# Patient Record
Sex: FEMALE | Race: WHITE | NOT HISPANIC OR LATINO | Employment: OTHER | URBAN - NONMETROPOLITAN AREA
[De-identification: names, ages, dates, MRNs, and addresses within clinical notes are randomized per-mention and may not be internally consistent; named-entity substitution may affect disease eponyms.]

---

## 2017-03-16 ENCOUNTER — TRANSCRIBE ORDERS (OUTPATIENT)
Dept: GENERAL RADIOLOGY | Facility: HOSPITAL | Age: 72
End: 2017-03-16

## 2017-03-16 ENCOUNTER — HOSPITAL ENCOUNTER (OUTPATIENT)
Dept: GENERAL RADIOLOGY | Facility: HOSPITAL | Age: 72
Discharge: HOME | End: 2017-03-16
Attending: INTERNAL MEDICINE | Admitting: INTERNAL MEDICINE

## 2017-03-16 DIAGNOSIS — N95.9 UNSPECIFIED MENOPAUSAL AND POSTMENOPAUSAL DISORDER: Primary | ICD-10-CM

## 2017-03-16 PROCEDURE — 77080 DXA BONE DENSITY AXIAL: CPT

## 2017-10-17 ENCOUNTER — HOSPITAL ENCOUNTER (OUTPATIENT)
Dept: PREADMISSION TESTING | Age: 72
Discharge: HOME | End: 2017-10-17
Payer: MEDICARE

## 2017-10-17 VITALS — WEIGHT: 168 LBS | BODY MASS INDEX: 27.99 KG/M2 | HEIGHT: 65 IN

## 2017-10-17 LAB
ANION GAP SERPL CALCULATED.3IONS-SCNC: 14 MMOL/L (ref 7–19)
BASOPHILS ABSOLUTE: 0.1 K/UL (ref 0–0.2)
BASOPHILS RELATIVE PERCENT: 0.6 % (ref 0–1)
BUN BLDV-MCNC: 12 MG/DL (ref 8–23)
CALCIUM SERPL-MCNC: 9.3 MG/DL (ref 8.8–10.2)
CHLORIDE BLD-SCNC: 100 MMOL/L (ref 98–111)
CO2: 26 MMOL/L (ref 22–29)
CREAT SERPL-MCNC: 0.6 MG/DL (ref 0.5–0.9)
EOSINOPHILS ABSOLUTE: 0.2 K/UL (ref 0–0.6)
EOSINOPHILS RELATIVE PERCENT: 2.3 % (ref 0–5)
GFR NON-AFRICAN AMERICAN: >60
GLUCOSE BLD-MCNC: 78 MG/DL (ref 74–109)
HCT VFR BLD CALC: 44.3 % (ref 37–47)
HEMOGLOBIN: 14.4 G/DL (ref 12–16)
LYMPHOCYTES ABSOLUTE: 2.6 K/UL (ref 1.1–4.5)
LYMPHOCYTES RELATIVE PERCENT: 29.7 % (ref 20–40)
MCH RBC QN AUTO: 30.6 PG (ref 27–31)
MCHC RBC AUTO-ENTMCNC: 32.5 G/DL (ref 33–37)
MCV RBC AUTO: 94.3 FL (ref 81–99)
MONOCYTES ABSOLUTE: 0.7 K/UL (ref 0–0.9)
MONOCYTES RELATIVE PERCENT: 8.3 % (ref 0–10)
NEUTROPHILS ABSOLUTE: 5.2 K/UL (ref 1.5–7.5)
NEUTROPHILS RELATIVE PERCENT: 58.9 % (ref 50–65)
PDW BLD-RTO: 13.4 % (ref 11.5–14.5)
PLATELET # BLD: 268 K/UL (ref 130–400)
PMV BLD AUTO: 11.4 FL (ref 9.4–12.3)
POTASSIUM SERPL-SCNC: 4.2 MMOL/L (ref 3.5–5)
RBC # BLD: 4.7 M/UL (ref 4.2–5.4)
SODIUM BLD-SCNC: 140 MMOL/L (ref 136–145)
WBC # BLD: 8.8 K/UL (ref 4.8–10.8)

## 2017-10-17 PROCEDURE — 80048 BASIC METABOLIC PNL TOTAL CA: CPT

## 2017-10-17 PROCEDURE — 85025 COMPLETE CBC W/AUTO DIFF WBC: CPT

## 2017-10-17 PROCEDURE — 93005 ELECTROCARDIOGRAM TRACING: CPT

## 2017-10-17 RX ORDER — LOSARTAN POTASSIUM 25 MG/1
25 TABLET ORAL DAILY
COMMUNITY
End: 2021-08-06

## 2017-10-17 RX ORDER — CAL/D3/MAG11/ZINC/COP/MANG/BOR 600 MG-800
2 TABLET ORAL DAILY
COMMUNITY
End: 2021-08-06

## 2017-10-17 RX ORDER — LEVOTHYROXINE SODIUM 0.12 MG/1
125 TABLET ORAL DAILY
COMMUNITY
End: 2022-01-12

## 2017-10-18 PROBLEM — S46.012A TRAUMATIC INCOMPLETE TEAR OF LEFT ROTATOR CUFF: Status: ACTIVE | Noted: 2017-10-18

## 2017-10-18 LAB
EKG P AXIS: 37 DEGREES
EKG P-R INTERVAL: 152 MS
EKG Q-T INTERVAL: 352 MS
EKG QRS DURATION: 76 MS
EKG QTC CALCULATION (BAZETT): 380 MS
EKG T AXIS: 29 DEGREES

## 2017-10-18 NOTE — OP NOTE
Patient Name: Osvaldo Patel  : 1945  MRN: [id number]    DATE of SURGERY: 10/19/17    SURGEON: Fabien [address] MD Cornelius    ASSISTANT: Valeri Gupta PA-C, was used as an assistant during this procedure and was utilized for patient/limb positioning, arthroscopic management during repair, wound closure, and postoperative dressing/sling application. PREOPERATIVE DIAGNOSIS:  1) Chronic traumatic incomplete rotator cuff tear of the Right shoulder  2) Acromioclavicular joint primary osteoarthritis, Right Shoulder   3) Subacromial impingement syndrome, Right Shoulder     POSTOPERATIVE DIAGNOSIS:  1) Chronic traumatic complete rotator cuff tear of the Right shoulder  2) Acromioclavicular joint primary osteoarthritis, Right Shoulder   3) Subacromial impingement syndrome, Right Shoulder     PROCEDURE PERFORMED:  1) Right Shoulder arthroscopic rotator cuff repair   2) Right Shoulder arthroscopic distal clavicle excision  3) Right Shoulder arthroscopic subacromial decompression    IMPLANTS: Arthrex 4.75 mm bioswivelock anchor    ANESTHESIA USED: General endotrachial anesthesia, interscalene block    ESTIMATED BLOOD LOSS: minimal    DRAINS: none     COMPLICATIONS: none    SPECIMENS: none    OPERATIVE INDICATIONS: This patient is a 71 y.o. 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 An MRI was obtained which showed the above named diagnoses. Pain was not relieved with conservative treatment consisting of anti-inflammatories, corticosteroid injections, physical therapy. Due to progressive pain and loss of function, surgical evaluation was discussed and the patient wished to proceed understanding risks, benefits, and alternatives.  The surgical indication were to relieve pain, improve function, and prevent future disability in regards to the shoulder pathology dictated in the aboved diagnoses. Risks included, but were not limited to, that of anesthesia, bleeding, infection, pain, damage to local structures, need for further surgery, failure of repair, stiffness, failure of implants, and loss of function. OPERATIVE FINDINGS:  1) Exam under anesthesia:  Full passive ROM, joint stable without laxity, skin intact  2) Glenohumeral Joint:       A) Chondral surfaces: Intact          B) Labrum: Intact without tear            C) Biceps: Intact without tear        D) Rotator Cuff:  Complete full thickness tear of the anterior supraspinatus, crescent shaped, bone/tissue adequate, good excursion  3) Subacromial space:         A) Large amount of dense vascular bursa         B) Type 2 acromium, hypertrophic coracoacromial ligament         C) Bursal surface rotator cuff:  Complete tear as above         D) AC joint: Hypertrophic with decreased joint space, osteophytes      PROCEDURE in DETAIL:  The patient was seen in the preoperative holding room, once again the informed consent was reviewed with the patient and signed. The site of surgery was marked with the patient's agreement. After being transported to the operating room, a timeout was performed identifying the correct patient as well as the operative site. Dose appropriate IV antibiotics were given prior to incision. The patient was positioned in the beach chair position, all bony prominences were protected and a sterile prep and drape was performed. A standard posterior arthroscopy portal was established and an outside-in technique was utilized to establish an anterior portal within the rotator interval.  An arthroscopic probe was inserted and a thorough exam of the glenohumeral joint was performed. Attention was then turned to the intraarticular portion of the rotator cuff. A probe was used to identify a complete tear of the rotator cuff and a shaver was used to debride the tendon.     The arthroscope was then transitioned to the subacromial space and an outside in technique was used to establish a lateral portal.  The arthroscopic shaver was introduced in the subacromial space and a complete bursectomy was performed with this device. Attention was turned to the anterior and lateral edge of the acromium where soft tissue was removed with a cautery device. Due to a prominent acromial spur causing impingement, an acromioplasty was performed with an arthroscopic jenna converting this to a flat type 1 morphology. The coracoaromial ligament was incised with a cautery device completing the subacromial decompression. Attention was then turned to the rotator cuff tear which was visualized from the intraarticular space. The tendon and greater tuberosity footprint were debrided followed by shuttling fibertape and fiberwire suture into the tear site with a suture passing device. Fixation was then performed to the greater tuberosity footprint with a 4.75 mm bioswivelock anchor in knotless fashion. The repair appeared to be anatomic. Attention was then turned to the acromioclavicular joint where a cautery device was utilized to remove soft tissue from the distal end of the clavicle revealing decreased joint space and osteophyte formation. An arthroscopic jenna was introduced through the anterior portal removing the entire articular portion of the distal clavicle. Less than 1 cm of bone was removed, preserving the posterior and superior ligaments to prevent instability of the joint. The arthroscope was transitioned anteriorly noting the decompression to be adequate. Portals were then closed with monocryl and a sterile dressing was applied followed by a sling. The patient was awakened from anesthesia, transported to the recovery room in stable condition.     POSTOP PLAN:    1) Discharge home with family  2) Follow up in 1 week for a clinical check  3) Follow the following protocol: Small/Medium RCR       Electronically

## 2017-10-19 ENCOUNTER — HOSPITAL ENCOUNTER (OUTPATIENT)
Age: 72
Setting detail: OUTPATIENT SURGERY
Discharge: HOME | End: 2017-10-19
Attending: ORTHOPAEDIC SURGERY | Admitting: ORTHOPAEDIC SURGERY
Payer: MEDICARE

## 2017-10-19 ENCOUNTER — ANESTHESIA EVENT (OUTPATIENT)
Dept: OPERATING ROOM | Age: 72
End: 2017-10-19
Payer: MEDICARE

## 2017-10-19 ENCOUNTER — ANESTHESIA (OUTPATIENT)
Dept: OPERATING ROOM | Age: 72
End: 2017-10-19
Payer: MEDICARE

## 2017-10-19 VITALS
HEART RATE: 76 BPM | DIASTOLIC BLOOD PRESSURE: 87 MMHG | TEMPERATURE: 97 F | OXYGEN SATURATION: 96 % | RESPIRATION RATE: 18 BRPM | SYSTOLIC BLOOD PRESSURE: 147 MMHG

## 2017-10-19 VITALS
SYSTOLIC BLOOD PRESSURE: 127 MMHG | TEMPERATURE: 97 F | RESPIRATION RATE: 3 BRPM | DIASTOLIC BLOOD PRESSURE: 77 MMHG | OXYGEN SATURATION: 94 %

## 2017-10-19 PROCEDURE — 2580000003 HC RX 258: Performed by: ANESTHESIOLOGY

## 2017-10-19 PROCEDURE — 6360000002 HC RX W HCPCS: Performed by: NURSE ANESTHETIST, CERTIFIED REGISTERED

## 2017-10-19 PROCEDURE — 6360000002 HC RX W HCPCS: Performed by: ORTHOPAEDIC SURGERY

## 2017-10-19 PROCEDURE — 6360000002 HC RX W HCPCS: Performed by: ANESTHESIOLOGY

## 2017-10-19 PROCEDURE — 64415 NJX AA&/STRD BRCH PLXS IMG: CPT | Performed by: NURSE ANESTHETIST, CERTIFIED REGISTERED

## 2017-10-19 PROCEDURE — S0028 INJECTION, FAMOTIDINE, 20 MG: HCPCS | Performed by: ANESTHESIOLOGY

## 2017-10-19 PROCEDURE — 3600000014 HC SURGERY LEVEL 4 ADDTL 15MIN: Performed by: ORTHOPAEDIC SURGERY

## 2017-10-19 PROCEDURE — 7100000000 HC PACU RECOVERY - FIRST 15 MIN: Performed by: ORTHOPAEDIC SURGERY

## 2017-10-19 PROCEDURE — 3600000004 HC SURGERY LEVEL 4 BASE: Performed by: ORTHOPAEDIC SURGERY

## 2017-10-19 PROCEDURE — 2500000003 HC RX 250 WO HCPCS: Performed by: ANESTHESIOLOGY

## 2017-10-19 PROCEDURE — C1776 JOINT DEVICE (IMPLANTABLE): HCPCS | Performed by: ORTHOPAEDIC SURGERY

## 2017-10-19 PROCEDURE — 6370000000 HC RX 637 (ALT 250 FOR IP): Performed by: ANESTHESIOLOGY

## 2017-10-19 PROCEDURE — 6370000000 HC RX 637 (ALT 250 FOR IP): Performed by: ORTHOPAEDIC SURGERY

## 2017-10-19 PROCEDURE — 3700000000 HC ANESTHESIA ATTENDED CARE: Performed by: ORTHOPAEDIC SURGERY

## 2017-10-19 PROCEDURE — 7100000010 HC PHASE II RECOVERY - FIRST 15 MIN: Performed by: ORTHOPAEDIC SURGERY

## 2017-10-19 PROCEDURE — 2580000003 HC RX 258: Performed by: ORTHOPAEDIC SURGERY

## 2017-10-19 PROCEDURE — 2720000001 HC MISC SURG SUPPLY STERILE $51-500: Performed by: ORTHOPAEDIC SURGERY

## 2017-10-19 PROCEDURE — 7100000001 HC PACU RECOVERY - ADDTL 15 MIN: Performed by: ORTHOPAEDIC SURGERY

## 2017-10-19 PROCEDURE — 3700000001 HC ADD 15 MINUTES (ANESTHESIA): Performed by: ORTHOPAEDIC SURGERY

## 2017-10-19 PROCEDURE — 7100000011 HC PHASE II RECOVERY - ADDTL 15 MIN: Performed by: ORTHOPAEDIC SURGERY

## 2017-10-19 PROCEDURE — 2720000003 HC MISC SUTURE/STAPLES/RELOADS/ETC: Performed by: ORTHOPAEDIC SURGERY

## 2017-10-19 PROCEDURE — 2720000010 HC SURG SUPPLY STERILE: Performed by: ORTHOPAEDIC SURGERY

## 2017-10-19 PROCEDURE — C1713 ANCHOR/SCREW BN/BN,TIS/BN: HCPCS | Performed by: ORTHOPAEDIC SURGERY

## 2017-10-19 PROCEDURE — 2720000000 HC MISC SURG SUPPLY STERILE $0-50: Performed by: ORTHOPAEDIC SURGERY

## 2017-10-19 PROCEDURE — 2500000003 HC RX 250 WO HCPCS: Performed by: NURSE ANESTHETIST, CERTIFIED REGISTERED

## 2017-10-19 PROCEDURE — A6258 TRANSPARENT FILM >16<=48 IN: HCPCS | Performed by: ORTHOPAEDIC SURGERY

## 2017-10-19 DEVICE — ANCHOR SUTURE BIOCOMP 4.75X19.1 MM SWIVELOCK C: Type: IMPLANTABLE DEVICE | Site: SHOULDER | Status: FUNCTIONAL

## 2017-10-19 RX ORDER — METOCLOPRAMIDE HYDROCHLORIDE 5 MG/ML
10 INJECTION INTRAMUSCULAR; INTRAVENOUS
Status: DISCONTINUED | OUTPATIENT
Start: 2017-10-19 | End: 2017-10-19 | Stop reason: HOSPADM

## 2017-10-19 RX ORDER — MORPHINE SULFATE 4 MG/ML
4 INJECTION, SOLUTION INTRAMUSCULAR; INTRAVENOUS EVERY 5 MIN PRN
Status: DISCONTINUED | OUTPATIENT
Start: 2017-10-19 | End: 2017-10-19 | Stop reason: HOSPADM

## 2017-10-19 RX ORDER — LIDOCAINE HYDROCHLORIDE 10 MG/ML
INJECTION, SOLUTION INFILTRATION; PERINEURAL PRN
Status: DISCONTINUED | OUTPATIENT
Start: 2017-10-19 | End: 2017-10-19 | Stop reason: SDUPTHER

## 2017-10-19 RX ORDER — SODIUM CHLORIDE, SODIUM LACTATE, POTASSIUM CHLORIDE, CALCIUM CHLORIDE 600; 310; 30; 20 MG/100ML; MG/100ML; MG/100ML; MG/100ML
INJECTION, SOLUTION INTRAVENOUS CONTINUOUS
Status: DISCONTINUED | OUTPATIENT
Start: 2017-10-19 | End: 2017-10-19 | Stop reason: HOSPADM

## 2017-10-19 RX ORDER — PROPOFOL 10 MG/ML
INJECTION, EMULSION INTRAVENOUS CONTINUOUS PRN
Status: DISCONTINUED | OUTPATIENT
Start: 2017-10-19 | End: 2017-10-19 | Stop reason: SDUPTHER

## 2017-10-19 RX ORDER — MIDAZOLAM HYDROCHLORIDE 1 MG/ML
2 INJECTION INTRAMUSCULAR; INTRAVENOUS
Status: COMPLETED | OUTPATIENT
Start: 2017-10-19 | End: 2017-10-19

## 2017-10-19 RX ORDER — FENTANYL CITRATE 50 UG/ML
50 INJECTION, SOLUTION INTRAMUSCULAR; INTRAVENOUS
Status: DISCONTINUED | OUTPATIENT
Start: 2017-10-19 | End: 2017-10-19 | Stop reason: HOSPADM

## 2017-10-19 RX ORDER — LABETALOL HYDROCHLORIDE 5 MG/ML
INJECTION, SOLUTION INTRAVENOUS PRN
Status: DISCONTINUED | OUTPATIENT
Start: 2017-10-19 | End: 2017-10-19 | Stop reason: SDUPTHER

## 2017-10-19 RX ORDER — PROPOFOL 10 MG/ML
INJECTION, EMULSION INTRAVENOUS PRN
Status: DISCONTINUED | OUTPATIENT
Start: 2017-10-19 | End: 2017-10-19 | Stop reason: SDUPTHER

## 2017-10-19 RX ORDER — ONDANSETRON 4 MG/1
4 TABLET, FILM COATED ORAL EVERY 8 HOURS PRN
Qty: 10 TABLET | Refills: 0 | Status: SHIPPED | OUTPATIENT
Start: 2017-10-19

## 2017-10-19 RX ORDER — ONDANSETRON 2 MG/ML
INJECTION INTRAMUSCULAR; INTRAVENOUS PRN
Status: DISCONTINUED | OUTPATIENT
Start: 2017-10-19 | End: 2017-10-19 | Stop reason: SDUPTHER

## 2017-10-19 RX ORDER — OXYCODONE HYDROCHLORIDE AND ACETAMINOPHEN 5; 325 MG/1; MG/1
1 TABLET ORAL PRN
Status: COMPLETED | OUTPATIENT
Start: 2017-10-19 | End: 2017-10-19

## 2017-10-19 RX ORDER — MORPHINE SULFATE 4 MG/ML
INJECTION, SOLUTION INTRAMUSCULAR; INTRAVENOUS
Status: DISCONTINUED
Start: 2017-10-19 | End: 2017-10-19 | Stop reason: HOSPADM

## 2017-10-19 RX ORDER — DEXAMETHASONE SODIUM PHOSPHATE 10 MG/ML
INJECTION INTRAMUSCULAR; INTRAVENOUS PRN
Status: DISCONTINUED | OUTPATIENT
Start: 2017-10-19 | End: 2017-10-19 | Stop reason: SDUPTHER

## 2017-10-19 RX ORDER — LIDOCAINE HYDROCHLORIDE 10 MG/ML
1 INJECTION, SOLUTION EPIDURAL; INFILTRATION; INTRACAUDAL; PERINEURAL
Status: DISCONTINUED | OUTPATIENT
Start: 2017-10-19 | End: 2017-10-19 | Stop reason: HOSPADM

## 2017-10-19 RX ORDER — MORPHINE SULFATE 4 MG/ML
4 INJECTION, SOLUTION INTRAMUSCULAR; INTRAVENOUS
Status: DISCONTINUED | OUTPATIENT
Start: 2017-10-19 | End: 2017-10-19 | Stop reason: HOSPADM

## 2017-10-19 RX ORDER — SODIUM CHLORIDE 0.9 % (FLUSH) 0.9 %
10 SYRINGE (ML) INJECTION PRN
Status: DISCONTINUED | OUTPATIENT
Start: 2017-10-19 | End: 2017-10-19 | Stop reason: HOSPADM

## 2017-10-19 RX ORDER — SODIUM CHLORIDE 0.9 % (FLUSH) 0.9 %
10 SYRINGE (ML) INJECTION EVERY 12 HOURS SCHEDULED
Status: DISCONTINUED | OUTPATIENT
Start: 2017-10-19 | End: 2017-10-19 | Stop reason: HOSPADM

## 2017-10-19 RX ORDER — MORPHINE SULFATE 4 MG/ML
2 INJECTION, SOLUTION INTRAMUSCULAR; INTRAVENOUS EVERY 5 MIN PRN
Status: DISCONTINUED | OUTPATIENT
Start: 2017-10-19 | End: 2017-10-19 | Stop reason: HOSPADM

## 2017-10-19 RX ORDER — LABETALOL HYDROCHLORIDE 5 MG/ML
5 INJECTION, SOLUTION INTRAVENOUS EVERY 10 MIN PRN
Status: DISCONTINUED | OUTPATIENT
Start: 2017-10-19 | End: 2017-10-19 | Stop reason: HOSPADM

## 2017-10-19 RX ORDER — DIPHENHYDRAMINE HYDROCHLORIDE 50 MG/ML
12.5 INJECTION INTRAMUSCULAR; INTRAVENOUS
Status: DISCONTINUED | OUTPATIENT
Start: 2017-10-19 | End: 2017-10-19 | Stop reason: HOSPADM

## 2017-10-19 RX ORDER — HYDRALAZINE HYDROCHLORIDE 20 MG/ML
5 INJECTION INTRAMUSCULAR; INTRAVENOUS EVERY 10 MIN PRN
Status: DISCONTINUED | OUTPATIENT
Start: 2017-10-19 | End: 2017-10-19 | Stop reason: HOSPADM

## 2017-10-19 RX ORDER — OXYCODONE HYDROCHLORIDE AND ACETAMINOPHEN 5; 325 MG/1; MG/1
2 TABLET ORAL PRN
Status: COMPLETED | OUTPATIENT
Start: 2017-10-19 | End: 2017-10-19

## 2017-10-19 RX ORDER — MEPERIDINE HYDROCHLORIDE 50 MG/ML
12.5 INJECTION INTRAMUSCULAR; INTRAVENOUS; SUBCUTANEOUS EVERY 5 MIN PRN
Status: DISCONTINUED | OUTPATIENT
Start: 2017-10-19 | End: 2017-10-19 | Stop reason: HOSPADM

## 2017-10-19 RX ORDER — SCOLOPAMINE TRANSDERMAL SYSTEM 1 MG/1
1 PATCH, EXTENDED RELEASE TRANSDERMAL
Status: DISCONTINUED | OUTPATIENT
Start: 2017-10-19 | End: 2017-10-19 | Stop reason: HOSPADM

## 2017-10-19 RX ORDER — ENALAPRILAT 2.5 MG/2ML
1.25 INJECTION INTRAVENOUS
Status: DISCONTINUED | OUTPATIENT
Start: 2017-10-19 | End: 2017-10-19 | Stop reason: HOSPADM

## 2017-10-19 RX ORDER — PROMETHAZINE HYDROCHLORIDE 25 MG/ML
6.25 INJECTION, SOLUTION INTRAMUSCULAR; INTRAVENOUS
Status: DISCONTINUED | OUTPATIENT
Start: 2017-10-19 | End: 2017-10-19 | Stop reason: HOSPADM

## 2017-10-19 RX ORDER — ROCURONIUM BROMIDE 10 MG/ML
INJECTION, SOLUTION INTRAVENOUS PRN
Status: DISCONTINUED | OUTPATIENT
Start: 2017-10-19 | End: 2017-10-19 | Stop reason: SDUPTHER

## 2017-10-19 RX ORDER — OXYCODONE AND ACETAMINOPHEN 10; 325 MG/1; MG/1
TABLET ORAL
Qty: 60 TABLET | Refills: 0 | Status: ON HOLD | OUTPATIENT
Start: 2017-10-19 | End: 2021-09-21 | Stop reason: SINTOL

## 2017-10-19 RX ORDER — ROPIVACAINE HYDROCHLORIDE 5 MG/ML
INJECTION, SOLUTION EPIDURAL; INFILTRATION; PERINEURAL PRN
Status: DISCONTINUED | OUTPATIENT
Start: 2017-10-19 | End: 2017-10-19 | Stop reason: SDUPTHER

## 2017-10-19 RX ADMIN — SODIUM CHLORIDE, SODIUM LACTATE, POTASSIUM CHLORIDE, AND CALCIUM CHLORIDE: 600; 310; 30; 20 INJECTION, SOLUTION INTRAVENOUS at 08:11

## 2017-10-19 RX ADMIN — DEXAMETHASONE SODIUM PHOSPHATE 10 MG: 10 INJECTION INTRAMUSCULAR; INTRAVENOUS at 08:24

## 2017-10-19 RX ADMIN — SODIUM CHLORIDE, SODIUM LACTATE, POTASSIUM CHLORIDE, AND CALCIUM CHLORIDE: 600; 310; 30; 20 INJECTION, SOLUTION INTRAVENOUS at 07:24

## 2017-10-19 RX ADMIN — LABETALOL HYDROCHLORIDE 5 MG: 5 INJECTION, SOLUTION INTRAVENOUS at 09:02

## 2017-10-19 RX ADMIN — LIDOCAINE HYDROCHLORIDE 50 MG: 10 INJECTION, SOLUTION INFILTRATION; PERINEURAL at 08:15

## 2017-10-19 RX ADMIN — OXYCODONE HYDROCHLORIDE AND ACETAMINOPHEN 2 TABLET: 5; 325 TABLET ORAL at 10:19

## 2017-10-19 RX ADMIN — MORPHINE SULFATE 4 MG: 4 INJECTION, SOLUTION INTRAMUSCULAR; INTRAVENOUS at 09:39

## 2017-10-19 RX ADMIN — LABETALOL HYDROCHLORIDE 5 MG: 5 INJECTION, SOLUTION INTRAVENOUS at 08:40

## 2017-10-19 RX ADMIN — PROPOFOL 140 MCG/KG/MIN: 10 INJECTION, EMULSION INTRAVENOUS at 08:18

## 2017-10-19 RX ADMIN — CEFAZOLIN SODIUM 1 G: 1 INJECTION, SOLUTION INTRAVENOUS at 08:22

## 2017-10-19 RX ADMIN — PROPOFOL 170 MG: 10 INJECTION, EMULSION INTRAVENOUS at 08:15

## 2017-10-19 RX ADMIN — MIDAZOLAM 2 MG: 1 INJECTION INTRAMUSCULAR; INTRAVENOUS at 07:45

## 2017-10-19 RX ADMIN — SODIUM CHLORIDE, SODIUM LACTATE, POTASSIUM CHLORIDE, AND CALCIUM CHLORIDE: 600; 310; 30; 20 INJECTION, SOLUTION INTRAVENOUS at 09:04

## 2017-10-19 RX ADMIN — SUGAMMADEX 160 MG: 100 INJECTION, SOLUTION INTRAVENOUS at 09:12

## 2017-10-19 RX ADMIN — ONDANSETRON HYDROCHLORIDE 4 MG: 2 SOLUTION INTRAMUSCULAR; INTRAVENOUS at 08:24

## 2017-10-19 RX ADMIN — FAMOTIDINE 20 MG: 10 INJECTION, SOLUTION INTRAVENOUS at 07:35

## 2017-10-19 RX ADMIN — ROPIVACAINE HYDROCHLORIDE 20 ML: 5 INJECTION, SOLUTION EPIDURAL; INFILTRATION; PERINEURAL at 07:49

## 2017-10-19 RX ADMIN — ROCURONIUM BROMIDE 50 MG: 10 INJECTION INTRAVENOUS at 08:15

## 2017-10-19 ASSESSMENT — PAIN DESCRIPTION - FREQUENCY: FREQUENCY: INTERMITTENT

## 2017-10-19 ASSESSMENT — PAIN DESCRIPTION - ORIENTATION: ORIENTATION: RIGHT;UPPER

## 2017-10-19 ASSESSMENT — PAIN DESCRIPTION - LOCATION: LOCATION: SHOULDER

## 2017-10-19 ASSESSMENT — PAIN SCALES - GENERAL
PAINLEVEL_OUTOF10: 6
PAINLEVEL_OUTOF10: 9
PAINLEVEL_OUTOF10: 9

## 2017-10-19 ASSESSMENT — PAIN DESCRIPTION - DESCRIPTORS: DESCRIPTORS: BURNING

## 2017-10-19 NOTE — ANESTHESIA PRE PROCEDURE
Department of Anesthesiology  Preprocedure Note       Name:  Boni Washburn   Age:  71 y.o.  :  1945                                          MRN:  [id number]         Date:  10/19/2017      Surgeon: Krystal Mclaughlin):  López Butler MD    Procedure: Procedure(s):  SHOULDER ARTHROSCOPY ROTATOR CUFF REPAIR/ DEBRIDEMENT BICEPS TENODESIS/ TENOTOMY SUBACROMIAL DECOMPRESSION/ DISTAL CLAVICLE EXCISION    Medications prior to admission:   Prior to Admission medications    Medication Sig Start Date End Date Taking? Authorizing Provider   levothyroxine (SYNTHROID) 125 MCG tablet Take 125 mcg by mouth Daily    Historical Provider, MD   losartan (COZAAR) 25 MG tablet Take 25 mg by mouth daily    Historical Provider, MD   CALTRATE 600+D PLUS MINERALS (CALTRATE) 600-800 MG-UNIT TABS tablet Take 2 tablets by mouth daily    Historical Provider, MD       Current medications:    Current Facility-Administered Medications   Medication Dose Route Frequency Provider Last Rate Last Dose    ceFAZolin (ANCEF) 1 g in dextrose 5 % 50 mL IVPB (premix)  1 g Intravenous Once López Butler MD        lactated ringers infusion   Intravenous Continuous López Butler MD           Allergies:     Allergies   Allergen Reactions    Diclofenac Other (See Comments)     Couldn't sleep and face turned blood red       Problem List:    Patient Active Problem List   Diagnosis Code    Traumatic incomplete tear of left rotator cuff S46.012A       Past Medical History:        Diagnosis Date    Arthritis     Hypertension     Hypoglycemia     if fasting for very long    Shoulder pain     incompleted rcr; possible injury    Thyroid disease        Past Surgical History:        Procedure Laterality Date    CHOLECYSTECTOMY      HYSTERECTOMY      WRIST SURGERY      spur excision       Social History:    Social History   Substance Use Topics    Smoking status: Never Smoker    Smokeless tobacco: Never Used    Alcohol use Yes      Comment: rare Counseling given: Not Answered      Vital Signs (Current): There were no vitals filed for this visit. BP Readings from Last 3 Encounters:   No data found for BP       NPO Status:                                                                                 BMI:   Wt Readings from Last 3 Encounters:   10/17/17 168 lb (76.2 kg)     There is no height or weight on file to calculate BMI.    CBC:   Lab Results   Component Value Date    WBC 8.8 10/17/2017    RBC 4.70 10/17/2017    HGB 14.4 10/17/2017    HCT 44.3 10/17/2017    MCV 94.3 10/17/2017    RDW 13.4 10/17/2017     10/17/2017       CMP:   Lab Results   Component Value Date     10/17/2017    K 4.2 10/17/2017     10/17/2017    CO2 26 10/17/2017    BUN 12 10/17/2017    CREATININE 0.6 10/17/2017    LABGLOM >60 10/17/2017    GLUCOSE 78 10/17/2017    CALCIUM 9.3 10/17/2017       POC Tests: No results for input(s): POCGLU, POCNA, POCK, POCCL, POCBUN, POCHEMO, POCHCT in the last 72 hours. Coags: No results found for: PROTIME, INR, APTT    HCG (If Applicable): No results found for: PREGTESTUR, PREGSERUM, HCG, HCGQUANT     ABGs: No results found for: PHART, PO2ART, RZG0VQB, PBZ2BGM, BEART, E0PWCAQQ     Type & Screen (If Applicable):  No results found for: [facility]    Anesthesia Evaluation  Patient summary reviewed and Nursing notes reviewed   history of anesthetic complications: PONV. Airway: Mallampati: II  TM distance: >3 FB   Neck ROM: full  Mouth opening: > = 3 FB Dental:          Pulmonary:negative ROS and normal exam                               Cardiovascular:    (+) hypertension:,       ECG reviewed               Beta Blocker:  Not on Beta Blocker         Neuro/Psych:   {neg ROS              GI/Hepatic/Renal: neg ROS            Endo/Other: negative ROS   (+) hypothyroidism: arthritis:.                  Abdominal:           Vascular:

## 2017-10-19 NOTE — H&P
Pt Name: Barber Rodriguez  MRN: [id number]  YOB: 1945  Date of evaluation: 10/19/2017    H&P including current review of systems was updated in the paper chart and/or the document previously scanned into the record. There have been no significant changes or new problems since the original evaluation. The patient's problems continue and indications for contemplated procedure have not changed.     Electronically signed by Michelle Limon MD on 10/19/2017 at 6:32 AM

## 2017-10-19 NOTE — DISCHARGE INSTR - DIET
 Good nutrition is important when healing from an illness, injury, or surgery. Follow any nutrition recommendations given to you during your hospital stay.    Resume usual diet

## 2018-03-02 ENCOUNTER — TELEPHONE (OUTPATIENT)
Dept: INTERNAL MEDICINE | Facility: CLINIC | Age: 73
End: 2018-03-02

## 2018-03-02 ENCOUNTER — LAB (OUTPATIENT)
Dept: LAB | Facility: HOSPITAL | Age: 73
End: 2018-03-02

## 2018-03-02 ENCOUNTER — OFFICE VISIT (OUTPATIENT)
Dept: INTERNAL MEDICINE | Facility: CLINIC | Age: 73
End: 2018-03-02

## 2018-03-02 VITALS
HEIGHT: 65 IN | DIASTOLIC BLOOD PRESSURE: 82 MMHG | TEMPERATURE: 97.8 F | HEART RATE: 83 BPM | OXYGEN SATURATION: 97 % | BODY MASS INDEX: 29.22 KG/M2 | WEIGHT: 175.4 LBS | RESPIRATION RATE: 16 BRPM | SYSTOLIC BLOOD PRESSURE: 145 MMHG

## 2018-03-02 DIAGNOSIS — E03.9 ACQUIRED HYPOTHYROIDISM: ICD-10-CM

## 2018-03-02 DIAGNOSIS — R06.02 SHORTNESS OF BREATH: ICD-10-CM

## 2018-03-02 DIAGNOSIS — Z00.00 ENCOUNTER FOR PREVENTIVE HEALTH EXAMINATION: ICD-10-CM

## 2018-03-02 DIAGNOSIS — M85.89 OSTEOPENIA OF MULTIPLE SITES: ICD-10-CM

## 2018-03-02 DIAGNOSIS — E03.9 ACQUIRED HYPOTHYROIDISM: Primary | ICD-10-CM

## 2018-03-02 DIAGNOSIS — Z12.39 SCREENING FOR BREAST CANCER: ICD-10-CM

## 2018-03-02 DIAGNOSIS — I10 ESSENTIAL HYPERTENSION: Primary | ICD-10-CM

## 2018-03-02 DIAGNOSIS — R05.9 COUGH: ICD-10-CM

## 2018-03-02 PROBLEM — E78.2 MIXED HYPERLIPIDEMIA: Status: ACTIVE | Noted: 2018-03-02

## 2018-03-02 PROBLEM — M54.2 CERVICALGIA: Status: ACTIVE | Noted: 2018-03-02

## 2018-03-02 PROBLEM — M15.9 OSTEOARTHRITIS OF MULTIPLE JOINTS: Status: ACTIVE | Noted: 2018-03-02

## 2018-03-02 PROBLEM — M54.2 CERVICALGIA: Status: RESOLVED | Noted: 2018-03-02 | Resolved: 2018-03-02

## 2018-03-02 LAB
ALBUMIN SERPL-MCNC: 4.1 G/DL (ref 3.5–5)
ALBUMIN/GLOB SERPL: 1.3 G/DL (ref 1.1–2.5)
ALP SERPL-CCNC: 71 U/L (ref 24–120)
ALT SERPL W P-5'-P-CCNC: 29 U/L (ref 0–54)
ANION GAP SERPL CALCULATED.3IONS-SCNC: 11 MMOL/L (ref 4–13)
ARTICHOKE IGE QN: 156 MG/DL (ref 0–99)
AST SERPL-CCNC: 34 U/L (ref 7–45)
BILIRUB SERPL-MCNC: 0.3 MG/DL (ref 0.1–1)
BUN BLD-MCNC: 16 MG/DL (ref 5–21)
BUN/CREAT SERPL: 23.9 (ref 7–25)
CALCIUM SPEC-SCNC: 9.4 MG/DL (ref 8.4–10.4)
CHLORIDE SERPL-SCNC: 102 MMOL/L (ref 98–110)
CHOLEST SERPL-MCNC: 221 MG/DL (ref 130–200)
CO2 SERPL-SCNC: 27 MMOL/L (ref 24–31)
CREAT BLD-MCNC: 0.67 MG/DL (ref 0.5–1.4)
DEPRECATED RDW RBC AUTO: 41.4 FL (ref 40–54)
ERYTHROCYTE [DISTWIDTH] IN BLOOD BY AUTOMATED COUNT: 12.8 % (ref 12–15)
GFR SERPL CREATININE-BSD FRML MDRD: 87 ML/MIN/1.73
GLOBULIN UR ELPH-MCNC: 3.2 GM/DL
GLUCOSE BLD-MCNC: 87 MG/DL (ref 70–100)
HCT VFR BLD AUTO: 41.7 % (ref 37–47)
HCV AB SER DONR QL: NEGATIVE
HCV S/C RATIO: 0.01 (ref 0–0.99)
HDLC SERPL-MCNC: 40 MG/DL
HGB BLD-MCNC: 13.9 G/DL (ref 12–16)
LDLC/HDLC SERPL: 3.48 {RATIO}
MCH RBC QN AUTO: 29.5 PG (ref 28–32)
MCHC RBC AUTO-ENTMCNC: 33.3 G/DL (ref 33–36)
MCV RBC AUTO: 88.5 FL (ref 82–98)
PLATELET # BLD AUTO: 269 10*3/MM3 (ref 130–400)
PMV BLD AUTO: 11.3 FL (ref 6–12)
POTASSIUM BLD-SCNC: 4.4 MMOL/L (ref 3.5–5.3)
PROT SERPL-MCNC: 7.3 G/DL (ref 6.3–8.7)
RBC # BLD AUTO: 4.71 10*6/MM3 (ref 4.2–5.4)
SODIUM BLD-SCNC: 140 MMOL/L (ref 135–145)
TRIGL SERPL-MCNC: 210 MG/DL (ref 0–149)
TSH SERPL DL<=0.05 MIU/L-ACNC: 0.28 MIU/ML (ref 0.47–4.68)
WBC NRBC COR # BLD: 8.15 10*3/MM3 (ref 4.8–10.8)

## 2018-03-02 PROCEDURE — 85027 COMPLETE CBC AUTOMATED: CPT | Performed by: NURSE PRACTITIONER

## 2018-03-02 PROCEDURE — 99214 OFFICE O/P EST MOD 30 MIN: CPT | Performed by: NURSE PRACTITIONER

## 2018-03-02 PROCEDURE — 36415 COLL VENOUS BLD VENIPUNCTURE: CPT

## 2018-03-02 PROCEDURE — 80053 COMPREHEN METABOLIC PANEL: CPT | Performed by: NURSE PRACTITIONER

## 2018-03-02 PROCEDURE — 86803 HEPATITIS C AB TEST: CPT | Performed by: NURSE PRACTITIONER

## 2018-03-02 PROCEDURE — 80061 LIPID PANEL: CPT | Performed by: NURSE PRACTITIONER

## 2018-03-02 PROCEDURE — 84443 ASSAY THYROID STIM HORMONE: CPT | Performed by: NURSE PRACTITIONER

## 2018-03-02 RX ORDER — CAL/D3/MAG11/ZINC/COP/MANG/BOR 600 MG-800
1 TABLET ORAL DAILY
COMMUNITY
End: 2023-03-28

## 2018-03-02 RX ORDER — LOSARTAN POTASSIUM 25 MG/1
25 TABLET ORAL DAILY
COMMUNITY
End: 2018-08-31 | Stop reason: SDUPTHER

## 2018-03-02 RX ORDER — LEVOTHYROXINE SODIUM 112 UG/1
112 TABLET ORAL DAILY
Qty: 30 TABLET | Refills: 5 | Status: SHIPPED | OUTPATIENT
Start: 2018-03-02 | End: 2018-09-05 | Stop reason: SDUPTHER

## 2018-03-02 RX ORDER — LEVOTHYROXINE SODIUM 0.12 MG/1
1 TABLET ORAL DAILY
COMMUNITY
End: 2018-03-02

## 2018-03-02 NOTE — TELEPHONE ENCOUNTER
Patient has refused statin therapy at this time.  We will continue to encourage this at future appointments.

## 2018-03-02 NOTE — TELEPHONE ENCOUNTER
Please advise that her cholesterol is elevated.  Total is 221, HDL is 40 and LDL is 156.  Triglycerides 210.  Her risk of having a heart attack or stroke within the next 10 years is 18.8%  Statin therapy is strongly advised for anything over 7.5%.  She said that she was strongly against these medications, however, please check to see if she is willing to try one herself.  She very well may not have the same reaction her  had.  We would recommend this for her.  Also, I need to adjust her levothyroxine. Her TSH was only 0.278, so I need to decrease her levothyroxine dose to 112 mcg.  We will need to recheck her TSH in 6 weeks also. The order will be in and she can just show up at the lab at her convenience. Please ask her what pharmacy she would like her meds to go to.

## 2018-03-02 NOTE — TELEPHONE ENCOUNTER
"Patient informed of her elevated cholesterol levels and risk for heart attack or stroke and was told we highly recommend starting statin therapy.  Patient said because of the history of her 's issues with statins, she does not want to start therapy right now, but said \"I will think about it.\"  Patient also informed of the change in her levothyroxine dosage and stated she will return to have her TSH level checked again around the 18th of next month. Patient would like her medications sent to [facility] on [address].  "

## 2018-03-02 NOTE — PROGRESS NOTES
CC: establish care; HTN, hypothyroid    History:  Ирина Randolph is a 72 y.o. female who presents today for evaluation of the above problems.    BP is elevated today, but normally runs <140/90 at home. She did have labs done in February and her TSH was normal.   She did have right rotator cuff repair this past October and is doing home therapy 2x per week still. She is extremely averse to statin therapy because her  had a severe reaction.  She does have a chronic cough and shortness of breath with exertion.  This has been a problem for 15 years. She has not had pulmonary function testing done in the past. She denies any history of tobacco use or second hand smoke exposure. She does have wheezing at night time.  She currently uses cough drops and OTC cough syrups to help.  She has had a history of anxiety/depression, however this is not currently a problem. She has had a pneumonia vaccine, however, she is not sure which one and if she has had more than one vaccination.      ROS:  Review of Systems   Constitutional: Negative for chills, fatigue, fever and unexpected weight change.   HENT: Negative for congestion, postnasal drip, rhinorrhea, sinus pain, sinus pressure, sneezing and sore throat.    Eyes: Positive for itching. Negative for visual disturbance.   Respiratory: Positive for cough, shortness of breath and wheezing. Negative for chest tightness.    Cardiovascular: Negative for chest pain and leg swelling.   Gastrointestinal: Negative for abdominal distention, abdominal pain, blood in stool, constipation, diarrhea, nausea and vomiting.   Endocrine: Negative for polyuria.   Genitourinary: Negative for decreased urine volume, dysuria, hematuria and urgency.   Musculoskeletal: Negative for arthralgias and myalgias.   Skin: Negative for rash.   Allergic/Immunologic: Negative for environmental allergies.   Neurological: Negative for dizziness, seizures, light-headedness and headaches.   Psychiatric/Behavioral:  "Negative for dysphoric mood and sleep disturbance.       Allergies   Allergen Reactions   • Diclofenac Other (See Comments)     Couldn't sleep and face turned blood red   • Percocet [Oxycodone-Acetaminophen] Hives     itching     Past Medical History:   Diagnosis Date   • Acute bronchitis    • Arthritis     Osteoarthritis     Past Surgical History:   Procedure Laterality Date   • ABDOMINAL HYSTERECTOMY     • CHOLECYSTECTOMY  1994   • ROTATOR CUFF REPAIR  10/19/2017     Family History   Problem Relation Age of Onset   • Alcohol abuse Father    • Breast cancer Maternal Aunt    • Bone cancer Maternal Aunt    • Leukemia Maternal Aunt       reports that she has never smoked. She has never used smokeless tobacco. She reports that she does not use illicit drugs.      Current Outpatient Prescriptions:   •  Cholecalciferol (VITAMIN D3 PO), Take 2,000 mg by mouth Daily., Disp: , Rfl:   •  NON FORMULARY, Daily., Disp: , Rfl:   •  Turmeric 450 MG capsule, Take 1 tablet by mouth Daily., Disp: , Rfl:   •  Calcium Carbonate-Vit D-Min (CALTRATE 600+D PLUS MINERALS) 600-800 MG-UNIT tablet, Take 1 tablet by mouth Daily., Disp: , Rfl:   •  levothyroxine (SYNTHROID, LEVOTHROID) 125 MCG tablet, Take 1 tablet by mouth Daily., Disp: , Rfl:   •  losartan (COZAAR) 25 MG tablet, Take 25 mg by mouth Daily., Disp: , Rfl:     OBJECTIVE:  /82 (BP Location: Left arm, Patient Position: Sitting, Cuff Size: Adult)  Pulse 83  Temp 97.8 °F (36.6 °C) (Oral)   Resp 16  Ht 165.1 cm (65\")  Wt 79.6 kg (175 lb 6.4 oz)  SpO2 97%  BMI 29.19 kg/m2   Physical Exam   Constitutional: She is oriented to person, place, and time. Vital signs are normal. She appears well-developed and well-nourished. She is cooperative. She does not have a sickly appearance.   HENT:   Head: Normocephalic.   Right Ear: Tympanic membrane, external ear and ear canal normal.   Left Ear: External ear and ear canal normal.   Nose: Right sinus exhibits no maxillary sinus " tenderness and no frontal sinus tenderness. Left sinus exhibits no maxillary sinus tenderness and no frontal sinus tenderness.   Mouth/Throat: Uvula is midline, oropharynx is clear and moist and mucous membranes are normal. No posterior oropharyngeal edema.   Left TM not visible due to cerumen.   Eyes: Conjunctivae and lids are normal. Right eye exhibits no discharge. Left eye exhibits no discharge.   Cardiovascular: Normal rate, regular rhythm and normal heart sounds.  Exam reveals no gallop and no friction rub.    No murmur heard.  Pulmonary/Chest: Effort normal and breath sounds normal. No tachypnea. No respiratory distress. She has no wheezes. She has no rales. She exhibits no tenderness.   Abdominal: Soft. Bowel sounds are normal. She exhibits no distension. There is no tenderness.   Neurological: She is alert and oriented to person, place, and time.   Skin: Skin is warm, dry and intact. No rash noted. She is not diaphoretic. No erythema.   Psychiatric: She has a normal mood and affect. Her speech is normal and behavior is normal. Thought content normal.   Vitals reviewed.      Assessment/Plan    Diagnoses and all orders for this visit:    Essential hypertension  BP only mildly elevated today and she states that it is generally <140/90 at home.  No changes at this time.    Acquired hypothyroidism  Will evaluate TSH and adjust therapy as indicated.    Screening for breast cancer  -     Mammo Screening Bilateral With CAD; Future  She did take a printed order with her for a mammogram due in May.    Cough  Shortness of breath  -     Full Pulmonary Function Test With Bronchodilator; Future  This has been an ongoing problem for many years.  She has not had pulmonary function testing done in the past and I believe that it would be advisable to evaluate this due to the chronic nature of her complaint.     Osteopenia of multiple sites  Continue calcium and vitamin d supplementation.    Encounter for preventive health  examination  -     Comprehensive Metabolic Panel; Future  -     Lipid panel; Future  -     TSH; Future  -     CBC (No Diff); Future  -     Hepatitis C antibody; Future  She will schedule an appointment for a Medicare Wellness visit.  I will request vaccination records from Dr. Ford.      An After Visit Summary was printed and given to the patient at discharge.  Return for Medicare Wellness.         Summer Cota, APRN . 3/2/2018

## 2018-03-16 ENCOUNTER — HOSPITAL ENCOUNTER (OUTPATIENT)
Dept: PULMONOLOGY | Facility: HOSPITAL | Age: 73
Discharge: HOME | End: 2018-03-16
Admitting: NURSE PRACTITIONER

## 2018-03-16 DIAGNOSIS — R06.02 SHORTNESS OF BREATH: ICD-10-CM

## 2018-03-16 DIAGNOSIS — R05.9 COUGH: ICD-10-CM

## 2018-03-16 PROCEDURE — 94060 EVALUATION OF WHEEZING: CPT

## 2018-03-16 PROCEDURE — 94726 PLETHYSMOGRAPHY LUNG VOLUMES: CPT

## 2018-03-16 PROCEDURE — 94729 DIFFUSING CAPACITY: CPT

## 2018-03-16 RX ORDER — ALBUTEROL SULFATE 2.5 MG/3ML
2.5 SOLUTION RESPIRATORY (INHALATION) ONCE
Status: COMPLETED | OUTPATIENT
Start: 2018-03-16 | End: 2018-03-16

## 2018-03-16 RX ADMIN — ALBUTEROL SULFATE 2.5 MG: 2.5 SOLUTION RESPIRATORY (INHALATION) at 11:00

## 2018-03-20 ENCOUNTER — TELEPHONE (OUTPATIENT)
Dept: INTERNAL MEDICINE | Facility: CLINIC | Age: 73
End: 2018-03-20

## 2018-03-20 RX ORDER — ALBUTEROL SULFATE 90 UG/1
2 AEROSOL, METERED RESPIRATORY (INHALATION) EVERY 6 HOURS PRN
Qty: 1 INHALER | Refills: 3 | Status: SHIPPED | OUTPATIENT
Start: 2018-03-20

## 2018-03-20 NOTE — TELEPHONE ENCOUNTER
Please advise Mrs. Randolph that her pulmonary function testing did not show any asthma or COPD. All testing was normal, however, her lung function did improve after she used the bronchodilator.   So, I am going to order an albuterol inhaler for her to use every 6 hours as needed for shortness of breath or wheezing.  I have sent the order to [facility].

## 2018-04-09 ENCOUNTER — TELEPHONE (OUTPATIENT)
Dept: INTERNAL MEDICINE | Facility: CLINIC | Age: 73
End: 2018-04-09

## 2018-04-09 NOTE — TELEPHONE ENCOUNTER
It appears that she had pneumococcal vaccination in 2010 (age 64) and in 2015 (age 69). Age 64 would have been Pneumo 23 and at age 69 should have been Prevnar 13.  She will need one additional dose of Pneumo 23 and then her pneumococcal vaccinations will be complete.  Will plan to give this at her appointment on 4/18.

## 2018-04-18 ENCOUNTER — LAB (OUTPATIENT)
Dept: LAB | Facility: HOSPITAL | Age: 73
End: 2018-04-18

## 2018-04-18 ENCOUNTER — OFFICE VISIT (OUTPATIENT)
Dept: INTERNAL MEDICINE | Facility: CLINIC | Age: 73
End: 2018-04-18

## 2018-04-18 ENCOUNTER — TELEPHONE (OUTPATIENT)
Dept: INTERNAL MEDICINE | Facility: CLINIC | Age: 73
End: 2018-04-18

## 2018-04-18 VITALS
DIASTOLIC BLOOD PRESSURE: 91 MMHG | HEART RATE: 86 BPM | RESPIRATION RATE: 16 BRPM | BODY MASS INDEX: 29.85 KG/M2 | OXYGEN SATURATION: 96 % | TEMPERATURE: 97.9 F | SYSTOLIC BLOOD PRESSURE: 142 MMHG | WEIGHT: 179.4 LBS

## 2018-04-18 DIAGNOSIS — Z23 ENCOUNTER FOR IMMUNIZATION: ICD-10-CM

## 2018-04-18 DIAGNOSIS — E78.2 MIXED HYPERLIPIDEMIA: ICD-10-CM

## 2018-04-18 DIAGNOSIS — Z00.00 MEDICARE ANNUAL WELLNESS VISIT, INITIAL: Primary | ICD-10-CM

## 2018-04-18 DIAGNOSIS — E03.9 ACQUIRED HYPOTHYROIDISM: ICD-10-CM

## 2018-04-18 LAB — TSH SERPL DL<=0.05 MIU/L-ACNC: 0.89 MIU/ML (ref 0.47–4.68)

## 2018-04-18 PROCEDURE — 36415 COLL VENOUS BLD VENIPUNCTURE: CPT

## 2018-04-18 PROCEDURE — 84443 ASSAY THYROID STIM HORMONE: CPT | Performed by: NURSE PRACTITIONER

## 2018-04-18 PROCEDURE — G0439 PPPS, SUBSEQ VISIT: HCPCS | Performed by: NURSE PRACTITIONER

## 2018-04-18 NOTE — PROGRESS NOTES
QUICK REFERENCE INFORMATION:  The ABCs of the Annual Wellness Visit    Initial Medicare Wellness Visit    HEALTH RISK ASSESSMENT    1945    Recent Hospitalizations:  No hospitalization(s) within the last year..        Current Medical Providers:  Patient Care Team:  Serafin Andrade DO as PCP - General (Internal Medicine)  Delmar Ford DO as PCP - Claims Attributed        Smoking Status:  History   Smoking Status   • Never Smoker   Smokeless Tobacco   • Never Used       Alcohol Consumption:  History   Alcohol use Not on file       Depression Screen:   PHQ-2/PHQ-9 Depression Screening 3/2/2018   Little interest or pleasure in doing things 0   Feeling down, depressed, or hopeless 0   Total Score 0       Health Habits and Functional and Cognitive Screening:  Functional & Cognitive Status 4/18/2018   Do you have difficulty preparing food and eating? No   Do you have difficulty bathing yourself, getting dressed or grooming yourself? No   Do you have difficulty using the toilet? No   Do you have difficulty moving around from place to place? No   Do you have trouble with steps or getting out of a bed or a chair? No   In the past year have you fallen or experienced a near fall? No   Current Diet Limited Junk Food   Dental Exam Up to date   Eye Exam Up to date   Exercise (times per week) 3 times per week   Current Exercise Activities Include Walking   Do you need help using the phone?  No   Are you deaf or do you have serious difficulty hearing?  No   Do you need help with transportation? No   Do you need help shopping? No   Do you need help preparing meals?  No   Do you need help with housework?  No   Do you need help with laundry? No   Do you need help taking your medications? No   Do you need help managing money? No   Do you ever drive or ride in a car without wearing a seat belt? No   Have you felt unusual stress, anger or loneliness in the last month? No   Who do you live with? Alone   If you need help, do you  have trouble finding someone available to you? No   Have you been bothered in the last four weeks by sexual problems? No   Do you have difficulty concentrating, remembering or making decisions? No           Does the patient have evidence of cognitive impairment? No    Asiprin use counseling: Does not need ASA (and currently is not on it)      Recent Lab Results:    Visual Acuity:  No exam data present    Age-appropriate Screening Schedule:  Refer to the list below for future screening recommendations based on patient's age, sex and/or medical conditions. Orders for these recommended tests are listed in the plan section. The patient has been provided with a written plan.    Health Maintenance   Topic Date Due   • TDAP/TD VACCINES (1 - Tdap) 10/05/1965   • PNEUMOCOCCAL VACCINES (65+ LOW/MEDIUM RISK) (1 of 2 - PCV13) 10/05/2011   • MAMMOGRAM  03/01/2018   • COLONOSCOPY  03/01/2018   • ZOSTER VACCINE  03/01/2018   • INFLUENZA VACCINE  08/01/2018   • LIPID PANEL  03/02/2019        Subjective   History of Present Illness    Ирина Randolph is a 72 y.o. female who presents for an Annual Wellness Visit.    The following portions of the patient's history were reviewed and updated as appropriate: allergies, current medications, past family history, past medical history, past social history, past surgical history and problem list.    Outpatient Medications Prior to Visit   Medication Sig Dispense Refill   • albuterol (PROVENTIL HFA;VENTOLIN HFA) 108 (90 Base) MCG/ACT inhaler Inhale 2 puffs Every 6 (Six) Hours As Needed for Wheezing or Shortness of Air. 1 inhaler 3   • Calcium Carbonate-Vit D-Min (CALTRATE 600+D PLUS MINERALS) 600-800 MG-UNIT tablet Take 1 tablet by mouth Daily.     • Cholecalciferol (VITAMIN D3 PO) Take 2,000 mg by mouth Daily.     • levothyroxine (SYNTHROID, LEVOTHROID) 112 MCG tablet Take 1 tablet by mouth Daily. 30 tablet 5   • losartan (COZAAR) 25 MG tablet Take 25 mg by mouth Daily.     • NON FORMULARY  Daily.     • Turmeric 450 MG capsule Take 1 tablet by mouth Daily.       No facility-administered medications prior to visit.        Patient Active Problem List   Diagnosis   • Essential hypertension   • Acquired hypothyroidism   • Osteopenia of multiple sites   • Osteoarthritis of multiple joints   • Mixed hyperlipidemia       Advance Care Planning:  has NO advance directive - information provided to the patient today    Identification of Risk Factors:  Risk factors include: weight .    Review of Systems    Compared to one year ago, the patient feels her physical health is the same.  Compared to one year ago, the patient feels her mental health is the same.    Objective     Physical Exam    Vitals:    04/18/18 1014   BP: 142/91   BP Location: Right arm   Patient Position: Sitting   Cuff Size: Adult   Pulse: 86   Resp: 16   Temp: 97.9 °F (36.6 °C)   TempSrc: Oral   SpO2: 96%   Weight: 81.4 kg (179 lb 6.4 oz)       Patient's Body mass index is 29.85 kg/m². BMI is above normal parameters. Follow-up plan includes:  nutrition counseling.      Assessment/Plan   Patient Self-Management and Personalized Health Advice  The patient has been provided with information about: diet, exercise, weight management, prevention of cardiac or vascular disease and designing advance directives and preventive services including:   · Advance directive, Nutrition counseling provided, Pneumococcal vaccine , Screening mammography, referral placed.    Visit Diagnoses:    ICD-10-CM ICD-9-CM   1. Medicare annual wellness visit, initial Z00.00 V70.0   2. Encounter for immunization Z23 V03.89   3. Mixed hyperlipidemia E78.2 272.2     Discussed need for statin therapy with Mrs. Randolph.  I did advise that this medication would help to reduce her risk of heart attack or stroke.  She denies treatment at this time.     No orders of the defined types were placed in this encounter.      Outpatient Encounter Prescriptions as of 4/18/2018   Medication Sig  Dispense Refill   • albuterol (PROVENTIL HFA;VENTOLIN HFA) 108 (90 Base) MCG/ACT inhaler Inhale 2 puffs Every 6 (Six) Hours As Needed for Wheezing or Shortness of Air. 1 inhaler 3   • Calcium Carbonate-Vit D-Min (CALTRATE 600+D PLUS MINERALS) 600-800 MG-UNIT tablet Take 1 tablet by mouth Daily.     • Cholecalciferol (VITAMIN D3 PO) Take 2,000 mg by mouth Daily.     • levothyroxine (SYNTHROID, LEVOTHROID) 112 MCG tablet Take 1 tablet by mouth Daily. 30 tablet 5   • losartan (COZAAR) 25 MG tablet Take 25 mg by mouth Daily.     • NON FORMULARY Daily.     • Turmeric 450 MG capsule Take 1 tablet by mouth Daily.       Facility-Administered Encounter Medications as of 4/18/2018   Medication Dose Route Frequency Provider Last Rate Last Dose   • pneumococcal polysaccharide 23-valent (PNEUMOVAX-23) vaccine 0.5 mL  0.5 mL Intramuscular Once SKYLER Ross           Reviewed use of high risk medication in the elderly: yes  Reviewed for potential of harmful drug interactions in the elderly: yes    Follow Up:  Return in about 6 months (around 10/18/2018).     An After Visit Summary and PPPS with all of these plans were given to the patient.

## 2018-05-19 ENCOUNTER — OFFICE VISIT (OUTPATIENT)
Dept: RETAIL CLINIC | Facility: CLINIC | Age: 73
End: 2018-05-19

## 2018-05-19 VITALS
SYSTOLIC BLOOD PRESSURE: 122 MMHG | DIASTOLIC BLOOD PRESSURE: 78 MMHG | TEMPERATURE: 98.2 F | OXYGEN SATURATION: 98 % | RESPIRATION RATE: 18 BRPM | HEART RATE: 90 BPM

## 2018-05-19 DIAGNOSIS — L24.7 IRRITANT CONTACT DERMATITIS DUE TO PLANTS, EXCEPT FOOD: Primary | ICD-10-CM

## 2018-05-19 PROCEDURE — 99213 OFFICE O/P EST LOW 20 MIN: CPT | Performed by: NURSE PRACTITIONER

## 2018-05-19 PROCEDURE — 96372 THER/PROPH/DIAG INJ SC/IM: CPT | Performed by: NURSE PRACTITIONER

## 2018-05-19 RX ORDER — METHYLPREDNISOLONE ACETATE 80 MG/ML
40 INJECTION, SUSPENSION INTRA-ARTICULAR; INTRALESIONAL; INTRAMUSCULAR; SOFT TISSUE ONCE
Status: COMPLETED | OUTPATIENT
Start: 2018-05-19 | End: 2018-05-19

## 2018-05-19 RX ORDER — DEXAMETHASONE SODIUM PHOSPHATE 4 MG/ML
2 INJECTION, SOLUTION INTRA-ARTICULAR; INTRALESIONAL; INTRAMUSCULAR; INTRAVENOUS; SOFT TISSUE ONCE
Status: COMPLETED | OUTPATIENT
Start: 2018-05-19 | End: 2018-05-19

## 2018-05-19 RX ADMIN — DEXAMETHASONE SODIUM PHOSPHATE 2 MG: 4 INJECTION, SOLUTION INTRA-ARTICULAR; INTRALESIONAL; INTRAMUSCULAR; INTRAVENOUS; SOFT TISSUE at 15:01

## 2018-05-19 RX ADMIN — METHYLPREDNISOLONE ACETATE 40 MG: 80 INJECTION, SUSPENSION INTRA-ARTICULAR; INTRALESIONAL; INTRAMUSCULAR; SOFT TISSUE at 15:02

## 2018-05-19 NOTE — PROGRESS NOTES
Chief Complaint   Patient presents with   • Rash     Subjective   Ирина Randolph is a 72 y.o. female who presents to the clinic today with complaints poison ivy. She was in the ivy about 4 days ago and now itching. She has to have IM steroids to get rid of it.   Rash   The current episode started in the past 7 days. The affected locations include the left arm, right arm, left hand and right hand. She was exposed to plant contact. Pertinent negatives include no anorexia, congestion, cough, diarrhea, eye pain, fatigue, fever, joint pain, nail changes, rhinorrhea, shortness of breath, sore throat or vomiting. Past treatments include anti-itch cream. The treatment provided mild relief. There is no history of allergies, asthma, eczema or varicella.         Current Outpatient Prescriptions:   •  albuterol (PROVENTIL HFA;VENTOLIN HFA) 108 (90 Base) MCG/ACT inhaler, Inhale 2 puffs Every 6 (Six) Hours As Needed for Wheezing or Shortness of Air., Disp: 1 inhaler, Rfl: 3  •  Calcium Carbonate-Vit D-Min (CALTRATE 600+D PLUS MINERALS) 600-800 MG-UNIT tablet, Take 1 tablet by mouth Daily., Disp: , Rfl:   •  Cholecalciferol (VITAMIN D3 PO), Take 2,000 mg by mouth Daily., Disp: , Rfl:   •  levothyroxine (SYNTHROID, LEVOTHROID) 112 MCG tablet, Take 1 tablet by mouth Daily., Disp: 30 tablet, Rfl: 5  •  losartan (COZAAR) 25 MG tablet, Take 25 mg by mouth Daily., Disp: , Rfl:   •  NON FORMULARY, Daily., Disp: , Rfl:   •  Turmeric 450 MG capsule, Take 1 tablet by mouth Daily., Disp: , Rfl:   •  triamcinolone (KENALOG) 0.1 % ointment, Apply  topically 2 (Two) Times a Day., Disp: 30 g, Rfl: 0  No current facility-administered medications for this visit.     Allergies:  Diclofenac and Percocet [oxycodone-acetaminophen]    Past Medical History:   Diagnosis Date   • Acute bronchitis    • Arthritis     Osteoarthritis   • Gallbladder abscess    • Hypertension      Past Surgical History:   Procedure Laterality Date   • ABDOMINAL HYSTERECTOMY      • CHOLECYSTECTOMY  1994   • ROTATOR CUFF REPAIR  10/19/2017     Family History   Problem Relation Age of Onset   • Alcohol abuse Father    • Breast cancer Maternal Aunt    • Bone cancer Maternal Aunt    • Leukemia Maternal Aunt      Social History   Substance Use Topics   • Smoking status: Never Smoker   • Smokeless tobacco: Never Used   • Alcohol use Not on file       Review of Systems  Review of Systems   Constitutional: Negative for fatigue and fever.   HENT: Negative for congestion, rhinorrhea and sore throat.    Eyes: Negative for pain.   Respiratory: Negative for cough and shortness of breath.    Gastrointestinal: Negative for anorexia, diarrhea and vomiting.   Musculoskeletal: Negative for joint pain.   Skin: Positive for rash. Negative for nail changes.       Objective   /78 (BP Location: Left arm, Patient Position: Sitting, Cuff Size: Adult)   Pulse 90   Temp 98.2 °F (36.8 °C) (Tympanic)   Resp 18   SpO2 98%       Physical Exam   Constitutional: She is oriented to person, place, and time. She appears well-developed and well-nourished.   HENT:   Head: Normocephalic and atraumatic.   Eyes: EOM are normal. Pupils are equal, round, and reactive to light.   Neck: Normal range of motion. Neck supple.   Cardiovascular: Normal rate, regular rhythm and normal heart sounds.  Exam reveals no gallop and no friction rub.    No murmur heard.  Pulmonary/Chest: Effort normal and breath sounds normal. No stridor. No respiratory distress. She has no wheezes. She has no rales. She exhibits no tenderness.   Lymphadenopathy:     She has no cervical adenopathy.   Neurological: She is alert and oriented to person, place, and time.   Skin: Skin is warm and dry. Rash (she has erythema and papules on her arms and hands with excoriations from scratching ) noted.   Psychiatric: She has a normal mood and affect. Her behavior is normal.   Vitals reviewed.      Assessment/Plan     Ирина was seen today for rash.    Diagnoses  and all orders for this visit:    Irritant contact dermatitis due to plants, except food  -     methylPREDNISolone acetate (DEPO-medrol) injection 40 mg; Inject 0.5 mL into the shoulder, thigh, or buttocks 1 (One) Time.  -     dexamethasone (DECADRON) injection 2 mg; Infuse 0.5 mL into a venous catheter 1 (One) Time.    Other orders  -     triamcinolone (KENALOG) 0.1 % ointment; Apply  topically 2 (Two) Times a Day.    Follow up if symptoms worsen or persist

## 2018-05-19 NOTE — PATIENT INSTRUCTIONS
Poison Ivy Dermatitis  Poison ivy dermatitis is inflammation of the skin that is caused by the allergens on the leaves of the poison ivy plant. The skin reaction often involves redness, swelling, blisters, and extreme itching.  What are the causes?  This condition is caused by a specific chemical (urushiol) found in the sap of the poison ivy plant. This chemical is sticky and can be easily spread to people, animals, and objects. You can get poison ivy dermatitis by:  · Having direct contact with a poison ivy plant.  · Touching animals, other people, or objects that have come in contact with poison ivy and have the chemical on them.  What increases the risk?  This condition is more likely to develop in:  · People who are outdoors often.  · People who go outdoors without wearing protective clothing, such as closed shoes, long pants, and a long-sleeved shirt.  What are the signs or symptoms?  Symptoms of this condition include:  · Redness and itching.  · A rash that often includes bumps and blisters. The rash usually appears 48 hours after exposure.  · Swelling. This may occur if the reaction is more severe.  Symptoms usually last for 1-2 weeks. However, the first time you develop this condition, symptoms may last 3-4 weeks.  How is this diagnosed?  This condition may be diagnosed based on your symptoms and a physical exam. Your health care provider may also ask you about any recent outdoor activity.  How is this treated?  Treatment for this condition will vary depending on how severe it is. Treatment may include:  · Hydrocortisone creams or calamine lotions to relieve itching.  · Oatmeal baths to soothe the skin.  · Over-the-counter antihistamine tablets.  · Oral steroid medicine for more severe outbreaks.  Follow these instructions at home:  · Take or apply over-the-counter and prescription medicines only as told by your health care provider.  · Wash exposed skin as soon as possible with soap and cold water.  · Use  hydrocortisone creams or calamine lotion as needed to soothe the skin and relieve itching.  · Take oatmeal baths as needed. Use colloidal oatmeal. You can get this at your local pharmacy or grocery store. Follow the instructions on the packaging.  · Do not scratch or rub your skin.  · While you have the rash, wash clothes right after you wear them.  How is this prevented?  · Learn to identify the poison ivy plant and avoid contact with the plant. This plant can be recognized by the number of leaves. Generally, poison ivy has three leaves with flowering branches on a single stem. The leaves are typically glossy, and they have jagged edges that come to a point at the front.  · If you have been exposed to poison ivy, thoroughly wash with soap and water right away. You have about 30 minutes to remove the plant resin before it will cause the rash. Be sure to wash under your fingernails because any plant resin there will continue to spread the rash.  · When hiking or camping, wear clothes that will help you to avoid exposure on the skin. This includes long pants, a long-sleeved shirt, tall socks, and hiking boots. You can also apply preventive lotion to your skin to help limit exposure.  · If you suspect that your clothes or outdoor gear came in contact with poison ivy, rinse them off outside with a garden hose before you bring them inside your house.  Contact a health care provider if:  · You have open sores in the rash area.  · You have more redness, swelling, or pain in the affected area.  · You have redness that spreads beyond the rash area.  · You have fluid, blood, or pus coming from the affected area.  · You have a fever.  · You have a rash over a large area of your body.  · You have a rash on your eyes, mouth, or genitals.  · Your rash does not improve after a few days.  Get help right away if:  · Your face swells or your eyes swell shut.  · You have trouble breathing.  · You have trouble swallowing.  This  information is not intended to replace advice given to you by your health care provider. Make sure you discuss any questions you have with your health care provider.  Document Released: 12/15/2001 Document Revised: 05/25/2017 Document Reviewed: 05/25/2016  [employer] Interactive Patient Education © 2017 [employer]

## 2018-06-09 PROCEDURE — 87086 URINE CULTURE/COLONY COUNT: CPT | Performed by: NURSE PRACTITIONER

## 2018-06-09 PROCEDURE — 87186 SC STD MICRODIL/AGAR DIL: CPT | Performed by: NURSE PRACTITIONER

## 2018-06-09 PROCEDURE — 87077 CULTURE AEROBIC IDENTIFY: CPT | Performed by: NURSE PRACTITIONER

## 2018-08-31 RX ORDER — LOSARTAN POTASSIUM 25 MG/1
25 TABLET ORAL DAILY
Qty: 90 TABLET | Refills: 3 | Status: SHIPPED | OUTPATIENT
Start: 2018-08-31 | End: 2018-10-18 | Stop reason: SDUPTHER

## 2018-09-05 DIAGNOSIS — E03.9 ACQUIRED HYPOTHYROIDISM: ICD-10-CM

## 2018-09-05 RX ORDER — LEVOTHYROXINE SODIUM 112 UG/1
112 TABLET ORAL DAILY
Qty: 90 TABLET | Refills: 3 | Status: SHIPPED | OUTPATIENT
Start: 2018-09-05 | End: 2019-04-10

## 2018-10-18 ENCOUNTER — OFFICE VISIT (OUTPATIENT)
Dept: INTERNAL MEDICINE | Facility: CLINIC | Age: 73
End: 2018-10-18

## 2018-10-18 VITALS
OXYGEN SATURATION: 97 % | HEART RATE: 85 BPM | HEIGHT: 65 IN | WEIGHT: 181.1 LBS | SYSTOLIC BLOOD PRESSURE: 162 MMHG | BODY MASS INDEX: 30.17 KG/M2 | DIASTOLIC BLOOD PRESSURE: 98 MMHG | RESPIRATION RATE: 16 BRPM

## 2018-10-18 DIAGNOSIS — I10 ESSENTIAL HYPERTENSION: Primary | ICD-10-CM

## 2018-10-18 DIAGNOSIS — Z00.00 ENCOUNTER FOR PREVENTIVE HEALTH EXAMINATION: ICD-10-CM

## 2018-10-18 DIAGNOSIS — E03.9 ACQUIRED HYPOTHYROIDISM: ICD-10-CM

## 2018-10-18 DIAGNOSIS — M85.89 OSTEOPENIA OF MULTIPLE SITES: ICD-10-CM

## 2018-10-18 DIAGNOSIS — Z23 ENCOUNTER FOR IMMUNIZATION: ICD-10-CM

## 2018-10-18 DIAGNOSIS — E78.2 MIXED HYPERLIPIDEMIA: ICD-10-CM

## 2018-10-18 PROCEDURE — G0008 ADMIN INFLUENZA VIRUS VAC: HCPCS | Performed by: INTERNAL MEDICINE

## 2018-10-18 PROCEDURE — 90662 IIV NO PRSV INCREASED AG IM: CPT | Performed by: INTERNAL MEDICINE

## 2018-10-18 PROCEDURE — 99214 OFFICE O/P EST MOD 30 MIN: CPT | Performed by: INTERNAL MEDICINE

## 2018-10-18 RX ORDER — LOSARTAN POTASSIUM 50 MG/1
50 TABLET ORAL DAILY
Qty: 30 TABLET | Refills: 5 | Status: SHIPPED | OUTPATIENT
Start: 2018-10-18 | End: 2019-05-13 | Stop reason: SDUPTHER

## 2018-10-18 NOTE — PROGRESS NOTES
"CC: f/u hypertension    History:  Ирина Randolph is a 73 y.o. female who presents today for follow-up for evaluation of the above:  She reports she has been doing well without acute illness. BP runs 140s/80s at home, though it tends to run higher here in the office. She continues on levothyroxine without symptoms of thyroid dysfunction at this time and with normal TSH in 4/2018 after dose adjustment in March. She continues on Calcium + D for her osteopenia and has no new falls or fractures.     ROS:  Review of Systems   Constitutional: Negative for chills and fever.   HENT: Negative for congestion and sore throat.    Respiratory: Negative for cough and shortness of breath.    Cardiovascular: Negative for chest pain and palpitations.   Gastrointestinal: Negative for abdominal pain, constipation and nausea.       Ms. Randolph  reports that she has never smoked. She has never used smokeless tobacco. She reports that she does not drink alcohol or use drugs.      Current Outpatient Prescriptions:   •  albuterol (PROVENTIL HFA;VENTOLIN HFA) 108 (90 Base) MCG/ACT inhaler, Inhale 2 puffs Every 6 (Six) Hours As Needed for Wheezing or Shortness of Air., Disp: 1 inhaler, Rfl: 3  •  Calcium Carbonate-Vit D-Min (CALTRATE 600+D PLUS MINERALS) 600-800 MG-UNIT tablet, Take 1 tablet by mouth Daily., Disp: , Rfl:   •  Cholecalciferol (VITAMIN D3 PO), Take 2,000 mg by mouth Daily., Disp: , Rfl:   •  levothyroxine (SYNTHROID, LEVOTHROID) 112 MCG tablet, Take 1 tablet by mouth Daily., Disp: 90 tablet, Rfl: 3  •  losartan (COZAAR) 25 MG tablet, Take 1 tablet by mouth Daily., Disp: 90 tablet, Rfl: 3  •  NON FORMULARY, Daily., Disp: , Rfl:       OBJECTIVE:  /98 (BP Location: Left arm, Patient Position: Sitting, Cuff Size: Adult)   Pulse 85   Resp 16   Ht 165.1 cm (65\")   Wt 82.1 kg (181 lb 1.6 oz)   SpO2 97%   Breastfeeding? No   BMI 30.14 kg/m²    Physical Exam   Constitutional: She is oriented to person, place, and time. She " appears well-nourished. No distress.   Cardiovascular: Normal rate, regular rhythm and normal heart sounds.    No murmur heard.  Pulmonary/Chest: Effort normal and breath sounds normal. She has no wheezes.   Abdominal: Soft. There is no tenderness.   Neurological: She is alert and oriented to person, place, and time.   Psychiatric: She has a normal mood and affect.       Assessment/Plan    Diagnoses and all orders for this visit:    Essential hypertension  -     losartan (COZAAR) 50 MG tablet; Take 1 tablet by mouth Daily.  Fair control, BP goal for age is <150/90 per JNC 8 guidelines and increase losartan to 50mg.     Acquired hypothyroidism  -     TSH; Future  TSH WNL in 4/2018. Recheck prior to follow-up. Continue levothyroxine.     Mixed hyperlipidemia  -     Lipid Panel; Future  Recheck lipids prior to next visit.     Osteopenia of multiple sites  -     Comprehensive Metabolic Panel; Future  -     Phosphorus; Future  Check labs and will address DEXA at f/u in 6 months.     Encounter for preventive health examination  -     Comprehensive Metabolic Panel; Future  -     Lipid Panel; Future    Encounter for immunization  -     Flu Vaccine High Dose PF 65YR+ ([id number])      An After Visit Summary was printed and given to the patient at discharge.  Return in about 6 months (around 4/18/2019). Sooner if problems arise.         Serafin Andrade D.O. 10/18/2018

## 2019-04-10 ENCOUNTER — LAB (OUTPATIENT)
Dept: LAB | Facility: HOSPITAL | Age: 74
End: 2019-04-10

## 2019-04-10 DIAGNOSIS — E03.9 ACQUIRED HYPOTHYROIDISM: ICD-10-CM

## 2019-04-10 DIAGNOSIS — Z00.00 ENCOUNTER FOR PREVENTIVE HEALTH EXAMINATION: ICD-10-CM

## 2019-04-10 DIAGNOSIS — E03.9 ACQUIRED HYPOTHYROIDISM: Primary | ICD-10-CM

## 2019-04-10 DIAGNOSIS — E78.2 MIXED HYPERLIPIDEMIA: ICD-10-CM

## 2019-04-10 DIAGNOSIS — M85.89 OSTEOPENIA OF MULTIPLE SITES: ICD-10-CM

## 2019-04-10 LAB
ALBUMIN SERPL-MCNC: 4.5 G/DL (ref 3.5–5)
ALBUMIN/GLOB SERPL: 1.6 G/DL (ref 1.1–2.5)
ALP SERPL-CCNC: 84 U/L (ref 24–120)
ALT SERPL W P-5'-P-CCNC: <15 U/L (ref 0–54)
ANION GAP SERPL CALCULATED.3IONS-SCNC: 8 MMOL/L (ref 4–13)
ARTICHOKE IGE QN: 143 MG/DL (ref 0–99)
AST SERPL-CCNC: 40 U/L (ref 7–45)
BILIRUB SERPL-MCNC: 0.9 MG/DL (ref 0.1–1)
BUN BLD-MCNC: 14 MG/DL (ref 5–21)
BUN/CREAT SERPL: 20.3 (ref 7–25)
CALCIUM SPEC-SCNC: 9.5 MG/DL (ref 8.4–10.4)
CHLORIDE SERPL-SCNC: 102 MMOL/L (ref 98–110)
CHOLEST SERPL-MCNC: 235 MG/DL (ref 130–200)
CO2 SERPL-SCNC: 29 MMOL/L (ref 24–31)
CREAT BLD-MCNC: 0.69 MG/DL (ref 0.5–1.4)
GFR SERPL CREATININE-BSD FRML MDRD: 84 ML/MIN/1.73
GLOBULIN UR ELPH-MCNC: 2.9 GM/DL
GLUCOSE BLD-MCNC: 89 MG/DL (ref 70–100)
HDLC SERPL-MCNC: 46 MG/DL
LDLC/HDLC SERPL: 3.29 {RATIO}
PHOSPHATE SERPL-MCNC: 3.7 MG/DL (ref 2.5–4.5)
POTASSIUM BLD-SCNC: 4.5 MMOL/L (ref 3.5–5.3)
PROT SERPL-MCNC: 7.4 G/DL (ref 6.3–8.7)
SODIUM BLD-SCNC: 139 MMOL/L (ref 135–145)
TRIGL SERPL-MCNC: 188 MG/DL (ref 0–149)
TSH SERPL DL<=0.05 MIU/L-ACNC: 5.26 MIU/ML (ref 0.47–4.68)

## 2019-04-10 PROCEDURE — 36415 COLL VENOUS BLD VENIPUNCTURE: CPT

## 2019-04-10 PROCEDURE — 84443 ASSAY THYROID STIM HORMONE: CPT | Performed by: INTERNAL MEDICINE

## 2019-04-10 PROCEDURE — 80061 LIPID PANEL: CPT | Performed by: INTERNAL MEDICINE

## 2019-04-10 PROCEDURE — 80053 COMPREHEN METABOLIC PANEL: CPT | Performed by: INTERNAL MEDICINE

## 2019-04-10 PROCEDURE — 84100 ASSAY OF PHOSPHORUS: CPT | Performed by: INTERNAL MEDICINE

## 2019-04-10 RX ORDER — LEVOTHYROXINE SODIUM 0.12 MG/1
125 TABLET ORAL DAILY
Qty: 90 TABLET | Refills: 1 | Status: SHIPPED | OUTPATIENT
Start: 2019-04-10 | End: 2023-03-28 | Stop reason: SDUPTHER

## 2019-04-11 ENCOUNTER — TELEPHONE (OUTPATIENT)
Dept: INTERNAL MEDICINE | Facility: CLINIC | Age: 74
End: 2019-04-11

## 2019-04-11 NOTE — TELEPHONE ENCOUNTER
----- Message from Serafin Andrade DO sent at 4/10/2019  5:40 PM CDT -----  Labs look OK. I have sent a change in her dose of thyroid pill to 125. She may start this with her next refill. Otherwise, no changes needed at this time and Summer will speak with her about cholesterol and other labs at her follow-up next week.

## 2019-04-11 NOTE — TELEPHONE ENCOUNTER
Patient informed labs look okay, per Dr. Andrade.  Patient informed of increase in levothyroxine dose and that labs will be discussed in more detail during her OV next week.

## 2019-04-19 ENCOUNTER — OFFICE VISIT (OUTPATIENT)
Dept: INTERNAL MEDICINE | Facility: CLINIC | Age: 74
End: 2019-04-19

## 2019-04-19 VITALS
DIASTOLIC BLOOD PRESSURE: 100 MMHG | HEART RATE: 80 BPM | HEIGHT: 65 IN | RESPIRATION RATE: 12 BRPM | WEIGHT: 179.13 LBS | TEMPERATURE: 97.9 F | SYSTOLIC BLOOD PRESSURE: 154 MMHG | BODY MASS INDEX: 29.84 KG/M2 | OXYGEN SATURATION: 97 %

## 2019-04-19 DIAGNOSIS — I10 ESSENTIAL HYPERTENSION: Primary | ICD-10-CM

## 2019-04-19 DIAGNOSIS — E03.9 ACQUIRED HYPOTHYROIDISM: ICD-10-CM

## 2019-04-19 DIAGNOSIS — E78.2 MIXED HYPERLIPIDEMIA: ICD-10-CM

## 2019-04-19 DIAGNOSIS — M15.9 PRIMARY OSTEOARTHRITIS INVOLVING MULTIPLE JOINTS: ICD-10-CM

## 2019-04-19 DIAGNOSIS — H61.22 IMPACTED CERUMEN OF LEFT EAR: ICD-10-CM

## 2019-04-19 DIAGNOSIS — M85.89 OSTEOPENIA OF MULTIPLE SITES: ICD-10-CM

## 2019-04-19 PROCEDURE — 99214 OFFICE O/P EST MOD 30 MIN: CPT | Performed by: NURSE PRACTITIONER

## 2019-04-19 RX ORDER — ATORVASTATIN CALCIUM 20 MG/1
20 TABLET, FILM COATED ORAL DAILY
Qty: 30 TABLET | Refills: 2 | Status: CANCELLED | OUTPATIENT
Start: 2019-04-19

## 2019-04-19 NOTE — PROGRESS NOTES
CC: follow up HTN, hyperlipidemia, osteoarthritis    History:  Ирина Randolph is a 73 y.o. female who presents today for evaluation of the above problems.      Doing well.     Has had sharp pain in her left ear multiple times over the last few weeks.  Has had some accompanying sinus drainage as well.     Otherwise, has occasional shortness of breath and no other symptoms.     BP is elevated today.  She relates this to having a long walk in and also receiving a phone call on her way in that her grandson was on his way to ER.    Reports that she will under no circumstances take a statin medication.      ROS:  Review of Systems   Respiratory: Positive for shortness of breath.    Cardiovascular: Negative for chest pain.   Gastrointestinal: Negative for constipation, diarrhea, nausea and vomiting.   Neurological: Negative for dizziness, light-headedness and headaches.   Psychiatric/Behavioral: Negative for dysphoric mood. The patient is not nervous/anxious.        Allergies   Allergen Reactions   • Diclofenac Other (See Comments)     Couldn't sleep and face turned blood red   • Percocet [Oxycodone-Acetaminophen] Hives     itching     Past Medical History:   Diagnosis Date   • Acute bronchitis    • Arthritis     Osteoarthritis   • Gallbladder abscess    • Hypertension      Past Surgical History:   Procedure Laterality Date   • ABDOMINAL HYSTERECTOMY     • CHOLECYSTECTOMY  1994   • ROTATOR CUFF REPAIR  10/19/2017     Family History   Problem Relation Age of Onset   • Alcohol abuse Father    • Breast cancer Maternal Aunt    • Bone cancer Maternal Aunt    • Leukemia Maternal Aunt       reports that she has never smoked. She has never used smokeless tobacco. She reports that she does not drink alcohol or use drugs.      Current Outpatient Medications:   •  albuterol (PROVENTIL HFA;VENTOLIN HFA) 108 (90 Base) MCG/ACT inhaler, Inhale 2 puffs Every 6 (Six) Hours As Needed for Wheezing or Shortness of Air., Disp: 1 inhaler, Rfl:  "3  •  Calcium Carbonate-Vit D-Min (CALTRATE 600+D PLUS MINERALS) 600-800 MG-UNIT tablet, Take 1 tablet by mouth Daily., Disp: , Rfl:   •  Cholecalciferol (VITAMIN D3 PO), Take 2,000 mg by mouth Daily., Disp: , Rfl:   •  levothyroxine (SYNTHROID, LEVOTHROID) 125 MCG tablet, Take 1 tablet by mouth Daily. (Patient taking differently: Take 125 mcg by mouth Daily. Patient is still taking the 112mcg until it is finished), Disp: 90 tablet, Rfl: 1  •  losartan (COZAAR) 50 MG tablet, Take 1 tablet by mouth Daily., Disp: 30 tablet, Rfl: 5  •  NON FORMULARY, Daily., Disp: , Rfl:   •  carbamide peroxide (DEBROX) 6.5 % otic solution, Administer 5 drops into the left ear Every Night., Disp: 14.8 mL, Rfl: 1    OBJECTIVE:  /100 (BP Location: Left arm, Patient Position: Sitting, Cuff Size: Adult)   Pulse 80   Temp 97.9 °F (36.6 °C) (Temporal)   Resp 12   Ht 165.1 cm (65\")   Wt 81.3 kg (179 lb 2 oz)   SpO2 97%   BMI 29.81 kg/m²    Physical Exam   Constitutional: She is oriented to person, place, and time. Vital signs are normal. She appears well-developed and well-nourished.   Cardiovascular: Normal rate and regular rhythm.   Pulmonary/Chest: Effort normal and breath sounds normal.   Neurological: She is alert and oriented to person, place, and time.   Psychiatric: She has a normal mood and affect. Her behavior is normal.   Vitals reviewed.      Assessment/Plan    Ирина was seen today for follow-up.    Diagnoses and all orders for this visit:    Essential hypertension  Will recheck in 2 weeks due to elevation today.     Acquired hypothyroidism  Level elevated at recent check and medication dosage increased by Dr. Andrade.    Primary osteoarthritis involving multiple joints    Mixed hyperlipidemia  Patient unwilling to take statin.  Advised closer attention to diet choices to lower cholesterol and reduce risk of heart attack or stroke.     Osteopenia of multiple sites    Impacted cerumen of left ear  -     carbamide " peroxide (DEBROX) 6.5 % otic solution; Administer 5 drops into the left ear Every Night.  Will use debrox for two weeks and return for lavage.     Other orders  -     Cancel: atorvastatin (LIPITOR) 20 MG tablet; Take 1 tablet by mouth Daily.    An After Visit Summary was printed and given to the patient at discharge.  Return in about 2 weeks (around 5/3/2019) for Ear lavage and BP check.       Summer Cota, APRN  4/19/2019

## 2019-05-03 ENCOUNTER — OFFICE VISIT (OUTPATIENT)
Dept: INTERNAL MEDICINE | Facility: CLINIC | Age: 74
End: 2019-05-03

## 2019-05-03 VITALS
HEART RATE: 76 BPM | DIASTOLIC BLOOD PRESSURE: 88 MMHG | RESPIRATION RATE: 16 BRPM | WEIGHT: 181 LBS | SYSTOLIC BLOOD PRESSURE: 140 MMHG | HEIGHT: 65 IN | BODY MASS INDEX: 30.16 KG/M2 | TEMPERATURE: 97.8 F | OXYGEN SATURATION: 96 %

## 2019-05-03 DIAGNOSIS — I10 ESSENTIAL HYPERTENSION: Primary | ICD-10-CM

## 2019-05-03 DIAGNOSIS — H61.22 HEARING LOSS DUE TO CERUMEN IMPACTION, LEFT: ICD-10-CM

## 2019-05-03 PROCEDURE — 99213 OFFICE O/P EST LOW 20 MIN: CPT | Performed by: NURSE PRACTITIONER

## 2019-05-03 PROCEDURE — 69210 REMOVE IMPACTED EAR WAX UNI: CPT | Performed by: NURSE PRACTITIONER

## 2019-05-03 NOTE — PROGRESS NOTES
Procedure   Ear Cerumen Removal Instrumentation  Date/Time: 5/3/2019 11:51 AM  Performed by: Summer Cota APRN  Authorized by: Summer Cota APRN     Anesthesia:  Local Anesthetic: none  Location details: left ear  Patient tolerance: Patient tolerated the procedure well with no immediate complications  Comments: TM visualized and WNL post procedure.   Procedure type: curette (And irrigation)      Sedation:  Patient sedated: no

## 2019-05-03 NOTE — PROGRESS NOTES
CC: Follow up hypertension and cerumen impaction.    History:  Ирина Randolph is a 73 y.o. female who presents today for evaluation of the above problems.      Has been taking losartan 50 mg daily and BP is 140/88.      Has been using debrox drops in left ear for two weeks.  Would like to have impaction removed today.      ROS:  Review of Systems   HENT: Positive for hearing loss.    Respiratory: Negative for shortness of breath.    Cardiovascular: Negative for chest pain.       Allergies   Allergen Reactions   • Diclofenac Other (See Comments)     Couldn't sleep and face turned blood red   • Percocet [Oxycodone-Acetaminophen] Hives     itching     Past Medical History:   Diagnosis Date   • Acute bronchitis    • Arthritis     Osteoarthritis   • Gallbladder abscess    • Hypertension      Past Surgical History:   Procedure Laterality Date   • ABDOMINAL HYSTERECTOMY     • CHOLECYSTECTOMY  1994   • ROTATOR CUFF REPAIR  10/19/2017     Family History   Problem Relation Age of Onset   • Alcohol abuse Father    • Breast cancer Maternal Aunt    • Bone cancer Maternal Aunt    • Leukemia Maternal Aunt       reports that she has never smoked. She has never used smokeless tobacco. She reports that she does not drink alcohol or use drugs.      Current Outpatient Medications:   •  albuterol (PROVENTIL HFA;VENTOLIN HFA) 108 (90 Base) MCG/ACT inhaler, Inhale 2 puffs Every 6 (Six) Hours As Needed for Wheezing or Shortness of Air., Disp: 1 inhaler, Rfl: 3  •  Calcium Carbonate-Vit D-Min (CALTRATE 600+D PLUS MINERALS) 600-800 MG-UNIT tablet, Take 1 tablet by mouth Daily., Disp: , Rfl:   •  carbamide peroxide (DEBROX) 6.5 % otic solution, Administer 5 drops into the left ear Every Night., Disp: 14.8 mL, Rfl: 1  •  Cholecalciferol (VITAMIN D3 PO), Take 2,000 mg by mouth Daily., Disp: , Rfl:   •  levothyroxine (SYNTHROID, LEVOTHROID) 125 MCG tablet, Take 1 tablet by mouth Daily. (Patient taking differently: Take 125 mcg by mouth Daily.  "Patient is still taking the 112mcg until it is finished), Disp: 90 tablet, Rfl: 1  •  losartan (COZAAR) 50 MG tablet, Take 1 tablet by mouth Daily., Disp: 30 tablet, Rfl: 5  •  NON FORMULARY, Daily., Disp: , Rfl:     OBJECTIVE:  /88 (BP Location: Left arm, Patient Position: Sitting, Cuff Size: Adult)   Pulse 76   Temp 97.8 °F (36.6 °C) (Oral)   Resp 16   Ht 165.1 cm (65\")   Wt 82.1 kg (181 lb)   SpO2 96%   BMI 30.12 kg/m²    Physical Exam   Constitutional: She is oriented to person, place, and time. Vital signs are normal. She appears well-developed and well-nourished.   HENT:   Right Ear: Tympanic membrane, external ear and ear canal normal.   Left Ear: External ear and ear canal normal.   Cerumen impaction left ear   Cardiovascular: Normal rate.   Pulmonary/Chest: Effort normal.   Neurological: She is alert and oriented to person, place, and time.   Psychiatric: She has a normal mood and affect. Her behavior is normal.   Vitals reviewed.      Assessment/Plan    Ирина was seen today for cerumen impaction and hypertension.    Diagnoses and all orders for this visit:    Essential hypertension   No change to therapy.  Patient advised to check with her pharmacy regarding Losartan recall and let us know if replacement is required.     Hearing loss due to cerumen impaction, left  -     Ear Cerumen Removal Instrumentation    An After Visit Summary was printed and given to the patient at discharge.  Return in about 6 months (around 11/3/2019).         Summer Cota, SKYLER  5/3/2019   "

## 2019-05-11 DIAGNOSIS — I10 ESSENTIAL HYPERTENSION: ICD-10-CM

## 2019-05-11 RX ORDER — LOSARTAN POTASSIUM 50 MG/1
TABLET ORAL
Qty: 30 TABLET | Refills: 5 | Status: CANCELLED | OUTPATIENT
Start: 2019-05-11

## 2019-05-13 DIAGNOSIS — I10 ESSENTIAL HYPERTENSION: ICD-10-CM

## 2019-05-13 RX ORDER — LOSARTAN POTASSIUM 50 MG/1
50 TABLET ORAL DAILY
Qty: 30 TABLET | Refills: 11 | Status: SHIPPED | OUTPATIENT
Start: 2019-05-13 | End: 2019-05-21

## 2019-05-21 DIAGNOSIS — I10 ESSENTIAL HYPERTENSION: Primary | ICD-10-CM

## 2019-05-21 RX ORDER — BENAZEPRIL HYDROCHLORIDE 20 MG/1
20 TABLET ORAL DAILY
Qty: 30 TABLET | Refills: 11 | Status: SHIPPED | OUTPATIENT
Start: 2019-05-21 | End: 2019-05-29 | Stop reason: ALTCHOICE

## 2019-05-21 NOTE — TELEPHONE ENCOUNTER
We don't have any allergies on file for her. Has she ever tried lisinopril or had a blood pressure medication cause cough? If not, we can change to benazepril 20mg PO daily.

## 2019-05-21 NOTE — TELEPHONE ENCOUNTER
Patient stated she does not recall ever taking lisinopril and has not developed a cough while on any previous BP meds.    A request was sent for benazepril. Patient informed the new script will be sent to her pharmacy.

## 2019-05-21 NOTE — TELEPHONE ENCOUNTER
Patient called to request that her script for losartan 50 mg tablet be changed due to recalls.    She uses [facility], [address]    If you have any questions, you can reach her at [phone]

## 2019-05-29 ENCOUNTER — TELEPHONE (OUTPATIENT)
Dept: INTERNAL MEDICINE | Facility: CLINIC | Age: 74
End: 2019-05-29

## 2019-05-29 ENCOUNTER — HOSPITAL ENCOUNTER (OUTPATIENT)
Dept: GENERAL RADIOLOGY | Facility: HOSPITAL | Age: 74
Discharge: HOME | End: 2019-05-29
Admitting: NURSE PRACTITIONER

## 2019-05-29 ENCOUNTER — OFFICE VISIT (OUTPATIENT)
Dept: INTERNAL MEDICINE | Facility: CLINIC | Age: 74
End: 2019-05-29

## 2019-05-29 VITALS
BODY MASS INDEX: 29.82 KG/M2 | DIASTOLIC BLOOD PRESSURE: 86 MMHG | RESPIRATION RATE: 12 BRPM | OXYGEN SATURATION: 98 % | WEIGHT: 179 LBS | HEART RATE: 82 BPM | SYSTOLIC BLOOD PRESSURE: 118 MMHG | HEIGHT: 65 IN | TEMPERATURE: 97.9 F

## 2019-05-29 DIAGNOSIS — Z12.39 BREAST CANCER SCREENING: ICD-10-CM

## 2019-05-29 DIAGNOSIS — M79.671 PAIN OF RIGHT HEEL: ICD-10-CM

## 2019-05-29 DIAGNOSIS — I10 ESSENTIAL HYPERTENSION: Primary | ICD-10-CM

## 2019-05-29 DIAGNOSIS — M77.31 HEEL SPUR, RIGHT: Primary | ICD-10-CM

## 2019-05-29 PROCEDURE — 99214 OFFICE O/P EST MOD 30 MIN: CPT | Performed by: NURSE PRACTITIONER

## 2019-05-29 PROCEDURE — 73630 X-RAY EXAM OF FOOT: CPT

## 2019-05-29 RX ORDER — METHYLPREDNISOLONE 4 MG/1
TABLET ORAL
Qty: 21 TABLET | Refills: 0 | Status: SHIPPED | OUTPATIENT
Start: 2019-05-29 | End: 2019-07-10

## 2019-05-29 RX ORDER — LISINOPRIL 20 MG/1
20 TABLET ORAL DAILY
Qty: 30 TABLET | Refills: 0 | Status: SHIPPED | OUTPATIENT
Start: 2019-05-29 | End: 2019-06-24 | Stop reason: SDUPTHER

## 2019-05-29 NOTE — PROGRESS NOTES
CC: foot pain    History:  Ирина Randolph is a 73 y.o. female who presents today for evaluation of the above problems.    Has had an intolerable nonproductive dry cough since starting benazepril earlier this month.  Has never taken lisinopril. Previously took losartan until this was changed due to losartan recall.     Has been having right heel pain for about a year.  Is becoming more constant.  Pain is worse with walking and better first thing in the morning. Pain does not radiate into the arch of the foot - it is localized to the heel only.     She requests an order for her annual mammogram today as well.    ROS:  Review of Systems   Constitutional: Negative for fever.   HENT: Negative for congestion and sore throat.    Respiratory: Positive for cough. Negative for shortness of breath.    Musculoskeletal:        Right heel pain       Allergies   Allergen Reactions   • Diclofenac Other (See Comments)     Couldn't sleep and face turned blood red   • Percocet [Oxycodone-Acetaminophen] Hives     itching     Past Medical History:   Diagnosis Date   • Acute bronchitis    • Arthritis     Osteoarthritis   • Gallbladder abscess    • Hypertension      Past Surgical History:   Procedure Laterality Date   • ABDOMINAL HYSTERECTOMY     • CHOLECYSTECTOMY  1994   • ROTATOR CUFF REPAIR  10/19/2017     Family History   Problem Relation Age of Onset   • Alcohol abuse Father    • Breast cancer Maternal Aunt    • Bone cancer Maternal Aunt    • Leukemia Maternal Aunt       reports that she has never smoked. She has never used smokeless tobacco. She reports that she does not drink alcohol or use drugs.      Current Outpatient Medications:   •  albuterol (PROVENTIL HFA;VENTOLIN HFA) 108 (90 Base) MCG/ACT inhaler, Inhale 2 puffs Every 6 (Six) Hours As Needed for Wheezing or Shortness of Air., Disp: 1 inhaler, Rfl: 3  •  Calcium Carbonate-Vit D-Min (CALTRATE 600+D PLUS MINERALS) 600-800 MG-UNIT tablet, Take 1 tablet by mouth Daily., Disp: ,  "Rfl:   •  Cholecalciferol (VITAMIN D3 PO), Take 2,000 mg by mouth Daily., Disp: , Rfl:   •  levothyroxine (SYNTHROID, LEVOTHROID) 125 MCG tablet, Take 1 tablet by mouth Daily., Disp: 90 tablet, Rfl: 1  •  NON FORMULARY, Daily., Disp: , Rfl:   •  carbamide peroxide (DEBROX) 6.5 % otic solution, Administer 5 drops into the left ear Every Night., Disp: 14.8 mL, Rfl: 1  •  lisinopril (PRINIVIL,ZESTRIL) 20 MG tablet, Take 1 tablet by mouth Daily., Disp: 30 tablet, Rfl: 0    OBJECTIVE:  /86 (BP Location: Left arm, Patient Position: Sitting, Cuff Size: Adult)   Pulse 82   Temp 97.9 °F (36.6 °C) (Temporal)   Resp 12   Ht 165.1 cm (65\")   Wt 81.2 kg (179 lb)   SpO2 98%   BMI 29.79 kg/m²    Physical Exam   Constitutional: She is oriented to person, place, and time. Vital signs are normal. She appears well-developed and well-nourished.   Cardiovascular: Normal rate.   Pulmonary/Chest: Effort normal.        Neurological: She is alert and oriented to person, place, and time.   Psychiatric: She has a normal mood and affect. Her behavior is normal.   Vitals reviewed.      Assessment/Plan    Ирина was seen today for foot pain.    Diagnoses and all orders for this visit:    Essential hypertension  -     lisinopril (PRINIVIL,ZESTRIL) 20 MG tablet; Take 1 tablet by mouth Daily.  Discussed that lisinopril is in the same class as benazepril so she may not tolerate this medication, however, I would like to try this before changing to Amlodipine due to increased titration availability with lisinopril. She will notify us if cough does not improve.     Pain of right heel  -     XR Foot 3+ View Right; Future  Will review imaging and proceed as indicated.  Discussed potential for heel spur and advised icing foot with a frozen water bottle and taking ibuprofen.  Will consider steroid pack and potential podiatry referral based on imaging results.     Breast cancer screening  -     Mammo Screening Bilateral With CAD; Future        An " After Visit Summary was printed and given to the patient at discharge.  Return for Next scheduled follow up.         Summer Cota, SKYLER  5/29/2019

## 2019-05-29 NOTE — TELEPHONE ENCOUNTER
----- Message from SKYLER Ross sent at 5/29/2019 12:46 PM CDT -----  Please advise that her xray did show a heel spur.  I have sent in a medrol dose pack.  This will likely flare occasionally and symptoms will come and go.  It was a mild spur, so I don't suspect they would want to do a surgical correction, but I will   refer her to podiatry if she would like.

## 2019-05-29 NOTE — PROGRESS NOTES
Please advise that her xray did show a heel spur.  I have sent in a medrol dose pack.  This will likely flare occasionally and symptoms will come and go.  It was a mild spur, so I don't suspect they would want to do a surgical correction, but I will refer her to podiatry if she would like.

## 2019-06-11 DIAGNOSIS — Z12.39 BREAST CANCER SCREENING: ICD-10-CM

## 2019-06-13 ENCOUNTER — TELEPHONE (OUTPATIENT)
Dept: INTERNAL MEDICINE | Facility: CLINIC | Age: 74
End: 2019-06-13

## 2019-06-13 NOTE — TELEPHONE ENCOUNTER
----- Message from SKYLER Ross sent at 6/13/2019  7:58 AM CDT -----  Please advise that her mammogram was normal.

## 2019-06-24 DIAGNOSIS — I10 ESSENTIAL HYPERTENSION: ICD-10-CM

## 2019-06-26 RX ORDER — LISINOPRIL 20 MG/1
20 TABLET ORAL DAILY
Qty: 30 TABLET | Refills: 5 | Status: ON HOLD | OUTPATIENT
Start: 2019-06-26 | End: 2020-06-17

## 2019-07-09 ENCOUNTER — TELEPHONE (OUTPATIENT)
Dept: INTERNAL MEDICINE | Facility: CLINIC | Age: 74
End: 2019-07-09

## 2019-07-10 ENCOUNTER — OFFICE VISIT (OUTPATIENT)
Dept: INTERNAL MEDICINE | Facility: CLINIC | Age: 74
End: 2019-07-10

## 2019-07-10 VITALS
HEART RATE: 79 BPM | TEMPERATURE: 97.9 F | DIASTOLIC BLOOD PRESSURE: 106 MMHG | WEIGHT: 176.38 LBS | BODY MASS INDEX: 29.38 KG/M2 | OXYGEN SATURATION: 98 % | SYSTOLIC BLOOD PRESSURE: 152 MMHG | RESPIRATION RATE: 12 BRPM | HEIGHT: 65 IN

## 2019-07-10 DIAGNOSIS — G89.29 CHRONIC TMJ PAIN: ICD-10-CM

## 2019-07-10 DIAGNOSIS — R05.3 CHRONIC COUGH: Primary | ICD-10-CM

## 2019-07-10 DIAGNOSIS — M26.629 CHRONIC TMJ PAIN: ICD-10-CM

## 2019-07-10 DIAGNOSIS — I10 ESSENTIAL HYPERTENSION: ICD-10-CM

## 2019-07-10 PROCEDURE — 99214 OFFICE O/P EST MOD 30 MIN: CPT | Performed by: FAMILY MEDICINE

## 2019-07-10 RX ORDER — FLUTICASONE PROPIONATE 50 MCG
2 SPRAY, SUSPENSION (ML) NASAL DAILY
Qty: 1 BOTTLE | Refills: 12 | Status: SHIPPED | OUTPATIENT
Start: 2019-07-10

## 2019-07-10 NOTE — PATIENT INSTRUCTIONS
Goal blood pressure at home is less than 140/90    Start an over the counter bite guard to see if this helps your TMJ.     For chronic cough:   Start daily antihistamine (claritin, xyzal, zyrtec, or allegra)  Start flonase for congestion  If no improvement in one week, start over the counter zantac for acid reflux    Follow up in 4 weeks

## 2019-07-10 NOTE — PROGRESS NOTES
CC:   Chief Complaint   Patient presents with   • Follow-up     Patient reports left cheek bone pain that happens about 2-3 times a day   • Hypertension   • Cough       History:  Ирина Randolph is a 73 y.o. female who presents today for follow-up for evaluation of the above:    Cheek bone pain that occurs 2-3 times daily along the L zygomatic arch, does have TMJ, has been for about one month. Pain is sharp, lasts for about a minute. Is afraid of esophageal cancer 2/2 cousin who  from it and presented with L sided ear pain. Has had some acid reflux, does have a cough that is dry, no smoking hx. Does have acid reflux and heartburn at times.     HTN uncontrolled on visit, has been on lisinopril for about a month. Was on losartan but stopped due to recalls. Had bad cough with benazepril. At home it has been 130/75.     ROS:  Review of Systems   HENT: Dental problem: jaw pain.    Respiratory: Negative.    Cardiovascular: Negative.    Psychiatric/Behavioral: Positive for sleep disturbance.        Past Medical History:   Diagnosis Date   • Acute bronchitis    • Arthritis     Osteoarthritis   • Gallbladder abscess    • Hypertension        Ms. Randolph  reports that she has never smoked. She has never used smokeless tobacco. She reports that she does not drink alcohol or use drugs.      Current Outpatient Medications:   •  albuterol (PROVENTIL HFA;VENTOLIN HFA) 108 (90 Base) MCG/ACT inhaler, Inhale 2 puffs Every 6 (Six) Hours As Needed for Wheezing or Shortness of Air., Disp: 1 inhaler, Rfl: 3  •  Calcium Carbonate-Vit D-Min (CALTRATE 600+D PLUS MINERALS) 600-800 MG-UNIT tablet, Take 1 tablet by mouth Daily., Disp: , Rfl:   •  Cholecalciferol (VITAMIN D3 PO), Take 2,000 mg by mouth Daily., Disp: , Rfl:   •  levothyroxine (SYNTHROID, LEVOTHROID) 125 MCG tablet, Take 1 tablet by mouth Daily., Disp: 90 tablet, Rfl: 1  •  lisinopril (PRINIVIL,ZESTRIL) 20 MG tablet, Take 1 tablet by mouth Daily., Disp: 30 tablet, Rfl: 5  •  NON  "FORMULARY, Daily., Disp: , Rfl:   •  fluticasone (FLONASE) 50 MCG/ACT nasal spray, 2 sprays into the nostril(s) as directed by provider Daily., Disp: 1 bottle, Rfl: 12      OBJECTIVE:  BP (!) 152/106 (BP Location: Left arm, Patient Position: Sitting, Cuff Size: Adult)   Pulse 79   Temp 97.9 °F (36.6 °C) (Temporal)   Resp 12   Ht 165.1 cm (65\")   Wt 80 kg (176 lb 6 oz)   SpO2 98%   BMI 29.35 kg/m²    Physical Exam   Constitutional: She is oriented to person, place, and time. No distress.   HENT:   Head: Normocephalic and atraumatic.   Nose: Nose normal.   Hypertonicity to left masseter  Pale turbinate enlargement   Eyes: Conjunctivae are normal. Right eye exhibits no discharge. Left eye exhibits no discharge. No scleral icterus.   Neck: No tracheal deviation present.   Cardiovascular: Normal rate, regular rhythm and normal heart sounds. Exam reveals no gallop and no friction rub.   No murmur heard.  Pulmonary/Chest: Effort normal and breath sounds normal. No respiratory distress. She has no wheezes. She has no rales.   Abdominal: Soft. There is no tenderness.   Lymphadenopathy:     She has no cervical adenopathy.   Neurological: She is alert and oriented to person, place, and time.   Skin: Skin is warm and dry. She is not diaphoretic. No pallor.   Psychiatric: She has a normal mood and affect. Her behavior is normal. Judgment and thought content normal.   Nursing note and vitals reviewed.      Assessment/Plan     Diagnosis Plan   1. Chronic cough  fluticasone (FLONASE) 50 MCG/ACT nasal spray   2. Chronic TMJ pain     3. Essential hypertension     -Home monitoring for blood pressure given good control at home  -Recommend over-the-counter bite guard  -For chronic cough, start daily antihistamine with Flonase, if no improvement in 1 week consider zantac for GERD  -f/u 4 weeks    An After Visit Summary was printed and given to the patient at discharge.  Return in about 4 weeks (around 8/7/2019). Sooner if problems " umesh.         Scotty Jaffe D.O.  [facility]

## 2019-08-07 ENCOUNTER — OFFICE VISIT (OUTPATIENT)
Dept: INTERNAL MEDICINE | Facility: CLINIC | Age: 74
End: 2019-08-07

## 2019-08-07 VITALS
DIASTOLIC BLOOD PRESSURE: 92 MMHG | SYSTOLIC BLOOD PRESSURE: 138 MMHG | HEART RATE: 81 BPM | HEIGHT: 65 IN | OXYGEN SATURATION: 97 % | WEIGHT: 177.13 LBS | BODY MASS INDEX: 29.51 KG/M2 | TEMPERATURE: 98.2 F | RESPIRATION RATE: 16 BRPM

## 2019-08-07 DIAGNOSIS — R09.82 POST-NASAL DRIP: ICD-10-CM

## 2019-08-07 DIAGNOSIS — I10 ESSENTIAL HYPERTENSION: Primary | ICD-10-CM

## 2019-08-07 PROCEDURE — 99214 OFFICE O/P EST MOD 30 MIN: CPT | Performed by: FAMILY MEDICINE

## 2019-08-07 RX ORDER — DIPHENHYDRAMINE HCL 25 MG
25 TABLET ORAL NIGHTLY PRN
COMMUNITY

## 2019-08-07 RX ORDER — LORATADINE 10 MG/1
10 TABLET ORAL DAILY
COMMUNITY
End: 2021-06-02

## 2019-08-07 NOTE — PROGRESS NOTES
CC:   Chief Complaint   Patient presents with   • Follow-up     Patient reports BP readings at home 117//80   • Hypertension       History:  Ирина Randolph is a 73 y.o. female who presents today for follow-up for evaluation of the above:    BP well controlled on home measurements above on lisinopril 20 mg daily without side effects     Cough better with claritin and benadryl. No heartburn symptoms    ROS:  Review of Systems   Constitutional: Negative for activity change.   HENT: Negative for congestion.    Respiratory: Negative for shortness of breath.    Cardiovascular: Negative for chest pain.   Gastrointestinal: Negative.    Neurological: Negative for dizziness, light-headedness and headaches.       Ms. Randolph  reports that she has never smoked. She has never used smokeless tobacco. She reports that she does not drink alcohol or use drugs.      Current Outpatient Medications:   •  albuterol (PROVENTIL HFA;VENTOLIN HFA) 108 (90 Base) MCG/ACT inhaler, Inhale 2 puffs Every 6 (Six) Hours As Needed for Wheezing or Shortness of Air., Disp: 1 inhaler, Rfl: 3  •  Calcium Carbonate-Vit D-Min (CALTRATE 600+D PLUS MINERALS) 600-800 MG-UNIT tablet, Take 1 tablet by mouth Daily., Disp: , Rfl:   •  Cholecalciferol (VITAMIN D3 PO), Take 2,000 mg by mouth Daily., Disp: , Rfl:   •  diphenhydrAMINE (BENADRYL) 25 MG tablet, Take 25 mg by mouth At Night As Needed for Itching or Sleep., Disp: , Rfl:   •  fluticasone (FLONASE) 50 MCG/ACT nasal spray, 2 sprays into the nostril(s) as directed by provider Daily., Disp: 1 bottle, Rfl: 12  •  levothyroxine (SYNTHROID, LEVOTHROID) 125 MCG tablet, Take 1 tablet by mouth Daily., Disp: 90 tablet, Rfl: 1  •  lisinopril (PRINIVIL,ZESTRIL) 20 MG tablet, Take 1 tablet by mouth Daily., Disp: 30 tablet, Rfl: 5  •  loratadine (CLARITIN) 10 MG tablet, Take 10 mg by mouth Daily., Disp: , Rfl:   •  NON FORMULARY, Daily., Disp: , Rfl:       OBJECTIVE:  /92 (BP Location: Left arm, Patient  "Position: Sitting, Cuff Size: Adult)   Pulse 81   Temp 98.2 °F (36.8 °C) (Temporal)   Resp 16   Ht 165.1 cm (65\")   Wt 80.3 kg (177 lb 2 oz)   SpO2 97%   BMI 29.48 kg/m²    Physical Exam   Constitutional: She is oriented to person, place, and time. No distress.   HENT:   Head: Normocephalic and atraumatic.   Nose: Nose normal.   Eyes: Conjunctivae are normal. Right eye exhibits no discharge. Left eye exhibits no discharge. No scleral icterus.   Neck: No tracheal deviation present.   Cardiovascular: Normal rate, regular rhythm and normal heart sounds. Exam reveals no gallop and no friction rub.   No murmur heard.  Pulmonary/Chest: Effort normal and breath sounds normal. No respiratory distress. She has no wheezes. She has no rales.   Neurological: She is alert and oriented to person, place, and time.   Skin: Skin is warm and dry. She is not diaphoretic. No pallor.   Psychiatric: She has a normal mood and affect. Her behavior is normal. Judgment and thought content normal.   Nursing note and vitals reviewed.      Assessment/Plan     Diagnosis Plan   1. Essential hypertension     2. Post-nasal drip     -controlled, continue current regimen    An After Visit Summary was printed and given to the patient at discharge.  Return in about 6 months (around 2/7/2020). Sooner if problems arise.         Scotty Jaffe D.O.  Family Medicine  [facility]  "

## 2019-09-03 ENCOUNTER — HOSPITAL ENCOUNTER (OUTPATIENT)
Dept: GENERAL RADIOLOGY | Facility: HOSPITAL | Age: 74
Discharge: HOME | End: 2019-09-03
Admitting: PEDIATRICS

## 2019-09-03 ENCOUNTER — TRANSCRIBE ORDERS (OUTPATIENT)
Dept: ADMINISTRATIVE | Facility: HOSPITAL | Age: 74
End: 2019-09-03

## 2019-09-03 DIAGNOSIS — R06.02 SHORTNESS OF BREATH: Primary | ICD-10-CM

## 2019-09-03 PROCEDURE — 71046 X-RAY EXAM CHEST 2 VIEWS: CPT

## 2019-10-01 ENCOUNTER — HOSPITAL ENCOUNTER (OUTPATIENT)
Dept: CT IMAGING | Facility: HOSPITAL | Age: 74
Discharge: HOME | End: 2019-10-01
Admitting: NURSE PRACTITIONER

## 2019-10-01 ENCOUNTER — TRANSCRIBE ORDERS (OUTPATIENT)
Dept: GENERAL RADIOLOGY | Facility: HOSPITAL | Age: 74
End: 2019-10-01

## 2019-10-01 DIAGNOSIS — R05.9 COUGH: Primary | ICD-10-CM

## 2019-10-01 DIAGNOSIS — R05.9 COUGH: ICD-10-CM

## 2019-10-01 PROCEDURE — 71250 CT THORAX DX C-: CPT

## 2020-02-11 ENCOUNTER — APPOINTMENT (OUTPATIENT)
Dept: LAB | Facility: HOSPITAL | Age: 75
End: 2020-02-11

## 2020-02-11 ENCOUNTER — TRANSCRIBE ORDERS (OUTPATIENT)
Dept: ADMINISTRATIVE | Facility: HOSPITAL | Age: 75
End: 2020-02-11

## 2020-02-11 DIAGNOSIS — E03.9 HYPOTHYROIDISM, UNSPECIFIED TYPE: Primary | ICD-10-CM

## 2020-02-11 DIAGNOSIS — I10 ESSENTIAL (PRIMARY) HYPERTENSION: ICD-10-CM

## 2020-02-11 LAB
ALBUMIN SERPL-MCNC: 4.4 G/DL (ref 3.5–5)
ALBUMIN/GLOB SERPL: 1.3 G/DL (ref 1.1–2.5)
ALP SERPL-CCNC: 85 U/L (ref 24–120)
ALT SERPL W P-5'-P-CCNC: 16 U/L (ref 0–54)
ANION GAP SERPL CALCULATED.3IONS-SCNC: 7 MMOL/L (ref 4–13)
AST SERPL-CCNC: 29 U/L (ref 7–45)
AUTO MIXED CELLS #: 1.2 10*3/MM3 (ref 0.1–2.6)
AUTO MIXED CELLS %: 14.5 % (ref 0.1–24)
BILIRUB SERPL-MCNC: 0.5 MG/DL (ref 0.1–1)
BILIRUB UR QL STRIP: NEGATIVE
BUN BLD-MCNC: 12 MG/DL (ref 5–21)
BUN/CREAT SERPL: 20.3
CALCIUM SPEC-SCNC: 9.9 MG/DL (ref 8.4–10.4)
CHLORIDE SERPL-SCNC: 102 MMOL/L (ref 98–110)
CLARITY UR: CLEAR
CO2 SERPL-SCNC: 30 MMOL/L (ref 24–31)
COLOR UR: YELLOW
CREAT BLD-MCNC: 0.59 MG/DL (ref 0.5–1.4)
ERYTHROCYTE [DISTWIDTH] IN BLOOD BY AUTOMATED COUNT: 12.3 % (ref 12.3–15.4)
GFR SERPL CREATININE-BSD FRML MDRD: 100 ML/MIN/1.73
GLOBULIN UR ELPH-MCNC: 3.5 GM/DL
GLUCOSE BLD-MCNC: 87 MG/DL (ref 70–100)
GLUCOSE UR STRIP-MCNC: NEGATIVE MG/DL
HCT VFR BLD AUTO: 41.8 % (ref 34–46.6)
HGB BLD-MCNC: 14.3 G/DL (ref 12–15.9)
HGB UR QL STRIP.AUTO: NEGATIVE
KETONES UR QL STRIP: NEGATIVE
LEUKOCYTE ESTERASE UR QL STRIP.AUTO: NEGATIVE
LYMPHOCYTES # BLD AUTO: 3.4 10*3/MM3 (ref 0.7–3.1)
LYMPHOCYTES NFR BLD AUTO: 41.9 % (ref 19.6–45.3)
MCH RBC QN AUTO: 30.7 PG (ref 26.6–33)
MCHC RBC AUTO-ENTMCNC: 34.2 G/DL (ref 31.5–35.7)
MCV RBC AUTO: 89.7 FL (ref 79–97)
NEUTROPHILS # BLD AUTO: 3.6 10*3/MM3 (ref 1.7–7)
NEUTROPHILS NFR BLD AUTO: 43.6 % (ref 42.7–76)
NITRITE UR QL STRIP: NEGATIVE
PH UR STRIP.AUTO: 6.5 [PH] (ref 5–8)
PLATELET # BLD AUTO: 245 10*3/MM3 (ref 140–450)
PMV BLD AUTO: 9.9 FL (ref 6–12)
POTASSIUM BLD-SCNC: 4.7 MMOL/L (ref 3.5–5.3)
PROT SERPL-MCNC: 7.9 G/DL (ref 6.3–8.7)
PROT UR QL STRIP: NEGATIVE
RBC # BLD AUTO: 4.66 10*6/MM3 (ref 3.77–5.28)
SODIUM BLD-SCNC: 139 MMOL/L (ref 135–145)
SP GR UR STRIP: 1.02 (ref 1–1.03)
UROBILINOGEN UR QL STRIP: NORMAL
WBC NRBC COR # BLD: 8.2 10*3/MM3 (ref 3.4–10.8)

## 2020-02-11 PROCEDURE — 85025 COMPLETE CBC W/AUTO DIFF WBC: CPT | Performed by: PEDIATRICS

## 2020-02-11 PROCEDURE — 84443 ASSAY THYROID STIM HORMONE: CPT | Performed by: PEDIATRICS

## 2020-02-11 PROCEDURE — 80053 COMPREHEN METABOLIC PANEL: CPT | Performed by: PEDIATRICS

## 2020-02-11 PROCEDURE — 81003 URINALYSIS AUTO W/O SCOPE: CPT | Performed by: PEDIATRICS

## 2020-02-11 PROCEDURE — 36415 COLL VENOUS BLD VENIPUNCTURE: CPT | Performed by: PEDIATRICS

## 2020-02-12 LAB — TSH SERPL DL<=0.05 MIU/L-ACNC: 2.57 UIU/ML (ref 0.27–4.2)

## 2020-04-22 ENCOUNTER — HOSPITAL ENCOUNTER (EMERGENCY)
Facility: HOSPITAL | Age: 75
Discharge: HOME | End: 2020-04-22
Admitting: FAMILY MEDICINE

## 2020-04-22 ENCOUNTER — APPOINTMENT (OUTPATIENT)
Dept: GENERAL RADIOLOGY | Facility: HOSPITAL | Age: 75
End: 2020-04-22

## 2020-04-22 ENCOUNTER — APPOINTMENT (OUTPATIENT)
Dept: CT IMAGING | Facility: HOSPITAL | Age: 75
End: 2020-04-22

## 2020-04-22 VITALS
WEIGHT: 174 LBS | RESPIRATION RATE: 18 BRPM | TEMPERATURE: 98.8 F | HEART RATE: 94 BPM | HEIGHT: 65 IN | DIASTOLIC BLOOD PRESSURE: 80 MMHG | SYSTOLIC BLOOD PRESSURE: 133 MMHG | OXYGEN SATURATION: 99 % | BODY MASS INDEX: 28.99 KG/M2

## 2020-04-22 DIAGNOSIS — K86.89 PANCREATIC MASS: ICD-10-CM

## 2020-04-22 DIAGNOSIS — K85.90 ACUTE PANCREATITIS WITHOUT INFECTION OR NECROSIS, UNSPECIFIED PANCREATITIS TYPE: Primary | ICD-10-CM

## 2020-04-22 LAB
ALBUMIN SERPL-MCNC: 4.2 G/DL (ref 3.5–5.2)
ALBUMIN/GLOB SERPL: 1.5 G/DL
ALP SERPL-CCNC: 81 U/L (ref 39–117)
ALT SERPL W P-5'-P-CCNC: 9 U/L (ref 1–33)
ANION GAP SERPL CALCULATED.3IONS-SCNC: 10 MMOL/L (ref 5–15)
AST SERPL-CCNC: 24 U/L (ref 1–32)
B PARAPERT DNA SPEC QL NAA+PROBE: NOT DETECTED
B PERT DNA SPEC QL NAA+PROBE: NOT DETECTED
BACTERIA UR QL AUTO: ABNORMAL /HPF
BASOPHILS # BLD AUTO: 0.05 10*3/MM3 (ref 0–0.2)
BASOPHILS NFR BLD AUTO: 0.4 % (ref 0–1.5)
BILIRUB SERPL-MCNC: 0.4 MG/DL (ref 0.2–1.2)
BILIRUB UR QL STRIP: NEGATIVE
BUN BLD-MCNC: 8 MG/DL (ref 8–23)
BUN/CREAT SERPL: 12.1 (ref 7–25)
C PNEUM DNA NPH QL NAA+NON-PROBE: NOT DETECTED
CALCIUM SPEC-SCNC: 9.4 MG/DL (ref 8.6–10.5)
CHLORIDE SERPL-SCNC: 102 MMOL/L (ref 98–107)
CLARITY UR: CLEAR
CO2 SERPL-SCNC: 27 MMOL/L (ref 22–29)
COLOR UR: YELLOW
CREAT BLD-MCNC: 0.66 MG/DL (ref 0.57–1)
CRP SERPL-MCNC: 2.48 MG/DL (ref 0–0.5)
D-LACTATE SERPL-SCNC: 0.8 MMOL/L (ref 0.5–2)
DEPRECATED RDW RBC AUTO: 39.6 FL (ref 37–54)
EOSINOPHIL # BLD AUTO: 0.12 10*3/MM3 (ref 0–0.4)
EOSINOPHIL NFR BLD AUTO: 1 % (ref 0.3–6.2)
ERYTHROCYTE [DISTWIDTH] IN BLOOD BY AUTOMATED COUNT: 12.3 % (ref 12.3–15.4)
FLUAV H1 2009 PAND RNA NPH QL NAA+PROBE: NOT DETECTED
FLUAV H1 HA GENE NPH QL NAA+PROBE: NOT DETECTED
FLUAV H3 RNA NPH QL NAA+PROBE: NOT DETECTED
FLUAV SUBTYP SPEC NAA+PROBE: NOT DETECTED
FLUBV RNA ISLT QL NAA+PROBE: NOT DETECTED
GFR SERPL CREATININE-BSD FRML MDRD: 88 ML/MIN/1.73
GLOBULIN UR ELPH-MCNC: 2.8 GM/DL
GLUCOSE BLD-MCNC: 111 MG/DL (ref 65–99)
GLUCOSE UR STRIP-MCNC: NEGATIVE MG/DL
HADV DNA SPEC NAA+PROBE: NOT DETECTED
HCOV 229E RNA SPEC QL NAA+PROBE: NOT DETECTED
HCOV HKU1 RNA SPEC QL NAA+PROBE: NOT DETECTED
HCOV NL63 RNA SPEC QL NAA+PROBE: NOT DETECTED
HCOV OC43 RNA SPEC QL NAA+PROBE: NOT DETECTED
HCT VFR BLD AUTO: 40.2 % (ref 34–46.6)
HGB BLD-MCNC: 13.6 G/DL (ref 12–15.9)
HGB UR QL STRIP.AUTO: NEGATIVE
HMPV RNA NPH QL NAA+NON-PROBE: NOT DETECTED
HPIV1 RNA SPEC QL NAA+PROBE: NOT DETECTED
HPIV2 RNA SPEC QL NAA+PROBE: NOT DETECTED
HPIV3 RNA NPH QL NAA+PROBE: NOT DETECTED
HPIV4 P GENE NPH QL NAA+PROBE: NOT DETECTED
HYALINE CASTS UR QL AUTO: ABNORMAL /LPF
IMM GRANULOCYTES # BLD AUTO: 0.03 10*3/MM3 (ref 0–0.05)
IMM GRANULOCYTES NFR BLD AUTO: 0.2 % (ref 0–0.5)
KETONES UR QL STRIP: NEGATIVE
LDH SERPL-CCNC: 168 U/L (ref 135–214)
LEUKOCYTE ESTERASE UR QL STRIP.AUTO: ABNORMAL
LIPASE SERPL-CCNC: 45 U/L (ref 13–60)
LYMPHOCYTES # BLD AUTO: 2.04 10*3/MM3 (ref 0.7–3.1)
LYMPHOCYTES NFR BLD AUTO: 16.6 % (ref 19.6–45.3)
M PNEUMO IGG SER IA-ACNC: NOT DETECTED
MCH RBC QN AUTO: 29.9 PG (ref 26.6–33)
MCHC RBC AUTO-ENTMCNC: 33.8 G/DL (ref 31.5–35.7)
MCV RBC AUTO: 88.4 FL (ref 79–97)
MONOCYTES # BLD AUTO: 0.94 10*3/MM3 (ref 0.1–0.9)
MONOCYTES NFR BLD AUTO: 7.7 % (ref 5–12)
NEUTROPHILS # BLD AUTO: 9.1 10*3/MM3 (ref 1.7–7)
NEUTROPHILS NFR BLD AUTO: 74.1 % (ref 42.7–76)
NITRITE UR QL STRIP: NEGATIVE
NRBC BLD AUTO-RTO: 0 /100 WBC (ref 0–0.2)
PH UR STRIP.AUTO: >=9 [PH] (ref 5–8)
PLATELET # BLD AUTO: 272 10*3/MM3 (ref 140–450)
PMV BLD AUTO: 10.4 FL (ref 6–12)
POTASSIUM BLD-SCNC: 4.6 MMOL/L (ref 3.5–5.2)
PROCALCITONIN SERPL-MCNC: 0.03 NG/ML (ref 0.1–0.25)
PROT SERPL-MCNC: 7 G/DL (ref 6–8.5)
PROT UR QL STRIP: NEGATIVE
RBC # BLD AUTO: 4.55 10*6/MM3 (ref 3.77–5.28)
RBC # UR: ABNORMAL /HPF
REF LAB TEST METHOD: ABNORMAL
RHINOVIRUS RNA SPEC NAA+PROBE: NOT DETECTED
RSV RNA NPH QL NAA+NON-PROBE: NOT DETECTED
SODIUM BLD-SCNC: 139 MMOL/L (ref 136–145)
SP GR UR STRIP: 1.01 (ref 1–1.03)
SQUAMOUS #/AREA URNS HPF: ABNORMAL /HPF
TROPONIN T SERPL-MCNC: <0.01 NG/ML (ref 0–0.03)
UROBILINOGEN UR QL STRIP: ABNORMAL
WBC NRBC COR # BLD: 12.28 10*3/MM3 (ref 3.4–10.8)
WBC UR QL AUTO: ABNORMAL /HPF

## 2020-04-22 PROCEDURE — 80053 COMPREHEN METABOLIC PANEL: CPT | Performed by: NURSE PRACTITIONER

## 2020-04-22 PROCEDURE — 84145 PROCALCITONIN (PCT): CPT | Performed by: NURSE PRACTITIONER

## 2020-04-22 PROCEDURE — 83690 ASSAY OF LIPASE: CPT | Performed by: NURSE PRACTITIONER

## 2020-04-22 PROCEDURE — 86140 C-REACTIVE PROTEIN: CPT | Performed by: NURSE PRACTITIONER

## 2020-04-22 PROCEDURE — 84484 ASSAY OF TROPONIN QUANT: CPT | Performed by: NURSE PRACTITIONER

## 2020-04-22 PROCEDURE — 81001 URINALYSIS AUTO W/SCOPE: CPT | Performed by: NURSE PRACTITIONER

## 2020-04-22 PROCEDURE — 99284 EMERGENCY DEPT VISIT MOD MDM: CPT

## 2020-04-22 PROCEDURE — 25010000002 IOPAMIDOL 61 % SOLUTION: Performed by: NURSE PRACTITIONER

## 2020-04-22 PROCEDURE — 25010000002 ONDANSETRON PER 1 MG: Performed by: EMERGENCY MEDICINE

## 2020-04-22 PROCEDURE — 93005 ELECTROCARDIOGRAM TRACING: CPT | Performed by: NURSE PRACTITIONER

## 2020-04-22 PROCEDURE — 25010000002 MORPHINE SULFATE (PF) 2 MG/ML SOLUTION: Performed by: EMERGENCY MEDICINE

## 2020-04-22 PROCEDURE — 83615 LACTATE (LD) (LDH) ENZYME: CPT | Performed by: NURSE PRACTITIONER

## 2020-04-22 PROCEDURE — 87635 SARS-COV-2 COVID-19 AMP PRB: CPT | Performed by: PHYSICIAN ASSISTANT

## 2020-04-22 PROCEDURE — 93010 ELECTROCARDIOGRAM REPORT: CPT | Performed by: INTERNAL MEDICINE

## 2020-04-22 PROCEDURE — 85025 COMPLETE CBC W/AUTO DIFF WBC: CPT | Performed by: NURSE PRACTITIONER

## 2020-04-22 PROCEDURE — 36415 COLL VENOUS BLD VENIPUNCTURE: CPT | Performed by: NURSE PRACTITIONER

## 2020-04-22 PROCEDURE — 25010000002 ONDANSETRON PER 1 MG: Performed by: NURSE PRACTITIONER

## 2020-04-22 PROCEDURE — 96376 TX/PRO/DX INJ SAME DRUG ADON: CPT

## 2020-04-22 PROCEDURE — 71045 X-RAY EXAM CHEST 1 VIEW: CPT

## 2020-04-22 PROCEDURE — 96374 THER/PROPH/DIAG INJ IV PUSH: CPT

## 2020-04-22 PROCEDURE — 0100U HC BIOFIRE FILMARRAY RESP PANEL 2: CPT | Performed by: NURSE PRACTITIONER

## 2020-04-22 PROCEDURE — 74177 CT ABD & PELVIS W/CONTRAST: CPT

## 2020-04-22 PROCEDURE — 83605 ASSAY OF LACTIC ACID: CPT | Performed by: NURSE PRACTITIONER

## 2020-04-22 PROCEDURE — 25010000002 MORPHINE SULFATE (PF) 2 MG/ML SOLUTION: Performed by: NURSE PRACTITIONER

## 2020-04-22 PROCEDURE — 96375 TX/PRO/DX INJ NEW DRUG ADDON: CPT

## 2020-04-22 RX ORDER — CEFDINIR 300 MG/1
300 CAPSULE ORAL 2 TIMES DAILY
Qty: 14 CAPSULE | Refills: 0 | Status: SHIPPED | OUTPATIENT
Start: 2020-04-22 | End: 2020-04-29

## 2020-04-22 RX ORDER — MORPHINE SULFATE 2 MG/ML
2 INJECTION, SOLUTION INTRAMUSCULAR; INTRAVENOUS ONCE
Status: COMPLETED | OUTPATIENT
Start: 2020-04-22 | End: 2020-04-22

## 2020-04-22 RX ORDER — ONDANSETRON 2 MG/ML
4 INJECTION INTRAMUSCULAR; INTRAVENOUS ONCE
Status: COMPLETED | OUTPATIENT
Start: 2020-04-22 | End: 2020-04-22

## 2020-04-22 RX ADMIN — MORPHINE SULFATE 2 MG: 2 INJECTION, SOLUTION INTRAMUSCULAR; INTRAVENOUS at 19:52

## 2020-04-22 RX ADMIN — ONDANSETRON HYDROCHLORIDE 4 MG: 2 SOLUTION INTRAMUSCULAR; INTRAVENOUS at 19:52

## 2020-04-22 RX ADMIN — ONDANSETRON HYDROCHLORIDE 4 MG: 2 SOLUTION INTRAMUSCULAR; INTRAVENOUS at 15:10

## 2020-04-22 RX ADMIN — IOPAMIDOL 100 ML: 612 INJECTION, SOLUTION INTRAVENOUS at 18:15

## 2020-04-22 RX ADMIN — MORPHINE SULFATE 2 MG: 2 INJECTION, SOLUTION INTRAMUSCULAR; INTRAVENOUS at 15:11

## 2020-04-23 ENCOUNTER — TELEPHONE (OUTPATIENT)
Dept: EMERGENCY DEPT | Facility: HOSPITAL | Age: 75
End: 2020-04-23

## 2020-04-23 ENCOUNTER — EPISODE CHANGES (OUTPATIENT)
Dept: CASE MANAGEMENT | Facility: OTHER | Age: 75
End: 2020-04-23

## 2020-04-23 LAB — SARS-COV-2 RNA RESP QL NAA+PROBE: NOT DETECTED

## 2020-04-27 ENCOUNTER — EPISODE CHANGES (OUTPATIENT)
Dept: CASE MANAGEMENT | Facility: OTHER | Age: 75
End: 2020-04-27

## 2020-04-29 ENCOUNTER — TELEPHONE (OUTPATIENT)
Dept: EMERGENCY DEPT | Facility: HOSPITAL | Age: 75
End: 2020-04-29

## 2020-05-01 ENCOUNTER — TELEPHONE (OUTPATIENT)
Dept: GASTROENTEROLOGY | Facility: CLINIC | Age: 75
End: 2020-05-01

## 2020-05-01 NOTE — TELEPHONE ENCOUNTER
Left a voice mail that she is due for a c-scope  Asked her to call our office   Option 2 option 2 to potentially schedule

## 2020-06-04 ENCOUNTER — OFFICE VISIT (OUTPATIENT)
Dept: GASTROENTEROLOGY | Facility: CLINIC | Age: 75
End: 2020-06-04

## 2020-06-04 VITALS
HEART RATE: 79 BPM | DIASTOLIC BLOOD PRESSURE: 80 MMHG | SYSTOLIC BLOOD PRESSURE: 134 MMHG | TEMPERATURE: 97.1 F | BODY MASS INDEX: 28.82 KG/M2 | HEIGHT: 65 IN | OXYGEN SATURATION: 97 % | WEIGHT: 173 LBS

## 2020-06-04 DIAGNOSIS — R11.0 NAUSEA: ICD-10-CM

## 2020-06-04 DIAGNOSIS — Z86.010 HISTORY OF COLON POLYPS: Primary | ICD-10-CM

## 2020-06-04 PROCEDURE — S0260 H&P FOR SURGERY: HCPCS | Performed by: NURSE PRACTITIONER

## 2020-06-04 RX ORDER — CARBAMAZEPINE 200 MG/1
TABLET ORAL EVERY 12 HOURS SCHEDULED
COMMUNITY
End: 2023-03-28

## 2020-06-04 NOTE — H&P (VIEW-ONLY)
Chief Complaint   Patient presents with   • Colonoscopy     4-23-15 had colon 2 polyps 5 year recall having colon change in stools       PCP: Anupama Dhaliwal, DO  REFER: No ref. provider found    Subjective     HPI    Ирина Randolph is a 74 y.o. female who presents to office for preventative maintenance.  There is  a personal history of colon polyps.  There is not a history of colon cancer.  She does not have complaints of , change in bowels, weight loss, no BRBPR, no melena.  She has complain of nausea that started suddenly one week ago.  No emesis.  No nausea x 2 days.  Denies heartburn or indigestion.  No dysphagia.  Denies frequent use of NSAIDs.  There is not a family history of colon cancer.  There is not a family history of colon polyps.  She last colonoscopy-2015 .  Bowels do move on regular basis.      Past Medical History:   Diagnosis Date   • Acute bronchitis    • Arthritis     Osteoarthritis   • Gallbladder abscess    • Hypertension      Past Surgical History:   Procedure Laterality Date   • ABDOMINAL HYSTERECTOMY     • CHOLECYSTECTOMY  1994   • COLONOSCOPY  04/23/2015    two polyps both removed  extensive diverticulosis   • ENDOSCOPY  08/16/2016    normal   • ROTATOR CUFF REPAIR  10/19/2017     Outpatient Medications Marked as Taking for the 6/4/20 encounter (Office Visit) with Moustapha Pedroza APRN   Medication Sig Dispense Refill   • albuterol (PROVENTIL HFA;VENTOLIN HFA) 108 (90 Base) MCG/ACT inhaler Inhale 2 puffs Every 6 (Six) Hours As Needed for Wheezing or Shortness of Air. 1 inhaler 3   • Calcium Carbonate-Vit D-Min (CALTRATE 600+D PLUS MINERALS) 600-800 MG-UNIT tablet Take 1 tablet by mouth Daily.     • carBAMazepine (TEGretol) 200 MG tablet Every 12 (Twelve) Hours.     • Cholecalciferol (VITAMIN D3 PO) Take 2,000 mg by mouth Daily.     • diphenhydrAMINE (BENADRYL) 25 MG tablet Take 25 mg by mouth At Night As Needed for Itching or Sleep.     • fluticasone (FLONASE) 50 MCG/ACT  nasal spray 2 sprays into the nostril(s) as directed by provider Daily. 1 bottle 12   • levothyroxine (SYNTHROID, LEVOTHROID) 125 MCG tablet Take 1 tablet by mouth Daily. 90 tablet 1   • loratadine (CLARITIN) 10 MG tablet Take 10 mg by mouth Daily.     • NON FORMULARY Daily.       Allergies   Allergen Reactions   • Diclofenac Other (See Comments)     Couldn't sleep and face turned blood red   • Percocet [Oxycodone-Acetaminophen] Hives     itching     Social History     Socioeconomic History   • Marital status:      Spouse name: Not on file   • Number of children: Not on file   • Years of education: Not on file   • Highest education level: Not on file   Tobacco Use   • Smoking status: Never Smoker   • Smokeless tobacco: Never Used   Substance and Sexual Activity   • Alcohol use: No   • Drug use: No   • Sexual activity: Yes     Partners: Male     Family History   Problem Relation Age of Onset   • Alcohol abuse Father    • Breast cancer Maternal Aunt    • Bone cancer Maternal Aunt    • Leukemia Maternal Aunt    • Colon cancer Neg Hx    • Colon polyps Neg Hx    • Esophageal cancer Neg Hx      Review of Systems   Constitutional: Negative for fatigue, fever and unexpected weight change.   HENT: Negative for hearing loss, sore throat and voice change.    Eyes: Negative for visual disturbance.   Respiratory: Negative for cough, shortness of breath and wheezing.    Cardiovascular: Negative for chest pain and palpitations.   Gastrointestinal: Negative for abdominal pain, blood in stool and vomiting.   Endocrine: Negative for polydipsia and polyuria.   Genitourinary: Negative for difficulty urinating, dysuria, hematuria and urgency.   Musculoskeletal: Negative for joint swelling and myalgias.   Skin: Negative for color change, rash and wound.   Neurological: Negative for dizziness, tremors, seizures and syncope.   Hematological: Does not bruise/bleed easily.   Psychiatric/Behavioral: Negative for agitation and  confusion. The patient is not nervous/anxious.      Objective   Vitals:    06/04/20 1005   BP: 134/80   Pulse: 79   Temp: 97.1 °F (36.2 °C)   SpO2: 97%     Physical Exam   Constitutional: She is oriented to person, place, and time. She appears well-developed and well-nourished. She is cooperative.   HENT:   Head: Normocephalic and atraumatic.   Eyes: Pupils are equal, round, and reactive to light. Conjunctivae are normal. No scleral icterus.   Neck: Normal range of motion. Neck supple. No JVD present. No thyroid mass and no thyromegaly present.   Cardiovascular: Normal rate, regular rhythm and normal heart sounds. Exam reveals no gallop and no friction rub.   No murmur heard.  Pulmonary/Chest: Effort normal and breath sounds normal. No accessory muscle usage. No respiratory distress. She has no wheezes. She has no rales.   Abdominal: Soft. Normal appearance and bowel sounds are normal. She exhibits no distension, no ascites and no mass. There is no hepatosplenomegaly. There is no tenderness. There is no rebound and no guarding.   Musculoskeletal: Normal range of motion. She exhibits no edema or tenderness.     Vascular Status -  Her right foot exhibits normal foot vasculature  and no edema. Her left foot exhibits normal foot vasculature  and no edema.  Lymphadenopathy:     She has no cervical adenopathy.   Neurological: She is alert and oriented to person, place, and time. She has normal strength. Gait normal.   Skin: Skin is warm, dry and intact. No rash noted.     Imaging Results (Most Recent)     None        Body mass index is 28.79 kg/m².    Assessment/Plan   Ирина was seen today for colonoscopy.    Diagnoses and all orders for this visit:    History of colon polyps  -     Cancel: Case Request; Standing  -     Cancel: Case Request  -     Case Request; Standing  -     Case Request    Nausea  -     Cancel: Case Request; Standing  -     Cancel: Case Request      COLONOSCOPY WITH ANESTHESIA (N/A)    Discussed EGD  evaluation vs delaying procedure..  Due to acute onset and resolution of nausea will delay EGD at this time.  If nausea resumes she will call office and procedure can be added to colonoscopy procedure.  Patient was agreeable.     All risks, benefits, alternatives, and indications of colonoscopy procedure have been discussed with the patient. Risks to include perforation of the colon requiring possible surgery or colostomy, risk of bleeding from biopsies or removal of colon tissue, possibility of missing a colon polyp or cancer, or adverse drug reaction.  Benefits to include the diagnosis and management of disease of the colon and rectum. Alternatives to include barium enema, radiographic evaluation, lab testing or no intervention. She verbalizes understanding and agrees.     Patient verbalized understanding covid testing will be required prior to procedure    Patient's Body mass index is 28.79 kg/m². BMI is above normal parameters. Recommendations include: no follow up.          SKYLER Crowley  06/05/20        There are no Patient Instructions on file for this visit.

## 2020-06-04 NOTE — PROGRESS NOTES
Chief Complaint   Patient presents with   • Colonoscopy     4-23-15 had colon 2 polyps 5 year recall having colon change in stools       PCP: Anupama Dhaliwal, DO  REFER: No ref. provider found    Subjective     HPI    Ирина Radnolph is a 74 y.o. female who presents to office for preventative maintenance.  There is  a personal history of colon polyps.  There is not a history of colon cancer.  She does not have complaints of , change in bowels, weight loss, no BRBPR, no melena.  She has complain of nausea that started suddenly one week ago.  No emesis.  No nausea x 2 days.  Denies heartburn or indigestion.  No dysphagia.  Denies frequent use of NSAIDs.  There is not a family history of colon cancer.  There is not a family history of colon polyps.  She last colonoscopy-2015 .  Bowels do move on regular basis.      Past Medical History:   Diagnosis Date   • Acute bronchitis    • Arthritis     Osteoarthritis   • Gallbladder abscess    • Hypertension      Past Surgical History:   Procedure Laterality Date   • ABDOMINAL HYSTERECTOMY     • CHOLECYSTECTOMY  1994   • COLONOSCOPY  04/23/2015    two polyps both removed  extensive diverticulosis   • ENDOSCOPY  08/16/2016    normal   • ROTATOR CUFF REPAIR  10/19/2017     Outpatient Medications Marked as Taking for the 6/4/20 encounter (Office Visit) with Moustapha Pedroza APRN   Medication Sig Dispense Refill   • albuterol (PROVENTIL HFA;VENTOLIN HFA) 108 (90 Base) MCG/ACT inhaler Inhale 2 puffs Every 6 (Six) Hours As Needed for Wheezing or Shortness of Air. 1 inhaler 3   • Calcium Carbonate-Vit D-Min (CALTRATE 600+D PLUS MINERALS) 600-800 MG-UNIT tablet Take 1 tablet by mouth Daily.     • carBAMazepine (TEGretol) 200 MG tablet Every 12 (Twelve) Hours.     • Cholecalciferol (VITAMIN D3 PO) Take 2,000 mg by mouth Daily.     • diphenhydrAMINE (BENADRYL) 25 MG tablet Take 25 mg by mouth At Night As Needed for Itching or Sleep.     • fluticasone (FLONASE) 50 MCG/ACT  nasal spray 2 sprays into the nostril(s) as directed by provider Daily. 1 bottle 12   • levothyroxine (SYNTHROID, LEVOTHROID) 125 MCG tablet Take 1 tablet by mouth Daily. 90 tablet 1   • loratadine (CLARITIN) 10 MG tablet Take 10 mg by mouth Daily.     • NON FORMULARY Daily.       Allergies   Allergen Reactions   • Diclofenac Other (See Comments)     Couldn't sleep and face turned blood red   • Percocet [Oxycodone-Acetaminophen] Hives     itching     Social History     Socioeconomic History   • Marital status:      Spouse name: Not on file   • Number of children: Not on file   • Years of education: Not on file   • Highest education level: Not on file   Tobacco Use   • Smoking status: Never Smoker   • Smokeless tobacco: Never Used   Substance and Sexual Activity   • Alcohol use: No   • Drug use: No   • Sexual activity: Yes     Partners: Male     Family History   Problem Relation Age of Onset   • Alcohol abuse Father    • Breast cancer Maternal Aunt    • Bone cancer Maternal Aunt    • Leukemia Maternal Aunt    • Colon cancer Neg Hx    • Colon polyps Neg Hx    • Esophageal cancer Neg Hx      Review of Systems   Constitutional: Negative for fatigue, fever and unexpected weight change.   HENT: Negative for hearing loss, sore throat and voice change.    Eyes: Negative for visual disturbance.   Respiratory: Negative for cough, shortness of breath and wheezing.    Cardiovascular: Negative for chest pain and palpitations.   Gastrointestinal: Negative for abdominal pain, blood in stool and vomiting.   Endocrine: Negative for polydipsia and polyuria.   Genitourinary: Negative for difficulty urinating, dysuria, hematuria and urgency.   Musculoskeletal: Negative for joint swelling and myalgias.   Skin: Negative for color change, rash and wound.   Neurological: Negative for dizziness, tremors, seizures and syncope.   Hematological: Does not bruise/bleed easily.   Psychiatric/Behavioral: Negative for agitation and  confusion. The patient is not nervous/anxious.      Objective   Vitals:    06/04/20 1005   BP: 134/80   Pulse: 79   Temp: 97.1 °F (36.2 °C)   SpO2: 97%     Physical Exam   Constitutional: She is oriented to person, place, and time. She appears well-developed and well-nourished. She is cooperative.   HENT:   Head: Normocephalic and atraumatic.   Eyes: Pupils are equal, round, and reactive to light. Conjunctivae are normal. No scleral icterus.   Neck: Normal range of motion. Neck supple. No JVD present. No thyroid mass and no thyromegaly present.   Cardiovascular: Normal rate, regular rhythm and normal heart sounds. Exam reveals no gallop and no friction rub.   No murmur heard.  Pulmonary/Chest: Effort normal and breath sounds normal. No accessory muscle usage. No respiratory distress. She has no wheezes. She has no rales.   Abdominal: Soft. Normal appearance and bowel sounds are normal. She exhibits no distension, no ascites and no mass. There is no hepatosplenomegaly. There is no tenderness. There is no rebound and no guarding.   Musculoskeletal: Normal range of motion. She exhibits no edema or tenderness.     Vascular Status -  Her right foot exhibits normal foot vasculature  and no edema. Her left foot exhibits normal foot vasculature  and no edema.  Lymphadenopathy:     She has no cervical adenopathy.   Neurological: She is alert and oriented to person, place, and time. She has normal strength. Gait normal.   Skin: Skin is warm, dry and intact. No rash noted.     Imaging Results (Most Recent)     None        Body mass index is 28.79 kg/m².    Assessment/Plan   Ирина was seen today for colonoscopy.    Diagnoses and all orders for this visit:    History of colon polyps  -     Cancel: Case Request; Standing  -     Cancel: Case Request  -     Case Request; Standing  -     Case Request    Nausea  -     Cancel: Case Request; Standing  -     Cancel: Case Request      COLONOSCOPY WITH ANESTHESIA (N/A)    Discussed EGD  evaluation vs delaying procedure..  Due to acute onset and resolution of nausea will delay EGD at this time.  If nausea resumes she will call office and procedure can be added to colonoscopy procedure.  Patient was agreeable.     All risks, benefits, alternatives, and indications of colonoscopy procedure have been discussed with the patient. Risks to include perforation of the colon requiring possible surgery or colostomy, risk of bleeding from biopsies or removal of colon tissue, possibility of missing a colon polyp or cancer, or adverse drug reaction.  Benefits to include the diagnosis and management of disease of the colon and rectum. Alternatives to include barium enema, radiographic evaluation, lab testing or no intervention. She verbalizes understanding and agrees.     Patient verbalized understanding covid testing will be required prior to procedure    Patient's Body mass index is 28.79 kg/m². BMI is above normal parameters. Recommendations include: no follow up.          SKYLER Crowley  06/05/20        There are no Patient Instructions on file for this visit.

## 2020-06-09 ENCOUNTER — TRANSCRIBE ORDERS (OUTPATIENT)
Dept: ADMINISTRATIVE | Facility: HOSPITAL | Age: 75
End: 2020-06-09

## 2020-06-09 ENCOUNTER — TELEPHONE (OUTPATIENT)
Dept: GASTROENTEROLOGY | Facility: CLINIC | Age: 75
End: 2020-06-09

## 2020-06-09 DIAGNOSIS — Z01.818 PRE-OP TESTING: Primary | ICD-10-CM

## 2020-06-09 NOTE — TELEPHONE ENCOUNTER
Patient called and she does want EGD same day as COLON.  Could you fix case request when you get back in office ? Thank you.

## 2020-06-15 ENCOUNTER — LAB (OUTPATIENT)
Dept: LAB | Facility: HOSPITAL | Age: 75
End: 2020-06-15

## 2020-06-15 LAB — SARS-COV-2 RNA PNL SPEC NAA+PROBE: NOT DETECTED

## 2020-06-15 PROCEDURE — 87635 SARS-COV-2 COVID-19 AMP PRB: CPT | Performed by: INTERNAL MEDICINE

## 2020-06-17 ENCOUNTER — ANESTHESIA EVENT (OUTPATIENT)
Dept: GASTROENTEROLOGY | Facility: HOSPITAL | Age: 75
End: 2020-06-17

## 2020-06-17 ENCOUNTER — HOSPITAL ENCOUNTER (OUTPATIENT)
Facility: HOSPITAL | Age: 75
Setting detail: HOSPITAL OUTPATIENT SURGERY
Discharge: HOME | End: 2020-06-17
Attending: INTERNAL MEDICINE | Admitting: INTERNAL MEDICINE

## 2020-06-17 ENCOUNTER — ANESTHESIA (OUTPATIENT)
Dept: GASTROENTEROLOGY | Facility: HOSPITAL | Age: 75
End: 2020-06-17

## 2020-06-17 VITALS
DIASTOLIC BLOOD PRESSURE: 89 MMHG | BODY MASS INDEX: 27.82 KG/M2 | WEIGHT: 167 LBS | RESPIRATION RATE: 17 BRPM | TEMPERATURE: 97 F | OXYGEN SATURATION: 99 % | SYSTOLIC BLOOD PRESSURE: 150 MMHG | HEIGHT: 65 IN | HEART RATE: 70 BPM

## 2020-06-17 DIAGNOSIS — Z86.010 HISTORY OF COLON POLYPS: ICD-10-CM

## 2020-06-17 PROCEDURE — 25010000002 PROPOFOL 10 MG/ML EMULSION: Performed by: NURSE ANESTHETIST, CERTIFIED REGISTERED

## 2020-06-17 PROCEDURE — 87081 CULTURE SCREEN ONLY: CPT | Performed by: INTERNAL MEDICINE

## 2020-06-17 PROCEDURE — 43239 EGD BIOPSY SINGLE/MULTIPLE: CPT | Performed by: INTERNAL MEDICINE

## 2020-06-17 PROCEDURE — 45385 COLONOSCOPY W/LESION REMOVAL: CPT | Performed by: INTERNAL MEDICINE

## 2020-06-17 RX ORDER — LIDOCAINE HYDROCHLORIDE 10 MG/ML
0.5 INJECTION, SOLUTION EPIDURAL; INFILTRATION; INTRACAUDAL; PERINEURAL ONCE AS NEEDED
Status: DISCONTINUED | OUTPATIENT
Start: 2020-06-17 | End: 2020-06-17 | Stop reason: HOSPADM

## 2020-06-17 RX ORDER — PROPOFOL 10 MG/ML
VIAL (ML) INTRAVENOUS AS NEEDED
Status: DISCONTINUED | OUTPATIENT
Start: 2020-06-17 | End: 2020-06-17 | Stop reason: SURG

## 2020-06-17 RX ORDER — SODIUM CHLORIDE 0.9 % (FLUSH) 0.9 %
10 SYRINGE (ML) INJECTION AS NEEDED
Status: DISCONTINUED | OUTPATIENT
Start: 2020-06-17 | End: 2020-06-17 | Stop reason: HOSPADM

## 2020-06-17 RX ORDER — SODIUM CHLORIDE 9 MG/ML
500 INJECTION, SOLUTION INTRAVENOUS CONTINUOUS PRN
Status: DISCONTINUED | OUTPATIENT
Start: 2020-06-17 | End: 2020-06-17 | Stop reason: HOSPADM

## 2020-06-17 RX ORDER — LOSARTAN POTASSIUM 50 MG/1
50 TABLET ORAL DAILY
COMMUNITY
End: 2023-03-28

## 2020-06-17 RX ADMIN — LIDOCAINE HYDROCHLORIDE 100 MG: 20 INJECTION, SOLUTION INTRAVENOUS at 07:59

## 2020-06-17 RX ADMIN — SODIUM CHLORIDE: 0.9 INJECTION, SOLUTION INTRAVENOUS at 07:37

## 2020-06-17 RX ADMIN — PROPOFOL 220 MG: 10 INJECTION, EMULSION INTRAVENOUS at 07:59

## 2020-06-17 NOTE — ANESTHESIA PREPROCEDURE EVALUATION
Anesthesia Evaluation     Patient summary reviewed and Nursing notes reviewed   no history of anesthetic complications:  NPO Solid Status: > 8 hours  NPO Liquid Status: > 2 hours           Airway   Mallampati: II  TM distance: >3 FB  Neck ROM: full  Small opening  Dental      Pulmonary    (-) not a smoker    ROS comment: Uses advair with bronchitis  Cardiovascular   Exercise tolerance: good (4-7 METS)    (+) hypertension, hyperlipidemia,       Neuro/Psych  (-) seizures, TIA, CVA  GI/Hepatic/Renal/Endo    (+)   thyroid problem hypothyroidism  (-) liver disease, no renal disease, diabetes    Musculoskeletal     Abdominal    Substance History      OB/GYN          Other   arthritis,                      Anesthesia Plan    ASA 2     MAC     intravenous induction     Anesthetic plan, all risks, benefits, and alternatives have been provided, discussed and informed consent has been obtained with: patient.

## 2020-06-17 NOTE — ANESTHESIA POSTPROCEDURE EVALUATION
"Patient: Ирина Randolph    Procedure Summary     Date:  06/17/20 Room / Location:  [facility] ENDOSCOPY 6 / [facility] END[facility]COPY    Anesthesia Start:  0754 Anesthesia Stop:  0816    Procedures:       COLONOSCOPY WITH ANESTHESIA (N/A )      ESOPHAGOGASTRODUODENOSCOPY WITH ANESTHESIA (N/A ) Diagnosis:       History of colon polyps      (History of colon polyps [Z86.010])    Surgeon:  Homer Zamarripa DO Provider:  Kiki Coffey CRNA    Anesthesia Type:  MAC ASA Status:  2          Anesthesia Type: MAC    Vitals  Vitals Value Taken Time   /85 6/17/2020  8:30 AM   Temp     Pulse 69 6/17/2020  8:31 AM   Resp 18 6/17/2020  8:25 AM   SpO2 100 % 6/17/2020  8:21 AM   Vitals shown include unvalidated device data.        Post Anesthesia Care and Evaluation    Patient location during evaluation: PHASE II  Patient participation: complete - patient participated  Level of consciousness: awake and alert  Pain management: adequate  Airway patency: patent  Anesthetic complications: No anesthetic complications  PONV Status: none  Cardiovascular status: acceptable  Respiratory status: acceptable  Hydration status: acceptable    Comments: Blood pressure 111/81, pulse 78, temperature 97 °F (36.1 °C), temperature source Temporal, resp. rate 18, height 165.1 cm (65\"), weight 75.8 kg (167 lb), SpO2 99 %, not currently breastfeeding.        "

## 2020-06-18 LAB — UREASE TISS QL: NEGATIVE

## 2020-06-19 ENCOUNTER — TELEPHONE (OUTPATIENT)
Dept: GASTROENTEROLOGY | Facility: CLINIC | Age: 75
End: 2020-06-19

## 2020-08-22 ENCOUNTER — APPOINTMENT (OUTPATIENT)
Dept: CT IMAGING | Facility: HOSPITAL | Age: 75
End: 2020-08-22

## 2020-08-22 ENCOUNTER — APPOINTMENT (OUTPATIENT)
Dept: GENERAL RADIOLOGY | Facility: HOSPITAL | Age: 75
End: 2020-08-22

## 2020-08-22 ENCOUNTER — HOSPITAL ENCOUNTER (EMERGENCY)
Facility: HOSPITAL | Age: 75
Discharge: HOME | End: 2020-08-22
Attending: EMERGENCY MEDICINE | Admitting: EMERGENCY MEDICINE

## 2020-08-22 VITALS
WEIGHT: 164 LBS | BODY MASS INDEX: 29.06 KG/M2 | DIASTOLIC BLOOD PRESSURE: 76 MMHG | OXYGEN SATURATION: 98 % | HEIGHT: 63 IN | TEMPERATURE: 98.5 F | HEART RATE: 78 BPM | SYSTOLIC BLOOD PRESSURE: 138 MMHG | RESPIRATION RATE: 18 BRPM

## 2020-08-22 DIAGNOSIS — S60.222A CONTUSION OF LEFT HAND, INITIAL ENCOUNTER: ICD-10-CM

## 2020-08-22 DIAGNOSIS — S00.83XA CONTUSION OF FACE, INITIAL ENCOUNTER: ICD-10-CM
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
## 2020-08-22 PROCEDURE — 99282 EMERGENCY DEPT VISIT SF MDM: CPT

## 2020-08-22 PROCEDURE — 73110 X-RAY EXAM OF WRIST: CPT

## 2020-08-22 PROCEDURE — 70450 CT HEAD/BRAIN W/O DYE: CPT

## 2020-08-22 PROCEDURE — 73130 X-RAY EXAM OF HAND: CPT

## 2020-08-22 PROCEDURE — 70486 CT MAXILLOFACIAL W/O DYE: CPT

## 2020-08-22 NOTE — ED PROVIDER NOTES
"Subjective   Patient says she was at her neighbor's house and apparently tripped over a speed bump in the neighbors drive and fell forward.  She complains of \"a punk knot\" on her left forehead and some tenderness and swelling of her left cheek.  She also has some tenderness in her left lower lip.  She has some tenderness in her left wrist and in the small finger of the left hand.  She also has little scrapes in her knees but that is not bad.  She also has little pain in her back but also is not bad.  She had no loss of consciousness.      History provided by:  Patient   used: No    Fall   Mechanism of injury: fall    Injury location:  Head/neck, face and shoulder/arm  Head/neck injury location:  Head  Facial injury location:  L cheek and lower lip  Shoulder/arm injury location:  L wrist and L hand  Incident location:  Home  Time since incident:  1 hour  Arrived directly from scene: no    Fall:     Fall occurred:  Tripped    Impact surface: asphalt.    Point of impact:  Unable to specify    Entrapped after fall: no    Protective equipment: none    Suspicion of alcohol use: no    Suspicion of drug use: no    Tetanus status:  Unknown  Prior to arrival data:     Bystander interventions:  None    Patient ambulatory at scene: yes      Blood loss:  None    Responsiveness at scene:  Alert    Orientation at scene:  Person, place, situation and time    Loss of consciousness: no      Airway interventions:  None    Breathing interventions:  None    IV access status:  None    IO access:  None    Fluids administered:  None    Cardiac interventions:  None    Medications administered:  None    Immobilization:  None    Airway condition since incident:  Stable    Breathing condition since incident:  Stable    Circulation condition since incident:  Stable    Mental status condition since incident:  Stable    Disability condition since incident:  Stable  Associated symptoms: no abdominal pain, no back pain, no " blindness, no chest pain, no difficulty breathing, no headaches, no hearing loss, no loss of consciousness, no nausea, no neck pain, no seizures and no vomiting    Risk factors: no AICD, no anticoagulation therapy, no asthma, no beta blocker therapy, no CABG, no CAD, no CHF, no COPD, no diabetes, no dialysis, no hemophilia, no kidney disease, no pacemaker, no past MI, not pregnant and no steroid use        Review of Systems   Constitutional: Negative.    HENT: Negative.  Negative for hearing loss.    Eyes: Negative for blindness.   Respiratory: Negative.    Cardiovascular: Negative.  Negative for chest pain.   Gastrointestinal: Negative.  Negative for abdominal pain, nausea and vomiting.   Genitourinary: Negative.    Musculoskeletal: Negative.  Negative for back pain and neck pain.   Skin: Negative.    Neurological: Negative.  Negative for seizures, loss of consciousness and headaches.   Psychiatric/Behavioral: Negative.    All other systems reviewed and are negative.      Past Medical History:   Diagnosis Date   • Acute bronchitis    • Arthritis     Osteoarthritis   • Disease of thyroid gland    • Gallbladder abscess    • Hypertension        Allergies   Allergen Reactions   • Diclofenac Other (See Comments)     Couldn't sleep and face turned blood red   • Percocet [Oxycodone-Acetaminophen] Hives     itching       Past Surgical History:   Procedure Laterality Date   • ABDOMINAL HYSTERECTOMY     • CHOLECYSTECTOMY  1994   • COLONOSCOPY  04/23/2015    two polyps both removed  extensive diverticulosis   • COLONOSCOPY N/A 6/17/2020    Procedure: COLONOSCOPY WITH ANESTHESIA;  Surgeon: Homer Zamarripa DO;  Location: [facility] ENDOSCOPY;  Service: Gastroenterology;  Laterality: N/A;  pre: Hx of polyps  post:  Anupama Dhaliwal   • ENDOSCOPY  08/16/2016    normal   • ENDOSCOPY N/A 6/17/2020    Procedure: ESOPHAGOGASTRODUODENOSCOPY WITH ANESTHESIA;  Surgeon: Homer Zamarripa DO;  Location: [facility] ENDOSCOPY;  Service:  Gastroenterology;  Laterality: N/A;  pre: nausea  post:    • ROTATOR CUFF REPAIR  10/19/2017       Family History   Problem Relation Age of Onset   • Alcohol abuse Father    • Breast cancer Maternal Aunt    • Bone cancer Maternal Aunt    • Leukemia Maternal Aunt    • Colon cancer Neg Hx    • Colon polyps Neg Hx    • Esophageal cancer Neg Hx        Social History     Socioeconomic History   • Marital status:      Spouse name: Not on file   • Number of children: Not on file   • Years of education: Not on file   • Highest education level: Not on file   Tobacco Use   • Smoking status: Never Smoker   • Smokeless tobacco: Never Used   Substance and Sexual Activity   • Alcohol use: No   • Drug use: No   • Sexual activity: Yes     Partners: Male       Prior to Admission medications    Medication Sig Start Date End Date Taking? Authorizing Provider   albuterol (PROVENTIL HFA;VENTOLIN HFA) 108 (90 Base) MCG/ACT inhaler Inhale 2 puffs Every 6 (Six) Hours As Needed for Wheezing or Shortness of Air. 3/20/18   Summer Cota APRN   Calcium Carbonate-Vit D-Min (CALTRATE 600+D PLUS MINERALS) 600-800 MG-UNIT tablet Take 1 tablet by mouth Daily.    ProviderAlmita MD   carBAMazepine (TEGretol) 200 MG tablet Every 12 (Twelve) Hours.    Almita Tate MD   Cholecalciferol (VITAMIN D3 PO) Take 2,000 mg by mouth Daily.    Almita Tate MD   diphenhydrAMINE (BENADRYL) 25 MG tablet Take 25 mg by mouth At Night As Needed for Itching or Sleep.    ProviderAlmita MD   fluticasone (FLONASE) 50 MCG/ACT nasal spray 2 sprays into the nostril(s) as directed by provider Daily. 7/10/19   Scotty Jaffe, DO   levothyroxine (SYNTHROID, LEVOTHROID) 125 MCG tablet Take 1 tablet by mouth Daily. 4/10/19   Serafin Andrade, DO   loratadine (CLARITIN) 10 MG tablet Take 10 mg by mouth Daily.    Almita Tate MD   losartan (COZAAR) 50 MG tablet Take 50 mg by mouth Daily.    Almita Tate MD   NON  FORMULARY Daily.    Provider, MD Almita       Medications - No data to display    Vitals:    08/22/20 1345   BP: 144/83   Pulse: 79   Resp: 15   Temp: 98.4 °F (36.9 °C)   SpO2: 96%         Objective   Physical Exam   Constitutional: She is oriented to person, place, and time. She appears well-developed and well-nourished.   HENT:   Head: Normocephalic.   There is some mild swelling and localized swelling and tenderness in the left forehead but no bony deformity palpated.  She has little blood under her left nostril.  She has some bruising of her left lower lip and little localized swelling there.  There is no obvious laceration.  She has 2 superficial 1 cm cuts on her left cheek.  They are closed spontaneously.   Eyes: Pupils are equal, round, and reactive to light. EOM are normal.   Neck: Normal range of motion. Neck supple.   Cardiovascular: Normal rate and regular rhythm.   Pulmonary/Chest: Effort normal and breath sounds normal.   Musculoskeletal: Normal range of motion. She exhibits tenderness.   There is tenderness to palpation in the left lateral wrist area and on the lateral side of the left hand but no obvious deformities palpated.   Neurological: She is alert and oriented to person, place, and time.   Skin: Skin is warm and dry.   Psychiatric: She has a normal mood and affect. Her behavior is normal.   Nursing note and vitals reviewed.      Procedures         Lab Results (last 24 hours)     ** No results found for the last 24 hours. **          CT Head Without Contrast   Final Result   No hemorrhage, edema or mass effect. No acute findings.       The full report of this exam was immediately signed and available to the   emergency room. The patient is currently in the emergency room.       This report was finalized on 08/22/2020 15:30 by Dr. Adarsh Cintron MD.      CT Facial Bones Without Contrast   Final Result   1. No evidence of facial bone fracture.   2. Torus arising from the hard palate.   3.  Degenerative change of the temporomandibular joint spaces   bilaterally.   4. Minimal soft tissue swelling over the left forehead.       The full report of this exam was immediately signed and available to the   emergency room. The patient is currently in the emergency room.   This report was finalized on 08/22/2020 15:33 by Dr. Adarsh Cintron MD.      XR Wrist 3+ View Left   Final Result   1. No evidence of acute fracture.   2. Degenerative osteoarthritis.       This report was finalized on 08/22/2020 15:35 by Dr. Adarsh Cintron MD.      XR Hand 3+ View Left   Final Result   1. No evidence of acute fracture.   2. Degenerative osteoarthritis.       This report was finalized on 08/22/2020 15:36 by Dr. Adarsh Cintron MD.          ED Course  ED Course as of Aug 22 1553   Sat Aug 22, 2020   1552 Told the patient that her x-rays are okay with no signs of anything broken.  She is bruising even more now so I told her should be extremely sore tomorrow but should gradually get better.  She says she has pain medication at home.  She is discharged in stable condition.    [TR]      ED Course User Index  [TR] Ish Coronel Jr., MD          MDM  Number of Diagnoses or Management Options  Contusion of face, initial encounter: new and requires workup  Contusion of left hand, initial encounter: new and requires workup  Fall, initial encounter: new and requires workup     Amount and/or Complexity of Data Reviewed  Tests in the radiology section of CPT®: ordered and reviewed    Risk of Complications, Morbidity, and/or Mortality  Presenting problems: moderate  Diagnostic procedures: moderate  Management options: moderate    Patient Progress  Patient progress: stable      Final diagnoses:   Fall, initial encounter   Contusion of face, initial encounter   Contusion of left hand, initial encounter          Ish Coronel Jr., MD  08/22/20 6893

## 2021-06-02 ENCOUNTER — OFFICE VISIT (OUTPATIENT)
Dept: GASTROENTEROLOGY | Facility: CLINIC | Age: 76
End: 2021-06-02

## 2021-06-02 VITALS
HEART RATE: 80 BPM | BODY MASS INDEX: 28.32 KG/M2 | DIASTOLIC BLOOD PRESSURE: 72 MMHG | OXYGEN SATURATION: 97 % | SYSTOLIC BLOOD PRESSURE: 142 MMHG | HEIGHT: 65 IN | TEMPERATURE: 97.2 F | WEIGHT: 170 LBS

## 2021-06-02 DIAGNOSIS — K86.2 PANCREATIC CYST: Primary | ICD-10-CM

## 2021-06-02 PROCEDURE — 99214 OFFICE O/P EST MOD 30 MIN: CPT | Performed by: INTERNAL MEDICINE

## 2021-06-02 NOTE — PROGRESS NOTES
Chief Complaint   Patient presents with   • GI Problem     Pancreatic Cyst       PCP: Anupama Dhaliwal DO  REFER: Anupama Dhaliwal*    Subjective     HPI          Intermittent abdominal pain/tightness  Denies weight loss or appetite  Able to eat well  NML Bm's noted        Past Medical History:   Diagnosis Date   • Acute bronchitis    • Arthritis     Osteoarthritis   • Disease of thyroid gland    • Gallbladder abscess    • Hypertension        Past Surgical History:   Procedure Laterality Date   • ABDOMINAL HYSTERECTOMY     • CHOLECYSTECTOMY  1994   • COLONOSCOPY  04/23/2015    two polyps both removed  extensive diverticulosis   • COLONOSCOPY N/A 6/17/2020    Procedure: COLONOSCOPY WITH ANESTHESIA;  Surgeon: Homer Zamarripa DO;  Location: [facility] ENDOSCOPY;  Service: Gastroenterology;  Laterality: N/A;  pre: Hx of polyps  post:  Anupama Dhaliwal   • ENDOSCOPY  08/16/2016    normal   • ENDOSCOPY N/A 6/17/2020    Procedure: ESOPHAGOGASTRODUODENOSCOPY WITH ANESTHESIA;  Surgeon: Homer Zamarripa DO;  Location: [facility] ENDOSCOPY;  Service: Gastroenterology;  Laterality: N/A;  pre: nausea  post:    • ROTATOR CUFF REPAIR  10/19/2017       Outpatient Medications Marked as Taking for the 6/2/21 encounter (Office Visit) with Homer Zamarripa DO   Medication Sig Dispense Refill   • albuterol (PROVENTIL HFA;VENTOLIN HFA) 108 (90 Base) MCG/ACT inhaler Inhale 2 puffs Every 6 (Six) Hours As Needed for Wheezing or Shortness of Air. 1 inhaler 3   • Calcium Carbonate-Vit D-Min (CALTRATE 600+D PLUS MINERALS) 600-800 MG-UNIT tablet Take 1 tablet by mouth Daily.     • carBAMazepine (TEGretol) 200 MG tablet Every 12 (Twelve) Hours.     • Cholecalciferol (VITAMIN D3 PO) Take 2,000 mg by mouth Daily.     • diphenhydrAMINE (BENADRYL) 25 MG tablet Take 25 mg by mouth At Night As Needed for Itching or Sleep.     • fluticasone (FLONASE) 50 MCG/ACT nasal spray 2 sprays into the nostril(s) as directed by provider Daily. 1  "bottle 12   • levothyroxine (SYNTHROID, LEVOTHROID) 125 MCG tablet Take 1 tablet by mouth Daily. 90 tablet 1   • losartan (COZAAR) 50 MG tablet Take 50 mg by mouth Daily.         Allergies   Allergen Reactions   • Diclofenac Other (See Comments)     Couldn't sleep and face turned blood red   • Percocet [Oxycodone-Acetaminophen] Hives     itching       Social History     Socioeconomic History   • Marital status:      Spouse name: Not on file   • Number of children: Not on file   • Years of education: Not on file   • Highest education level: Not on file   Tobacco Use   • Smoking status: Never Smoker   • Smokeless tobacco: Never Used   Substance and Sexual Activity   • Alcohol use: No   • Drug use: No   • Sexual activity: Yes     Partners: Male       Review of Systems   Constitutional: Negative for unexpected weight change.   Respiratory: Negative for shortness of breath.    Cardiovascular: Negative for chest pain.   Gastrointestinal: Negative for abdominal pain and anal bleeding.       Objective     Vitals:    06/02/21 1240   BP: 142/72   Pulse: 80   Temp: 97.2 °F (36.2 °C)   SpO2: 97%   Weight: 77.1 kg (170 lb)   Height: 165.1 cm (65\")     Body mass index is 28.29 kg/m².    Physical Exam  Constitutional:       Appearance: Normal appearance. She is well-developed.   Eyes:      General: No scleral icterus.  Neck:      Thyroid: No thyroid mass or thyromegaly.      Vascular: No JVD.   Pulmonary:      Effort: Pulmonary effort is normal. No accessory muscle usage.   Abdominal:      General: There is no distension.      Tenderness: There is no abdominal tenderness. There is no guarding.   Skin:     Coloration: Skin is not jaundiced.   Neurological:      Mental Status: She is alert.   Psychiatric:         Behavior: Behavior is cooperative.         Judgment: Judgment normal.         Imaging Results (Most Recent)     None          Body mass index is 28.29 kg/m².    Assessment/Plan     Diagnoses and all orders for this " visit:    1. Pancreatic cyst (Primary)  -     Ambulatory Referral to Gastroenterology      EUS seems very reasonable at this time  Will refer to Dr Ruperto solitario    * Surgery not found *        Advised pt to stop use of NSAIDs, Fish Oil, and MV 5 days prior to procedure, per Dr Zamarripa protocol.  Tylenol based products are ok to take.  Pt verbalized understanding.    Precautions are currently being put in place due to COVID-19.  I have explained to Ирина Randolph they will be required to undergo COVID testing prior to their procedure.  Ирина Randolph verbalized understanding and was willing to proceed.        Homer Zamarripa, DO  06/02/21          There are no Patient Instructions on file for this visit.

## 2021-06-07 ENCOUNTER — LAB (OUTPATIENT)
Dept: LAB | Facility: HOSPITAL | Age: 76
End: 2021-06-07

## 2021-06-07 ENCOUNTER — TRANSCRIBE ORDERS (OUTPATIENT)
Dept: ADMINISTRATIVE | Facility: HOSPITAL | Age: 76
End: 2021-06-07

## 2021-06-07 DIAGNOSIS — K86.89 PANCREATIC MASS: Primary | ICD-10-CM

## 2021-06-07 DIAGNOSIS — K86.89 PANCREATIC MASS: ICD-10-CM

## 2021-06-07 LAB — CANCER AG19-9 SERPL-ACNC: 407.1 U/ML

## 2021-06-07 PROCEDURE — 86301 IMMUNOASSAY TUMOR CA 19-9: CPT

## 2021-06-07 PROCEDURE — 36415 COLL VENOUS BLD VENIPUNCTURE: CPT

## 2021-06-11 ENCOUNTER — TELEPHONE (OUTPATIENT)
Dept: GASTROENTEROLOGY | Facility: CLINIC | Age: 76
End: 2021-06-11

## 2021-06-11 NOTE — TELEPHONE ENCOUNTER
EUS-Dr. Wolfe-6/3/2021    Procedure for suspected mass in pancreas on CT scan    Findings  Visualized portion of pancreas abnormal on ultrasound.  There is a 26.7 mm solid mass in pancreatic tail.  It abuts the splenic vessel without occlusion or varices.  Pancreatic duct is dilated to 6 mm in the tail proximal to the mass.  FNA x4 yields atypical cells.  Celiac axis, liver appear normal, no local nodes.  Body and head of pancreas normal.    Impression  Specimens collected    Recommendations patient would be discharged home with review of treatment options with her including surgery.      PATHOLOGY    ADENOCARCINOMA

## 2021-06-24 PROBLEM — C25.2 MALIGNANT NEOPLASM OF TAIL OF PANCREAS (HCC): Status: ACTIVE | Noted: 2021-06-24

## 2021-07-07 ENCOUNTER — TELEPHONE (OUTPATIENT)
Dept: SURGERY | Age: 76
End: 2021-07-07

## 2021-07-07 NOTE — TELEPHONE ENCOUNTER
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 She is currently postop from Whipple procedure for pancreatic cancer. Her daughter is our patient and Ms. Cruzbruna Wang had her testing through the familial testing program with [facility]. Bryan-Please scan results into her chart and mail her a copy to her home address.

## 2021-08-03 DIAGNOSIS — C25.2 MALIGNANT NEOPLASM OF TAIL OF PANCREAS (HCC): Primary | ICD-10-CM

## 2021-08-04 NOTE — PROGRESS NOTES
MEDICAL ONCOLOGY CONSULTATION    Pt Name: Ashtyn Santamaria  MRN: [id number]  YOB: 1945  Date of evaluation: 8/6/2021    REASON FOR CONSULTATION:  Pancreatic cancer  REQUESTING PHYSICIAN: Dr Adela Stoll    History Obtained From:  patient and old medical records    HISTORY OF PRESENT ILLNESS:      Diagnosis  · Invasive ductal adenocarcinoma, pancreas, June 2021  · T2 N1 M0  · Strong family history for breast cancer  · Positive JESUSITA gene mutation    Treatment Summary  · 6/28/21 Laparoscopic assisted distal pancreatectomy and splenectomy with 1 of 6 lymph nodes positive. Surgeon Dr. Weldon [address]   · Recommend Gemzar 1000 mg/m2 IV D 1, D8, D15 with Capecitabine 830 mg/m2 D1-21 every 28 days x6 cycles    Cancer History  Loraine Combs was first seen by me on 8/6/2021. The patient was referred for a diagnosis of pancreatic malignancy. She had initial complaints of abdominal pain. · 5/5/20 MRI abdomen [facility]): Moderate fatty replacement of the pancreas with several small cystic structures within or adjacent to the pancreatic head and uncinate process most suggestive of benign cysts. A follow-up MRI of the abdomen may be obtained in 1 year to assess for stability. No solid lesion is seen. Otherwise unremarkable MRI of the abdomen. · 4/22/20 CT abd/pelvis [facility]): Focal inflammatory stranding associated with the pancreatic head. Radiographically I would favor this to represent acute pancreatitis. There is mild associated stranding within the descending and proximal transverse duodenum. There are 2 well-circumscribed hypodensities one in the region of the uncinate process and one slightly anterior to the pancreatic head likely representing sequela of pancreatitis but warranting follow-up on subsequent exam. The body and tail of the pancreas are spared. No evidence of pancreatic necrosis. The patient is status post hysterectomy and cholecystectomy. Bibasilar subsegmental atelectasis.  No evidence of nephrolithiasis or obstructive uropathy. Diverticulosis of the distal descending and sigmoid colon without evidence of diverticulitis. · 5/5/21 MRI [facility]) There are multiple tiny-to-small foci of increased T2 signal seen within the pancreas, consistent with a combination of cysts and/or beaded dilatation of the tributaries of the main pancreatic duct, increased in size and number when compared to the prior similar study of 5/5/20. The patient is again noted to be status post cholecystectomy. The examination is otherwise within normal limits for age with no abnormal postcontrast enhancement noted. · 6/3/21 Upper EUS (Dr Олег Rene/[place]): CT Visualized portion of pancreas abnormal on ultrasound. There is a 26.7 mm solid mass in pancreatic tail. It abuts the splenic vessel without occlusion or varices. Pancreatic duct is dilated to 6 mm in the tail proximal to the mass. FNA x4 yields atypical cells. Celiac axis, liver appear normal, no local nodes. Body and head of pancreas normal.  · 6/3/21 Pancreas tail mass, FNA aspiration smear (x2), ThinPrep and cell block: Adenocarcinoma. · 6/7/21 CA 19-9 407.1  · 6/28/21 Pancreatectomy: Invasive ductal carcinoma 4.3 cm in greatest dimension, invasive in the peripancreatic adipose tissue, diffuse perineural invasion. Surgical margins were negative for carcinoma. The pancreas margins involved by a low-grade dysplasia. 1/6 nodes positive for metastatic cancer with direct extension. Distal fibrosis and pancreatitis was noted. Spleen was unremarkable. Pathological stage pT2 N1 M0  · 8/6/2021she was first seen by me. Recommend adjuvant chemotherapy with gemcitabine/Xeloda. She acknowledged understanding and agree with treatment.     Past Medical History:    Past Medical History:   Diagnosis Date    Arthritis     Hypertension     Hypoglycemia     if fasting for very long    Pancreatic cancer ([facility] [address] 06/2021    s/p Whipple by Dr. Josue Kowalski  Shoulder pain     incompleted rcr; possible injury    Thyroid disease        Past Surgical History:    Past Surgical History:   Procedure Laterality Date    CHOLECYSTECTOMY  1991    COLONOSCOPY  2020    Dr Valle Base  06/28/2021    [place] SHLDR ARTHROSCOP,SURG,W/ROTAT CUFF REPR Right 10/19/2017    SHOULDER ARTHROSCOPY ROTATOR CUFF REPAIR/ DEBRIDEMENT BICEPS TENODESIS/ TENOTOMY SUBACROMIAL DECOMPRESSION/ DISTAL CLAVICLE EXCISION [facility]  [facility]21    UPPER GASTROINTESTINAL ENDOSCOPY  06/2021    Dr. John Morelos in [place]      spur excision       Social History:    Marital status:   Smoking status: no  ETOH status: no  Resides: [place], [place]    Family History:   Family History   Adopted: Yes   Problem Relation Age of Onset    Breast Cancer Maternal Aunt     Breast Cancer Daughter     Cancer Daughter         Bladder    Cancer Maternal Uncle         Bladder       Current Hospital Medications:    Current Outpatient Medications   Medication Sig Dispense Refill    busPIRone (BUSPAR) 5 MG tablet TAKE 1 TABLET BY MOUTH TWICE A DAY      losartan (COZAAR) 50 MG tablet TAKE 1 TABLET BY MOUTH EVERY DAY      levothyroxine (SYNTHROID) 125 MCG tablet Take 125 mcg by mouth Daily      oxyCODONE-acetaminophen (PERCOCET)  MG per tablet 1-2 tabs po q4-6 hrs prn pain. (Patient not taking: Reported on 8/6/2021) 60 tablet 0    ondansetron (ZOFRAN) 4 MG tablet Take 1 tablet by mouth every 8 hours as needed for Nausea or Vomiting (Patient not taking: Reported on 8/6/2021) 10 tablet 0     No current facility-administered medications for this visit. Allergies:    Allergies   Allergen Reactions    Diclofenac Other (See Comments)     Couldn't sleep and face turned blood red    Oxycodone-Acetaminophen Hives     itching         Subjective   REVIEW OF SYSTEMS:   CONSTITUTIONAL: no fever, no night sweats, no fatigue, loss of appetite;  HEENT: no blurring of vision, no double vision, no hearing difficulty, no tinnitus, no ulceration, no dysplasia, no epistaxis;  LUNGS: no cough, no hemoptysis, no wheeze,  no shortness of breath;  CARDIOVASCULAR: no palpitation, no chest pain, no shortness of breath;  GI: no abdominal pain, no nausea, no vomiting, no diarrhea, no constipation;  STUART: no dysuria, no hematuria, no frequency or urgency, no nephrolithiasis;  MUSCULOSKELETAL: no joint pain, no swelling, no stiffness;  ENDOCRINE: no polyuria, no polydipsia, no cold or heat intolerance;  HEMATOLOGY: no easy bruising or bleeding, no history of clotting disorder;  DERMATOLOGY: no skin rash, no eczema, no pruritus;  PSYCHIATRY: depression, anxiety, no panic attacks, no suicidal ideation, no homicidal ideation;  NEUROLOGY: no syncope, no seizures, no numbness or tingling of hands, no numbness or tingling of feet, no paresis;    Objective   /79   Pulse 79   Ht 5' 5\" (1.651 m)   Wt 160 lb (72.6 kg)   SpO2 96%   BMI 26.63 kg/m²     PHYSICAL EXAM:  CONSTITUTIONAL: Alert, appropriate, no acute distress  EYES: Non icteric, EOM intact, pupils equal round   ENT: Mucus membranes moist, no oral pharyngeal lesions, external inspection of ears and nose are normal  NECK: Supple, no masses. No palpable thyroid mass  CHEST/LUNGS: CTA bilaterally, normal respiratory effort   CARDIOVASCULAR: RRR, no murmurs. No lower extremity edema  ABDOMEN: soft non-tender, active bowel sounds, no HSM. No palpable masses  EXTREMITIES: warm, full ROM in all 4 extremities, no focal weakness. SKIN: warm, dry with no rashes or lesions  LYMPH: No cervical, clavicular, axillary, or inguinal lymphadenopathy  NEUROLOGIC: follows commands, non focal   PSYCH: mood and affect appropriate.   Alert and oriented to time, place, person      LABORATORY RESULTS REVIEWED/ANALYZED BY ME:  Lab Results   Component Value Date    WBC 10.70 (H) 08/06/2021    HGB 14.1 08/06/2021    HCT 45.3 (H) 08/06/2021    MCV 97.6 (H) 08/06/2021     08/06/2021     Lab Results   Component Value Date    NEUTROABS 4.46 08/06/2021     Lab Results   Component Value Date     10/17/2017    K 4.2 10/17/2017     10/17/2017    CO2 26 10/17/2017    BUN 12 10/17/2017    CREATININE 0.6 10/17/2017    GLUCOSE 78 10/17/2017    CALCIUM 9.3 10/17/2017    LABGLOM >60 10/17/2017         RADIOLOGY STUDIES REVIEWED BY ME:    ASSESSMENT:    No orders of the defined types were placed in this encounter. Perez Marion was seen today for new patient. Diagnoses and all orders for this visit:    Adenocarcinoma of pancreas [facility])    Care plan discussed with patient    History of partial pancreatectomy    History of splenectomy    Pancreatic tail adenocarcinoma pT2 N1 M0  The patient was counseled today about diagnosis, staging, prognosis, diagnostic tests, medications, side effects and disease management. Essentially, node positive third of the pancreas adenocarcinoma. Status post distal pancreatectomy/splenectomy  Discussed about adjuvant chemotherapy, side effects, treatment options to include FOLFIRINOX. I have recommended Gemzar/capecitabine due to better tolerance. PLAN:  · CT chest, abdomen, pelvis for staging  · CMP CEA CA 19-9 with treatment  · Recommend Gemzar 1000 mg/m2 IV D 1, D8, D15 with Capecitabine 830 mg/m2 D1-21 every 28 days x6 cycles  · RTC with MD MARINELLI# 2 in treatment room        I, Ashley Brito, am scribing for Corey Jo MD. Electronically signed by Ashley Brito RN on 8/6/2021 at 3:19 PM CDT. I, Dr Walt Gaona, personally performed the services described in this documentation as scribed by Ashley Brito RN in my presence and is both accurate and complete. Corey Jo MD    I have seen, examined and reviewed this patient medication list, appropriate labs and imaging studies. I reviewed relevant medical records and others physicians notes.  I discussed the plans of care with the patient. I answered all the questions to the patients satisfaction. I have also reviewed the chief complaint (CC) and part of the history (History of Present Illness (HPI), Past Family Social History [facility]), or Review of Systems (ROS) and made changes when appropriated. (Please note that portions of this note were completed with a voice recognition program. Efforts were made to edit the dictations but occasionally words are mis-transcribed.)  The total time (60min) I spent to see the patient today includes at least one or more of the following: preparing to see the patient by reviewing prior tests, prior notes or other relevant information, performing appropriate independent examination and evaluation, counseling, ordering of medications, tests or procedures, communicating with other healthcare professionals when appropriated to coordinate care, documenting clinic information in the electronic medical record or other health records, independently interpreting results of tests, managing test results and communicating the results to the patient/family or caregiver.       08/06/21  11:07 AM

## 2021-08-06 ENCOUNTER — OFFICE VISIT (OUTPATIENT)
Dept: HEMATOLOGY | Age: 76
End: 2021-08-06
Payer: MEDICARE

## 2021-08-06 ENCOUNTER — HOSPITAL ENCOUNTER (OUTPATIENT)
Dept: INFUSION THERAPY | Age: 76
Discharge: HOME | End: 2021-08-06
Payer: MEDICARE

## 2021-08-06 VITALS
HEART RATE: 79 BPM | DIASTOLIC BLOOD PRESSURE: 79 MMHG | WEIGHT: 160 LBS | SYSTOLIC BLOOD PRESSURE: 137 MMHG | HEIGHT: 65 IN | BODY MASS INDEX: 26.66 KG/M2 | OXYGEN SATURATION: 96 %

## 2021-08-06 DIAGNOSIS — C25.9 ADENOCARCINOMA OF PANCREAS (HCC): Primary | ICD-10-CM

## 2021-08-06 DIAGNOSIS — C25.2 MALIGNANT NEOPLASM OF TAIL OF PANCREAS (HCC): ICD-10-CM

## 2021-08-06 DIAGNOSIS — Z90.411 HISTORY OF PARTIAL PANCREATECTOMY: ICD-10-CM

## 2021-08-06 DIAGNOSIS — Z90.81 HISTORY OF SPLENECTOMY: ICD-10-CM

## 2021-08-06 DIAGNOSIS — Z71.89 CARE PLAN DISCUSSED WITH PATIENT: ICD-10-CM

## 2021-08-06 LAB
BASOPHILS ABSOLUTE: 0.06 K/UL (ref 0.01–0.08)
BASOPHILS RELATIVE PERCENT: 0.6 % (ref 0.1–1.2)
EOSINOPHILS ABSOLUTE: 0.44 K/UL (ref 0.04–0.54)
EOSINOPHILS RELATIVE PERCENT: 4.1 % (ref 0.7–7)
HCT VFR BLD CALC: 45.3 % (ref 34.1–44.9)
HEMOGLOBIN: 14.1 G/DL (ref 11.2–15.7)
LYMPHOCYTES ABSOLUTE: 4.42 K/UL (ref 1.18–3.74)
LYMPHOCYTES RELATIVE PERCENT: 41.3 % (ref 19.3–53.1)
MCH RBC QN AUTO: 30.4 PG (ref 25.6–32.2)
MCHC RBC AUTO-ENTMCNC: 31.1 G/DL (ref 32.3–35.5)
MCV RBC AUTO: 97.6 FL (ref 79.4–94.8)
MONOCYTES ABSOLUTE: 1.32 K/UL (ref 0.24–0.82)
MONOCYTES RELATIVE PERCENT: 12.3 % (ref 4.7–12.5)
NEUTROPHILS ABSOLUTE: 4.46 K/UL (ref 1.56–6.13)
NEUTROPHILS RELATIVE PERCENT: 41.7 % (ref 34–71.1)
PDW BLD-RTO: 13.8 % (ref 11.7–14.4)
PLATELET # BLD: 279 K/UL (ref 182–369)
PMV BLD AUTO: 11.3 FL (ref 7.4–10.4)
RBC # BLD: 4.64 M/UL (ref 3.93–5.22)
WBC # BLD: 10.7 K/UL (ref 3.98–10.04)

## 2021-08-06 PROCEDURE — 99205 OFFICE O/P NEW HI 60 MIN: CPT | Performed by: INTERNAL MEDICINE

## 2021-08-06 PROCEDURE — G8417 CALC BMI ABV UP PARAM F/U: HCPCS | Performed by: INTERNAL MEDICINE

## 2021-08-06 PROCEDURE — 1123F ACP DISCUSS/DSCN MKR DOCD: CPT | Performed by: INTERNAL MEDICINE

## 2021-08-06 PROCEDURE — 1090F PRES/ABSN URINE INCON ASSESS: CPT | Performed by: INTERNAL MEDICINE

## 2021-08-06 PROCEDURE — 1036F TOBACCO NON-USER: CPT | Performed by: INTERNAL MEDICINE

## 2021-08-06 PROCEDURE — 85025 COMPLETE CBC W/AUTO DIFF WBC: CPT

## 2021-08-06 PROCEDURE — 99212 OFFICE O/P EST SF 10 MIN: CPT

## 2021-08-06 PROCEDURE — G8400 PT W/DXA NO RESULTS DOC: HCPCS | Performed by: INTERNAL MEDICINE

## 2021-08-06 PROCEDURE — 3017F COLORECTAL CA SCREEN DOC REV: CPT | Performed by: INTERNAL MEDICINE

## 2021-08-06 PROCEDURE — 4040F PNEUMOC VAC/ADMIN/RCVD: CPT | Performed by: INTERNAL MEDICINE

## 2021-08-06 PROCEDURE — G8427 DOCREV CUR MEDS BY ELIG CLIN: HCPCS | Performed by: INTERNAL MEDICINE

## 2021-08-06 RX ORDER — PROMETHAZINE HYDROCHLORIDE 12.5 MG/1
12.5 TABLET ORAL EVERY 6 HOURS PRN
Qty: 30 TABLET | Refills: 5 | Status: SHIPPED | OUTPATIENT
Start: 2021-08-06

## 2021-08-06 RX ORDER — CAPECITABINE 500 MG/1
830 TABLET, FILM COATED ORAL 2 TIMES DAILY
Qty: 126 TABLET | Refills: 3 | Status: SHIPPED | OUTPATIENT
Start: 2021-08-06 | End: 2021-08-27

## 2021-08-06 RX ORDER — BUSPIRONE HYDROCHLORIDE 5 MG/1
5 TABLET ORAL 3 TIMES DAILY
COMMUNITY
Start: 2021-07-15

## 2021-08-06 RX ORDER — LOSARTAN POTASSIUM 50 MG/1
50 TABLET ORAL DAILY
COMMUNITY
Start: 2021-07-22 | End: 2022-01-12

## 2021-08-09 ENCOUNTER — HOSPITAL ENCOUNTER (OUTPATIENT)
Dept: CT IMAGING | Age: 76
Discharge: HOME | End: 2021-08-09
Payer: MEDICARE

## 2021-08-09 DIAGNOSIS — Z90.411 HISTORY OF PARTIAL PANCREATECTOMY: ICD-10-CM

## 2021-08-09 DIAGNOSIS — C25.9 ADENOCARCINOMA OF PANCREAS (HCC): ICD-10-CM

## 2021-08-09 DIAGNOSIS — Z90.81 HISTORY OF SPLENECTOMY: ICD-10-CM

## 2021-08-09 LAB
GFR AFRICAN AMERICAN: >60
GFR NON-AFRICAN AMERICAN: >60
PERFORMED ON: NORMAL
POC CREATININE: 0.6 MG/DL (ref 0.3–1.3)
POC SAMPLE TYPE: NORMAL

## 2021-08-09 PROCEDURE — 82565 ASSAY OF CREATININE: CPT

## 2021-08-09 PROCEDURE — 71260 CT THORAX DX C+: CPT

## 2021-08-09 PROCEDURE — 74177 CT ABD & PELVIS W/CONTRAST: CPT

## 2021-08-09 PROCEDURE — 6360000004 HC RX CONTRAST MEDICATION: Performed by: INTERNAL MEDICINE

## 2021-08-09 RX ADMIN — IOPAMIDOL 75 ML: 755 INJECTION, SOLUTION INTRAVENOUS at 10:12

## 2021-08-13 ENCOUNTER — HOSPITAL ENCOUNTER (OUTPATIENT)
Dept: INFUSION THERAPY | Age: 76
Discharge: HOME | End: 2021-08-13
Payer: MEDICARE

## 2021-08-13 VITALS
DIASTOLIC BLOOD PRESSURE: 80 MMHG | HEIGHT: 65 IN | HEART RATE: 81 BPM | BODY MASS INDEX: 26.54 KG/M2 | OXYGEN SATURATION: 98 % | WEIGHT: 159.3 LBS | SYSTOLIC BLOOD PRESSURE: 142 MMHG | TEMPERATURE: 98.5 F

## 2021-08-13 DIAGNOSIS — C25.2 MALIGNANT NEOPLASM OF TAIL OF PANCREAS (HCC): Primary | ICD-10-CM

## 2021-08-13 LAB
ALBUMIN SERPL-MCNC: 4.2 G/DL (ref 3.5–5.2)
ALP BLD-CCNC: 85 U/L (ref 35–104)
ALT SERPL-CCNC: 13 U/L (ref 9–52)
ANION GAP SERPL CALCULATED.3IONS-SCNC: 7 MMOL/L (ref 7–19)
AST SERPL-CCNC: 30 U/L (ref 14–36)
BASOPHILS ABSOLUTE: 0.06 K/UL (ref 0.01–0.08)
BASOPHILS RELATIVE PERCENT: 0.5 % (ref 0.1–1.2)
BILIRUB SERPL-MCNC: 0.5 MG/DL (ref 0.2–1.3)
BUN BLDV-MCNC: 12 MG/DL (ref 7–17)
CALCIUM SERPL-MCNC: 10 MG/DL (ref 8.4–10.2)
CHLORIDE BLD-SCNC: 103 MMOL/L (ref 98–111)
CO2: 29 MMOL/L (ref 22–29)
CREAT SERPL-MCNC: 0.6 MG/DL (ref 0.5–1)
EOSINOPHILS ABSOLUTE: 0.32 K/UL (ref 0.04–0.54)
EOSINOPHILS RELATIVE PERCENT: 2.9 % (ref 0.7–7)
GFR NON-AFRICAN AMERICAN: >60
GLOBULIN: 3 G/DL
GLUCOSE BLD-MCNC: 96 MG/DL (ref 74–106)
HCT VFR BLD CALC: 40.5 % (ref 34.1–44.9)
HEMOGLOBIN: 13.2 G/DL (ref 11.2–15.7)
LYMPHOCYTES ABSOLUTE: 4.19 K/UL (ref 1.18–3.74)
LYMPHOCYTES RELATIVE PERCENT: 38 % (ref 19.3–53.1)
MCH RBC QN AUTO: 29.9 PG (ref 25.6–32.2)
MCHC RBC AUTO-ENTMCNC: 32.6 G/DL (ref 32.3–35.5)
MCV RBC AUTO: 91.8 FL (ref 79.4–94.8)
MONOCYTES ABSOLUTE: 1.25 K/UL (ref 0.24–0.82)
MONOCYTES RELATIVE PERCENT: 11.3 % (ref 4.7–12.5)
NEUTROPHILS ABSOLUTE: 5.22 K/UL (ref 1.56–6.13)
NEUTROPHILS RELATIVE PERCENT: 47.3 % (ref 34–71.1)
PDW BLD-RTO: 13.5 % (ref 11.7–14.4)
PLATELET # BLD: 478 K/UL (ref 182–369)
PMV BLD AUTO: 10.2 FL (ref 7.4–10.4)
POTASSIUM SERPL-SCNC: 4.4 MMOL/L (ref 3.5–5.1)
RBC # BLD: 4.41 M/UL (ref 3.93–5.22)
SODIUM BLD-SCNC: 139 MMOL/L (ref 137–145)
TOTAL PROTEIN: 7.3 G/DL (ref 6.3–8.2)
WBC # BLD: 11.04 K/UL (ref 3.98–10.04)

## 2021-08-13 PROCEDURE — 6360000002 HC RX W HCPCS: Performed by: INTERNAL MEDICINE

## 2021-08-13 PROCEDURE — 2580000003 HC RX 258: Performed by: INTERNAL MEDICINE

## 2021-08-13 PROCEDURE — 96413 CHEMO IV INFUSION 1 HR: CPT

## 2021-08-13 PROCEDURE — 80053 COMPREHEN METABOLIC PANEL: CPT

## 2021-08-13 PROCEDURE — 85025 COMPLETE CBC W/AUTO DIFF WBC: CPT

## 2021-08-13 PROCEDURE — 96375 TX/PRO/DX INJ NEW DRUG ADDON: CPT

## 2021-08-13 RX ORDER — DIPHENHYDRAMINE HYDROCHLORIDE 50 MG/ML
50 INJECTION INTRAMUSCULAR; INTRAVENOUS ONCE
Status: CANCELLED | OUTPATIENT
Start: 2021-08-27 | End: 2021-08-27

## 2021-08-13 RX ORDER — EPINEPHRINE 1 MG/ML
0.3 INJECTION, SOLUTION, CONCENTRATE INTRAVENOUS PRN
Status: CANCELLED | OUTPATIENT
Start: 2021-08-27

## 2021-08-13 RX ORDER — SODIUM CHLORIDE 9 MG/ML
20 INJECTION, SOLUTION INTRAVENOUS ONCE
Status: CANCELLED | OUTPATIENT
Start: 2021-08-20 | End: 2021-08-20

## 2021-08-13 RX ORDER — DIPHENHYDRAMINE HYDROCHLORIDE 50 MG/ML
50 INJECTION INTRAMUSCULAR; INTRAVENOUS ONCE
Status: CANCELLED | OUTPATIENT
Start: 2021-08-13 | End: 2021-08-13

## 2021-08-13 RX ORDER — SODIUM CHLORIDE 0.9 % (FLUSH) 0.9 %
5-40 SYRINGE (ML) INJECTION PRN
Status: CANCELLED | OUTPATIENT
Start: 2021-08-20

## 2021-08-13 RX ORDER — METHYLPREDNISOLONE SODIUM SUCCINATE 125 MG/2ML
125 INJECTION, POWDER, LYOPHILIZED, FOR SOLUTION INTRAMUSCULAR; INTRAVENOUS ONCE
Status: CANCELLED | OUTPATIENT
Start: 2021-08-27 | End: 2021-08-27

## 2021-08-13 RX ORDER — SODIUM CHLORIDE 9 MG/ML
20 INJECTION, SOLUTION INTRAVENOUS ONCE
Status: CANCELLED | OUTPATIENT
Start: 2021-08-27 | End: 2021-08-27

## 2021-08-13 RX ORDER — PALONOSETRON 0.05 MG/ML
0.25 INJECTION, SOLUTION INTRAVENOUS ONCE
Status: COMPLETED | OUTPATIENT
Start: 2021-08-13 | End: 2021-08-13

## 2021-08-13 RX ORDER — SODIUM CHLORIDE 9 MG/ML
INJECTION, SOLUTION INTRAVENOUS CONTINUOUS
Status: CANCELLED | OUTPATIENT
Start: 2021-08-27

## 2021-08-13 RX ORDER — SODIUM CHLORIDE 0.9 % (FLUSH) 0.9 %
5-40 SYRINGE (ML) INJECTION PRN
Status: CANCELLED | OUTPATIENT
Start: 2021-08-13

## 2021-08-13 RX ORDER — SODIUM CHLORIDE 9 MG/ML
25 INJECTION, SOLUTION INTRAVENOUS PRN
Status: CANCELLED | OUTPATIENT
Start: 2021-08-27

## 2021-08-13 RX ORDER — DEXAMETHASONE SODIUM PHOSPHATE 10 MG/ML
10 INJECTION, SOLUTION INTRAMUSCULAR; INTRAVENOUS ONCE
Status: DISCONTINUED | OUTPATIENT
Start: 2021-08-14 | End: 2021-08-13

## 2021-08-13 RX ORDER — EPINEPHRINE 1 MG/ML
0.3 INJECTION, SOLUTION, CONCENTRATE INTRAVENOUS PRN
Status: CANCELLED | OUTPATIENT
Start: 2021-08-20

## 2021-08-13 RX ORDER — METHYLPREDNISOLONE SODIUM SUCCINATE 125 MG/2ML
125 INJECTION, POWDER, LYOPHILIZED, FOR SOLUTION INTRAMUSCULAR; INTRAVENOUS ONCE
Status: CANCELLED | OUTPATIENT
Start: 2021-08-20 | End: 2021-08-20

## 2021-08-13 RX ORDER — SODIUM CHLORIDE 9 MG/ML
INJECTION, SOLUTION INTRAVENOUS CONTINUOUS
Status: CANCELLED | OUTPATIENT
Start: 2021-08-20

## 2021-08-13 RX ORDER — HEPARIN SODIUM (PORCINE) LOCK FLUSH IV SOLN 100 UNIT/ML 100 UNIT/ML
500 SOLUTION INTRAVENOUS PRN
Status: CANCELLED | OUTPATIENT
Start: 2021-08-27

## 2021-08-13 RX ORDER — SODIUM CHLORIDE 0.9 % (FLUSH) 0.9 %
5-40 SYRINGE (ML) INJECTION PRN
Status: CANCELLED | OUTPATIENT
Start: 2021-08-27

## 2021-08-13 RX ORDER — METHYLPREDNISOLONE SODIUM SUCCINATE 125 MG/2ML
125 INJECTION, POWDER, LYOPHILIZED, FOR SOLUTION INTRAMUSCULAR; INTRAVENOUS ONCE
Status: CANCELLED | OUTPATIENT
Start: 2021-08-13 | End: 2021-08-13

## 2021-08-13 RX ORDER — EPINEPHRINE 1 MG/ML
0.3 INJECTION, SOLUTION, CONCENTRATE INTRAVENOUS PRN
Status: CANCELLED | OUTPATIENT
Start: 2021-08-13

## 2021-08-13 RX ORDER — DEXAMETHASONE SODIUM PHOSPHATE 10 MG/ML
10 INJECTION, SOLUTION INTRAMUSCULAR; INTRAVENOUS ONCE
Status: COMPLETED | OUTPATIENT
Start: 2021-08-13 | End: 2021-08-13

## 2021-08-13 RX ORDER — SODIUM CHLORIDE 9 MG/ML
25 INJECTION, SOLUTION INTRAVENOUS PRN
Status: CANCELLED | OUTPATIENT
Start: 2021-08-20

## 2021-08-13 RX ORDER — HEPARIN SODIUM (PORCINE) LOCK FLUSH IV SOLN 100 UNIT/ML 100 UNIT/ML
500 SOLUTION INTRAVENOUS PRN
Status: CANCELLED | OUTPATIENT
Start: 2021-08-13

## 2021-08-13 RX ORDER — PALONOSETRON 0.05 MG/ML
0.25 INJECTION, SOLUTION INTRAVENOUS ONCE
Status: CANCELLED
Start: 2021-08-20 | End: 2021-08-20

## 2021-08-13 RX ORDER — SODIUM CHLORIDE 9 MG/ML
INJECTION, SOLUTION INTRAVENOUS CONTINUOUS
Status: CANCELLED | OUTPATIENT
Start: 2021-08-13

## 2021-08-13 RX ORDER — DIPHENHYDRAMINE HYDROCHLORIDE 50 MG/ML
50 INJECTION INTRAMUSCULAR; INTRAVENOUS ONCE
Status: CANCELLED | OUTPATIENT
Start: 2021-08-20 | End: 2021-08-20

## 2021-08-13 RX ORDER — PALONOSETRON 0.05 MG/ML
0.25 INJECTION, SOLUTION INTRAVENOUS ONCE
Status: CANCELLED
Start: 2021-08-27 | End: 2021-08-27

## 2021-08-13 RX ORDER — HEPARIN SODIUM (PORCINE) LOCK FLUSH IV SOLN 100 UNIT/ML 100 UNIT/ML
500 SOLUTION INTRAVENOUS PRN
Status: CANCELLED | OUTPATIENT
Start: 2021-08-20

## 2021-08-13 RX ORDER — SODIUM CHLORIDE 9 MG/ML
25 INJECTION, SOLUTION INTRAVENOUS PRN
Status: CANCELLED | OUTPATIENT
Start: 2021-08-13

## 2021-08-13 RX ORDER — SODIUM CHLORIDE 9 MG/ML
20 INJECTION, SOLUTION INTRAVENOUS ONCE
Status: COMPLETED | OUTPATIENT
Start: 2021-08-13 | End: 2021-08-14

## 2021-08-13 RX ADMIN — GEMCITABINE 1820 MG: 38 INJECTION, SOLUTION INTRAVENOUS at 13:57

## 2021-08-13 RX ADMIN — SODIUM CHLORIDE 20 ML/HR: 9 INJECTION, SOLUTION INTRAVENOUS at 12:50

## 2021-08-13 RX ADMIN — DEXAMETHASONE SODIUM PHOSPHATE 10 MG: 10 INJECTION, SOLUTION INTRAMUSCULAR; INTRAVENOUS at 12:50

## 2021-08-13 RX ADMIN — PALONOSETRON 0.25 MG: 0.05 INJECTION, SOLUTION INTRAVENOUS at 12:49

## 2021-08-20 ENCOUNTER — HOSPITAL ENCOUNTER (OUTPATIENT)
Dept: INFUSION THERAPY | Age: 76
Discharge: HOME | End: 2021-08-20
Payer: MEDICARE

## 2021-08-20 VITALS
DIASTOLIC BLOOD PRESSURE: 83 MMHG | BODY MASS INDEX: 26.62 KG/M2 | HEIGHT: 65 IN | RESPIRATION RATE: 18 BRPM | TEMPERATURE: 98.1 F | HEART RATE: 81 BPM | WEIGHT: 159.8 LBS | OXYGEN SATURATION: 97 % | SYSTOLIC BLOOD PRESSURE: 135 MMHG

## 2021-08-20 DIAGNOSIS — C25.2 MALIGNANT NEOPLASM OF TAIL OF PANCREAS (HCC): Primary | ICD-10-CM

## 2021-08-20 LAB
ALBUMIN SERPL-MCNC: 4.2 G/DL (ref 3.5–5.2)
ALP BLD-CCNC: 94 U/L (ref 35–104)
ALT SERPL-CCNC: 13 U/L (ref 9–52)
ANION GAP SERPL CALCULATED.3IONS-SCNC: 8 MMOL/L (ref 7–19)
AST SERPL-CCNC: 32 U/L (ref 14–36)
BASOPHILS ABSOLUTE: 0.05 K/UL (ref 0.01–0.08)
BASOPHILS RELATIVE PERCENT: 0.6 % (ref 0.1–1.2)
BILIRUB SERPL-MCNC: 0.6 MG/DL (ref 0.2–1.3)
BUN BLDV-MCNC: 14 MG/DL (ref 7–17)
CALCIUM SERPL-MCNC: 10 MG/DL (ref 8.4–10.2)
CHLORIDE BLD-SCNC: 99 MMOL/L (ref 98–111)
CO2: 31 MMOL/L (ref 22–29)
CREAT SERPL-MCNC: 0.6 MG/DL (ref 0.5–1)
EOSINOPHILS ABSOLUTE: 0.13 K/UL (ref 0.04–0.54)
EOSINOPHILS RELATIVE PERCENT: 1.6 % (ref 0.7–7)
GFR NON-AFRICAN AMERICAN: >60
GLOBULIN: 3.1 G/DL
GLUCOSE BLD-MCNC: 103 MG/DL (ref 74–106)
HCT VFR BLD CALC: 40.3 % (ref 34.1–44.9)
HEMOGLOBIN: 13.1 G/DL (ref 11.2–15.7)
LYMPHOCYTES ABSOLUTE: 4.23 K/UL (ref 1.18–3.74)
LYMPHOCYTES RELATIVE PERCENT: 51.6 % (ref 19.3–53.1)
MCH RBC QN AUTO: 30.3 PG (ref 25.6–32.2)
MCHC RBC AUTO-ENTMCNC: 32.5 G/DL (ref 32.3–35.5)
MCV RBC AUTO: 93.1 FL (ref 79.4–94.8)
MONOCYTES ABSOLUTE: 0.96 K/UL (ref 0.24–0.82)
MONOCYTES RELATIVE PERCENT: 11.7 % (ref 4.7–12.5)
NEUTROPHILS ABSOLUTE: 2.82 K/UL (ref 1.56–6.13)
NEUTROPHILS RELATIVE PERCENT: 34.5 % (ref 34–71.1)
PDW BLD-RTO: 13 % (ref 11.7–14.4)
PLATELET # BLD: 389 K/UL (ref 182–369)
PMV BLD AUTO: 10.3 FL (ref 7.4–10.4)
POTASSIUM SERPL-SCNC: 4.5 MMOL/L (ref 3.5–5.1)
RBC # BLD: 4.33 M/UL (ref 3.93–5.22)
SODIUM BLD-SCNC: 138 MMOL/L (ref 137–145)
TOTAL PROTEIN: 7.3 G/DL (ref 6.3–8.2)
WBC # BLD: 8.19 K/UL (ref 3.98–10.04)

## 2021-08-20 PROCEDURE — 85025 COMPLETE CBC W/AUTO DIFF WBC: CPT

## 2021-08-20 PROCEDURE — 6360000002 HC RX W HCPCS: Performed by: INTERNAL MEDICINE

## 2021-08-20 PROCEDURE — 96413 CHEMO IV INFUSION 1 HR: CPT

## 2021-08-20 PROCEDURE — 96375 TX/PRO/DX INJ NEW DRUG ADDON: CPT

## 2021-08-20 PROCEDURE — 2580000003 HC RX 258: Performed by: INTERNAL MEDICINE

## 2021-08-20 PROCEDURE — 80053 COMPREHEN METABOLIC PANEL: CPT

## 2021-08-20 RX ORDER — SODIUM CHLORIDE 0.9 % (FLUSH) 0.9 %
5-40 SYRINGE (ML) INJECTION PRN
Status: DISCONTINUED | OUTPATIENT
Start: 2021-08-20 | End: 2021-08-21 | Stop reason: HOSPADM

## 2021-08-20 RX ORDER — DEXAMETHASONE SODIUM PHOSPHATE 10 MG/ML
10 INJECTION, SOLUTION INTRAMUSCULAR; INTRAVENOUS ONCE
Status: DISCONTINUED | OUTPATIENT
Start: 2021-08-21 | End: 2021-08-20

## 2021-08-20 RX ORDER — SODIUM CHLORIDE 9 MG/ML
20 INJECTION, SOLUTION INTRAVENOUS ONCE
Status: COMPLETED | OUTPATIENT
Start: 2021-08-20 | End: 2021-08-21

## 2021-08-20 RX ORDER — PALONOSETRON 0.05 MG/ML
0.25 INJECTION, SOLUTION INTRAVENOUS ONCE
Status: COMPLETED | OUTPATIENT
Start: 2021-08-20 | End: 2021-08-20

## 2021-08-20 RX ORDER — DEXAMETHASONE SODIUM PHOSPHATE 10 MG/ML
10 INJECTION, SOLUTION INTRAMUSCULAR; INTRAVENOUS ONCE
Status: COMPLETED | OUTPATIENT
Start: 2021-08-20 | End: 2021-08-20

## 2021-08-20 RX ADMIN — PALONOSETRON 0.25 MG: 0.05 INJECTION, SOLUTION INTRAVENOUS at 12:53

## 2021-08-20 RX ADMIN — GEMCITABINE 1820 MG: 38 INJECTION, SOLUTION INTRAVENOUS at 13:20

## 2021-08-20 RX ADMIN — DEXAMETHASONE SODIUM PHOSPHATE 10 MG: 10 INJECTION, SOLUTION INTRAMUSCULAR; INTRAVENOUS at 12:53

## 2021-08-20 RX ADMIN — SODIUM CHLORIDE 20 ML/HR: 9 INJECTION, SOLUTION INTRAVENOUS at 12:53

## 2021-08-27 ENCOUNTER — HOSPITAL ENCOUNTER (OUTPATIENT)
Dept: INFUSION THERAPY | Age: 76
Discharge: HOME | End: 2021-08-27
Payer: MEDICARE

## 2021-08-27 VITALS
TEMPERATURE: 98.2 F | WEIGHT: 161.6 LBS | SYSTOLIC BLOOD PRESSURE: 139 MMHG | RESPIRATION RATE: 18 BRPM | BODY MASS INDEX: 26.92 KG/M2 | DIASTOLIC BLOOD PRESSURE: 85 MMHG | HEIGHT: 65 IN | OXYGEN SATURATION: 98 % | HEART RATE: 82 BPM

## 2021-08-27 DIAGNOSIS — C25.2 MALIGNANT NEOPLASM OF TAIL OF PANCREAS (HCC): Primary | ICD-10-CM

## 2021-08-27 LAB
ALBUMIN SERPL-MCNC: 4 G/DL (ref 3.5–5.2)
ALP BLD-CCNC: 92 U/L (ref 35–104)
ALT SERPL-CCNC: 17 U/L (ref 9–52)
ANION GAP SERPL CALCULATED.3IONS-SCNC: 5 MMOL/L (ref 7–19)
AST SERPL-CCNC: 36 U/L (ref 14–36)
BASOPHILS ABSOLUTE: 0.03 K/UL (ref 0.01–0.08)
BASOPHILS RELATIVE PERCENT: 0.6 % (ref 0.1–1.2)
BILIRUB SERPL-MCNC: 0.6 MG/DL (ref 0.2–1.3)
BUN BLDV-MCNC: 11 MG/DL (ref 7–17)
CALCIUM SERPL-MCNC: 9.6 MG/DL (ref 8.4–10.2)
CHLORIDE BLD-SCNC: 100 MMOL/L (ref 98–111)
CO2: 33 MMOL/L (ref 22–29)
CREAT SERPL-MCNC: 0.5 MG/DL (ref 0.5–1)
EOSINOPHILS ABSOLUTE: 0.11 K/UL (ref 0.04–0.54)
EOSINOPHILS RELATIVE PERCENT: 2.2 % (ref 0.7–7)
GFR NON-AFRICAN AMERICAN: >60
GLOBULIN: 3 G/DL
GLUCOSE BLD-MCNC: 111 MG/DL (ref 74–106)
HCT VFR BLD CALC: 39.5 % (ref 34.1–44.9)
HEMOGLOBIN: 13 G/DL (ref 11.2–15.7)
LYMPHOCYTES ABSOLUTE: 3.23 K/UL (ref 1.18–3.74)
LYMPHOCYTES RELATIVE PERCENT: 63.7 % (ref 19.3–53.1)
MCH RBC QN AUTO: 30.7 PG (ref 25.6–32.2)
MCHC RBC AUTO-ENTMCNC: 32.9 G/DL (ref 32.3–35.5)
MCV RBC AUTO: 93.2 FL (ref 79.4–94.8)
MONOCYTES ABSOLUTE: 0.48 K/UL (ref 0.24–0.82)
MONOCYTES RELATIVE PERCENT: 9.5 % (ref 4.7–12.5)
NEUTROPHILS ABSOLUTE: 1.22 K/UL (ref 1.56–6.13)
NEUTROPHILS RELATIVE PERCENT: 24 % (ref 34–71.1)
PDW BLD-RTO: 13 % (ref 11.7–14.4)
PLATELET # BLD: 202 K/UL (ref 182–369)
PMV BLD AUTO: 9.5 FL (ref 7.4–10.4)
POTASSIUM SERPL-SCNC: 4.1 MMOL/L (ref 3.5–5.1)
RBC # BLD: 4.24 M/UL (ref 3.93–5.22)
SODIUM BLD-SCNC: 138 MMOL/L (ref 137–145)
TOTAL PROTEIN: 7 G/DL (ref 6.3–8.2)
WBC # BLD: 5.07 K/UL (ref 3.98–10.04)

## 2021-08-27 PROCEDURE — 96375 TX/PRO/DX INJ NEW DRUG ADDON: CPT

## 2021-08-27 PROCEDURE — 96413 CHEMO IV INFUSION 1 HR: CPT

## 2021-08-27 PROCEDURE — 6360000002 HC RX W HCPCS: Performed by: INTERNAL MEDICINE

## 2021-08-27 PROCEDURE — 85025 COMPLETE CBC W/AUTO DIFF WBC: CPT

## 2021-08-27 PROCEDURE — 80053 COMPREHEN METABOLIC PANEL: CPT

## 2021-08-27 PROCEDURE — 2580000003 HC RX 258: Performed by: INTERNAL MEDICINE

## 2021-08-27 RX ORDER — DEXAMETHASONE SODIUM PHOSPHATE 10 MG/ML
10 INJECTION, SOLUTION INTRAMUSCULAR; INTRAVENOUS ONCE
Status: COMPLETED | OUTPATIENT
Start: 2021-08-27 | End: 2021-08-27

## 2021-08-27 RX ORDER — PALONOSETRON 0.05 MG/ML
0.25 INJECTION, SOLUTION INTRAVENOUS ONCE
Status: COMPLETED | OUTPATIENT
Start: 2021-08-27 | End: 2021-08-27

## 2021-08-27 RX ORDER — SODIUM CHLORIDE 9 MG/ML
20 INJECTION, SOLUTION INTRAVENOUS ONCE
Status: COMPLETED | OUTPATIENT
Start: 2021-08-27 | End: 2021-08-28

## 2021-08-27 RX ADMIN — PALONOSETRON 0.25 MG: 0.05 INJECTION, SOLUTION INTRAVENOUS at 12:09

## 2021-08-27 RX ADMIN — GEMCITABINE 1820 MG: 38 INJECTION, SOLUTION INTRAVENOUS at 12:28

## 2021-08-27 RX ADMIN — DEXAMETHASONE SODIUM PHOSPHATE 10 MG: 10 INJECTION, SOLUTION INTRAMUSCULAR; INTRAVENOUS at 12:10

## 2021-08-27 RX ADMIN — SODIUM CHLORIDE 20 ML/HR: 9 INJECTION, SOLUTION INTRAVENOUS at 12:10

## 2021-08-31 ENCOUNTER — HOSPITAL ENCOUNTER (OUTPATIENT)
Dept: INFUSION THERAPY | Age: 76
Discharge: HOME | End: 2021-08-31
Payer: MEDICARE

## 2021-08-31 RX ORDER — DOXYCYCLINE HYCLATE 100 MG
100 TABLET ORAL 2 TIMES DAILY
Qty: 14 TABLET | Refills: 0 | Status: SHIPPED | OUTPATIENT
Start: 2021-08-31 | End: 2021-09-07

## 2021-09-03 NOTE — PROGRESS NOTES
MEDICAL ONCOLOGY PROGRESS NOTE    Pt Name: Vasile Lemus  MRN: [id number]  YOB: 1945  Date of evaluation: 9/10/2021    HISTORY OF PRESENT ILLNESS:    The patient is a very pleasant 77years old female who has been diagnosed with invasive ductal adenocarcinoma of the pancreas. She was node positive. She is receiving adjuvant chemotherapy with Gemzar/capecitabine. She has been tolerating treatments complains of steatorrhea. She has some fatigue as well. Denies any nausea vomiting. Diagnosis  · Invasive ductal adenocarcinoma, pancreas, June 2021  · pT2 N1 M0  · Strong family history for breast cancer  · Positive JESUSITA gene mutation    Treatment Summary  · 6/28/21 Laparoscopic assisted distal pancreatectomy and splenectomy with 1 of 6 lymph nodes positive. Surgeon Dr. Karyn Cruz [address] [place] [address] Drive [place]   · Recommend Gemzar 1000 mg/m2 IV D 1, D8, D15 with Capecitabine 830 mg/m2 D1-21 every 28 days x6 cycles    Cancer History  Cindy Santana was first seen by me on 8/6/2021. The patient was referred for a diagnosis of pancreatic malignancy. She had initial complaints of abdominal pain. · 5/5/20 MRI abdomen [facility]): Moderate fatty replacement of the pancreas with several small cystic structures within or adjacent to the pancreatic head and uncinate process most suggestive of benign cysts. A follow-up MRI of the abdomen may be obtained in 1 year to assess for stability. No solid lesion is seen. Otherwise unremarkable MRI of the abdomen. · 4/22/20 CT abd/pelvis [facility]): Focal inflammatory stranding associated with the pancreatic head. Radiographically I would favor this to represent acute pancreatitis. There is mild associated stranding within the descending and proximal transverse duodenum.  There are 2 well-circumscribed hypodensities one in the region of the uncinate process and one slightly anterior to the pancreatic head likely representing sequela of pancreatitis but warranting follow-up on subsequent exam. The body and tail of the pancreas are spared. No evidence of pancreatic necrosis. The patient is status post hysterectomy and cholecystectomy. Bibasilar subsegmental atelectasis. No evidence of nephrolithiasis or obstructive uropathy. Diverticulosis of the distal descending and sigmoid colon without evidence of diverticulitis. · 5/5/21 MRI [facility]) There are multiple tiny-to-small foci of increased T2 signal seen within the pancreas, consistent with a combination of cysts and/or beaded dilatation of the tributaries of the main pancreatic duct, increased in size and number when compared to the prior similar study of 5/5/20. The patient is again noted to be status post cholecystectomy. The examination is otherwise within normal limits for age with no abnormal postcontrast enhancement noted. · 6/3/21 Upper EUS (Dr Demi Rene/[place]): CT Visualized portion of pancreas abnormal on ultrasound. There is a 26.7 mm solid mass in pancreatic tail. It abuts the splenic vessel without occlusion or varices. Pancreatic duct is dilated to 6 mm in the tail proximal to the mass. FNA x4 yields atypical cells. Celiac axis, liver appear normal, no local nodes. Body and head of pancreas normal.  · 6/3/21 Pancreas tail mass, FNA aspiration smear (x2), ThinPrep and cell block: Adenocarcinoma. · 6/7/21 CA 19-9 407.1  · 6/28/21-she underwent Whipple procedure pancreatectomy by Dr. Rebeka Block: Invasive ductal carcinoma 4.3 cm in greatest dimension, invasive in the peripancreatic adipose tissue, diffuse perineural invasion. Surgical margins were negative for carcinoma. The pancreas margins involved by a low-grade dysplasia. 1/6 nodes positive for metastatic cancer with direct extension. Distal fibrosis and pancreatitis was noted. Spleen was unremarkable. Pathological stage pT2 N1 M0  · 8/6/2021she was first seen by me. Recommend adjuvant chemotherapy with gemcitabine/Xeloda.   She acknowledged understanding and agree with treatment. · 8/09/2021 CT Chest W/Contrast No evidence of intrathoracic metastatic disease is identified. · 8/09/2021 CT Abd/Pelvis W/Oral Contrast Prior distal pancreatectomy and splenectomy. There is a 1.5 cm circumscribed, nonenhancing cystic lesion along the inferior pancreatic head, perhaps a branch IPMN. Otherwise, no obvious pancreatic solid mass is seen. No suspicious adenopathy or evidence of distant metastatic disease in the abdomen or pelvis. Prior cholecystectomy. Liver steatosis. Colonic diverticulosis. · 8/13/2021initiation of adjuvant capecitabine/Gemzar   · 9/10/2021I reviewed CT chest abdomen pelvis.     Past Medical History:    Past Medical History:   Diagnosis Date    Anxiety     Arthritis     Depression     Hypertension     Hypoglycemia     if fasting for very long    Pancreatic cancer ([facility] [address] 06/2021    s/p Whipple by Dr. Mireille Rodas    Shoulder pain     incompleted rcr; possible injury    Thyroid disease        Past Surgical History:    Past Surgical History:   Procedure Laterality Date    CHOLECYSTECTOMY  1991    COLONOSCOPY  2020    Dr Jaqueline Yang  06/28/2021    [place] SHLDR ARTHROSCOP,SURG,W/ROTAT CUFF REPR Right 10/19/2017    SHOULDER ARTHROSCOPY ROTATOR CUFF REPAIR/ DEBRIDEMENT BICEPS TENODESIS/ TENOTOMY SUBACROMIAL DECOMPRESSION/ DISTAL CLAVICLE EXCISION performed by Taylor Theodore MD at [address] [place] [address]  06/28/2021    UPPER GASTROINTESTINAL ENDOSCOPY  06/2021    Dr. Sofia Mae in [place]      spur excision       Social History:    Marital status:   Smoking status: no  ETOH status: no  [place]ides[place]    Family History:   Family History   Adopted: Yes   Problem Relation Age of Onset    Breast Cancer Maternal Aunt     Breast Cancer Daughter     Cancer Daughter         Bladder    Cancer Maternal Uncle         Bladder       Current Hospital Medications:    Current Outpatient Medications   Medication Sig Dispense Refill    busPIRone (BUSPAR) 5 MG tablet TAKE 1 TABLET BY MOUTH TWICE A DAY      losartan (COZAAR) 50 MG tablet TAKE 1 TABLET BY MOUTH EVERY DAY      promethazine (PHENERGAN) 12.5 MG tablet Take 1 tablet by mouth every 6 hours as needed for Nausea 30 tablet 5    oxyCODONE-acetaminophen (PERCOCET)  MG per tablet 1-2 tabs po q4-6 hrs prn pain. (Patient not taking: Reported on 8/6/2021) 60 tablet 0    ondansetron (ZOFRAN) 4 MG tablet Take 1 tablet by mouth every 8 hours as needed for Nausea or Vomiting (Patient not taking: Reported on 8/6/2021) 10 tablet 0    levothyroxine (SYNTHROID) 125 MCG tablet Take 125 mcg by mouth Daily       No current facility-administered medications for this visit. Allergies:    Allergies   Allergen Reactions    Diclofenac Other (See Comments)     Couldn't sleep and face turned blood red    Oxycodone-Acetaminophen Hives     itching         Subjective   REVIEW OF SYSTEMS:   CONSTITUTIONAL: no fever, no night sweats, no fatigue, loss of appetite;  HEENT: no blurring of vision, no double vision, no hearing difficulty, no tinnitus, no ulceration, no dysplasia, no epistaxis;  LUNGS: no cough, no hemoptysis, no wheeze,  no shortness of breath;  CARDIOVASCULAR: no palpitation, no chest pain, no shortness of breath;  GI: no abdominal pain, no nausea, no vomiting, no diarrhea, no constipation;  STUART: no dysuria, no hematuria, no frequency or urgency, no nephrolithiasis;  MUSCULOSKELETAL: no joint pain, no swelling, no stiffness;  ENDOCRINE: no polyuria, no polydipsia, no cold or heat intolerance;  HEMATOLOGY: no easy bruising or bleeding, no history of clotting disorder;  DERMATOLOGY: no skin rash, no eczema, no pruritus;  PSYCHIATRY: depression, anxiety, no panic attacks, no suicidal ideation, no homicidal ideation;  NEUROLOGY: no syncope, no seizures, no numbness or tingling of hands, no numbness or tingling of feet, no paresis;    Objective   /84 (Site: Left Upper Arm, Position: Sitting)   Pulse 86   Temp 98.2 °F (36.8 °C)   Resp 16   Ht 5' 5\" (1.651 m)   Wt 161 lb 9.6 oz (73.3 kg)   SpO2 98%   BMI 26.89 kg/m²     PHYSICAL EXAM:  CONSTITUTIONAL: Alert, appropriate, no acute distress  EYES: Non icteric, EOM intact, pupils equal round   ENT: Mucus membranes moist, no oral pharyngeal lesions, external inspection of ears and nose are normal  NECK: Supple, no masses. No palpable thyroid mass  CHEST/LUNGS: CTA bilaterally, normal respiratory effort   CARDIOVASCULAR: RRR, no murmurs. No lower extremity edema  ABDOMEN: soft non-tender, active bowel sounds, no HSM. No palpable masses  EXTREMITIES: warm, full ROM in all 4 extremities, no focal weakness. SKIN: warm, dry with no rashes or lesions  LYMPH: No cervical, clavicular, axillary, or inguinal lymphadenopathy  NEUROLOGIC: follows commands, non focal   PSYCH: mood and affect appropriate. Alert and oriented to time, place, person      LABORATORY RESULTS REVIEWED/ANALYZED BY ME:  Lab Results   Component Value Date    WBC 10.90 (H) 09/10/2021    HGB 13.3 09/10/2021    HCT 40.1 09/10/2021    MCV 96.9 (H) 09/10/2021     (H) 09/10/2021     Lab Results   Component Value Date    NEUTROABS 2.91 09/10/2021     Lab Results   Component Value Date     08/27/2021    K 4.1 08/27/2021     08/27/2021    CO2 33 (H) 08/27/2021    BUN 11 08/27/2021    CREATININE 0.5 08/27/2021    GLUCOSE 111 (H) 08/27/2021    CALCIUM 9.6 08/27/2021    PROT 7.0 08/27/2021    LABALBU 4.0 08/27/2021    BILITOT 0.6 08/27/2021    ALKPHOS 92 08/27/2021    AST 36 08/27/2021    ALT 17 08/27/2021    LABGLOM >60 08/27/2021    GFRAA >60 08/09/2021    GLOB 3.0 08/27/2021         RADIOLOGY STUDIES REVIEWED BY ME:  8/09/2021 CT Chest W/Contrast No evidence of intrathoracic metastatic disease is identified. 8/09/2021 CT Abd/Pelvis W/Oral Contrast Prior distal pancreatectomy and splenectomy.  There is a 1.5 cm  circumscribed, nonenhancing cystic lesion along the inferior  pancreatic head, perhaps a branch IPMN. Otherwise, no obvious  pancreatic solid mass is seen. No suspicious adenopathy or evidence of distant metastatic disease in the abdomen or pelvis. Prior cholecystectomy. Liver steatosis. Colonic diverticulosis. ASSESSMENT:    Orders Placed This Encounter   Procedures    Comprehensive Metabolic Panel     Standing Status:   Future     Number of Occurrences:   1     Standing Expiration Date:   9/10/2022      Demi Johnson was seen today for cancer. Diagnoses and all orders for this visit:    Adenocarcinoma of pancreas ([facility] [address]  -     Comprehensive Metabolic Panel; Future    Poor venous access    Pancreatic tail adenocarcinoma pT2 N1 M0  Essentially, node positive third of the pancreas adenocarcinoma. Status post distal pancreatectomy/splenectomy  Discussed about adjuvant chemotherapy, side effects, treatment options to include FOLFIRINOX. I have recommended Gemzar/capecitabine due to better tolerance. Very poor IV access. Recommended port placement. Hold chemotherapy today. Proceed with port placement. Start cycle number 2-day 1 next week      PLAN:  · Refer to [address] [place] [address] Surgery for port placement ASAP  · Continue Gemzar 1000 mg/m2 IV D 1, D8, D15 with Capecitabine 830 mg/m2 D1-21 every 28 days x6 cycles  · RTC with MD MARINELLI# 2 in treatment room        Gil ROSAS am scribing for Gilmar Schulte MD. Electronically signed by Gil Jacobs MA on 9/10/2021 at 3:47 PM CDT. I, Dr Alexander Alvarez, personally performed the services described in this documentation as scribed by Gil Jacobs MA in my presence and is both accurate and complete. I have seen, examined and reviewed this patient medication list, appropriate labs and imaging studies. I reviewed relevant medical records and others physicians notes. I discussed the plans of care with the patient.  I answered all the questions to the patients satisfaction. I have also reviewed the chief complaint (CC) and part of the history (History of Present Illness (HPI), Past Family Social History [facility]), or Review of Systems (ROS) and made changes when appropriated.            09/10/21  12:25 PM

## 2021-09-10 ENCOUNTER — OFFICE VISIT (OUTPATIENT)
Dept: HEMATOLOGY | Age: 76
End: 2021-09-10
Payer: MEDICARE

## 2021-09-10 ENCOUNTER — HOSPITAL ENCOUNTER (OUTPATIENT)
Dept: INFUSION THERAPY | Age: 76
Discharge: HOME | End: 2021-09-10
Payer: MEDICARE

## 2021-09-10 VITALS
BODY MASS INDEX: 26.92 KG/M2 | HEART RATE: 86 BPM | OXYGEN SATURATION: 98 % | DIASTOLIC BLOOD PRESSURE: 84 MMHG | RESPIRATION RATE: 16 BRPM | SYSTOLIC BLOOD PRESSURE: 130 MMHG | TEMPERATURE: 98.2 F | HEIGHT: 65 IN | WEIGHT: 161.6 LBS

## 2021-09-10 DIAGNOSIS — C25.9 ADENOCARCINOMA OF PANCREAS (HCC): Primary | ICD-10-CM

## 2021-09-10 DIAGNOSIS — I87.8 POOR VENOUS ACCESS: ICD-10-CM

## 2021-09-10 DIAGNOSIS — C25.2 MALIGNANT NEOPLASM OF TAIL OF PANCREAS (HCC): ICD-10-CM

## 2021-09-10 DIAGNOSIS — C25.9 ADENOCARCINOMA OF PANCREAS (HCC): ICD-10-CM

## 2021-09-10 LAB
BASOPHILS ABSOLUTE: 0.09 K/UL (ref 0.01–0.08)
BASOPHILS RELATIVE PERCENT: 0.8 % (ref 0.1–1.2)
EOSINOPHILS ABSOLUTE: 0.41 K/UL (ref 0.04–0.54)
EOSINOPHILS RELATIVE PERCENT: 3.8 % (ref 0.7–7)
HCT VFR BLD CALC: 40.1 % (ref 34.1–44.9)
HEMOGLOBIN: 13.3 G/DL (ref 11.2–15.7)
LYMPHOCYTES ABSOLUTE: 5.65 K/UL (ref 1.18–3.74)
LYMPHOCYTES RELATIVE PERCENT: 51.8 % (ref 19.3–53.1)
MCH RBC QN AUTO: 32.1 PG (ref 25.6–32.2)
MCHC RBC AUTO-ENTMCNC: 33.2 G/DL (ref 32.3–35.5)
MCV RBC AUTO: 96.9 FL (ref 79.4–94.8)
MONOCYTES ABSOLUTE: 1.84 K/UL (ref 0.24–0.82)
MONOCYTES RELATIVE PERCENT: 16.9 % (ref 4.7–12.5)
NEUTROPHILS ABSOLUTE: 2.91 K/UL (ref 1.56–6.13)
NEUTROPHILS RELATIVE PERCENT: 26.7 % (ref 34–71.1)
PDW BLD-RTO: 17.4 % (ref 11.7–14.4)
PLATELET # BLD: 388 K/UL (ref 182–369)
PMV BLD AUTO: 10 FL (ref 7.4–10.4)
RBC # BLD: 4.14 M/UL (ref 3.93–5.22)
WBC # BLD: 10.9 K/UL (ref 3.98–10.04)

## 2021-09-10 PROCEDURE — G8427 DOCREV CUR MEDS BY ELIG CLIN: HCPCS | Performed by: INTERNAL MEDICINE

## 2021-09-10 PROCEDURE — 1036F TOBACCO NON-USER: CPT | Performed by: INTERNAL MEDICINE

## 2021-09-10 PROCEDURE — G8417 CALC BMI ABV UP PARAM F/U: HCPCS | Performed by: INTERNAL MEDICINE

## 2021-09-10 PROCEDURE — 4040F PNEUMOC VAC/ADMIN/RCVD: CPT | Performed by: INTERNAL MEDICINE

## 2021-09-10 PROCEDURE — 85025 COMPLETE CBC W/AUTO DIFF WBC: CPT

## 2021-09-10 PROCEDURE — G8400 PT W/DXA NO RESULTS DOC: HCPCS | Performed by: INTERNAL MEDICINE

## 2021-09-10 PROCEDURE — 99212 OFFICE O/P EST SF 10 MIN: CPT

## 2021-09-10 PROCEDURE — 1123F ACP DISCUSS/DSCN MKR DOCD: CPT | Performed by: INTERNAL MEDICINE

## 2021-09-10 PROCEDURE — 1090F PRES/ABSN URINE INCON ASSESS: CPT | Performed by: INTERNAL MEDICINE

## 2021-09-10 PROCEDURE — 3017F COLORECTAL CA SCREEN DOC REV: CPT | Performed by: INTERNAL MEDICINE

## 2021-09-10 PROCEDURE — 99214 OFFICE O/P EST MOD 30 MIN: CPT | Performed by: INTERNAL MEDICINE

## 2021-09-10 RX ORDER — PANCRELIPASE 60000; 12000; 38000 [USP'U]/1; [USP'U]/1; [USP'U]/1
12000 CAPSULE, DELAYED RELEASE PELLETS ORAL
Qty: 90 CAPSULE | Refills: 5 | Status: SHIPPED | OUTPATIENT
Start: 2021-09-10 | End: 2022-03-09

## 2021-09-14 ENCOUNTER — OFFICE VISIT (OUTPATIENT)
Dept: SURGERY | Age: 76
End: 2021-09-14
Payer: MEDICARE

## 2021-09-14 ENCOUNTER — PREP FOR PROCEDURE (OUTPATIENT)
Dept: SURGERY | Age: 76
End: 2021-09-14

## 2021-09-14 VITALS — TEMPERATURE: 98.4 F | BODY MASS INDEX: 26.83 KG/M2 | OXYGEN SATURATION: 98 % | HEART RATE: 91 BPM | WEIGHT: 161.2 LBS

## 2021-09-14 DIAGNOSIS — C25.2 MALIGNANT NEOPLASM OF TAIL OF PANCREAS (HCC): Primary | ICD-10-CM

## 2021-09-14 PROCEDURE — 4040F PNEUMOC VAC/ADMIN/RCVD: CPT | Performed by: SURGERY

## 2021-09-14 PROCEDURE — 99203 OFFICE O/P NEW LOW 30 MIN: CPT | Performed by: SURGERY

## 2021-09-14 PROCEDURE — 1036F TOBACCO NON-USER: CPT | Performed by: SURGERY

## 2021-09-14 PROCEDURE — G8400 PT W/DXA NO RESULTS DOC: HCPCS | Performed by: SURGERY

## 2021-09-14 PROCEDURE — 1123F ACP DISCUSS/DSCN MKR DOCD: CPT | Performed by: SURGERY

## 2021-09-14 PROCEDURE — G8427 DOCREV CUR MEDS BY ELIG CLIN: HCPCS | Performed by: SURGERY

## 2021-09-14 PROCEDURE — 3017F COLORECTAL CA SCREEN DOC REV: CPT | Performed by: SURGERY

## 2021-09-14 PROCEDURE — 1090F PRES/ABSN URINE INCON ASSESS: CPT | Performed by: SURGERY

## 2021-09-14 PROCEDURE — G8417 CALC BMI ABV UP PARAM F/U: HCPCS | Performed by: SURGERY

## 2021-09-14 RX ORDER — SODIUM CHLORIDE 0.9 % (FLUSH) 0.9 %
10 SYRINGE (ML) INJECTION PRN
Status: CANCELLED | OUTPATIENT
Start: 2021-09-14

## 2021-09-14 RX ORDER — SODIUM CHLORIDE 0.9 % (FLUSH) 0.9 %
10 SYRINGE (ML) INJECTION EVERY 12 HOURS SCHEDULED
Status: CANCELLED | OUTPATIENT
Start: 2021-09-14

## 2021-09-14 RX ORDER — CAL/D3/MAG11/ZINC/COP/MANG/BOR 600 MG-800
1 TABLET ORAL DAILY
COMMUNITY

## 2021-09-14 RX ORDER — CAPECITABINE 500 MG/1
1250 TABLET, FILM COATED ORAL 2 TIMES DAILY
COMMUNITY
End: 2021-11-05 | Stop reason: SDUPTHER

## 2021-09-14 RX ORDER — SODIUM CHLORIDE 9 MG/ML
25 INJECTION, SOLUTION INTRAVENOUS PRN
Status: CANCELLED | OUTPATIENT
Start: 2021-09-14

## 2021-09-14 RX ORDER — SODIUM CHLORIDE, SODIUM LACTATE, POTASSIUM CHLORIDE, CALCIUM CHLORIDE 600; 310; 30; 20 MG/100ML; MG/100ML; MG/100ML; MG/100ML
INJECTION, SOLUTION INTRAVENOUS CONTINUOUS
Status: CANCELLED | OUTPATIENT
Start: 2021-09-14

## 2021-09-14 ASSESSMENT — ENCOUNTER SYMPTOMS
ABDOMINAL DISTENTION: 0
SHORTNESS OF BREATH: 0
COUGH: 0
ABDOMINAL PAIN: 0
CONSTIPATION: 0
EYE REDNESS: 0
EYE PAIN: 0
BACK PAIN: 0
COLOR CHANGE: 0
SORE THROAT: 0
CHEST TIGHTNESS: 0
VOMITING: 0
NAUSEA: 1
DIARRHEA: 0

## 2021-09-14 NOTE — PROGRESS NOTES
SUBJECTIVE:  Ms. Sheila Perez is a 77 y.o. female who presents today with a recent diagnosis of pancreatic cancer. She is undergoing chemotherapy but having trouble with her IV's and would like to discuss port insertion.  She has never had this issue in the past.       Past Medical History:   Diagnosis Date    Anxiety     Arthritis     Depression     Hypertension     Hypoglycemia     if fasting for very long    Pancreatic cancer ([address] 06/2021    s/p Whipple by Dr. Adela Stoll    Shoulder pain     incompleted rcr; possible injury    Thyroid disease      Past Surgical History:   Procedure Laterality Date   Ton Mckeon  2020    Dr Zamora Medicus  06/28/2021    SD SHLDR ARTHROSCOP,SURG,W/ROTAT CUFF REPR Right 10/19/2017    SHOULDER ARTHROSCOPY ROTATOR CUFF REPAIR/ DEBRIDEMENT BICEPS TENODESIS/ TENOTOMY SUBACROMIAL DECOMPRESSION/ DISTAL CLAVICLE EXCISION performed by Navdeep Arredondo MD at [address] [place] [address]  06/28/2021    UPPER GASTROINTESTINAL ENDOSCOPY  06/2021    Dr. Shakira Suárez in [place]      spur excision     Current Outpatient Medications   Medication Sig Dispense Refill    Caltrate 600+D Plus Minerals (CALTRATE) 600-800 MG-UNIT TABS tablet Take 1 tablet by mouth daily      capecitabine (XELODA) 500 MG chemo tablet Take 1,250 mg/m2 by mouth 2 times daily      busPIRone (BUSPAR) 5 MG tablet TAKE 1 TABLET BY MOUTH TWICE A DAY      losartan (COZAAR) 50 MG tablet TAKE 1 TABLET BY MOUTH EVERY DAY      levothyroxine (SYNTHROID) 125 MCG tablet Take 125 mcg by mouth Daily      lipase-protease-amylase (CREON) 41535-62597 units delayed release capsule Take 1 capsule by mouth 3 times daily (with meals) (Patient not taking: Reported on 9/14/2021) 90 capsule 5    promethazine (PHENERGAN) 12.5 MG tablet Take 1 tablet by mouth every 6 hours as needed for Nausea (Patient not taking: Reported on 9/14/2021) 30 tablet 5    oxyCODONE-acetaminophen (PERCOCET)  MG per tablet 1-2 tabs po q4-6 hrs prn pain. (Patient not taking: Reported on 8/6/2021) 60 tablet 0    ondansetron (ZOFRAN) 4 MG tablet Take 1 tablet by mouth every 8 hours as needed for Nausea or Vomiting (Patient not taking: Reported on 8/6/2021) 10 tablet 0     No current facility-administered medications for this visit. Allergies: Diclofenac, Oxycodone-acetaminophen, and Doxycycline    Family History   Adopted: Yes   Problem Relation Age of Onset    Breast Cancer Maternal Aunt     Breast Cancer Daughter     Cancer Daughter         Bladder    Cancer Maternal Uncle         Bladder       Social History     Tobacco Use    Smoking status: Never Smoker    Smokeless tobacco: Never Used   Substance Use Topics    Alcohol use: Not Currently     Comment: rare       Review of Systems   Constitutional: Negative for fatigue, fever and unexpected weight change. HENT: Negative for hearing loss, nosebleeds and sore throat. Eyes: Negative for pain, redness and visual disturbance. Respiratory: Negative for cough, chest tightness and shortness of breath. Cardiovascular: Negative for chest pain, palpitations and leg swelling. Gastrointestinal: Positive for nausea. Negative for abdominal distention, abdominal pain, constipation, diarrhea and vomiting. Endocrine: Negative for cold intolerance, heat intolerance and polydipsia. Genitourinary: Negative for difficulty urinating, frequency and urgency. Musculoskeletal: Negative for back pain, joint swelling and neck pain. Skin: Negative for color change, rash and wound. Allergic/Immunologic: Negative for environmental allergies and food allergies. Neurological: Negative for seizures, light-headedness and headaches. Hematological: Negative for adenopathy. Does not bruise/bleed easily. Psychiatric/Behavioral: Negative for confusion, sleep disturbance and suicidal ideas.        OBJECTIVE:  Pulse 91   Temp 98.4 °F (36.9 °C) (Temporal)   Wt 161 lb 3.2 oz (73.1 kg)   SpO2 98%   BMI 26.83 kg/m²   Physical Exam  Vitals reviewed. Constitutional:       Appearance: She is well-developed. HENT:      Head: Normocephalic and atraumatic. Eyes:      Pupils: Pupils are equal, round, and reactive to light. Cardiovascular:      Rate and Rhythm: Normal rate and regular rhythm. Heart sounds: Normal heart sounds. Pulmonary:      Effort: Pulmonary effort is normal.      Breath sounds: Normal breath sounds. No wheezing or rales. Abdominal:      General: Bowel sounds are normal. There is no distension. Palpations: Abdomen is soft. Tenderness: There is no abdominal tenderness. There is no guarding or rebound. Musculoskeletal:         General: Normal range of motion. Cervical back: Normal range of motion. Lymphadenopathy:      Cervical: No cervical adenopathy. Skin:     General: Skin is warm and dry. Neurological:      Mental Status: She is alert and oriented to person, place, and time. Deep Tendon Reflexes: Reflexes are normal and symmetric. Psychiatric:         Behavior: Behavior normal.         Thought Content: Thought content normal.         Judgment: Judgment normal.         ASSESSMENT:   Diagnosis Orders   1. Malignant neoplasm of tail of pancreas ([facility]         PLAN:  Orders Placed This Encounter   Procedures    Initiate PAT Protocol     Standing Status:   Future     Standing Expiration Date:   11/13/2021     The risks, benefits, and alternatives were discussed with the pt. She is willing to accept the risks and proceed with a single lumen power port insertion with ultrasound and fluoroscopy. The surgical risks included but not limited to bleeding, infection, perforation, risk of needing further surgery, etc. The anesthetic risks included heart attacks, strokes, pneumonia, phlebitis, etc.  She is willing to accept all risks and proceed with surgery.  No guarantees have been given.      Return for PRN.     DO Chadwick 2/54/0601 2:26 PM

## 2021-09-14 NOTE — H&P
on 9/14/2021) 90 capsule 5    promethazine (PHENERGAN) 12.5 MG tablet Take 1 tablet by mouth every 6 hours as needed for Nausea (Patient not taking: Reported on 9/14/2021) 30 tablet 5    oxyCODONE-acetaminophen (PERCOCET)  MG per tablet 1-2 tabs po q4-6 hrs prn pain. (Patient not taking: Reported on 8/6/2021) 60 tablet 0    ondansetron (ZOFRAN) 4 MG tablet Take 1 tablet by mouth every 8 hours as needed for Nausea or Vomiting (Patient not taking: Reported on 8/6/2021) 10 tablet 0      No current facility-administered medications for this visit.         Allergies: Diclofenac, Oxycodone-acetaminophen, and Doxycycline     Family History         Family History   Adopted: Yes   Problem Relation Age of Onset    Breast Cancer Maternal Aunt      Breast Cancer Daughter      Cancer Daughter           Bladder    Cancer Maternal Uncle           Bladder            Social History            Tobacco Use    Smoking status: Never Smoker    Smokeless tobacco: Never Used   Substance Use Topics    Alcohol use: Not Currently       Comment: rare         Review of Systems   Constitutional: Negative for fatigue, fever and unexpected weight change. HENT: Negative for hearing loss, nosebleeds and sore throat. Eyes: Negative for pain, redness and visual disturbance. Respiratory: Negative for cough, chest tightness and shortness of breath. Cardiovascular: Negative for chest pain, palpitations and leg swelling. Gastrointestinal: Positive for nausea. Negative for abdominal distention, abdominal pain, constipation, diarrhea and vomiting. Endocrine: Negative for cold intolerance, heat intolerance and polydipsia. Genitourinary: Negative for difficulty urinating, frequency and urgency. Musculoskeletal: Negative for back pain, joint swelling and neck pain. Skin: Negative for color change, rash and wound. Allergic/Immunologic: Negative for environmental allergies and food allergies.    Neurological: Negative for seizures, light-headedness and headaches. Hematological: Negative for adenopathy. Does not bruise/bleed easily. Psychiatric/Behavioral: Negative for confusion, sleep disturbance and suicidal ideas.         OBJECTIVE:  Pulse 91   Temp 98.4 °F (36.9 °C) (Temporal)   Wt 161 lb 3.2 oz (73.1 kg)   SpO2 98%   BMI 26.83 kg/m²   Physical Exam  Vitals reviewed. Constitutional:       Appearance: She is well-developed. HENT:      Head: Normocephalic and atraumatic. Eyes:      Pupils: Pupils are equal, round, and reactive to light. Cardiovascular:      Rate and Rhythm: Normal rate and regular rhythm. Heart sounds: Normal heart sounds. Pulmonary:      Effort: Pulmonary effort is normal.      Breath sounds: Normal breath sounds. No wheezing or rales. Abdominal:      General: Bowel sounds are normal. There is no distension. Palpations: Abdomen is soft. Tenderness: There is no abdominal tenderness. There is no guarding or rebound. Musculoskeletal:         General: Normal range of motion. Cervical back: Normal range of motion. Lymphadenopathy:      Cervical: No cervical adenopathy. Skin:     General: Skin is warm and dry. Neurological:      Mental Status: She is alert and oriented to person, place, and time. Deep Tendon Reflexes: Reflexes are normal and symmetric. Psychiatric:         Behavior: Behavior normal.         Thought Content: Thought content normal.         Judgment: Judgment normal.            ASSESSMENT:    Diagnosis Orders   1. Malignant neoplasm of tail of pancreas (HCC)            PLAN:        Orders Placed This Encounter   Procedures    Initiate PAT Protocol       Standing Status:   Future       Standing Expiration Date:   11/13/2021      The risks, benefits, and alternatives were discussed with the pt. She is willing to accept the risks and proceed with a single lumen power port insertion with ultrasound and fluoroscopy.  The surgical risks included but not limited to bleeding, infection, perforation, risk of needing further surgery, etc. The anesthetic risks included heart attacks, strokes, pneumonia, phlebitis, etc.  She is willing to accept all risks and proceed with surgery.  No guarantees have been given.        Return for PRN.     Vivian Grant DO 8/65/2249 2:26 PM

## 2021-09-14 NOTE — LETTER
SCHEDULING INSTRUCTIONS:     Patient: Vasile Lemus  : 1945    Hospital: [facility]    Admitting Physician:  Dr. Alfredo Stanton    Diagnosis: Pancreatic Cancer    Procedure: single lumen power port insertion with ultrasound and fluoroscopy    ALLOW ADDITIONAL 30 MINS FOR TURNOVER EXCEPT FOR PHYSICIAN'S BOUNCE DAY    Anesthesia: MAC    Admission: Outpatient    Date: 181          Jacobo Samaniego DO  22  2:18 PM       NOT TO BE USED OUTSIDE OF THE OFFICE

## 2021-09-14 NOTE — H&P (VIEW-ONLY)
[facility] Surgery History and Physical   SUBJECTIVE:  Ms. Lisa Alvarez is a 77 y.o. female who presents today with a recent diagnosis of pancreatic cancer. She is undergoing chemotherapy but having trouble with her IV's and would like to discuss port insertion.  She has never had this issue in the past.         Past Medical History        Past Medical History:   Diagnosis Date    Anxiety      Arthritis      Depression      Hypertension      Hypoglycemia       if fasting for very long    Pancreatic cancer ([address] 06/2021     s/p Whipple by Dr. Angel Hdz    Shoulder pain       incompleted rcr; possible injury    Thyroid disease           Past Surgical History         Past Surgical History:   Procedure Laterality Date    CHOLECYSTECTOMY   1991    COLONOSCOPY   2020     Dr Ivonne Bass   06/28/2021    AR SHLDR ARTHROSCOP,SURG,W/ROTAT CUFF REPR Right 10/19/2017     SHOULDER ARTHROSCOPY ROTATOR CUFF REPAIR/ DEBRIDEMENT BICEPS TENODESIS/ TENOTOMY SUBACROMIAL DECOMPRESSION/ DISTAL CLAVICLE EXCISION performed by Stephanie Mcnair MD at [facility]   06/28/2021    UPPER GASTROINTESTINAL ENDOSCOPY   06/2021     Dr. Maury Gaona in [place]         spur excision         Current Facility-Administered Medications          Current Outpatient Medications   Medication Sig Dispense Refill    Caltrate 600+D Plus Minerals (CALTRATE) 600-800 MG-UNIT TABS tablet Take 1 tablet by mouth daily        capecitabine (XELODA) 500 MG chemo tablet Take 1,250 mg/m2 by mouth 2 times daily        busPIRone (BUSPAR) 5 MG tablet TAKE 1 TABLET BY MOUTH TWICE A DAY        losartan (COZAAR) 50 MG tablet TAKE 1 TABLET BY MOUTH EVERY DAY        levothyroxine (SYNTHROID) 125 MCG tablet Take 125 mcg by mouth Daily        lipase-protease-amylase (CREON) 10936-91985 units delayed release capsule Take 1 capsule by mouth 3 times daily (with meals) (Patient not taking: Reported on 9/14/2021) 90 capsule 5    promethazine (PHENERGAN) 12.5 MG tablet Take 1 tablet by mouth every 6 hours as needed for Nausea (Patient not taking: Reported on 9/14/2021) 30 tablet 5    oxyCODONE-acetaminophen (PERCOCET)  MG per tablet 1-2 tabs po q4-6 hrs prn pain. (Patient not taking: Reported on 8/6/2021) 60 tablet 0    ondansetron (ZOFRAN) 4 MG tablet Take 1 tablet by mouth every 8 hours as needed for Nausea or Vomiting (Patient not taking: Reported on 8/6/2021) 10 tablet 0      No current facility-administered medications for this visit.         Allergies: Diclofenac, Oxycodone-acetaminophen, and Doxycycline     Family History         Family History   Adopted: Yes   Problem Relation Age of Onset    Breast Cancer Maternal Aunt      Breast Cancer Daughter      Cancer Daughter           Bladder    Cancer Maternal Uncle           Bladder            Social History            Tobacco Use    Smoking status: Never Smoker    Smokeless tobacco: Never Used   Substance Use Topics    Alcohol use: Not Currently       Comment: rare         Review of Systems   Constitutional: Negative for fatigue, fever and unexpected weight change. HENT: Negative for hearing loss, nosebleeds and sore throat. Eyes: Negative for pain, redness and visual disturbance. Respiratory: Negative for cough, chest tightness and shortness of breath. Cardiovascular: Negative for chest pain, palpitations and leg swelling. Gastrointestinal: Positive for nausea. Negative for abdominal distention, abdominal pain, constipation, diarrhea and vomiting. Endocrine: Negative for cold intolerance, heat intolerance and polydipsia. Genitourinary: Negative for difficulty urinating, frequency and urgency. Musculoskeletal: Negative for back pain, joint swelling and neck pain. Skin: Negative for color change, rash and wound. Allergic/Immunologic: Negative for environmental allergies and food allergies.    Neurological: Negative for seizures, light-headedness and headaches. Hematological: Negative for adenopathy. Does not bruise/bleed easily. Psychiatric/Behavioral: Negative for confusion, sleep disturbance and suicidal ideas.         OBJECTIVE:  Pulse 91   Temp 98.4 °F (36.9 °C) (Temporal)   Wt 161 lb 3.2 oz (73.1 kg)   SpO2 98%   BMI 26.83 kg/m²   Physical Exam  Vitals reviewed. Constitutional:       Appearance: She is well-developed. HENT:      Head: Normocephalic and atraumatic. Eyes:      Pupils: Pupils are equal, round, and reactive to light. Cardiovascular:      Rate and Rhythm: Normal rate and regular rhythm. Heart sounds: Normal heart sounds. Pulmonary:      Effort: Pulmonary effort is normal.      Breath sounds: Normal breath sounds. No wheezing or rales. Abdominal:      General: Bowel sounds are normal. There is no distension. Palpations: Abdomen is soft. Tenderness: There is no abdominal tenderness. There is no guarding or rebound. Musculoskeletal:         General: Normal range of motion. Cervical back: Normal range of motion. Lymphadenopathy:      Cervical: No cervical adenopathy. Skin:     General: Skin is warm and dry. Neurological:      Mental Status: She is alert and oriented to person, place, and time. Deep Tendon Reflexes: Reflexes are normal and symmetric. Psychiatric:         Behavior: Behavior normal.         Thought Content: Thought content normal.         Judgment: Judgment normal.            ASSESSMENT:    Diagnosis Orders   1. Malignant neoplasm of tail of pancreas (HCC)            PLAN:        Orders Placed This Encounter   Procedures    Initiate PAT Protocol       Standing Status:   Future       Standing Expiration Date:   11/13/2021      The risks, benefits, and alternatives were discussed with the pt. She is willing to accept the risks and proceed with a single lumen power port insertion with ultrasound and fluoroscopy.  The surgical risks included but not limited to bleeding, infection, perforation, risk of needing further surgery, etc. The anesthetic risks included heart attacks, strokes, pneumonia, phlebitis, etc.  She is willing to accept all risks and proceed with surgery.  No guarantees have been given.        Return for PRN.     Vivian Grant DO 6/88/1969 2:26 PM

## 2021-09-16 ENCOUNTER — ANESTHESIA EVENT (OUTPATIENT)
Dept: OPERATING ROOM | Age: 76
End: 2021-09-16

## 2021-09-20 ENCOUNTER — OFFICE VISIT (OUTPATIENT)
Age: 76
End: 2021-09-20

## 2021-09-20 ENCOUNTER — HOSPITAL ENCOUNTER (OUTPATIENT)
Dept: NON INVASIVE DIAGNOSTICS | Age: 76
Discharge: HOME | End: 2021-09-20
Payer: MEDICARE

## 2021-09-20 DIAGNOSIS — Z11.59 SCREENING FOR VIRAL DISEASE: Primary | ICD-10-CM

## 2021-09-20 DIAGNOSIS — Z01.818 PRE-OP TESTING: ICD-10-CM

## 2021-09-20 LAB — SARS-COV-2, PCR: NOT DETECTED

## 2021-09-20 PROCEDURE — 93005 ELECTROCARDIOGRAM TRACING: CPT | Performed by: ANESTHESIOLOGY

## 2021-09-20 PROCEDURE — 99999 PR OFFICE/OUTPT VISIT,PROCEDURE ONLY: CPT | Performed by: NURSE PRACTITIONER

## 2021-09-21 ENCOUNTER — ANESTHESIA (OUTPATIENT)
Dept: OPERATING ROOM | Age: 76
End: 2021-09-21

## 2021-09-21 ENCOUNTER — HOSPITAL ENCOUNTER (OUTPATIENT)
Dept: GENERAL RADIOLOGY | Age: 76
Setting detail: OUTPATIENT SURGERY
Discharge: HOME | End: 2021-09-21
Payer: MEDICARE

## 2021-09-21 ENCOUNTER — HOSPITAL ENCOUNTER (OUTPATIENT)
Age: 76
Setting detail: OUTPATIENT SURGERY
Discharge: HOME | End: 2021-09-21
Attending: SURGERY | Admitting: SURGERY
Payer: MEDICARE

## 2021-09-21 VITALS — SYSTOLIC BLOOD PRESSURE: 143 MMHG | OXYGEN SATURATION: 98 % | DIASTOLIC BLOOD PRESSURE: 67 MMHG

## 2021-09-21 VITALS
DIASTOLIC BLOOD PRESSURE: 79 MMHG | HEART RATE: 68 BPM | OXYGEN SATURATION: 97 % | WEIGHT: 161 LBS | HEIGHT: 65 IN | SYSTOLIC BLOOD PRESSURE: 124 MMHG | BODY MASS INDEX: 26.82 KG/M2 | TEMPERATURE: 97.2 F | RESPIRATION RATE: 16 BRPM

## 2021-09-21 DIAGNOSIS — C25.9 MALIGNANT NEOPLASM OF PANCREAS, UNSPECIFIED LOCATION OF MALIGNANCY (HCC): ICD-10-CM

## 2021-09-21 DIAGNOSIS — Z01.818 PRE-OP TESTING: Primary | ICD-10-CM

## 2021-09-21 PROBLEM — C25.2 MALIGNANT NEOPLASM OF TAIL OF PANCREAS (HCC): Chronic | Status: ACTIVE | Noted: 2021-06-24

## 2021-09-21 LAB
EKG P AXIS: 75 DEGREES
EKG P-R INTERVAL: 142 MS
EKG Q-T INTERVAL: 370 MS
EKG QRS DURATION: 80 MS
EKG QTC CALCULATION (BAZETT): 399 MS
EKG T AXIS: 63 DEGREES

## 2021-09-21 PROCEDURE — 36561 INSERT TUNNELED CV CATH: CPT

## 2021-09-21 PROCEDURE — 76937 US GUIDE VASCULAR ACCESS: CPT | Performed by: SURGERY

## 2021-09-21 PROCEDURE — 77001 FLUOROGUIDE FOR VEIN DEVICE: CPT | Performed by: SURGERY

## 2021-09-21 PROCEDURE — 3209999900 FLUORO FOR SURGICAL PROCEDURES

## 2021-09-21 PROCEDURE — G8907 PT DOC NO EVENTS ON DISCHARG: HCPCS

## 2021-09-21 PROCEDURE — G8916 PT W IV AB GIVEN ON TIME: HCPCS

## 2021-09-21 PROCEDURE — 36561 INSERT TUNNELED CV CATH: CPT | Performed by: SURGERY

## 2021-09-21 PROCEDURE — C1788 PORT, INDWELLING, IMP: HCPCS | Performed by: SURGERY

## 2021-09-21 PROCEDURE — 93010 ELECTROCARDIOGRAM REPORT: CPT | Performed by: INTERNAL MEDICINE

## 2021-09-21 DEVICE — PORT INFUS PLAS SGL LUMN W/ 9.6FR SIL CATH AIRGUARD VLV: Type: IMPLANTABLE DEVICE | Site: CHEST | Status: FUNCTIONAL

## 2021-09-21 RX ORDER — LIDOCAINE HYDROCHLORIDE 10 MG/ML
INJECTION, SOLUTION INFILTRATION; PERINEURAL PRN
Status: DISCONTINUED | OUTPATIENT
Start: 2021-09-21 | End: 2021-09-21 | Stop reason: SDUPTHER

## 2021-09-21 RX ORDER — BUPIVACAINE HYDROCHLORIDE AND EPINEPHRINE 2.5; 5 MG/ML; UG/ML
INJECTION, SOLUTION INFILTRATION; PERINEURAL PRN
Status: DISCONTINUED | OUTPATIENT
Start: 2021-09-21 | End: 2021-09-21 | Stop reason: ALTCHOICE

## 2021-09-21 RX ORDER — SODIUM CHLORIDE 9 MG/ML
25 INJECTION, SOLUTION INTRAVENOUS PRN
Status: DISCONTINUED | OUTPATIENT
Start: 2021-09-21 | End: 2021-09-21 | Stop reason: HOSPADM

## 2021-09-21 RX ORDER — SODIUM CHLORIDE 0.9 % (FLUSH) 0.9 %
10 SYRINGE (ML) INJECTION PRN
Status: DISCONTINUED | OUTPATIENT
Start: 2021-09-21 | End: 2021-09-21 | Stop reason: HOSPADM

## 2021-09-21 RX ORDER — HEPARIN SODIUM (PORCINE) LOCK FLUSH IV SOLN 100 UNIT/ML 100 UNIT/ML
SOLUTION INTRAVENOUS PRN
Status: DISCONTINUED | OUTPATIENT
Start: 2021-09-21 | End: 2021-09-21 | Stop reason: ALTCHOICE

## 2021-09-21 RX ORDER — SODIUM CHLORIDE, SODIUM LACTATE, POTASSIUM CHLORIDE, CALCIUM CHLORIDE 600; 310; 30; 20 MG/100ML; MG/100ML; MG/100ML; MG/100ML
INJECTION, SOLUTION INTRAVENOUS CONTINUOUS
Status: DISCONTINUED | OUTPATIENT
Start: 2021-09-21 | End: 2021-09-21 | Stop reason: HOSPADM

## 2021-09-21 RX ORDER — PROPOFOL 10 MG/ML
INJECTION, EMULSION INTRAVENOUS PRN
Status: DISCONTINUED | OUTPATIENT
Start: 2021-09-21 | End: 2021-09-21 | Stop reason: SDUPTHER

## 2021-09-21 RX ORDER — SODIUM CHLORIDE 0.9 % (FLUSH) 0.9 %
10 SYRINGE (ML) INJECTION EVERY 12 HOURS SCHEDULED
Status: DISCONTINUED | OUTPATIENT
Start: 2021-09-21 | End: 2021-09-21 | Stop reason: HOSPADM

## 2021-09-21 RX ADMIN — PROPOFOL 200 MG: 10 INJECTION, EMULSION INTRAVENOUS at 09:08

## 2021-09-21 RX ADMIN — SODIUM CHLORIDE, SODIUM LACTATE, POTASSIUM CHLORIDE, CALCIUM CHLORIDE: 600; 310; 30; 20 INJECTION, SOLUTION INTRAVENOUS at 08:26

## 2021-09-21 RX ADMIN — LIDOCAINE HYDROCHLORIDE 50 MG: 10 INJECTION, SOLUTION INFILTRATION; PERINEURAL at 09:08

## 2021-09-21 NOTE — OP NOTE
Operative Note      Patient: Ashtyn Santamaria  YOB: 1945  MRN: [id number]    Date of Procedure: 9/21/2021    Pre-Op Diagnosis: pancreatic cancer    Post-Op Diagnosis: Same       Procedure(s):  Single lumen PORT INSERTION with ultrasound and fluoroscopy    Surgeon(s):  Dior Wright DO    Assistant:   * No surgical staff found *    Anesthesia: Monitor Anesthesia Care    Estimated Blood Loss (mL): Minimal    Complications: None    Specimens:   * No specimens in log *    Implants:  Implant Name Type Inv. Item Serial No.  Lot No. LRB No. Used Action   PORT INFUS PLAS SGL LUMN W/ 9.6FR MICHAEL CATH AIRGUARD VLV  PORT INFUS PLAS SGL LUMN W/ 9.6FR MICHAEL CATH AIRGUARD VLV  [employer] [id number] Right 1 Implanted         Drains: * No LDAs found *      Detailed Description of Procedure:   Patient was taken to the main operating room and placed on the operating table supine. After the administration of appropriate IV sedation, the upper  chest and neck were prepped and draped to a sterile field. Timeout was undertaken. The right internal jugular vein was cannulated on the 1st pass with an 18-gauge thin-wall needle via sonogram guidance. Through this a flexible-tipped guidewire was advanced without problem. Appropriate positioning was confirmed by fluoroscopy. A peel-away sheath and dilator assembly were advanced over the guidewire and the guidewire and dilator removed. A small transverse incision was then made in the skin of the right upper chest and a subcutaneous pocket created for the port reservoir. A Power Port had been selected and this was delivered onto the field. The catheter was measured and cut to appropriate length. It was then assembled to the port reservoir and locked in place with the locking ring. Each lumen was flushed with heparin saline. The catheter was then attached to the tunneler and pulled it through the subcutaneous tunnel, from the chest to the neck, without problem.  The reservoir was placed in its pocket and secured to the deep tissue with interrupted 2-0 PDS suture. The catheter was then advanced through the peel-away sheath and the peel-away sheath removed without problem. Appropriate positioning was confirmed by fluoroscopy. There was good blood return and easy flush of heparin saline through each lumen of the port. The small incision at the cannulation site was closed with a single 3-0 Vicryl subcuticular suture followed by Dermabond dressing. The main incision was closed with interrupted 3-0 Vicryl subcuticular suture followed by Dermabond as well. Sponge, needle and instrument count correct on 2 occasions. Estimated intraoperative blood loss minimal.    Ms. Sheila Perez tolerated her surgery well and she was taken to PACU in satisfactory condition.       ________________________________  Brenda Corrales DO        Electronically signed by Brenda Corrales DO on 1/50/5081 at 9:35 AM

## 2021-09-21 NOTE — INTERVAL H&P NOTE
Update History & Physical    The patient's History and Physical of September 14, 2021 was reviewed with the patient and I examined the patient. There was no change. The surgical site was confirmed by the patient and me. Plan: The risks, benefits, expected outcome, and alternative to the recommended procedure have been discussed with the patient. Patient understands and wants to proceed with the procedure.      Electronically signed by Nicolás Altamirano DO on 0/18/7497 at 8:50 AM

## 2021-09-21 NOTE — ANESTHESIA PRE PROCEDURE
09/21/21 0826 New Bag at 09/21/21 0826    sodium chloride flush 0.9 % injection 10 mL  10 mL IntraVENous 2 times per day Vivian Grant,         sodium chloride flush 0.9 % injection 10 mL  10 mL IntraVENous PRN Vivian Grant, DO           Allergies:     Allergies   Allergen Reactions    Diclofenac Other (See Comments)     Couldn't sleep and face turned blood red    Oxycodone-Acetaminophen Hives     itching    Doxycycline Rash       Problem List:    Patient Active Problem List   Diagnosis Code    Traumatic incomplete tear of left rotator cuff S46.012A    Malignant neoplasm of tail of pancreas ([facility] [address] C25.2       Past Medical History:        Diagnosis Date    Anxiety     Arthritis     Depression     Hypertension     Hypoglycemia     if fasting for very long    Pancreatic cancer ([facility] [address] 06/2021    s/p Whipple by Dr. Annel Smith    Shoulder pain     incompleted rcr; possible injury    Thyroid disease        Past Surgical History:        Procedure Laterality Date    CHOLECYSTECTOMY  1991    COLONOSCOPY  2020    Dr Bernard Flowers  06/28/2021    VT SHLDR ARTHROSCOP,SURG,W/ROTAT CUFF REPR Right 10/19/2017    SHOULDER ARTHROSCOPY ROTATOR CUFF REPAIR/ DEBRIDEMENT BICEPS TENODESIS/ TENOTOMY SUBACROMIAL DECOMPRESSION/ DISTAL CLAVICLE EXCISION performed by Madina Andujar MD at [address] [place] [address]  06/28/2021    UPPER GASTROINTESTINAL ENDOSCOPY  06/2021    Dr. Katherine Rubin in [place]      spur excision       Social History:    Social History     Tobacco Use    Smoking status: Never Smoker    Smokeless tobacco: Never Used   Substance Use Topics    Alcohol use: Not Currently     Comment: rare                                Counseling given: Not Answered      Vital Signs (Current):   Vitals:    09/21/21 0822   BP: 135/89   Pulse: 81   Resp: 16   Temp: 97.2 °F (36.2 °C)   SpO2: 97%   Weight: 161 lb (73 kg)   Height: 5' 5\" (1.651 m) BP Readings from Last 3 Encounters:   09/21/21 135/89   09/10/21 130/84   08/27/21 139/85       NPO Status: Time of last liquid consumption: 2300                        Time of last solid consumption: 2300                        Date of last liquid consumption: 09/20/21                        Date of last solid food consumption: 09/20/21    BMI:   Wt Readings from Last 3 Encounters:   09/21/21 161 lb (73 kg)   09/14/21 161 lb 3.2 oz (73.1 kg)   09/10/21 161 lb 9.6 oz (73.3 kg)     Body mass index is 26.79 kg/m². CBC:   Lab Results   Component Value Date    WBC 10.90 09/10/2021    RBC 4.14 09/10/2021    HGB 13.3 09/10/2021    HCT 40.1 09/10/2021    MCV 96.9 09/10/2021    RDW 17.4 09/10/2021     09/10/2021       CMP:   Lab Results   Component Value Date     08/27/2021    K 4.1 08/27/2021     08/27/2021    CO2 33 08/27/2021    BUN 11 08/27/2021    CREATININE 0.5 08/27/2021    GFRAA >60 08/09/2021    LABGLOM >60 08/27/2021    GLUCOSE 111 08/27/2021    PROT 7.0 08/27/2021    CALCIUM 9.6 08/27/2021    BILITOT 0.6 08/27/2021    ALKPHOS 92 08/27/2021    AST 36 08/27/2021    ALT 17 08/27/2021       POC Tests: No results for input(s): POCGLU, POCNA, POCK, POCCL, POCBUN, POCHEMO, POCHCT in the last 72 hours.     Coags: No results found for: PROTIME, INR, APTT    HCG (If Applicable): No results found for: PREGTESTUR, PREGSERUM, HCG, HCGQUANT     ABGs: No results found for: PHART, PO2ART, QGA6DRQ, GBB7LNR, BEART, V2XJSXHC     Type & Screen (If Applicable):  No results found for: LABABO, LABRH    Drug/Infectious Status (If Applicable):  No results found for: HIV, HEPCAB    COVID-19 Screening (If Applicable):   Lab Results   Component Value Date    COVID19 Not Detected 09/20/2021           Anesthesia Evaluation  Patient summary reviewed and Nursing notes reviewed  Airway: Mallampati: II  TM distance: >3 FB   Neck ROM: full  Mouth opening: > = 3 FB Dental: normal exam Pulmonary:Negative Pulmonary ROS and normal exam                               Cardiovascular:    (+) hypertension:,       ECG reviewed               Beta Blocker:  Not on Beta Blocker      ROS comment: 9/20/21 - ECG - 77 BPM   Sinus rhythm  Comparison Summary: No serial comparison made  Summary: Normal ECG     Neuro/Psych:   (+) psychiatric history:            GI/Hepatic/Renal: Neg GI/Hepatic/Renal ROS            Endo/Other: Negative Endo/Other ROS                    Abdominal:             Vascular: negative vascular ROS. Other Findings:           Anesthesia Plan      MAC     ASA 3       Induction: intravenous. Anesthetic plan and risks discussed with patient. Plan discussed with CRNA.                   MACHELLE Crowder - CRNA   9/21/2021

## 2021-09-21 NOTE — ANESTHESIA POSTPROCEDURE EVALUATION
Department of Anesthesiology  Postprocedure Note    Patient: Weston Lowery  MRN: [id number]  YOB: 1945  Date of evaluation: 9/21/2021  Time:  9:41 AM     Procedure Summary     Date: 09/21/21 Room / Location: [address]    Anesthesia Start: 0901 Anesthesia Stop: 7213    Procedure: Single lumen PORT INSERTION with ultrasound and fluoroscopy (Right Chest) Diagnosis: (pancreatic cancer)    Surgeons: Manfred Schlatter, DO Responsible Provider: MACHELLE Davis CRNA    Anesthesia Type: MAC ASA Status: 3          Anesthesia Type: MAC    Kole Phase I:      Kole Phase II: Kole Score: 10    Last vitals: Reviewed and per EMR flowsheets.        Anesthesia Post Evaluation    Patient location during evaluation: bedside  Patient participation: complete - patient participated  Level of consciousness: awake  Pain score: 0  Airway patency: patent  Nausea & Vomiting: no nausea and no vomiting  Complications: no  Cardiovascular status: blood pressure returned to baseline  Respiratory status: acceptable, room air and spontaneous ventilation  Hydration status: euvolemic

## 2021-09-24 ENCOUNTER — HOSPITAL ENCOUNTER (OUTPATIENT)
Dept: INFUSION THERAPY | Age: 76
Discharge: HOME | End: 2021-09-24
Payer: MEDICARE

## 2021-09-24 VITALS
OXYGEN SATURATION: 97 % | TEMPERATURE: 98.5 F | HEIGHT: 65 IN | WEIGHT: 161.6 LBS | BODY MASS INDEX: 26.92 KG/M2 | HEART RATE: 65 BPM | SYSTOLIC BLOOD PRESSURE: 143 MMHG | RESPIRATION RATE: 16 BRPM | DIASTOLIC BLOOD PRESSURE: 82 MMHG

## 2021-09-24 DIAGNOSIS — C25.2 MALIGNANT NEOPLASM OF TAIL OF PANCREAS (HCC): Primary | ICD-10-CM

## 2021-09-24 DIAGNOSIS — C25.9 ADENOCARCINOMA OF PANCREAS (HCC): Primary | ICD-10-CM

## 2021-09-24 DIAGNOSIS — C25.9 ADENOCARCINOMA OF PANCREAS (HCC): ICD-10-CM

## 2021-09-24 LAB
ALBUMIN SERPL-MCNC: 4.2 G/DL (ref 3.5–5.2)
ALP BLD-CCNC: 131 U/L (ref 35–104)
ALT SERPL-CCNC: 16 U/L (ref 9–52)
ANION GAP SERPL CALCULATED.3IONS-SCNC: 9 MMOL/L (ref 7–19)
AST SERPL-CCNC: 35 U/L (ref 14–36)
BASOPHILS ABSOLUTE: 0.16 K/UL (ref 0.01–0.08)
BASOPHILS RELATIVE PERCENT: 1 % (ref 0.1–1.2)
BILIRUB SERPL-MCNC: 0.8 MG/DL (ref 0.2–1.3)
BUN BLDV-MCNC: 9 MG/DL (ref 7–17)
CALCIUM SERPL-MCNC: 9.5 MG/DL (ref 8.4–10.2)
CHLORIDE BLD-SCNC: 102 MMOL/L (ref 98–111)
CO2: 27 MMOL/L (ref 22–29)
CREAT SERPL-MCNC: 0.6 MG/DL (ref 0.5–1)
EOSINOPHILS ABSOLUTE: 1.45 K/UL (ref 0.04–0.54)
EOSINOPHILS RELATIVE PERCENT: 9.2 % (ref 0.7–7)
GFR NON-AFRICAN AMERICAN: >60
GLOBULIN: 2.9 G/DL
GLUCOSE BLD-MCNC: 107 MG/DL (ref 74–106)
HCT VFR BLD CALC: 38.7 % (ref 34.1–44.9)
HEMOGLOBIN: 12.9 G/DL (ref 11.2–15.7)
LYMPHOCYTES ABSOLUTE: 5.1 K/UL (ref 1.18–3.74)
LYMPHOCYTES RELATIVE PERCENT: 32.4 % (ref 19.3–53.1)
MCH RBC QN AUTO: 31.5 PG (ref 25.6–32.2)
MCHC RBC AUTO-ENTMCNC: 33.3 G/DL (ref 32.3–35.5)
MCV RBC AUTO: 94.6 FL (ref 79.4–94.8)
MONOCYTES ABSOLUTE: 1.86 K/UL (ref 0.24–0.82)
MONOCYTES RELATIVE PERCENT: 11.8 % (ref 4.7–12.5)
NEUTROPHILS ABSOLUTE: 7.15 K/UL (ref 1.56–6.13)
NEUTROPHILS RELATIVE PERCENT: 45.6 % (ref 34–71.1)
PDW BLD-RTO: 17.6 % (ref 11.7–14.4)
PLATELET # BLD: 494 K/UL (ref 182–369)
PMV BLD AUTO: 10.1 FL (ref 7.4–10.4)
POTASSIUM SERPL-SCNC: 4.1 MMOL/L (ref 3.5–5.1)
RBC # BLD: 4.09 M/UL (ref 3.93–5.22)
SODIUM BLD-SCNC: 138 MMOL/L (ref 137–145)
TOTAL PROTEIN: 7.1 G/DL (ref 6.3–8.2)
WBC # BLD: 15.72 K/UL (ref 3.98–10.04)

## 2021-09-24 PROCEDURE — 96375 TX/PRO/DX INJ NEW DRUG ADDON: CPT

## 2021-09-24 PROCEDURE — 96413 CHEMO IV INFUSION 1 HR: CPT

## 2021-09-24 PROCEDURE — 85025 COMPLETE CBC W/AUTO DIFF WBC: CPT

## 2021-09-24 PROCEDURE — 2580000003 HC RX 258: Performed by: INTERNAL MEDICINE

## 2021-09-24 PROCEDURE — 6360000002 HC RX W HCPCS: Performed by: INTERNAL MEDICINE

## 2021-09-24 PROCEDURE — 80053 COMPREHEN METABOLIC PANEL: CPT

## 2021-09-24 RX ORDER — SODIUM CHLORIDE 0.9 % (FLUSH) 0.9 %
5-40 SYRINGE (ML) INJECTION PRN
Status: DISCONTINUED | OUTPATIENT
Start: 2021-09-24 | End: 2021-09-25 | Stop reason: HOSPADM

## 2021-09-24 RX ORDER — SODIUM CHLORIDE 9 MG/ML
20 INJECTION, SOLUTION INTRAVENOUS ONCE
Status: CANCELLED | OUTPATIENT
Start: 2021-09-24 | End: 2021-09-24

## 2021-09-24 RX ORDER — LIDOCAINE AND PRILOCAINE 25; 25 MG/G; MG/G
CREAM TOPICAL
Qty: 30 G | Refills: 2 | Status: SHIPPED | OUTPATIENT
Start: 2021-09-24

## 2021-09-24 RX ORDER — SODIUM CHLORIDE 0.9 % (FLUSH) 0.9 %
5-40 SYRINGE (ML) INJECTION PRN
Status: CANCELLED | OUTPATIENT
Start: 2021-09-24

## 2021-09-24 RX ORDER — SODIUM CHLORIDE 9 MG/ML
20 INJECTION, SOLUTION INTRAVENOUS ONCE
Status: DISCONTINUED | OUTPATIENT
Start: 2021-09-24 | End: 2021-09-26 | Stop reason: HOSPADM

## 2021-09-24 RX ORDER — DEXAMETHASONE SODIUM PHOSPHATE 10 MG/ML
10 INJECTION, SOLUTION INTRAMUSCULAR; INTRAVENOUS ONCE
Status: DISCONTINUED | OUTPATIENT
Start: 2021-09-25 | End: 2021-09-24

## 2021-09-24 RX ORDER — DEXAMETHASONE SODIUM PHOSPHATE 10 MG/ML
10 INJECTION, SOLUTION INTRAMUSCULAR; INTRAVENOUS ONCE
Status: COMPLETED | OUTPATIENT
Start: 2021-09-24 | End: 2021-09-24

## 2021-09-24 RX ORDER — PALONOSETRON 0.05 MG/ML
0.25 INJECTION, SOLUTION INTRAVENOUS ONCE
Status: CANCELLED
Start: 2021-09-24 | End: 2021-09-24

## 2021-09-24 RX ORDER — PALONOSETRON 0.05 MG/ML
0.25 INJECTION, SOLUTION INTRAVENOUS ONCE
Status: COMPLETED | OUTPATIENT
Start: 2021-09-24 | End: 2021-09-24

## 2021-09-24 RX ORDER — HEPARIN SODIUM (PORCINE) LOCK FLUSH IV SOLN 100 UNIT/ML 100 UNIT/ML
500 SOLUTION INTRAVENOUS PRN
Status: CANCELLED | OUTPATIENT
Start: 2021-09-24

## 2021-09-24 RX ORDER — HEPARIN SODIUM (PORCINE) LOCK FLUSH IV SOLN 100 UNIT/ML 100 UNIT/ML
500 SOLUTION INTRAVENOUS PRN
Status: DISCONTINUED | OUTPATIENT
Start: 2021-09-24 | End: 2021-09-25 | Stop reason: HOSPADM

## 2021-09-24 RX ORDER — SODIUM CHLORIDE 9 MG/ML
25 INJECTION, SOLUTION INTRAVENOUS PRN
Status: CANCELLED | OUTPATIENT
Start: 2021-09-24

## 2021-09-24 RX ORDER — METHYLPREDNISOLONE SODIUM SUCCINATE 125 MG/2ML
125 INJECTION, POWDER, LYOPHILIZED, FOR SOLUTION INTRAMUSCULAR; INTRAVENOUS ONCE
Status: CANCELLED | OUTPATIENT
Start: 2021-09-24 | End: 2021-09-24

## 2021-09-24 RX ORDER — DIPHENHYDRAMINE HYDROCHLORIDE 50 MG/ML
50 INJECTION INTRAMUSCULAR; INTRAVENOUS ONCE
Status: CANCELLED | OUTPATIENT
Start: 2021-09-24 | End: 2021-09-24

## 2021-09-24 RX ORDER — EPINEPHRINE 1 MG/ML
0.3 INJECTION, SOLUTION, CONCENTRATE INTRAVENOUS PRN
Status: CANCELLED | OUTPATIENT
Start: 2021-09-24

## 2021-09-24 RX ORDER — SODIUM CHLORIDE 9 MG/ML
INJECTION, SOLUTION INTRAVENOUS CONTINUOUS
Status: CANCELLED | OUTPATIENT
Start: 2021-09-24

## 2021-09-24 RX ADMIN — PALONOSETRON 0.25 MG: 0.05 INJECTION, SOLUTION INTRAVENOUS at 11:57

## 2021-09-24 RX ADMIN — DEXAMETHASONE SODIUM PHOSPHATE 10 MG: 10 INJECTION, SOLUTION INTRAMUSCULAR; INTRAVENOUS at 11:57

## 2021-09-24 RX ADMIN — GEMCITABINE 1820 MG: 38 INJECTION, SOLUTION INTRAVENOUS at 12:21

## 2021-09-24 RX ADMIN — HEPARIN 500 UNITS: 100 SYRINGE at 12:55

## 2021-09-24 RX ADMIN — Medication 10 ML: at 12:55

## 2021-10-01 ENCOUNTER — HOSPITAL ENCOUNTER (OUTPATIENT)
Dept: INFUSION THERAPY | Age: 76
Discharge: HOME | End: 2021-10-01
Payer: MEDICARE

## 2021-10-01 VITALS
HEART RATE: 86 BPM | DIASTOLIC BLOOD PRESSURE: 79 MMHG | OXYGEN SATURATION: 99 % | BODY MASS INDEX: 27.09 KG/M2 | WEIGHT: 162.6 LBS | SYSTOLIC BLOOD PRESSURE: 128 MMHG | HEIGHT: 65 IN | TEMPERATURE: 98.1 F

## 2021-10-01 DIAGNOSIS — C25.2 MALIGNANT NEOPLASM OF TAIL OF PANCREAS (HCC): Primary | ICD-10-CM

## 2021-10-01 LAB
ALBUMIN SERPL-MCNC: 3.9 G/DL (ref 3.5–5.2)
ALP BLD-CCNC: 80 U/L (ref 35–104)
ALT SERPL-CCNC: 12 U/L (ref 9–52)
ANION GAP SERPL CALCULATED.3IONS-SCNC: 7 MMOL/L (ref 7–19)
AST SERPL-CCNC: 29 U/L (ref 14–36)
BASOPHILS ABSOLUTE: 0.06 K/UL (ref 0.01–0.08)
BASOPHILS RELATIVE PERCENT: 0.8 % (ref 0.1–1.2)
BILIRUB SERPL-MCNC: 0.5 MG/DL (ref 0.2–1.3)
BUN BLDV-MCNC: 13 MG/DL (ref 7–17)
CALCIUM SERPL-MCNC: 9.3 MG/DL (ref 8.4–10.2)
CHLORIDE BLD-SCNC: 101 MMOL/L (ref 98–111)
CO2: 30 MMOL/L (ref 22–29)
CREAT SERPL-MCNC: 0.6 MG/DL (ref 0.5–1)
EOSINOPHILS ABSOLUTE: 0.25 K/UL (ref 0.04–0.54)
EOSINOPHILS RELATIVE PERCENT: 3.5 % (ref 0.7–7)
GFR NON-AFRICAN AMERICAN: >60
GLOBULIN: 2.6 G/DL
GLUCOSE BLD-MCNC: 116 MG/DL (ref 74–106)
HCT VFR BLD CALC: 36.4 % (ref 34.1–44.9)
HEMOGLOBIN: 12.2 G/DL (ref 11.2–15.7)
LYMPHOCYTES ABSOLUTE: 2.72 K/UL (ref 1.18–3.74)
LYMPHOCYTES RELATIVE PERCENT: 38.4 % (ref 19.3–53.1)
MCH RBC QN AUTO: 31.8 PG (ref 25.6–32.2)
MCHC RBC AUTO-ENTMCNC: 33.5 G/DL (ref 32.3–35.5)
MCV RBC AUTO: 94.8 FL (ref 79.4–94.8)
MONOCYTES ABSOLUTE: 1.24 K/UL (ref 0.24–0.82)
MONOCYTES RELATIVE PERCENT: 17.5 % (ref 4.7–12.5)
NEUTROPHILS ABSOLUTE: 2.81 K/UL (ref 1.56–6.13)
NEUTROPHILS RELATIVE PERCENT: 39.8 % (ref 34–71.1)
PDW BLD-RTO: 17.3 % (ref 11.7–14.4)
PLATELET # BLD: 265 K/UL (ref 182–369)
PMV BLD AUTO: 9.9 FL (ref 7.4–10.4)
POTASSIUM SERPL-SCNC: 4.1 MMOL/L (ref 3.5–5.1)
RBC # BLD: 3.84 M/UL (ref 3.93–5.22)
SODIUM BLD-SCNC: 138 MMOL/L (ref 137–145)
TOTAL PROTEIN: 6.5 G/DL (ref 6.3–8.2)
WBC # BLD: 7.08 K/UL (ref 3.98–10.04)

## 2021-10-01 PROCEDURE — 80053 COMPREHEN METABOLIC PANEL: CPT

## 2021-10-01 PROCEDURE — 36593 DECLOT VASCULAR DEVICE: CPT

## 2021-10-01 PROCEDURE — 2580000003 HC RX 258: Performed by: INTERNAL MEDICINE

## 2021-10-01 PROCEDURE — 96375 TX/PRO/DX INJ NEW DRUG ADDON: CPT

## 2021-10-01 PROCEDURE — 85025 COMPLETE CBC W/AUTO DIFF WBC: CPT

## 2021-10-01 PROCEDURE — 96413 CHEMO IV INFUSION 1 HR: CPT

## 2021-10-01 PROCEDURE — 6360000002 HC RX W HCPCS: Performed by: INTERNAL MEDICINE

## 2021-10-01 RX ORDER — SODIUM CHLORIDE 9 MG/ML
25 INJECTION, SOLUTION INTRAVENOUS PRN
Status: CANCELLED | OUTPATIENT
Start: 2021-10-01

## 2021-10-01 RX ORDER — HEPARIN SODIUM (PORCINE) LOCK FLUSH IV SOLN 100 UNIT/ML 100 UNIT/ML
500 SOLUTION INTRAVENOUS PRN
Status: DISCONTINUED | OUTPATIENT
Start: 2021-10-01 | End: 2021-10-02 | Stop reason: HOSPADM

## 2021-10-01 RX ORDER — METHYLPREDNISOLONE SODIUM SUCCINATE 125 MG/2ML
125 INJECTION, POWDER, LYOPHILIZED, FOR SOLUTION INTRAMUSCULAR; INTRAVENOUS ONCE
Status: CANCELLED | OUTPATIENT
Start: 2021-10-01 | End: 2021-10-01

## 2021-10-01 RX ORDER — DIPHENHYDRAMINE HYDROCHLORIDE 50 MG/ML
50 INJECTION INTRAMUSCULAR; INTRAVENOUS ONCE
Status: CANCELLED | OUTPATIENT
Start: 2021-10-01 | End: 2021-10-01

## 2021-10-01 RX ORDER — SODIUM CHLORIDE 9 MG/ML
INJECTION, SOLUTION INTRAVENOUS CONTINUOUS
Status: CANCELLED | OUTPATIENT
Start: 2021-10-01

## 2021-10-01 RX ORDER — PALONOSETRON 0.05 MG/ML
0.25 INJECTION, SOLUTION INTRAVENOUS ONCE
Status: COMPLETED | OUTPATIENT
Start: 2021-10-01 | End: 2021-10-01

## 2021-10-01 RX ORDER — SODIUM CHLORIDE 9 MG/ML
20 INJECTION, SOLUTION INTRAVENOUS ONCE
Status: COMPLETED | OUTPATIENT
Start: 2021-10-01 | End: 2021-10-02

## 2021-10-01 RX ORDER — DEXAMETHASONE SODIUM PHOSPHATE 10 MG/ML
10 INJECTION, SOLUTION INTRAMUSCULAR; INTRAVENOUS ONCE
Status: COMPLETED | OUTPATIENT
Start: 2021-10-01 | End: 2021-10-01

## 2021-10-01 RX ORDER — EPINEPHRINE 1 MG/ML
0.3 INJECTION, SOLUTION, CONCENTRATE INTRAVENOUS PRN
Status: CANCELLED | OUTPATIENT
Start: 2021-10-01

## 2021-10-01 RX ORDER — SODIUM CHLORIDE 0.9 % (FLUSH) 0.9 %
5-40 SYRINGE (ML) INJECTION PRN
Status: DISCONTINUED | OUTPATIENT
Start: 2021-10-01 | End: 2021-10-02 | Stop reason: HOSPADM

## 2021-10-01 RX ORDER — DEXAMETHASONE SODIUM PHOSPHATE 10 MG/ML
10 INJECTION, SOLUTION INTRAMUSCULAR; INTRAVENOUS ONCE
Status: DISCONTINUED | OUTPATIENT
Start: 2021-10-02 | End: 2021-10-01

## 2021-10-01 RX ADMIN — DEXAMETHASONE SODIUM PHOSPHATE 10 MG: 10 INJECTION, SOLUTION INTRAMUSCULAR; INTRAVENOUS at 12:23

## 2021-10-01 RX ADMIN — HEPARIN 500 UNITS: 100 SYRINGE at 13:16

## 2021-10-01 RX ADMIN — PALONOSETRON 0.25 MG: 0.05 INJECTION, SOLUTION INTRAVENOUS at 12:23

## 2021-10-01 RX ADMIN — SODIUM CHLORIDE 20 ML/HR: 9 INJECTION, SOLUTION INTRAVENOUS at 12:23

## 2021-10-01 RX ADMIN — Medication 2.2 ML: at 11:09

## 2021-10-01 RX ADMIN — ALTEPLASE 2 MG: 2.2 INJECTION, POWDER, LYOPHILIZED, FOR SOLUTION INTRAVENOUS at 11:09

## 2021-10-01 RX ADMIN — Medication 10 ML: at 13:16

## 2021-10-01 RX ADMIN — GEMCITABINE 1820 MG: 38 INJECTION, SOLUTION INTRAVENOUS at 12:40

## 2021-10-06 NOTE — PROGRESS NOTES
MEDICAL ONCOLOGY PROGRESS NOTE    Pt Name: Enrrique Jacobsen  MRN: [id number]  YOB: 1945  Date of evaluation: 10/8/2021    HISTORY OF PRESENT ILLNESS:    The patient is a very pleasant 77years old female who has been diagnosed with invasive ductal adenocarcinoma of the pancreas, node positive. She was recommended adjuvant chemotherapy with Gemzar/capecitabine  She has been tolerated treatment well except for complains of mild fatigue. She had significant steatorrhea and was started on Creon that has helped significantly. She denies any hand-foot syndrome, mucositis. Diagnosis  · Invasive ductal adenocarcinoma, pancreas, June 2021  · pT2 N1 M0  · Strong family history for breast cancer  · Positive JESUSITA gene mutation    Treatment Summary  · 6/28/21 Laparoscopic assisted distal pancreatectomy and splenectomy with 1 of 6 lymph nodes positive. Surgeon Dr. West Lopez   · 8/13/2021adjuvant Gemzar 1000 mg/m2 IV D 1, D8, D15 with Capecitabine 830 mg/m2 D1-21 every 28 days x6 cycles    Cancer History  Micaela Allred was first seen by me on 8/6/2021. The patient was referred for a diagnosis of pancreatic malignancy. She had initial complaints of abdominal pain. · 5/5/20 MRI abdomen [facility]): Moderate fatty replacement of the pancreas with several small cystic structures within or adjacent to the pancreatic head and uncinate process most suggestive of benign cysts. A follow-up MRI of the abdomen may be obtained in 1 year to assess for stability. No solid lesion is seen. Otherwise unremarkable MRI of the abdomen. · 4/22/20 CT abd/pelvis [facility]): Focal inflammatory stranding associated with the pancreatic head. Radiographically I would favor this to represent acute pancreatitis. There is mild associated stranding within the descending and proximal transverse duodenum.  There are 2 well-circumscribed hypodensities one in the region of the uncinate process and one slightly anterior to the pancreatic head likely representing sequela of pancreatitis but warranting follow-up on subsequent exam. The body and tail of the pancreas are spared. No evidence of pancreatic necrosis. The patient is status post hysterectomy and cholecystectomy. Bibasilar subsegmental atelectasis. No evidence of nephrolithiasis or obstructive uropathy. Diverticulosis of the distal descending and sigmoid colon without evidence of diverticulitis. · 5/5/21 MRI [facility]) There are multiple tiny-to-small foci of increased T2 signal seen within the pancreas, consistent with a combination of cysts and/or beaded dilatation of the tributaries of the main pancreatic duct, increased in size and number when compared to the prior similar study of 5/5/20. The patient is again noted to be status post cholecystectomy. The examination is otherwise within normal limits for age with no abnormal postcontrast enhancement noted. · 6/3/21 Upper EUS (Dr Shellie Rene/[place]): CT Visualized portion of pancreas abnormal on ultrasound. There is a 26.7 mm solid mass in pancreatic tail. It abuts the splenic vessel without occlusion or varices. Pancreatic duct is dilated to 6 mm in the tail proximal to the mass. FNA x4 yields atypical cells. Celiac axis, liver appear normal, no local nodes. Body and head of pancreas normal.  · 6/3/21 Pancreas tail mass, FNA aspiration smear (x2), ThinPrep and cell block: Adenocarcinoma. · 6/7/21 CA 19-9 407.1  · 6/28/21-she underwent Whipple procedure pancreatectomy by Dr. Micah Conroy: Invasive ductal carcinoma 4.3 cm in greatest dimension, invasive in the peripancreatic adipose tissue, diffuse perineural invasion. Surgical margins were negative for carcinoma. The pancreas margins involved by a low-grade dysplasia. 1/6 nodes positive for metastatic cancer with direct extension. Distal fibrosis and pancreatitis was noted. Spleen was unremarkable.   Pathological stage pT2 N1 M0  · 8/6/2021she was first Yes   Problem Relation Age of Onset    Breast Cancer Maternal Aunt     Breast Cancer Daughter     Cancer Daughter         Bladder    Cancer Maternal Uncle         Bladder       Current Hospital Medications:    Current Outpatient Medications   Medication Sig Dispense Refill    lidocaine-prilocaine (EMLA) 2.5-2.5 % cream Apply topically as needed. 30 g 2    Caltrate 600+D Plus Minerals (CALTRATE) 600-800 MG-UNIT TABS tablet Take 1 tablet by mouth daily      capecitabine (XELODA) 500 MG chemo tablet Take 1,250 mg/m2 by mouth 2 times daily      lipase-protease-amylase (CREON) 11354-94822 units delayed release capsule Take 1 capsule by mouth 3 times daily (with meals) 90 capsule 5    busPIRone (BUSPAR) 5 MG tablet TAKE 1 TABLET BY MOUTH TWICE A DAY      losartan (COZAAR) 50 MG tablet TAKE 1 TABLET BY MOUTH EVERY DAY      promethazine (PHENERGAN) 12.5 MG tablet Take 1 tablet by mouth every 6 hours as needed for Nausea (Patient not taking: Reported on 9/14/2021) 30 tablet 5    ondansetron (ZOFRAN) 4 MG tablet Take 1 tablet by mouth every 8 hours as needed for Nausea or Vomiting (Patient not taking: Reported on 8/6/2021) 10 tablet 0    levothyroxine (SYNTHROID) 125 MCG tablet Take 125 mcg by mouth Daily       No current facility-administered medications for this visit. Facility-Administered Medications Ordered in Other Visits   Medication Dose Route Frequency Provider Last Rate Last Admin    0.9 % sodium chloride infusion  20 mL/hr IntraVENous Once Karina Shaffer MD 20 mL/hr at 10/08/21 1311 20 mL/hr at 10/08/21 1311    gemcitabine HCl (GEMZAR) 1,820 mg in sodium chloride 0.9 % 250 mL chemo IVPB  1,820 mg IntraVENous Once Karina Shaffer .7 mL/hr at 10/08/21 1348 1,820 mg at 10/08/21 1348    heparin flush 100 UNIT/ML injection 500 Units  500 Units IntraCATHeter PRN Karina Shaffer MD           Allergies:    Allergies   Allergen Reactions    Diclofenac Other (See Comments) Couldn't sleep and face turned blood red    Oxycodone-Acetaminophen Hives     itching    Doxycycline Rash         Subjective     REVIEW OF SYSTEMS:   CONSTITUTIONAL: no fever, no night sweats, no fatigue;  HEENT: no blurring of vision, no double vision, no hearing difficulty, no tinnitus, no ulceration, no dysplasia, no epistaxis;   LUNGS: no cough, no hemoptysis, no wheeze,  no shortness of breath;  CARDIOVASCULAR: no palpitation, no chest pain, no shortness of breath;  GI: no abdominal pain, no nausea, no vomiting, no diarrhea, no constipation;  STUART: no dysuria, no hematuria, no frequency or urgency, no nephrolithiasis;  MUSCULOSKELETAL: no joint pain, no swelling, no stiffness;  ENDOCRINE: no polyuria, no polydipsia, no cold or heat intolerance;  HEMATOLOGY: no easy bruising or bleeding, no history of clotting disorder;  DERMATOLOGY: no skin rash, no eczema, no pruritus;  PSYCHIATRY: no depression, no anxiety, no panic attacks, no suicidal ideation, no homicidal ideation;  NEUROLOGY: no syncope, no seizures, no numbness or tingling of hands, no numbness or tingling of feet, no paresis;       Objective /81   Pulse 88   Temp 98.3 °F (36.8 °C) (Oral)   Ht 5' 5\" (1.651 m)   Wt 164 lb 1.6 oz (74.4 kg)   SpO2 99%   BMI 27.31 kg/m²       PHYSICAL EXAM:  CONSTITUTIONAL: Alert, appropriate, no acute distress  EYES: Non icteric, EOM intact, pupils equal round   ENT: Mucus membranes moist, no oral pharyngeal lesions, external inspection of ears and nose are normal  NECK: Supple, no masses. No palpable thyroid mass  CHEST/LUNGS: CTA bilaterally, normal respiratory effort   CARDIOVASCULAR: RRR, no murmurs. No lower extremity edema  ABDOMEN: soft non-tender, active bowel sounds, no HSM. No palpable masses  EXTREMITIES: warm, full ROM in all 4 extremities, no focal weakness.   SKIN: warm, dry with no rashes or lesions  LYMPH: No cervical, clavicular, axillary, or inguinal lymphadenopathy  NEUROLOGIC: follows commands, non focal   PSYCH: mood and affect appropriate. Alert and oriented to time, place, person      LABORATORY RESULTS REVIEWED/ANALYZED BY ME:  Lab Results   Component Value Date    WBC 5.99 10/08/2021    HGB 12.0 10/08/2021    HCT 35.4 10/08/2021    MCV 95.2 (H) 10/08/2021     (L) 10/08/2021     Lab Results   Component Value Date    NEUTROABS 1.36 (L) 10/08/2021     Lab Results   Component Value Date     (L) 10/08/2021    K 4.1 10/08/2021     10/08/2021    CO2 30 (H) 10/08/2021    BUN 12 10/08/2021    CREATININE 0.5 10/08/2021    GLUCOSE 103 10/08/2021    CALCIUM 9.5 10/08/2021    PROT 6.6 10/08/2021    LABALBU 3.8 10/08/2021    BILITOT 0.7 10/08/2021    ALKPHOS 80 10/08/2021    AST 31 10/08/2021    ALT 14 10/08/2021    LABGLOM >60 10/08/2021    GFRAA >60 08/09/2021    GLOB 2.8 10/08/2021       RADIOLOGY STUDIES REVIEWED BY ME:   none    ASSESSMENT:    No orders of the defined types were placed in this encounter. Parviz Cordoba was seen today for cancer. Diagnoses and all orders for this visit:    Malignant neoplasm of tail of pancreas ([facility] [address]    Chemotherapy management, encounter for    Adverse effect of chemotherapy, subsequent encounter    Care plan discussed with patient    Chemotherapy-induced neutropenia ([facility] [address]    Pancreatic tail adenocarcinoma pT2 N1 M0  Essentially, node positive third of the pancreas adenocarcinoma. Status post distal pancreatectomy/splenectomy  I have recommended Gemzar/capecitabine due to better tolerance. Proceed cycle number 2-day 15 adjuvant treatment with Xeloda/Gemzar    Chemotherapy-induced toxicityfatigue     PLAN:  · Continue Gemzar 1000 mg/m2 IV D 1, D8, D15 with Capecitabine 830 mg/m2 D1-21 every 28 days x6 cycles  · RTC with MD MARINELLI# 2 in treatment room    I have seen, examined and reviewed this patient medication list, appropriate labs and imaging studies. I reviewed relevant medical records and others physicians notes.  I discussed the plans of care with the patient. I answered all the questions to the patients satisfaction. I have also reviewed the chief complaint (CC) and part of the history (History of Present Illness (HPI), Past Family Social History [facility]), or Review of Systems (ROS) and made changes when appropriated.            10/08/21  2:08 PM

## 2021-10-08 ENCOUNTER — HOSPITAL ENCOUNTER (OUTPATIENT)
Dept: INFUSION THERAPY | Age: 76
Discharge: HOME | End: 2021-10-08
Payer: MEDICARE

## 2021-10-08 ENCOUNTER — OFFICE VISIT (OUTPATIENT)
Dept: HEMATOLOGY | Age: 76
End: 2021-10-08
Payer: MEDICARE

## 2021-10-08 VITALS
BODY MASS INDEX: 27.34 KG/M2 | DIASTOLIC BLOOD PRESSURE: 81 MMHG | TEMPERATURE: 98.3 F | HEART RATE: 88 BPM | HEIGHT: 65 IN | SYSTOLIC BLOOD PRESSURE: 138 MMHG | WEIGHT: 164.1 LBS | OXYGEN SATURATION: 99 %

## 2021-10-08 DIAGNOSIS — D70.1 CHEMOTHERAPY-INDUCED NEUTROPENIA (HCC): ICD-10-CM

## 2021-10-08 DIAGNOSIS — T45.1X5A CHEMOTHERAPY-INDUCED NEUTROPENIA (HCC): ICD-10-CM

## 2021-10-08 DIAGNOSIS — T45.1X5D ADVERSE EFFECT OF CHEMOTHERAPY, SUBSEQUENT ENCOUNTER: ICD-10-CM

## 2021-10-08 DIAGNOSIS — Z51.11 CHEMOTHERAPY MANAGEMENT, ENCOUNTER FOR: ICD-10-CM

## 2021-10-08 DIAGNOSIS — C25.2 MALIGNANT NEOPLASM OF TAIL OF PANCREAS (HCC): Primary | ICD-10-CM

## 2021-10-08 DIAGNOSIS — Z71.89 CARE PLAN DISCUSSED WITH PATIENT: ICD-10-CM

## 2021-10-08 LAB
ALBUMIN SERPL-MCNC: 3.8 G/DL (ref 3.5–5.2)
ALP BLD-CCNC: 80 U/L (ref 35–104)
ALT SERPL-CCNC: 14 U/L (ref 9–52)
ANION GAP SERPL CALCULATED.3IONS-SCNC: 5 MMOL/L (ref 7–19)
AST SERPL-CCNC: 31 U/L (ref 14–36)
BASOPHILS ABSOLUTE: 0.06 K/UL (ref 0.01–0.08)
BASOPHILS RELATIVE PERCENT: 1 % (ref 0.1–1.2)
BILIRUB SERPL-MCNC: 0.7 MG/DL (ref 0.2–1.3)
BUN BLDV-MCNC: 12 MG/DL (ref 7–17)
CALCIUM SERPL-MCNC: 9.5 MG/DL (ref 8.4–10.2)
CHLORIDE BLD-SCNC: 101 MMOL/L (ref 98–111)
CO2: 30 MMOL/L (ref 22–29)
CREAT SERPL-MCNC: 0.5 MG/DL (ref 0.5–1)
EOSINOPHILS ABSOLUTE: 0.27 K/UL (ref 0.04–0.54)
EOSINOPHILS RELATIVE PERCENT: 4.5 % (ref 0.7–7)
GFR NON-AFRICAN AMERICAN: >60
GLOBULIN: 2.8 G/DL
GLUCOSE BLD-MCNC: 103 MG/DL (ref 74–106)
HCT VFR BLD CALC: 35.4 % (ref 34.1–44.9)
HEMOGLOBIN: 12 G/DL (ref 11.2–15.7)
LYMPHOCYTES ABSOLUTE: 3.61 K/UL (ref 1.18–3.74)
LYMPHOCYTES RELATIVE PERCENT: 60.3 % (ref 19.3–53.1)
MCH RBC QN AUTO: 32.3 PG (ref 25.6–32.2)
MCHC RBC AUTO-ENTMCNC: 33.9 G/DL (ref 32.3–35.5)
MCV RBC AUTO: 95.2 FL (ref 79.4–94.8)
MONOCYTES ABSOLUTE: 0.69 K/UL (ref 0.24–0.82)
MONOCYTES RELATIVE PERCENT: 11.5 % (ref 4.7–12.5)
NEUTROPHILS ABSOLUTE: 1.36 K/UL (ref 1.56–6.13)
NEUTROPHILS RELATIVE PERCENT: 22.7 % (ref 34–71.1)
PDW BLD-RTO: 17.7 % (ref 11.7–14.4)
PLATELET # BLD: 176 K/UL (ref 182–369)
PMV BLD AUTO: 9.4 FL (ref 7.4–10.4)
POTASSIUM SERPL-SCNC: 4.1 MMOL/L (ref 3.5–5.1)
RBC # BLD: 3.72 M/UL (ref 3.93–5.22)
SODIUM BLD-SCNC: 136 MMOL/L (ref 137–145)
TOTAL PROTEIN: 6.6 G/DL (ref 6.3–8.2)
WBC # BLD: 5.99 K/UL (ref 3.98–10.04)

## 2021-10-08 PROCEDURE — 3017F COLORECTAL CA SCREEN DOC REV: CPT | Performed by: INTERNAL MEDICINE

## 2021-10-08 PROCEDURE — 6360000002 HC RX W HCPCS: Performed by: INTERNAL MEDICINE

## 2021-10-08 PROCEDURE — G8400 PT W/DXA NO RESULTS DOC: HCPCS | Performed by: INTERNAL MEDICINE

## 2021-10-08 PROCEDURE — 1090F PRES/ABSN URINE INCON ASSESS: CPT | Performed by: INTERNAL MEDICINE

## 2021-10-08 PROCEDURE — 36415 COLL VENOUS BLD VENIPUNCTURE: CPT

## 2021-10-08 PROCEDURE — G8417 CALC BMI ABV UP PARAM F/U: HCPCS | Performed by: INTERNAL MEDICINE

## 2021-10-08 PROCEDURE — 96375 TX/PRO/DX INJ NEW DRUG ADDON: CPT

## 2021-10-08 PROCEDURE — 80053 COMPREHEN METABOLIC PANEL: CPT

## 2021-10-08 PROCEDURE — 2580000003 HC RX 258: Performed by: INTERNAL MEDICINE

## 2021-10-08 PROCEDURE — 99214 OFFICE O/P EST MOD 30 MIN: CPT | Performed by: INTERNAL MEDICINE

## 2021-10-08 PROCEDURE — 1123F ACP DISCUSS/DSCN MKR DOCD: CPT | Performed by: INTERNAL MEDICINE

## 2021-10-08 PROCEDURE — 1036F TOBACCO NON-USER: CPT | Performed by: INTERNAL MEDICINE

## 2021-10-08 PROCEDURE — 4040F PNEUMOC VAC/ADMIN/RCVD: CPT | Performed by: INTERNAL MEDICINE

## 2021-10-08 PROCEDURE — G8428 CUR MEDS NOT DOCUMENT: HCPCS | Performed by: INTERNAL MEDICINE

## 2021-10-08 PROCEDURE — 85025 COMPLETE CBC W/AUTO DIFF WBC: CPT

## 2021-10-08 PROCEDURE — G8484 FLU IMMUNIZE NO ADMIN: HCPCS | Performed by: INTERNAL MEDICINE

## 2021-10-08 PROCEDURE — 96413 CHEMO IV INFUSION 1 HR: CPT

## 2021-10-08 RX ORDER — HEPARIN SODIUM (PORCINE) LOCK FLUSH IV SOLN 100 UNIT/ML 100 UNIT/ML
500 SOLUTION INTRAVENOUS PRN
Status: DISCONTINUED | OUTPATIENT
Start: 2021-10-08 | End: 2021-10-09 | Stop reason: HOSPADM

## 2021-10-08 RX ORDER — EPINEPHRINE 1 MG/ML
0.3 INJECTION, SOLUTION, CONCENTRATE INTRAVENOUS PRN
Status: CANCELLED | OUTPATIENT
Start: 2021-10-08

## 2021-10-08 RX ORDER — HEPARIN SODIUM (PORCINE) LOCK FLUSH IV SOLN 100 UNIT/ML 100 UNIT/ML
500 SOLUTION INTRAVENOUS PRN
Status: CANCELLED | OUTPATIENT
Start: 2021-10-08

## 2021-10-08 RX ORDER — SODIUM CHLORIDE 9 MG/ML
20 INJECTION, SOLUTION INTRAVENOUS ONCE
Status: CANCELLED | OUTPATIENT
Start: 2021-10-08 | End: 2021-10-08

## 2021-10-08 RX ORDER — DIPHENHYDRAMINE HYDROCHLORIDE 50 MG/ML
50 INJECTION INTRAMUSCULAR; INTRAVENOUS ONCE
Status: CANCELLED | OUTPATIENT
Start: 2021-10-08 | End: 2021-10-08

## 2021-10-08 RX ORDER — PALONOSETRON 0.05 MG/ML
0.25 INJECTION, SOLUTION INTRAVENOUS ONCE
Status: COMPLETED | OUTPATIENT
Start: 2021-10-08 | End: 2021-10-08

## 2021-10-08 RX ORDER — SODIUM CHLORIDE 9 MG/ML
INJECTION, SOLUTION INTRAVENOUS CONTINUOUS
Status: CANCELLED | OUTPATIENT
Start: 2021-10-08

## 2021-10-08 RX ORDER — SODIUM CHLORIDE 9 MG/ML
25 INJECTION, SOLUTION INTRAVENOUS PRN
Status: CANCELLED | OUTPATIENT
Start: 2021-10-08

## 2021-10-08 RX ORDER — METHYLPREDNISOLONE SODIUM SUCCINATE 125 MG/2ML
125 INJECTION, POWDER, LYOPHILIZED, FOR SOLUTION INTRAMUSCULAR; INTRAVENOUS ONCE
Status: CANCELLED | OUTPATIENT
Start: 2021-10-08 | End: 2021-10-08

## 2021-10-08 RX ORDER — PALONOSETRON 0.05 MG/ML
0.25 INJECTION, SOLUTION INTRAVENOUS ONCE
Status: CANCELLED
Start: 2021-10-08 | End: 2021-10-08

## 2021-10-08 RX ORDER — SODIUM CHLORIDE 9 MG/ML
20 INJECTION, SOLUTION INTRAVENOUS ONCE
Status: COMPLETED | OUTPATIENT
Start: 2021-10-08 | End: 2021-10-09

## 2021-10-08 RX ORDER — SODIUM CHLORIDE 0.9 % (FLUSH) 0.9 %
5-40 SYRINGE (ML) INJECTION PRN
Status: CANCELLED | OUTPATIENT
Start: 2021-10-08

## 2021-10-08 RX ADMIN — DEXAMETHASONE SODIUM PHOSPHATE: 10 INJECTION, SOLUTION INTRAMUSCULAR; INTRAVENOUS at 13:11

## 2021-10-08 RX ADMIN — PALONOSETRON 0.25 MG: 0.05 INJECTION, SOLUTION INTRAVENOUS at 13:12

## 2021-10-08 RX ADMIN — GEMCITABINE 1820 MG: 38 INJECTION, SOLUTION INTRAVENOUS at 13:48

## 2021-10-08 RX ADMIN — SODIUM CHLORIDE 20 ML/HR: 9 INJECTION, SOLUTION INTRAVENOUS at 13:11

## 2021-10-08 RX ADMIN — HEPARIN 500 UNITS: 100 SYRINGE at 14:23

## 2021-10-11 ENCOUNTER — TELEPHONE (OUTPATIENT)
Dept: INFUSION THERAPY | Age: 76
End: 2021-10-11

## 2021-10-11 NOTE — TELEPHONE ENCOUNTER
Received a call from Shireen Navarro. She had complaints of eye flutters and throat burning. She denies any mouth sores. Spoke with Dr. Justin Collet miracle mouth wash escribed. Explained if eye flutters continue to see opthalmology.

## 2021-10-22 ENCOUNTER — HOSPITAL ENCOUNTER (OUTPATIENT)
Dept: INFUSION THERAPY | Age: 76
Discharge: HOME | End: 2021-10-22
Payer: MEDICARE

## 2021-10-22 VITALS
DIASTOLIC BLOOD PRESSURE: 88 MMHG | TEMPERATURE: 98.4 F | HEART RATE: 87 BPM | WEIGHT: 161.8 LBS | BODY MASS INDEX: 26.96 KG/M2 | RESPIRATION RATE: 18 BRPM | SYSTOLIC BLOOD PRESSURE: 147 MMHG | HEIGHT: 65 IN | OXYGEN SATURATION: 99 %

## 2021-10-22 DIAGNOSIS — C25.2 MALIGNANT NEOPLASM OF TAIL OF PANCREAS (HCC): Primary | ICD-10-CM

## 2021-10-22 LAB
ALBUMIN SERPL-MCNC: 4 G/DL (ref 3.5–5.2)
ALP BLD-CCNC: 101 U/L (ref 35–104)
ALT SERPL-CCNC: 26 U/L (ref 9–52)
ANION GAP SERPL CALCULATED.3IONS-SCNC: 6 MMOL/L (ref 7–19)
AST SERPL-CCNC: 63 U/L (ref 14–36)
BILIRUB SERPL-MCNC: 0.5 MG/DL (ref 0.2–1.3)
BUN BLDV-MCNC: 10 MG/DL (ref 7–17)
CALCIUM SERPL-MCNC: 9.4 MG/DL (ref 8.4–10.2)
CHLORIDE BLD-SCNC: 103 MMOL/L (ref 98–111)
CO2: 30 MMOL/L (ref 22–29)
CREAT SERPL-MCNC: 0.6 MG/DL (ref 0.5–1)
GFR NON-AFRICAN AMERICAN: >60
GLOBULIN: 2.5 G/DL
GLUCOSE BLD-MCNC: 94 MG/DL (ref 74–106)
HCT VFR BLD CALC: 36.3 % (ref 34.1–44.9)
HEMOGLOBIN: 12.3 G/DL (ref 11.2–15.7)
MCH RBC QN AUTO: 32.8 PG (ref 25.6–32.2)
MCHC RBC AUTO-ENTMCNC: 33.9 G/DL (ref 32.3–35.5)
MCV RBC AUTO: 96.8 FL (ref 79.4–94.8)
PDW BLD-RTO: 21.7 % (ref 11.7–14.4)
PLATELET # BLD: 461 K/UL (ref 182–369)
PMV BLD AUTO: 9.1 FL (ref 7.4–10.4)
POTASSIUM SERPL-SCNC: 4.3 MMOL/L (ref 3.5–5.1)
RBC # BLD: 3.75 M/UL (ref 3.93–5.22)
SODIUM BLD-SCNC: 139 MMOL/L (ref 137–145)
TOTAL PROTEIN: 6.6 G/DL (ref 6.3–8.2)
WBC # BLD: 9.3 K/UL (ref 3.98–10.04)

## 2021-10-22 PROCEDURE — 96413 CHEMO IV INFUSION 1 HR: CPT

## 2021-10-22 PROCEDURE — 80053 COMPREHEN METABOLIC PANEL: CPT

## 2021-10-22 PROCEDURE — 85027 COMPLETE CBC AUTOMATED: CPT

## 2021-10-22 PROCEDURE — 96375 TX/PRO/DX INJ NEW DRUG ADDON: CPT

## 2021-10-22 PROCEDURE — 96367 TX/PROPH/DG ADDL SEQ IV INF: CPT

## 2021-10-22 PROCEDURE — 2580000003 HC RX 258: Performed by: INTERNAL MEDICINE

## 2021-10-22 PROCEDURE — 6360000002 HC RX W HCPCS: Performed by: INTERNAL MEDICINE

## 2021-10-22 RX ORDER — DIPHENHYDRAMINE HYDROCHLORIDE 50 MG/ML
50 INJECTION INTRAMUSCULAR; INTRAVENOUS ONCE
Status: CANCELLED | OUTPATIENT
Start: 2021-11-05 | End: 2021-11-05

## 2021-10-22 RX ORDER — SODIUM CHLORIDE 9 MG/ML
20 INJECTION, SOLUTION INTRAVENOUS ONCE
Status: CANCELLED | OUTPATIENT
Start: 2021-11-05 | End: 2021-11-05

## 2021-10-22 RX ORDER — HEPARIN SODIUM (PORCINE) LOCK FLUSH IV SOLN 100 UNIT/ML 100 UNIT/ML
500 SOLUTION INTRAVENOUS PRN
Status: DISCONTINUED | OUTPATIENT
Start: 2021-10-22 | End: 2021-10-23 | Stop reason: HOSPADM

## 2021-10-22 RX ORDER — SODIUM CHLORIDE 9 MG/ML
INJECTION, SOLUTION INTRAVENOUS CONTINUOUS
Status: CANCELLED | OUTPATIENT
Start: 2021-10-22

## 2021-10-22 RX ORDER — SODIUM CHLORIDE 0.9 % (FLUSH) 0.9 %
5-40 SYRINGE (ML) INJECTION PRN
Status: CANCELLED | OUTPATIENT
Start: 2021-11-05

## 2021-10-22 RX ORDER — EPINEPHRINE 1 MG/ML
0.3 INJECTION, SOLUTION, CONCENTRATE INTRAVENOUS PRN
Status: CANCELLED | OUTPATIENT
Start: 2021-11-05

## 2021-10-22 RX ORDER — EPINEPHRINE 1 MG/ML
0.3 INJECTION, SOLUTION, CONCENTRATE INTRAVENOUS PRN
Status: CANCELLED | OUTPATIENT
Start: 2021-10-22

## 2021-10-22 RX ORDER — SODIUM CHLORIDE 9 MG/ML
20 INJECTION, SOLUTION INTRAVENOUS ONCE
Status: COMPLETED | OUTPATIENT
Start: 2021-10-22 | End: 2021-10-23

## 2021-10-22 RX ORDER — SODIUM CHLORIDE 9 MG/ML
20 INJECTION, SOLUTION INTRAVENOUS ONCE
Status: CANCELLED | OUTPATIENT
Start: 2021-10-29 | End: 2021-10-29

## 2021-10-22 RX ORDER — METHYLPREDNISOLONE SODIUM SUCCINATE 125 MG/2ML
125 INJECTION, POWDER, LYOPHILIZED, FOR SOLUTION INTRAMUSCULAR; INTRAVENOUS ONCE
Status: CANCELLED | OUTPATIENT
Start: 2021-10-29 | End: 2021-10-29

## 2021-10-22 RX ORDER — DIPHENHYDRAMINE HYDROCHLORIDE 50 MG/ML
50 INJECTION INTRAMUSCULAR; INTRAVENOUS ONCE
Status: CANCELLED | OUTPATIENT
Start: 2021-10-29 | End: 2021-10-29

## 2021-10-22 RX ORDER — SODIUM CHLORIDE 9 MG/ML
INJECTION, SOLUTION INTRAVENOUS CONTINUOUS
Status: CANCELLED | OUTPATIENT
Start: 2021-10-29

## 2021-10-22 RX ORDER — DIPHENHYDRAMINE HYDROCHLORIDE 50 MG/ML
50 INJECTION INTRAMUSCULAR; INTRAVENOUS ONCE
Status: CANCELLED | OUTPATIENT
Start: 2021-10-22 | End: 2021-10-22

## 2021-10-22 RX ORDER — METHYLPREDNISOLONE SODIUM SUCCINATE 125 MG/2ML
125 INJECTION, POWDER, LYOPHILIZED, FOR SOLUTION INTRAMUSCULAR; INTRAVENOUS ONCE
Status: CANCELLED | OUTPATIENT
Start: 2021-11-05 | End: 2021-11-05

## 2021-10-22 RX ORDER — SODIUM CHLORIDE 9 MG/ML
INJECTION, SOLUTION INTRAVENOUS CONTINUOUS
Status: CANCELLED | OUTPATIENT
Start: 2021-11-05

## 2021-10-22 RX ORDER — PALONOSETRON 0.05 MG/ML
0.25 INJECTION, SOLUTION INTRAVENOUS ONCE
Status: CANCELLED
Start: 2021-11-05 | End: 2021-11-05

## 2021-10-22 RX ORDER — SODIUM CHLORIDE 0.9 % (FLUSH) 0.9 %
5-40 SYRINGE (ML) INJECTION PRN
Status: CANCELLED | OUTPATIENT
Start: 2021-10-29

## 2021-10-22 RX ORDER — SODIUM CHLORIDE 9 MG/ML
25 INJECTION, SOLUTION INTRAVENOUS PRN
Status: CANCELLED | OUTPATIENT
Start: 2021-11-05

## 2021-10-22 RX ORDER — SODIUM CHLORIDE 9 MG/ML
20 INJECTION, SOLUTION INTRAVENOUS ONCE
Status: CANCELLED | OUTPATIENT
Start: 2021-10-22 | End: 2021-10-22

## 2021-10-22 RX ORDER — SODIUM CHLORIDE 9 MG/ML
25 INJECTION, SOLUTION INTRAVENOUS PRN
Status: CANCELLED | OUTPATIENT
Start: 2021-10-22

## 2021-10-22 RX ORDER — SODIUM CHLORIDE 9 MG/ML
25 INJECTION, SOLUTION INTRAVENOUS PRN
Status: CANCELLED | OUTPATIENT
Start: 2021-10-29

## 2021-10-22 RX ORDER — SODIUM CHLORIDE 0.9 % (FLUSH) 0.9 %
5-40 SYRINGE (ML) INJECTION PRN
Status: DISCONTINUED | OUTPATIENT
Start: 2021-10-22 | End: 2021-10-23 | Stop reason: HOSPADM

## 2021-10-22 RX ORDER — METHYLPREDNISOLONE SODIUM SUCCINATE 125 MG/2ML
125 INJECTION, POWDER, LYOPHILIZED, FOR SOLUTION INTRAMUSCULAR; INTRAVENOUS ONCE
Status: CANCELLED | OUTPATIENT
Start: 2021-10-22 | End: 2021-10-22

## 2021-10-22 RX ORDER — SODIUM CHLORIDE 0.9 % (FLUSH) 0.9 %
5-40 SYRINGE (ML) INJECTION PRN
Status: CANCELLED | OUTPATIENT
Start: 2021-10-22

## 2021-10-22 RX ORDER — EPINEPHRINE 1 MG/ML
0.3 INJECTION, SOLUTION, CONCENTRATE INTRAVENOUS PRN
Status: CANCELLED | OUTPATIENT
Start: 2021-10-29

## 2021-10-22 RX ORDER — PALONOSETRON 0.05 MG/ML
0.25 INJECTION, SOLUTION INTRAVENOUS ONCE
Status: CANCELLED
Start: 2021-10-29 | End: 2021-10-29

## 2021-10-22 RX ORDER — HEPARIN SODIUM (PORCINE) LOCK FLUSH IV SOLN 100 UNIT/ML 100 UNIT/ML
500 SOLUTION INTRAVENOUS PRN
Status: CANCELLED | OUTPATIENT
Start: 2021-11-05

## 2021-10-22 RX ORDER — HEPARIN SODIUM (PORCINE) LOCK FLUSH IV SOLN 100 UNIT/ML 100 UNIT/ML
500 SOLUTION INTRAVENOUS PRN
Status: CANCELLED | OUTPATIENT
Start: 2021-10-22

## 2021-10-22 RX ORDER — PALONOSETRON 0.05 MG/ML
0.25 INJECTION, SOLUTION INTRAVENOUS ONCE
Status: COMPLETED | OUTPATIENT
Start: 2021-10-22 | End: 2021-10-22

## 2021-10-22 RX ORDER — HEPARIN SODIUM (PORCINE) LOCK FLUSH IV SOLN 100 UNIT/ML 100 UNIT/ML
500 SOLUTION INTRAVENOUS PRN
Status: CANCELLED | OUTPATIENT
Start: 2021-10-29

## 2021-10-22 RX ORDER — PALONOSETRON 0.05 MG/ML
0.25 INJECTION, SOLUTION INTRAVENOUS ONCE
Status: CANCELLED
Start: 2021-10-22 | End: 2021-10-22

## 2021-10-22 RX ADMIN — SODIUM CHLORIDE, PRESERVATIVE FREE 10 ML: 5 INJECTION INTRAVENOUS at 14:28

## 2021-10-22 RX ADMIN — GEMCITABINE 1800 MG: 38 INJECTION, SOLUTION INTRAVENOUS at 13:49

## 2021-10-22 RX ADMIN — SODIUM CHLORIDE 20 ML/HR: 9 INJECTION, SOLUTION INTRAVENOUS at 13:27

## 2021-10-22 RX ADMIN — HEPARIN 500 UNITS: 100 SYRINGE at 14:28

## 2021-10-22 RX ADMIN — PALONOSETRON 0.25 MG: 0.05 INJECTION, SOLUTION INTRAVENOUS at 13:27

## 2021-10-22 RX ADMIN — DEXAMETHASONE SODIUM PHOSPHATE: 10 INJECTION, SOLUTION INTRAMUSCULAR; INTRAVENOUS at 13:27

## 2021-10-29 ENCOUNTER — HOSPITAL ENCOUNTER (OUTPATIENT)
Dept: INFUSION THERAPY | Age: 76
Discharge: HOME | End: 2021-10-29
Payer: MEDICARE

## 2021-10-29 VITALS
WEIGHT: 161.4 LBS | SYSTOLIC BLOOD PRESSURE: 123 MMHG | DIASTOLIC BLOOD PRESSURE: 74 MMHG | RESPIRATION RATE: 18 BRPM | TEMPERATURE: 98.3 F | HEART RATE: 85 BPM | BODY MASS INDEX: 26.89 KG/M2 | HEIGHT: 65 IN | OXYGEN SATURATION: 99 %

## 2021-10-29 DIAGNOSIS — C25.2 MALIGNANT NEOPLASM OF TAIL OF PANCREAS (HCC): Primary | ICD-10-CM

## 2021-10-29 LAB
ALBUMIN SERPL-MCNC: 3.9 G/DL (ref 3.5–5.2)
ALP BLD-CCNC: 116 U/L (ref 35–104)
ALT SERPL-CCNC: 24 U/L (ref 9–52)
ANION GAP SERPL CALCULATED.3IONS-SCNC: 6 MMOL/L (ref 7–19)
AST SERPL-CCNC: 51 U/L (ref 14–36)
BASOPHILS ABSOLUTE: 0.12 K/UL (ref 0.01–0.08)
BASOPHILS RELATIVE PERCENT: 1.7 % (ref 0.1–1.2)
BILIRUB SERPL-MCNC: 0.9 MG/DL (ref 0.2–1.3)
BUN BLDV-MCNC: 11 MG/DL (ref 7–17)
CALCIUM SERPL-MCNC: 8.9 MG/DL (ref 8.4–10.2)
CHLORIDE BLD-SCNC: 103 MMOL/L (ref 98–111)
CO2: 28 MMOL/L (ref 22–29)
CREAT SERPL-MCNC: 0.6 MG/DL (ref 0.5–1)
EOSINOPHILS ABSOLUTE: 0.1 K/UL (ref 0.04–0.54)
EOSINOPHILS RELATIVE PERCENT: 1.4 % (ref 0.7–7)
GFR NON-AFRICAN AMERICAN: >60
GLOBULIN: 2.7 G/DL
GLUCOSE BLD-MCNC: 114 MG/DL (ref 74–106)
HCT VFR BLD CALC: 35 % (ref 34.1–44.9)
HEMOGLOBIN: 12 G/DL (ref 11.2–15.7)
LYMPHOCYTES ABSOLUTE: 3.77 K/UL (ref 1.18–3.74)
LYMPHOCYTES RELATIVE PERCENT: 53.9 % (ref 19.3–53.1)
MCH RBC QN AUTO: 33.1 PG (ref 25.6–32.2)
MCHC RBC AUTO-ENTMCNC: 34.3 G/DL (ref 32.3–35.5)
MCV RBC AUTO: 96.7 FL (ref 79.4–94.8)
MONOCYTES ABSOLUTE: 1.31 K/UL (ref 0.24–0.82)
MONOCYTES RELATIVE PERCENT: 18.7 % (ref 4.7–12.5)
NEUTROPHILS ABSOLUTE: 1.7 K/UL (ref 1.56–6.13)
NEUTROPHILS RELATIVE PERCENT: 24.3 % (ref 34–71.1)
PDW BLD-RTO: 21.1 % (ref 11.7–14.4)
PLATELET # BLD: 544 K/UL (ref 182–369)
PMV BLD AUTO: 10 FL (ref 7.4–10.4)
POTASSIUM SERPL-SCNC: 4.3 MMOL/L (ref 3.5–5.1)
RBC # BLD: 3.62 M/UL (ref 3.93–5.22)
SODIUM BLD-SCNC: 137 MMOL/L (ref 137–145)
TOTAL PROTEIN: 6.5 G/DL (ref 6.3–8.2)
WBC # BLD: 7 K/UL (ref 3.98–10.04)

## 2021-10-29 PROCEDURE — 6360000002 HC RX W HCPCS: Performed by: INTERNAL MEDICINE

## 2021-10-29 PROCEDURE — 96367 TX/PROPH/DG ADDL SEQ IV INF: CPT

## 2021-10-29 PROCEDURE — 96413 CHEMO IV INFUSION 1 HR: CPT

## 2021-10-29 PROCEDURE — 85025 COMPLETE CBC W/AUTO DIFF WBC: CPT

## 2021-10-29 PROCEDURE — 96375 TX/PRO/DX INJ NEW DRUG ADDON: CPT

## 2021-10-29 PROCEDURE — 80053 COMPREHEN METABOLIC PANEL: CPT

## 2021-10-29 PROCEDURE — 2580000003 HC RX 258: Performed by: INTERNAL MEDICINE

## 2021-10-29 RX ORDER — PALONOSETRON 0.05 MG/ML
0.25 INJECTION, SOLUTION INTRAVENOUS ONCE
Status: COMPLETED | OUTPATIENT
Start: 2021-10-29 | End: 2021-10-29

## 2021-10-29 RX ORDER — HEPARIN SODIUM (PORCINE) LOCK FLUSH IV SOLN 100 UNIT/ML 100 UNIT/ML
500 SOLUTION INTRAVENOUS PRN
Status: DISCONTINUED | OUTPATIENT
Start: 2021-10-29 | End: 2021-10-30 | Stop reason: HOSPADM

## 2021-10-29 RX ORDER — SODIUM CHLORIDE 0.9 % (FLUSH) 0.9 %
5-40 SYRINGE (ML) INJECTION PRN
Status: DISCONTINUED | OUTPATIENT
Start: 2021-10-29 | End: 2021-10-30 | Stop reason: HOSPADM

## 2021-10-29 RX ADMIN — DEXAMETHASONE SODIUM PHOSPHATE: 10 INJECTION, SOLUTION INTRAMUSCULAR; INTRAVENOUS at 12:10

## 2021-10-29 RX ADMIN — Medication 500 UNITS: at 12:58

## 2021-10-29 RX ADMIN — GEMCITABINE 1820 MG: 38 INJECTION, SOLUTION INTRAVENOUS at 12:24

## 2021-10-29 RX ADMIN — SODIUM CHLORIDE, PRESERVATIVE FREE 10 ML: 5 INJECTION INTRAVENOUS at 12:58

## 2021-10-29 RX ADMIN — PALONOSETRON 0.25 MG: 0.05 INJECTION, SOLUTION INTRAVENOUS at 12:10

## 2021-11-04 NOTE — PROGRESS NOTES
MEDICAL ONCOLOGY PROGRESS NOTE    Pt Name: Dasia Uriarte  MRN: [id number]  YOB: 1945  Date of evaluation: 11/7/2021    HISTORY OF PRESENT ILLNESS:    The patient is a very pleasant 77years old female who has been diagnosed with invasive ductal adenocarcinoma of the pancreas, node positive. She is currently receiving adjuvant chemotherapy with Gemzar/capecitabine. She has been tolerating treatments complains of fatigue. She also has significant diarrhea secondary to pancreatic insufficiency. She has been started on Creon. Diarrhea has improved. She has mild erythema/discoloration left foot plantar region. Denies any nausea vomiting. Diagnosis  · Invasive ductal adenocarcinoma, pancreas, June 2021  · pT2 N1 M0  · Strong family history for breast cancer  · Positive JESUSITA gene mutation    Treatment Summary  · 6/28/21 Laparoscopic assisted distal pancreatectomy and splenectomy with 1 of 6 lymph nodes positive. Surgeon Dr. Fabby Reed   · 8/13/2021-adjuvant Gemzar 1000 mg/m2 IV D 1, D8, D15 with Capecitabine 830 mg/m2 D1-21 every 28 days x6 cycles    Cancer History  Elvira Carter was first seen by me on 8/6/2021. The patient was referred for a diagnosis of pancreatic malignancy. She had initial complaints of abdominal pain. · 5/5/20 MRI abdomen [facility]): Moderate fatty replacement of the pancreas with several small cystic structures within or adjacent to the pancreatic head and uncinate process most suggestive of benign cysts. A follow-up MRI of the abdomen may be obtained in 1 year to assess for stability. No solid lesion is seen. Otherwise unremarkable MRI of the abdomen. · 4/22/20 CT abd/pelvis [facility]): Focal inflammatory stranding associated with the pancreatic head. Radiographically I would favor this to represent acute pancreatitis. There is mild associated stranding within the descending and proximal transverse duodenum.  There are 2 well-circumscribed noted. Spleen was unremarkable. Pathological stage pT2 N1 M0  · 8/6/2021-she was first seen by me. Recommend adjuvant chemotherapy with gemcitabine/Xeloda. She acknowledged understanding and agree with treatment. · 8/09/2021 CT Chest W/Contrast No evidence of intrathoracic metastatic disease is identified. · 8/09/2021 CT Abd/Pelvis W/Oral Contrast Prior distal pancreatectomy and splenectomy. There is a 1.5 cm circumscribed, nonenhancing cystic lesion along the inferior pancreatic head, perhaps a branch IPMN. Otherwise, no obvious pancreatic solid mass is seen. No suspicious adenopathy or evidence of distant metastatic disease in the abdomen or pelvis. Prior cholecystectomy. Liver steatosis. Colonic diverticulosis. · 8/13/2021-initiation of adjuvant capecitabine/Gemzar   · 9/10/2021-I reviewed CT chest abdomen pelvis.     Past Medical History:    Past Medical History:   Diagnosis Date    Anxiety     Arthritis     Depression     Hypertension     Hypoglycemia     if fasting for very long    Pancreatic cancer ([address] 06/2021    s/p Whipple by Dr. Regina Archuleta    Shoulder pain     incompleted rcr; possible injury    Thyroid disease        Past Surgical History:    Past Surgical History:   Procedure Laterality Date   [address]  2020    Dr Cao Blade  06/28/2021    PORT SURGERY Right 9/21/2021    Single lumen PORT INSERTION with ultrasound and fluoroscopy performed by Jeff Niño DO at [address] Right 10/19/2017    SHOULDER ARTHROSCOPY ROTATOR CUFF REPAIR/ DEBRIDEMENT BICEPS TENODESIS/ TENOTOMY SUBACROMIAL DECOMPRESSION/ DISTAL CLAVICLE EXCISION performed by Víctor Doan MD at [address]  06/28/2021    UPPER GASTROINTESTINAL ENDOSCOPY  06/2021    Dr. Tavon Cash in [place]      spur excision       Social History:    Marital status:   Smoking status: no  ETOH status: no  Resides: [place]    Family History:   Family History   Adopted: Yes   Problem Relation Age of Onset    Breast Cancer Maternal Aunt     Breast Cancer Daughter     Cancer Daughter         Bladder    Cancer Maternal Uncle         Bladder       Current Hospital Medications:    Current Outpatient Medications   Medication Sig Dispense Refill    capecitabine (XELODA) 500 MG chemo tablet Take 3 tablets by mouth 2 times a day for 21 days of a 28 day cycle 126 tablet 5    Magic Mouthwash (MIRACLE MOUTHWASH) Swish and spit 5 mLs 4 times daily as needed for Irritation 1/3 viscous lidocaine, 1/3 benadryl, 1/3 Maalox. 480 mL 1    lidocaine-prilocaine (EMLA) 2.5-2.5 % cream Apply topically as needed. 30 g 2    Caltrate 600+D Plus Minerals (CALTRATE) 600-800 MG-UNIT TABS tablet Take 1 tablet by mouth daily      lipase-protease-amylase (CREON) 92367-28107 units delayed release capsule Take 1 capsule by mouth 3 times daily (with meals) 90 capsule 5    busPIRone (BUSPAR) 5 MG tablet TAKE 1 TABLET BY MOUTH TWICE A DAY      losartan (COZAAR) 50 MG tablet TAKE 1 TABLET BY MOUTH EVERY DAY      promethazine (PHENERGAN) 12.5 MG tablet Take 1 tablet by mouth every 6 hours as needed for Nausea (Patient not taking: Reported on 9/14/2021) 30 tablet 5    ondansetron (ZOFRAN) 4 MG tablet Take 1 tablet by mouth every 8 hours as needed for Nausea or Vomiting (Patient not taking: Reported on 8/6/2021) 10 tablet 0    levothyroxine (SYNTHROID) 125 MCG tablet Take 125 mcg by mouth Daily       No current facility-administered medications for this visit. Allergies:    Allergies   Allergen Reactions    Diclofenac Other (See Comments)     Couldn't sleep and face turned blood red    Oxycodone-Acetaminophen Hives     itching    Doxycycline Rash         Subjective     REVIEW OF SYSTEMS:   CONSTITUTIONAL: no fever, no night sweats, fatigue;  HEENT: no blurring of vision, no double vision, no hearing difficulty, no 11/05/2021     11/05/2021     Lab Results   Component Value Date    NEUTROABS 1.50 (L) 11/05/2021     Lab Results   Component Value Date     11/05/2021    K 4.3 11/05/2021     11/05/2021    CO2 31 (H) 11/05/2021    BUN 12 11/05/2021    CREATININE 0.5 11/05/2021    GLUCOSE 111 (H) 11/05/2021    CALCIUM 9.5 11/05/2021    PROT 6.5 11/05/2021    LABALBU 3.8 11/05/2021    BILITOT 1.0 11/05/2021    ALKPHOS 107 (H) 11/05/2021    AST 40 (H) 11/05/2021    ALT 18 11/05/2021    LABGLOM >60 11/05/2021    GFRAA >60 08/09/2021    GLOB 2.7 11/05/2021         RADIOLOGY STUDIES REVIEWED BY ME:   None    ASSESSMENT:    No orders of the defined types were placed in this encounter. Fatmata Bermudez was seen today for cancer. Diagnoses and all orders for this visit:    Malignant neoplasm of tail of pancreas [facility])    Chemotherapy management, encounter for    Care plan discussed with patient    Chemotherapy-induced neutropenia ([facility] [address]    Pancreatic tail adenocarcinoma pT2 N1 M0  Essentially, node positive third of the pancreas adenocarcinoma. Status post distal pancreatectomy/splenectomy  I have recommended Gemzar/capecitabine due to better tolerance. Proceed cycle number 3-day 15 adjuvant treatment with Xeloda/Gemzar    Chemotherapy-induced toxicity-fatigue, neutropenia, hand-foot syndrome     PLAN:  · Continue Gemzar 1000 mg/m2 IV D 1, D8, D15 with Capecitabine 830 mg/m2 D1-21 every 28 days x6 cycles  · RTC with MD 12/3/2021 in treatment room  · Repeat CT scans Feb 2022 and every 6 months  · CBC/CMP for toxicity monitoring    IOlimpia am scribing for Lisandro Irizarry MD. Electronically signed by Olimpia Delgado RN on 11/7/2021 at 1:45 PM CDT. I, Dr Donita Gutierrez, personally performed the services described in this documentation as scribed by Olimpia Delgado RN in my presence and is both accurate and complete. I have seen, examined and reviewed this patient medication list, appropriate labs and imaging studies. I reviewed relevant medical records and others physicians notes. I discussed the plans of care with the patient. I answered all the questions to the patients satisfaction. I have also reviewed the chief complaint (CC) and part of the history (History of Present Illness (HPI), Past Family Social History [facility]), or Review of Systems (ROS) and made changes when appropriated. (Please note that portions of this note were completed with a voice recognition program. Efforts were made to edit the dictations but occasionally words are mis-transcribed. )Electronically signed by Troy Nieves MD on 11/7/2021 at 5:21 PM

## 2021-11-05 ENCOUNTER — OFFICE VISIT (OUTPATIENT)
Dept: HEMATOLOGY | Age: 76
End: 2021-11-05
Payer: MEDICARE

## 2021-11-05 ENCOUNTER — HOSPITAL ENCOUNTER (OUTPATIENT)
Dept: INFUSION THERAPY | Age: 76
Discharge: HOME | End: 2021-11-05
Payer: MEDICARE

## 2021-11-05 VITALS
SYSTOLIC BLOOD PRESSURE: 129 MMHG | HEART RATE: 79 BPM | DIASTOLIC BLOOD PRESSURE: 78 MMHG | WEIGHT: 163.6 LBS | HEIGHT: 65 IN | OXYGEN SATURATION: 99 % | TEMPERATURE: 98 F | BODY MASS INDEX: 27.26 KG/M2

## 2021-11-05 DIAGNOSIS — D70.1 CHEMOTHERAPY-INDUCED NEUTROPENIA (HCC): ICD-10-CM

## 2021-11-05 DIAGNOSIS — C25.2 MALIGNANT NEOPLASM OF TAIL OF PANCREAS (HCC): Primary | ICD-10-CM

## 2021-11-05 DIAGNOSIS — T45.1X5A CHEMOTHERAPY-INDUCED NEUTROPENIA (HCC): ICD-10-CM

## 2021-11-05 DIAGNOSIS — Z71.89 CARE PLAN DISCUSSED WITH PATIENT: ICD-10-CM

## 2021-11-05 DIAGNOSIS — Z51.11 CHEMOTHERAPY MANAGEMENT, ENCOUNTER FOR: ICD-10-CM

## 2021-11-05 LAB
ALBUMIN SERPL-MCNC: 3.8 G/DL (ref 3.5–5.2)
ALP BLD-CCNC: 107 U/L (ref 35–104)
ALT SERPL-CCNC: 18 U/L (ref 9–52)
ANION GAP SERPL CALCULATED.3IONS-SCNC: 5 MMOL/L (ref 7–19)
AST SERPL-CCNC: 40 U/L (ref 14–36)
BASOPHILS ABSOLUTE: 0.07 K/UL (ref 0.01–0.08)
BASOPHILS RELATIVE PERCENT: 1.2 % (ref 0.1–1.2)
BILIRUB SERPL-MCNC: 1 MG/DL (ref 0.2–1.3)
BUN BLDV-MCNC: 12 MG/DL (ref 7–17)
CA 19-9: 12 U/ML (ref 0–37)
CALCIUM SERPL-MCNC: 9.5 MG/DL (ref 8.4–10.2)
CHLORIDE BLD-SCNC: 102 MMOL/L (ref 98–111)
CO2: 31 MMOL/L (ref 22–29)
CREAT SERPL-MCNC: 0.5 MG/DL (ref 0.5–1)
EOSINOPHILS ABSOLUTE: 0.2 K/UL (ref 0.04–0.54)
EOSINOPHILS RELATIVE PERCENT: 3.3 % (ref 0.7–7)
GFR NON-AFRICAN AMERICAN: >60
GLOBULIN: 2.7 G/DL
GLUCOSE BLD-MCNC: 111 MG/DL (ref 74–106)
HCT VFR BLD CALC: 33.7 % (ref 34.1–44.9)
HEMOGLOBIN: 11.8 G/DL (ref 11.2–15.7)
LYMPHOCYTES ABSOLUTE: 3.31 K/UL (ref 1.18–3.74)
LYMPHOCYTES RELATIVE PERCENT: 55 % (ref 19.3–53.1)
MCH RBC QN AUTO: 34 PG (ref 25.6–32.2)
MCHC RBC AUTO-ENTMCNC: 35 G/DL (ref 32.3–35.5)
MCV RBC AUTO: 97.1 FL (ref 79.4–94.8)
MONOCYTES ABSOLUTE: 0.94 K/UL (ref 0.24–0.82)
MONOCYTES RELATIVE PERCENT: 15.6 % (ref 4.7–12.5)
NEUTROPHILS ABSOLUTE: 1.5 K/UL (ref 1.56–6.13)
NEUTROPHILS RELATIVE PERCENT: 24.9 % (ref 34–71.1)
PDW BLD-RTO: 21.3 % (ref 11.7–14.4)
PLATELET # BLD: 280 K/UL (ref 182–369)
PMV BLD AUTO: 9.8 FL (ref 7.4–10.4)
POTASSIUM SERPL-SCNC: 4.3 MMOL/L (ref 3.5–5.1)
RBC # BLD: 3.47 M/UL (ref 3.93–5.22)
SODIUM BLD-SCNC: 138 MMOL/L (ref 137–145)
TOTAL PROTEIN: 6.5 G/DL (ref 6.3–8.2)
WBC # BLD: 6.02 K/UL (ref 3.98–10.04)

## 2021-11-05 PROCEDURE — 96375 TX/PRO/DX INJ NEW DRUG ADDON: CPT

## 2021-11-05 PROCEDURE — G8417 CALC BMI ABV UP PARAM F/U: HCPCS | Performed by: INTERNAL MEDICINE

## 2021-11-05 PROCEDURE — 1123F ACP DISCUSS/DSCN MKR DOCD: CPT | Performed by: INTERNAL MEDICINE

## 2021-11-05 PROCEDURE — 96413 CHEMO IV INFUSION 1 HR: CPT

## 2021-11-05 PROCEDURE — G8428 CUR MEDS NOT DOCUMENT: HCPCS | Performed by: INTERNAL MEDICINE

## 2021-11-05 PROCEDURE — 4040F PNEUMOC VAC/ADMIN/RCVD: CPT | Performed by: INTERNAL MEDICINE

## 2021-11-05 PROCEDURE — 3017F COLORECTAL CA SCREEN DOC REV: CPT | Performed by: INTERNAL MEDICINE

## 2021-11-05 PROCEDURE — 86301 IMMUNOASSAY TUMOR CA 19-9: CPT

## 2021-11-05 PROCEDURE — 1090F PRES/ABSN URINE INCON ASSESS: CPT | Performed by: INTERNAL MEDICINE

## 2021-11-05 PROCEDURE — 2580000003 HC RX 258: Performed by: INTERNAL MEDICINE

## 2021-11-05 PROCEDURE — 96367 TX/PROPH/DG ADDL SEQ IV INF: CPT

## 2021-11-05 PROCEDURE — 99214 OFFICE O/P EST MOD 30 MIN: CPT | Performed by: INTERNAL MEDICINE

## 2021-11-05 PROCEDURE — G8400 PT W/DXA NO RESULTS DOC: HCPCS | Performed by: INTERNAL MEDICINE

## 2021-11-05 PROCEDURE — 6360000002 HC RX W HCPCS: Performed by: INTERNAL MEDICINE

## 2021-11-05 PROCEDURE — 85025 COMPLETE CBC W/AUTO DIFF WBC: CPT

## 2021-11-05 PROCEDURE — 80053 COMPREHEN METABOLIC PANEL: CPT

## 2021-11-05 PROCEDURE — 1036F TOBACCO NON-USER: CPT | Performed by: INTERNAL MEDICINE

## 2021-11-05 PROCEDURE — G8484 FLU IMMUNIZE NO ADMIN: HCPCS | Performed by: INTERNAL MEDICINE

## 2021-11-05 RX ORDER — SODIUM CHLORIDE 9 MG/ML
20 INJECTION, SOLUTION INTRAVENOUS ONCE
Status: COMPLETED | OUTPATIENT
Start: 2021-11-05 | End: 2021-11-06

## 2021-11-05 RX ORDER — SODIUM CHLORIDE 0.9 % (FLUSH) 0.9 %
5-40 SYRINGE (ML) INJECTION PRN
Status: DISCONTINUED | OUTPATIENT
Start: 2021-11-05 | End: 2021-11-06 | Stop reason: HOSPADM

## 2021-11-05 RX ORDER — CAPECITABINE 500 MG/1
TABLET, FILM COATED ORAL
Qty: 126 TABLET | Refills: 5 | Status: SHIPPED | OUTPATIENT
Start: 2021-11-05 | End: 2022-01-20 | Stop reason: SDUPTHER

## 2021-11-05 RX ORDER — PALONOSETRON 0.05 MG/ML
0.25 INJECTION, SOLUTION INTRAVENOUS ONCE
Status: COMPLETED | OUTPATIENT
Start: 2021-11-05 | End: 2021-11-05

## 2021-11-05 RX ORDER — HEPARIN SODIUM (PORCINE) LOCK FLUSH IV SOLN 100 UNIT/ML 100 UNIT/ML
500 SOLUTION INTRAVENOUS PRN
Status: DISCONTINUED | OUTPATIENT
Start: 2021-11-05 | End: 2021-11-06 | Stop reason: HOSPADM

## 2021-12-03 ENCOUNTER — HOSPITAL ENCOUNTER (OUTPATIENT)
Dept: INFUSION THERAPY | Age: 76
Discharge: HOME | End: 2021-12-03
Payer: MEDICARE

## 2021-12-03 ENCOUNTER — OFFICE VISIT (OUTPATIENT)
Dept: HEMATOLOGY | Age: 76
End: 2021-12-03
Payer: MEDICARE

## 2021-12-03 VITALS
DIASTOLIC BLOOD PRESSURE: 89 MMHG | HEIGHT: 65 IN | HEART RATE: 86 BPM | WEIGHT: 164.9 LBS | BODY MASS INDEX: 27.47 KG/M2 | OXYGEN SATURATION: 96 % | SYSTOLIC BLOOD PRESSURE: 143 MMHG | RESPIRATION RATE: 18 BRPM | TEMPERATURE: 98.1 F

## 2021-12-03 DIAGNOSIS — C25.2 MALIGNANT NEOPLASM OF TAIL OF PANCREAS (HCC): Primary | ICD-10-CM

## 2021-12-03 DIAGNOSIS — Z51.11 CHEMOTHERAPY MANAGEMENT, ENCOUNTER FOR: ICD-10-CM

## 2021-12-03 DIAGNOSIS — T45.1X5D ADVERSE EFFECT OF CHEMOTHERAPY, SUBSEQUENT ENCOUNTER: ICD-10-CM

## 2021-12-03 DIAGNOSIS — Z71.89 CARE PLAN DISCUSSED WITH PATIENT: ICD-10-CM

## 2021-12-03 LAB
ALBUMIN SERPL-MCNC: 4.1 G/DL (ref 3.5–5.2)
ALP BLD-CCNC: 112 U/L (ref 35–104)
ALT SERPL-CCNC: 15 U/L (ref 9–52)
ANION GAP SERPL CALCULATED.3IONS-SCNC: 6 MMOL/L (ref 7–19)
AST SERPL-CCNC: 42 U/L (ref 14–36)
BASOPHILS ABSOLUTE: 0.17 K/UL (ref 0.01–0.08)
BASOPHILS RELATIVE PERCENT: 1.2 % (ref 0.1–1.2)
BILIRUB SERPL-MCNC: 0.9 MG/DL (ref 0.2–1.3)
BUN BLDV-MCNC: 12 MG/DL (ref 7–17)
CA 19-9: 10 U/ML (ref 0–37)
CALCIUM SERPL-MCNC: 9.5 MG/DL (ref 8.4–10.2)
CHLORIDE BLD-SCNC: 105 MMOL/L (ref 98–111)
CO2: 29 MMOL/L (ref 22–29)
CREAT SERPL-MCNC: 0.6 MG/DL (ref 0.5–1)
EOSINOPHILS ABSOLUTE: 0.63 K/UL (ref 0.04–0.54)
EOSINOPHILS RELATIVE PERCENT: 4.6 % (ref 0.7–7)
GFR NON-AFRICAN AMERICAN: >60
GLOBULIN: 2.7 G/DL
GLUCOSE BLD-MCNC: 82 MG/DL (ref 74–106)
HCT VFR BLD CALC: 36.8 % (ref 34.1–44.9)
HEMOGLOBIN: 12.8 G/DL (ref 11.2–15.7)
LYMPHOCYTES ABSOLUTE: 4.53 K/UL (ref 1.18–3.74)
LYMPHOCYTES RELATIVE PERCENT: 33.1 % (ref 19.3–53.1)
MCH RBC QN AUTO: 35.1 PG (ref 25.6–32.2)
MCHC RBC AUTO-ENTMCNC: 34.8 G/DL (ref 32.3–35.5)
MCV RBC AUTO: 100.8 FL (ref 79.4–94.8)
MONOCYTES ABSOLUTE: 2.55 K/UL (ref 0.24–0.82)
MONOCYTES RELATIVE PERCENT: 18.6 % (ref 4.7–12.5)
NEUTROPHILS ABSOLUTE: 5.81 K/UL (ref 1.56–6.13)
NEUTROPHILS RELATIVE PERCENT: 42.5 % (ref 34–71.1)
PDW BLD-RTO: 21.1 % (ref 11.7–14.4)
PLATELET # BLD: 452 K/UL (ref 182–369)
PMV BLD AUTO: 10.6 FL (ref 7.4–10.4)
POTASSIUM SERPL-SCNC: 4.3 MMOL/L (ref 3.5–5.1)
RBC # BLD: 3.65 M/UL (ref 3.93–5.22)
SODIUM BLD-SCNC: 140 MMOL/L (ref 137–145)
TOTAL PROTEIN: 6.8 G/DL (ref 6.3–8.2)
WBC # BLD: 13.69 K/UL (ref 3.98–10.04)

## 2021-12-03 PROCEDURE — G8400 PT W/DXA NO RESULTS DOC: HCPCS | Performed by: INTERNAL MEDICINE

## 2021-12-03 PROCEDURE — 96413 CHEMO IV INFUSION 1 HR: CPT

## 2021-12-03 PROCEDURE — 99214 OFFICE O/P EST MOD 30 MIN: CPT | Performed by: INTERNAL MEDICINE

## 2021-12-03 PROCEDURE — 96375 TX/PRO/DX INJ NEW DRUG ADDON: CPT

## 2021-12-03 PROCEDURE — 4040F PNEUMOC VAC/ADMIN/RCVD: CPT | Performed by: INTERNAL MEDICINE

## 2021-12-03 PROCEDURE — G8484 FLU IMMUNIZE NO ADMIN: HCPCS | Performed by: INTERNAL MEDICINE

## 2021-12-03 PROCEDURE — 86301 IMMUNOASSAY TUMOR CA 19-9: CPT

## 2021-12-03 PROCEDURE — G8427 DOCREV CUR MEDS BY ELIG CLIN: HCPCS | Performed by: INTERNAL MEDICINE

## 2021-12-03 PROCEDURE — 80053 COMPREHEN METABOLIC PANEL: CPT

## 2021-12-03 PROCEDURE — G8417 CALC BMI ABV UP PARAM F/U: HCPCS | Performed by: INTERNAL MEDICINE

## 2021-12-03 PROCEDURE — 1123F ACP DISCUSS/DSCN MKR DOCD: CPT | Performed by: INTERNAL MEDICINE

## 2021-12-03 PROCEDURE — 96367 TX/PROPH/DG ADDL SEQ IV INF: CPT

## 2021-12-03 PROCEDURE — 2580000003 HC RX 258: Performed by: INTERNAL MEDICINE

## 2021-12-03 PROCEDURE — 6360000002 HC RX W HCPCS: Performed by: INTERNAL MEDICINE

## 2021-12-03 PROCEDURE — 3017F COLORECTAL CA SCREEN DOC REV: CPT | Performed by: INTERNAL MEDICINE

## 2021-12-03 PROCEDURE — 85025 COMPLETE CBC W/AUTO DIFF WBC: CPT

## 2021-12-03 PROCEDURE — 1036F TOBACCO NON-USER: CPT | Performed by: INTERNAL MEDICINE

## 2021-12-03 PROCEDURE — 1090F PRES/ABSN URINE INCON ASSESS: CPT | Performed by: INTERNAL MEDICINE

## 2021-12-03 PROCEDURE — 36415 COLL VENOUS BLD VENIPUNCTURE: CPT

## 2021-12-03 RX ORDER — SODIUM CHLORIDE 0.9 % (FLUSH) 0.9 %
5-40 SYRINGE (ML) INJECTION PRN
Status: DISCONTINUED | OUTPATIENT
Start: 2021-12-03 | End: 2021-12-04 | Stop reason: HOSPADM

## 2021-12-03 RX ORDER — PALONOSETRON 0.05 MG/ML
0.25 INJECTION, SOLUTION INTRAVENOUS ONCE
Status: COMPLETED | OUTPATIENT
Start: 2021-12-03 | End: 2021-12-03

## 2021-12-03 RX ORDER — HEPARIN SODIUM (PORCINE) LOCK FLUSH IV SOLN 100 UNIT/ML 100 UNIT/ML
500 SOLUTION INTRAVENOUS PRN
Status: DISCONTINUED | OUTPATIENT
Start: 2021-12-03 | End: 2021-12-04 | Stop reason: HOSPADM

## 2021-12-03 RX ORDER — SODIUM CHLORIDE 9 MG/ML
20 INJECTION, SOLUTION INTRAVENOUS ONCE
Status: COMPLETED | OUTPATIENT
Start: 2021-12-03 | End: 2021-12-03

## 2021-12-03 RX ADMIN — SODIUM CHLORIDE 20 ML/HR: 9 INJECTION, SOLUTION INTRAVENOUS at 11:32

## 2021-12-03 RX ADMIN — DEXAMETHASONE SODIUM PHOSPHATE: 10 INJECTION, SOLUTION INTRAMUSCULAR; INTRAVENOUS at 11:32

## 2021-12-03 RX ADMIN — HEPARIN 500 UNITS: 100 SYRINGE at 12:34

## 2021-12-03 RX ADMIN — SODIUM CHLORIDE, PRESERVATIVE FREE 10 ML: 5 INJECTION INTRAVENOUS at 12:34

## 2021-12-03 RX ADMIN — GEMCITABINE 1800 MG: 38 INJECTION, SOLUTION INTRAVENOUS at 11:59

## 2021-12-03 RX ADMIN — PALONOSETRON 0.25 MG: 0.05 INJECTION, SOLUTION INTRAVENOUS at 11:31

## 2021-12-03 NOTE — PROGRESS NOTES
MEDICAL ONCOLOGY PROGRESS NOTE    Pt Name: Rika Acevedo  MRN: [id number]  YOB: 1945  Date of evaluation: 12/4/2021    HISTORY OF PRESENT ILLNESS:    Patient is a pleasant 77years old female who has been diagnosed with invasive ductal adenocarcinoma of the pancreas, node positive. She is status post laparoscopic assisted distal pancreatectomy and splenectomy in June 2021. She is current receiving adjuvant chemotherapy with Gemzar/Xeloda. She presents today for cycle number 4-day 1. She has been tolerated treatment with complaints of significant hand-foot toxicity. He had significant desquamation of the palmar region of her feet. She takes Xeloda 3 weeks on/1 week off. She denies any significant diarrhea. Denies any other constitutional complaints. Diagnosis  · Invasive ductal adenocarcinoma, pancreas, June 2021  · pT2 N1 M0  · Strong family history for breast cancer  · Positive JESUSITA gene mutation    Treatment Summary  · 6/28/21 Laparoscopic assisted distal pancreatectomy and splenectomy with 1 of 6 lymph nodes positive. Surgeon Dr. Jaime Point   · 8/13/2021-adjuvant Gemzar 1000 mg/m2 IV D 1, D8, D15 with Capecitabine 830 mg/m2 D1-21 every 28 days x6 cycles    Cancer History  Eugenia Paniagua was first seen by me on 8/6/2021. The patient was referred for a diagnosis of pancreatic malignancy. She had initial complaints of abdominal pain. · 5/5/20 MRI abdomen [facility]): Moderate fatty replacement of the pancreas with several small cystic structures within or adjacent to the pancreatic head and uncinate process most suggestive of benign cysts. A follow-up MRI of the abdomen may be obtained in 1 year to assess for stability. No solid lesion is seen. Otherwise unremarkable MRI of the abdomen. · 4/22/20 CT abd/pelvis [facility]): Focal inflammatory stranding associated with the pancreatic head. Radiographically I would favor this to represent acute pancreatitis.  There is mild associated stranding within the descending and proximal transverse duodenum. There are 2 well-circumscribed hypodensities one in the region of the uncinate process and one slightly anterior to the pancreatic head likely representing sequela of pancreatitis but warranting follow-up on subsequent exam. The body and tail of the pancreas are spared. No evidence of pancreatic necrosis. The patient is status post hysterectomy and cholecystectomy. Bibasilar subsegmental atelectasis. No evidence of nephrolithiasis or obstructive uropathy. Diverticulosis of the distal descending and sigmoid colon without evidence of diverticulitis. · 5/5/21 MRI [facility]) There are multiple tiny-to-small foci of increased T2 signal seen within the pancreas, consistent with a combination of cysts and/or beaded dilatation of the tributaries of the main pancreatic duct, increased in size and number when compared to the prior similar study of 5/5/20. The patient is again noted to be status post cholecystectomy. The examination is otherwise within normal limits for age with no abnormal postcontrast enhancement noted. · 6/3/21 Upper EUS (Dr Jovanni Rene/[place]): CT Visualized portion of pancreas abnormal on ultrasound. There is a 26.7 mm solid mass in pancreatic tail. It abuts the splenic vessel without occlusion or varices. Pancreatic duct is dilated to 6 mm in the tail proximal to the mass. FNA x4 yields atypical cells. Celiac axis, liver appear normal, no local nodes. Body and head of pancreas normal.  · 6/3/21 Pancreas tail mass, FNA aspiration smear (x2), ThinPrep and cell block: Adenocarcinoma. · 6/7/21 CA 19-9 407.1  · 6/28/21-she underwent Whipple procedure pancreatectomy by Dr. Rice Linker: Invasive ductal carcinoma 4.3 cm in greatest dimension, invasive in the peripancreatic adipose tissue, diffuse perineural invasion. Surgical margins were negative for carcinoma.  The pancreas margins involved by a low-grade dysplasia. 1/6 nodes positive for metastatic cancer with direct extension. Distal fibrosis and pancreatitis was noted. Spleen was unremarkable. Pathological stage pT2 N1 M0  · 8/6/2021-she was first seen by me. Recommend adjuvant chemotherapy with gemcitabine/Xeloda. She acknowledged understanding and agree with treatment. · 8/09/2021 CT Chest W/Contrast No evidence of intrathoracic metastatic disease is identified. · 8/09/2021 CT Abd/Pelvis W/Oral Contrast Prior distal pancreatectomy and splenectomy. There is a 1.5 cm circumscribed, nonenhancing cystic lesion along the inferior pancreatic head, perhaps a branch IPMN. Otherwise, no obvious pancreatic solid mass is seen. No suspicious adenopathy or evidence of distant metastatic disease in the abdomen or pelvis. Prior cholecystectomy. Liver steatosis. Colonic diverticulosis. · 8/13/2021-initiation of adjuvant capecitabine/Gemzar   · 9/10/2021-I reviewed CT chest abdomen pelvis.     Past Medical History:    Past Medical History:   Diagnosis Date    Anxiety     Arthritis     Depression     Hypertension     Hypoglycemia     if fasting for very long    Pancreatic cancer ([address] 06/2021    s/p Whipple by Dr. Krystal Gonsales    Shoulder pain     incompleted rcr; possible injury    Thyroid disease        Past Surgical History:    Past Surgical History:   Procedure Laterality Date   [address]  [address]    Dr Jennifer Huizar  06/28/2021    PORT SURGERY Right 9/21/2021    Single lumen PORT INSERTION with ultrasound and fluoroscopy performed by Carly Soto DO at [place] [address] ARTHROSCOP,SURG,W/ROTAT CUFF REPR Right 10/19/2017    SHOULDER ARTHROSCOPY ROTATOR CUFF REPAIR/ DEBRIDEMENT BICEPS TENODESIS/ TENOTOMY SUBACROMIAL DECOMPRESSION/ DISTAL CLAVICLE EXCISION performed by Fara Duarte MD at [address] [place] [address]  06/28/2021    UPPER GASTROINTESTINAL ENDOSCOPY  06/2021    Dr. Bret Zhou in [place]    WRIST SURGERY      spur excision       Social History:    Marital status:   Smoking status: no  ETOH status: no  Resides: [place]    Family History:   Family History   Adopted: Yes   Problem Relation Age of Onset    Breast Cancer Maternal Aunt     Breast Cancer Daughter     Cancer Daughter         Bladder    Cancer Maternal Uncle         Bladder       Current Hospital Medications:    Current Outpatient Medications   Medication Sig Dispense Refill    capecitabine (XELODA) 500 MG chemo tablet Take 3 tablets by mouth 2 times a day for 21 days of a 28 day cycle 126 tablet 5    Magic Mouthwash (MIRACLE MOUTHWASH) Swish and spit 5 mLs 4 times daily as needed for Irritation 1/3 viscous lidocaine, 1/3 benadryl, 1/3 Maalox. 480 mL 1    lidocaine-prilocaine (EMLA) 2.5-2.5 % cream Apply topically as needed. 30 g 2    Caltrate 600+D Plus Minerals (CALTRATE) 600-800 MG-UNIT TABS tablet Take 1 tablet by mouth daily      lipase-protease-amylase (CREON) 95048-71869 units delayed release capsule Take 1 capsule by mouth 3 times daily (with meals) 90 capsule 5    busPIRone (BUSPAR) 5 MG tablet TAKE 1 TABLET BY MOUTH TWICE A DAY      losartan (COZAAR) 50 MG tablet TAKE 1 TABLET BY MOUTH EVERY DAY      promethazine (PHENERGAN) 12.5 MG tablet Take 1 tablet by mouth every 6 hours as needed for Nausea (Patient not taking: Reported on 9/14/2021) 30 tablet 5    ondansetron (ZOFRAN) 4 MG tablet Take 1 tablet by mouth every 8 hours as needed for Nausea or Vomiting (Patient not taking: Reported on 8/6/2021) 10 tablet 0    levothyroxine (SYNTHROID) 125 MCG tablet Take 125 mcg by mouth Daily       No current facility-administered medications for this visit. Allergies:    Allergies   Allergen Reactions    Diclofenac Other (See Comments)     Couldn't sleep and face turned blood red    Oxycodone-Acetaminophen Hives     itching    Doxycycline Rash         Subjective     REVIEW OF SYSTEMS: CONSTITUTIONAL: no fever, no night sweats, fatigue;  HEENT: no blurring of vision, no double vision, no hearing difficulty, no tinnitus, no ulceration, no dysplasia, no epistaxis;   LUNGS: no cough, no hemoptysis, no wheeze,  no shortness of breath;  CARDIOVASCULAR: no palpitation, no chest pain, no shortness of breath;  GI: no abdominal pain, no nausea, no vomiting, mild diarrhea, no constipation;  STUART: no dysuria, no hematuria, no frequency or urgency, no nephrolithiasis;  MUSCULOSKELETAL: no joint pain, no swelling, no stiffness;  ENDOCRINE: no polyuria, no polydipsia, no cold or heat intolerance;  HEMATOLOGY: no easy bruising or bleeding, no history of clotting disorder;  DERMATOLOGY:  bilateral plantar skin rash, no eczema, no pruritus;  PSYCHIATRY: no depression, no anxiety, no panic attacks, no suicidal ideation, no homicidal ideation;  NEUROLOGY: no syncope, no seizures, no numbness or tingling of hands, no numbness or tingling of feet, no paresis;       Objective BP (!) 143/89   Pulse 86   Temp 98.1 °F (36.7 °C)   Resp 18   Ht 5' 5\" (1.651 m)   Wt 164 lb 14.4 oz (74.8 kg)   SpO2 96%   BMI 27.44 kg/m²       PHYSICAL EXAM:  CONSTITUTIONAL: Alert, appropriate, no acute distress  EYES: Non icteric, EOM intact, pupils equal round   ENT: Mucus membranes moist, no oral pharyngeal lesions, external inspection of ears and nose are normal  NECK: Supple, no masses. No palpable thyroid mass  CHEST/LUNGS: CTA bilaterally, normal respiratory effort   CARDIOVASCULAR: RRR, no murmurs. No lower extremity edema  ABDOMEN: soft non-tender, active bowel sounds, no HSM. No palpable masses  EXTREMITIES: warm, full ROM in all 4 extremities, no focal weakness. SKIN: Bilateral plantar skin rash,   LYMPH: No cervical, clavicular, axillary, or inguinal lymphadenopathy  NEUROLOGIC: follows commands, non focal   PSYCH: mood and affect appropriate.   Alert and oriented to time, place, person      LABORATORY RESULTS REVIEWED/ANALYZED BY ME:  Lab Results   Component Value Date    WBC 13.69 (H) 12/03/2021    HGB 12.8 12/03/2021    HCT 36.8 12/03/2021    .8 (H) 12/03/2021     (H) 12/03/2021     Lab Results   Component Value Date    NEUTROABS 5.81 12/03/2021     Lab Results   Component Value Date     12/03/2021    K 4.3 12/03/2021     12/03/2021    CO2 29 12/03/2021    BUN 12 12/03/2021    CREATININE 0.6 12/03/2021    GLUCOSE 82 12/03/2021    CALCIUM 9.5 12/03/2021    PROT 6.8 12/03/2021    LABALBU 4.1 12/03/2021    BILITOT 0.9 12/03/2021    ALKPHOS 112 (H) 12/03/2021    AST 42 (H) 12/03/2021    ALT 15 12/03/2021    LABGLOM >60 12/03/2021    GFRAA >60 08/09/2021    GLOB 2.7 12/03/2021     12/3/2021-CA 19-9 = 10      RADIOLOGY STUDIES REVIEWED BY ME:   None    ASSESSMENT:    Orders Placed This Encounter   Procedures    CBC Auto Differential     Standing Status:   Future     Number of Occurrences:   1     Standing Expiration Date:   12/3/2022    Comprehensive Metabolic Panel     Standing Status:   Future     Number of Occurrences:   1     Standing Expiration Date:   12/3/2022    Cancer Antigen 19-9     Standing Status:   Future     Number of Occurrences:   1     Standing Expiration Date:   12/3/2022      Jaleesa Lezama was seen today for cancer. Diagnoses and all orders for this visit:    Malignant neoplasm of tail of pancreas (HCC)  -     CBC Auto Differential; Future  -     Comprehensive Metabolic Panel; Future  -     Cancer Antigen 19-9; Future    Chemotherapy management, encounter for    Adverse effect of chemotherapy, subsequent encounter    Care plan discussed with patient    Pancreatic tail adenocarcinoma pT2 N1 M0, June 2021  Essentially, node positive third of the pancreas adenocarcinoma. Status post distal pancreatectomy/splenectomy  I have recommended Gemzar/capecitabine due to better tolerance.       Proceed cycle number 4-day 1 adjuvant treatment with Xeloda/Gemzar    Patient instructed to take Xeloda day 1-14 only to decrease hand-foot syndrome toxicity. Chemotherapy-induced toxicity-fatigue, hand-foot syndrome     PLAN:  · RTC with MD 1/14/21 in treatment  · C# 4 day 1 Gemzar Capecitabine  · CBC CMP CA 19-9 today  · Decrease Capecitabine to D1-14 every 28 days x6 cycles  · RTC with MD 12/3/2021 in treatment room  · Repeat CT scans Feb 2022 and every 6 months  · CBC/CMP for toxicity monitoring    IKatelin am pre-charting as a registered nurse for Mellisa Rojas MD. Electronically signed by Katelin De Leon RN on 12/4/2021 at 11:48 AM CST. Janelle Akbar am scribing as Medical Assistant for Mellisa Rojas MD. Electronically signed by Stepan William MA on 12/4/2021 at 11:48 AM CST. I, Dr Shannon Whatley, personally performed the services described in this documentation as scribed by Stepan William MA in my presence and is both accurate and complete. I have seen, examined and reviewed this patient medication list, appropriate labs and imaging studies. I reviewed relevant medical records and others physicians notes. I discussed the plans of care with the patient. I answered all the questions to the patients satisfaction. I have also reviewed the chief complaint (CC) and part of the history (History of Present Illness (HPI), Past Family Social History [facility]), or Review of Systems (ROS) and made changes when appropriated.        (Please note that portions of this note were completed with a voice recognition program. Efforts were made to edit the dictations but occasionally words are mis-transcribed.)    Electronically signed by Mellisa Rojas MD on 12/4/2021 at 6:42 AM

## 2021-12-10 ENCOUNTER — HOSPITAL ENCOUNTER (OUTPATIENT)
Dept: INFUSION THERAPY | Age: 76
Discharge: HOME | End: 2021-12-10
Payer: MEDICARE

## 2021-12-10 DIAGNOSIS — C25.2 MALIGNANT NEOPLASM OF TAIL OF PANCREAS (HCC): Primary | ICD-10-CM

## 2021-12-10 LAB
ALBUMIN SERPL-MCNC: 4.1 G/DL (ref 3.5–5.2)
ALP BLD-CCNC: 104 U/L (ref 35–104)
ALT SERPL-CCNC: 15 U/L (ref 9–52)
ANION GAP SERPL CALCULATED.3IONS-SCNC: 6 MMOL/L (ref 7–19)
AST SERPL-CCNC: 35 U/L (ref 14–36)
BASOPHILS ABSOLUTE: 0.13 K/UL (ref 0.01–0.08)
BASOPHILS RELATIVE PERCENT: 1.7 % (ref 0.1–1.2)
BILIRUB SERPL-MCNC: 0.8 MG/DL (ref 0.2–1.3)
BUN BLDV-MCNC: 10 MG/DL (ref 7–17)
CALCIUM SERPL-MCNC: 9.6 MG/DL (ref 8.4–10.2)
CHLORIDE BLD-SCNC: 101 MMOL/L (ref 98–111)
CO2: 31 MMOL/L (ref 22–29)
CREAT SERPL-MCNC: 0.6 MG/DL (ref 0.5–1)
EOSINOPHILS ABSOLUTE: 0.3 K/UL (ref 0.04–0.54)
EOSINOPHILS RELATIVE PERCENT: 4 % (ref 0.7–7)
GFR NON-AFRICAN AMERICAN: >60
GLOBULIN: 2.8 G/DL
GLUCOSE BLD-MCNC: 97 MG/DL (ref 74–106)
HCT VFR BLD CALC: 37.2 % (ref 34.1–44.9)
HEMOGLOBIN: 12.8 G/DL (ref 11.2–15.7)
LYMPHOCYTES ABSOLUTE: 3.96 K/UL (ref 1.18–3.74)
LYMPHOCYTES RELATIVE PERCENT: 52.3 % (ref 19.3–53.1)
MCH RBC QN AUTO: 35.1 PG (ref 25.6–32.2)
MCHC RBC AUTO-ENTMCNC: 34.4 G/DL (ref 32.3–35.5)
MCV RBC AUTO: 101.9 FL (ref 79.4–94.8)
MONOCYTES ABSOLUTE: 1.42 K/UL (ref 0.24–0.82)
MONOCYTES RELATIVE PERCENT: 18.8 % (ref 4.7–12.5)
NEUTROPHILS ABSOLUTE: 1.76 K/UL (ref 1.56–6.13)
NEUTROPHILS RELATIVE PERCENT: 23.2 % (ref 34–71.1)
PDW BLD-RTO: 19.2 % (ref 11.7–14.4)
PLATELET # BLD: 257 K/UL (ref 182–369)
PMV BLD AUTO: 10.4 FL (ref 7.4–10.4)
POTASSIUM SERPL-SCNC: 4.1 MMOL/L (ref 3.5–5.1)
RBC # BLD: 3.65 M/UL (ref 3.93–5.22)
SODIUM BLD-SCNC: 138 MMOL/L (ref 137–145)
TOTAL PROTEIN: 6.9 G/DL (ref 6.3–8.2)
WBC # BLD: 7.57 K/UL (ref 3.98–10.04)

## 2021-12-10 PROCEDURE — 6360000002 HC RX W HCPCS: Performed by: INTERNAL MEDICINE

## 2021-12-10 PROCEDURE — 96375 TX/PRO/DX INJ NEW DRUG ADDON: CPT

## 2021-12-10 PROCEDURE — 80053 COMPREHEN METABOLIC PANEL: CPT

## 2021-12-10 PROCEDURE — 96367 TX/PROPH/DG ADDL SEQ IV INF: CPT

## 2021-12-10 PROCEDURE — 96413 CHEMO IV INFUSION 1 HR: CPT

## 2021-12-10 PROCEDURE — 85025 COMPLETE CBC W/AUTO DIFF WBC: CPT

## 2021-12-10 PROCEDURE — 36415 COLL VENOUS BLD VENIPUNCTURE: CPT

## 2021-12-10 PROCEDURE — 2580000003 HC RX 258: Performed by: INTERNAL MEDICINE

## 2021-12-10 RX ORDER — PALONOSETRON 0.05 MG/ML
0.25 INJECTION, SOLUTION INTRAVENOUS ONCE
Status: COMPLETED | OUTPATIENT
Start: 2021-12-10 | End: 2021-12-10

## 2021-12-10 RX ORDER — SODIUM CHLORIDE 0.9 % (FLUSH) 0.9 %
5-40 SYRINGE (ML) INJECTION PRN
Status: DISCONTINUED | OUTPATIENT
Start: 2021-12-10 | End: 2021-12-11 | Stop reason: HOSPADM

## 2021-12-10 RX ORDER — HEPARIN SODIUM (PORCINE) LOCK FLUSH IV SOLN 100 UNIT/ML 100 UNIT/ML
500 SOLUTION INTRAVENOUS PRN
Status: DISCONTINUED | OUTPATIENT
Start: 2021-12-10 | End: 2021-12-11 | Stop reason: HOSPADM

## 2021-12-10 RX ADMIN — ALTEPLASE 2 MG: 2.2 INJECTION, POWDER, LYOPHILIZED, FOR SOLUTION INTRAVENOUS at 10:15

## 2021-12-10 RX ADMIN — DEXAMETHASONE SODIUM PHOSPHATE: 10 INJECTION, SOLUTION INTRAMUSCULAR; INTRAVENOUS at 11:32

## 2021-12-10 RX ADMIN — WATER 2.2 ML: 1 INJECTION INTRAMUSCULAR; INTRAVENOUS; SUBCUTANEOUS at 11:12

## 2021-12-10 RX ADMIN — GEMCITABINE 1820 MG: 38 INJECTION, SOLUTION INTRAVENOUS at 11:56

## 2021-12-10 RX ADMIN — PALONOSETRON 0.25 MG: 0.05 INJECTION, SOLUTION INTRAVENOUS at 11:32

## 2021-12-10 RX ADMIN — SODIUM CHLORIDE, PRESERVATIVE FREE 10 ML: 5 INJECTION INTRAVENOUS at 12:31

## 2021-12-10 RX ADMIN — HEPARIN 500 UNITS: 100 SYRINGE at 12:30

## 2021-12-17 ENCOUNTER — HOSPITAL ENCOUNTER (OUTPATIENT)
Dept: INFUSION THERAPY | Age: 76
Discharge: HOME | End: 2021-12-17
Payer: MEDICARE

## 2021-12-17 VITALS
SYSTOLIC BLOOD PRESSURE: 136 MMHG | TEMPERATURE: 98.2 F | OXYGEN SATURATION: 98 % | HEIGHT: 65 IN | BODY MASS INDEX: 27.51 KG/M2 | HEART RATE: 94 BPM | WEIGHT: 165.1 LBS | DIASTOLIC BLOOD PRESSURE: 78 MMHG

## 2021-12-17 DIAGNOSIS — C25.2 MALIGNANT NEOPLASM OF TAIL OF PANCREAS (HCC): Primary | ICD-10-CM

## 2021-12-17 LAB
ALBUMIN SERPL-MCNC: 3.8 G/DL (ref 3.5–5.2)
ALP BLD-CCNC: 100 U/L (ref 35–104)
ALT SERPL-CCNC: 17 U/L (ref 5–33)
ANION GAP SERPL CALCULATED.3IONS-SCNC: 11 MMOL/L (ref 7–19)
AST SERPL-CCNC: 27 U/L (ref 5–32)
BASOPHILS ABSOLUTE: 0.1 K/UL (ref 0–0.2)
BASOPHILS RELATIVE PERCENT: 1.5 % (ref 0–1)
BILIRUB SERPL-MCNC: 0.3 MG/DL (ref 0.2–1.2)
BUN BLDV-MCNC: 8 MG/DL (ref 8–23)
CALCIUM SERPL-MCNC: 9.1 MG/DL (ref 8.8–10.2)
CHLORIDE BLD-SCNC: 101 MMOL/L (ref 98–111)
CO2: 25 MMOL/L (ref 22–29)
CREAT SERPL-MCNC: 0.6 MG/DL (ref 0.5–0.9)
EOSINOPHILS ABSOLUTE: 0.1 K/UL (ref 0–0.6)
EOSINOPHILS RELATIVE PERCENT: 1.3 % (ref 0–5)
GFR AFRICAN AMERICAN: >59
GFR NON-AFRICAN AMERICAN: >60
GLUCOSE BLD-MCNC: 188 MG/DL (ref 74–109)
HCT VFR BLD CALC: 36.8 % (ref 37–47)
HEMOGLOBIN: 12.2 G/DL (ref 12–16)
IMMATURE GRANULOCYTES #: 0.1 K/UL
LYMPHOCYTES ABSOLUTE: 3.1 K/UL (ref 1.1–4.5)
LYMPHOCYTES RELATIVE PERCENT: 55.7 % (ref 20–40)
MCH RBC QN AUTO: 34.1 PG (ref 27–31)
MCHC RBC AUTO-ENTMCNC: 33.2 G/DL (ref 33–37)
MCV RBC AUTO: 102.8 FL (ref 81–99)
MONOCYTES ABSOLUTE: 0.9 K/UL (ref 0–0.9)
MONOCYTES RELATIVE PERCENT: 16.2 % (ref 0–10)
NEUTROPHILS ABSOLUTE: 1.3 K/UL (ref 1.5–7.5)
NEUTROPHILS RELATIVE PERCENT: 24.2 % (ref 50–65)
PDW BLD-RTO: 19.3 % (ref 11.5–14.5)
PLATELET # BLD: 155 K/UL (ref 130–400)
PMV BLD AUTO: 10.3 FL (ref 9.4–12.3)
POTASSIUM SERPL-SCNC: 4.1 MMOL/L (ref 3.5–5)
RBC # BLD: 3.58 M/UL (ref 4.2–5.4)
SODIUM BLD-SCNC: 137 MMOL/L (ref 136–145)
TOTAL PROTEIN: 6.5 G/DL (ref 6.6–8.7)
WBC # BLD: 5.5 K/UL (ref 4.8–10.8)

## 2021-12-17 PROCEDURE — 2580000003 HC RX 258: Performed by: INTERNAL MEDICINE

## 2021-12-17 PROCEDURE — 6360000002 HC RX W HCPCS: Performed by: INTERNAL MEDICINE

## 2021-12-17 PROCEDURE — 80053 COMPREHEN METABOLIC PANEL: CPT

## 2021-12-17 PROCEDURE — 85025 COMPLETE CBC W/AUTO DIFF WBC: CPT

## 2021-12-17 PROCEDURE — 36415 COLL VENOUS BLD VENIPUNCTURE: CPT

## 2021-12-17 PROCEDURE — 96523 IRRIG DRUG DELIVERY DEVICE: CPT

## 2021-12-17 RX ORDER — SODIUM CHLORIDE 0.9 % (FLUSH) 0.9 %
5-40 SYRINGE (ML) INJECTION PRN
Status: DISCONTINUED | OUTPATIENT
Start: 2021-12-17 | End: 2021-12-18 | Stop reason: HOSPADM

## 2021-12-17 RX ORDER — SODIUM CHLORIDE 0.9 % (FLUSH) 0.9 %
5-40 SYRINGE (ML) INJECTION PRN
Status: CANCELLED | OUTPATIENT
Start: 2021-12-17

## 2021-12-17 RX ORDER — HEPARIN SODIUM (PORCINE) LOCK FLUSH IV SOLN 100 UNIT/ML 100 UNIT/ML
500 SOLUTION INTRAVENOUS PRN
Status: DISCONTINUED | OUTPATIENT
Start: 2021-12-17 | End: 2021-12-18 | Stop reason: HOSPADM

## 2021-12-17 RX ORDER — HEPARIN SODIUM (PORCINE) LOCK FLUSH IV SOLN 100 UNIT/ML 100 UNIT/ML
500 SOLUTION INTRAVENOUS PRN
Status: CANCELLED | OUTPATIENT
Start: 2021-12-17

## 2021-12-17 RX ADMIN — SODIUM CHLORIDE, PRESERVATIVE FREE 10 ML: 5 INJECTION INTRAVENOUS at 09:46

## 2021-12-17 RX ADMIN — HEPARIN 500 UNITS: 100 SYRINGE at 09:46

## 2021-12-17 NOTE — PROGRESS NOTES
Treatment held today (12/17/21) due to low neutrophil count. Per Dr. Andrew Bell, just skip this treatment (Cycle 4, Day 15) and pick back up with Cycle 5, Day 1).

## 2022-01-07 ENCOUNTER — HOSPITAL ENCOUNTER (OUTPATIENT)
Dept: INFUSION THERAPY | Age: 77
Discharge: HOME | End: 2022-01-07
Payer: MEDICARE

## 2022-01-07 VITALS
TEMPERATURE: 98.8 F | DIASTOLIC BLOOD PRESSURE: 87 MMHG | WEIGHT: 164.7 LBS | HEART RATE: 99 BPM | BODY MASS INDEX: 27.44 KG/M2 | SYSTOLIC BLOOD PRESSURE: 145 MMHG | OXYGEN SATURATION: 99 % | RESPIRATION RATE: 18 BRPM | HEIGHT: 65 IN

## 2022-01-07 DIAGNOSIS — C25.2 MALIGNANT NEOPLASM OF TAIL OF PANCREAS (HCC): Primary | ICD-10-CM

## 2022-01-07 DIAGNOSIS — T82.9XXA: Primary | ICD-10-CM

## 2022-01-07 LAB
BASOPHILS ABSOLUTE: 0.09 K/UL (ref 0.01–0.08)
BASOPHILS RELATIVE PERCENT: 0.5 % (ref 0.1–1.2)
EOSINOPHILS ABSOLUTE: 0.43 K/UL (ref 0.04–0.54)
EOSINOPHILS RELATIVE PERCENT: 2.5 % (ref 0.7–7)
HCT VFR BLD CALC: 43.4 % (ref 34.1–44.9)
HEMOGLOBIN: 14.9 G/DL (ref 11.2–15.7)
LYMPHOCYTES ABSOLUTE: 6.25 K/UL (ref 1.18–3.74)
LYMPHOCYTES RELATIVE PERCENT: 35.7 % (ref 19.3–53.1)
MCH RBC QN AUTO: 35.1 PG (ref 25.6–32.2)
MCHC RBC AUTO-ENTMCNC: 34.3 G/DL (ref 32.3–35.5)
MCV RBC AUTO: 102.4 FL (ref 79.4–94.8)
MONOCYTES ABSOLUTE: 2.77 K/UL (ref 0.24–0.82)
MONOCYTES RELATIVE PERCENT: 15.8 % (ref 4.7–12.5)
NEUTROPHILS ABSOLUTE: 7.97 K/UL (ref 1.56–6.13)
NEUTROPHILS RELATIVE PERCENT: 45.5 % (ref 34–71.1)
PDW BLD-RTO: 17.1 % (ref 11.7–14.4)
PLATELET # BLD: 394 K/UL (ref 182–369)
PMV BLD AUTO: 10.5 FL (ref 7.4–10.4)
RBC # BLD: 4.24 M/UL (ref 3.93–5.22)
WBC # BLD: 17.51 K/UL (ref 3.98–10.04)

## 2022-01-07 PROCEDURE — 96375 TX/PRO/DX INJ NEW DRUG ADDON: CPT

## 2022-01-07 PROCEDURE — 85025 COMPLETE CBC W/AUTO DIFF WBC: CPT

## 2022-01-07 PROCEDURE — 96367 TX/PROPH/DG ADDL SEQ IV INF: CPT

## 2022-01-07 PROCEDURE — 2580000003 HC RX 258: Performed by: NURSE PRACTITIONER

## 2022-01-07 PROCEDURE — 96413 CHEMO IV INFUSION 1 HR: CPT

## 2022-01-07 PROCEDURE — 6360000002 HC RX W HCPCS: Performed by: NURSE PRACTITIONER

## 2022-01-07 PROCEDURE — 96417 CHEMO IV INFUS EACH ADDL SEQ: CPT

## 2022-01-07 RX ORDER — METHYLPREDNISOLONE SODIUM SUCCINATE 125 MG/2ML
125 INJECTION, POWDER, LYOPHILIZED, FOR SOLUTION INTRAMUSCULAR; INTRAVENOUS ONCE
Status: CANCELLED | OUTPATIENT
Start: 2022-01-07 | End: 2022-01-07

## 2022-01-07 RX ORDER — SODIUM CHLORIDE 0.9 % (FLUSH) 0.9 %
5-40 SYRINGE (ML) INJECTION PRN
Status: CANCELLED | OUTPATIENT
Start: 2022-01-07

## 2022-01-07 RX ORDER — DIPHENHYDRAMINE HYDROCHLORIDE 50 MG/ML
50 INJECTION INTRAMUSCULAR; INTRAVENOUS ONCE
Status: CANCELLED | OUTPATIENT
Start: 2022-01-21 | End: 2022-01-21

## 2022-01-07 RX ORDER — SODIUM CHLORIDE 9 MG/ML
25 INJECTION, SOLUTION INTRAVENOUS PRN
Status: CANCELLED | OUTPATIENT
Start: 2022-01-21

## 2022-01-07 RX ORDER — SODIUM CHLORIDE 9 MG/ML
25 INJECTION, SOLUTION INTRAVENOUS PRN
Status: CANCELLED | OUTPATIENT
Start: 2022-01-14

## 2022-01-07 RX ORDER — PALONOSETRON 0.05 MG/ML
0.25 INJECTION, SOLUTION INTRAVENOUS ONCE
Status: CANCELLED
Start: 2022-01-14 | End: 2022-01-14

## 2022-01-07 RX ORDER — SODIUM CHLORIDE 9 MG/ML
25 INJECTION, SOLUTION INTRAVENOUS PRN
Status: CANCELLED | OUTPATIENT
Start: 2022-01-07

## 2022-01-07 RX ORDER — SODIUM CHLORIDE 0.9 % (FLUSH) 0.9 %
5-40 SYRINGE (ML) INJECTION PRN
Status: CANCELLED | OUTPATIENT
Start: 2022-01-21

## 2022-01-07 RX ORDER — PALONOSETRON 0.05 MG/ML
0.25 INJECTION, SOLUTION INTRAVENOUS ONCE
Status: COMPLETED | OUTPATIENT
Start: 2022-01-07 | End: 2022-01-07

## 2022-01-07 RX ORDER — EPINEPHRINE 1 MG/ML
0.3 INJECTION, SOLUTION, CONCENTRATE INTRAVENOUS PRN
Status: CANCELLED | OUTPATIENT
Start: 2022-01-21

## 2022-01-07 RX ORDER — SODIUM CHLORIDE 0.9 % (FLUSH) 0.9 %
5-40 SYRINGE (ML) INJECTION PRN
Status: CANCELLED | OUTPATIENT
Start: 2022-01-14

## 2022-01-07 RX ORDER — METHYLPREDNISOLONE SODIUM SUCCINATE 125 MG/2ML
125 INJECTION, POWDER, LYOPHILIZED, FOR SOLUTION INTRAMUSCULAR; INTRAVENOUS ONCE
Status: CANCELLED | OUTPATIENT
Start: 2022-01-21 | End: 2022-01-21

## 2022-01-07 RX ORDER — EPINEPHRINE 1 MG/ML
0.3 INJECTION, SOLUTION, CONCENTRATE INTRAVENOUS PRN
Status: CANCELLED | OUTPATIENT
Start: 2022-01-07

## 2022-01-07 RX ORDER — SODIUM CHLORIDE 9 MG/ML
20 INJECTION, SOLUTION INTRAVENOUS ONCE
Status: CANCELLED | OUTPATIENT
Start: 2022-01-07 | End: 2022-01-07

## 2022-01-07 RX ORDER — METHYLPREDNISOLONE SODIUM SUCCINATE 125 MG/2ML
125 INJECTION, POWDER, LYOPHILIZED, FOR SOLUTION INTRAMUSCULAR; INTRAVENOUS ONCE
Status: CANCELLED | OUTPATIENT
Start: 2022-01-14 | End: 2022-01-14

## 2022-01-07 RX ORDER — PALONOSETRON 0.05 MG/ML
0.25 INJECTION, SOLUTION INTRAVENOUS ONCE
Status: CANCELLED
Start: 2022-01-21 | End: 2022-01-21

## 2022-01-07 RX ORDER — SODIUM CHLORIDE 9 MG/ML
20 INJECTION, SOLUTION INTRAVENOUS ONCE
Status: COMPLETED | OUTPATIENT
Start: 2022-01-07 | End: 2022-01-07

## 2022-01-07 RX ORDER — SODIUM CHLORIDE 9 MG/ML
INJECTION, SOLUTION INTRAVENOUS CONTINUOUS
Status: CANCELLED | OUTPATIENT
Start: 2022-01-14

## 2022-01-07 RX ORDER — EPINEPHRINE 1 MG/ML
0.3 INJECTION, SOLUTION, CONCENTRATE INTRAVENOUS PRN
Status: CANCELLED | OUTPATIENT
Start: 2022-01-14

## 2022-01-07 RX ORDER — SODIUM CHLORIDE 9 MG/ML
INJECTION, SOLUTION INTRAVENOUS CONTINUOUS
Status: CANCELLED | OUTPATIENT
Start: 2022-01-21

## 2022-01-07 RX ORDER — HEPARIN SODIUM (PORCINE) LOCK FLUSH IV SOLN 100 UNIT/ML 100 UNIT/ML
500 SOLUTION INTRAVENOUS PRN
Status: CANCELLED | OUTPATIENT
Start: 2022-01-14

## 2022-01-07 RX ORDER — HEPARIN SODIUM (PORCINE) LOCK FLUSH IV SOLN 100 UNIT/ML 100 UNIT/ML
500 SOLUTION INTRAVENOUS PRN
Status: CANCELLED | OUTPATIENT
Start: 2022-01-21

## 2022-01-07 RX ORDER — SODIUM CHLORIDE 9 MG/ML
20 INJECTION, SOLUTION INTRAVENOUS ONCE
Status: CANCELLED | OUTPATIENT
Start: 2022-01-21 | End: 2022-01-21

## 2022-01-07 RX ORDER — HEPARIN SODIUM (PORCINE) LOCK FLUSH IV SOLN 100 UNIT/ML 100 UNIT/ML
500 SOLUTION INTRAVENOUS PRN
Status: CANCELLED | OUTPATIENT
Start: 2022-01-07

## 2022-01-07 RX ORDER — SODIUM CHLORIDE 9 MG/ML
INJECTION, SOLUTION INTRAVENOUS CONTINUOUS
Status: CANCELLED | OUTPATIENT
Start: 2022-01-07

## 2022-01-07 RX ORDER — PALONOSETRON 0.05 MG/ML
0.25 INJECTION, SOLUTION INTRAVENOUS ONCE
Status: CANCELLED
Start: 2022-01-07 | End: 2022-01-07

## 2022-01-07 RX ORDER — DIPHENHYDRAMINE HYDROCHLORIDE 50 MG/ML
50 INJECTION INTRAMUSCULAR; INTRAVENOUS ONCE
Status: CANCELLED | OUTPATIENT
Start: 2022-01-14 | End: 2022-01-14

## 2022-01-07 RX ORDER — SODIUM CHLORIDE 9 MG/ML
20 INJECTION, SOLUTION INTRAVENOUS ONCE
Status: CANCELLED | OUTPATIENT
Start: 2022-01-14 | End: 2022-01-14

## 2022-01-07 RX ORDER — DIPHENHYDRAMINE HYDROCHLORIDE 50 MG/ML
50 INJECTION INTRAMUSCULAR; INTRAVENOUS ONCE
Status: CANCELLED | OUTPATIENT
Start: 2022-01-07 | End: 2022-01-07

## 2022-01-07 RX ORDER — SODIUM CHLORIDE 0.9 % (FLUSH) 0.9 %
5-40 SYRINGE (ML) INJECTION PRN
Status: DISCONTINUED | OUTPATIENT
Start: 2022-01-07 | End: 2022-01-08 | Stop reason: HOSPADM

## 2022-01-07 RX ADMIN — SODIUM CHLORIDE 20 ML/HR: 9 INJECTION, SOLUTION INTRAVENOUS at 10:58

## 2022-01-07 RX ADMIN — DEXAMETHASONE SODIUM PHOSPHATE: 10 INJECTION, SOLUTION INTRAMUSCULAR; INTRAVENOUS at 11:00

## 2022-01-07 RX ADMIN — PALONOSETRON 0.25 MG: 0.05 INJECTION, SOLUTION INTRAVENOUS at 10:58

## 2022-01-07 RX ADMIN — GEMCITABINE 1820 MG: 38 INJECTION, SOLUTION INTRAVENOUS at 11:18

## 2022-01-07 NOTE — PROGRESS NOTES
Ankur CARTY at PT's bedside to examine PT. She lets PT know that a port study will be ordered after a covid screen per Hakan Coleraine . She states that the patient will be notified by staff of her appointment times. Ankur CARTY instructs the PT to notify this office or go to ER immediately if she experiences any new or worsening pain, fever, increased swelling or redness to RT side of neck. PT verbalizes understanding with no questions at this time.

## 2022-01-07 NOTE — PROGRESS NOTES
PT arrives to this office for gemzar tx. PT states she has had edema to hands and PCP has added a fluid pill to her medication regimen. PT reports increased neuropathy of her feet. PT states she is having pain in neck on side of port. PT's RT side of neck is visibly swollen and appears to be mildly red. PT states it is painful when this nurse gently palpates area. This RN notifies Zoodak Matt CARTY immediately. Melissa instructs this RN to access port to see if it will draw blood and flush. Port flushes without difficulty, no blood return noted. PT's port de accessed at this time. PT will be set up for port study and appointment with Dr. Genao to examine port site. Dr. Silva Lyman office is closed today due to inclement weather. Per Zoodak Matt CARTY we will continue with administering Gemzar today via peripheral IV site located to PT's [facility]. PT denies fever and her temp is WNL today.

## 2022-01-10 ENCOUNTER — TELEPHONE (OUTPATIENT)
Dept: VASCULAR SURGERY | Age: 77
End: 2022-01-10

## 2022-01-10 ENCOUNTER — OFFICE VISIT (OUTPATIENT)
Dept: HEMATOLOGY | Age: 77
End: 2022-01-10

## 2022-01-10 ENCOUNTER — OFFICE VISIT (OUTPATIENT)
Dept: SURGERY | Age: 77
End: 2022-01-10
Payer: MEDICARE

## 2022-01-10 ENCOUNTER — PREP FOR PROCEDURE (OUTPATIENT)
Dept: VASCULAR SURGERY | Age: 77
End: 2022-01-10

## 2022-01-10 ENCOUNTER — HOSPITAL ENCOUNTER (OUTPATIENT)
Dept: INFUSION THERAPY | Age: 77
Discharge: HOME | End: 2022-01-10

## 2022-01-10 VITALS
OXYGEN SATURATION: 97 % | HEART RATE: 104 BPM | TEMPERATURE: 97.5 F | SYSTOLIC BLOOD PRESSURE: 128 MMHG | BODY MASS INDEX: 27.32 KG/M2 | HEIGHT: 65 IN | WEIGHT: 164 LBS | DIASTOLIC BLOOD PRESSURE: 74 MMHG

## 2022-01-10 VITALS
HEART RATE: 107 BPM | WEIGHT: 164 LBS | BODY MASS INDEX: 27.32 KG/M2 | SYSTOLIC BLOOD PRESSURE: 120 MMHG | DIASTOLIC BLOOD PRESSURE: 60 MMHG | OXYGEN SATURATION: 96 % | HEIGHT: 65 IN

## 2022-01-10 DIAGNOSIS — C25.2 MALIGNANT NEOPLASM OF TAIL OF PANCREAS (HCC): Primary | ICD-10-CM

## 2022-01-10 DIAGNOSIS — Z11.59 SCREENING FOR VIRAL DISEASE: Primary | ICD-10-CM

## 2022-01-10 DIAGNOSIS — C25.2 MALIGNANT NEOPLASM OF TAIL OF PANCREAS (HCC): Chronic | ICD-10-CM

## 2022-01-10 DIAGNOSIS — I82.C11 JUGULAR VEIN OCCLUSION, RIGHT (HCC): Primary | ICD-10-CM

## 2022-01-10 DIAGNOSIS — T82.594A OBSTRUCTION OF CENTRAL LINE, INITIAL ENCOUNTER (HCC): ICD-10-CM

## 2022-01-10 DIAGNOSIS — C25.2 MALIGNANT NEOPLASM OF TAIL OF PANCREAS (HCC): ICD-10-CM

## 2022-01-10 LAB — SARS-COV-2, PCR: NOT DETECTED

## 2022-01-10 PROCEDURE — 3017F COLORECTAL CA SCREEN DOC REV: CPT | Performed by: PHYSICIAN ASSISTANT

## 2022-01-10 PROCEDURE — 99212 OFFICE O/P EST SF 10 MIN: CPT | Performed by: PHYSICIAN ASSISTANT

## 2022-01-10 PROCEDURE — 1090F PRES/ABSN URINE INCON ASSESS: CPT | Performed by: PHYSICIAN ASSISTANT

## 2022-01-10 PROCEDURE — G8417 CALC BMI ABV UP PARAM F/U: HCPCS | Performed by: PHYSICIAN ASSISTANT

## 2022-01-10 PROCEDURE — 4040F PNEUMOC VAC/ADMIN/RCVD: CPT | Performed by: PHYSICIAN ASSISTANT

## 2022-01-10 PROCEDURE — 1123F ACP DISCUSS/DSCN MKR DOCD: CPT | Performed by: PHYSICIAN ASSISTANT

## 2022-01-10 PROCEDURE — G8484 FLU IMMUNIZE NO ADMIN: HCPCS | Performed by: PHYSICIAN ASSISTANT

## 2022-01-10 PROCEDURE — 1036F TOBACCO NON-USER: CPT | Performed by: PHYSICIAN ASSISTANT

## 2022-01-10 PROCEDURE — G8400 PT W/DXA NO RESULTS DOC: HCPCS | Performed by: PHYSICIAN ASSISTANT

## 2022-01-10 PROCEDURE — G8427 DOCREV CUR MEDS BY ELIG CLIN: HCPCS | Performed by: PHYSICIAN ASSISTANT

## 2022-01-10 RX ORDER — LEVOTHYROXINE SODIUM 0.03 MG/1
TABLET ORAL
COMMUNITY
End: 2022-01-12

## 2022-01-10 RX ORDER — IBUPROFEN 800 MG
1 TABLET ORAL DAILY
COMMUNITY

## 2022-01-10 RX ORDER — HYDROCHLOROTHIAZIDE 12.5 MG/1
12.5 TABLET ORAL DAILY
COMMUNITY
End: 2022-01-12

## 2022-01-10 NOTE — H&P
Patient Care Team:  Rios Anderson DO as PCP - General (Internal Medicine)  Pranay Branch DO as Consulting Physician (Vascular Surgery)      Addendum: 1/11/22  Oliver Henry 77 y.o. female with a history of pancreatic cancer, status post Whipple procedure. She is having swelling in her right upper extremity and neck. We have been asked by Hem/Onc to perform a Port study. A port study was performed on 1/11/2022 by Dr. Willis Aldrich. This showed that the port was dislodged into the subcutaneous tissue. She  will need a new port placed.     Patient Active Problem List   Diagnosis    Traumatic incomplete tear of left rotator cuff    Malignant neoplasm of tail of pancreas ([facility] [address]      See attached meds  Allergies:  Diclofenac, Oxycodone-acetaminophen, and Doxycycline  Past Medical History:   Diagnosis Date    Anxiety     Arthritis     Depression     Hypertension     Hypoglycemia     if fasting for very long    Pancreatic cancer ([facility] [address] 06/2021    s/p Whipple by Dr. Asael Hernandez    Shoulder pain     incompleted rcr; possible injury    Thyroid disease       Past Surgical History:   Procedure Laterality Date   [address]  2020    Dr Ya Roa  06/28/2021    PORT SURGERY Right 9/21/2021    Single lumen PORT INSERTION with ultrasound and fluoroscopy performed by Avi Aviles DO at [address] Right 10/19/2017    SHOULDER ARTHROSCOPY ROTATOR CUFF REPAIR/ DEBRIDEMENT BICEPS TENODESIS/ TENOTOMY SUBACROMIAL DECOMPRESSION/ DISTAL CLAVICLE EXCISION performed by Onel Kellogg MD at [facility]  06/28/2021    UPPER GASTROINTESTINAL ENDOSCOPY  06/2021    Dr. Gorge Gant in [place]      spur excision      Family History   Adopted: Yes   Problem Relation Age of Onset    Breast Cancer Maternal Aunt     Breast Cancer Daughter     Cancer Daughter         Bladder    Cancer Maternal Uncle         Bladder      Social History     Tobacco Use    Smoking status: Never Smoker    Smokeless tobacco: Never Used   Substance Use Topics    Alcohol use: Not Currently     Comment: rare       HEENT __normocepahlic; sclera clear  Neck   Supple  Lungs -cta  Heart  rr  GI - Abdomen soft, non-tender  Extremities without cyanosis  Skin without significant lesions     Diagnosis: Pancreatic CA    Permit for removal of old port; placement of new port       Risks have been reviewed with the patient including but not limited to heart attack, death, CVA, bleeding, nerve injury, infection, steal, pain, and need for further surgery.       _______________________________________________________________________  Signature     Date    Time

## 2022-01-10 NOTE — PROGRESS NOTES
Doni Galeana Ms. Luna Alvarado is a 67-year-old female that over the past several days has had progressive right upper extremity swelling and swelling in the right side of her neck. She has a history of a right internal jugular port placement for systemic chemo due to her history of pancreatic cancer. States that the pain within the right arm and the right side of neck has been getting worse and she is also had some swelling. She states that her oncology service has attempted to order a port study but have been unsuccessful. She comes today for evaluation and management. She denies any fevers. Pain is worse with movement. Pain is relieved with rest.    Objective  Patient Active Problem List    Diagnosis Date Noted    Malignant neoplasm of tail of pancreas ([facility] [address] 06/24/2021    Traumatic incomplete tear of left rotator cuff 10/18/2017       Current Outpatient Medications   Medication Sig Dispense Refill    levothyroxine (SYNTHROID) 25 MCG tablet 1 tablet in the morning on an empty stomach      Cholecalciferol (VITAMIN D3) 10 MCG (400 UNIT) CAPS 1 tablet      hydroCHLOROthiazide (HYDRODIURIL) 12.5 MG tablet Take 12.5 mg by mouth daily Per pt Dr. Freda Emerson since 01/03/2022      capecitabine (XELODA) 500 MG chemo tablet Take 3 tablets by mouth 2 times a day for 21 days of a 28 day cycle 126 tablet 5    Magic Mouthwash (MIRACLE MOUTHWASH) Swish and spit 5 mLs 4 times daily as needed for Irritation 1/3 viscous lidocaine, 1/3 benadryl, 1/3 Maalox. 480 mL 1    lidocaine-prilocaine (EMLA) 2.5-2.5 % cream Apply topically as needed.  30 g 2    Caltrate 600+D Plus Minerals (CALTRATE) 600-800 MG-UNIT TABS tablet Take 1 tablet by mouth daily      lipase-protease-amylase (CREON) 32387-59340 units delayed release capsule Take 1 capsule by mouth 3 times daily (with meals) 90 capsule 5    busPIRone (BUSPAR) 5 MG tablet TAKE 1 TABLET BY MOUTH TWICE A DAY      losartan (COZAAR) 50 MG tablet TAKE 1 TABLET BY MOUTH EVERY DAY  promethazine (PHENERGAN) 12.5 MG tablet Take 1 tablet by mouth every 6 hours as needed for Nausea 30 tablet 5    ondansetron (ZOFRAN) 4 MG tablet Take 1 tablet by mouth every 8 hours as needed for Nausea or Vomiting 10 tablet 0    levothyroxine (SYNTHROID) 125 MCG tablet Take 125 mcg by mouth Daily       No current facility-administered medications for this visit.        Allergies: Diclofenac, Oxycodone-acetaminophen, and Doxycycline    Past Medical History:   Diagnosis Date    Anxiety     Arthritis     Depression     Hypertension     Hypoglycemia     if fasting for very long    Pancreatic cancer ([facility] [address] 06/2021    s/p Whipple by Dr. Patrica Lucas    Shoulder pain     incompleted rcr; possible injury    Thyroid disease        Past Surgical History:   Procedure Laterality Date    CHOLECYSTECTOMY  1991    COLONOSCOPY  2020    Dr Caresse Osgood  06/28/2021    PORT SURGERY Right 9/21/2021    Single lumen PORT INSERTION with ultrasound and fluoroscopy performed by Elzbieta Denny DO at [address] Right 10/19/2017    SHOULDER ARTHROSCOPY ROTATOR CUFF REPAIR/ DEBRIDEMENT BICEPS TENODESIS/ TENOTOMY SUBACROMIAL DECOMPRESSION/ DISTAL CLAVICLE EXCISION performed by Michael Oconnor MD at [address] [place] [address]  06/28/2021    UPPER GASTROINTESTINAL ENDOSCOPY  06/2021    Dr. Mykel Balderrama in [place]      spur excision       Family History   Adopted: Yes   Problem Relation Age of Onset    Breast Cancer Maternal Aunt     Breast Cancer Daughter     Cancer Daughter         Bladder    Cancer Maternal Uncle         Bladder       Social History     Tobacco Use    Smoking status: Never Smoker    Smokeless tobacco: Never Used   Substance Use Topics    Alcohol use: Not Currently     Comment: rare        Review of systems  Reviewed and positive for the above although system noted to be negative    Exam  Blood pressure 128/74, pulse 104, temperature 97.5 °F (36.4 °C), height 5' 5\" (1.651 m), weight 164 lb (74.4 kg), SpO2 97 %. On examination to her right upper extremity there is some edema of the right upper extremity and also into her neck. There is some mild to moderate pain to palpation. There is no evidence of occult infection. Assessment  Possible port occlusion  History of pancreatic cancer    Plan  I will asked vascular surgery to see for port study and further management as needed.

## 2022-01-10 NOTE — TELEPHONE ENCOUNTER
Shaquille Saenz with [facility] called to make us aware [facility] Hem/Onc is requesting a port study for the pt. The pt has swelling of the right side of her neck as well as her RUE. I then called and sw the pt to let her know I have her scheduled for her port study tomorrow with Dr. Yokasta Tang. The pt will need to arrive at [facility] at 1015 and be NPO the morning of. Hold HCTZ as well as Losartan. You will not need a  to take you home. The pt voiced understanding and is aware. The pt was sent for covid testing today in anticipation for tomorrow's procedure.

## 2022-01-11 ENCOUNTER — HOSPITAL ENCOUNTER (OUTPATIENT)
Dept: INTERVENTIONAL RADIOLOGY/VASCULAR | Age: 77
Discharge: HOME | End: 2022-01-11
Payer: MEDICARE

## 2022-01-11 DIAGNOSIS — C80.1 CANCER (HCC): ICD-10-CM

## 2022-01-11 DIAGNOSIS — Z01.818 PRE-OP TESTING: Primary | ICD-10-CM

## 2022-01-11 PROCEDURE — 6360000004 HC RX CONTRAST MEDICATION: Performed by: SURGERY

## 2022-01-11 PROCEDURE — 2709999900 IR CONTRAST INJECTION  EXISTING CV ACCESS DEVICE EVALUATION W FLUORO

## 2022-01-11 PROCEDURE — 36598 INJ W/FLUOR EVAL CV DEVICE: CPT | Performed by: SURGERY

## 2022-01-11 PROCEDURE — 36598 INJ W/FLUOR EVAL CV DEVICE: CPT

## 2022-01-11 RX ORDER — SODIUM CHLORIDE 9 MG/ML
25 INJECTION, SOLUTION INTRAVENOUS PRN
Status: CANCELLED | OUTPATIENT
Start: 2022-01-11

## 2022-01-11 RX ORDER — SODIUM CHLORIDE 0.9 % (FLUSH) 0.9 %
5-40 SYRINGE (ML) INJECTION EVERY 12 HOURS SCHEDULED
Status: CANCELLED | OUTPATIENT
Start: 2022-01-11

## 2022-01-11 RX ORDER — SODIUM CHLORIDE 0.9 % (FLUSH) 0.9 %
5-40 SYRINGE (ML) INJECTION PRN
Status: CANCELLED | OUTPATIENT
Start: 2022-01-11

## 2022-01-11 RX ORDER — IODIXANOL 320 MG/ML
INJECTION, SOLUTION INTRAVASCULAR
Status: COMPLETED | OUTPATIENT
Start: 2022-01-11 | End: 2022-01-11

## 2022-01-11 RX ADMIN — IODIXANOL 2 ML: 320 INJECTION, SOLUTION INTRAVASCULAR at 11:57

## 2022-01-11 NOTE — OP NOTE
Operative Note        Patient Name: Inna Wellington   YOB: 1945   MRN: [id number]     Procedure Date: 1/11/2022     Pre Op Diagnosis: port malfunction    Post Op Diagnosis: same    Procedure: Port study    Anesthesia: none    Estimated Blood Loss: Minimal    Complications: None    Implants: none    Procedure Details: Imaging of the port was performed and there was a question of port still being intravenous. The port was accessed in a sterile fashion and very tiny amount of injection of contrast manual was performed and showing the contrast in the subcutaneous tissue and tip no longer in the vein. We will schedule for port removal and placement of new port.     Findings: Port is outside of venous system    Disposition: home    Mike Davenport DO  1/11/2022 12:01 PM

## 2022-01-12 ENCOUNTER — HOSPITAL ENCOUNTER (OUTPATIENT)
Dept: GENERAL RADIOLOGY | Age: 77
Discharge: HOME | End: 2022-01-12
Payer: MEDICARE

## 2022-01-12 ENCOUNTER — TELEPHONE (OUTPATIENT)
Dept: VASCULAR SURGERY | Age: 77
End: 2022-01-12

## 2022-01-12 ENCOUNTER — HOSPITAL ENCOUNTER (OUTPATIENT)
Dept: PREADMISSION TESTING | Age: 77
Setting detail: OUTPATIENT SURGERY
Discharge: HOME | End: 2022-01-16
Payer: MEDICARE

## 2022-01-12 VITALS — WEIGHT: 165 LBS | HEIGHT: 65 IN | BODY MASS INDEX: 27.49 KG/M2

## 2022-01-12 DIAGNOSIS — Z01.818 PRE-OP TESTING: ICD-10-CM

## 2022-01-12 LAB
ANION GAP SERPL CALCULATED.3IONS-SCNC: 9 MMOL/L (ref 7–19)
APTT: 27.5 SEC (ref 26–36.2)
BUN BLDV-MCNC: 13 MG/DL (ref 8–23)
CALCIUM SERPL-MCNC: 9.4 MG/DL (ref 8.8–10.2)
CHLORIDE BLD-SCNC: 94 MMOL/L (ref 98–111)
CO2: 28 MMOL/L (ref 22–29)
CREAT SERPL-MCNC: 0.5 MG/DL (ref 0.5–0.9)
EKG P AXIS: 48 DEGREES
EKG P-R INTERVAL: 140 MS
EKG Q-T INTERVAL: 342 MS
EKG QRS DURATION: 78 MS
EKG QTC CALCULATION (BAZETT): 384 MS
EKG T AXIS: 16 DEGREES
GFR AFRICAN AMERICAN: >59
GFR NON-AFRICAN AMERICAN: >60
GLUCOSE BLD-MCNC: 156 MG/DL (ref 74–109)
HCT VFR BLD CALC: 41.7 % (ref 37–47)
HEMOGLOBIN: 13.5 G/DL (ref 12–16)
INR BLD: 0.97 (ref 0.88–1.18)
MCH RBC QN AUTO: 33.7 PG (ref 27–31)
MCHC RBC AUTO-ENTMCNC: 32.4 G/DL (ref 33–37)
MCV RBC AUTO: 104 FL (ref 81–99)
PDW BLD-RTO: 16.2 % (ref 11.5–14.5)
PLATELET # BLD: 310 K/UL (ref 130–400)
PMV BLD AUTO: 11.3 FL (ref 9.4–12.3)
POTASSIUM SERPL-SCNC: 4.3 MMOL/L (ref 3.5–5)
PROTHROMBIN TIME: 13.1 SEC (ref 12–14.6)
RBC # BLD: 4.01 M/UL (ref 4.2–5.4)
SODIUM BLD-SCNC: 131 MMOL/L (ref 136–145)
WBC # BLD: 11.7 K/UL (ref 4.8–10.8)

## 2022-01-12 PROCEDURE — 85610 PROTHROMBIN TIME: CPT

## 2022-01-12 PROCEDURE — 80048 BASIC METABOLIC PNL TOTAL CA: CPT

## 2022-01-12 PROCEDURE — 85730 THROMBOPLASTIN TIME PARTIAL: CPT

## 2022-01-12 PROCEDURE — 93005 ELECTROCARDIOGRAM TRACING: CPT

## 2022-01-12 PROCEDURE — 87641 MR-STAPH DNA AMP PROBE: CPT

## 2022-01-12 PROCEDURE — 85027 COMPLETE CBC AUTOMATED: CPT

## 2022-01-12 PROCEDURE — 71046 X-RAY EXAM CHEST 2 VIEWS: CPT

## 2022-01-12 RX ORDER — LOSARTAN POTASSIUM AND HYDROCHLOROTHIAZIDE 12.5; 5 MG/1; MG/1
1 TABLET ORAL DAILY
COMMUNITY

## 2022-01-12 RX ORDER — LEVOTHYROXINE SODIUM 137 UG/1
137 TABLET ORAL DAILY
COMMUNITY

## 2022-01-12 NOTE — TELEPHONE ENCOUNTER
I called the pt to discuss details of her surgery with Dr. Claudia Cormier. The following information was reviewed with the pt:          [facility]    [facility] D[facility]    1. Report to the outpatient registration at [facility] on [id number], at 6:00 am. Our surgery department will call you the day prior to confirm this time. 2. Nothing to eat or drink after midnight the night before the procedure. 3. Please take all medications as normally scheduled to take, including heart and blood pressure medicines with a sip of water. Except Losartan, and Hydrochlorothiazide, do not take these medications the morning of surgery. 4. Do not take Lasix, insulin, or any diabetic medicine the morning of the procedure. If you take insulin, you may only take 1/2 of any scheduled nightly dose, and none the morning of the procedure. You may take all regularly scheduled heart, cholesterol, and blood pressure medicines with a sip of water. 5. If you take Glucophage, Metformin, Actos Plus Met, or Glucovance,please tell our nurse. it may interfere with some of the medications given during surgery. 6. Hold Plavix/Coumadin for 0 days prior to surgery. 7. If you have sleep apnea and require C-PAP, please bring this with you to the hospital.  8. Bring a list of all of your allergies and medications with you to the hospital.  9. Please let our nurse know if you have had an allergy to iodine, shellfish, or x-ray dye. 10. Let the nurse know if you take any of the followin. Over the counter herbal supplements  2. Diclofenec, indomethacin, ketoprofen, Caridopa/levadopa, naproxen, sulindac, piroxicam, glucosamine, Chondrotin, cocchine, or methotrexate. 11. Plan to stay at the hospital for 4 - 6 hours before being released  by the physician. You will need someone to drive you home after the procedure. 12. Medications instructed to hold: See Above  13. Please stop at your local [employer] or pharmacy and buy a bottle of Hibiclens. Wash thoroughly with this the night before and the morning of the procedure, paying special attention to the area that will be operated on. Make sure you rinse very well. The Hibiclens should only be used prior to surgery. 14. Please register at the [facility] Patient Registration on Wed. 1/12/2022 at 9:00 am for pre-op and covid testing. You will not need to fast prior. 15. New policy requires that anyone who comes into the hospital will be required to wear a face mask. A cloth mask is acceptable. 16. To ensure the health and safety of our patients and staff, [facility] has implemented visitor restrictions. Only one person will be allowed to accompany you for your procedure. If you or your visitor are exhibiting signs & symptoms of illness such as fever, cough, sore throat or body aches, we ask that you reschedule your procedure to a later date after your symptoms have been resolved. 17. Other Directions: If you have any questions or concerns please contact our office at [phone] and ask for Shelle Oppenheim. Unless instructed otherwise by your physician, cleanse incision/puncture site twice daily with soap and water. Apply dry gauze. Do not get in tub. Okay to shower. Do not apply any salve, cream, peroxide or alcohol to the incision. Call with any increasing redness or drainage      PLEASE NOTE:  If the patient is unable to sign his/her own paperwork, the appointed caregiver (POA, child, sibling, etc) must be present at the time of registration for all testing and procedures. Transportation to and from all testing and procedure appointments is the sole responsibility of the patient, caregiver, and/or nursing facility in which they reside. Please remember you will not be able to drive after you are discharged. Please call the office at [phone] with any questions or concerns. Please allow 48-72 hours notice for cancellations or rescheduling.  We will attempt to accommodate your needs to the best of our capabilities, however, strict policies with procedure room availability does not allow much flexibility.

## 2022-01-13 ENCOUNTER — TELEPHONE (OUTPATIENT)
Dept: VASCULAR SURGERY | Age: 77
End: 2022-01-13

## 2022-01-13 ENCOUNTER — ANESTHESIA (OUTPATIENT)
Dept: OPERATING ROOM | Age: 77
End: 2022-01-13
Payer: MEDICARE

## 2022-01-13 ENCOUNTER — APPOINTMENT (OUTPATIENT)
Dept: INTERVENTIONAL RADIOLOGY/VASCULAR | Age: 77
End: 2022-01-13
Attending: SURGERY
Payer: MEDICARE

## 2022-01-13 ENCOUNTER — ANESTHESIA EVENT (OUTPATIENT)
Dept: OPERATING ROOM | Age: 77
End: 2022-01-13
Payer: MEDICARE

## 2022-01-13 ENCOUNTER — HOSPITAL ENCOUNTER (OUTPATIENT)
Age: 77
Setting detail: OUTPATIENT SURGERY
Discharge: HOME | End: 2022-01-13
Attending: SURGERY | Admitting: SURGERY
Payer: MEDICARE

## 2022-01-13 VITALS — OXYGEN SATURATION: 97 % | TEMPERATURE: 97 F | DIASTOLIC BLOOD PRESSURE: 72 MMHG | SYSTOLIC BLOOD PRESSURE: 155 MMHG

## 2022-01-13 VITALS
BODY MASS INDEX: 27.49 KG/M2 | DIASTOLIC BLOOD PRESSURE: 80 MMHG | RESPIRATION RATE: 16 BRPM | HEIGHT: 65 IN | SYSTOLIC BLOOD PRESSURE: 155 MMHG | TEMPERATURE: 97.6 F | WEIGHT: 165 LBS | OXYGEN SATURATION: 100 % | HEART RATE: 88 BPM

## 2022-01-13 DIAGNOSIS — C25.2 MALIGNANT NEOPLASM OF TAIL OF PANCREAS (HCC): Primary | Chronic | ICD-10-CM

## 2022-01-13 LAB — MRSA SCREEN RT-PCR: NOT DETECTED

## 2022-01-13 PROCEDURE — 77001 FLUOROGUIDE FOR VEIN DEVICE: CPT

## 2022-01-13 PROCEDURE — 7100000011 HC PHASE II RECOVERY - ADDTL 15 MIN: Performed by: SURGERY

## 2022-01-13 PROCEDURE — 2709999900 HC NON-CHARGEABLE SUPPLY: Performed by: SURGERY

## 2022-01-13 PROCEDURE — 6370000000 HC RX 637 (ALT 250 FOR IP): Performed by: SURGERY

## 2022-01-13 PROCEDURE — 3700000000 HC ANESTHESIA ATTENDED CARE: Performed by: SURGERY

## 2022-01-13 PROCEDURE — 7100000001 HC PACU RECOVERY - ADDTL 15 MIN: Performed by: SURGERY

## 2022-01-13 PROCEDURE — 36582 REPLACE TUNNELED CV CATH: CPT | Performed by: SURGERY

## 2022-01-13 PROCEDURE — 2500000003 HC RX 250 WO HCPCS

## 2022-01-13 PROCEDURE — C1788 PORT, INDWELLING, IMP: HCPCS | Performed by: SURGERY

## 2022-01-13 PROCEDURE — 6360000002 HC RX W HCPCS

## 2022-01-13 PROCEDURE — 6360000002 HC RX W HCPCS: Performed by: PHYSICIAN ASSISTANT

## 2022-01-13 PROCEDURE — 7100000010 HC PHASE II RECOVERY - FIRST 15 MIN: Performed by: SURGERY

## 2022-01-13 PROCEDURE — 6360000002 HC RX W HCPCS: Performed by: SURGERY

## 2022-01-13 PROCEDURE — 2580000003 HC RX 258: Performed by: SURGERY

## 2022-01-13 PROCEDURE — 3700000001 HC ADD 15 MINUTES (ANESTHESIA): Performed by: SURGERY

## 2022-01-13 PROCEDURE — 77001 FLUOROGUIDE FOR VEIN DEVICE: CPT | Performed by: SURGERY

## 2022-01-13 PROCEDURE — 36590 REMOVAL TUNNELED CV CATH: CPT | Performed by: SURGERY

## 2022-01-13 PROCEDURE — 7100000000 HC PACU RECOVERY - FIRST 15 MIN: Performed by: SURGERY

## 2022-01-13 PROCEDURE — 3600000013 HC SURGERY LEVEL 3 ADDTL 15MIN: Performed by: SURGERY

## 2022-01-13 PROCEDURE — 3600000003 HC SURGERY LEVEL 3 BASE: Performed by: SURGERY

## 2022-01-13 DEVICE — PORT INFUS PLAS SGL LUMN W/ 9.6FR SIL CATH AIRGUARD VLV: Type: IMPLANTABLE DEVICE | Status: FUNCTIONAL

## 2022-01-13 RX ORDER — CEFAZOLIN SODIUM 2 G/100ML
2000 INJECTION, SOLUTION INTRAVENOUS
Status: COMPLETED | OUTPATIENT
Start: 2022-01-13 | End: 2022-01-13

## 2022-01-13 RX ORDER — SODIUM CHLORIDE 0.9 % (FLUSH) 0.9 %
5-40 SYRINGE (ML) INJECTION EVERY 12 HOURS SCHEDULED
Status: DISCONTINUED | OUTPATIENT
Start: 2022-01-13 | End: 2022-01-13 | Stop reason: HOSPADM

## 2022-01-13 RX ORDER — PROPOFOL 10 MG/ML
INJECTION, EMULSION INTRAVENOUS PRN
Status: DISCONTINUED | OUTPATIENT
Start: 2022-01-13 | End: 2022-01-13 | Stop reason: SDUPTHER

## 2022-01-13 RX ORDER — PROMETHAZINE HYDROCHLORIDE 25 MG/ML
6.25 INJECTION, SOLUTION INTRAMUSCULAR; INTRAVENOUS
Status: DISCONTINUED | OUTPATIENT
Start: 2022-01-13 | End: 2022-01-13 | Stop reason: HOSPADM

## 2022-01-13 RX ORDER — MEPERIDINE HYDROCHLORIDE 25 MG/ML
12.5 INJECTION INTRAMUSCULAR; INTRAVENOUS; SUBCUTANEOUS EVERY 5 MIN PRN
Status: DISCONTINUED | OUTPATIENT
Start: 2022-01-13 | End: 2022-01-13 | Stop reason: HOSPADM

## 2022-01-13 RX ORDER — HYDROCODONE BITARTRATE AND ACETAMINOPHEN 5; 325 MG/1; MG/1
1 TABLET ORAL EVERY 8 HOURS PRN
Qty: 15 TABLET | Refills: 0 | Status: SHIPPED | OUTPATIENT
Start: 2022-01-13 | End: 2022-01-18

## 2022-01-13 RX ORDER — HYDRALAZINE HYDROCHLORIDE 20 MG/ML
5 INJECTION INTRAMUSCULAR; INTRAVENOUS EVERY 10 MIN PRN
Status: DISCONTINUED | OUTPATIENT
Start: 2022-01-13 | End: 2022-01-13 | Stop reason: HOSPADM

## 2022-01-13 RX ORDER — LIDOCAINE HYDROCHLORIDE 10 MG/ML
INJECTION, SOLUTION EPIDURAL; INFILTRATION; INTRACAUDAL; PERINEURAL PRN
Status: DISCONTINUED | OUTPATIENT
Start: 2022-01-13 | End: 2022-01-13 | Stop reason: SDUPTHER

## 2022-01-13 RX ORDER — SODIUM CHLORIDE 9 MG/ML
25 INJECTION, SOLUTION INTRAVENOUS PRN
Status: DISCONTINUED | OUTPATIENT
Start: 2022-01-13 | End: 2022-01-13 | Stop reason: HOSPADM

## 2022-01-13 RX ORDER — ENALAPRILAT 2.5 MG/2ML
1.25 INJECTION INTRAVENOUS
Status: DISCONTINUED | OUTPATIENT
Start: 2022-01-13 | End: 2022-01-13 | Stop reason: HOSPADM

## 2022-01-13 RX ORDER — DIPHENHYDRAMINE HYDROCHLORIDE 50 MG/ML
12.5 INJECTION INTRAMUSCULAR; INTRAVENOUS
Status: DISCONTINUED | OUTPATIENT
Start: 2022-01-13 | End: 2022-01-13 | Stop reason: HOSPADM

## 2022-01-13 RX ORDER — FENTANYL CITRATE 50 UG/ML
INJECTION, SOLUTION INTRAMUSCULAR; INTRAVENOUS PRN
Status: DISCONTINUED | OUTPATIENT
Start: 2022-01-13 | End: 2022-01-13 | Stop reason: SDUPTHER

## 2022-01-13 RX ORDER — LABETALOL HYDROCHLORIDE 5 MG/ML
5 INJECTION, SOLUTION INTRAVENOUS EVERY 10 MIN PRN
Status: DISCONTINUED | OUTPATIENT
Start: 2022-01-13 | End: 2022-01-13 | Stop reason: HOSPADM

## 2022-01-13 RX ORDER — HYDROCODONE BITARTRATE AND ACETAMINOPHEN 5; 325 MG/1; MG/1
1 TABLET ORAL
Status: COMPLETED | OUTPATIENT
Start: 2022-01-13 | End: 2022-01-13

## 2022-01-13 RX ORDER — METOCLOPRAMIDE HYDROCHLORIDE 5 MG/ML
10 INJECTION INTRAMUSCULAR; INTRAVENOUS
Status: DISCONTINUED | OUTPATIENT
Start: 2022-01-13 | End: 2022-01-13 | Stop reason: HOSPADM

## 2022-01-13 RX ORDER — SODIUM CHLORIDE 0.9 % (FLUSH) 0.9 %
5-40 SYRINGE (ML) INJECTION PRN
Status: DISCONTINUED | OUTPATIENT
Start: 2022-01-13 | End: 2022-01-13 | Stop reason: HOSPADM

## 2022-01-13 RX ADMIN — PROPOFOL 50 MG: 10 INJECTION, EMULSION INTRAVENOUS at 08:02

## 2022-01-13 RX ADMIN — FENTANYL CITRATE 50 MCG: 50 INJECTION, SOLUTION INTRAMUSCULAR; INTRAVENOUS at 08:27

## 2022-01-13 RX ADMIN — PROPOFOL 200 MG: 10 INJECTION, EMULSION INTRAVENOUS at 08:05

## 2022-01-13 RX ADMIN — LIDOCAINE HYDROCHLORIDE 100 MG: 10 INJECTION, SOLUTION EPIDURAL; INFILTRATION; INTRACAUDAL; PERINEURAL at 08:05

## 2022-01-13 RX ADMIN — FENTANYL CITRATE 100 MCG: 50 INJECTION, SOLUTION INTRAMUSCULAR; INTRAVENOUS at 08:05

## 2022-01-13 RX ADMIN — CEFAZOLIN SODIUM 2000 MG: 2 INJECTION, SOLUTION INTRAVENOUS at 08:05

## 2022-01-13 RX ADMIN — FENTANYL CITRATE 50 MCG: 50 INJECTION, SOLUTION INTRAMUSCULAR; INTRAVENOUS at 08:46

## 2022-01-13 RX ADMIN — HYDROCODONE BITARTRATE AND ACETAMINOPHEN 1 TABLET: 5; 325 TABLET ORAL at 10:11

## 2022-01-13 ASSESSMENT — PAIN SCALES - GENERAL
PAINLEVEL_OUTOF10: 6
PAINLEVEL_OUTOF10: 2
PAINLEVEL_OUTOF10: 6
PAINLEVEL_OUTOF10: 6

## 2022-01-13 ASSESSMENT — PAIN DESCRIPTION - LOCATION
LOCATION: INCISION
LOCATION: INCISION

## 2022-01-13 ASSESSMENT — PAIN DESCRIPTION - PAIN TYPE
TYPE: SURGICAL PAIN
TYPE: SURGICAL PAIN

## 2022-01-13 ASSESSMENT — PAIN DESCRIPTION - DESCRIPTORS: DESCRIPTORS: ACHING

## 2022-01-13 NOTE — BRIEF OP NOTE
Brief Postoperative Note      Patient: Neal Ch  YOB: 1945  MRN: [id number]    Date of Procedure: 1/13/2022    Pre-Op Diagnosis: I87.8    Post-Op Diagnosis: Same       Procedure(s):  RIGHT SIDE MEDIPORT REMOVAL, LEFT SIDE MEDIPORT INSERTION    Surgeon(s):  Erika Scott DO    Assistant:  * No surgical staff found *    Anesthesia: General    Estimated Blood Loss (mL): less than 50     Complications: None    Specimens:   * No specimens in log *    Implants:  Implant Name Type Inv. Item Serial No.  Lot No. LRB No. Used Action   PORT INFUS PLAS SGL LUMN W/ 9.6FR MICHAEL CATH AIRGUARD VLV  PORT INFUS PLAS SGL LUMN W/ 9.6FR MICHAEL CATH AIRGUARD VLV  [employer]- [id number] Left 1 Implanted         Drains: * No LDAs found *    Findings: tip of the inserted chemoport at the SVC atrial junction. No signs of infection at the removed chemoport site.     Electronically signed by Erika Scott DO on 1/13/2022 at 9:03 AM

## 2022-01-13 NOTE — OP NOTE
Operative Note      Patient: Humberto De Dios  YOB: 1945  MRN: [id number]    Date of Procedure: 1/13/2022    Pre-Op Diagnosis: I87.8    Post-Op Diagnosis: Same       Procedure(s):  RIGHT SIDE MEDIPORT REMOVAL, LEFT SIDE MEDIPORT INSERTION    Surgeon(s):  Karo Hannah DO    Assistant:   * No surgical staff found *    Anesthesia: General    Estimated Blood Loss (mL): less than 50     Complications: None    Specimens:   * No specimens in log *    Implants:  Implant Name Type Inv. Item Serial No.  Lot No. LRB No. Used Action   PORT INFUS PLAS SGL LUMN W/ 9.6FR MICHAEL CATH AIRGUARD VLV  PORT INFUS PLAS SGL LUMN W/ 9.6FR MICHAEL CATH AIRGUARD VLV  [employer]WD [id number] Left 1 Implanted         Drains: * No LDAs found *    Findings: Left jugular vein patent. Catheter tip of the inserted chemoport at the SVC atrial junction. No signs of infection at the removed chemoport site. Procedure in Detail:    After the patient was consented and given IV antibiotics, she was brought to the operating room and placed on the operating room table in the supine position. General was administered, and the patient's bilateral chest and neck were prepped and draped in the usual sterile fashion. The internal jugular vein was cannulated with a micropuncture needle under ultrasound guidance. An 0.018 wire was placed through the needle and then the needle was replaced with a micropuncture catheter. The 0.018 wire was exchanged for an 0.035 wire, and this wire was placed into the inferior vena cava utilizing fluoroscopy. The micropuncture catheter was removed and a small incision was made over the wire in the neck with knife. Next, an incision was made in the chest with knife. Subcutaneous tissue was dissected with bovie electrocautery and a subcutaneous pocket was created inferior to the chest incision with bovie electrocautery and blunt dissection. Hemostasis was excellent.   Moistened gauze was placed in the pocket and on the skin around the neck and chest incisions so the port and catheter didn't touch the skin. The catheter was tunneled subcutaneously from the chest wound to the neck wound. A dilator/tear away sheath was placed over the wire without resistance under fluoroscopic visualization. The catheter was placed through the tear away sheath until the tip is in the right atrium/superior vena cava junction. It was then cut to length and then attached to the port with the attachment device. The port was placed in the previously created subcutaneous pocket and secured to pectoralis fascia with two 2-0 prolene sutures. The port aspirated and flushed easily with heparinized saline utilizing a go needle. The tip of the catheter was visualized in the right atrium/superior vena cava junction by fluoroscopy. Next, an incision was made in the right chest with knife through the previous scar. Subcutaneous tissue was dissected with bovie electrocautery. The catheter was dissected away from the tunnel. The catheter was removed through this tunnel and the tunnel was ligated with 3-0 PDS suture. The port, including capsule and anchoring sutures were then completely removed the bovie electrocautery and sharp dissection. They were passed off the field. Hemostasis was achieved with bovie electrocautery. The both sides neck wound was closed with 4-0 monocryl subcuticular suture and dermabond skin adhesive. The chest wound was closed with 3-0 PDS subcutaneous suture, 4-0 monocryl subcuticular suture and dermabond skin adhesive. The patient tolerated the procedure well and was brought to the recovery room in good condition. Her port can be used when needed.           Electronically signed by Silvana Reid DO on 1/13/2022 at 9:05 AM

## 2022-01-13 NOTE — ANESTHESIA POSTPROCEDURE EVALUATION
Department of Anesthesiology  Postprocedure Note    Patient: Jessica Nava  MRN: [id number]  YOB: 1945  Date of evaluation: 1/13/2022  Time:  9:10 AM     Procedure Summary     Date: 01/13/22 Room / Location: [address]    Anesthesia Start: 0923 Anesthesia Stop: 9631    Procedure: RIGHT SIDE MEDIPORT REMOVAL, LEFT SIDE [address] (Right ) Diagnosis: (I87.8)    Surgeons: Meggan Martins DO Responsible Provider: MACHELLE Moreno CRNA    Anesthesia Type: General ASA Status: 3          Anesthesia Type: General    Kole Phase I:      Kole Phase II:      Last vitals: Reviewed and per EMR flowsheets.        Anesthesia Post Evaluation

## 2022-01-13 NOTE — INTERVAL H&P NOTE
Update History & Physical    The patient's History and Physical of January 10, 2022 was reviewed with the patient and I examined the patient. There was no change. The surgical site was confirmed by the patient and me. Plan: The risks, benefits, expected outcome, and alternative to the recommended procedure have been discussed with the patient. Patient understands and wants to proceed with the procedure.      Electronically signed by Shakeel Sullivan DO on 1/13/2022 at 7:49 AM

## 2022-01-13 NOTE — ANESTHESIA PRE PROCEDURE
Department of Anesthesiology  Preprocedure Note       Name:  Neal Ch   Age:  77 y.o.  :  1945                                          MRN:  [id number]         Date:  2022      Surgeon: Isabella Hood):  Erika Scott DO    Procedure: Procedure(s):  RIGHT SIDE MEDIPORT EXCHANGE    Medications prior to admission:   Prior to Admission medications    Medication Sig Start Date End Date Taking? Authorizing Provider   losartan-hydroCHLOROthiazide (HYZAAR) 50-12.5 MG per tablet Take 1 tablet by mouth daily Indications: High Blood Pressure Disorder    Historical Provider, MD   levothyroxine (SYNTHROID) 137 MCG tablet Take 137 mcg by mouth Daily Indications: Underactive Thyroid    Historical Provider, MD   Cholecalciferol (VITAMIN D3) 10 MCG (400 UNIT) CAPS Take 1 capsule by mouth daily     Historical Provider, MD   capecitabine (XELODA) 500 MG chemo tablet Take 3 tablets by mouth 2 times a day for 21 days of a 28 day cycle  Patient taking differently: Take by mouth 2 times daily Take 3 tablets by mouth 2 times a day for 21 days of a 28 day cycle 21   Kathryn Fry MD   Magic Mouthwash (MIRACLE MOUTHWASH) Swish and spit 5 mLs 4 times daily as needed for Irritation 1/3 viscous lidocaine, 1/3 benadryl, 1/3 Maalox. 10/11/21   Kathryn Fry MD   lidocaine-prilocaine (EMLA) 2.5-2.5 % cream Apply topically as needed.  21   Kathryn Fry MD   Caltrate 600+D Plus Minerals (CALTRATE) 600-800 MG-UNIT TABS tablet Take 1 tablet by mouth daily    Historical Provider, MD   lipase-protease-amylase (CREON) 30515-15070 units delayed release capsule Take 1 capsule by mouth 3 times daily (with meals) 9/10/21   Kathryn Fry MD   busPIRone (BUSPAR) 5 MG tablet Take 5 mg by mouth 3 times daily  7/15/21   Historical Provider, MD   promethazine (PHENERGAN) 12.5 MG tablet Take 1 tablet by mouth every 6 hours as needed for Nausea 21   Kathryn Fry MD   ondansetron (ZOFRAN) 4 MG tablet Take 1 tablet by mouth every 8 hours as needed for Nausea or Vomiting 10/19/17   Bryan Pearson MD       Current medications:    No current facility-administered medications for this visit. No current outpatient medications on file. Facility-Administered Medications Ordered in Other Visits   Medication Dose Route Frequency Provider Last Rate Last Admin    0.9 % sodium chloride infusion  25 mL IntraVENous PRN Tiny R Legereayni PA-C        ceFAZolin (ANCEF) 2000 mg in dextrose 4 % 100 mL IVPB (premix)  2,000 mg IntraVENous On Call to OR [facility], PA-ISIS        sodium chloride flush 0.9 % injection 5-40 mL  5-40 mL IntraVENous 2 times per day [facility], PA-ISIS        sodium chloride flush 0.9 % injection 5-40 mL  5-40 mL IntraVENous PRN Tiny R Legereit, PA-C           Allergies:     Allergies   Allergen Reactions    Diclofenac Other (See Comments)     Couldn't sleep and face turned blood red    Oxycodone-Acetaminophen Hives     itching    Doxycycline Rash       Problem List:    Patient Active Problem List   Diagnosis Code    Traumatic incomplete tear of left rotator cuff S46.012A    Malignant neoplasm of tail of pancreas (HCC) C25.2    Cancer ([facility] [address] C80.1       Past Medical History:        Diagnosis Date    Anxiety     Arthritis     Depression     Hypertension     Hypoglycemia     if fasting for very long    Pancreatic cancer ([facility] [address] 06/2021    s/p Whipple by Dr. John Bosch    Shoulder pain     incompleted rcr; possible injury    Thyroid disease        Past Surgical History:        Procedure Laterality Date   [facility]  2020    Dr Bry Moody, COLON, DIAGNOSTIC     [facility]  06/28/2021    PORT SURGERY Right 9/21/2021    Single lumen PORT INSERTION with ultrasound and fluoroscopy performed by Wendy Carbajal DO at [address] [place] [address] Right 10/19/2017    SHOULDER ARTHROSCOPY ROTATOR CUFF REPAIR/ DEBRIDEMENT BICEPS TENODESIS/ TENOTOMY SUBACROMIAL DECOMPRESSION/ DISTAL CLAVICLE EXCISION performed by Crys Hardy MD at [facility]  06/28/2021    UPPER GASTROINTESTINAL ENDOSCOPY  06/2021    Dr. Alex Hennessy in [place]      spur excision       Social History:    Social History     Tobacco Use    Smoking status: Never Smoker    Smokeless tobacco: Never Used   Substance Use Topics    Alcohol use: Not Currently     Comment: rare                                Counseling given: Not Answered      Vital Signs (Current): There were no vitals filed for this visit. BP Readings from Last 3 Encounters:   01/13/22 132/80   01/10/22 128/74   01/10/22 120/60       NPO Status:                                                                                 BMI:   Wt Readings from Last 3 Encounters:   01/13/22 165 lb (74.8 kg)   01/12/22 165 lb (74.8 kg)   01/10/22 164 lb (74.4 kg)     There is no height or weight on file to calculate BMI.    CBC:   Lab Results   Component Value Date    WBC 11.7 01/12/2022    RBC 4.01 01/12/2022    HGB 13.5 01/12/2022    HCT 41.7 01/12/2022    .0 01/12/2022    RDW 16.2 01/12/2022     01/12/2022       CMP:   Lab Results   Component Value Date     01/12/2022    K 4.3 01/12/2022    CL 94 01/12/2022    CO2 28 01/12/2022    BUN 13 01/12/2022    CREATININE 0.5 01/12/2022    GFRAA >59 01/12/2022    LABGLOM >60 01/12/2022    GLUCOSE 156 01/12/2022    PROT 6.5 12/17/2021    CALCIUM 9.4 01/12/2022    BILITOT 0.3 12/17/2021    ALKPHOS 100 12/17/2021    AST 27 12/17/2021    ALT 17 12/17/2021       POC Tests: No results for input(s): POCGLU, POCNA, POCK, POCCL, POCBUN, POCHEMO, POCHCT in the last 72 hours.     Coags:   Lab Results   Component Value Date    PROTIME 13.1 01/12/2022    INR 0.97 01/12/2022    APTT 27.5 01/12/2022       HCG (If Applicable): No results found for: PREGTESTUR, PREGSERUM, HCG, HCGQUANT     ABGs: No results found for: PHART, PO2ART, WFG8ESX, ZYK7XYJ, BEART, D8KGQSBP     Type & Screen (If Applicable):  No results found for: LABABO, LABRH    Drug/Infectious Status (If Applicable):  No results found for: HIV, HEPCAB    COVID-19 Screening (If Applicable):   Lab Results   Component Value Date    COVID19 Not Detected 01/10/2022           Anesthesia Evaluation  Patient summary reviewed and Nursing notes reviewed   history of anesthetic complications: PONV. Airway: Mallampati: II  TM distance: >3 FB   Neck ROM: full  Mouth opening: > = 3 FB Dental: normal exam         Pulmonary:Negative Pulmonary ROS and normal exam                               Cardiovascular:    (+) hypertension:,     (-) past MI    ECG reviewed               Beta Blocker:  Not on Beta Blocker      ROS comment: 9/20/21 - ECG - 77 BPM   Sinus rhythm  Comparison Summary: No serial comparison made  Summary: Normal ECG     Neuro/Psych:   (+) psychiatric history:   (-) seizures and CVA           GI/Hepatic/Renal:   (+) GERD: well controlled,           Endo/Other:    (+) hypothyroidism::., malignancy/cancer. (-) diabetes mellitus                ROS comment: Pancreatic ca Abdominal:             Vascular: negative vascular ROS. Other Findings:               Anesthesia Plan      MAC     ASA 3     (Decadron and zofran intraop)  Induction: intravenous. MIPS: Postoperative opioids intended and Prophylactic antiemetics administered. Anesthetic plan and risks discussed with patient. Plan discussed with CRNA.                   Desirae Bobo MD   1/13/2022

## 2022-01-14 ENCOUNTER — HOSPITAL ENCOUNTER (OUTPATIENT)
Dept: INFUSION THERAPY | Age: 77
Discharge: HOME | End: 2022-01-14
Payer: MEDICARE

## 2022-01-14 ENCOUNTER — HOSPITAL ENCOUNTER (OUTPATIENT)
Dept: VASCULAR LAB | Age: 77
Discharge: HOME | End: 2022-01-14
Payer: MEDICARE

## 2022-01-14 VITALS
HEART RATE: 101 BPM | DIASTOLIC BLOOD PRESSURE: 87 MMHG | RESPIRATION RATE: 20 BRPM | SYSTOLIC BLOOD PRESSURE: 152 MMHG | WEIGHT: 165.8 LBS | TEMPERATURE: 98.5 F | OXYGEN SATURATION: 100 % | BODY MASS INDEX: 27.63 KG/M2 | HEIGHT: 65 IN

## 2022-01-14 DIAGNOSIS — M79.601 PAIN, ARM, RIGHT: ICD-10-CM

## 2022-01-14 DIAGNOSIS — C25.2 MALIGNANT NEOPLASM OF TAIL OF PANCREAS (HCC): Primary | ICD-10-CM

## 2022-01-14 DIAGNOSIS — R60.0 EDEMA OF RIGHT UPPER EXTREMITY: ICD-10-CM

## 2022-01-14 DIAGNOSIS — I82.621 ACUTE DEEP VEIN THROMBOSIS (DVT) OF RIGHT UPPER EXTREMITY, UNSPECIFIED VEIN (HCC): Primary | ICD-10-CM

## 2022-01-14 LAB
ALBUMIN SERPL-MCNC: 3.8 G/DL (ref 3.5–5.2)
ALP BLD-CCNC: 116 U/L (ref 35–104)
ALT SERPL-CCNC: 14 U/L (ref 9–52)
ANION GAP SERPL CALCULATED.3IONS-SCNC: 8 MMOL/L (ref 7–19)
AST SERPL-CCNC: 31 U/L (ref 14–36)
BASOPHILS ABSOLUTE: 0.05 K/UL (ref 0.01–0.08)
BASOPHILS RELATIVE PERCENT: 0.3 % (ref 0.1–1.2)
BILIRUB SERPL-MCNC: 0.3 MG/DL (ref 0.2–1.3)
BUN BLDV-MCNC: 8 MG/DL (ref 7–17)
CALCIUM SERPL-MCNC: 8.9 MG/DL (ref 8.4–10.2)
CHLORIDE BLD-SCNC: 97 MMOL/L (ref 98–111)
CO2: 30 MMOL/L (ref 22–29)
CREAT SERPL-MCNC: 0.5 MG/DL (ref 0.5–1)
EOSINOPHILS ABSOLUTE: 0.28 K/UL (ref 0.04–0.54)
EOSINOPHILS RELATIVE PERCENT: 1.9 % (ref 0.7–7)
GFR NON-AFRICAN AMERICAN: >60
GLOBULIN: 3.4 G/DL
GLUCOSE BLD-MCNC: 98 MG/DL (ref 74–106)
HCT VFR BLD CALC: 36.2 % (ref 34.1–44.9)
HEMOGLOBIN: 12.4 G/DL (ref 11.2–15.7)
LYMPHOCYTES ABSOLUTE: 6.21 K/UL (ref 1.18–3.74)
LYMPHOCYTES RELATIVE PERCENT: 42.6 % (ref 19.3–53.1)
MCH RBC QN AUTO: 34.4 PG (ref 25.6–32.2)
MCHC RBC AUTO-ENTMCNC: 34.3 G/DL (ref 32.3–35.5)
MCV RBC AUTO: 100.6 FL (ref 79.4–94.8)
MONOCYTES ABSOLUTE: 2.66 K/UL (ref 0.24–0.82)
MONOCYTES RELATIVE PERCENT: 18.2 % (ref 4.7–12.5)
NEUTROPHILS ABSOLUTE: 5.39 K/UL (ref 1.56–6.13)
NEUTROPHILS RELATIVE PERCENT: 37 % (ref 34–71.1)
PDW BLD-RTO: 16.1 % (ref 11.7–14.4)
PLATELET # BLD: 255 K/UL (ref 182–369)
PMV BLD AUTO: 9.6 FL (ref 7.4–10.4)
POTASSIUM SERPL-SCNC: 4.2 MMOL/L (ref 3.5–5.1)
RBC # BLD: 3.6 M/UL (ref 3.93–5.22)
SODIUM BLD-SCNC: 135 MMOL/L (ref 137–145)
TOTAL PROTEIN: 7.2 G/DL (ref 6.3–8.2)
WBC # BLD: 14.59 K/UL (ref 3.98–10.04)

## 2022-01-14 PROCEDURE — 96413 CHEMO IV INFUSION 1 HR: CPT

## 2022-01-14 PROCEDURE — 96375 TX/PRO/DX INJ NEW DRUG ADDON: CPT

## 2022-01-14 PROCEDURE — 80053 COMPREHEN METABOLIC PANEL: CPT

## 2022-01-14 PROCEDURE — 36415 COLL VENOUS BLD VENIPUNCTURE: CPT

## 2022-01-14 PROCEDURE — 2580000003 HC RX 258: Performed by: NURSE PRACTITIONER

## 2022-01-14 PROCEDURE — 93971 EXTREMITY STUDY: CPT

## 2022-01-14 PROCEDURE — 96367 TX/PROPH/DG ADDL SEQ IV INF: CPT

## 2022-01-14 PROCEDURE — 85025 COMPLETE CBC W/AUTO DIFF WBC: CPT

## 2022-01-14 PROCEDURE — 6360000002 HC RX W HCPCS: Performed by: NURSE PRACTITIONER

## 2022-01-14 RX ORDER — SODIUM CHLORIDE 9 MG/ML
20 INJECTION, SOLUTION INTRAVENOUS ONCE
Status: COMPLETED | OUTPATIENT
Start: 2022-01-14 | End: 2022-01-14

## 2022-01-14 RX ORDER — PALONOSETRON 0.05 MG/ML
0.25 INJECTION, SOLUTION INTRAVENOUS ONCE
Status: COMPLETED | OUTPATIENT
Start: 2022-01-14 | End: 2022-01-14

## 2022-01-14 RX ORDER — HEPARIN SODIUM (PORCINE) LOCK FLUSH IV SOLN 100 UNIT/ML 100 UNIT/ML
500 SOLUTION INTRAVENOUS PRN
Status: DISCONTINUED | OUTPATIENT
Start: 2022-01-14 | End: 2022-01-15 | Stop reason: HOSPADM

## 2022-01-14 RX ORDER — SODIUM CHLORIDE 0.9 % (FLUSH) 0.9 %
5-40 SYRINGE (ML) INJECTION PRN
Status: DISCONTINUED | OUTPATIENT
Start: 2022-01-14 | End: 2022-01-15 | Stop reason: HOSPADM

## 2022-01-14 RX ADMIN — GEMCITABINE 1820 MG: 38 INJECTION, SOLUTION INTRAVENOUS at 12:09

## 2022-01-14 RX ADMIN — SODIUM CHLORIDE, PRESERVATIVE FREE 10 ML: 5 INJECTION INTRAVENOUS at 12:44

## 2022-01-14 RX ADMIN — PALONOSETRON 0.25 MG: 0.05 INJECTION, SOLUTION INTRAVENOUS at 11:22

## 2022-01-14 RX ADMIN — HEPARIN 500 UNITS: 100 SYRINGE at 12:44

## 2022-01-14 RX ADMIN — DEXAMETHASONE SODIUM PHOSPHATE: 10 INJECTION, SOLUTION INTRAMUSCULAR; INTRAVENOUS at 11:23

## 2022-01-14 RX ADMIN — SODIUM CHLORIDE 250 ML/HR: 9 INJECTION, SOLUTION INTRAVENOUS at 11:23

## 2022-01-14 NOTE — PROGRESS NOTES
Pt presents for D8 C5 Gemzar. She recently had right side implanted port removed, and a new port placed on the left. She c/o right arm swelling. On assessment, right arm is very swollen from shoulder to hand, she states is painful. Dr. Lucia Chamberlain notified; order received to obtain ultrasound to rule out clot. Arrangements made for pt to have US at [address] this afternoon. Discussed this with pt; she verbalizes understanding.

## 2022-01-14 NOTE — PROGRESS NOTES
Ultrasound of right arm shows DVT. Per Dr. Hannah Campos, pt will need to take Eliquis 10 mg PO BID x7 days, then Eliquis 5 mg PO BID until discontiued by MD. Call placed to pt; no answer, message left. Rx sent to [facility] for above meds.

## 2022-01-18 NOTE — PROGRESS NOTES
MEDICAL ONCOLOGY PROGRESS NOTE    Pt Name: Kinga Ho  MRN: [id number]  YOB: 1945  Date of evaluation: 1/21/2022    HISTORY OF PRESENT ILLNESS:    Patient is a pleasant 77years old female who has been diagnosed with invasive ductal adenocarcinoma of the pancreas, node positive. She is status post laparoscopic assisted distal pancreatectomy and splenectomy in June 2021. She is current receiving adjuvant chemotherapy with Gemzar/Xeloda. She presents today for cycle number 5-day 15. She has been tolerated treatment with complaints of fatigue. Hand and foot toxicity has improved. She is compliant with her treatment. Denies any significant diarrhea nausea vomiting. Diagnosis  · Invasive ductal adenocarcinoma, pancreas, June 2021  · pT2 N1 M0  · Strong family history for breast cancer  · Positive JESUSITA gene mutation  · DVT right upper extremity, Jan 2022    Treatment Summary  · 6/28/21 Laparoscopic assisted distal pancreatectomy and splenectomy with 1 of 6 lymph nodes positive. Surgeon Dr. Shira Cardoza   · 8/13/2021-adjuvant Gemzar 1000 mg/m2 IV D 1, D8, D15 with Capecitabine 830 mg/m2 D1-21 every 28 days x6 cycles  · 1/14/22 Eliquis    Cancer History  Hattie Orourke was first seen by me on 8/6/2021. The patient was referred for a diagnosis of pancreatic malignancy. She had initial complaints of abdominal pain. · 5/5/20 MRI abdomen [facility]): Moderate fatty replacement of the pancreas with several small cystic structures within or adjacent to the pancreatic head and uncinate process most suggestive of benign cysts. A follow-up MRI of the abdomen may be obtained in 1 year to assess for stability. No solid lesion is seen. Otherwise unremarkable MRI of the abdomen. · 4/22/20 CT abd/pelvis [facility]): Focal inflammatory stranding associated with the pancreatic head. Radiographically I would favor this to represent acute pancreatitis.  There is mild associated stranding within the descending and proximal transverse duodenum. There are 2 well-circumscribed hypodensities one in the region of the uncinate process and one slightly anterior to the pancreatic head likely representing sequela of pancreatitis but warranting follow-up on subsequent exam. The body and tail of the pancreas are spared. No evidence of pancreatic necrosis. The patient is status post hysterectomy and cholecystectomy. Bibasilar subsegmental atelectasis. No evidence of nephrolithiasis or obstructive uropathy. Diverticulosis of the distal descending and sigmoid colon without evidence of diverticulitis. · 5/5/21 MRI [facility]) There are multiple tiny-to-small foci of increased T2 signal seen within the pancreas, consistent with a combination of cysts and/or beaded dilatation of the tributaries of the main pancreatic duct, increased in size and number when compared to the prior similar study of 5/5/20. The patient is again noted to be status post cholecystectomy. The examination is otherwise within normal limits for age with no abnormal postcontrast enhancement noted. · 6/3/21 Upper EUS (Dr Alvaro Rene/[place]): CT Visualized portion of pancreas abnormal on ultrasound. There is a 26.7 mm solid mass in pancreatic tail. It abuts the splenic vessel without occlusion or varices. Pancreatic duct is dilated to 6 mm in the tail proximal to the mass. FNA x4 yields atypical cells. Celiac axis, liver appear normal, no local nodes. Body and head of pancreas normal.  · 6/3/21 Pancreas tail mass, FNA aspiration smear (x2), ThinPrep and cell block: Adenocarcinoma. · 6/7/21 CA 19-9 407.1  · 6/28/21-she underwent Whipple procedure pancreatectomy by Dr. Tammy Soares: Invasive ductal carcinoma 4.3 cm in greatest dimension, invasive in the peripancreatic adipose tissue, diffuse perineural invasion. Surgical margins were negative for carcinoma. The pancreas margins involved by a low-grade dysplasia.  1/6 nodes positive for metastatic cancer with direct extension. Distal fibrosis and pancreatitis was noted. Spleen was unremarkable. Pathological stage pT2 N1 M0  · 8/6/2021-she was first seen by me. Recommend adjuvant chemotherapy with gemcitabine/Xeloda. She acknowledged understanding and agree with treatment. · 8/09/2021 CT Chest W/Contrast No evidence of intrathoracic metastatic disease is identified. · 8/09/2021 CT Abd/Pelvis W/Oral Contrast Prior distal pancreatectomy and splenectomy. There is a 1.5 cm circumscribed, nonenhancing cystic lesion along the inferior pancreatic head, perhaps a branch IPMN. Otherwise, no obvious pancreatic solid mass is seen. No suspicious adenopathy or evidence of distant metastatic disease in the abdomen or pelvis. Prior cholecystectomy. Liver steatosis. Colonic diverticulosis. · 8/13/2021-initiation of adjuvant capecitabine/Gemzar   · 9/10/2021-I reviewed CT chest abdomen pelvis.   · 1/21/2022-CA 19-9 = 10    Past Medical History:    Past Medical History:   Diagnosis Date    Anxiety     Arthritis     Depression     Hypertension     Hypoglycemia     if fasting for very long    Pancreatic cancer ([facility] [address] 06/2021    s/p Whipple by Dr. Can Found    Shoulder pain     incompleted rcr; possible injury    Thyroid disease        Past Surgical History:    Past Surgical History:   Procedure Laterality Date   [address]  2020    Dr Brooks Marinelli, COLON, DIAGNOSTIC     [facility]  06/28/2021    PORT SURGERY Right 9/21/2021    Single lumen PORT INSERTION with ultrasound and fluoroscopy performed by Mary Justice DO at [place] [address] Right 1/13/2022    RIGHT SIDE MEDIPORT REMOVAL, LEFT SIDE MEDIPORT INSERTION performed by Miguelangel Go DO at [address] [place] [address] ARTHROSCOP,SURG,W/ROTAT CUFF REPR Right 10/19/2017    SHOULDER ARTHROSCOPY ROTATOR CUFF REPAIR/ DEBRIDEMENT BICEPS TENODESIS/ TENOTOMY SUBACROMIAL DECOMPRESSION/ DISTAL CLAVICLE EXCISION performed by Jeovanny Samuel MD at [address]  06/28/2021    UPPER GASTROINTESTINAL ENDOSCOPY  06/2021    Dr. Praful Stinson in [place]      spur excision       Social History:    Marital status:   Smoking status: no  ETOH status: no  [facility]    Family History:   Family History   Adopted: Yes   Problem Relation Age of Onset    Breast Cancer Maternal Aunt     Breast Cancer Daughter     Cancer Daughter         Bladder    Cancer Maternal Uncle         Bladder       Current Hospital Medications:    Current Outpatient Medications   Medication Sig Dispense Refill    apixaban (ELIQUIS) 5 MG TABS tablet Take 2 tablets by mouth 2 times daily for 7 days 28 tablet 0    apixaban (ELIQUIS) 5 MG TABS tablet Take 1 tablet by mouth 2 times daily 60 tablet 3    losartan-hydroCHLOROthiazide (HYZAAR) 50-12.5 MG per tablet Take 1 tablet by mouth daily Indications: High Blood Pressure Disorder      levothyroxine (SYNTHROID) 137 MCG tablet Take 137 mcg by mouth Daily Indications: Underactive Thyroid      Cholecalciferol (VITAMIN D3) 10 MCG (400 UNIT) CAPS Take 1 capsule by mouth daily       capecitabine (XELODA) 500 MG chemo tablet Take 3 tablets by mouth 2 times a day for 21 days of a 28 day cycle (Patient taking differently: Take by mouth 2 times daily Take 3 tablets by mouth 2 times a day for 21 days of a 28 day cycle) 126 tablet 5    Magic Mouthwash (MIRACLE MOUTHWASH) Swish and spit 5 mLs 4 times daily as needed for Irritation 1/3 viscous lidocaine, 1/3 benadryl, 1/3 Maalox. 480 mL 1    lidocaine-prilocaine (EMLA) 2.5-2.5 % cream Apply topically as needed.  30 g 2    Caltrate 600+D Plus Minerals (CALTRATE) 600-800 MG-UNIT TABS tablet Take 1 tablet by mouth daily      lipase-protease-amylase (CREON) 61647-68817 units delayed release capsule Take 1 capsule by mouth 3 times daily (with meals) 90 capsule 5    busPIRone (BUSPAR) 5 MG tablet Take 5 mg by mouth 3 times daily       promethazine (PHENERGAN) 12.5 MG tablet Take 1 tablet by mouth every 6 hours as needed for Nausea 30 tablet 5    ondansetron (ZOFRAN) 4 MG tablet Take 1 tablet by mouth every 8 hours as needed for Nausea or Vomiting 10 tablet 0     No current facility-administered medications for this visit. Facility-Administered Medications Ordered in Other Visits   Medication Dose Route Frequency Provider Last Rate Last Admin    0.9 % sodium chloride infusion  20 mL/hr IntraVENous Once MACHELLE Schreiber 20 mL/hr at 01/21/22 1010 20 mL/hr at 01/21/22 1010    gemcitabine HCl (GEMZAR) 1,820 mg in sodium chloride 0.9 % 250 mL chemo IVPB  1,000 mg/m2 (Treatment Plan Recorded) IntraVENous Once MACHELLE Angeles 595.7 mL/hr at 01/21/22 1030 1,820 mg at 01/21/22 1030    sodium chloride flush 0.9 % injection 5-40 mL  5-40 mL IntraVENous PRN MACHELLE Schreiber        heparin flush 100 UNIT/ML injection 500 Units  500 Units IntraCATHeter PRN Genenazanin Jansen APRN           Allergies:    Allergies   Allergen Reactions    Diclofenac Other (See Comments)     Couldn't sleep and face turned blood red    Oxycodone-Acetaminophen Hives     itching    Doxycycline Rash         Subjective     REVIEW OF SYSTEMS:   CONSTITUTIONAL: no fever, no night sweats,  fatigue;  HEENT: no blurring of vision, no double vision, no hearing difficulty, no tinnitus, no ulceration, no dysplasia, no epistaxis;   LUNGS: no cough, no hemoptysis, no wheeze,  no shortness of breath;  CARDIOVASCULAR: no palpitation, no chest pain, no shortness of breath;  GI: no abdominal pain, no nausea, no vomiting, no diarrhea, no constipation;  STUART: no dysuria, no hematuria, no frequency or urgency, no nephrolithiasis;  MUSCULOSKELETAL: no joint pain, no swelling, no stiffness;  ENDOCRINE: no polyuria, no polydipsia, no cold or heat intolerance;  HEMATOLOGY: no easy bruising or bleeding, no history of clotting disorder;  DERMATOLOGY: no skin rash, no eczema, no pruritus;  PSYCHIATRY: no depression, no anxiety, no panic attacks, no suicidal ideation, no homicidal ideation;  NEUROLOGY: no syncope, no seizures, no numbness or tingling of hands, no numbness or tingling of feet, no paresis;     Objective /84   Pulse 86   Temp 98.6 °F (37 °C)   Resp 18   Ht 5' 5\" (1.651 m)   Wt 163 lb 8 oz (74.2 kg)   SpO2 98%   BMI 27.21 kg/m²       PHYSICAL EXAM:  CONSTITUTIONAL: Alert, appropriate, no acute distress  EYES: Non icteric, EOM intact, pupils equal round   ENT: Mucus membranes moist, no oral pharyngeal lesions, external inspection of ears and nose are normal.  NECK: Supple, no masses. No palpable thyroid mass  CHEST/LUNGS: CTA bilaterally, normal respiratory effort   CARDIOVASCULAR: RRR, no murmurs. No lower extremity edema  ABDOMEN: soft non-tender, active bowel sounds, no HSM. No palpable masses  EXTREMITIES: warm, full ROM in all 4 extremities, no focal weakness. SKIN: warm, dry with no rashes or lesions  LYMPH: No cervical, clavicular, axillary, or inguinal lymphadenopathy  NEUROLOGIC: follows commands, non focal   PSYCH: mood and affect appropriate.   Alert and oriented to time, place, person       LABORATORY RESULTS REVIEWED/ANALYZED BY ME:  12/3/2021-CA 19-9 = 10  Lab Results   Component Value Date    WBC 6.52 01/21/2022    HGB 12.4 01/21/2022    HCT 36.1 01/21/2022    .1 (H) 01/21/2022     01/21/2022     Lab Results   Component Value Date    NEUTROABS 1.50 (L) 01/21/2022     Lab Results   Component Value Date     (L) 01/21/2022    K 4.2 01/21/2022    CL 97 (L) 01/21/2022    CO2 32 (H) 01/21/2022    BUN 8 01/21/2022    CREATININE 0.6 01/21/2022    GLUCOSE 89 01/21/2022    CALCIUM 9.2 01/21/2022    PROT 7.1 01/21/2022    LABALBU 4.0 01/21/2022    BILITOT 0.3 01/21/2022    ALKPHOS 101 01/21/2022    AST 41 (H) 01/21/2022    ALT 22 01/21/2022    LABGLOM >60 01/21/2022    GFRAA >59 01/12/2022    GLOB 3.1 01/21/2022 documentation as scribed by Diamond Torres RN in my presence and is both accurate and complete. Electronically signed by Jia Horan MD on 1/22/2022 at 9:58 AM

## 2022-01-21 ENCOUNTER — OFFICE VISIT (OUTPATIENT)
Dept: HEMATOLOGY | Age: 77
End: 2022-01-21
Payer: MEDICARE

## 2022-01-21 ENCOUNTER — HOSPITAL ENCOUNTER (OUTPATIENT)
Dept: INFUSION THERAPY | Age: 77
Discharge: HOME | End: 2022-01-21
Payer: MEDICARE

## 2022-01-21 VITALS
BODY MASS INDEX: 27.24 KG/M2 | TEMPERATURE: 98.6 F | DIASTOLIC BLOOD PRESSURE: 84 MMHG | RESPIRATION RATE: 18 BRPM | WEIGHT: 163.5 LBS | HEART RATE: 86 BPM | OXYGEN SATURATION: 98 % | HEIGHT: 65 IN | SYSTOLIC BLOOD PRESSURE: 129 MMHG

## 2022-01-21 DIAGNOSIS — Z71.89 CARE PLAN DISCUSSED WITH PATIENT: ICD-10-CM

## 2022-01-21 DIAGNOSIS — T45.1X5A CHEMOTHERAPY-INDUCED NEUTROPENIA (HCC): ICD-10-CM

## 2022-01-21 DIAGNOSIS — T45.1X5D ADVERSE EFFECT OF CHEMOTHERAPY, SUBSEQUENT ENCOUNTER: ICD-10-CM

## 2022-01-21 DIAGNOSIS — C25.2 MALIGNANT NEOPLASM OF TAIL OF PANCREAS (HCC): ICD-10-CM

## 2022-01-21 DIAGNOSIS — C25.2 MALIGNANT NEOPLASM OF TAIL OF PANCREAS (HCC): Primary | ICD-10-CM

## 2022-01-21 DIAGNOSIS — D70.1 CHEMOTHERAPY-INDUCED NEUTROPENIA (HCC): ICD-10-CM

## 2022-01-21 DIAGNOSIS — I82.621 DEEP VEIN THROMBOSIS (DVT) OF RIGHT UPPER EXTREMITY, UNSPECIFIED CHRONICITY, UNSPECIFIED VEIN (HCC): ICD-10-CM

## 2022-01-21 DIAGNOSIS — Z51.11 CHEMOTHERAPY MANAGEMENT, ENCOUNTER FOR: Primary | ICD-10-CM

## 2022-01-21 DIAGNOSIS — C25.9 ADENOCARCINOMA OF PANCREAS (HCC): ICD-10-CM

## 2022-01-21 LAB
ALBUMIN SERPL-MCNC: 4 G/DL (ref 3.5–5.2)
ALP BLD-CCNC: 101 U/L (ref 35–104)
ALT SERPL-CCNC: 22 U/L (ref 9–52)
ANION GAP SERPL CALCULATED.3IONS-SCNC: 6 MMOL/L (ref 7–19)
AST SERPL-CCNC: 41 U/L (ref 14–36)
BASOPHILS ABSOLUTE: 0.05 K/UL (ref 0.01–0.08)
BASOPHILS RELATIVE PERCENT: 0.8 % (ref 0.1–1.2)
BILIRUB SERPL-MCNC: 0.3 MG/DL (ref 0.2–1.3)
BUN BLDV-MCNC: 8 MG/DL (ref 7–17)
CA 19-9: 10 U/ML (ref 0–35)
CALCIUM SERPL-MCNC: 9.2 MG/DL (ref 8.4–10.2)
CHLORIDE BLD-SCNC: 97 MMOL/L (ref 98–111)
CO2: 32 MMOL/L (ref 22–29)
CREAT SERPL-MCNC: 0.6 MG/DL (ref 0.5–1)
EOSINOPHILS ABSOLUTE: 0.18 K/UL (ref 0.04–0.54)
EOSINOPHILS RELATIVE PERCENT: 2.8 % (ref 0.7–7)
GFR NON-AFRICAN AMERICAN: >60
GLOBULIN: 3.1 G/DL
GLUCOSE BLD-MCNC: 89 MG/DL (ref 74–106)
HCT VFR BLD CALC: 36.1 % (ref 34.1–44.9)
HEMOGLOBIN: 12.4 G/DL (ref 11.2–15.7)
LYMPHOCYTES ABSOLUTE: 3.44 K/UL (ref 1.18–3.74)
LYMPHOCYTES RELATIVE PERCENT: 52.8 % (ref 19.3–53.1)
MCH RBC QN AUTO: 34.7 PG (ref 25.6–32.2)
MCHC RBC AUTO-ENTMCNC: 34.3 G/DL (ref 32.3–35.5)
MCV RBC AUTO: 101.1 FL (ref 79.4–94.8)
MONOCYTES ABSOLUTE: 1.35 K/UL (ref 0.24–0.82)
MONOCYTES RELATIVE PERCENT: 20.7 % (ref 4.7–12.5)
NEUTROPHILS ABSOLUTE: 1.5 K/UL (ref 1.56–6.13)
NEUTROPHILS RELATIVE PERCENT: 22.9 % (ref 34–71.1)
PDW BLD-RTO: 16.3 % (ref 11.7–14.4)
PLATELET # BLD: 281 K/UL (ref 182–369)
PMV BLD AUTO: 8.6 FL (ref 7.4–10.4)
POTASSIUM SERPL-SCNC: 4.2 MMOL/L (ref 3.5–5.1)
RBC # BLD: 3.57 M/UL (ref 3.93–5.22)
SODIUM BLD-SCNC: 135 MMOL/L (ref 137–145)
TOTAL PROTEIN: 7.1 G/DL (ref 6.3–8.2)
WBC # BLD: 6.52 K/UL (ref 3.98–10.04)

## 2022-01-21 PROCEDURE — G8417 CALC BMI ABV UP PARAM F/U: HCPCS | Performed by: INTERNAL MEDICINE

## 2022-01-21 PROCEDURE — 1090F PRES/ABSN URINE INCON ASSESS: CPT | Performed by: INTERNAL MEDICINE

## 2022-01-21 PROCEDURE — 36415 COLL VENOUS BLD VENIPUNCTURE: CPT

## 2022-01-21 PROCEDURE — 6360000002 HC RX W HCPCS: Performed by: NURSE PRACTITIONER

## 2022-01-21 PROCEDURE — 1036F TOBACCO NON-USER: CPT | Performed by: INTERNAL MEDICINE

## 2022-01-21 PROCEDURE — G8400 PT W/DXA NO RESULTS DOC: HCPCS | Performed by: INTERNAL MEDICINE

## 2022-01-21 PROCEDURE — 96367 TX/PROPH/DG ADDL SEQ IV INF: CPT

## 2022-01-21 PROCEDURE — G8428 CUR MEDS NOT DOCUMENT: HCPCS | Performed by: INTERNAL MEDICINE

## 2022-01-21 PROCEDURE — 99214 OFFICE O/P EST MOD 30 MIN: CPT | Performed by: INTERNAL MEDICINE

## 2022-01-21 PROCEDURE — 85025 COMPLETE CBC W/AUTO DIFF WBC: CPT

## 2022-01-21 PROCEDURE — 96413 CHEMO IV INFUSION 1 HR: CPT

## 2022-01-21 PROCEDURE — G8484 FLU IMMUNIZE NO ADMIN: HCPCS | Performed by: INTERNAL MEDICINE

## 2022-01-21 PROCEDURE — 80053 COMPREHEN METABOLIC PANEL: CPT

## 2022-01-21 PROCEDURE — 1123F ACP DISCUSS/DSCN MKR DOCD: CPT | Performed by: INTERNAL MEDICINE

## 2022-01-21 PROCEDURE — 96375 TX/PRO/DX INJ NEW DRUG ADDON: CPT

## 2022-01-21 PROCEDURE — 3017F COLORECTAL CA SCREEN DOC REV: CPT | Performed by: INTERNAL MEDICINE

## 2022-01-21 PROCEDURE — 4040F PNEUMOC VAC/ADMIN/RCVD: CPT | Performed by: INTERNAL MEDICINE

## 2022-01-21 PROCEDURE — 2580000003 HC RX 258: Performed by: NURSE PRACTITIONER

## 2022-01-21 RX ORDER — SODIUM CHLORIDE 9 MG/ML
20 INJECTION, SOLUTION INTRAVENOUS ONCE
Status: COMPLETED | OUTPATIENT
Start: 2022-01-21 | End: 2022-01-22

## 2022-01-21 RX ORDER — SODIUM CHLORIDE 0.9 % (FLUSH) 0.9 %
5-40 SYRINGE (ML) INJECTION PRN
Status: DISCONTINUED | OUTPATIENT
Start: 2022-01-21 | End: 2022-01-22 | Stop reason: HOSPADM

## 2022-01-21 RX ORDER — HEPARIN SODIUM (PORCINE) LOCK FLUSH IV SOLN 100 UNIT/ML 100 UNIT/ML
500 SOLUTION INTRAVENOUS PRN
Status: DISCONTINUED | OUTPATIENT
Start: 2022-01-21 | End: 2022-01-22 | Stop reason: HOSPADM

## 2022-01-21 RX ORDER — PALONOSETRON 0.05 MG/ML
0.25 INJECTION, SOLUTION INTRAVENOUS ONCE
Status: COMPLETED | OUTPATIENT
Start: 2022-01-21 | End: 2022-01-21

## 2022-01-21 RX ORDER — CAPECITABINE 500 MG/1
TABLET, FILM COATED ORAL
Qty: 84 TABLET | Refills: 5 | Status: SHIPPED | OUTPATIENT
Start: 2022-01-21

## 2022-01-21 RX ADMIN — SODIUM CHLORIDE 20 ML/HR: 9 INJECTION, SOLUTION INTRAVENOUS at 10:10

## 2022-01-21 RX ADMIN — GEMCITABINE 1820 MG: 38 INJECTION, SOLUTION INTRAVENOUS at 10:30

## 2022-01-21 RX ADMIN — PALONOSETRON HYDROCHLORIDE 0.25 MG: 0.25 INJECTION, SOLUTION INTRAVENOUS at 10:10

## 2022-01-21 RX ADMIN — DEXAMETHASONE SODIUM PHOSPHATE: 10 INJECTION, SOLUTION INTRAMUSCULAR; INTRAVENOUS at 10:12

## 2022-01-26 ENCOUNTER — OFFICE VISIT (OUTPATIENT)
Dept: VASCULAR SURGERY | Age: 77
End: 2022-01-26

## 2022-01-26 VITALS
DIASTOLIC BLOOD PRESSURE: 76 MMHG | RESPIRATION RATE: 20 BRPM | TEMPERATURE: 97.2 F | OXYGEN SATURATION: 97 % | BODY MASS INDEX: 27.16 KG/M2 | HEART RATE: 89 BPM | WEIGHT: 163 LBS | HEIGHT: 65 IN | SYSTOLIC BLOOD PRESSURE: 119 MMHG

## 2022-01-26 DIAGNOSIS — C25.2 MALIGNANT NEOPLASM OF TAIL OF PANCREAS (HCC): Primary | ICD-10-CM

## 2022-01-26 PROCEDURE — 99024 POSTOP FOLLOW-UP VISIT: CPT | Performed by: PHYSICIAN ASSISTANT

## 2022-01-26 NOTE — PROGRESS NOTES
Patient Care Team:  Floyd Alonzo DO as PCP - General (Internal Medicine)  Gaviota Colindres DO as Consulting Physician (Vascular Surgery)    Ms. Santosh Nunez is a 78-year-old female who has a history of pancreatic cancer. She underwent a removal of a malposition right-sided Mediport with placement of new left-sided Mediport. She denies any significant pain or swelling. She reports that the swelling in her right arm has improved significantly. She reports no fever or chills. She reports that they have accessed and used her port a couple times without any difficulty    On evaluation today, bilateral upper chest wall incisions are clean, dry, intact. No erythema drainage or signs of infection. (Dr. April Baker also evaluated the incisions)    Today we have recommended her wash incisions daily with soap and water and pat dry. We will follow up with her PRN or sooner if She develops fever/chills or wound deterioration. This should bring you up to date on [facility]  As always we want to thank you for allowing us to participate in the care of your patients.     Sincerely,    Ramona Horvath PA-C

## 2022-02-10 ENCOUNTER — HOSPITAL ENCOUNTER (OUTPATIENT)
Dept: CT IMAGING | Age: 77
Discharge: HOME | End: 2022-02-10
Payer: MEDICARE

## 2022-02-10 DIAGNOSIS — C25.9 ADENOCARCINOMA OF PANCREAS (HCC): ICD-10-CM

## 2022-02-10 DIAGNOSIS — C25.2 MALIGNANT NEOPLASM OF TAIL OF PANCREAS (HCC): ICD-10-CM

## 2022-02-10 PROCEDURE — 74177 CT ABD & PELVIS W/CONTRAST: CPT

## 2022-02-10 PROCEDURE — 6360000004 HC RX CONTRAST MEDICATION: Performed by: INTERNAL MEDICINE

## 2022-02-10 PROCEDURE — 71260 CT THORAX DX C+: CPT

## 2022-02-10 RX ADMIN — IOPAMIDOL 75 ML: 755 INJECTION, SOLUTION INTRAVENOUS at 10:31

## 2022-02-11 ENCOUNTER — HOSPITAL ENCOUNTER (OUTPATIENT)
Dept: INFUSION THERAPY | Age: 77
Discharge: HOME | End: 2022-02-11
Payer: MEDICARE

## 2022-02-11 VITALS
WEIGHT: 164.8 LBS | OXYGEN SATURATION: 97 % | HEART RATE: 88 BPM | TEMPERATURE: 98 F | BODY MASS INDEX: 27.46 KG/M2 | RESPIRATION RATE: 18 BRPM | SYSTOLIC BLOOD PRESSURE: 128 MMHG | DIASTOLIC BLOOD PRESSURE: 77 MMHG | HEIGHT: 65 IN

## 2022-02-11 DIAGNOSIS — C25.2 MALIGNANT NEOPLASM OF TAIL OF PANCREAS (HCC): Primary | ICD-10-CM

## 2022-02-11 LAB
ALBUMIN SERPL-MCNC: 3.9 G/DL (ref 3.5–5.2)
ALP BLD-CCNC: 99 U/L (ref 35–104)
ALT SERPL-CCNC: 18 U/L (ref 9–52)
ANION GAP SERPL CALCULATED.3IONS-SCNC: 9 MMOL/L (ref 7–19)
AST SERPL-CCNC: 41 U/L (ref 14–36)
BASOPHILS ABSOLUTE: 0.06 K/UL (ref 0.01–0.08)
BASOPHILS RELATIVE PERCENT: 0.5 % (ref 0.1–1.2)
BILIRUB SERPL-MCNC: 0.5 MG/DL (ref 0.2–1.3)
BUN BLDV-MCNC: 10 MG/DL (ref 7–17)
CALCIUM SERPL-MCNC: 8.9 MG/DL (ref 8.4–10.2)
CHLORIDE BLD-SCNC: 103 MMOL/L (ref 98–111)
CO2: 28 MMOL/L (ref 22–29)
CREAT SERPL-MCNC: 0.5 MG/DL (ref 0.5–1)
EOSINOPHILS ABSOLUTE: 0.2 K/UL (ref 0.04–0.54)
EOSINOPHILS RELATIVE PERCENT: 1.8 % (ref 0.7–7)
GFR NON-AFRICAN AMERICAN: >60
GLOBULIN: 3.2 G/DL
GLUCOSE BLD-MCNC: 81 MG/DL (ref 74–106)
HCT VFR BLD CALC: 38.4 % (ref 34.1–44.9)
HEMOGLOBIN: 13.1 G/DL (ref 11.2–15.7)
LYMPHOCYTES ABSOLUTE: 5.49 K/UL (ref 1.18–3.74)
LYMPHOCYTES RELATIVE PERCENT: 48.5 % (ref 19.3–53.1)
MCH RBC QN AUTO: 34 PG (ref 25.6–32.2)
MCHC RBC AUTO-ENTMCNC: 34.1 G/DL (ref 32.3–35.5)
MCV RBC AUTO: 99.7 FL (ref 79.4–94.8)
MONOCYTES ABSOLUTE: 1.99 K/UL (ref 0.24–0.82)
MONOCYTES RELATIVE PERCENT: 17.6 % (ref 4.7–12.5)
NEUTROPHILS ABSOLUTE: 3.59 K/UL (ref 1.56–6.13)
NEUTROPHILS RELATIVE PERCENT: 31.6 % (ref 34–71.1)
PDW BLD-RTO: 16 % (ref 11.7–14.4)
PLATELET # BLD: 688 K/UL (ref 182–369)
PMV BLD AUTO: 9.9 FL (ref 7.4–10.4)
POTASSIUM SERPL-SCNC: 4 MMOL/L (ref 3.5–5.1)
RBC # BLD: 3.85 M/UL (ref 3.93–5.22)
SODIUM BLD-SCNC: 140 MMOL/L (ref 137–145)
TOTAL PROTEIN: 7.1 G/DL (ref 6.3–8.2)
WBC # BLD: 11.33 K/UL (ref 3.98–10.04)

## 2022-02-11 PROCEDURE — 85025 COMPLETE CBC W/AUTO DIFF WBC: CPT

## 2022-02-11 PROCEDURE — 80053 COMPREHEN METABOLIC PANEL: CPT

## 2022-02-11 PROCEDURE — 96375 TX/PRO/DX INJ NEW DRUG ADDON: CPT

## 2022-02-11 PROCEDURE — 36415 COLL VENOUS BLD VENIPUNCTURE: CPT

## 2022-02-11 PROCEDURE — 96367 TX/PROPH/DG ADDL SEQ IV INF: CPT

## 2022-02-11 PROCEDURE — 2580000003 HC RX 258: Performed by: INTERNAL MEDICINE

## 2022-02-11 PROCEDURE — 96413 CHEMO IV INFUSION 1 HR: CPT

## 2022-02-11 PROCEDURE — 6360000002 HC RX W HCPCS: Performed by: INTERNAL MEDICINE

## 2022-02-11 RX ORDER — SODIUM CHLORIDE 0.9 % (FLUSH) 0.9 %
5-40 SYRINGE (ML) INJECTION PRN
Status: CANCELLED | OUTPATIENT
Start: 2022-02-11

## 2022-02-11 RX ORDER — HEPARIN SODIUM (PORCINE) LOCK FLUSH IV SOLN 100 UNIT/ML 100 UNIT/ML
500 SOLUTION INTRAVENOUS PRN
Status: CANCELLED | OUTPATIENT
Start: 2022-02-11

## 2022-02-11 RX ORDER — HEPARIN SODIUM (PORCINE) LOCK FLUSH IV SOLN 100 UNIT/ML 100 UNIT/ML
500 SOLUTION INTRAVENOUS PRN
Status: DISCONTINUED | OUTPATIENT
Start: 2022-02-11 | End: 2022-02-12 | Stop reason: HOSPADM

## 2022-02-11 RX ORDER — SODIUM CHLORIDE 0.9 % (FLUSH) 0.9 %
5-40 SYRINGE (ML) INJECTION PRN
Status: CANCELLED | OUTPATIENT
Start: 2022-03-04

## 2022-02-11 RX ORDER — SODIUM CHLORIDE 9 MG/ML
20 INJECTION, SOLUTION INTRAVENOUS ONCE
Status: CANCELLED | OUTPATIENT
Start: 2022-02-11 | End: 2022-02-11

## 2022-02-11 RX ORDER — DIPHENHYDRAMINE HYDROCHLORIDE 50 MG/ML
50 INJECTION INTRAMUSCULAR; INTRAVENOUS ONCE
Status: CANCELLED | OUTPATIENT
Start: 2022-02-11 | End: 2022-02-11

## 2022-02-11 RX ORDER — PALONOSETRON 0.05 MG/ML
0.25 INJECTION, SOLUTION INTRAVENOUS ONCE
Status: COMPLETED | OUTPATIENT
Start: 2022-02-11 | End: 2022-02-11

## 2022-02-11 RX ORDER — SODIUM CHLORIDE 9 MG/ML
INJECTION, SOLUTION INTRAVENOUS CONTINUOUS
Status: CANCELLED | OUTPATIENT
Start: 2022-02-11

## 2022-02-11 RX ORDER — SODIUM CHLORIDE 9 MG/ML
INJECTION, SOLUTION INTRAVENOUS CONTINUOUS
Status: CANCELLED | OUTPATIENT
Start: 2022-02-18

## 2022-02-11 RX ORDER — DIPHENHYDRAMINE HYDROCHLORIDE 50 MG/ML
50 INJECTION INTRAMUSCULAR; INTRAVENOUS ONCE
Status: CANCELLED | OUTPATIENT
Start: 2022-03-04 | End: 2022-02-25

## 2022-02-11 RX ORDER — PALONOSETRON 0.05 MG/ML
0.25 INJECTION, SOLUTION INTRAVENOUS ONCE
Status: CANCELLED
Start: 2022-03-04 | End: 2022-02-25

## 2022-02-11 RX ORDER — HEPARIN SODIUM (PORCINE) LOCK FLUSH IV SOLN 100 UNIT/ML 100 UNIT/ML
500 SOLUTION INTRAVENOUS PRN
Status: CANCELLED | OUTPATIENT
Start: 2022-03-04

## 2022-02-11 RX ORDER — SODIUM CHLORIDE 9 MG/ML
20 INJECTION, SOLUTION INTRAVENOUS ONCE
Status: CANCELLED | OUTPATIENT
Start: 2022-02-18 | End: 2022-02-18

## 2022-02-11 RX ORDER — METHYLPREDNISOLONE SODIUM SUCCINATE 125 MG/2ML
125 INJECTION, POWDER, LYOPHILIZED, FOR SOLUTION INTRAMUSCULAR; INTRAVENOUS ONCE
Status: CANCELLED | OUTPATIENT
Start: 2022-02-11 | End: 2022-02-11

## 2022-02-11 RX ORDER — HEPARIN SODIUM (PORCINE) LOCK FLUSH IV SOLN 100 UNIT/ML 100 UNIT/ML
500 SOLUTION INTRAVENOUS PRN
Status: CANCELLED | OUTPATIENT
Start: 2022-02-18

## 2022-02-11 RX ORDER — EPINEPHRINE 1 MG/ML
0.3 INJECTION, SOLUTION, CONCENTRATE INTRAVENOUS PRN
Status: CANCELLED | OUTPATIENT
Start: 2022-02-11

## 2022-02-11 RX ORDER — SODIUM CHLORIDE 0.9 % (FLUSH) 0.9 %
5-40 SYRINGE (ML) INJECTION PRN
Status: DISCONTINUED | OUTPATIENT
Start: 2022-02-11 | End: 2022-02-12 | Stop reason: HOSPADM

## 2022-02-11 RX ORDER — SODIUM CHLORIDE 9 MG/ML
25 INJECTION, SOLUTION INTRAVENOUS PRN
Status: CANCELLED | OUTPATIENT
Start: 2022-03-04

## 2022-02-11 RX ORDER — EPINEPHRINE 1 MG/ML
0.3 INJECTION, SOLUTION, CONCENTRATE INTRAVENOUS PRN
Status: CANCELLED | OUTPATIENT
Start: 2022-02-18

## 2022-02-11 RX ORDER — SODIUM CHLORIDE 9 MG/ML
25 INJECTION, SOLUTION INTRAVENOUS PRN
Status: CANCELLED | OUTPATIENT
Start: 2022-02-11

## 2022-02-11 RX ORDER — SODIUM CHLORIDE 9 MG/ML
INJECTION, SOLUTION INTRAVENOUS CONTINUOUS
Status: CANCELLED | OUTPATIENT
Start: 2022-03-04

## 2022-02-11 RX ORDER — EPINEPHRINE 1 MG/ML
0.3 INJECTION, SOLUTION, CONCENTRATE INTRAVENOUS PRN
Status: CANCELLED | OUTPATIENT
Start: 2022-03-04

## 2022-02-11 RX ORDER — METHYLPREDNISOLONE SODIUM SUCCINATE 125 MG/2ML
125 INJECTION, POWDER, LYOPHILIZED, FOR SOLUTION INTRAMUSCULAR; INTRAVENOUS ONCE
Status: CANCELLED | OUTPATIENT
Start: 2022-03-04 | End: 2022-02-25

## 2022-02-11 RX ORDER — SODIUM CHLORIDE 9 MG/ML
25 INJECTION, SOLUTION INTRAVENOUS PRN
Status: CANCELLED | OUTPATIENT
Start: 2022-02-18

## 2022-02-11 RX ORDER — PALONOSETRON 0.05 MG/ML
0.25 INJECTION, SOLUTION INTRAVENOUS ONCE
Status: CANCELLED
Start: 2022-02-11 | End: 2022-02-11

## 2022-02-11 RX ORDER — METHYLPREDNISOLONE SODIUM SUCCINATE 125 MG/2ML
125 INJECTION, POWDER, LYOPHILIZED, FOR SOLUTION INTRAMUSCULAR; INTRAVENOUS ONCE
Status: CANCELLED | OUTPATIENT
Start: 2022-02-18 | End: 2022-02-18

## 2022-02-11 RX ORDER — SODIUM CHLORIDE 9 MG/ML
20 INJECTION, SOLUTION INTRAVENOUS ONCE
Status: COMPLETED | OUTPATIENT
Start: 2022-02-11 | End: 2022-02-12

## 2022-02-11 RX ORDER — SODIUM CHLORIDE 9 MG/ML
20 INJECTION, SOLUTION INTRAVENOUS ONCE
Status: CANCELLED | OUTPATIENT
Start: 2022-03-04 | End: 2022-02-25

## 2022-02-11 RX ORDER — PALONOSETRON 0.05 MG/ML
0.25 INJECTION, SOLUTION INTRAVENOUS ONCE
Status: CANCELLED
Start: 2022-02-18 | End: 2022-02-18

## 2022-02-11 RX ORDER — SODIUM CHLORIDE 0.9 % (FLUSH) 0.9 %
5-40 SYRINGE (ML) INJECTION PRN
Status: CANCELLED | OUTPATIENT
Start: 2022-02-18

## 2022-02-11 RX ORDER — DIPHENHYDRAMINE HYDROCHLORIDE 50 MG/ML
50 INJECTION INTRAMUSCULAR; INTRAVENOUS ONCE
Status: CANCELLED | OUTPATIENT
Start: 2022-02-18 | End: 2022-02-18

## 2022-02-11 RX ADMIN — GEMCITABINE 1820 MG: 38 INJECTION, SOLUTION INTRAVENOUS at 11:15

## 2022-02-11 RX ADMIN — SODIUM CHLORIDE, PRESERVATIVE FREE 10 ML: 5 INJECTION INTRAVENOUS at 11:46

## 2022-02-11 RX ADMIN — Medication 500 UNITS: at 11:46

## 2022-02-11 RX ADMIN — SODIUM CHLORIDE 20 ML/HR: 9 INJECTION, SOLUTION INTRAVENOUS at 10:53

## 2022-02-11 RX ADMIN — PALONOSETRON HYDROCHLORIDE 0.25 MG: 0.25 INJECTION, SOLUTION INTRAVENOUS at 10:53

## 2022-02-11 RX ADMIN — DEXAMETHASONE SODIUM PHOSPHATE: 10 INJECTION, SOLUTION INTRAMUSCULAR; INTRAVENOUS at 10:56

## 2022-02-13 NOTE — PROGRESS NOTES
MEDICAL ONCOLOGY PROGRESS NOTE    Pt Name: Tee Peters  MRN: [id number]  YOB: 1945  Date of evaluation: 2/18/2022    HISTORY OF PRESENT ILLNESS:    Patient is a pleasant 77years old female who has been diagnosed with invasive ductal adenocarcinoma of the pancreas, node positive. She is status post laparoscopic assisted distal pancreatectomy and splenectomy in June 2021. She is current receiving adjuvant chemotherapy with Gemzar/Xeloda. She presents today for cycle number 6-day 8. She has been tolerating treatment with complaints of fatigue. Hand and foot toxicity has improved. She is compliant with her treatment. Denies any significant diarrhea nausea vomiting. She had CT scans performed. Diagnosis  · Invasive ductal adenocarcinoma, pancreas, June 2021  · pT2 N1 M0  · Strong family history for breast cancer  · Positive JESUSITA gene mutation  · DVT right upper extremity, Jan 2022    Treatment Summary  · 6/28/21 Laparoscopic assisted distal pancreatectomy and splenectomy with 1 of 6 lymph nodes positive. Surgeon Dr. Woods Slider   · 8/13/2021-adjuvant Gemzar 1000 mg/m2 IV D 1, D8, D15 with Capecitabine 830 mg/m2 D1-21 every 28 days x6 cycles  · 1/14/22 Eliquis    Cancer History  Clifton Reynolds was first seen by me on 8/6/2021. The patient was referred for a diagnosis of pancreatic malignancy. She had initial complaints of abdominal pain. · 5/5/20 MRI abdomen [facility]): Moderate fatty replacement of the pancreas with several small cystic structures within or adjacent to the pancreatic head and uncinate process most suggestive of benign cysts. A follow-up MRI of the abdomen may be obtained in 1 year to assess for stability. No solid lesion is seen. Otherwise unremarkable MRI of the abdomen. · 4/22/20 CT abd/pelvis [facility]): Focal inflammatory stranding associated with the pancreatic head. Radiographically I would favor this to represent acute pancreatitis.  There is mild associated stranding within the descending and proximal transverse duodenum. There are 2 well-circumscribed hypodensities one in the region of the uncinate process and one slightly anterior to the pancreatic head likely representing sequela of pancreatitis but warranting follow-up on subsequent exam. The body and tail of the pancreas are spared. No evidence of pancreatic necrosis. The patient is status post hysterectomy and cholecystectomy. Bibasilar subsegmental atelectasis. No evidence of nephrolithiasis or obstructive uropathy. Diverticulosis of the distal descending and sigmoid colon without evidence of diverticulitis. · 5/5/21 MRI [facility]) There are multiple tiny-to-small foci of increased T2 signal seen within the pancreas, consistent with a combination of cysts and/or beaded dilatation of the tributaries of the main pancreatic duct, increased in size and number when compared to the prior similar study of 5/5/20. The patient is again noted to be status post cholecystectomy. The examination is otherwise within normal limits for age with no abnormal postcontrast enhancement noted. · 6/3/21 Upper EUS (Dr Ry Rene/[place]): CT Visualized portion of pancreas abnormal on ultrasound. There is a 26.7 mm solid mass in pancreatic tail. It abuts the splenic vessel without occlusion or varices. Pancreatic duct is dilated to 6 mm in the tail proximal to the mass. FNA x4 yields atypical cells. Celiac axis, liver appear normal, no local nodes. Body and head of pancreas normal.  · 6/3/21 Pancreas tail mass, FNA aspiration smear (x2), ThinPrep and cell block: Adenocarcinoma. · 6/7/21 CA 19-9 407.1  · 6/28/21-she underwent Whipple procedure pancreatectomy by Dr. Raciel Mendez: Invasive ductal carcinoma 4.3 cm in greatest dimension, invasive in the peripancreatic adipose tissue, diffuse perineural invasion. Surgical margins were negative for carcinoma.  The pancreas margins involved by a low-grade dysplasia. 1/6 nodes positive for metastatic cancer with direct extension. Distal fibrosis and pancreatitis was noted. Spleen was unremarkable. Pathological stage pT2 N1 M0  · 8/6/2021-she was first seen by me. Recommend adjuvant chemotherapy with gemcitabine/Xeloda. She acknowledged understanding and agree with treatment. · 8/09/2021 CT Chest W/Contrast No evidence of intrathoracic metastatic disease is identified. · 8/09/2021 CT Abd/Pelvis W/Oral Contrast Prior distal pancreatectomy and splenectomy. There is a 1.5 cm circumscribed, nonenhancing cystic lesion along the inferior pancreatic head, perhaps a branch IPMN. Otherwise, no obvious pancreatic solid mass is seen. No suspicious adenopathy or evidence of distant metastatic disease in the abdomen or pelvis. Prior cholecystectomy. Liver steatosis. Colonic diverticulosis. · 8/13/2021-initiation of adjuvant capecitabine/Gemzar   · 9/10/2021-I reviewed CT chest abdomen pelvis. · 1/21/2022-CA 19-9 = 10  · 2/10/22 CT CHEST W CONTRAST No evidence of intrathoracic metastasis. Incidental findings described above. · 2/10/22 CT ABDOMEN PELVIS W IV CONTRAST  Partial pancreatectomy and splenectomy. Pancreatic head cysts measuring up to 1.6 cm, very similar to 8/9/2021. Continued surveillance imaging recommended. No evidence of intra-abdominal or pelvic metastasis. · 2/18/2022-I reviewed results CT chest abdomen pelvis. No evidence of recurrent disease. Pancreatic head cyst stable.     Past Medical History:    Past Medical History:   Diagnosis Date    Anxiety     Arthritis     Depression     Hypertension     Hypoglycemia     if fasting for very long    Pancreatic cancer ([address] 06/2021    s/p Whipple by Dr. Yao Bread    Shoulder pain     incompleted rcr; possible injury    Thyroid disease        Past Surgical History:    Past Surgical History:   Procedure Laterality Date   [address]  2020    Dr Bhavna Abdi, COLON, DIAGNOSTIC      HYSTERECTOMY  1994    PANCREATECTOMY  06/28/2021    PORT SURGERY Right 9/21/2021    Single lumen PORT INSERTION with ultrasound and fluoroscopy performed by Mary Justice DO at [facility] [address] 1/13/2022    RIGHT SIDE MEDIPORT REMOVAL, LEFT SIDE MEDIPORT INSERTION performed by Miguelangel Go DO at [id number] ARTHROSCOP,SURG,W/ROTAT CUFF REPR Right 10/19/2017    SHOULDER ARTHROSCOPY ROTATOR CUFF REPAIR/ DEBRIDEMENT BICEPS TENODESIS/ TENOTOMY SUBACROMIAL DECOMPRESSION/ DISTAL CLAVICLE EXCISION performed by Roselie Schaumann, MD at [address]  06/28/2021    UPPER GASTROINTESTINAL ENDOSCOPY  06/2021    Dr. Miriam Castano in [place]      spur excision       Social History:    Marital status:   Smoking status: no  ETOH status: no  Resides: [place], [place]    Family History:   Family History   Adopted: Yes   Problem Relation Age of Onset    Breast Cancer Maternal Aunt     Breast Cancer Daughter     Cancer Daughter         Bladder    Cancer Maternal Uncle         Bladder       Current Hospital Medications:    Current Outpatient Medications   Medication Sig Dispense Refill    capecitabine (XELODA) 500 MG chemo tablet Take 3 tablets by mouth 2 times a day for 14 days of a 28 day cycle 84 tablet 5    apixaban (ELIQUIS) 5 MG TABS tablet Take 1 tablet by mouth 2 times daily 60 tablet 3    losartan-hydroCHLOROthiazide (HYZAAR) 50-12.5 MG per tablet Take 1 tablet by mouth daily Indications: High Blood Pressure Disorder      levothyroxine (SYNTHROID) 137 MCG tablet Take 137 mcg by mouth Daily Indications: Underactive Thyroid      Cholecalciferol (VITAMIN D3) 10 MCG (400 UNIT) CAPS Take 1 capsule by mouth daily       Magic Mouthwash (MIRACLE MOUTHWASH) Swish and spit 5 mLs 4 times daily as needed for Irritation 1/3 viscous lidocaine, 1/3 benadryl, 1/3 Maalox.  480 mL 1    lidocaine-prilocaine (EMLA) 2.5-2.5 % cream Apply topically as needed. 30 g 2    Caltrate 600+D Plus Minerals (CALTRATE) 600-800 MG-UNIT TABS tablet Take 1 tablet by mouth daily      lipase-protease-amylase (CREON) 17333-13098 units delayed release capsule Take 1 capsule by mouth 3 times daily (with meals) 90 capsule 5    busPIRone (BUSPAR) 5 MG tablet Take 5 mg by mouth 3 times daily       promethazine (PHENERGAN) 12.5 MG tablet Take 1 tablet by mouth every 6 hours as needed for Nausea 30 tablet 5    ondansetron (ZOFRAN) 4 MG tablet Take 1 tablet by mouth every 8 hours as needed for Nausea or Vomiting 10 tablet 0     No current facility-administered medications for this visit. Facility-Administered Medications Ordered in Other Visits   Medication Dose Route Frequency Provider Last Rate Last Admin    0.9 % sodium chloride infusion  20 mL/hr IntraVENous Once Elva Dewitt MD 20 mL/hr at 02/18/22 1005 20 mL/hr at 02/18/22 1005    gemcitabine HCl (GEMZAR) 1,820 mg in sodium chloride 0.9 % 250 mL chemo IVPB  1,000 mg/m2 (Treatment Plan Recorded) IntraVENous Once Elva Dewitt .7 mL/hr at 02/18/22 1036 1,820 mg at 02/18/22 1036    sodium chloride flush 0.9 % injection 5-40 mL  5-40 mL IntraVENous PRN Elva Dewitt MD        heparin flush 100 UNIT/ML injection 500 Units  500 Units IntraCATHeter PRN Elva Dewitt MD           Allergies:    Allergies   Allergen Reactions    Diclofenac Other (See Comments)     Couldn't sleep and face turned blood red    Oxycodone-Acetaminophen Hives     itching    Doxycycline Rash         Subjective     REVIEW OF SYSTEMS:   CONSTITUTIONAL: no fever, no night sweats fatigue;  HEENT: no blurring of vision, no double vision, no hearing difficulty, no tinnitus, no ulceration, no dysplasia, no epistaxis;   LUNGS: no cough, no hemoptysis, no wheeze,  no shortness of breath;  CARDIOVASCULAR: no palpitation, no chest pain, no shortness of breath;  GI: no abdominal pain, no nausea, no vomiting, no diarrhea, no constipation;  STUART: no dysuria, no hematuria, no frequency or urgency, no nephrolithiasis;  MUSCULOSKELETAL: no joint pain, no swelling, no stiffness;  ENDOCRINE: no polyuria, no polydipsia, no cold or heat intolerance;  HEMATOLOGY: no easy bruising or bleeding, no history of clotting disorder;  DERMATOLOGY: no skin rash, no eczema, no pruritus;  PSYCHIATRY: no depression, no anxiety, no panic attacks, no suicidal ideation, no homicidal ideation;  NEUROLOGY: no syncope, no seizures, no numbness or tingling of hands, no numbness or tingling of feet, no paresis;     Objective /76 (Site: Left Upper Arm, Position: Sitting)   Pulse 83   Temp 98 °F (36.7 °C)   Resp 16   Ht 5' 5\" (1.651 m)   Wt 164 lb (74.4 kg)   SpO2 98%   BMI 27.29 kg/m²       PHYSICAL EXAM:  CONSTITUTIONAL: Alert, appropriate, no acute distress  EYES: Non icteric, EOM intact, pupils equal round   ENT: Mucus membranes moist, no oral pharyngeal lesions, external inspection of ears and nose are normal.  NECK: Supple, no masses. No palpable thyroid mass  CHEST/LUNGS: CTA bilaterally, normal respiratory effort   CARDIOVASCULAR: RRR, no murmurs. No lower extremity edema  ABDOMEN: soft non-tender, active bowel sounds, no HSM. No palpable masses  EXTREMITIES: warm, full ROM in all 4 extremities, no focal weakness. SKIN: warm, dry with no rashes or lesions  LYMPH: No cervical, clavicular, axillary, or inguinal lymphadenopathy  NEUROLOGIC: follows commands, non focal   PSYCH: mood and affect appropriate.   Alert and oriented to time, place, person       LABORATORY RESULTS REVIEWED/ANALYZED BY ME:  1/21/22 CA 19-9 = 10    Lab Results   Component Value Date    WBC 6.90 02/18/2022    HGB 13.2 02/18/2022    HCT 38.4 02/18/2022    MCV 98.2 (H) 02/18/2022     (H) 02/18/2022     Lab Results   Component Value Date    NEUTROABS 2.19 02/18/2022     Lab Results   Component Value Date     02/18/2022    K 4.2 02/18/2022     02/18/2022    CO2 27 02/18/2022    BUN 11 02/18/2022    CREATININE 0.6 02/18/2022    GLUCOSE 103 02/18/2022    CALCIUM 9.2 02/18/2022    PROT 7.0 02/18/2022    LABALBU 3.6 02/18/2022    BILITOT 0.3 02/18/2022    ALKPHOS 118 (H) 02/18/2022    AST 36 02/18/2022    ALT 19 02/18/2022    LABGLOM >60 02/18/2022    GFRAA >59 01/12/2022    GLOB 3.4 02/18/2022         RADIOLOGY STUDIES REVIEWED BY ME:   CT CHEST W CONTRAST    Result Date: 2/10/2022  . No evidence of intrathoracic metastasis. Incidental findings described above. Signed by Dr Michelle Carias Additional Contrast? Oral    Result Date: 2/10/2022  1. Partial pancreatectomy and splenectomy. Pancreatic head cysts measuring up to 1.6 cm, very similar to 8/9/2021. Continued surveillance imaging recommended. No evidence of intra-abdominal or pelvic metastasis. Signed by Dr Memo Acosta      ASSESSMENT:    Orders Placed This Encounter   Procedures    CBC with Auto Differential     Standing Status:   Future     Standing Expiration Date:   2/18/2023    Comprehensive Metabolic Panel     Standing Status:   Future     Number of Occurrences:   1     Standing Expiration Date:   2/18/2023    CBC with Auto Differential     Standing Status:   Future     Standing Expiration Date:   2/18/2023    Comprehensive Metabolic Panel     Standing Status:   Future     Standing Expiration Date:   2/18/2023    Cancer Antigen 19-9     Standing Status:   Future     Standing Expiration Date:   2/18/2023      Esperanza Subramanian was seen today for cancer. Diagnoses and all orders for this visit:    Malignant neoplasm of tail of pancreas ([facility] [address]  -     CBC with Auto Differential; Future  -     Comprehensive Metabolic Panel; Future  -     CBC with Auto Differential; Future  -     Comprehensive Metabolic Panel; Future  -     Cancer Antigen 19-9; Future    Adenocarcinoma of pancreas ([facility] [address]  -     CBC with Auto Differential; Future  -     Comprehensive Metabolic Panel;  Future    Chemotherapy management, encounter for    Adverse effect of chemotherapy, subsequent encounter    Care plan discussed with patient    Deep vein thrombosis (DVT) of right upper extremity, unspecified chronicity, unspecified vein (HCC)      Pancreatic tail adenocarcinoma pT2 N1 M0, June 2021  Essentially, node positive third of the pancreas adenocarcinoma. Status post distal pancreatectomy/splenectomy  -Anticipated completion 6-month adjuvant Gemzar/Xeloda on 2/25/2002  -Last CA 19-9 = 10  Proceed cycle number 6-day 8 adjuvant treatment with Xeloda/Gemzar. Surveillance follow-up visit as per NCCN guidelines:    Chemotherapy-induced toxicity-fatigue, hand-foot syndrome improved. Xeloda has been dose reduced. Right upper extremity DVT port related-started on Eliquis started on 1/14/2022. Continue Eliquis for 3 months through mid April 2022. PLAN:  · RTC with MD 2 months  · C# 6 day 8 Gemzar Capecitabine today  · RTC 1 week to complete treatment  · CBC CMP CA 19-9 today. · Continue decreased frequency of capecitabine D1-14 every 28 days x6 cycles  · Continue Eliquis through mid April 2021  · Repeat CT scans every 6 months-next due Aug 2022      Becky ROSAS am pre-charting as a registered nurse for Mitzy Montana MD. Electronically signed by Becky Washington RN on 2/18/2022 at 4:23 PM CST. Caryl Dewitt am scribing for Mitzy Montana MD. Electronically signed by Becky Washington RN on 2/18/2022 at 10:16 AM CST. I, Dr Diogenes Kolb, personally performed the services described in this documentation as scribed by Becky Washington RN in my presence and is both accurate and complete. I have seen, examined and reviewed this patient medication list, appropriate labs and imaging studies. I reviewed relevant medical records and others physicians notes. I discussed the plans of care with the patient. I answered all the questions to the patients satisfaction.  I have also reviewed the chief complaint (CC) and part of the history (History of Present Illness (HPI), Past Family Social History [facility]), or Review of Systems (ROS) and made changes when appropriated. (Please note that portions of this note were completed with a voice recognition program. Efforts were made to edit the dictations but occasionally words are mis-transcribed. )Electronically signed by César Aguilar MD on 2/18/2022 at 10:47 AM

## 2022-02-18 ENCOUNTER — HOSPITAL ENCOUNTER (OUTPATIENT)
Dept: INFUSION THERAPY | Age: 77
Discharge: HOME | End: 2022-02-18
Payer: MEDICARE

## 2022-02-18 ENCOUNTER — OFFICE VISIT (OUTPATIENT)
Dept: HEMATOLOGY | Age: 77
End: 2022-02-18
Payer: MEDICARE

## 2022-02-18 VITALS
WEIGHT: 164 LBS | DIASTOLIC BLOOD PRESSURE: 76 MMHG | HEART RATE: 83 BPM | RESPIRATION RATE: 16 BRPM | BODY MASS INDEX: 27.32 KG/M2 | TEMPERATURE: 98 F | HEIGHT: 65 IN | SYSTOLIC BLOOD PRESSURE: 125 MMHG | OXYGEN SATURATION: 98 %

## 2022-02-18 DIAGNOSIS — C25.9 ADENOCARCINOMA OF PANCREAS (HCC): ICD-10-CM

## 2022-02-18 DIAGNOSIS — T45.1X5D ADVERSE EFFECT OF CHEMOTHERAPY, SUBSEQUENT ENCOUNTER: ICD-10-CM

## 2022-02-18 DIAGNOSIS — I82.621 DEEP VEIN THROMBOSIS (DVT) OF RIGHT UPPER EXTREMITY, UNSPECIFIED CHRONICITY, UNSPECIFIED VEIN (HCC): ICD-10-CM

## 2022-02-18 DIAGNOSIS — C25.2 MALIGNANT NEOPLASM OF TAIL OF PANCREAS (HCC): Primary | ICD-10-CM

## 2022-02-18 DIAGNOSIS — Z51.11 CHEMOTHERAPY MANAGEMENT, ENCOUNTER FOR: ICD-10-CM

## 2022-02-18 DIAGNOSIS — Z71.89 CARE PLAN DISCUSSED WITH PATIENT: ICD-10-CM

## 2022-02-18 DIAGNOSIS — C25.2 MALIGNANT NEOPLASM OF TAIL OF PANCREAS (HCC): ICD-10-CM

## 2022-02-18 LAB
ALBUMIN SERPL-MCNC: 3.6 G/DL (ref 3.5–5.2)
ALP BLD-CCNC: 118 U/L (ref 35–104)
ALT SERPL-CCNC: 19 U/L (ref 9–52)
ANION GAP SERPL CALCULATED.3IONS-SCNC: 8 MMOL/L (ref 7–19)
AST SERPL-CCNC: 36 U/L (ref 14–36)
BASOPHILS ABSOLUTE: 0.05 K/UL (ref 0.01–0.08)
BASOPHILS RELATIVE PERCENT: 0.7 % (ref 0.1–1.2)
BILIRUB SERPL-MCNC: 0.3 MG/DL (ref 0.2–1.3)
BUN BLDV-MCNC: 11 MG/DL (ref 7–17)
CA 19-9: 10 U/ML (ref 0–35)
CALCIUM SERPL-MCNC: 9.2 MG/DL (ref 8.4–10.2)
CHLORIDE BLD-SCNC: 102 MMOL/L (ref 98–111)
CO2: 27 MMOL/L (ref 22–29)
CREAT SERPL-MCNC: 0.6 MG/DL (ref 0.5–1)
EOSINOPHILS ABSOLUTE: 0.09 K/UL (ref 0.04–0.54)
EOSINOPHILS RELATIVE PERCENT: 1.3 % (ref 0.7–7)
GFR NON-AFRICAN AMERICAN: >60
GLOBULIN: 3.4 G/DL
GLUCOSE BLD-MCNC: 103 MG/DL (ref 74–106)
HCT VFR BLD CALC: 38.4 % (ref 34.1–44.9)
HEMOGLOBIN: 13.2 G/DL (ref 11.2–15.7)
LYMPHOCYTES ABSOLUTE: 3.42 K/UL (ref 1.18–3.74)
LYMPHOCYTES RELATIVE PERCENT: 49.6 % (ref 19.3–53.1)
MCH RBC QN AUTO: 33.8 PG (ref 25.6–32.2)
MCHC RBC AUTO-ENTMCNC: 34.4 G/DL (ref 32.3–35.5)
MCV RBC AUTO: 98.2 FL (ref 79.4–94.8)
MONOCYTES ABSOLUTE: 1.15 K/UL (ref 0.24–0.82)
MONOCYTES RELATIVE PERCENT: 16.7 % (ref 4.7–12.5)
NEUTROPHILS ABSOLUTE: 2.19 K/UL (ref 1.56–6.13)
NEUTROPHILS RELATIVE PERCENT: 31.7 % (ref 34–71.1)
PDW BLD-RTO: 14.9 % (ref 11.7–14.4)
PLATELET # BLD: 406 K/UL (ref 182–369)
PMV BLD AUTO: 9.9 FL (ref 7.4–10.4)
POTASSIUM SERPL-SCNC: 4.2 MMOL/L (ref 3.5–5.1)
RBC # BLD: 3.91 M/UL (ref 3.93–5.22)
SODIUM BLD-SCNC: 137 MMOL/L (ref 137–145)
TOTAL PROTEIN: 7 G/DL (ref 6.3–8.2)
WBC # BLD: 6.9 K/UL (ref 3.98–10.04)

## 2022-02-18 PROCEDURE — 36415 COLL VENOUS BLD VENIPUNCTURE: CPT

## 2022-02-18 PROCEDURE — 1123F ACP DISCUSS/DSCN MKR DOCD: CPT | Performed by: INTERNAL MEDICINE

## 2022-02-18 PROCEDURE — G8428 CUR MEDS NOT DOCUMENT: HCPCS | Performed by: INTERNAL MEDICINE

## 2022-02-18 PROCEDURE — 2580000003 HC RX 258: Performed by: INTERNAL MEDICINE

## 2022-02-18 PROCEDURE — 1090F PRES/ABSN URINE INCON ASSESS: CPT | Performed by: INTERNAL MEDICINE

## 2022-02-18 PROCEDURE — 4040F PNEUMOC VAC/ADMIN/RCVD: CPT | Performed by: INTERNAL MEDICINE

## 2022-02-18 PROCEDURE — G8400 PT W/DXA NO RESULTS DOC: HCPCS | Performed by: INTERNAL MEDICINE

## 2022-02-18 PROCEDURE — 80053 COMPREHEN METABOLIC PANEL: CPT

## 2022-02-18 PROCEDURE — 6360000002 HC RX W HCPCS: Performed by: INTERNAL MEDICINE

## 2022-02-18 PROCEDURE — G8417 CALC BMI ABV UP PARAM F/U: HCPCS | Performed by: INTERNAL MEDICINE

## 2022-02-18 PROCEDURE — 99214 OFFICE O/P EST MOD 30 MIN: CPT | Performed by: INTERNAL MEDICINE

## 2022-02-18 PROCEDURE — 3017F COLORECTAL CA SCREEN DOC REV: CPT | Performed by: INTERNAL MEDICINE

## 2022-02-18 PROCEDURE — 96375 TX/PRO/DX INJ NEW DRUG ADDON: CPT

## 2022-02-18 PROCEDURE — 85025 COMPLETE CBC W/AUTO DIFF WBC: CPT

## 2022-02-18 PROCEDURE — 1036F TOBACCO NON-USER: CPT | Performed by: INTERNAL MEDICINE

## 2022-02-18 PROCEDURE — G8484 FLU IMMUNIZE NO ADMIN: HCPCS | Performed by: INTERNAL MEDICINE

## 2022-02-18 PROCEDURE — 96413 CHEMO IV INFUSION 1 HR: CPT

## 2022-02-18 RX ORDER — SODIUM CHLORIDE 9 MG/ML
20 INJECTION, SOLUTION INTRAVENOUS ONCE
Status: COMPLETED | OUTPATIENT
Start: 2022-02-18 | End: 2022-02-19

## 2022-02-18 RX ORDER — PALONOSETRON 0.05 MG/ML
0.25 INJECTION, SOLUTION INTRAVENOUS ONCE
Status: COMPLETED | OUTPATIENT
Start: 2022-02-18 | End: 2022-02-18

## 2022-02-18 RX ORDER — HEPARIN SODIUM (PORCINE) LOCK FLUSH IV SOLN 100 UNIT/ML 100 UNIT/ML
500 SOLUTION INTRAVENOUS PRN
Status: DISCONTINUED | OUTPATIENT
Start: 2022-02-18 | End: 2022-02-19 | Stop reason: HOSPADM

## 2022-02-18 RX ORDER — SODIUM CHLORIDE 0.9 % (FLUSH) 0.9 %
5-40 SYRINGE (ML) INJECTION PRN
Status: DISCONTINUED | OUTPATIENT
Start: 2022-02-18 | End: 2022-02-19 | Stop reason: HOSPADM

## 2022-02-18 RX ADMIN — DEXAMETHASONE SODIUM PHOSPHATE 10 MG: 10 INJECTION, SOLUTION INTRAMUSCULAR; INTRAVENOUS at 10:08

## 2022-02-18 RX ADMIN — HEPARIN 500 UNITS: 100 SYRINGE at 11:09

## 2022-02-18 RX ADMIN — SODIUM CHLORIDE, PRESERVATIVE FREE 10 ML: 5 INJECTION INTRAVENOUS at 11:09

## 2022-02-18 RX ADMIN — GEMCITABINE 1820 MG: 38 INJECTION, SOLUTION INTRAVENOUS at 10:36

## 2022-02-18 RX ADMIN — PALONOSETRON 0.25 MG: 0.05 INJECTION, SOLUTION INTRAVENOUS at 10:09

## 2022-02-18 RX ADMIN — SODIUM CHLORIDE 20 ML/HR: 9 INJECTION, SOLUTION INTRAVENOUS at 10:05

## 2022-02-25 ENCOUNTER — HOSPITAL ENCOUNTER (OUTPATIENT)
Dept: INFUSION THERAPY | Age: 77
Discharge: HOME | End: 2022-02-25
Payer: MEDICARE

## 2022-02-25 VITALS
HEART RATE: 88 BPM | SYSTOLIC BLOOD PRESSURE: 125 MMHG | HEIGHT: 65 IN | TEMPERATURE: 98.5 F | RESPIRATION RATE: 18 BRPM | BODY MASS INDEX: 27.56 KG/M2 | WEIGHT: 165.4 LBS | DIASTOLIC BLOOD PRESSURE: 73 MMHG | OXYGEN SATURATION: 99 %

## 2022-02-25 DIAGNOSIS — C25.9 ADENOCARCINOMA OF PANCREAS (HCC): ICD-10-CM

## 2022-02-25 DIAGNOSIS — C25.2 MALIGNANT NEOPLASM OF TAIL OF PANCREAS (HCC): Primary | ICD-10-CM

## 2022-02-25 LAB
ALBUMIN SERPL-MCNC: 3.5 G/DL (ref 3.5–5.2)
ALP BLD-CCNC: 106 U/L (ref 35–104)
ALT SERPL-CCNC: 16 U/L (ref 9–52)
ANION GAP SERPL CALCULATED.3IONS-SCNC: 5 MMOL/L (ref 7–19)
AST SERPL-CCNC: 33 U/L (ref 14–36)
BASOPHILS ABSOLUTE: 0.03 K/UL (ref 0.01–0.08)
BASOPHILS RELATIVE PERCENT: 0.6 % (ref 0.1–1.2)
BILIRUB SERPL-MCNC: 0.3 MG/DL (ref 0.2–1.3)
BUN BLDV-MCNC: 10 MG/DL (ref 7–17)
CALCIUM SERPL-MCNC: 9.1 MG/DL (ref 8.4–10.2)
CHLORIDE BLD-SCNC: 102 MMOL/L (ref 98–111)
CO2: 29 MMOL/L (ref 22–29)
CREAT SERPL-MCNC: 0.5 MG/DL (ref 0.5–1)
EOSINOPHILS ABSOLUTE: 0.05 K/UL (ref 0.04–0.54)
EOSINOPHILS RELATIVE PERCENT: 1 % (ref 0.7–7)
GFR NON-AFRICAN AMERICAN: >60
GLOBULIN: 2.2 G/DL
GLUCOSE BLD-MCNC: 247 MG/DL (ref 74–106)
HCT VFR BLD CALC: 35.8 % (ref 34.1–44.9)
HEMOGLOBIN: 12.4 G/DL (ref 11.2–15.7)
LYMPHOCYTES ABSOLUTE: 3.35 K/UL (ref 1.18–3.74)
LYMPHOCYTES RELATIVE PERCENT: 66.2 % (ref 19.3–53.1)
MCH RBC QN AUTO: 33.9 PG (ref 25.6–32.2)
MCHC RBC AUTO-ENTMCNC: 34.6 G/DL (ref 32.3–35.5)
MCV RBC AUTO: 97.8 FL (ref 79.4–94.8)
MONOCYTES ABSOLUTE: 0.68 K/UL (ref 0.24–0.82)
MONOCYTES RELATIVE PERCENT: 13.4 % (ref 4.7–12.5)
NEUTROPHILS ABSOLUTE: 0.95 K/UL (ref 1.56–6.13)
NEUTROPHILS RELATIVE PERCENT: 18.8 % (ref 34–71.1)
PDW BLD-RTO: 15.3 % (ref 11.7–14.4)
PLATELET # BLD: 183 K/UL (ref 182–369)
PMV BLD AUTO: 9.9 FL (ref 7.4–10.4)
POTASSIUM SERPL-SCNC: 4 MMOL/L (ref 3.5–5.1)
RBC # BLD: 3.66 M/UL (ref 3.93–5.22)
SODIUM BLD-SCNC: 136 MMOL/L (ref 137–145)
TOTAL PROTEIN: 5.7 G/DL (ref 6.3–8.2)
WBC # BLD: 5.06 K/UL (ref 3.98–10.04)

## 2022-02-25 PROCEDURE — 6360000002 HC RX W HCPCS: Performed by: INTERNAL MEDICINE

## 2022-02-25 PROCEDURE — 85025 COMPLETE CBC W/AUTO DIFF WBC: CPT

## 2022-02-25 PROCEDURE — 36415 COLL VENOUS BLD VENIPUNCTURE: CPT

## 2022-02-25 PROCEDURE — 96523 IRRIG DRUG DELIVERY DEVICE: CPT

## 2022-02-25 PROCEDURE — 80053 COMPREHEN METABOLIC PANEL: CPT

## 2022-02-25 PROCEDURE — 2580000003 HC RX 258: Performed by: INTERNAL MEDICINE

## 2022-02-25 RX ORDER — HEPARIN SODIUM (PORCINE) LOCK FLUSH IV SOLN 100 UNIT/ML 100 UNIT/ML
500 SOLUTION INTRAVENOUS PRN
Status: DISCONTINUED | OUTPATIENT
Start: 2022-02-25 | End: 2022-02-26 | Stop reason: HOSPADM

## 2022-02-25 RX ORDER — HEPARIN SODIUM (PORCINE) LOCK FLUSH IV SOLN 100 UNIT/ML 100 UNIT/ML
500 SOLUTION INTRAVENOUS PRN
Status: CANCELLED | OUTPATIENT
Start: 2022-02-25

## 2022-02-25 RX ORDER — SODIUM CHLORIDE 0.9 % (FLUSH) 0.9 %
5-40 SYRINGE (ML) INJECTION PRN
Status: CANCELLED | OUTPATIENT
Start: 2022-02-25

## 2022-02-25 RX ORDER — SODIUM CHLORIDE 0.9 % (FLUSH) 0.9 %
5-40 SYRINGE (ML) INJECTION PRN
Status: DISCONTINUED | OUTPATIENT
Start: 2022-02-25 | End: 2022-02-26 | Stop reason: HOSPADM

## 2022-02-25 RX ADMIN — SODIUM CHLORIDE, PRESERVATIVE FREE 10 ML: 5 INJECTION INTRAVENOUS at 10:02

## 2022-02-25 RX ADMIN — HEPARIN 500 UNITS: 100 SYRINGE at 10:02

## 2022-03-04 ENCOUNTER — HOSPITAL ENCOUNTER (OUTPATIENT)
Dept: INFUSION THERAPY | Age: 77
Discharge: HOME | End: 2022-03-04
Payer: MEDICARE

## 2022-03-04 VITALS
HEART RATE: 93 BPM | TEMPERATURE: 98.5 F | WEIGHT: 165.7 LBS | DIASTOLIC BLOOD PRESSURE: 75 MMHG | BODY MASS INDEX: 27.61 KG/M2 | RESPIRATION RATE: 18 BRPM | OXYGEN SATURATION: 97 % | HEIGHT: 65 IN | SYSTOLIC BLOOD PRESSURE: 113 MMHG

## 2022-03-04 DIAGNOSIS — C25.2 MALIGNANT NEOPLASM OF TAIL OF PANCREAS (HCC): Primary | ICD-10-CM

## 2022-03-04 LAB
ALBUMIN SERPL-MCNC: 3.8 G/DL (ref 3.5–5.2)
ALP BLD-CCNC: 107 U/L (ref 35–104)
ALT SERPL-CCNC: 21 U/L (ref 9–52)
ANION GAP SERPL CALCULATED.3IONS-SCNC: 5 MMOL/L (ref 7–19)
AST SERPL-CCNC: 47 U/L (ref 14–36)
BASOPHILS ABSOLUTE: 0.05 K/UL (ref 0.01–0.08)
BASOPHILS RELATIVE PERCENT: 0.5 % (ref 0.1–1.2)
BILIRUB SERPL-MCNC: 0.5 MG/DL (ref 0.2–1.3)
BUN BLDV-MCNC: 10 MG/DL (ref 7–17)
CALCIUM SERPL-MCNC: 9.1 MG/DL (ref 8.4–10.2)
CHLORIDE BLD-SCNC: 104 MMOL/L (ref 98–111)
CO2: 28 MMOL/L (ref 22–29)
CREAT SERPL-MCNC: 0.6 MG/DL (ref 0.5–1)
EOSINOPHILS ABSOLUTE: 0.26 K/UL (ref 0.04–0.54)
EOSINOPHILS RELATIVE PERCENT: 2.7 % (ref 0.7–7)
GFR NON-AFRICAN AMERICAN: >60
GLOBULIN: 2.8 G/DL
GLUCOSE BLD-MCNC: 164 MG/DL (ref 74–106)
HCT VFR BLD CALC: 35.9 % (ref 34.1–44.9)
HEMOGLOBIN: 12.5 G/DL (ref 11.2–15.7)
LYMPHOCYTES ABSOLUTE: 3.79 K/UL (ref 1.18–3.74)
LYMPHOCYTES RELATIVE PERCENT: 39.8 % (ref 19.3–53.1)
MCH RBC QN AUTO: 34.2 PG (ref 25.6–32.2)
MCHC RBC AUTO-ENTMCNC: 34.8 G/DL (ref 32.3–35.5)
MCV RBC AUTO: 98.4 FL (ref 79.4–94.8)
MONOCYTES ABSOLUTE: 2.4 K/UL (ref 0.24–0.82)
MONOCYTES RELATIVE PERCENT: 25.2 % (ref 4.7–12.5)
NEUTROPHILS ABSOLUTE: 3.02 K/UL (ref 1.56–6.13)
NEUTROPHILS RELATIVE PERCENT: 31.8 % (ref 34–71.1)
PDW BLD-RTO: 17.8 % (ref 11.7–14.4)
PLATELET # BLD: 409 K/UL (ref 182–369)
PMV BLD AUTO: 9.8 FL (ref 7.4–10.4)
POTASSIUM SERPL-SCNC: 4 MMOL/L (ref 3.5–5.1)
RBC # BLD: 3.65 M/UL (ref 3.93–5.22)
SODIUM BLD-SCNC: 137 MMOL/L (ref 137–145)
TOTAL PROTEIN: 6.6 G/DL (ref 6.3–8.2)
WBC # BLD: 9.52 K/UL (ref 3.98–10.04)

## 2022-03-04 PROCEDURE — 96375 TX/PRO/DX INJ NEW DRUG ADDON: CPT

## 2022-03-04 PROCEDURE — 36415 COLL VENOUS BLD VENIPUNCTURE: CPT

## 2022-03-04 PROCEDURE — 2580000003 HC RX 258: Performed by: INTERNAL MEDICINE

## 2022-03-04 PROCEDURE — 6360000002 HC RX W HCPCS: Performed by: INTERNAL MEDICINE

## 2022-03-04 PROCEDURE — 80053 COMPREHEN METABOLIC PANEL: CPT

## 2022-03-04 PROCEDURE — 85025 COMPLETE CBC W/AUTO DIFF WBC: CPT

## 2022-03-04 PROCEDURE — 96413 CHEMO IV INFUSION 1 HR: CPT

## 2022-03-04 PROCEDURE — 96367 TX/PROPH/DG ADDL SEQ IV INF: CPT

## 2022-03-04 RX ORDER — SODIUM CHLORIDE 9 MG/ML
20 INJECTION, SOLUTION INTRAVENOUS ONCE
Status: DISCONTINUED | OUTPATIENT
Start: 2022-03-04 | End: 2022-03-06 | Stop reason: HOSPADM

## 2022-03-04 RX ORDER — HEPARIN SODIUM (PORCINE) LOCK FLUSH IV SOLN 100 UNIT/ML 100 UNIT/ML
500 SOLUTION INTRAVENOUS PRN
Status: DISCONTINUED | OUTPATIENT
Start: 2022-03-04 | End: 2022-03-05 | Stop reason: HOSPADM

## 2022-03-04 RX ORDER — SODIUM CHLORIDE 0.9 % (FLUSH) 0.9 %
5-40 SYRINGE (ML) INJECTION PRN
Status: DISCONTINUED | OUTPATIENT
Start: 2022-03-04 | End: 2022-03-05 | Stop reason: HOSPADM

## 2022-03-04 RX ORDER — PALONOSETRON 0.05 MG/ML
0.25 INJECTION, SOLUTION INTRAVENOUS ONCE
Status: COMPLETED | OUTPATIENT
Start: 2022-03-04 | End: 2022-03-04

## 2022-03-04 RX ADMIN — DEXAMETHASONE SODIUM PHOSPHATE: 10 INJECTION, SOLUTION INTRAMUSCULAR; INTRAVENOUS at 09:59

## 2022-03-04 RX ADMIN — SODIUM CHLORIDE, PRESERVATIVE FREE 10 ML: 5 INJECTION INTRAVENOUS at 10:50

## 2022-03-04 RX ADMIN — PALONOSETRON 0.25 MG: 0.05 INJECTION, SOLUTION INTRAVENOUS at 09:59

## 2022-03-04 RX ADMIN — HEPARIN 500 UNITS: 100 SYRINGE at 10:50

## 2022-03-04 RX ADMIN — GEMCITABINE 1820 MG: 38 INJECTION, SOLUTION INTRAVENOUS at 10:17

## 2022-03-09 DIAGNOSIS — C25.9 ADENOCARCINOMA OF PANCREAS (HCC): ICD-10-CM

## 2022-03-09 RX ORDER — PANCRELIPASE 60000; 12000; 38000 [USP'U]/1; [USP'U]/1; [USP'U]/1
12000 CAPSULE, DELAYED RELEASE PELLETS ORAL
Qty: 90 CAPSULE | Refills: 5 | Status: SHIPPED | OUTPATIENT
Start: 2022-03-09 | End: 2022-10-14 | Stop reason: SDUPTHER

## 2022-04-20 NOTE — PROGRESS NOTES
MEDICAL ONCOLOGY PROGRESS NOTE    Pt Name: Higinio Luis  MRN: [id number]  YOB: 1945  Date of evaluation: 4/22/2022    HISTORY OF PRESENT ILLNESS:    Patient is a pleasant 77years old female who has been diagnosed with invasive ductal adenocarcinoma of the pancreas, node positive. She is status post laparoscopic assisted distal pancreatectomy and splenectomy in June 2021. She is current receiving adjuvant chemotherapy with Gemzar/Xeloda completed February 2022. Diagnosis  · Invasive ductal adenocarcinoma, pancreas, June 2021  · pT2 N1 M0  · Strong family history for breast cancer  · Positive JESUSITA gene mutation  · DVT right upper extremity, Jan 2022    Treatment Summary  · 6/28/21 Laparoscopic assisted distal pancreatectomy and splenectomy with 1 of 6 lymph nodes positive. Surgeon Dr. Garcia Elijah   · 8/13/2021-02/25/22-adjuvant Gemzar 1000 mg/m2 IV D 1, D8, D15 with Capecitabine 830 mg/m2 D1-21 every 28 days x6 cycles  · 1/14/22 Eliquis    Cancer History  Robe Mejia was first seen by me on 8/6/2021. The patient was referred for a diagnosis of pancreatic malignancy. She had initial complaints of abdominal pain. · 5/5/20 MRI abdomen [facility]): Moderate fatty replacement of the pancreas with several small cystic structures within or adjacent to the pancreatic head and uncinate process most suggestive of benign cysts. A follow-up MRI of the abdomen may be obtained in 1 year to assess for stability. No solid lesion is seen. Otherwise unremarkable MRI of the abdomen. · 4/22/20 CT abd/pelvis [facility]): Focal inflammatory stranding associated with the pancreatic head. Radiographically I would favor this to represent acute pancreatitis. There is mild associated stranding within the descending and proximal transverse duodenum.  There are 2 well-circumscribed hypodensities one in the region of the uncinate process and one slightly anterior to the pancreatic head likely representing sequela of pancreatitis but warranting follow-up on subsequent exam. The body and tail of the pancreas are spared. No evidence of pancreatic necrosis. The patient is status post hysterectomy and cholecystectomy. Bibasilar subsegmental atelectasis. No evidence of nephrolithiasis or obstructive uropathy. Diverticulosis of the distal descending and sigmoid colon without evidence of diverticulitis. · 5/5/21 MRI [facility]) There are multiple tiny-to-small foci of increased T2 signal seen within the pancreas, consistent with a combination of cysts and/or beaded dilatation of the tributaries of the main pancreatic duct, increased in size and number when compared to the prior similar study of 5/5/20. The patient is again noted to be status post cholecystectomy. The examination is otherwise within normal limits for age with no abnormal postcontrast enhancement noted. · 6/3/21 Upper EUS (Dr Favian Rene/[place]): CT Visualized portion of pancreas abnormal on ultrasound. There is a 26.7 mm solid mass in pancreatic tail. It abuts the splenic vessel without occlusion or varices. Pancreatic duct is dilated to 6 mm in the tail proximal to the mass. FNA x4 yields atypical cells. Celiac axis, liver appear normal, no local nodes. Body and head of pancreas normal.  · 6/3/21 Pancreas tail mass, FNA aspiration smear (x2), ThinPrep and cell block: Adenocarcinoma. · 6/7/21 CA 19-9 407.1  · 6/28/21-she underwent Whipple procedure pancreatectomy by Dr. Kendrick Cloud: Invasive ductal carcinoma 4.3 cm in greatest dimension, invasive in the peripancreatic adipose tissue, diffuse perineural invasion. Surgical margins were negative for carcinoma. The pancreas margins involved by a low-grade dysplasia. 1/6 nodes positive for metastatic cancer with direct extension. Distal fibrosis and pancreatitis was noted. Spleen was unremarkable. Pathological stage pT2 N1 M0  · 8/6/2021-she was first seen by me.   Recommend adjuvant chemotherapy with gemcitabine/Xeloda. She acknowledged understanding and agree with treatment. · 8/09/2021 CT Chest W/Contrast No evidence of intrathoracic metastatic disease is identified. · 8/09/2021 CT Abd/Pelvis W/Oral Contrast Prior distal pancreatectomy and splenectomy. There is a 1.5 cm circumscribed, nonenhancing cystic lesion along the inferior pancreatic head, perhaps a branch IPMN. Otherwise, no obvious pancreatic solid mass is seen. No suspicious adenopathy or evidence of distant metastatic disease in the abdomen or pelvis. Prior cholecystectomy. Liver steatosis. Colonic diverticulosis. · 8/13/2021-initiation of adjuvant capecitabine/Gemzar   · 9/10/2021-I reviewed CT chest abdomen pelvis. · 1/21/2022-CA 19-9 = 10  · 2/10/22 CT CHEST W CONTRAST No evidence of intrathoracic metastasis. Incidental findings described above. · 2/10/22 CT ABDOMEN PELVIS W IV CONTRAST  Partial pancreatectomy and splenectomy. Pancreatic head cysts measuring up to 1.6 cm, very similar to 8/9/2021. Continued surveillance imaging recommended. No evidence of intra-abdominal or pelvic metastasis. · 2/18/2022-I reviewed results CT chest abdomen pelvis. No evidence of recurrent disease. Pancreatic head cyst stable. · 02/25/22- Completion of adjuvant chemotherapy.      Past Medical History:    Past Medical History:   Diagnosis Date    Anxiety     Arthritis     Depression     Hypertension     Hypoglycemia     if fasting for very long    Pancreatic cancer ([facility] [address] 06/2021    s/p Whipple by Dr. Jenaro Diaz    Shoulder pain     incompleted rcr; possible injury    Thyroid disease        Past Surgical History:    Past Surgical History:   Procedure Laterality Date   [address] [facility]  2020    Dr Deana Sanchez, COLON, DIAGNOSTIC     [address] [facility]  06/28/2021    PORT SURGERY Right 9/21/2021    Single lumen PORT INSERTION with ultrasound and fluoroscopy performed by Jacob Retana DO at [facility] [address] Right 1/13/2022    RIGHT SIDE MEDIPORT REMOVAL, LEFT SIDE MEDIPORT INSERTION performed by Sawyer Berry DO at [address] ARTHROSCOP,SURG,W/ROTAT CUFF REPR Right 10/19/2017    SHOULDER ARTHROSCOPY ROTATOR CUFF REPAIR/ DEBRIDEMENT BICEPS TENODESIS/ TENOTOMY SUBACROMIAL DECOMPRESSION/ DISTAL CLAVICLE EXCISION performed by Veronica Hernandez MD at [address] [facility]  06/28/2021    UPPER GASTROINTESTINAL ENDOSCOPY  06/2021    Dr. Argentina Ramsey in [place]      spur excision       Social History:    Marital status:   Smoking status: no  ETOH status: no  Resides: [address]    Family History:   Family History   Adopted: Yes   Problem Relation Age of Onset    Breast Cancer Maternal Aunt     Breast Cancer Daughter     Cancer Daughter         Bladder    Cancer Maternal Uncle         Bladder       Current Hospital Medications:    Current Outpatient Medications   Medication Sig Dispense Refill    CREON 47842-59371 units delayed release capsule TAKE 1 CAPSULE BY MOUTH 3 TIMES DAILY (WITH MEALS) 90 capsule 5    capecitabine (XELODA) 500 MG chemo tablet Take 3 tablets by mouth 2 times a day for 14 days of a 28 day cycle 84 tablet 5    losartan-hydroCHLOROthiazide (HYZAAR) 50-12.5 MG per tablet Take 1 tablet by mouth daily Indications: High Blood Pressure Disorder      levothyroxine (SYNTHROID) 137 MCG tablet Take 137 mcg by mouth Daily Indications: Underactive Thyroid      Cholecalciferol (VITAMIN D3) 10 MCG (400 UNIT) CAPS Take 1 capsule by mouth daily       Magic Mouthwash (MIRACLE MOUTHWASH) Swish and spit 5 mLs 4 times daily as needed for Irritation 1/3 viscous lidocaine, 1/3 benadryl, 1/3 Maalox. 480 mL 1    lidocaine-prilocaine (EMLA) 2.5-2.5 % cream Apply topically as needed.  30 g 2    Caltrate 600+D Plus Minerals (CALTRATE) 600-800 MG-UNIT TABS tablet Take 1 tablet by mouth daily      busPIRone (BUSPAR) 5 MG tablet Take 5 mg by mouth 3 times daily       promethazine (PHENERGAN) 12.5 MG tablet Take 1 tablet by mouth every 6 hours as needed for Nausea 30 tablet 5    ondansetron (ZOFRAN) 4 MG tablet Take 1 tablet by mouth every 8 hours as needed for Nausea or Vomiting 10 tablet 0     No current facility-administered medications for this visit. Facility-Administered Medications Ordered in Other Visits   Medication Dose Route Frequency Provider Last Rate Last Admin    sodium chloride flush 0.9 % injection 5-40 mL  5-40 mL IntraVENous PRN Otis Parson MD        heparin flush 100 UNIT/ML injection 500 Units  500 Units IntraCATHeter PRN Otis Parson MD           Allergies:    Allergies   Allergen Reactions    Diclofenac Other (See Comments)     Couldn't sleep and face turned blood red    Oxycodone-Acetaminophen Hives     itching    Doxycycline Rash         Subjective     REVIEW OF SYSTEMS:   CONSTITUTIONAL: no fever, no night sweats,no fatigue;  HEENT: no blurring of vision, no double vision, no hearing difficulty, no tinnitus, no ulceration, no dysplasia, no epistaxis;   LUNGS: no cough, no hemoptysis, no wheeze,  no shortness of breath;  CARDIOVASCULAR: no palpitation, no chest pain, no shortness of breath;  GI: no abdominal pain, no nausea, no vomiting, no diarrhea, no constipation;  STUART: no dysuria, no hematuria, no frequency or urgency, no nephrolithiasis;  MUSCULOSKELETAL: mild joint pain, no swelling, no stiffness;  ENDOCRINE: no polyuria, no polydipsia, no cold or heat intolerance;  HEMATOLOGY: no easy bruising or bleeding, no history of clotting disorder;  DERMATOLOGY: no skin rash, no eczema, no pruritus;  PSYCHIATRY: no depression, no anxiety, no panic attacks, no suicidal ideation, no homicidal ideation;  NEUROLOGY: no syncope, no seizures, no numbness or tingling of hands,  numbness or tingling of feet, no paresis;     Objective /82   Pulse 95   Ht 5' 5\" (1.651 m)   Wt 164 lb 14.4 oz (74.8 kg)   SpO2 98%   BMI 27.44 kg/m²       PHYSICAL EXAM:  CONSTITUTIONAL: Alert, appropriate, no acute distress  EYES: Non icteric, EOM intact, pupils equal round   ENT: Mucus membranes moist, no oral pharyngeal lesions, external inspection of ears and nose are normal.  NECK: Supple, no masses. No palpable thyroid mass  CHEST/LUNGS: CTA bilaterally, normal respiratory effort   CARDIOVASCULAR: RRR, no murmurs. No lower extremity edema  ABDOMEN: soft non-tender, active bowel sounds, no HSM. No palpable masses  EXTREMITIES: warm, full ROM in all 4 extremities, no focal weakness. SKIN: warm, dry with no rashes or lesions  LYMPH: No cervical, clavicular, axillary, or inguinal lymphadenopathy  NEUROLOGIC: follows commands, non focal   PSYCH: mood and affect appropriate.   Alert and oriented to time, place, person       LABORATORY RESULTS REVIEWED/ANALYZED BY ME:  Lab Results   Component Value Date    WBC 10.51 (H) 04/22/2022    HGB 13.2 04/22/2022    HCT 40.7 04/22/2022    MCV 95.1 (H) 04/22/2022     04/22/2022     Lab Results   Component Value Date    NEUTROABS 3.85 04/22/2022       RADIOLOGY STUDIES REVIEWED BY ME:   None      ASSESSMENT:    Orders Placed This Encounter   Procedures    CT CHEST W CONTRAST     Standing Status:   Future     Standing Expiration Date:   4/22/2023     Order Specific Question:   STAT Creatinine as needed:     Answer:   Yes     Order Specific Question:   Reason for exam:     Answer:   Assess response to treatment    CT ABDOMEN PELVIS W IV CONTRAST Additional Contrast? Oral     Standing Status:   Future     Standing Expiration Date:   4/22/2023     Order Specific Question:   Additional Contrast?     Answer:   Oral     Order Specific Question:   STAT Creatinine as needed:     Answer:   Yes     Order Specific Question:   Reason for exam:     Answer:   assess response to treatment    Comprehensive Metabolic Panel     Standing Status:   Future     Standing Expiration Date: 4/22/2023    Cancer Antigen 19-9     Standing Status:   Future     Standing Expiration Date:   4/22/2023      Kanu Tellez was seen today for follow-up. Diagnoses and all orders for this visit:    Adenocarcinoma of pancreas ([facility] [address]  -     Comprehensive Metabolic Panel; Future  -     Cancer Antigen 19-9; Future  -     CT CHEST W CONTRAST; Future  -     CT ABDOMEN PELVIS W IV CONTRAST Additional Contrast? Oral; Future    Malignant neoplasm of tail of pancreas [facility])    Care plan discussed with patient      Pancreatic tail adenocarcinoma pT2 N1 M0, June 2021  Essentially, node positive third of the pancreas adenocarcinoma. Status post distal pancreatectomy/splenectomy  -Anticipated completion 6-month adjuvant Gemzar/Xeloda on 2/25/2002  -Last CA 19-9 = 10  -Feb 2022Completion of adjuvant treatment with Xeloda/Gemzar. -CT chest/abdomen/pelvis in August 2022. Surveillance follow-up visit as per NCCN guidelines:     Chemotherapy-induced toxicity-fatigue, hand-foot syndrome improved. Xeloda has been dose reduced. Right upper extremity DVT port related-started on Eliquis started on 1/14/2022. Continue Eliquis for 3 months through mid April 2022. PLAN:  · RTC with MD 4 months after scans  · Discussed port removal   · Port Flush today and every 8 weeks  · CBC CMP CA 19-9 today. · Discontinue Eliquis   · Repeat CT scans every 6 months-next due Aug 2022      Jw ROSAS am pre charting  as Medical Assistant for Alison Santos MD. Electronically signed by Jw Chapman MA on 4/22/2022 at 10:42 AM CDT. Lauren Nguyen am scribing as Medical Assistant for Alison Santos MD. Electronically signed by Jw Chapman MA on 4/22/2022 at 10:51 AM CDT. I, Dr Cesar Hameed, personally performed the services described in this documentation as scribed by Jw Chapman MA in my presence and is both accurate and complete.     I have seen, examined and reviewed this patient medication list, appropriate labs and imaging studies. I reviewed relevant medical records and others physicians notes. I discussed the plans of care with the patient. I answered all the questions to the patients satisfaction. I have also reviewed the chief complaint (CC) and part of the history (History of Present Illness (HPI), Past Family Social History [facility]), or Review of Systems (ROS) and made changes when appropriated. (Please note that portions of this note were completed with a voice recognition program. Efforts were made to edit the dictations but occasionally words are mis-transcribed. )Electronically signed by Gilmar Schulte MD on 4/22/2022 at 10:55 AM

## 2022-04-22 ENCOUNTER — HOSPITAL ENCOUNTER (OUTPATIENT)
Dept: INFUSION THERAPY | Age: 77
Discharge: HOME | End: 2022-04-22
Payer: MEDICARE

## 2022-04-22 ENCOUNTER — OFFICE VISIT (OUTPATIENT)
Dept: HEMATOLOGY | Age: 77
End: 2022-04-22
Payer: MEDICARE

## 2022-04-22 VITALS
SYSTOLIC BLOOD PRESSURE: 114 MMHG | WEIGHT: 164.9 LBS | HEIGHT: 65 IN | BODY MASS INDEX: 27.47 KG/M2 | HEART RATE: 95 BPM | OXYGEN SATURATION: 98 % | DIASTOLIC BLOOD PRESSURE: 82 MMHG

## 2022-04-22 DIAGNOSIS — C25.9 ADENOCARCINOMA OF PANCREAS (HCC): ICD-10-CM

## 2022-04-22 DIAGNOSIS — C25.9 ADENOCARCINOMA OF PANCREAS (HCC): Primary | ICD-10-CM

## 2022-04-22 DIAGNOSIS — C25.2 MALIGNANT NEOPLASM OF TAIL OF PANCREAS (HCC): ICD-10-CM

## 2022-04-22 DIAGNOSIS — Z71.89 CARE PLAN DISCUSSED WITH PATIENT: ICD-10-CM

## 2022-04-22 LAB
BASOPHILS ABSOLUTE: 0.02 K/UL (ref 0.01–0.08)
BASOPHILS RELATIVE PERCENT: 0.2 % (ref 0.1–1.2)
CA 19-9: 13 U/ML (ref 0–35)
EOSINOPHILS ABSOLUTE: 0.24 K/UL (ref 0.04–0.54)
EOSINOPHILS RELATIVE PERCENT: 2.3 % (ref 0.7–7)
HCT VFR BLD CALC: 40.7 % (ref 34.1–44.9)
HEMOGLOBIN: 13.2 G/DL (ref 11.2–15.7)
LYMPHOCYTES ABSOLUTE: 4.95 K/UL (ref 1.18–3.74)
LYMPHOCYTES RELATIVE PERCENT: 47.1 % (ref 19.3–53.1)
MCH RBC QN AUTO: 30.8 PG (ref 25.6–32.2)
MCHC RBC AUTO-ENTMCNC: 32.4 G/DL (ref 32.3–35.5)
MCV RBC AUTO: 95.1 FL (ref 79.4–94.8)
MONOCYTES ABSOLUTE: 1.45 K/UL (ref 0.24–0.82)
MONOCYTES RELATIVE PERCENT: 13.8 % (ref 4.7–12.5)
NEUTROPHILS ABSOLUTE: 3.85 K/UL (ref 1.56–6.13)
NEUTROPHILS RELATIVE PERCENT: 36.6 % (ref 34–71.1)
PDW BLD-RTO: 15.4 % (ref 11.7–14.4)
PLATELET # BLD: 331 K/UL (ref 182–369)
PMV BLD AUTO: 10.6 FL (ref 7.4–10.4)
RBC # BLD: 4.28 M/UL (ref 3.93–5.22)
WBC # BLD: 10.51 K/UL (ref 3.98–10.04)

## 2022-04-22 PROCEDURE — 2580000003 HC RX 258: Performed by: INTERNAL MEDICINE

## 2022-04-22 PROCEDURE — 99213 OFFICE O/P EST LOW 20 MIN: CPT | Performed by: INTERNAL MEDICINE

## 2022-04-22 PROCEDURE — 6360000002 HC RX W HCPCS: Performed by: INTERNAL MEDICINE

## 2022-04-22 PROCEDURE — G8417 CALC BMI ABV UP PARAM F/U: HCPCS | Performed by: INTERNAL MEDICINE

## 2022-04-22 PROCEDURE — 4040F PNEUMOC VAC/ADMIN/RCVD: CPT | Performed by: INTERNAL MEDICINE

## 2022-04-22 PROCEDURE — 96523 IRRIG DRUG DELIVERY DEVICE: CPT

## 2022-04-22 PROCEDURE — 1036F TOBACCO NON-USER: CPT | Performed by: INTERNAL MEDICINE

## 2022-04-22 PROCEDURE — 99212 OFFICE O/P EST SF 10 MIN: CPT

## 2022-04-22 PROCEDURE — 1123F ACP DISCUSS/DSCN MKR DOCD: CPT | Performed by: INTERNAL MEDICINE

## 2022-04-22 PROCEDURE — G8400 PT W/DXA NO RESULTS DOC: HCPCS | Performed by: INTERNAL MEDICINE

## 2022-04-22 PROCEDURE — 85025 COMPLETE CBC W/AUTO DIFF WBC: CPT

## 2022-04-22 PROCEDURE — 3017F COLORECTAL CA SCREEN DOC REV: CPT | Performed by: INTERNAL MEDICINE

## 2022-04-22 PROCEDURE — 1090F PRES/ABSN URINE INCON ASSESS: CPT | Performed by: INTERNAL MEDICINE

## 2022-04-22 PROCEDURE — G8427 DOCREV CUR MEDS BY ELIG CLIN: HCPCS | Performed by: INTERNAL MEDICINE

## 2022-04-22 RX ORDER — HEPARIN SODIUM (PORCINE) LOCK FLUSH IV SOLN 100 UNIT/ML 100 UNIT/ML
500 SOLUTION INTRAVENOUS PRN
Status: DISCONTINUED | OUTPATIENT
Start: 2022-04-22 | End: 2022-04-23 | Stop reason: HOSPADM

## 2022-04-22 RX ORDER — SODIUM CHLORIDE 0.9 % (FLUSH) 0.9 %
5-40 SYRINGE (ML) INJECTION PRN
Status: DISCONTINUED | OUTPATIENT
Start: 2022-04-22 | End: 2022-04-23 | Stop reason: HOSPADM

## 2022-04-22 RX ORDER — SODIUM CHLORIDE 0.9 % (FLUSH) 0.9 %
5-40 SYRINGE (ML) INJECTION PRN
Status: CANCELLED | OUTPATIENT
Start: 2022-04-22

## 2022-04-22 RX ORDER — HEPARIN SODIUM (PORCINE) LOCK FLUSH IV SOLN 100 UNIT/ML 100 UNIT/ML
500 SOLUTION INTRAVENOUS PRN
Status: CANCELLED | OUTPATIENT
Start: 2022-04-22

## 2022-04-22 RX ADMIN — HEPARIN 500 UNITS: 100 SYRINGE at 09:45

## 2022-04-22 RX ADMIN — SODIUM CHLORIDE, PRESERVATIVE FREE 20 ML: 5 INJECTION INTRAVENOUS at 09:45

## 2022-05-02 ENCOUNTER — TRANSCRIBE ORDERS (OUTPATIENT)
Dept: ADMINISTRATIVE | Facility: HOSPITAL | Age: 77
End: 2022-05-02

## 2022-05-02 DIAGNOSIS — M85.80 OSTEOPENIA, SENILE: ICD-10-CM

## 2022-05-02 DIAGNOSIS — Z78.0 POSTMENOPAUSAL STATUS: Primary | ICD-10-CM

## 2022-05-10 DIAGNOSIS — I82.621 ACUTE DEEP VEIN THROMBOSIS (DVT) OF RIGHT UPPER EXTREMITY, UNSPECIFIED VEIN (HCC): ICD-10-CM

## 2022-05-10 RX ORDER — APIXABAN 5 MG/1
TABLET, FILM COATED ORAL
Qty: 60 TABLET | Refills: 3 | OUTPATIENT
Start: 2022-05-10

## 2022-05-11 ENCOUNTER — HOSPITAL ENCOUNTER (OUTPATIENT)
Dept: BONE DENSITY | Facility: HOSPITAL | Age: 77
Discharge: HOME | End: 2022-05-11
Admitting: INTERNAL MEDICINE

## 2022-05-11 DIAGNOSIS — Z78.0 POSTMENOPAUSAL STATUS: ICD-10-CM

## 2022-05-11 DIAGNOSIS — M85.80 OSTEOPENIA, SENILE: ICD-10-CM

## 2022-05-11 PROCEDURE — 77080 DXA BONE DENSITY AXIAL: CPT

## 2022-05-21 NOTE — PROGRESS NOTES
Chief Complaint   Patient presents with   • Endoscopy     Coughing needs endo thinks she is having acid       PCP: Anupama Dhaliwal DO  REFER: No ref. provider found    Subjective     HPI    Patient presents to office with complaints of  Persistent dry cough.  No heartburn, no indigestion, no dysphagia.    Started on prilsoec 2-3 wekks ago, she has not noted improvement in cough.    No bright red blood per rectum, no melena.   She was diagnosed with adenocarcinoma of pancreas 6/2021 and is s/p pancreatectomy and splenectomy (Dr Sanchez).  She is followed by Dr Mendiola.  Completed chemo Feb 2022.     EUS-Dr Wolfe [id number] PATHOLOGY (06/03/2021)    UPPER GI ENDOSCOPY (06/17/2020 07:55)  ENDOSCOPY, INT (08/16/2016 00:00)      COLONOSCOPY (06/17/2020 07:55)-polypectomy tissue not retrieved   SCANNED - COLONOSCOPY (04/23/2015 00:00)-hyperplastic polyp       Past Medical History:   Diagnosis Date   • Acute bronchitis    • Arthritis     Osteoarthritis   • Disease of thyroid gland    • Gallbladder abscess    • Hypertension        Past Surgical History:   Procedure Laterality Date   • ABDOMINAL HYSTERECTOMY     • CHOLECYSTECTOMY  1994   • COLONOSCOPY  04/23/2015    two polyps both removed  extensive diverticulosis   • COLONOSCOPY N/A 6/17/2020    Procedure: COLONOSCOPY WITH ANESTHESIA;  Surgeon: Homer Zamarripa DO;  Location: [facility] ENDOSCOPY;  Service: Gastroenterology;  Laterality: N/A;  pre: Hx of polyps  post:  Anupama Dhaliwal   • ENDOSCOPY  08/16/2016    normal   • ENDOSCOPY N/A 6/17/2020    Procedure: ESOPHAGOGASTRODUODENOSCOPY WITH ANESTHESIA;  Surgeon: Homer Zamarripa DO;  Location: [facility] ENDOSCOPY;  Service: Gastroenterology;  Laterality: N/A;  pre: nausea  post:    • ROTATOR CUFF REPAIR  10/19/2017       Outpatient Medications Marked as Taking for the 5/23/22 encounter (Office Visit) with Moustapha Pedroza APRN   Medication Sig Dispense Refill   • albuterol (PROVENTIL  "HFA;VENTOLIN HFA) 108 (90 Base) MCG/ACT inhaler Inhale 2 puffs Every 6 (Six) Hours As Needed for Wheezing or Shortness of Air. 1 inhaler 3   • Calcium Carbonate-Vit D-Min (CALTRATE 600+D PLUS MINERALS) 600-800 MG-UNIT tablet Take 1 tablet by mouth Daily.     • Cholecalciferol (VITAMIN D3 PO) Take 2,000 mg by mouth Daily.     • diphenhydrAMINE (BENADRYL) 25 MG tablet Take 25 mg by mouth At Night As Needed for Itching or Sleep.     • fluticasone (FLONASE) 50 MCG/ACT nasal spray 2 sprays into the nostril(s) as directed by provider Daily. 1 bottle 12   • levothyroxine (SYNTHROID, LEVOTHROID) 125 MCG tablet Take 1 tablet by mouth Daily. 90 tablet 1   • losartan (COZAAR) 50 MG tablet Take 50 mg by mouth Daily.     • omeprazole (priLOSEC) 20 MG capsule Take 20 mg by mouth Daily.         Allergies   Allergen Reactions   • Diclofenac Other (See Comments)     Couldn't sleep and face turned blood red   • Percocet [Oxycodone-Acetaminophen] Hives     itching       Social History     Socioeconomic History   • Marital status:    Tobacco Use   • Smoking status: Never Smoker   • Smokeless tobacco: Never Used   Vaping Use   • Vaping Use: Never used   Substance and Sexual Activity   • Alcohol use: No   • Drug use: No   • Sexual activity: Yes     Partners: Male       Review of Systems   Constitutional: Negative for unexpected weight change.   Respiratory: Positive for cough. Negative for shortness of breath.    Cardiovascular: Negative for chest pain.   Gastrointestinal: Negative for abdominal pain and anal bleeding.       Objective     Vitals:    05/23/22 1022   BP: 136/74   Pulse: 74   Temp: 97.6 °F (36.4 °C)   SpO2: 98%   Weight: 73.9 kg (163 lb)   Height: 165.1 cm (65\")     Body mass index is 27.12 kg/m².    Physical Exam  Constitutional:       Appearance: Normal appearance. She is well-developed.   Eyes:      General: No scleral icterus.  Cardiovascular:      Rate and Rhythm: Regular rhythm.      Heart sounds: Normal heart " sounds. No murmur heard.  Pulmonary:      Effort: Pulmonary effort is normal. No accessory muscle usage.      Breath sounds: Normal breath sounds.   Abdominal:      General: Bowel sounds are normal. There is no distension.      Palpations: Abdomen is soft. There is no mass.      Tenderness: There is no abdominal tenderness. There is no guarding or rebound.   Skin:     General: Skin is warm and dry.      Coloration: Skin is not jaundiced.   Neurological:      Mental Status: She is alert.   Psychiatric:         Behavior: Behavior is cooperative.         Imaging Results (Most Recent)     None          Body mass index is 27.12 kg/m².    Assessment & Plan     Diagnoses and all orders for this visit:    1. Cough (Primary)  -     Case Request; Standing  -     Case Request    2. Gastroesophageal reflux disease, unspecified whether esophagitis present         ESOPHAGOGASTRODUODENOSCOPY WITH ANESTHESIA (N/A)    Hold ppi until after EGD as PPI has not provided relief.  If EGD does not reveal signs of acid reflux Ирина Randolph will be able to know normal appearing esophagus is due to acid suppression on PPI vs no acid present.  Ирина Randolph verbalized understanding     Decrease caffeine, nicotine, etoh- all contribute to acid reflux  Do not eat 2-3 hours within lying down, elevated HOB 4-6 inches    Further recommendation pending finding of EGD    Advised pt to stop use of NSAIDs, Fish Oil, and MV 5 days prior to procedure, per Dr Zamarripa protocol.  Tylenol based products are ok to take.  Pt verbalized understanding.    The risks of the endoscopy were discussed in detail.  We discussed the risks of perforation (one out of 3326-9970, riskier with dilation), bleeding (one out of 500), and the rare risks of infection, adverse reaction to anesthesia, respiratory failure, cardiac failure including MI and adverse reaction to medications, etc.  We discussed consequences that could occur if a risk were to develop such as the need for  hospitalization, blood transfusion, surgical intervention, medications, pain and disability and death.  Alternatives include not doing anything, or pursuing an UGI series which only offers a diagnosis with potential less accuracy compared to EGD.  The patient verbalizes understanding and agrees to proceed.  Precautions are currently being put in place due to COVID-19.  I have explained to Ирина MARTINEZ Agustín they will be required to undergo COVID testing prior to their EGD.  Ирина MARTINEZ Randolph verbalized understanding and was willing to proceed.             Moustapha Pedroza, APRN  05/23/22          There are no Patient Instructions on file for this visit.

## 2022-05-23 ENCOUNTER — OFFICE VISIT (OUTPATIENT)
Dept: GASTROENTEROLOGY | Facility: CLINIC | Age: 77
End: 2022-05-23

## 2022-05-23 VITALS
HEART RATE: 74 BPM | TEMPERATURE: 97.6 F | SYSTOLIC BLOOD PRESSURE: 136 MMHG | OXYGEN SATURATION: 98 % | HEIGHT: 65 IN | DIASTOLIC BLOOD PRESSURE: 74 MMHG | BODY MASS INDEX: 27.16 KG/M2 | WEIGHT: 163 LBS

## 2022-05-23 DIAGNOSIS — R05.9 COUGH: Primary | ICD-10-CM

## 2022-05-23 DIAGNOSIS — K21.9 GASTROESOPHAGEAL REFLUX DISEASE, UNSPECIFIED WHETHER ESOPHAGITIS PRESENT: ICD-10-CM

## 2022-05-23 DIAGNOSIS — Z01.818 PREOPERATIVE TESTING: Primary | ICD-10-CM

## 2022-05-23 PROCEDURE — 99214 OFFICE O/P EST MOD 30 MIN: CPT | Performed by: NURSE PRACTITIONER

## 2022-05-23 RX ORDER — OMEPRAZOLE 20 MG/1
20 CAPSULE, DELAYED RELEASE ORAL DAILY
COMMUNITY
Start: 2022-05-02 | End: 2023-03-28

## 2022-06-06 ENCOUNTER — LAB (OUTPATIENT)
Dept: LAB | Facility: HOSPITAL | Age: 77
End: 2022-06-06

## 2022-06-06 LAB — SARS-COV-2 ORF1AB RESP QL NAA+PROBE: NOT DETECTED

## 2022-06-06 PROCEDURE — U0004 COV-19 TEST NON-CDC HGH THRU: HCPCS | Performed by: NURSE PRACTITIONER

## 2022-06-06 PROCEDURE — U0005 INFEC AGEN DETEC AMPLI PROBE: HCPCS | Performed by: NURSE PRACTITIONER

## 2022-06-06 PROCEDURE — C9803 HOPD COVID-19 SPEC COLLECT: HCPCS

## 2022-06-07 ENCOUNTER — ANESTHESIA EVENT (OUTPATIENT)
Dept: GASTROENTEROLOGY | Facility: HOSPITAL | Age: 77
End: 2022-06-07

## 2022-06-07 ENCOUNTER — ANESTHESIA (OUTPATIENT)
Dept: GASTROENTEROLOGY | Facility: HOSPITAL | Age: 77
End: 2022-06-07

## 2022-06-07 ENCOUNTER — HOSPITAL ENCOUNTER (OUTPATIENT)
Facility: HOSPITAL | Age: 77
Setting detail: HOSPITAL OUTPATIENT SURGERY
Discharge: HOME | End: 2022-06-07
Attending: INTERNAL MEDICINE | Admitting: INTERNAL MEDICINE

## 2022-06-07 VITALS
RESPIRATION RATE: 18 BRPM | HEIGHT: 65 IN | WEIGHT: 160 LBS | BODY MASS INDEX: 26.66 KG/M2 | OXYGEN SATURATION: 98 % | TEMPERATURE: 97.4 F | SYSTOLIC BLOOD PRESSURE: 121 MMHG | HEART RATE: 74 BPM | DIASTOLIC BLOOD PRESSURE: 83 MMHG

## 2022-06-07 DIAGNOSIS — R05.9 COUGH: ICD-10-CM

## 2022-06-07 PROCEDURE — 25010000002 HEPARIN LOCK FLUSH PER 10 UNITS: Performed by: ANESTHESIOLOGY

## 2022-06-07 PROCEDURE — 25010000002 PROPOFOL 10 MG/ML EMULSION: Performed by: NURSE ANESTHETIST, CERTIFIED REGISTERED

## 2022-06-07 PROCEDURE — 43239 EGD BIOPSY SINGLE/MULTIPLE: CPT | Performed by: INTERNAL MEDICINE

## 2022-06-07 PROCEDURE — 87081 CULTURE SCREEN ONLY: CPT | Performed by: INTERNAL MEDICINE

## 2022-06-07 RX ORDER — HEPARIN SODIUM (PORCINE) LOCK FLUSH IV SOLN 100 UNIT/ML 100 UNIT/ML
100 SOLUTION INTRAVENOUS ONCE
Status: COMPLETED | OUTPATIENT
Start: 2022-06-07 | End: 2022-06-07

## 2022-06-07 RX ORDER — SODIUM CHLORIDE 9 MG/ML
500 INJECTION, SOLUTION INTRAVENOUS CONTINUOUS PRN
Status: DISCONTINUED | OUTPATIENT
Start: 2022-06-07 | End: 2022-06-07 | Stop reason: HOSPADM

## 2022-06-07 RX ORDER — LIDOCAINE HYDROCHLORIDE 10 MG/ML
0.5 INJECTION, SOLUTION EPIDURAL; INFILTRATION; INTRACAUDAL; PERINEURAL ONCE AS NEEDED
Status: DISCONTINUED | OUTPATIENT
Start: 2022-06-07 | End: 2022-06-07 | Stop reason: HOSPADM

## 2022-06-07 RX ORDER — PROPOFOL 10 MG/ML
VIAL (ML) INTRAVENOUS AS NEEDED
Status: DISCONTINUED | OUTPATIENT
Start: 2022-06-07 | End: 2022-06-07 | Stop reason: SURG

## 2022-06-07 RX ORDER — SODIUM CHLORIDE 0.9 % (FLUSH) 0.9 %
10 SYRINGE (ML) INJECTION EVERY 12 HOURS SCHEDULED
Status: CANCELLED | OUTPATIENT
Start: 2022-06-07

## 2022-06-07 RX ORDER — SODIUM CHLORIDE 9 MG/ML
100 INJECTION, SOLUTION INTRAVENOUS CONTINUOUS
Status: CANCELLED | OUTPATIENT
Start: 2022-06-07

## 2022-06-07 RX ORDER — SODIUM CHLORIDE 0.9 % (FLUSH) 0.9 %
10 SYRINGE (ML) INJECTION AS NEEDED
Status: CANCELLED | OUTPATIENT
Start: 2022-06-07

## 2022-06-07 RX ORDER — SODIUM CHLORIDE 0.9 % (FLUSH) 0.9 %
10 SYRINGE (ML) INJECTION AS NEEDED
Status: DISCONTINUED | OUTPATIENT
Start: 2022-06-07 | End: 2022-06-07 | Stop reason: HOSPADM

## 2022-06-07 RX ADMIN — SODIUM CHLORIDE 500 ML: 9 INJECTION, SOLUTION INTRAVENOUS at 09:35

## 2022-06-07 RX ADMIN — HEPARIN 100 UNITS: 100 SYRINGE at 10:11

## 2022-06-07 RX ADMIN — PROPOFOL 140 MG: 10 INJECTION, EMULSION INTRAVENOUS at 09:41

## 2022-06-07 NOTE — ANESTHESIA POSTPROCEDURE EVALUATION
"Patient: рИина Randolph    Procedure Summary     Date: 06/07/22 Room / Location: [facility] ENDOSCOPY 5 / [facility] ENDOSCOPY    Anesthesia Start: 0939 Anesthesia Stop: 0949    Procedure: ESOPHAGOGASTRODUODENOSCOPY WITH ANESTHESIA (N/A ) Diagnosis:       Cough      (Cough [R05.9])    Surgeons: Homer Zamarripa DO Provider: Buzz Casillas CRNA    Anesthesia Type: MAC ASA Status: 2          Anesthesia Type: MAC    Vitals  No vitals data found for the desired time range.          Post Anesthesia Care and Evaluation    Patient location during evaluation: PHASE II  Patient participation: complete - patient participated  Level of consciousness: awake and alert  Pain management: adequate    Airway patency: patent  Anesthetic complications: No anesthetic complications    Cardiovascular status: acceptable  Respiratory status: acceptable  Hydration status: acceptable    Comments: Blood pressure 166/87, temperature 97.4 °F (36.3 °C), temperature source Temporal, resp. rate 18, height 165.1 cm (65\"), weight 72.6 kg (160 lb), SpO2 98 %, not currently breastfeeding.    Pt discharged from PACU based on christina score >8      "

## 2022-06-07 NOTE — ANESTHESIA PREPROCEDURE EVALUATION
Anesthesia Evaluation     Patient summary reviewed and Nursing notes reviewed   no history of anesthetic complications (port; difficult IV access ):  NPO Solid Status: > 8 hours  NPO Liquid Status: > 2 hours           Airway   Mallampati: II  TM distance: >3 FB  Neck ROM: full  Small opening  Dental      Pulmonary    (-) not a smoker    ROS comment: Uses advair with bronchitis  Cardiovascular   Exercise tolerance: good (4-7 METS)    (+) hypertension, hyperlipidemia,   (-) pacemaker, valvular problems/murmurs, past MI, CABG      Neuro/Psych  (-) seizures, TIA, CVA  GI/Hepatic/Renal/Endo    (+)   thyroid problem hypothyroidism  (-) liver disease, no renal disease, diabetes    Musculoskeletal     Abdominal    Substance History      OB/GYN          Other   arthritis,    history of cancer                      Anesthesia Plan    ASA 2     MAC     intravenous induction     Anesthetic plan, risks, benefits, and alternatives have been provided, discussed and informed consent has been obtained with: patient and spouse/significant other.

## 2022-06-08 LAB — UREASE TISS QL: NEGATIVE

## 2022-08-19 ENCOUNTER — HOSPITAL ENCOUNTER (OUTPATIENT)
Dept: CT IMAGING | Age: 77
Discharge: HOME | End: 2022-08-19
Payer: MEDICARE

## 2022-08-19 DIAGNOSIS — C25.9 ADENOCARCINOMA OF PANCREAS (HCC): ICD-10-CM

## 2022-08-19 PROCEDURE — 74177 CT ABD & PELVIS W/CONTRAST: CPT

## 2022-08-19 PROCEDURE — 71260 CT THORAX DX C+: CPT | Performed by: RADIOLOGY

## 2022-08-19 PROCEDURE — 6360000004 HC RX CONTRAST MEDICATION: Performed by: INTERNAL MEDICINE

## 2022-08-19 PROCEDURE — 74177 CT ABD & PELVIS W/CONTRAST: CPT | Performed by: RADIOLOGY

## 2022-08-19 PROCEDURE — 71260 CT THORAX DX C+: CPT

## 2022-08-19 RX ADMIN — IOPAMIDOL 75 ML: 755 INJECTION, SOLUTION INTRAVENOUS at 11:15

## 2022-08-26 ENCOUNTER — HOSPITAL ENCOUNTER (OUTPATIENT)
Dept: INFUSION THERAPY | Age: 77
Discharge: HOME | End: 2022-08-26
Payer: MEDICARE

## 2022-08-26 ENCOUNTER — OFFICE VISIT (OUTPATIENT)
Dept: HEMATOLOGY | Age: 77
End: 2022-08-26
Payer: MEDICARE

## 2022-08-26 VITALS
DIASTOLIC BLOOD PRESSURE: 70 MMHG | HEART RATE: 94 BPM | WEIGHT: 164 LBS | SYSTOLIC BLOOD PRESSURE: 110 MMHG | OXYGEN SATURATION: 97 % | BODY MASS INDEX: 27.29 KG/M2

## 2022-08-26 DIAGNOSIS — R94.5 ABNORMAL RESULTS OF LIVER FUNCTION STUDIES: ICD-10-CM

## 2022-08-26 DIAGNOSIS — F41.1 ANXIETY ASSOCIATED WITH CANCER DIAGNOSIS (HCC): ICD-10-CM

## 2022-08-26 DIAGNOSIS — R11.0 NAUSEA: ICD-10-CM

## 2022-08-26 DIAGNOSIS — R18.8 OTHER ASCITES: ICD-10-CM

## 2022-08-26 DIAGNOSIS — Z71.89 CARE PLAN DISCUSSED WITH PATIENT: ICD-10-CM

## 2022-08-26 DIAGNOSIS — R14.0 ABDOMINAL BLOATING: ICD-10-CM

## 2022-08-26 DIAGNOSIS — R10.84 GENERALIZED ABDOMINAL PAIN: ICD-10-CM

## 2022-08-26 DIAGNOSIS — C25.9 ADENOCARCINOMA OF PANCREAS (HCC): Primary | ICD-10-CM

## 2022-08-26 DIAGNOSIS — C25.9 ADENOCARCINOMA OF PANCREAS (HCC): ICD-10-CM

## 2022-08-26 DIAGNOSIS — C25.2 MALIGNANT NEOPLASM OF TAIL OF PANCREAS (HCC): ICD-10-CM

## 2022-08-26 DIAGNOSIS — C80.1 ANXIETY ASSOCIATED WITH CANCER DIAGNOSIS (HCC): ICD-10-CM

## 2022-08-26 DIAGNOSIS — C25.2 MALIGNANT NEOPLASM OF TAIL OF PANCREAS (HCC): Primary | ICD-10-CM

## 2022-08-26 LAB
BASOPHILS ABSOLUTE: 0.13 K/UL (ref 0.01–0.08)
BASOPHILS RELATIVE PERCENT: 1 % (ref 0.1–1.2)
EOSINOPHILS ABSOLUTE: 0.4 K/UL (ref 0.04–0.54)
EOSINOPHILS RELATIVE PERCENT: 3.2 % (ref 0.7–7)
HCT VFR BLD CALC: 48.4 % (ref 34.1–44.9)
HEMOGLOBIN: 15 G/DL (ref 11.2–15.7)
LYMPHOCYTES ABSOLUTE: 4 K/UL (ref 1.18–3.74)
LYMPHOCYTES RELATIVE PERCENT: 31.5 % (ref 19.3–53.1)
MCH RBC QN AUTO: 30.1 PG (ref 25.6–32.2)
MCHC RBC AUTO-ENTMCNC: 31 G/DL (ref 32.3–35.5)
MCV RBC AUTO: 97 FL (ref 79.4–94.8)
MONOCYTES ABSOLUTE: 1.41 K/UL (ref 0.24–0.82)
MONOCYTES RELATIVE PERCENT: 11.1 % (ref 4.7–12.5)
NEUTROPHILS ABSOLUTE: 6.72 K/UL (ref 1.56–6.13)
NEUTROPHILS RELATIVE PERCENT: 53 % (ref 34–71.1)
PDW BLD-RTO: 14.8 % (ref 11.7–14.4)
PLATELET # BLD: 297 K/UL (ref 182–369)
PMV BLD AUTO: 10.6 FL (ref 7.4–10.4)
RBC # BLD: 4.99 M/UL (ref 3.93–5.22)
WBC # BLD: 12.69 K/UL (ref 3.98–10.04)

## 2022-08-26 PROCEDURE — 1036F TOBACCO NON-USER: CPT | Performed by: INTERNAL MEDICINE

## 2022-08-26 PROCEDURE — G8400 PT W/DXA NO RESULTS DOC: HCPCS | Performed by: INTERNAL MEDICINE

## 2022-08-26 PROCEDURE — 3017F COLORECTAL CA SCREEN DOC REV: CPT | Performed by: INTERNAL MEDICINE

## 2022-08-26 PROCEDURE — 96523 IRRIG DRUG DELIVERY DEVICE: CPT

## 2022-08-26 PROCEDURE — 99214 OFFICE O/P EST MOD 30 MIN: CPT | Performed by: INTERNAL MEDICINE

## 2022-08-26 PROCEDURE — 1090F PRES/ABSN URINE INCON ASSESS: CPT | Performed by: INTERNAL MEDICINE

## 2022-08-26 PROCEDURE — 85025 COMPLETE CBC W/AUTO DIFF WBC: CPT

## 2022-08-26 PROCEDURE — 99212 OFFICE O/P EST SF 10 MIN: CPT

## 2022-08-26 PROCEDURE — G8417 CALC BMI ABV UP PARAM F/U: HCPCS | Performed by: INTERNAL MEDICINE

## 2022-08-26 PROCEDURE — 1123F ACP DISCUSS/DSCN MKR DOCD: CPT | Performed by: INTERNAL MEDICINE

## 2022-08-26 PROCEDURE — G8427 DOCREV CUR MEDS BY ELIG CLIN: HCPCS | Performed by: INTERNAL MEDICINE

## 2022-08-26 RX ORDER — HEPARIN SODIUM (PORCINE) LOCK FLUSH IV SOLN 100 UNIT/ML 100 UNIT/ML
500 SOLUTION INTRAVENOUS PRN
Status: CANCELLED | OUTPATIENT
Start: 2022-08-26

## 2022-08-26 RX ORDER — HYDROCODONE BITARTRATE AND ACETAMINOPHEN 7.5; 325 MG/1; MG/1
1 TABLET ORAL EVERY 6 HOURS PRN
Qty: 60 TABLET | Refills: 0 | Status: SHIPPED | OUTPATIENT
Start: 2022-08-26 | End: 2022-09-25

## 2022-08-26 RX ORDER — HEPARIN SODIUM (PORCINE) LOCK FLUSH IV SOLN 100 UNIT/ML 100 UNIT/ML
500 SOLUTION INTRAVENOUS PRN
Status: DISCONTINUED | OUTPATIENT
Start: 2022-08-26 | End: 2022-08-27 | Stop reason: HOSPADM

## 2022-08-26 RX ORDER — SODIUM CHLORIDE 0.9 % (FLUSH) 0.9 %
5-40 SYRINGE (ML) INJECTION PRN
Status: DISCONTINUED | OUTPATIENT
Start: 2022-08-26 | End: 2022-08-27 | Stop reason: HOSPADM

## 2022-08-26 RX ORDER — SODIUM CHLORIDE 0.9 % (FLUSH) 0.9 %
5-40 SYRINGE (ML) INJECTION PRN
Status: CANCELLED | OUTPATIENT
Start: 2022-08-26

## 2022-08-26 RX ORDER — PROMETHAZINE HYDROCHLORIDE 12.5 MG/1
12.5 TABLET ORAL 3 TIMES DAILY PRN
Qty: 30 TABLET | Refills: 5 | Status: SHIPPED | OUTPATIENT
Start: 2022-08-26

## 2022-08-26 RX ORDER — LORAZEPAM 0.5 MG/1
TABLET ORAL
Qty: 2 TABLET | Refills: 0 | Status: SHIPPED | OUTPATIENT
Start: 2022-08-26 | End: 2022-08-26

## 2022-08-26 NOTE — PROGRESS NOTES
MEDICAL ONCOLOGY PROGRESS NOTE    Pt Name: Camila Kohli  MRN: [id number]  YOB: 1945  Date of evaluation: 8/26/2022    HISTORY OF PRESENT ILLNESS:    Patient is a pleasant 77years old female who has been diagnosed with invasive ductal adenocarcinoma of the pancreas, node positive. She is status post laparoscopic assisted distal pancreatectomy and splenectomy in June 2021 at [facility] she is a status post completion of adjuvant chemotherapy with Gemzar/Xeloda completed February 2022. She returns today status post completion of surveillance imaging. She has complaints of abdominal bloating and abdominal discomfort. She denies any nausea vomit diarrhea. Diagnosis  Invasive ductal adenocarcinoma, pancreas, June 2021  pT2 N1 M0  Strong family history for breast cancer  Positive JESUSITA gene mutation  DVT right upper extremity, Jan 2022    Treatment Summary  6/28/21 Laparoscopic assisted distal pancreatectomy and splenectomy with 1 of 6 lymph nodes positive. Surgeon Dr. Daria Khan [address] [place] [address] Drive [place]   8/13/2021-02/25/22-adjuvant Gemzar 1000 mg/m2 IV D 1, D8, D15 with Capecitabine 830 mg/m2 D1-21 every 28 days x6 cycles  1/14/22 Eliquis    Cancer History  Maximino Adrian was first seen by me on 8/6/2021. The patient was referred for a diagnosis of pancreatic malignancy. She had initial complaints of abdominal pain. 5/5/20 MRI abdomen [facility]): Moderate fatty replacement of the pancreas with several small cystic structures within or adjacent to the pancreatic head and uncinate process most suggestive of benign cysts. A follow-up MRI of the abdomen may be obtained in 1 year to assess for stability. No solid lesion is seen. Otherwise unremarkable MRI of the abdomen. 4/22/20 CT abd/pelvis [facility]): Focal inflammatory stranding associated with the pancreatic head. Radiographically I would favor this to represent acute pancreatitis.  There is mild associated stranding within the descending and proximal transverse duodenum. There are 2 well-circumscribed hypodensities one in the region of the uncinate process and one slightly anterior to the pancreatic head likely representing sequela of pancreatitis but warranting follow-up on subsequent exam. The body and tail of the pancreas are spared. No evidence of pancreatic necrosis. The patient is status post hysterectomy and cholecystectomy. Bibasilar subsegmental atelectasis. No evidence of nephrolithiasis or obstructive uropathy. Diverticulosis of the distal descending and sigmoid colon without evidence of diverticulitis. 5/5/21 MRI [facility]) There are multiple tiny-to-small foci of increased T2 signal seen within the pancreas, consistent with a combination of cysts and/or beaded dilatation of the tributaries of the main pancreatic duct, increased in size and number when compared to the prior similar study of 5/5/20. The patient is again noted to be status post cholecystectomy. The examination is otherwise within normal limits for age with no abnormal postcontrast enhancement noted. 6/3/21 Upper EUS (Dr Ochoa Rene/[place]): CT Visualized portion of pancreas abnormal on ultrasound. There is a 26.7 mm solid mass in pancreatic tail. It abuts the splenic vessel without occlusion or varices. Pancreatic duct is dilated to 6 mm in the tail proximal to the mass. FNA x4 yields atypical cells. Celiac axis, liver appear normal, no local nodes. Body and head of pancreas normal.  6/3/21 Pancreas tail mass, FNA aspiration smear (x2), ThinPrep and cell block: Adenocarcinoma. 6/7/21 CA 19-9 407.1  6/28/21-she underwent Whipple procedure pancreatectomy by Dr. Vickie Keller: Invasive ductal carcinoma 4.3 cm in greatest dimension, invasive in the peripancreatic adipose tissue, diffuse perineural invasion. Surgical margins were negative for carcinoma. The pancreas margins involved by a low-grade dysplasia.  1/6 nodes positive for metastatic cancer with direct extension. Distal fibrosis and pancreatitis was noted. Spleen was unremarkable. Pathological stage pT2 N1 M0  8/6/2021-she was first seen by me. Recommend adjuvant chemotherapy with gemcitabine/Xeloda. She acknowledged understanding and agree with treatment. 8/09/2021 CT Chest W/Contrast No evidence of intrathoracic metastatic disease is identified. 8/09/2021 CT Abd/Pelvis W/Oral Contrast Prior distal pancreatectomy and splenectomy. There is a 1.5 cm circumscribed, nonenhancing cystic lesion along the inferior pancreatic head, perhaps a branch IPMN. Otherwise, no obvious pancreatic solid mass is seen. No suspicious adenopathy or evidence of distant metastatic disease in the abdomen or pelvis. Prior cholecystectomy. Liver steatosis. Colonic diverticulosis. 8/13/2021-initiation of adjuvant capecitabine/Gemzar   9/10/2021-I reviewed CT chest abdomen pelvis. 1/21/2022-CA 19-9 = 10  2/10/22 CT CHEST W CONTRAST No evidence of intrathoracic metastasis. Incidental findings described above. 2/10/22 CT ABDOMEN PELVIS W IV CONTRAST  Partial pancreatectomy and splenectomy. Pancreatic head cysts measuring up to 1.6 cm, very similar to 8/9/2021. Continued surveillance imaging recommended. No evidence of intra-abdominal or pelvic metastasis. 2/18/2022-I reviewed results CT chest abdomen pelvis. No evidence of recurrent disease. Pancreatic head cyst stable. 02/25/22- Completion of adjuvant chemotherapy. 4/22/2022 CA 19-9-13  5/11/2022 Bone Density (P) Osteopenia. Femoral Neck T-Score -2.0, Lumbar Spine T-Score -0.9  6/7/2022 Endoscopy ([facility]) Urease was negative  8/5/2022 CT Abd/Pelvis WO Contrast [facility]) Fairly diffuse soft tissue infiltration with haziness and nodularity of there peripheral aspects of the peritoneal cavity most concerning for a process such as peritoneal carcinomatosis. Given history of pancreatic cancer,a PET scan may be considered for further evaluation. Small amount of ascites in the pelvis. Colonic diverticulosis. Large,fat-containing umbilical hernia. Tiny right pleural effusion with atelectasis in the lung bases. 8/22/2022 CT Chest W Contrast Calcified and noncalcified lung nodules may indicate inflammatory or infectious process and old granulomatous disease. Follow-up in 3 months is recommended. Small right pleural effusion. Cirrhotic liver. Free fluid in the abdomen with mesenteric edema upper abdomen. Multiple lymph nodes in region of pancreatic head. 8/22/2022-surveillance  CT Abd/Pelvis W IV Contrast (oral) Partial pancreatectomy with absence of the pancreatic body and tail. Multiple soft tissue and hypodense areas in the location of the pancreatic head indicating measuring up to 8 mm indicating cysts, residual tissue and possible soft tissue pancreatic  nodules. A cyst or soft tissue nodule is identified measuring up to 1.2 cm. Indeterminate nodules in the location of the pancreatic head versus lymphadenopathy. Consider MRI pancreas without and with IV contrast.Bilateral extrarenal pelves. 4 mm left renal cyst. Soft tissue and fat herniates into the umbilical region measuring 4.7 cm indicating hernia. There is mesentery within the hernia. No bowel is within the hernia. No evidence of free air. .  Small amount of free fluid is within the abdomen and pelvis. Mesenteric edema of the abdomen and pelvis. Rectosigmoid diverticulosis without acute diverticulitis. Slightly irregular liver contour may indicate early cirrhotic changes. Free fluid within the abdomen and pelvis. Inflammation of the mesentery. 8/28/2022-I reviewed CT abdomen/pelvis. New onset ascites. Liver is smaller when compared to prior CT scan suggesting hydrophobic disease. Radiology reported peripancreatic nodules. Recommended CA 19-9 reviewed. Recommend MRI pancreas. Recommended paracentesis (send for cytology, total protein, LDH). CMP same day of paracentesis.     Past Medical History:    Past Medical History: Diagnosis Date    Anxiety     Arthritis     Depression     Hypertension     Hypoglycemia     if fasting for very long    Pancreatic cancer ([facility] [address] 06/2021    s/p Whipple by Dr. Silvana Wolfe    Shoulder pain     incompleted rcr; possible injury    Thyroid disease        Past Surgical History:    Past Surgical History:   Procedure Laterality Date    CHOLECYSTECTOMY  1991    COLONOSCOPY  2020    Dr Rosendo Tamayo, COLON, DIAGNOSTIC      HYSTERECTOMY ([facility])  [address] [place]  06/28/2021    PORT SURGERY Right 9/21/2021    Single lumen PORT INSERTION with ultrasound and fluoroscopy performed by Patricia Sam DO at [facility] . Right 1/13/2022    RIGHT SIDE MEDIPORT REMOVAL, LEFT SIDE MEDIPORT INSERTION performed by Kuldip Ji DO at [address] Right 10/19/2017    SHOULDER ARTHROSCOPY ROTATOR CUFF REPAIR/ DEBRIDEMENT BICEPS TENODESIS/ TENOTOMY SUBACROMIAL DECOMPRESSION/ DISTAL CLAVICLE EXCISION performed by Radha Sandoval MD at [facility]  06/28/2021    UPPER GASTROINTESTINAL ENDOSCOPY  06/2021    Dr. Sheyla Chapman in [facility] [address]      spur excision       Social History:    Marital status:   Smoking status: no  ETOH status: no  [facility]ides: [facility]    Family History:   Family History   Adopted: Yes   Problem Relation Age of Onset    Breast Cancer Maternal Aunt     Breast Cancer Daughter     Cancer Daughter         Bladder    Cancer Maternal Uncle         Bladder       Current Hospital Medications:    Current Outpatient Medications   Medication Sig Dispense Refill    HYDROcodone-acetaminophen (NORCO) 7.5-325 MG per tablet Take 1 tablet by mouth every 6 hours as needed for Pain for up to 30 days.  Intended supply: 30 days 60 tablet 0    promethazine (PHENERGAN) 12.5 MG tablet Take 1 tablet by mouth 3 times daily as needed for Nausea 30 tablet 5    LORazepam (ATIVAN) 0.5 MG tablet Take 1 tablet 1 hr prior to MRI and may repeat at time of MRI 2 tablet 0    CREON 63969-35039 units delayed release capsule TAKE 1 CAPSULE BY MOUTH 3 TIMES DAILY (WITH MEALS) 90 capsule 5    capecitabine (XELODA) 500 MG chemo tablet Take 3 tablets by mouth 2 times a day for 14 days of a 28 day cycle 84 tablet 5    losartan-hydroCHLOROthiazide (HYZAAR) 50-12.5 MG per tablet Take 1 tablet by mouth daily Indications: High Blood Pressure Disorder      levothyroxine (SYNTHROID) 137 MCG tablet Take 137 mcg by mouth Daily Indications: Underactive Thyroid      Cholecalciferol (VITAMIN D3) 10 MCG (400 UNIT) CAPS Take 1 capsule by mouth daily       Magic Mouthwash (MIRACLE MOUTHWASH) Swish and spit 5 mLs 4 times daily as needed for Irritation 1/3 viscous lidocaine, 1/3 benadryl, 1/3 Maalox. 480 mL 1    lidocaine-prilocaine (EMLA) 2.5-2.5 % cream Apply topically as needed. 30 g 2    Caltrate 600+D Plus Minerals (CALTRATE) 600-800 MG-UNIT TABS tablet Take 1 tablet by mouth daily      busPIRone (BUSPAR) 5 MG tablet Take 5 mg by mouth 3 times daily       promethazine (PHENERGAN) 12.5 MG tablet Take 1 tablet by mouth every 6 hours as needed for Nausea 30 tablet 5    ondansetron (ZOFRAN) 4 MG tablet Take 1 tablet by mouth every 8 hours as needed for Nausea or Vomiting 10 tablet 0     No current facility-administered medications for this visit. Facility-Administered Medications Ordered in Other Visits   Medication Dose Route Frequency Provider Last Rate Last Admin    sodium chloride flush 0.9 % injection 5-40 mL  5-40 mL IntraVENous PRN Catarina Wilhelm MD        heparin flush 100 UNIT/ML injection 500 Units  500 Units IntraCATHeter PRN Catarina Wilhelm MD           Allergies:    Allergies   Allergen Reactions    Diclofenac Other (See Comments)     Couldn't sleep and face turned blood red    Oxycodone-Acetaminophen Hives     itching    Doxycycline Rash         Subjective     REVIEW OF SYSTEMS:   CONSTITUTIONAL: no fever, no night sweats, fatigue;  HEENT: no blurring of vision, no double vision, no hearing difficulty, no tinnitus, no ulceration, no dysplasia, no epistaxis;   LUNGS: no cough, no hemoptysis, no wheeze,  no shortness of breath;  CARDIOVASCULAR: no palpitation, no chest pain, no shortness of breath;  GI: abdominal pain, abdominal bloating, nausea, no vomiting, soft stool,no diarrhea, no constipation;  STUART: no dysuria, no hematuria, no frequency or urgency, no nephrolithiasis;  MUSCULOSKELETAL: no joint pain, no swelling, no stiffness;  ENDOCRINE: no polyuria, no polydipsia, no cold or heat intolerance;  HEMATOLOGY: no easy bruising or bleeding, no history of clotting disorder;  DERMATOLOGY: no skin rash, no eczema, no pruritus;  PSYCHIATRY: no depression, no anxiety, no panic attacks, no suicidal ideation, no homicidal ideation;  NEUROLOGY: no syncope, no seizures, no numbness or tingling of hands, no numbness or tingling of feet, no paresis;      Objective /70   Pulse 94   Wt 164 lb (74.4 kg)   SpO2 97%   BMI 27.29 kg/m²     PHYSICAL EXAM:  CONSTITUTIONAL: Alert, appropriate, no acute distress  EYES: Non icteric, EOM intact, pupils equal round   ENT: Mucus membranes moist, no oral pharyngeal lesions, external inspection of ears and nose are normal.  NECK: Supple, no masses. No palpable thyroid mass  CHEST/LUNGS: CTA bilaterally, normal respiratory effort   CARDIOVASCULAR: RRR, no murmurs. No lower extremity edema  ABDOMEN: soft non-tender, active bowel sounds, no HSM. No palpable masses  EXTREMITIES: warm, full ROM in all 4 extremities, no focal weakness. SKIN: warm, dry with no rashes or lesions  LYMPH: No cervical, clavicular, axillary, or inguinal lymphadenopathy  NEUROLOGIC: follows commands, non focal   PSYCH: mood and affect appropriate.   Alert and oriented to time, place, person       LABORATORY RESULTS REVIEWED/ANALYZED BY ME:  4/22/2022 CA 19-9-13    Lab Results   Component Value Date    WBC 12.69 (H) 08/26/2022 HGB 15.0 08/26/2022    HCT 48.4 (H) 08/26/2022    MCV 97.0 (H) 08/26/2022     08/26/2022     Lab Results   Component Value Date    NEUTROABS 6.72 (H) 08/26/2022     RADIOLOGY STUDIES REVIEWED BY ME:   5/11/2022 Bone Density ([facility]) Osteopenia. Femoral Neck T-Score -2.0, Lumbar Spine T-Score -0.9    8/22/2022 CT Chest W Contrast Calcified and noncalcified lung nodules may indicate inflammatory or infectious process and old granulomatous disease. Follow-up in 3 months is recommended. Small right pleural effusion. Cirrhotic liver. Free fluid in the abdomen with mesenteric edema upper abdomen. Multiple lymph nodes in region of pancreatic head. 8/22/2022 CT Abd/Pelvis W IV Contrast (oral) Partial pancreatectomy with absence of the pancreatic body and tail. Multiple soft tissue and hypodense areas in the location of the pancreatic head indicating measuring up to 8 mm indicating cysts, residual tissue and possible soft tissue pancreatic  nodules. A cyst or soft tissue nodule is identified measuring up to 1.2 cm. Indeterminate nodules in the location of the pancreatic head versus lymphadenopathy. Consider MRI pancreas without and with IV contrast.Bilateral extrarenal pelves. 4 mm left renal cyst. Soft tissue and fat herniates into the umbilical region measuring 4.7 cm indicating hernia. There is mesentery within the hernia. No bowel is within the hernia. No evidence of free air. .  Small amount of free fluid is within the abdomen and pelvis. Mesenteric edema of the abdomen and pelvis. Rectosigmoid diverticulosis without acute diverticulitis. Slightly irregular liver contour may indicate early cirrhotic changes. Free fluid within the abdomen and pelvis. Inflammation of the mesentery.           ASSESSMENT:    Orders Placed This Encounter   Procedures    US GUIDED NEEDLE PLACEMENT     Standing Status:   Future     Standing Expiration Date:   8/26/2023     Scheduling Instructions:      SEND FLUID FOR CYTOLOGY, TOTAL PROTEIN, LDH     Order Specific Question:   Reason for exam:     Answer:   COMPARE TO PREVIOUS SCANS TO ASSESS ASCITES    MRI ABDOMEN W WO CONTRAST     Standing Status:   Future     Standing Expiration Date:   8/26/2023     Order Specific Question:   STAT Creatinine as needed:     Answer:   No     Order Specific Question:   Reason for exam:     Answer:   COMPARE TO PREVIOUS SCANS TO ASSESS PANCREAS LESIONS AND ASCITES     Order Specific Question:   Specify organ? Answer:   Liver     Order Specific Question:   Specify organ? Answer:   Pancreas    Hepatitis Panel, Acute     Standing Status:   Future     Standing Expiration Date:   8/26/2023        Phillip Corado was seen today for follow-up. Diagnoses and all orders for this visit:    Adenocarcinoma of pancreas ([facility] [address]  -     [address] [place] [address]; Future  -     MRI ABDOMEN W WO CONTRAST; Future  -     HYDROcodone-acetaminophen (NORCO) 7.5-325 MG per tablet; Take 1 tablet by mouth every 6 hours as needed for Pain for up to 30 days. Intended supply: 30 days  -     Hepatitis Panel, Acute; Future    Malignant neoplasm of tail of pancreas ([facility] [address]  -     US GUIDED NEEDLE PLACEMENT; Future  -     MRI ABDOMEN W WO CONTRAST; Future    Care plan discussed with patient    Other ascites  -     US GUIDED NEEDLE PLACEMENT; Future  -     MRI ABDOMEN W WO CONTRAST; Future  -     Hepatitis Panel, Acute; Future    Generalized abdominal pain  -     US GUIDED NEEDLE PLACEMENT; Future  -     MRI ABDOMEN W WO CONTRAST; Future  -     HYDROcodone-acetaminophen (NORCO) 7.5-325 MG per tablet; Take 1 tablet by mouth every 6 hours as needed for Pain for up to 30 days. Intended supply: 30 days    Abdominal bloating  -     US GUIDED NEEDLE PLACEMENT; Future  -     MRI ABDOMEN W WO CONTRAST; Future  -     Hepatitis Panel, Acute; Future    Nausea  -     promethazine (PHENERGAN) 12.5 MG tablet;  Take 1 tablet by mouth 3 times daily as needed for Nausea    [facility]  -     LORazepam (ATIVAN) 0.5 MG tablet; Take 1 tablet 1 hr prior to MRI and may repeat at time of MRI    Abnormal results of liver function studies   -     Hepatitis Panel, Acute; Future      Pancreatic tail adenocarcinoma pT2 N1 M0, June 2021  Essentially, node positive third of the pancreas adenocarcinoma. Status post distal pancreatectomy/splenectomy  -Anticipated completion 6-month adjuvant Gemzar/Xeloda on 2/25/2002  -Last CA 19-9 = 10  -Feb 2022Completion of adjuvant treatment with Xeloda/Gemzar. -CT chest/abdomen/pelvis in August 2022. Surveillance follow-up visit as per NCCN guidelines:       8/22/2022 CT Abd/Pelvis W IV Contrast (oral) Partial pancreatectomy with absence of the pancreatic body and tail. Multiple soft tissue and hypodense areas in the location of the pancreatic head indicating measuring up to 8 mm indicating cysts, residual tissue and possible soft tissue pancreatic  nodules. A cyst or soft tissue nodule is identified measuring up to 1.2 cm. Indeterminate nodules in the location of the pancreatic head versus lymphadenopathy. Consider MRI pancreas without and with IV contrast.Bilateral extrarenal pelves. 4 mm left renal cyst. Soft tissue and fat herniates into the umbilical region measuring 4.7 cm indicating hernia. There is mesentery within the hernia. No bowel is within the hernia. No evidence of free air. .  Small amount of free fluid is within the abdomen and pelvis. Mesenteric edema of the abdomen and pelvis. Rectosigmoid diverticulosis without acute diverticulitis. Slightly irregular liver contour may indicate early cirrhotic changes. Free fluid within the abdomen and pelvis. Inflammation of the mesentery. Chemotherapy-induced toxicity-fatigue, hand-foot syndrome improved. Xeloda has been dose reduced. Right upper extremity DVT port related-started on Eliquis started on 1/14/2022. Continue Eliquis for 3 months through mid April 2022.      PLAN:  RTC with MD 2 weeks-after MRI and paracentesis  MRI abdomen with liver/pancreas protocol  US guided paracentesis for cytology, LDH, total protein  Port Flush and lab draw same days as paracentesis  CMP, CA 19-9, LDH, hepatitis profile same day as paracentesis  Recommend Norco 7.5mg every 6 hrs as needed-script sent  Recommend Phenergan 12.5mg every 6 hrs as needed-script sent  Recommend Ativan 0.5mg 1 hr prior to MRI and may repeat at time of MRI-script sent      Lillie Birmingham am pre charting  as Medical Assistant for Syd Dunham MD. Electronically signed by Jean Carlos Carreon MA on 8/26/2022 at 8:02 AM CDT. Dwight Corley am scribing for Syd Dunham MD. Electronically signed by Lida Martinez RN on 8/26/2022 at 12:37 PM CDT. I, Dr Molly Fall, personally performed the services described in this documentation as scribed by Lida Martinez RN in my presence and is both accurate and complete. I have seen, examined and reviewed this patient medication list, appropriate labs and imaging studies. I reviewed relevant medical records and others physicians notes. I discussed the plans of care with the patient. I answered all the questions to the patients satisfaction. I have also reviewed the chief complaint (CC) and part of the history (History of Present Illness (HPI), Past Family Social History [facility]), or Review of Systems (ROS) and made changes when appropriated.        (Please note that portions of this note were completed with a voice recognition program. Efforts were made to edit the dictations but occasionally words are mis-transcribed.)  Electronically signed by Syd Dunham MD on 8/26/2022 at 4:07 PM

## 2022-09-09 ENCOUNTER — TELEPHONE (OUTPATIENT)
Dept: INTERVENTIONAL RADIOLOGY/VASCULAR | Age: 77
End: 2022-09-09

## 2022-09-09 NOTE — TELEPHONE ENCOUNTER
Patient pre procedure instructions discussed with the patient and she confirmed date and time and procedure

## 2022-09-09 NOTE — PRE-PROCEDURE INSTRUCTIONS
Called patient to confirm apt for ultrasound guided biopsy Monday 9/12/22. Patient confirmed date and time and procedure. She is also scheduled for a MRI prior to the biopsy. Patient confirmed she is on no blood thinners or anticoagulants.

## 2022-09-12 ENCOUNTER — HOSPITAL ENCOUNTER (OUTPATIENT)
Dept: INFUSION THERAPY | Age: 77
Discharge: HOME | End: 2022-09-12
Payer: MEDICARE

## 2022-09-12 ENCOUNTER — TRANSCRIBE ORDERS (OUTPATIENT)
Dept: ADMINISTRATIVE | Facility: HOSPITAL | Age: 77
End: 2022-09-12

## 2022-09-12 ENCOUNTER — HOSPITAL ENCOUNTER (OUTPATIENT)
Dept: MRI IMAGING | Age: 77
Discharge: HOME | End: 2022-09-12
Payer: MEDICARE

## 2022-09-12 DIAGNOSIS — R79.89 ABNORMAL LFTS: ICD-10-CM

## 2022-09-12 DIAGNOSIS — R18.8 OTHER ASCITES: ICD-10-CM

## 2022-09-12 DIAGNOSIS — C25.2 MALIGNANT NEOPLASM OF TAIL OF PANCREAS (HCC): ICD-10-CM

## 2022-09-12 DIAGNOSIS — C25.9 ADENOCARCINOMA OF PANCREAS (HCC): ICD-10-CM

## 2022-09-12 DIAGNOSIS — C25.9 ADENOCARCINOMA OF PANCREAS (HCC): Primary | ICD-10-CM

## 2022-09-12 DIAGNOSIS — R14.0 ABDOMINAL BLOATING: ICD-10-CM

## 2022-09-12 DIAGNOSIS — R10.84 GENERALIZED ABDOMINAL PAIN: ICD-10-CM

## 2022-09-12 DIAGNOSIS — R94.5 ABNORMAL RESULTS OF LIVER FUNCTION STUDIES: ICD-10-CM

## 2022-09-12 DIAGNOSIS — R18.8 OTHER ASCITES: Primary | ICD-10-CM

## 2022-09-12 DIAGNOSIS — C25.2 MALIGNANT NEOPLASM OF TAIL OF PANCREAS (HCC): Primary | ICD-10-CM

## 2022-09-12 LAB
ALBUMIN SERPL-MCNC: 4.2 G/DL (ref 3.5–5.2)
ALP BLD-CCNC: 88 U/L (ref 35–104)
ALT SERPL-CCNC: 13 U/L (ref 9–52)
ANION GAP SERPL CALCULATED.3IONS-SCNC: 12 MMOL/L (ref 7–19)
AST SERPL-CCNC: 40 U/L (ref 14–36)
BILIRUB SERPL-MCNC: 0.6 MG/DL (ref 0.2–1.3)
BUN BLDV-MCNC: 9 MG/DL (ref 7–17)
CA 19-9: 1004 U/ML (ref 0–35)
CALCIUM SERPL-MCNC: 9.6 MG/DL (ref 8.4–10.2)
CHLORIDE BLD-SCNC: 99 MMOL/L (ref 98–111)
CO2: 27 MMOL/L (ref 22–29)
CREAT SERPL-MCNC: 0.6 MG/DL (ref 0.5–1)
GFR NON-AFRICAN AMERICAN: >60
GLUCOSE BLD-MCNC: 100 MG/DL (ref 74–106)
LACTATE DEHYDROGENASE: 161 U/L (ref 313–618)
POTASSIUM SERPL-SCNC: 4.3 MMOL/L (ref 3.5–5.1)
SODIUM BLD-SCNC: 138 MMOL/L (ref 137–145)
TOTAL PROTEIN: 7.4 G/DL (ref 6.3–8.2)

## 2022-09-12 PROCEDURE — A9577 INJ MULTIHANCE: HCPCS | Performed by: INTERNAL MEDICINE

## 2022-09-12 PROCEDURE — 80053 COMPREHEN METABOLIC PANEL: CPT

## 2022-09-12 PROCEDURE — 6360000002 HC RX W HCPCS: Performed by: INTERNAL MEDICINE

## 2022-09-12 PROCEDURE — 6360000004 HC RX CONTRAST MEDICATION: Performed by: INTERNAL MEDICINE

## 2022-09-12 PROCEDURE — 83615 LACTATE (LD) (LDH) ENZYME: CPT

## 2022-09-12 PROCEDURE — 36415 COLL VENOUS BLD VENIPUNCTURE: CPT

## 2022-09-12 PROCEDURE — 96523 IRRIG DRUG DELIVERY DEVICE: CPT

## 2022-09-12 PROCEDURE — 74183 MRI ABD W/O CNTR FLWD CNTR: CPT

## 2022-09-12 PROCEDURE — 2580000003 HC RX 258: Performed by: INTERNAL MEDICINE

## 2022-09-12 RX ORDER — SODIUM CHLORIDE 0.9 % (FLUSH) 0.9 %
5-40 SYRINGE (ML) INJECTION PRN
Status: DISCONTINUED | OUTPATIENT
Start: 2022-09-12 | End: 2022-09-13 | Stop reason: HOSPADM

## 2022-09-12 RX ORDER — HEPARIN SODIUM (PORCINE) LOCK FLUSH IV SOLN 100 UNIT/ML 100 UNIT/ML
500 SOLUTION INTRAVENOUS PRN
Status: DISCONTINUED | OUTPATIENT
Start: 2022-09-12 | End: 2022-09-13 | Stop reason: HOSPADM

## 2022-09-12 RX ORDER — HEPARIN SODIUM (PORCINE) LOCK FLUSH IV SOLN 100 UNIT/ML 100 UNIT/ML
500 SOLUTION INTRAVENOUS PRN
OUTPATIENT
Start: 2022-09-12

## 2022-09-12 RX ORDER — SODIUM CHLORIDE 0.9 % (FLUSH) 0.9 %
5-40 SYRINGE (ML) INJECTION PRN
OUTPATIENT
Start: 2022-09-12

## 2022-09-12 RX ADMIN — HEPARIN 500 UNITS: 100 SYRINGE at 08:35

## 2022-09-12 RX ADMIN — SODIUM CHLORIDE, PRESERVATIVE FREE 10 ML: 5 INJECTION INTRAVENOUS at 08:35

## 2022-09-12 RX ADMIN — GADOBENATE DIMEGLUMINE 16 ML: 529 INJECTION, SOLUTION INTRAVENOUS at 07:58

## 2022-09-12 NOTE — PROGRESS NOTES
Patient was scheduled today for a MRI and a Ultrasound guided paracentesis. The mri was completed. The paracentesis was cancelled and to be rescheduled, due to scheduling conflict with radiologist.  I talked with the patient and explained the situation and told her that as soon as the issue is resolved, that I will call her and place her back on the schedule. I also told her that if she feels she is in distress in any way, to not hesitate to go to the nearest ER for assistance. She verbalized understanding and stated, \" I am okay right now, I hope I am able to be rescheduled for tomorrow. \"  I will call the patient today to reschedule for first available apt.

## 2022-09-15 ENCOUNTER — HOSPITAL ENCOUNTER (OUTPATIENT)
Dept: ULTRASOUND IMAGING | Facility: HOSPITAL | Age: 77
Discharge: HOME | End: 2022-09-15
Admitting: RADIOLOGY

## 2022-09-15 VITALS
RESPIRATION RATE: 17 BRPM | SYSTOLIC BLOOD PRESSURE: 137 MMHG | HEIGHT: 65 IN | TEMPERATURE: 96.4 F | HEART RATE: 86 BPM | BODY MASS INDEX: 26.79 KG/M2 | OXYGEN SATURATION: 98 % | DIASTOLIC BLOOD PRESSURE: 83 MMHG | WEIGHT: 160.8 LBS

## 2022-09-15 DIAGNOSIS — R18.8 OTHER ASCITES: ICD-10-CM

## 2022-09-15 DIAGNOSIS — R18.8 OTHER ASCITES: Primary | ICD-10-CM

## 2022-09-15 LAB
APTT PPP: 27.3 SECONDS (ref 24.1–35)
INR PPP: 1.02 (ref 0.91–1.09)
PLATELET # BLD AUTO: 538 10*3/MM3 (ref 140–450)
PROTHROMBIN TIME: 13 SECONDS (ref 11.9–14.6)

## 2022-09-15 PROCEDURE — 85610 PROTHROMBIN TIME: CPT | Performed by: RADIOLOGY

## 2022-09-15 PROCEDURE — 76705 ECHO EXAM OF ABDOMEN: CPT

## 2022-09-15 PROCEDURE — 85730 THROMBOPLASTIN TIME PARTIAL: CPT | Performed by: RADIOLOGY

## 2022-09-15 PROCEDURE — 85049 AUTOMATED PLATELET COUNT: CPT | Performed by: RADIOLOGY

## 2022-09-15 RX ORDER — LOSARTAN POTASSIUM AND HYDROCHLOROTHIAZIDE 12.5; 5 MG/1; MG/1
1 TABLET ORAL DAILY
COMMUNITY

## 2022-09-15 NOTE — PROGRESS NOTES
MEDICAL ONCOLOGY PROGRESS NOTE    Pt Name: John Sharif  MRN: [id number]  YOB: 1945  Date of evaluation: 9/16/2022    HISTORY OF PRESENT ILLNESS:    Patient is a pleasant 77years old female who has been diagnosed with invasive ductal adenocarcinoma of the pancreas, node positive. She is status post laparoscopic assisted distal pancreatectomy and splenectomy in June 2021 at [facility] she is a status post completion of adjuvant chemotherapy with Gemzar/Xeloda completed February 2022. She returns today status post completion of surveillance imaging. Imaging studies showed developed new onset ascites. CA 19-9 has been significant elevated. She had an ultrasound performed at [facility] that did not show any evidence of significant ascites. I called and discussed with Dr. Demetrice Hartley. I showed him CT scans and also MRI of the abdomen that shows significant ascites. They will attempt CT-guided paracentesis. Diagnosis  Invasive ductal adenocarcinoma, pancreas, June 2021  pT2 N1 M0  Strong family history for breast cancer  Positive JESUSITA gene mutation  DVT right upper extremity, Jan 2022    Treatment Summary  6/28/21 Laparoscopic assisted distal pancreatectomy and splenectomy with 1 of 6 lymph nodes positive. Surgeon Dr. Yoko Hampton [address] [place] [address] Drive Ne   8/13/2021-02/25/22-adjuvant Gemzar 1000 mg/m2 IV D 1, D8, D15 with Capecitabine 830 mg/m2 D1-21 every 28 days x6 cycles  1/14/22 Eliquis    Cancer History  Aniyah Clay was first seen by me on 8/6/2021. The patient was referred for a diagnosis of pancreatic malignancy. She had initial complaints of abdominal pain. 5/5/20 MRI abdomen [facility]): Moderate fatty replacement of the pancreas with several small cystic structures within or adjacent to the pancreatic head and uncinate process most suggestive of benign cysts. A follow-up MRI of the abdomen may be obtained in 1 year to assess for stability. No solid lesion is seen.  Otherwise unremarkable MRI of the abdomen. 4/22/20 CT abd/pelvis [facility]): Focal inflammatory stranding associated with the pancreatic head. Radiographically I would favor this to represent acute pancreatitis. There is mild associated stranding within the descending and proximal transverse duodenum. There are 2 well-circumscribed hypodensities one in the region of the uncinate process and one slightly anterior to the pancreatic head likely representing sequela of pancreatitis but warranting follow-up on subsequent exam. The body and tail of the pancreas are spared. No evidence of pancreatic necrosis. The patient is status post hysterectomy and cholecystectomy. Bibasilar subsegmental atelectasis. No evidence of nephrolithiasis or obstructive uropathy. Diverticulosis of the distal descending and sigmoid colon without evidence of diverticulitis. 5/5/21 MRI [facility]) There are multiple tiny-to-small foci of increased T2 signal seen within the pancreas, consistent with a combination of cysts and/or beaded dilatation of the tributaries of the main pancreatic duct, increased in size and number when compared to the prior similar study of 5/5/20. The patient is again noted to be status post cholecystectomy. The examination is otherwise within normal limits for age with no abnormal postcontrast enhancement noted. 6/3/21 Upper EUS (Dr Juan Rene/Nikolai): CT Visualized portion of pancreas abnormal on ultrasound. There is a 26.7 mm solid mass in pancreatic tail. It abuts the splenic vessel without occlusion or varices. Pancreatic duct is dilated to 6 mm in the tail proximal to the mass. FNA x4 yields atypical cells. Celiac axis, liver appear normal, no local nodes. Body and head of pancreas normal.  6/3/21 Pancreas tail mass, FNA aspiration smear (x2), ThinPrep and cell block: Adenocarcinoma.   6/7/21 CA 19-9 407.1  6/28/21-she underwent Whipple procedure pancreatectomy by Dr. Silvana Wolfe: Invasive ductal carcinoma 4.3 cm in greatest dimension, invasive in the peripancreatic adipose tissue, diffuse perineural invasion. Surgical margins were negative for carcinoma. The pancreas margins involved by a low-grade dysplasia. 1/6 nodes positive for metastatic cancer with direct extension. Distal fibrosis and pancreatitis was noted. Spleen was unremarkable. Pathological stage pT2 N1 M0  8/6/2021-she was first seen by me. Recommend adjuvant chemotherapy with gemcitabine/Xeloda. She acknowledged understanding and agree with treatment. 8/09/2021 CT Chest W/Contrast No evidence of intrathoracic metastatic disease is identified. 8/09/2021 CT Abd/Pelvis W/Oral Contrast Prior distal pancreatectomy and splenectomy. There is a 1.5 cm circumscribed, nonenhancing cystic lesion along the inferior pancreatic head, perhaps a branch IPMN. Otherwise, no obvious pancreatic solid mass is seen. No suspicious adenopathy or evidence of distant metastatic disease in the abdomen or pelvis. Prior cholecystectomy. Liver steatosis. Colonic diverticulosis. 8/13/2021-initiation of adjuvant capecitabine/Gemzar   9/10/2021-I reviewed CT chest abdomen pelvis. 1/21/2022-CA 19-9 = 10  2/10/22 CT CHEST W CONTRAST No evidence of intrathoracic metastasis. Incidental findings described above. 2/10/22 CT ABDOMEN PELVIS W IV CONTRAST  Partial pancreatectomy and splenectomy. Pancreatic head cysts measuring up to 1.6 cm, very similar to 8/9/2021. Continued surveillance imaging recommended. No evidence of intra-abdominal or pelvic metastasis. 2/18/2022-I reviewed results CT chest abdomen pelvis. No evidence of recurrent disease. Pancreatic head cyst stable. 02/25/22- Completion of adjuvant chemotherapy. 4/22/2022 CA 19-9-13  5/11/2022 Bone Density ([facility]) Osteopenia.  Femoral Neck T-Score -2.0, Lumbar Spine T-Score -0.9  6/7/2022 Endoscopy ([facility]) Urease was negative  8/5/2022 CT Abd/Pelvis WO Contrast [facility]) Fairly diffuse soft tissue infiltration with haziness and nodularity of there peripheral aspects of the peritoneal cavity most concerning for a process such as peritoneal carcinomatosis. Given history of pancreatic cancer,a PET scan may be considered for further evaluation. Small amount of ascites in the pelvis. Colonic diverticulosis. Large,fat-containing umbilical hernia. Tiny right pleural effusion with atelectasis in the lung bases. 8/22/2022 CT Chest W Contrast Calcified and noncalcified lung nodules may indicate inflammatory or infectious process and old granulomatous disease. Follow-up in 3 months is recommended. Small right pleural effusion. Cirrhotic liver. Free fluid in the abdomen with mesenteric edema upper abdomen. Multiple lymph nodes in region of pancreatic head. 8/22/2022-surveillance  CT Abd/Pelvis W IV Contrast (oral) Partial pancreatectomy with absence of the pancreatic body and tail. Multiple soft tissue and hypodense areas in the location of the pancreatic head indicating measuring up to 8 mm indicating cysts, residual tissue and possible soft tissue pancreatic  nodules. A cyst or soft tissue nodule is identified measuring up to 1.2 cm. Indeterminate nodules in the location of the pancreatic head versus lymphadenopathy. Consider MRI pancreas without and with IV contrast.Bilateral extrarenal pelves. 4 mm left renal cyst. Soft tissue and fat herniates into the umbilical region measuring 4.7 cm indicating hernia. There is mesentery within the hernia. No bowel is within the hernia. No evidence of free air. .  Small amount of free fluid is within the abdomen and pelvis. Mesenteric edema of the abdomen and pelvis. Rectosigmoid diverticulosis without acute diverticulitis. Slightly irregular liver contour may indicate early cirrhotic changes. Free fluid within the abdomen and pelvis. Inflammation of the mesentery. 8/28/2022-I reviewed CT abdomen/pelvis. New onset ascites. Liver is smaller when compared to prior CT scan suggesting hydrophobic disease. DISTAL CLAVICLE EXCISION performed by Phillip Hercules MD at [address]  06/28/2021    UPPER GASTROINTESTINAL ENDOSCOPY  06/2021    Dr. Raciel Connelly in [address] [place] [address]      spur excision       Social History:    Marital status:   Smoking status: no  ETOH status: no  Resides: [place]    Family History:   Family History   Adopted: Yes   Problem Relation Age of Onset    Breast Cancer Maternal Aunt     Breast Cancer Daughter     Cancer Daughter         Bladder    Cancer Maternal Uncle         Bladder       Current Hospital Medications:    Current Outpatient Medications   Medication Sig Dispense Refill    HYDROcodone-acetaminophen (NORCO) 7.5-325 MG per tablet Take 1 tablet by mouth every 6 hours as needed for Pain for up to 30 days. Intended supply: 30 days 60 tablet 0    promethazine (PHENERGAN) 12.5 MG tablet Take 1 tablet by mouth 3 times daily as needed for Nausea 30 tablet 5    CREON 94786-30162 units delayed release capsule TAKE 1 CAPSULE BY MOUTH 3 TIMES DAILY (WITH MEALS) 90 capsule 5    capecitabine (XELODA) 500 MG chemo tablet Take 3 tablets by mouth 2 times a day for 14 days of a 28 day cycle 84 tablet 5    losartan-hydroCHLOROthiazide (HYZAAR) 50-12.5 MG per tablet Take 1 tablet by mouth daily Indications: High Blood Pressure Disorder      levothyroxine (SYNTHROID) 137 MCG tablet Take 137 mcg by mouth Daily Indications: Underactive Thyroid      Cholecalciferol (VITAMIN D3) 10 MCG (400 UNIT) CAPS Take 1 capsule by mouth daily       Magic Mouthwash (MIRACLE MOUTHWASH) Swish and spit 5 mLs 4 times daily as needed for Irritation 1/3 viscous lidocaine, 1/3 benadryl, 1/3 Maalox. 480 mL 1    lidocaine-prilocaine (EMLA) 2.5-2.5 % cream Apply topically as needed.  30 g 2    Caltrate 600+D Plus Minerals (CALTRATE) 600-800 MG-UNIT TABS tablet Take 1 tablet by mouth daily      busPIRone (BUSPAR) 5 MG tablet Take 5 mg by mouth 3 times daily       promethazine (PHENERGAN) 12.5 MG tablet Take 1 tablet by mouth every 6 hours as needed for Nausea 30 tablet 5    ondansetron (ZOFRAN) 4 MG tablet Take 1 tablet by mouth every 8 hours as needed for Nausea or Vomiting 10 tablet 0     No current facility-administered medications for this visit. Allergies: Allergies   Allergen Reactions    Diclofenac Other (See Comments)     Couldn't sleep and face turned blood red    Oxycodone-Acetaminophen Hives     itching    Doxycycline Rash         Subjective     REVIEW OF SYSTEMS:   CONSTITUTIONAL: no fever, no night sweats, no fatigue;  HEENT: no blurring of vision, no double vision, no hearing difficulty, no tinnitus, no ulceration, no dysplasia, no epistaxis;   LUNGS: no cough, no hemoptysis, no wheeze,  no shortness of breath;  CARDIOVASCULAR: no palpitation, no chest pain, no shortness of breath;  GI: mild abdominal pain, nausea, no vomiting, no diarrhea, no constipation;  STUART: no dysuria, no hematuria, no frequency or urgency, no nephrolithiasis;  MUSCULOSKELETAL: no joint pain, no swelling, no stiffness;  ENDOCRINE: no polyuria, no polydipsia, no cold or heat intolerance;  HEMATOLOGY: no easy bruising or bleeding, no history of clotting disorder;  DERMATOLOGY: no skin rash, no eczema, no pruritus;  PSYCHIATRY: no depression, no anxiety, no panic attacks, no suicidal ideation, no homicidal ideation;  NEUROLOGY: no syncope, no seizures, no numbness or tingling of hands, no numbness or tingling of feet, no paresis;     Objective /70   Pulse 99   Wt 162 lb (73.5 kg)   SpO2 97%   BMI 26.96 kg/m²     PHYSICAL EXAM:  CONSTITUTIONAL: Alert, appropriate, no acute distress  EYES: Non icteric, EOM intact, pupils equal round   ENT: Mucus membranes moist, no oral pharyngeal lesions, external inspection of ears and nose are normal.  NECK: Supple, no masses. No palpable thyroid mass  CHEST/LUNGS: CTA bilaterally, normal respiratory effort   CARDIOVASCULAR: RRR, no murmurs.   No lower extremity edema  ABDOMEN: soft non-tender, active bowel sounds, no HSM. No palpable masses  EXTREMITIES: warm, full ROM in all 4 extremities, no focal weakness. SKIN: warm, dry with no rashes or lesions  LYMPH: No cervical, clavicular, axillary, or inguinal lymphadenopathy  NEUROLOGIC: follows commands, non focal   PSYCH: mood and affect appropriate. Alert and oriented to time, place, person       LABORATORY RESULTS REVIEWED/ANALYZED BY ME:  9/15/2022 US Guided Parecentesis [facility]) Limited grayscale ultrasound evaluation to assess for peritoneal fluid. 4 quadrant evaluation shows trace fluid around the liver. No more than trace fluid at the right lower quadrant. No significant fluid is seen at the left upper quadrant or left lower quadrant. 9/12/22 CA 19-9 1004  Lab Results   Component Value Date    WBC 10.82 (H) 09/16/2022    HGB 14.5 09/16/2022    HCT 45.9 (H) 09/16/2022    MCV 95.6 (H) 09/16/2022     09/16/2022     Lab Results   Component Value Date    NEUTROABS 5.45 09/16/2022     Lab Results   Component Value Date     09/12/2022    K 4.3 09/12/2022    CL 99 09/12/2022    CO2 27 09/12/2022    BUN 9 09/12/2022    CREATININE 0.6 09/12/2022    GLUCOSE 100 09/12/2022    CALCIUM 9.6 09/12/2022    PROT 7.4 09/12/2022    LABALBU 4.2 09/12/2022    BILITOT 0.6 09/12/2022    ALKPHOS 88 09/12/2022    AST 40 (H) 09/12/2022    ALT 13 09/12/2022    LABGLOM >60 09/12/2022    GFRAA >59 01/12/2022    GLOB 2.8 03/04/2022           RADIOLOGY STUDIES REVIEWED BY ME:   9/12/2022 MRI Abdomen W WO Contrast MRCP Hepatic steatosis with a nodular contour suggestive of cirrhosis. Ascites. Absence of the spleen. Absence of the gallbladder. The patient appears to be status post partial pancreatectomy . The nodular lesions identified on the CT scan dated August 19, 2022 are poorly visualized on this examination due to respiratory artifact and technique. Small renal cysts. Midline ventral hernia.     ASSESSMENT:    Orders Placed This Encounter   Procedures    CT GUIDED NEEDLE PLACEMENT     Send fluid for CYTOLOGY, LDH, TOTAL PROTEIN, ALBUMIN     Standing Status:   Future     Standing Expiration Date:   9/16/2023     Scheduling Instructions:      SCHED AT [facility]     Order Specific Question:   Reason for exam:     Answer:   CT GUDED PARACENTESIS-COMPARE TO MRI ABDOMEN FOR ASCITES. DISCUSSED WITH DR Denia Beard    Comprehensive Metabolic Panel     DO SAME DAY AS CT GUIDED PARACENTESIS     Standing Status:   Future     Standing Expiration Date:   9/16/2023          Carly Lorenzo was seen today for follow-up. Diagnoses and all orders for this visit:    Other ascites  -     Cancel: CT GUIDED NEEDLE PLACEMENT; Future  -     CT GUIDED NEEDLE PLACEMENT; Future  -     Comprehensive Metabolic Panel; Future    Malignant neoplasm of tail of pancreas (HCC)  -     Cancel: CT GUIDED NEEDLE PLACEMENT; Future  -     CT GUIDED NEEDLE PLACEMENT; Future  -     Comprehensive Metabolic Panel; Future    Care plan discussed with patient    Adenocarcinoma of pancreas [facility])        Pancreatic tail adenocarcinoma pT2 N1 M0, June 2021  Essentially, node positive third of the pancreas adenocarcinoma. Status post distal pancreatectomy/splenectomy  -Anticipated completion 6-month adjuvant Gemzar/Xeloda on 2/25/2002  -Last CA 19-9 = 10  -Feb 2022Completion of adjuvant treatment with Xeloda/Gemzar. -CT chest/abdomen/pelvis in August 2022. Surveillance follow-up visit as per NCCN guidelines:       8/22/2022 CT Abd/Pelvis W IV Contrast (oral) Partial pancreatectomy with absence of the pancreatic body and tail. Multiple soft tissue and hypodense areas in the location of the pancreatic head indicating measuring up to 8 mm indicating cysts, residual tissue and possible soft tissue pancreatic  nodules. A cyst or soft tissue nodule is identified measuring up to 1.2 cm. Indeterminate nodules in the location of the pancreatic head versus lymphadenopathy.   Consider MRI pancreas without and with IV contrast.Bilateral extrarenal pelves. 4 mm left renal cyst. Soft tissue and fat herniates into the umbilical region measuring 4.7 cm indicating hernia. There is mesentery within the hernia. No bowel is within the hernia. No evidence of free air. .  Small amount of free fluid is within the abdomen and pelvis. Mesenteric edema of the abdomen and pelvis. Rectosigmoid diverticulosis without acute diverticulitis. Slightly irregular liver contour may indicate early cirrhotic changes. Free fluid within the abdomen and pelvis. Inflammation of the mesentery. The patient has new onset ascites. CA 19-9 has been elevated. I am concerned about disease recurrence. Discussed with radiology at [facility].   Patient will proceed with CT-guided biopsy. Right upper extremity DVT port related-started on Eliquis started on 1/14/2022. Eliquis for 3 months through mid April 2022. PLAN:  RTC with MD 2 weeks-after paracentesis  CT guided paracentesis for cytology, LDH, total protein  Discussed with Dr. Elvis Goldberg, [facility] radiology for coordination of care. CMP at [facility] same day as paracentesis. Continue  Norco 7.5mg every 6 hrs as needed  Continue Phenergan 12.5mg every 6 hrs as needed  Continue Ativan 0.5mg 1 hr prior to MRI and may repeat at time of MRI    IMirian am pre charting  as Medical Assistant for Doug Velarde MD. Electronically signed by Mirian Joe MA on 9/16/2022 at 12:09 PM CDT. Jv Beth am scribing for Doug Velarde MD. Electronically signed by Lisa Tatum RN on 9/16/2022 at 10:45 AM CDT. I, Dr Ally Nieves, personally performed the services described in this documentation as scribed by Lisa Tatum RN in my presence and is both accurate and complete. I have seen, examined and reviewed this patient medication list, appropriate labs and imaging studies. I reviewed relevant medical records and others physicians notes. I discussed the plans of care with the patient.  I answered all the questions to the patients satisfaction. I have also reviewed the chief complaint (CC) and part of the history (History of Present Illness (HPI), Past Family Social History [facility]), or Review of Systems (ROS) and made changes when appropriated. (Please note that portions of this note were completed with a voice recognition program. Efforts were made to edit the dictations but occasionally words are mis-transcribed.)  Electronically signed by Behzad Johnston MD on 9/16/2022 at 10:50 AM         The total time (38 minutes) I spent to see the patient today includes at least one or more of the following: preparing to see the patient by reviewing prior tests, prior notes or other relevant information, performing appropriate independent examination and evaluation, counseling, , communicating with other healthcare professionals when appropriated to coordinate care, documenting clinic information in the electronic medical record or other health records, independently interpreting results of tests, managing test results and communicating the results to the patient/family or caregiver.

## 2022-09-15 NOTE — NURSING NOTE
Procedure cancelled by radiologist due to not enough fluid findings to proceed with paracentesis.

## 2022-09-16 ENCOUNTER — TRANSCRIBE ORDERS (OUTPATIENT)
Dept: ADMINISTRATIVE | Facility: HOSPITAL | Age: 77
End: 2022-09-16

## 2022-09-16 ENCOUNTER — OFFICE VISIT (OUTPATIENT)
Dept: HEMATOLOGY | Age: 77
End: 2022-09-16
Payer: MEDICARE

## 2022-09-16 ENCOUNTER — HOSPITAL ENCOUNTER (OUTPATIENT)
Dept: INFUSION THERAPY | Age: 77
Discharge: HOME | End: 2022-09-16
Payer: MEDICARE

## 2022-09-16 VITALS
BODY MASS INDEX: 26.96 KG/M2 | DIASTOLIC BLOOD PRESSURE: 70 MMHG | HEART RATE: 99 BPM | WEIGHT: 162 LBS | SYSTOLIC BLOOD PRESSURE: 120 MMHG | OXYGEN SATURATION: 97 %

## 2022-09-16 DIAGNOSIS — C25.9 ADENOCARCINOMA OF PANCREAS (HCC): ICD-10-CM

## 2022-09-16 DIAGNOSIS — R18.8 OTHER ASCITES: Primary | ICD-10-CM

## 2022-09-16 DIAGNOSIS — C25.2 MALIGNANT NEOPLASM OF TAIL OF PANCREAS (HCC): ICD-10-CM

## 2022-09-16 DIAGNOSIS — C25.2 MALIGNANT NEOPLASM OF TAIL OF PANCREAS: ICD-10-CM

## 2022-09-16 DIAGNOSIS — Z71.89 CARE PLAN DISCUSSED WITH PATIENT: ICD-10-CM

## 2022-09-16 DIAGNOSIS — Z71.89 COORDINATION OF COMPLEX CARE: ICD-10-CM

## 2022-09-16 LAB
BASOPHILS ABSOLUTE: 0.12 K/UL (ref 0.01–0.08)
BASOPHILS RELATIVE PERCENT: 1.1 % (ref 0.1–1.2)
EOSINOPHILS ABSOLUTE: 0.17 K/UL (ref 0.04–0.54)
EOSINOPHILS RELATIVE PERCENT: 1.6 % (ref 0.7–7)
HCT VFR BLD CALC: 45.9 % (ref 34.1–44.9)
HEMOGLOBIN: 14.5 G/DL (ref 11.2–15.7)
LYMPHOCYTES ABSOLUTE: 3.84 K/UL (ref 1.18–3.74)
LYMPHOCYTES RELATIVE PERCENT: 35.5 % (ref 19.3–53.1)
MCH RBC QN AUTO: 30.2 PG (ref 25.6–32.2)
MCHC RBC AUTO-ENTMCNC: 31.6 G/DL (ref 32.3–35.5)
MCV RBC AUTO: 95.6 FL (ref 79.4–94.8)
MONOCYTES ABSOLUTE: 1.21 K/UL (ref 0.24–0.82)
MONOCYTES RELATIVE PERCENT: 11.2 % (ref 4.7–12.5)
NEUTROPHILS ABSOLUTE: 5.45 K/UL (ref 1.56–6.13)
NEUTROPHILS RELATIVE PERCENT: 50.3 % (ref 34–71.1)
PDW BLD-RTO: 14 % (ref 11.7–14.4)
PLATELET # BLD: 268 K/UL (ref 182–369)
PMV BLD AUTO: 10.4 FL (ref 7.4–10.4)
RBC # BLD: 4.8 M/UL (ref 3.93–5.22)
WBC # BLD: 10.82 K/UL (ref 3.98–10.04)

## 2022-09-16 PROCEDURE — 99212 OFFICE O/P EST SF 10 MIN: CPT

## 2022-09-16 PROCEDURE — G8417 CALC BMI ABV UP PARAM F/U: HCPCS | Performed by: INTERNAL MEDICINE

## 2022-09-16 PROCEDURE — 1036F TOBACCO NON-USER: CPT | Performed by: INTERNAL MEDICINE

## 2022-09-16 PROCEDURE — 3017F COLORECTAL CA SCREEN DOC REV: CPT | Performed by: INTERNAL MEDICINE

## 2022-09-16 PROCEDURE — G8400 PT W/DXA NO RESULTS DOC: HCPCS | Performed by: INTERNAL MEDICINE

## 2022-09-16 PROCEDURE — 99214 OFFICE O/P EST MOD 30 MIN: CPT | Performed by: INTERNAL MEDICINE

## 2022-09-16 PROCEDURE — 1090F PRES/ABSN URINE INCON ASSESS: CPT | Performed by: INTERNAL MEDICINE

## 2022-09-16 PROCEDURE — G8427 DOCREV CUR MEDS BY ELIG CLIN: HCPCS | Performed by: INTERNAL MEDICINE

## 2022-09-16 PROCEDURE — 85025 COMPLETE CBC W/AUTO DIFF WBC: CPT

## 2022-09-16 PROCEDURE — 1123F ACP DISCUSS/DSCN MKR DOCD: CPT | Performed by: INTERNAL MEDICINE

## 2022-09-19 ENCOUNTER — HOSPITAL ENCOUNTER (OUTPATIENT)
Dept: CT IMAGING | Facility: HOSPITAL | Age: 77
Discharge: HOME | End: 2022-09-19

## 2022-09-19 ENCOUNTER — LAB (OUTPATIENT)
Dept: LAB | Facility: HOSPITAL | Age: 77
End: 2022-09-19

## 2022-09-19 ENCOUNTER — APPOINTMENT (OUTPATIENT)
Dept: ULTRASOUND IMAGING | Facility: HOSPITAL | Age: 77
End: 2022-09-19

## 2022-09-19 ENCOUNTER — HOSPITAL ENCOUNTER (OUTPATIENT)
Dept: ULTRASOUND IMAGING | Facility: HOSPITAL | Age: 77
Discharge: HOME | End: 2022-09-19

## 2022-09-19 VITALS
RESPIRATION RATE: 18 BRPM | OXYGEN SATURATION: 98 % | SYSTOLIC BLOOD PRESSURE: 138 MMHG | HEIGHT: 65 IN | WEIGHT: 161 LBS | TEMPERATURE: 97.7 F | HEART RATE: 87 BPM | DIASTOLIC BLOOD PRESSURE: 80 MMHG | BODY MASS INDEX: 26.82 KG/M2

## 2022-09-19 DIAGNOSIS — C25.2 MALIGNANT NEOPLASM OF TAIL OF PANCREAS: ICD-10-CM

## 2022-09-19 DIAGNOSIS — R18.8 OTHER ASCITES: ICD-10-CM

## 2022-09-19 LAB
ALBUMIN FLD-MCNC: 3.4 G/DL
ALBUMIN SERPL-MCNC: 4.3 G/DL (ref 3.5–5.2)
ALBUMIN/GLOB SERPL: 1.3 G/DL
ALP SERPL-CCNC: 92 U/L (ref 39–117)
ALT SERPL W P-5'-P-CCNC: 9 U/L (ref 1–33)
ANION GAP SERPL CALCULATED.3IONS-SCNC: 11 MMOL/L (ref 5–15)
AST SERPL-CCNC: 38 U/L (ref 1–32)
BILIRUB SERPL-MCNC: 0.5 MG/DL (ref 0–1.2)
BUN SERPL-MCNC: 11 MG/DL (ref 8–23)
BUN/CREAT SERPL: 17.7 (ref 7–25)
CALCIUM SPEC-SCNC: 9.3 MG/DL (ref 8.6–10.5)
CHLORIDE SERPL-SCNC: 99 MMOL/L (ref 98–107)
CO2 SERPL-SCNC: 27 MMOL/L (ref 22–29)
CREAT SERPL-MCNC: 0.62 MG/DL (ref 0.57–1)
EGFRCR SERPLBLD CKD-EPI 2021: 93 ML/MIN/1.73
GLOBULIN UR ELPH-MCNC: 3.4 GM/DL
GLUCOSE SERPL-MCNC: 106 MG/DL (ref 65–99)
LDH FLD-CCNC: 223 U/L
POTASSIUM SERPL-SCNC: 4.3 MMOL/L (ref 3.5–5.2)
PROT FLD-MCNC: 4.8 G/DL
PROT SERPL-MCNC: 7.7 G/DL (ref 6–8.5)
SODIUM SERPL-SCNC: 137 MMOL/L (ref 136–145)

## 2022-09-19 PROCEDURE — 84157 ASSAY OF PROTEIN OTHER: CPT | Performed by: INTERNAL MEDICINE

## 2022-09-19 PROCEDURE — 82042 OTHER SOURCE ALBUMIN QUAN EA: CPT | Performed by: INTERNAL MEDICINE

## 2022-09-19 PROCEDURE — 80053 COMPREHEN METABOLIC PANEL: CPT

## 2022-09-19 PROCEDURE — 36415 COLL VENOUS BLD VENIPUNCTURE: CPT

## 2022-09-19 PROCEDURE — 88112 CYTOPATH CELL ENHANCE TECH: CPT | Performed by: INTERNAL MEDICINE

## 2022-09-19 PROCEDURE — 74150 CT ABDOMEN W/O CONTRAST: CPT

## 2022-09-19 PROCEDURE — 88305 TISSUE EXAM BY PATHOLOGIST: CPT | Performed by: INTERNAL MEDICINE

## 2022-09-19 PROCEDURE — 83615 LACTATE (LD) (LDH) ENZYME: CPT | Performed by: INTERNAL MEDICINE

## 2022-09-19 PROCEDURE — 76942 ECHO GUIDE FOR BIOPSY: CPT

## 2022-09-19 RX ORDER — LIDOCAINE HYDROCHLORIDE 10 MG/ML
10 INJECTION, SOLUTION INFILTRATION; PERINEURAL ONCE
Status: DISPENSED | OUTPATIENT
Start: 2022-09-19

## 2022-09-20 LAB
CYTO UR: NORMAL
LAB AP CASE REPORT: NORMAL
LAB AP CLINICAL INFORMATION: NORMAL
Lab: NORMAL
PATH REPORT.FINAL DX SPEC: NORMAL
PATH REPORT.GROSS SPEC: NORMAL

## 2022-09-30 NOTE — PROGRESS NOTES
MEDICAL ONCOLOGY PROGRESS NOTE    Pt Name: Mandy Gonzales  MRN: [id number]  YOB: 1945  Date of evaluation: 10/3/2022    HISTORY OF PRESENT ILLNESS:    Patient is a pleasant 77years old female who has been diagnosed with invasive ductal adenocarcinoma of the pancreas, node positive. She is status post laparoscopic assisted distal pancreatectomy and splenectomy in June 2021 at [facility] she is a status post completion of adjuvant chemotherapy with Gemzar/Xeloda completed February 2022. She returns today status post completion of surveillance imaging. Imaging studies showed developed new onset ascites. CA 19-9 has been significant elevated. She had an ultrasound performed at [facility] that did not show any evidence of significant ascites. I called and discussed with Dr. Janessa Sylvester. I showed her CT scans and also MRI of the abdomen that shows significant ascites. A repeat CT-guided paracentesis was performed. Cytology consistent with metastatic adenocarcinoma. CA 19-9 was elevated over 1000. She is here to discuss results of pathology and further treatment recommendations. She is experienced some abdominal pain on/off. She has pain medication at home if needed. 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 Surgeon Dr. Reba Kahn [address] [place] [address] Drive Ne   8/13/2021-02/25/22-adjuvant Gemzar 1000 mg/m2 IV D 1, D8, D15 with Capecitabine 830 mg/m2 D1-21 every 28 days x6 cycles  1/14/22 Eliquis  Anticipate Gemzar Abraxane every 2 weeks    Cancer History  Roge Royal was first seen by me on 8/6/2021. The patient was referred for a diagnosis of pancreatic malignancy.   She had initial complaints of abdominal pain. 5/5/20 MRI abdomen [facility]): Moderate fatty replacement of the pancreas with several small cystic structures within or adjacent to the pancreatic head and uncinate process most suggestive of benign cysts. A follow-up MRI of the abdomen may be obtained in 1 year to assess for stability. No solid lesion is seen. Otherwise unremarkable MRI of the abdomen. 4/22/20 CT abd/pelvis [facility]): Focal inflammatory stranding associated with the pancreatic head. Radiographically I would favor this to represent acute pancreatitis. There is mild associated stranding within the descending and proximal transverse duodenum. There are 2 well-circumscribed hypodensities one in the region of the uncinate process and one slightly anterior to the pancreatic head likely representing sequela of pancreatitis but warranting follow-up on subsequent exam. The body and tail of the pancreas are spared. No evidence of pancreatic necrosis. The patient is status post hysterectomy and cholecystectomy. Bibasilar subsegmental atelectasis. No evidence of nephrolithiasis or obstructive uropathy. Diverticulosis of the distal descending and sigmoid colon without evidence of diverticulitis. 5/5/21 MRI [facility]) There are multiple tiny-to-small foci of increased T2 signal seen within the pancreas, consistent with a combination of cysts and/or beaded dilatation of the tributaries of the main pancreatic duct, increased in size and number when compared to the prior similar study of 5/5/20. The patient is again noted to be status post cholecystectomy. The examination is otherwise within normal limits for age with no abnormal postcontrast enhancement noted. 6/3/21 Upper EUS (Dr Checo Rene/[place]): CT Visualized portion of pancreas abnormal on ultrasound. There is a 26.7 mm solid mass in pancreatic tail. It abuts the splenic vessel without occlusion or varices.  Pancreatic duct is dilated to 6 mm in the tail proximal to the mass. FNA x4 yields atypical cells. Celiac axis, liver appear normal, no local nodes. Body and head of pancreas normal.  6/3/21 Pancreas tail mass, FNA aspiration smear (x2), ThinPrep and cell block: Adenocarcinoma. 6/7/21 CA 19-9 407.1  6/28/21-she underwent Whipple procedure pancreatectomy by Dr. Lilliam Castro: Invasive ductal carcinoma 4.3 cm in greatest dimension, invasive in the peripancreatic adipose tissue, diffuse perineural invasion. Surgical margins were negative for carcinoma. The pancreas margins involved by a low-grade dysplasia. 1/6 nodes positive for metastatic cancer with direct extension. Distal fibrosis and pancreatitis was noted. Spleen was unremarkable. Pathological stage pT2 N1 M0  8/6/2021-she was first seen by me. Recommend adjuvant chemotherapy with gemcitabine/Xeloda. She acknowledged understanding and agree with treatment. 8/09/2021 CT Chest W/Contrast No evidence of intrathoracic metastatic disease is identified. 8/09/2021 CT Abd/Pelvis W/Oral Contrast Prior distal pancreatectomy and splenectomy. There is a 1.5 cm circumscribed, nonenhancing cystic lesion along the inferior pancreatic head, perhaps a branch IPMN. Otherwise, no obvious pancreatic solid mass is seen. No suspicious adenopathy or evidence of distant metastatic disease in the abdomen or pelvis. Prior cholecystectomy. Liver steatosis. Colonic diverticulosis. 8/13/2021-initiation of adjuvant capecitabine/Gemzar   9/10/2021-I reviewed CT chest abdomen pelvis. 1/21/2022-CA 19-9 = 10  2/10/22 CT CHEST W CONTRAST No evidence of intrathoracic metastasis. Incidental findings described above. 2/10/22 CT ABDOMEN PELVIS W IV CONTRAST  Partial pancreatectomy and splenectomy. Pancreatic head cysts measuring up to 1.6 cm, very similar to 8/9/2021. Continued surveillance imaging recommended. No evidence of intra-abdominal or pelvic metastasis. 2/18/2022-I reviewed results CT chest abdomen pelvis.   No evidence of recurrent disease. Pancreatic head cyst stable. 02/25/22- Completion of adjuvant chemotherapy. 4/22/2022 CA 19-9-13  5/11/2022 Bone Density ([facility]) Osteopenia. Femoral Neck T-Score -2.0, Lumbar Spine T-Score -0.9  6/7/2022 Endoscopy ([facility]) Urease was negative  8/5/2022 CT Abd/Pelvis WO Contrast [facility]) Fairly diffuse soft tissue infiltration with haziness and nodularity of there peripheral aspects of the peritoneal cavity most concerning for a process such as peritoneal carcinomatosis. Given history of pancreatic cancer,a PET scan may be considered for further evaluation. Small amount of ascites in the pelvis. Colonic diverticulosis. Large,fat-containing umbilical hernia. Tiny right pleural effusion with atelectasis in the lung bases. 8/22/2022 CT Chest W Contrast Calcified and noncalcified lung nodules may indicate inflammatory or infectious process and old granulomatous disease. Follow-up in 3 months is recommended. Small right pleural effusion. Cirrhotic liver. Free fluid in the abdomen with mesenteric edema upper abdomen. Multiple lymph nodes in region of pancreatic head. 8/22/2022-surveillance  CT Abd/Pelvis W IV Contrast (oral) Partial pancreatectomy with absence of the pancreatic body and tail. Multiple soft tissue and hypodense areas in the location of the pancreatic head indicating measuring up to 8 mm indicating cysts, residual tissue and possible soft tissue pancreatic  nodules. A cyst or soft tissue nodule is identified measuring up to 1.2 cm. Indeterminate nodules in the location of the pancreatic head versus lymphadenopathy. Consider MRI pancreas without and with IV contrast.Bilateral extrarenal pelves. 4 mm left renal cyst. Soft tissue and fat herniates into the umbilical region measuring 4.7 cm indicating hernia. There is mesentery within the hernia. No bowel is within the hernia. No evidence of free air. .  Small amount of free fluid is within the abdomen and pelvis.   Mesenteric edema of the abdomen and pelvis. Rectosigmoid diverticulosis without acute diverticulitis. Slightly irregular liver contour may indicate early cirrhotic changes. Free fluid within the abdomen and pelvis. Inflammation of the mesentery. 8/28/2022-I reviewed CT abdomen/pelvis. New onset ascites. Liver is smaller when compared to prior CT scan suggesting hydrophobic disease. Radiology reported peripancreatic nodules. Recommended CA 19-9 reviewed. Recommend MRI pancreas. Recommended paracentesis (send for cytology, total protein, LDH). CMP same day of paracentesis. 9/12/2022 MRI Abdomen W WO Contrast MRCP Hepatic steatosis with a nodular contour suggestive of cirrhosis. Ascites. Absence of the spleen. Absence of the gallbladder. The patient appears to be status post partial pancreatectomy . The nodular lesions identified on the CT scan dated August 19, 2022 are poorly visualized on this examination due to respiratory artifact and technique. Small renal cysts. Midline ventral hernia. 9/12/2022-CA 19-9 1004  9/15/2022 US Guided Parecentesis [facility]) Limited grayscale ultrasound evaluation to assess for peritoneal fluid. 4 quadrant evaluation shows trace fluid around the liver. No more than trace fluid at the right lower quadrant. No significant fluid is seen at the left upper quadrant or left lower quadrant. 9/19/2022 Ascites fluid,paracentesis: Malignant cells present. Adenocarcinoma. 9/19/2022 CT Abdomen WO Contrast ([facility]):Small amount of diffuse ascites within the abdomen, likely malignant ascites given the presence of nodular peritoneal and omental soft tissue thickening compatible with carcinomatosis. Diagnostic paracentesis was performed following this examination with ultrasound guidance. Evidence previous resection of the pancreatic body and tail. There is a soft tissue implant or enlarged lymph node inferior to the celiac artery on the right which measures 1.6 x 1.6 cm, concerning for metastatic disease.  There is a small cyst in subtle stable nodularity and patient replacement of the pancreatic head. 10/3/2022-essentially, findings of recurrent metastatic pancreatic adenocarcinoma. Stage IV. Recommend palliative Gemzar 1000 mg/M2 and Abraxane 125 mg/M2 every 2 weeks until disease progression or intolerable toxicity.     Past Medical History:    Past Medical History:   Diagnosis Date    Anxiety     Arthritis     Depression     Hypertension     Hypoglycemia     if fasting for very long    Pancreatic cancer ([facility] [address] 06/2021    s/p Whipple by Dr. Cookie Willis    Shoulder pain     incompleted rcr; possible injury    Thyroid disease        Past Surgical History:    Past Surgical History:   Procedure Laterality Date    CHOLECYSTECTOMY  1991    COLONOSCOPY  2020    Dr Romario Turcios, COLON, DIAGNOSTIC      HYSTERECTOMY ([facility])  [address] [place]  06/28/2021    PORT SURGERY Right 9/21/2021    Single lumen PORT INSERTION with ultrasound and fluoroscopy performed by Lucio Ch DO at [place] [address]. Right 1/13/2022    RIGHT SIDE MEDIPORT REMOVAL, LEFT SIDE MEDIPORT INSERTION performed by Meggan Benson DO at [address] Right 10/19/2017    SHOULDER ARTHROSCOPY ROTATOR CUFF REPAIR/ DEBRIDEMENT BICEPS TENODESIS/ TENOTOMY SUBACROMIAL DECOMPRESSION/ DISTAL CLAVICLE EXCISION performed by Ree Phoenix, MD at [address] [place] [address]  06/28/2021    UPPER GASTROINTESTINAL ENDOSCOPY  06/2021    Dr. Fonseca Meals in [address] [place] [address]       Social History:    Marital status:   Smoking status: no  ETOH status: no  Resides: [place], [place]    Family History:   Family History   Adopted: Yes   Problem Relation Age of Onset    Breast Cancer Maternal Aunt     Breast Cancer Daughter     Cancer Daughter         Bladder    Cancer Maternal Uncle         Bladder       Current Hospital Medications:    Current Outpatient Medications   Medication Sig Dispense Refill promethazine (PHENERGAN) 12.5 MG tablet Take 1 tablet by mouth 3 times daily as needed for Nausea 30 tablet 5    CREON 42187-64575 units delayed release capsule TAKE 1 CAPSULE BY MOUTH 3 TIMES DAILY (WITH MEALS) 90 capsule 5    capecitabine (XELODA) 500 MG chemo tablet Take 3 tablets by mouth 2 times a day for 14 days of a 28 day cycle 84 tablet 5    losartan-hydroCHLOROthiazide (HYZAAR) 50-12.5 MG per tablet Take 1 tablet by mouth daily Indications: High Blood Pressure Disorder      levothyroxine (SYNTHROID) 137 MCG tablet Take 137 mcg by mouth Daily Indications: Underactive Thyroid      Cholecalciferol (VITAMIN D3) 10 MCG (400 UNIT) CAPS Take 1 capsule by mouth daily       Magic Mouthwash (MIRACLE MOUTHWASH) Swish and spit 5 mLs 4 times daily as needed for Irritation 1/3 viscous lidocaine, 1/3 benadryl, 1/3 Maalox. 480 mL 1    lidocaine-prilocaine (EMLA) 2.5-2.5 % cream Apply topically as needed. 30 g 2    Caltrate 600+D Plus Minerals (CALTRATE) 600-800 MG-UNIT TABS tablet Take 1 tablet by mouth daily      busPIRone (BUSPAR) 5 MG tablet Take 5 mg by mouth 3 times daily       promethazine (PHENERGAN) 12.5 MG tablet Take 1 tablet by mouth every 6 hours as needed for Nausea 30 tablet 5    ondansetron (ZOFRAN) 4 MG tablet Take 1 tablet by mouth every 8 hours as needed for Nausea or Vomiting 10 tablet 0     No current facility-administered medications for this visit. Allergies:    Allergies   Allergen Reactions    Diclofenac Other (See Comments)     Couldn't sleep and face turned blood red    Oxycodone-Acetaminophen Hives     itching    Doxycycline Rash         Subjective   REVIEW OF SYSTEMS:   CONSTITUTIONAL: no fever, no night sweats, fatigue;  HEENT: no blurring of vision, no double vision, no hearing difficulty, no tinnitus, no ulceration, no dysplasia, no epistaxis;   LUNGS: no cough, no hemoptysis, no wheeze,  no shortness of breath;  CARDIOVASCULAR: no palpitation, no chest pain, no shortness of breath;  GI: abdominal pain, no nausea, no vomiting, no diarrhea, no constipation;  STUART: no dysuria, no hematuria, no frequency or urgency, no nephrolithiasis;  MUSCULOSKELETAL: arthritic pain, no joint pain, no swelling, no stiffness;  ENDOCRINE: no polyuria, no polydipsia, no cold or heat intolerance;  HEMATOLOGY: no easy bruising or bleeding, no history of clotting disorder;  DERMATOLOGY: no skin rash, no eczema, no pruritus;  PSYCHIATRY: no depression, no anxiety, no panic attacks, no suicidal ideation, no homicidal ideation;  NEUROLOGY: no syncope, no seizures, no numbness or tingling of hands, no numbness or tingling of feet, no paresis;     Objective /70   Pulse 90   Ht 5' 5\" (1.651 m)   Wt 161 lb (73 kg)   SpO2 96%   BMI 26.79 kg/m²     PHYSICAL EXAM:  CONSTITUTIONAL: Alert, appropriate, no acute distress  EYES: Non icteric, EOM intact, pupils equal round   ENT: Mucus membranes moist, no oral pharyngeal lesions, external inspection of ears and nose are normal.  NECK: Supple, no masses. No palpable thyroid mass  CHEST/LUNGS: CTA bilaterally, normal respiratory effort   CARDIOVASCULAR: RRR, no murmurs. No lower extremity edema  ABDOMEN: soft non-tender, active bowel sounds, no HSM. No palpable masses  EXTREMITIES: warm, full ROM in all 4 extremities, no focal weakness. SKIN: warm, dry with no rashes or lesions  LYMPH: No cervical, clavicular, axillary, or inguinal lymphadenopathy  NEUROLOGIC: follows commands, non focal   PSYCH: mood and affect appropriate. Alert and oriented to time, place, person       LABORATORY RESULTS REVIEWED/ANALYZED BY ME:  9/19/2022 Ascites fluid,paracentesis [facility]): Malignant cells present. Adenocarcinoma.     10/3/2022 CBC  WBC 13.09  HGB 14.9    Neut 6.33    RADIOLOGY STUDIES REVIEWED BY ME:   9/19/2022 CT Abdomen WO Contrast ([facility]):Small amount of diffuse ascites within the abdomen, likely malignant ascites given the presence of nodular peritoneal and omental soft tissue thickening compatible with carcinomatosis. Diagnostic paracentesis was performed following this examination with ultrasound guidance. Evidence previous resection of the pancreatic body and tail. There is a soft tissue implant or enlarged lymph node inferior to the celiac artery on the right which measures 1.6 x 1.6 cm, concerning for metastatic disease. There is a small cyst in subtle stable nodularity and patient replacement of the pancreatic head. ASSESSMENT:    No orders of the defined types were placed in this encounter. Diagnoses and all orders for this visit:    Malignant neoplasm of tail of pancreas [facility])    Adenocarcinoma of pancreas [facility])    Care plan discussed with patient          Pancreatic tail adenocarcinoma pT2 N1 M0, June 2021  Essentially, node positive third of the pancreas adenocarcinoma. Status post distal pancreatectomy/splenectomy  -Anticipated completion 6-month adjuvant Gemzar/Xeloda on 2/25/2002  -Last CA 19-9 = 10  -Feb 2022Completion of adjuvant treatment with Xeloda/Gemzar. -CT chest/abdomen/pelvis in August 2022.    8/22/2022 CT Abd/Pelvis W IV Contrast (oral) Partial pancreatectomy with absence of the pancreatic body and tail. Multiple soft tissue and hypodense areas in the location of the pancreatic head indicating measuring up to 8 mm indicating cysts, residual tissue and possible soft tissue pancreatic  nodules. A cyst or soft tissue nodule is identified measuring up to 1.2 cm. Indeterminate nodules in the location of the pancreatic head versus lymphadenopathy. Consider MRI pancreas without and with IV contrast.Bilateral extrarenal pelves. 4 mm left renal cyst. Soft tissue and fat herniates into the umbilical region measuring 4.7 cm indicating hernia. There is mesentery within the hernia. No bowel is within the hernia. No evidence of free air. .  Small amount of free fluid is within the abdomen and pelvis.   Mesenteric edema of the abdomen and pelvis. Rectosigmoid diverticulosis without acute diverticulitis. Slightly irregular liver contour may indicate early cirrhotic changes. Free fluid within the abdomen and pelvis. Inflammation of the mesentery. 9/12/2022-CA 19-9 1004  9/15/2022 US Guided Parecentesis [facility]) Limited grayscale ultrasound evaluation to assess for peritoneal fluid. 4 quadrant evaluation shows trace fluid around the liver. No more than trace fluid at the right lower quadrant. No significant fluid is seen at the left upper quadrant or left lower quadrant. 9/19/2022 Ascites fluid,paracentesis: Malignant cells present. Adenocarcinoma. 9/19/2022 CT Abdomen WO Contrast ([facility]):Small amount of diffuse ascites within the abdomen, likely malignant ascites given the presence of nodular peritoneal and omental soft tissue thickening compatible with carcinomatosis. Diagnostic paracentesis was performed following this examination with ultrasound guidance. Evidence previous resection of the pancreatic body and tail. There is a soft tissue implant or enlarged lymph node inferior to the celiac artery on the right which measures 1.6 x 1.6 cm, concerning for metastatic disease. There is a small cyst in subtle stable nodularity and patient replacement of the pancreatic head. 10/3/2022-essentially, findings of recurrent metastatic pancreatic adenocarcinoma. Stage IV. Plan:  Recommend palliative Gemzar 1000 mg/M2 and Abraxane 125 mg/M2 every 2 weeks until disease progression or intolerable toxicity. Right upper extremity DVT port related-started on Eliquis started on 1/14/2022. Eliquis for 3 months through mid April 2022.      PLAN:  RTC with MD in treatment room C#2  Recommend Gemzar Abraxane every 2 weeks- once ins approves  Patient education given  Chemo conset signed  Continue  Norco 7.5mg every 6 hrs as needed  Continue Phenergan as needed  Continue Emla cream to port-script sent  Recommend OTC Imodium as needed  Repeat CT scans after C# 6        I, Dariana Granados am pre charting  as Medical Assistant for Kandy Pendleton MD. Electronically signed by Dariana Granados MA on 10/3/2022 at 8:51 AM CDT. Skip Weber am scribing for Kandy Pendleton MD. Electronically signed by Lissett Banerjee RN on 10/3/2022 at 11:23 AM CDT. I, Dr Waleska Gomez, personally performed the services described in this documentation as scribed by Lissett Banerjee RN in my presence and is both accurate and complete. I have seen, examined and reviewed this patient medication list, appropriate labs and imaging studies. I reviewed relevant medical records and others physicians notes. I discussed the plans of care with the patient. I answered all the questions to the patients satisfaction. I have also reviewed the chief complaint (CC) and part of the history (History of Present Illness (HPI), Past Family Social History [facility]), or Review of Systems (ROS) and made changes when appropriated.        (Please note that portions of this note were completed with a voice recognition program. Efforts were made to edit the dictations but occasionally words are mis-transcribed.)  Electronically signed by Kandy Pendleton MD on 10/3/2022 at 11:31 AM

## 2022-10-03 ENCOUNTER — OFFICE VISIT (OUTPATIENT)
Dept: HEMATOLOGY | Age: 77
End: 2022-10-03
Payer: MEDICARE

## 2022-10-03 ENCOUNTER — HOSPITAL ENCOUNTER (OUTPATIENT)
Dept: INFUSION THERAPY | Age: 77
Discharge: HOME | End: 2022-10-03
Payer: MEDICARE

## 2022-10-03 VITALS
SYSTOLIC BLOOD PRESSURE: 122 MMHG | BODY MASS INDEX: 26.82 KG/M2 | WEIGHT: 161 LBS | HEART RATE: 90 BPM | HEIGHT: 65 IN | OXYGEN SATURATION: 96 % | DIASTOLIC BLOOD PRESSURE: 70 MMHG

## 2022-10-03 DIAGNOSIS — C25.9 ADENOCARCINOMA OF PANCREAS (HCC): ICD-10-CM

## 2022-10-03 DIAGNOSIS — C25.2 MALIGNANT NEOPLASM OF TAIL OF PANCREAS (HCC): ICD-10-CM

## 2022-10-03 DIAGNOSIS — T45.1X5D ADVERSE EFFECT OF CHEMOTHERAPY, SUBSEQUENT ENCOUNTER: ICD-10-CM

## 2022-10-03 DIAGNOSIS — Z51.11 CHEMOTHERAPY MANAGEMENT, ENCOUNTER FOR: ICD-10-CM

## 2022-10-03 DIAGNOSIS — C25.2 MALIGNANT NEOPLASM OF TAIL OF PANCREAS (HCC): Primary | ICD-10-CM

## 2022-10-03 DIAGNOSIS — Z71.89 CARE PLAN DISCUSSED WITH PATIENT: ICD-10-CM

## 2022-10-03 DIAGNOSIS — C79.9 METASTASIS FROM PANCREATIC CANCER (HCC): ICD-10-CM

## 2022-10-03 DIAGNOSIS — C25.9 METASTASIS FROM PANCREATIC CANCER (HCC): ICD-10-CM

## 2022-10-03 PROBLEM — I87.8 POOR VENOUS ACCESS: Status: ACTIVE | Noted: 2022-10-03

## 2022-10-03 LAB
BASOPHILS ABSOLUTE: 0.12 K/UL (ref 0.01–0.08)
BASOPHILS RELATIVE PERCENT: 0.9 % (ref 0.1–1.2)
EOSINOPHILS ABSOLUTE: 0.2 K/UL (ref 0.04–0.54)
EOSINOPHILS RELATIVE PERCENT: 1.5 % (ref 0.7–7)
HCT VFR BLD CALC: 46.2 % (ref 34.1–44.9)
HEMOGLOBIN: 14.9 G/DL (ref 11.2–15.7)
LYMPHOCYTES ABSOLUTE: 4.92 K/UL (ref 1.18–3.74)
LYMPHOCYTES RELATIVE PERCENT: 37.6 % (ref 19.3–53.1)
MCH RBC QN AUTO: 30.8 PG (ref 25.6–32.2)
MCHC RBC AUTO-ENTMCNC: 32.3 G/DL (ref 32.3–35.5)
MCV RBC AUTO: 95.5 FL (ref 79.4–94.8)
MONOCYTES ABSOLUTE: 1.47 K/UL (ref 0.24–0.82)
MONOCYTES RELATIVE PERCENT: 11.2 % (ref 4.7–12.5)
NEUTROPHILS ABSOLUTE: 6.33 K/UL (ref 1.56–6.13)
NEUTROPHILS RELATIVE PERCENT: 48.4 % (ref 34–71.1)
PDW BLD-RTO: 14.3 % (ref 11.7–14.4)
PLATELET # BLD: 322 K/UL (ref 182–369)
PMV BLD AUTO: 10.8 FL (ref 7.4–10.4)
RBC # BLD: 4.84 M/UL (ref 3.93–5.22)
WBC # BLD: 13.09 K/UL (ref 3.98–10.04)

## 2022-10-03 PROCEDURE — G8400 PT W/DXA NO RESULTS DOC: HCPCS | Performed by: INTERNAL MEDICINE

## 2022-10-03 PROCEDURE — 1123F ACP DISCUSS/DSCN MKR DOCD: CPT | Performed by: INTERNAL MEDICINE

## 2022-10-03 PROCEDURE — 85025 COMPLETE CBC W/AUTO DIFF WBC: CPT

## 2022-10-03 PROCEDURE — G8484 FLU IMMUNIZE NO ADMIN: HCPCS | Performed by: INTERNAL MEDICINE

## 2022-10-03 PROCEDURE — 99214 OFFICE O/P EST MOD 30 MIN: CPT | Performed by: INTERNAL MEDICINE

## 2022-10-03 PROCEDURE — 36415 COLL VENOUS BLD VENIPUNCTURE: CPT

## 2022-10-03 PROCEDURE — 1090F PRES/ABSN URINE INCON ASSESS: CPT | Performed by: INTERNAL MEDICINE

## 2022-10-03 PROCEDURE — G8417 CALC BMI ABV UP PARAM F/U: HCPCS | Performed by: INTERNAL MEDICINE

## 2022-10-03 PROCEDURE — G8427 DOCREV CUR MEDS BY ELIG CLIN: HCPCS | Performed by: INTERNAL MEDICINE

## 2022-10-03 PROCEDURE — 1036F TOBACCO NON-USER: CPT | Performed by: INTERNAL MEDICINE

## 2022-10-03 PROCEDURE — 3017F COLORECTAL CA SCREEN DOC REV: CPT | Performed by: INTERNAL MEDICINE

## 2022-10-03 PROCEDURE — 99212 OFFICE O/P EST SF 10 MIN: CPT

## 2022-10-03 RX ORDER — LIDOCAINE AND PRILOCAINE 25; 25 MG/G; MG/G
CREAM TOPICAL
Qty: 30 G | Refills: 5 | Status: SHIPPED | OUTPATIENT
Start: 2022-10-03

## 2022-10-14 ENCOUNTER — HOSPITAL ENCOUNTER (OUTPATIENT)
Dept: INFUSION THERAPY | Age: 77
Discharge: HOME | End: 2022-10-14
Payer: MEDICARE

## 2022-10-14 VITALS
OXYGEN SATURATION: 98 % | SYSTOLIC BLOOD PRESSURE: 140 MMHG | BODY MASS INDEX: 26.79 KG/M2 | RESPIRATION RATE: 18 BRPM | HEART RATE: 84 BPM | WEIGHT: 160.8 LBS | DIASTOLIC BLOOD PRESSURE: 84 MMHG | HEIGHT: 65 IN | TEMPERATURE: 98.2 F

## 2022-10-14 DIAGNOSIS — C25.2 MALIGNANT NEOPLASM OF TAIL OF PANCREAS (HCC): ICD-10-CM

## 2022-10-14 DIAGNOSIS — R18.8 OTHER ASCITES: Primary | ICD-10-CM

## 2022-10-14 DIAGNOSIS — C25.9 ADENOCARCINOMA OF PANCREAS (HCC): ICD-10-CM

## 2022-10-14 DIAGNOSIS — C25.9 METASTASIS FROM PANCREATIC CANCER (HCC): ICD-10-CM

## 2022-10-14 DIAGNOSIS — C79.9 METASTASIS FROM PANCREATIC CANCER (HCC): ICD-10-CM

## 2022-10-14 DIAGNOSIS — C25.9 METASTASIS FROM PANCREATIC CANCER (HCC): Primary | ICD-10-CM

## 2022-10-14 DIAGNOSIS — C79.9 METASTASIS FROM PANCREATIC CANCER (HCC): Primary | ICD-10-CM

## 2022-10-14 LAB
ALBUMIN SERPL-MCNC: 4.2 G/DL (ref 3.5–5.2)
ALP BLD-CCNC: 87 U/L (ref 35–104)
ALT SERPL-CCNC: 16 U/L (ref 9–52)
ANION GAP SERPL CALCULATED.3IONS-SCNC: 11 MMOL/L (ref 7–19)
AST SERPL-CCNC: 40 U/L (ref 14–36)
BILIRUB SERPL-MCNC: 0.5 MG/DL (ref 0.2–1.3)
BUN BLDV-MCNC: 11 MG/DL (ref 7–17)
CALCIUM SERPL-MCNC: 9.5 MG/DL (ref 8.4–10.2)
CHLORIDE BLD-SCNC: 100 MMOL/L (ref 98–111)
CO2: 26 MMOL/L (ref 22–29)
CREAT SERPL-MCNC: 0.6 MG/DL (ref 0.5–1)
GFR NON-AFRICAN AMERICAN: >60
GLOBULIN: 3.1 G/DL
GLUCOSE BLD-MCNC: 92 MG/DL (ref 74–106)
HCT VFR BLD CALC: 42.2 % (ref 34.1–44.9)
HEMOGLOBIN: 13.5 G/DL (ref 11.2–15.7)
LYMPHOCYTES ABSOLUTE: 4.65 K/UL (ref 1.18–3.74)
LYMPHOCYTES RELATIVE PERCENT: 39.7 % (ref 19.3–53.1)
MCH RBC QN AUTO: 29.7 PG (ref 25.6–32.2)
MCHC RBC AUTO-ENTMCNC: 32 G/DL (ref 32.3–35.5)
MCV RBC AUTO: 92.7 FL (ref 79.4–94.8)
MONOCYTES ABSOLUTE: 1.28 K/UL (ref 0.24–0.82)
MONOCYTES RELATIVE PERCENT: 10.9 % (ref 4.7–12.5)
NEUTROPHILS ABSOLUTE: 5.45 K/UL (ref 1.56–6.13)
NEUTROPHILS RELATIVE PERCENT: 46.5 % (ref 34–71.1)
PDW BLD-RTO: 13.5 % (ref 11.7–14.4)
PLATELET # BLD: 459 K/UL (ref 182–369)
PMV BLD AUTO: 9.7 FL (ref 7.4–10.4)
POTASSIUM SERPL-SCNC: 4.4 MMOL/L (ref 3.5–5.1)
RBC # BLD: 4.55 M/UL (ref 3.93–5.22)
SODIUM BLD-SCNC: 137 MMOL/L (ref 137–145)
TOTAL PROTEIN: 7.3 G/DL (ref 6.3–8.2)
WBC # BLD: 11.72 K/UL (ref 3.98–10.04)

## 2022-10-14 PROCEDURE — 6360000002 HC RX W HCPCS: Performed by: INTERNAL MEDICINE

## 2022-10-14 PROCEDURE — 96375 TX/PRO/DX INJ NEW DRUG ADDON: CPT

## 2022-10-14 PROCEDURE — 85025 COMPLETE CBC W/AUTO DIFF WBC: CPT

## 2022-10-14 PROCEDURE — 96417 CHEMO IV INFUS EACH ADDL SEQ: CPT

## 2022-10-14 PROCEDURE — 36415 COLL VENOUS BLD VENIPUNCTURE: CPT

## 2022-10-14 PROCEDURE — 96413 CHEMO IV INFUSION 1 HR: CPT

## 2022-10-14 PROCEDURE — 80053 COMPREHEN METABOLIC PANEL: CPT

## 2022-10-14 PROCEDURE — 2580000003 HC RX 258: Performed by: INTERNAL MEDICINE

## 2022-10-14 RX ORDER — FAMOTIDINE 10 MG/ML
20 INJECTION, SOLUTION INTRAVENOUS
Status: CANCELLED | OUTPATIENT
Start: 2022-10-14

## 2022-10-14 RX ORDER — PANCRELIPASE 60000; 12000; 38000 [USP'U]/1; [USP'U]/1; [USP'U]/1
12000 CAPSULE, DELAYED RELEASE PELLETS ORAL
Qty: 90 CAPSULE | Refills: 5 | Status: SHIPPED | OUTPATIENT
Start: 2022-10-14

## 2022-10-14 RX ORDER — SODIUM CHLORIDE 9 MG/ML
5-250 INJECTION, SOLUTION INTRAVENOUS PRN
Status: CANCELLED | OUTPATIENT
Start: 2022-10-14

## 2022-10-14 RX ORDER — ONDANSETRON 2 MG/ML
8 INJECTION INTRAMUSCULAR; INTRAVENOUS
Status: CANCELLED | OUTPATIENT
Start: 2022-10-14

## 2022-10-14 RX ORDER — SODIUM CHLORIDE 9 MG/ML
5-250 INJECTION, SOLUTION INTRAVENOUS PRN
Status: DISCONTINUED | OUTPATIENT
Start: 2022-10-14 | End: 2022-10-15 | Stop reason: HOSPADM

## 2022-10-14 RX ORDER — SODIUM CHLORIDE 0.9 % (FLUSH) 0.9 %
5-40 SYRINGE (ML) INJECTION PRN
Status: CANCELLED | OUTPATIENT
Start: 2022-10-14

## 2022-10-14 RX ORDER — HEPARIN SODIUM (PORCINE) LOCK FLUSH IV SOLN 100 UNIT/ML 100 UNIT/ML
500 SOLUTION INTRAVENOUS PRN
Status: CANCELLED | OUTPATIENT
Start: 2022-10-28

## 2022-10-14 RX ORDER — SODIUM CHLORIDE 9 MG/ML
5-250 INJECTION, SOLUTION INTRAVENOUS PRN
Status: CANCELLED | OUTPATIENT
Start: 2022-10-28

## 2022-10-14 RX ORDER — MEPERIDINE HYDROCHLORIDE 50 MG/ML
12.5 INJECTION INTRAMUSCULAR; INTRAVENOUS; SUBCUTANEOUS PRN
Status: CANCELLED | OUTPATIENT
Start: 2022-10-14

## 2022-10-14 RX ORDER — SODIUM CHLORIDE 9 MG/ML
5-250 INJECTION, SOLUTION INTRAVENOUS PRN
OUTPATIENT
Start: 2022-10-28

## 2022-10-14 RX ORDER — HEPARIN SODIUM (PORCINE) LOCK FLUSH IV SOLN 100 UNIT/ML 100 UNIT/ML
500 SOLUTION INTRAVENOUS PRN
Status: DISCONTINUED | OUTPATIENT
Start: 2022-10-14 | End: 2022-10-15 | Stop reason: HOSPADM

## 2022-10-14 RX ORDER — PACLITAXEL 100 MG/20ML
125 INJECTION, POWDER, LYOPHILIZED, FOR SUSPENSION INTRAVENOUS ONCE
Status: COMPLETED | OUTPATIENT
Start: 2022-10-14 | End: 2022-10-14

## 2022-10-14 RX ORDER — PACLITAXEL 100 MG/20ML
125 INJECTION, POWDER, LYOPHILIZED, FOR SUSPENSION INTRAVENOUS ONCE
Status: CANCELLED | OUTPATIENT
Start: 2022-10-14 | End: 2022-10-14

## 2022-10-14 RX ORDER — EPINEPHRINE 1 MG/ML
0.3 INJECTION, SOLUTION, CONCENTRATE INTRAVENOUS PRN
Status: CANCELLED | OUTPATIENT
Start: 2022-10-14

## 2022-10-14 RX ORDER — DIPHENHYDRAMINE HYDROCHLORIDE 50 MG/ML
50 INJECTION INTRAMUSCULAR; INTRAVENOUS
OUTPATIENT
Start: 2022-10-28

## 2022-10-14 RX ORDER — ACETAMINOPHEN 325 MG/1
650 TABLET ORAL
OUTPATIENT
Start: 2022-10-28

## 2022-10-14 RX ORDER — PACLITAXEL 100 MG/20ML
125 INJECTION, POWDER, LYOPHILIZED, FOR SUSPENSION INTRAVENOUS ONCE
Status: CANCELLED | OUTPATIENT
Start: 2022-10-28 | End: 2022-10-28

## 2022-10-14 RX ORDER — EPINEPHRINE 1 MG/ML
0.3 INJECTION, SOLUTION, CONCENTRATE INTRAVENOUS PRN
OUTPATIENT
Start: 2022-10-28

## 2022-10-14 RX ORDER — ALBUTEROL SULFATE 90 UG/1
4 AEROSOL, METERED RESPIRATORY (INHALATION) PRN
OUTPATIENT
Start: 2022-10-28

## 2022-10-14 RX ORDER — SODIUM CHLORIDE 9 MG/ML
INJECTION, SOLUTION INTRAVENOUS CONTINUOUS
OUTPATIENT
Start: 2022-10-28

## 2022-10-14 RX ORDER — SODIUM CHLORIDE 0.9 % (FLUSH) 0.9 %
5-40 SYRINGE (ML) INJECTION PRN
Status: CANCELLED | OUTPATIENT
Start: 2022-10-28

## 2022-10-14 RX ORDER — SODIUM CHLORIDE 9 MG/ML
5-40 INJECTION INTRAVENOUS PRN
Status: CANCELLED | OUTPATIENT
Start: 2022-10-14

## 2022-10-14 RX ORDER — SODIUM CHLORIDE 9 MG/ML
5-40 INJECTION INTRAVENOUS PRN
OUTPATIENT
Start: 2022-10-28

## 2022-10-14 RX ORDER — DEXAMETHASONE SODIUM PHOSPHATE 10 MG/ML
10 INJECTION INTRAMUSCULAR; INTRAVENOUS ONCE
Status: CANCELLED
Start: 2022-10-28 | End: 2022-10-28

## 2022-10-14 RX ORDER — HEPARIN SODIUM (PORCINE) LOCK FLUSH IV SOLN 100 UNIT/ML 100 UNIT/ML
500 SOLUTION INTRAVENOUS PRN
Status: CANCELLED | OUTPATIENT
Start: 2022-10-14

## 2022-10-14 RX ORDER — DEXAMETHASONE SODIUM PHOSPHATE 10 MG/ML
10 INJECTION INTRAMUSCULAR; INTRAVENOUS ONCE
Status: CANCELLED
Start: 2022-10-14 | End: 2022-10-14

## 2022-10-14 RX ORDER — ACETAMINOPHEN 325 MG/1
650 TABLET ORAL
Status: CANCELLED | OUTPATIENT
Start: 2022-10-14

## 2022-10-14 RX ORDER — SODIUM CHLORIDE 9 MG/ML
INJECTION, SOLUTION INTRAVENOUS CONTINUOUS
Status: CANCELLED | OUTPATIENT
Start: 2022-10-14

## 2022-10-14 RX ORDER — DIPHENHYDRAMINE HYDROCHLORIDE 50 MG/ML
50 INJECTION INTRAMUSCULAR; INTRAVENOUS
Status: CANCELLED | OUTPATIENT
Start: 2022-10-14

## 2022-10-14 RX ORDER — FAMOTIDINE 10 MG/ML
20 INJECTION, SOLUTION INTRAVENOUS
OUTPATIENT
Start: 2022-10-28

## 2022-10-14 RX ORDER — MEPERIDINE HYDROCHLORIDE 50 MG/ML
12.5 INJECTION INTRAMUSCULAR; INTRAVENOUS; SUBCUTANEOUS PRN
OUTPATIENT
Start: 2022-10-28

## 2022-10-14 RX ORDER — SODIUM CHLORIDE 0.9 % (FLUSH) 0.9 %
5-40 SYRINGE (ML) INJECTION PRN
Status: DISCONTINUED | OUTPATIENT
Start: 2022-10-14 | End: 2022-10-15 | Stop reason: HOSPADM

## 2022-10-14 RX ORDER — PALONOSETRON 0.05 MG/ML
0.25 INJECTION, SOLUTION INTRAVENOUS ONCE
Status: COMPLETED | OUTPATIENT
Start: 2022-10-14 | End: 2022-10-14

## 2022-10-14 RX ORDER — ALBUTEROL SULFATE 90 UG/1
4 AEROSOL, METERED RESPIRATORY (INHALATION) PRN
Status: CANCELLED | OUTPATIENT
Start: 2022-10-14

## 2022-10-14 RX ORDER — ONDANSETRON 2 MG/ML
8 INJECTION INTRAMUSCULAR; INTRAVENOUS
OUTPATIENT
Start: 2022-10-28

## 2022-10-14 RX ORDER — DEXAMETHASONE SODIUM PHOSPHATE 10 MG/ML
10 INJECTION, SOLUTION INTRAMUSCULAR; INTRAVENOUS ONCE
Status: COMPLETED | OUTPATIENT
Start: 2022-10-14 | End: 2022-10-14

## 2022-10-14 RX ADMIN — PACLITAXEL 229 MG: 100 INJECTION, POWDER, LYOPHILIZED, FOR SUSPENSION INTRAVENOUS at 12:41

## 2022-10-14 RX ADMIN — PALONOSETRON HYDROCHLORIDE 0.25 MG: 0.25 INJECTION, SOLUTION INTRAVENOUS at 12:30

## 2022-10-14 RX ADMIN — SODIUM CHLORIDE, PRESERVATIVE FREE 10 ML: 5 INJECTION INTRAVENOUS at 14:13

## 2022-10-14 RX ADMIN — DEXAMETHASONE SODIUM PHOSPHATE 10 MG: 10 INJECTION, SOLUTION INTRAMUSCULAR; INTRAVENOUS at 12:30

## 2022-10-14 RX ADMIN — SODIUM CHLORIDE 25 ML/HR: 9 INJECTION, SOLUTION INTRAVENOUS at 12:31

## 2022-10-14 RX ADMIN — GEMCITABINE HYDROCHLORIDE 1830 MG: 1 INJECTION, SOLUTION INTRAVENOUS at 13:37

## 2022-10-14 RX ADMIN — Medication 500 UNITS: at 14:13

## 2022-10-27 NOTE — PROGRESS NOTES
MEDICAL ONCOLOGY PROGRESS NOTE    Pt Name: Cyndee Marquez  MRN: [id number]  YOB: 1945  Date of evaluation: 10/28/2022    HISTORY OF PRESENT ILLNESS:    Patient is a pleasant 69years old female who has been diagnosed with invasive ductal adenocarcinoma of the pancreas, node positive. She is status post laparoscopic assisted distal pancreatectomy and splenectomy in June 2021 at [facility] she is a status post completion of adjuvant chemotherapy with Gemzar/Xeloda completed February 2022. She returns today status post completion of surveillance imaging. Imaging studies showed developed new onset ascites. CA 19-9 has been significant elevated. She had an ultrasound performed at [facility] that did not show any evidence of significant ascites. I called and discussed with Dr. Caryn Randhawa. I showed her CT scans and also MRI of the abdomen that shows significant ascites. A repeat CT-guided paracentesis was performed. Cytology consistent with metastatic adenocarcinoma. CA 19-9 was elevated over 1000. She is here to discuss results of pathology and further treatment recommendations. She is experienced some abdominal pain on/off. She has pain medication at home if needed. She has received first cycle of chemotherapy. She has tolerated well. She had nausea prior to initiation of chemotherapy which has improved. She also experienced pain over the weekend that has now improved. 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  Surgeon Dr. Nain Martel [facility]   8/13/2021-02/25/22-adjuvant Gemzar 1000 mg/m2 IV D 1, D8, D15 with Capecitabine 830 mg/m2 D1-21 every 28 days x6 cycles  1/14/22 Eliquis  10/14/22-Initiated Gemzar Abraxane every 2 weeks    Cancer History  Tami Alva was first seen by me on 8/6/2021. The patient was referred for a diagnosis of pancreatic malignancy. She had initial complaints of abdominal pain. 5/5/20 MRI abdomen [facility]): Moderate fatty replacement of the pancreas with several small cystic structures within or adjacent to the pancreatic head and uncinate process most suggestive of benign cysts. A follow-up MRI of the abdomen may be obtained in 1 year to assess for stability. No solid lesion is seen. Otherwise unremarkable MRI of the abdomen. 4/22/20 CT abd/pelvis [facility]): Focal inflammatory stranding associated with the pancreatic head. Radiographically I would favor this to represent acute pancreatitis. There is mild associated stranding within the descending and proximal transverse duodenum. There are 2 well-circumscribed hypodensities one in the region of the uncinate process and one slightly anterior to the pancreatic head likely representing sequela of pancreatitis but warranting follow-up on subsequent exam. The body and tail of the pancreas are spared. No evidence of pancreatic necrosis. The patient is status post hysterectomy and cholecystectomy. Bibasilar subsegmental atelectasis. No evidence of nephrolithiasis or obstructive uropathy. Diverticulosis of the distal descending and sigmoid colon without evidence of diverticulitis. 5/5/21 MRI [facility]) There are multiple tiny-to-small foci of increased T2 signal seen within the pancreas, consistent with a combination of cysts and/or beaded dilatation of the tributaries of the main pancreatic duct, increased in size and number when compared to the prior similar study of 5/5/20. The patient is again noted to be status post cholecystectomy. The examination is otherwise within normal limits for age with no abnormal postcontrast enhancement noted.   6/3/21 Upper EUS (Dr Oj Rene/[place]): CT Visualized portion of pancreas abnormal on ultrasound. There is a 26.7 mm solid mass in pancreatic tail. It abuts the splenic vessel without occlusion or varices. Pancreatic duct is dilated to 6 mm in the tail proximal to the mass. FNA x4 yields atypical cells. Celiac axis, liver appear normal, no local nodes. Body and head of pancreas normal.  6/3/21 Pancreas tail mass, FNA aspiration smear (x2), ThinPrep and cell block: Adenocarcinoma. 6/7/21 CA 19-9 407.1  6/28/21-she underwent Whipple procedure pancreatectomy by Dr. Tae Fraser: Invasive ductal carcinoma 4.3 cm in greatest dimension, invasive in the peripancreatic adipose tissue, diffuse perineural invasion. Surgical margins were negative for carcinoma. The pancreas margins involved by a low-grade dysplasia. 1/6 nodes positive for metastatic cancer with direct extension. Distal fibrosis and pancreatitis was noted. Spleen was unremarkable. Pathological stage pT2 N1 M0  8/6/2021-she was first seen by me. Recommend adjuvant chemotherapy with gemcitabine/Xeloda. She acknowledged understanding and agree with treatment. 8/09/2021 CT Chest W/Contrast No evidence of intrathoracic metastatic disease is identified. 8/09/2021 CT Abd/Pelvis W/Oral Contrast Prior distal pancreatectomy and splenectomy. There is a 1.5 cm circumscribed, nonenhancing cystic lesion along the inferior pancreatic head, perhaps a branch IPMN. Otherwise, no obvious pancreatic solid mass is seen. No suspicious adenopathy or evidence of distant metastatic disease in the abdomen or pelvis. Prior cholecystectomy. Liver steatosis. Colonic diverticulosis. 8/13/2021-initiation of adjuvant capecitabine/Gemzar   9/10/2021-I reviewed CT chest abdomen pelvis. 1/21/2022-CA 19-9 = 10  2/10/22 CT CHEST W CONTRAST No evidence of intrathoracic metastasis. Incidental findings described above. 2/10/22 CT ABDOMEN PELVIS W IV CONTRAST  Partial pancreatectomy and splenectomy.  Pancreatic head cysts measuring up to 1.6 cm, very similar to 8/9/2021. Continued surveillance imaging recommended. No evidence of intra-abdominal or pelvic metastasis. 2/18/2022-I reviewed results CT chest abdomen pelvis. No evidence of recurrent disease. Pancreatic head cyst stable. 02/25/22- Completion of adjuvant chemotherapy. 4/22/2022 CA 19-9-13  5/11/2022 Bone Density (BHP) Osteopenia. Femoral Neck T-Score -2.0, Lumbar Spine T-Score -0.9  6/7/2022 Endoscopy ([facility]) Urease was negative  8/5/2022 CT Abd/Pelvis WO Contrast [facility]) Fairly diffuse soft tissue infiltration with haziness and nodularity of there peripheral aspects of the peritoneal cavity most concerning for a process such as peritoneal carcinomatosis. Given history of pancreatic cancer,a PET scan may be considered for further evaluation. Small amount of ascites in the pelvis. Colonic diverticulosis. Large,fat-containing umbilical hernia. Tiny right pleural effusion with atelectasis in the lung bases. 8/22/2022 CT Chest W Contrast Calcified and noncalcified lung nodules may indicate inflammatory or infectious process and old granulomatous disease. Follow-up in 3 months is recommended. Small right pleural effusion. Cirrhotic liver. Free fluid in the abdomen with mesenteric edema upper abdomen. Multiple lymph nodes in region of pancreatic head. 8/22/2022-surveillance  CT Abd/Pelvis W IV Contrast (oral) Partial pancreatectomy with absence of the pancreatic body and tail. Multiple soft tissue and hypodense areas in the location of the pancreatic head indicating measuring up to 8 mm indicating cysts, residual tissue and possible soft tissue pancreatic  nodules. A cyst or soft tissue nodule is identified measuring up to 1.2 cm. Indeterminate nodules in the location of the pancreatic head versus lymphadenopathy. Consider MRI pancreas without and with IV contrast.Bilateral extrarenal pelves.   4 mm left renal cyst. Soft tissue and fat herniates into the umbilical region measuring 4.7 cm indicating hernia. There is mesentery within the hernia. No bowel is within the hernia. No evidence of free air. .  Small amount of free fluid is within the abdomen and pelvis. Mesenteric edema of the abdomen and pelvis. Rectosigmoid diverticulosis without acute diverticulitis. Slightly irregular liver contour may indicate early cirrhotic changes. Free fluid within the abdomen and pelvis. Inflammation of the mesentery. 8/28/2022-I reviewed CT abdomen/pelvis. New onset ascites. Liver is smaller when compared to prior CT scan suggesting hydrophobic disease. Radiology reported peripancreatic nodules. Recommended CA 19-9 reviewed. Recommend MRI pancreas. Recommended paracentesis (send for cytology, total protein, LDH). CMP same day of paracentesis. 9/12/2022 MRI Abdomen W WO Contrast MRCP Hepatic steatosis with a nodular contour suggestive of cirrhosis. Ascites. Absence of the spleen. Absence of the gallbladder. The patient appears to be status post partial pancreatectomy . The nodular lesions identified on the CT scan dated August 19, 2022 are poorly visualized on this examination due to respiratory artifact and technique. Small renal cysts. Midline ventral hernia. 9/12/2022-CA 19-9 1004  9/15/2022 US Guided Parecentesis [facility]) Limited grayscale ultrasound evaluation to assess for peritoneal fluid. 4 quadrant evaluation shows trace fluid around the liver. No more than trace fluid at the right lower quadrant. No significant fluid is seen at the left upper quadrant or left lower quadrant. 9/19/2022 Ascites fluid,paracentesis: Malignant cells present. Adenocarcinoma. 9/19/2022 CT Abdomen WO Contrast (B[facility]):Small amount of diffuse ascites within the abdomen, likely malignant ascites given the presence of nodular peritoneal and omental soft tissue thickening compatible with carcinomatosis. Diagnostic paracentesis was performed following this examination with ultrasound guidance. Evidence previous resection of the pancreatic body and tail. There is a soft tissue implant or enlarged lymph node inferior to the celiac artery on the right which measures 1.6 x 1.6 cm, concerning for metastatic disease. There is a small cyst in subtle stable nodularity and patient replacement of the pancreatic head. 10/3/2022-essentially, findings of recurrent metastatic pancreatic adenocarcinoma. Stage IV. Recommend palliative Gemzar 1000 mg/M2 and Abraxane 125 mg/M2 every 2 weeks until disease progression or intolerable toxicity.     Past Medical History:    Past Medical History:   Diagnosis Date    Anxiety     Arthritis     Depression     Hypertension     Hypoglycemia     if fasting for very long    Pancreatic cancer ([facility] [address] 06/2021    s/p Whipple by Dr. Kristen Mayorga    Shoulder pain     incompleted rcr; possible injury    Thyroid disease        Past Surgical History:    Past Surgical History:   Procedure Laterality Date    CHOLECYSTECTOMY  1991    COLONOSCOPY  2020    Dr Ana Roger, COLON, DIAGNOSTIC      HYSTERECTOMY ([facility])  [address]  06/28/2021    PORT SURGERY Right 9/21/2021    Single lumen PORT INSERTION with ultrasound and fluoroscopy performed by Morenita Brooks DO at [place] [id number]. Right 1/13/2022    RIGHT SIDE MEDIPORT REMOVAL, LEFT SIDE MEDIPORT INSERTION performed by Lauren Agudelo DO at [address] Right 10/19/2017    SHOULDER ARTHROSCOPY ROTATOR CUFF REPAIR/ DEBRIDEMENT BICEPS TENODESIS/ TENOTOMY SUBACROMIAL DECOMPRESSION/ DISTAL CLAVICLE EXCISION performed by Rene Duncan MD at [address] [place] [address]  06/28/2021    UPPER GASTROINTESTINAL ENDOSCOPY  06/2021    Dr. Karly Neely in [address] [place] [address]      spur excision       Social History:    Marital status:   Smoking status: no  ETOH status: no  Resides: [place], [place]    Family History:   Family History   Adopted: Yes   Problem Relation Age of Onset    Breast Cancer Maternal Aunt     Breast Cancer Daughter     Cancer Daughter         Bladder    Cancer Maternal Uncle         Bladder       Current Hospital Medications:    Current Outpatient Medications   Medication Sig Dispense Refill    lipase-protease-amylase (CREON) 05059-04970 units delayed release capsule Take 1 capsule by mouth 3 times daily (with meals) 90 capsule 5    lidocaine-prilocaine (EMLA) 2.5-2.5 % cream Apply topically as needed. 30 g 5    promethazine (PHENERGAN) 12.5 MG tablet Take 1 tablet by mouth 3 times daily as needed for Nausea 30 tablet 5    capecitabine (XELODA) 500 MG chemo tablet Take 3 tablets by mouth 2 times a day for 14 days of a 28 day cycle 84 tablet 5    losartan-hydroCHLOROthiazide (HYZAAR) 50-12.5 MG per tablet Take 1 tablet by mouth daily Indications: High Blood Pressure Disorder      levothyroxine (SYNTHROID) 137 MCG tablet Take 137 mcg by mouth Daily Indications: Underactive Thyroid      Cholecalciferol (VITAMIN D3) 10 MCG (400 UNIT) CAPS Take 1 capsule by mouth daily       Magic Mouthwash (MIRACLE MOUTHWASH) Swish and spit 5 mLs 4 times daily as needed for Irritation 1/3 viscous lidocaine, 1/3 benadryl, 1/3 Maalox. 480 mL 1    lidocaine-prilocaine (EMLA) 2.5-2.5 % cream Apply topically as needed. 30 g 2    Caltrate 600+D Plus Minerals (CALTRATE) 600-800 MG-UNIT TABS tablet Take 1 tablet by mouth daily      busPIRone (BUSPAR) 5 MG tablet Take 5 mg by mouth 3 times daily       promethazine (PHENERGAN) 12.5 MG tablet Take 1 tablet by mouth every 6 hours as needed for Nausea 30 tablet 5    ondansetron (ZOFRAN) 4 MG tablet Take 1 tablet by mouth every 8 hours as needed for Nausea or Vomiting 10 tablet 0     No current facility-administered medications for this visit. Allergies:    Allergies   Allergen Reactions    Diclofenac Other (See Comments)     Couldn't sleep and face turned blood red    Oxycodone-Acetaminophen Hives     itching    Doxycycline Rash         Subjective   REVIEW OF SYSTEMS:   CONSTITUTIONAL: no fever, no night sweats, fatigue;  HEENT: no blurring of vision, no double vision, no hearing difficulty, no tinnitus, no ulceration, no dysplasia, no epistaxis;   LUNGS: no cough, no hemoptysis, no wheeze,  no shortness of breath;  CARDIOVASCULAR: no palpitation, no chest pain, no shortness of breath;  GI: abdominal pain, no nausea, no vomiting, no diarrhea, no constipation;  STUART: no dysuria, no hematuria, no frequency or urgency, no nephrolithiasis;  MUSCULOSKELETAL: no joint pain, no swelling, no stiffness;  ENDOCRINE: no polyuria, no polydipsia, no cold or heat intolerance;  HEMATOLOGY: no easy bruising or bleeding, no history of clotting disorder;  DERMATOLOGY: no skin rash, no eczema, no pruritus;  PSYCHIATRY: no depression, no anxiety, no panic attacks, no suicidal ideation, no homicidal ideation;  NEUROLOGY: no syncope, no seizures, no numbness or tingling of hands, no numbness or tingling of feet, no paresis;      Objective /80   Pulse 85   Temp 98.2 °F (36.8 °C)   Resp 18   Ht 5' 5\" (1.651 m)   Wt 157 lb 6.4 oz (71.4 kg)   SpO2 99%   BMI 26.19 kg/m²     PHYSICAL EXAM:  CONSTITUTIONAL: Alert, appropriate, no acute distress  EYES: Non icteric, EOM intact, pupils equal round   ENT: Mucus membranes moist, no oral pharyngeal lesions, external inspection of ears and nose are normal.  NECK: Supple, no masses. No palpable thyroid mass  CHEST/LUNGS: CTA bilaterally, normal respiratory effort   CARDIOVASCULAR: RRR, no murmurs. No lower extremity edema  ABDOMEN: soft non-tender, active bowel sounds, no HSM. No palpable masses  EXTREMITIES: warm, full ROM in all 4 extremities, no focal weakness. SKIN: warm, dry with no rashes or lesions  LYMPH: No cervical, clavicular, axillary, or inguinal lymphadenopathy  NEUROLOGIC: follows commands, non focal   PSYCH: mood and affect appropriate.   Alert and oriented to time, place, person       LABORATORY RESULTS REVIEWED/ANALYZED BY ME:  Lab Results   Component Value Date    WBC 9.10 10/28/2022    HGB 13.0 10/28/2022    HCT 39.6 10/28/2022    MCV 91.5 10/28/2022     (H) 10/28/2022     Lab Results   Component Value Date    NEUTROABS 3.07 10/28/2022     Lab Results   Component Value Date     10/28/2022    K 4.4 10/28/2022     10/28/2022    CO2 28 10/28/2022    BUN 13 10/28/2022    CREATININE 0.5 10/28/2022    GLUCOSE 97 10/28/2022    CALCIUM 9.4 10/28/2022    PROT 7.2 10/28/2022    LABALBU 4.2 10/28/2022    BILITOT 0.4 10/28/2022    ALKPHOS 76 10/28/2022    AST 40 (H) 10/28/2022    ALT 22 10/28/2022    LABGLOM >60 10/28/2022    GFRAA >59 01/12/2022    GLOB 3.0 10/28/2022           RADIOLOGY STUDIES REVIEWED BY ME:   None    ASSESSMENT:    No orders of the defined types were placed in this encounter. Rosette Chadwick was seen today for pancreatic cancer. Diagnoses and all orders for this visit:    Metastasis from pancreatic cancer [facility])    Malignant neoplasm of tail of pancreas ([facility] [address]    Adenocarcinoma of pancreas [facility])    Care plan discussed with patient    Other orders  -     palonosetron (ALOXI) injection 0.25 mg      Pancreatic tail adenocarcinoma pT2 N1 M0, June 2021  Essentially, node positive third of the pancreas adenocarcinoma. Status post distal pancreatectomy/splenectomy  -Anticipated completion 6-month adjuvant Gemzar/Xeloda on 2/25/2002  -Last CA 19-9 = 10  -Feb 2022Completion of adjuvant treatment with Xeloda/Gemzar. -CT chest/abdomen/pelvis in August 2022.    8/22/2022 CT Abd/Pelvis W IV Contrast (oral) Partial pancreatectomy with absence of the pancreatic body and tail. Multiple soft tissue and hypodense areas in the location of the pancreatic head indicating measuring up to 8 mm indicating cysts, residual tissue and possible soft tissue pancreatic  nodules. A cyst or soft tissue nodule is identified measuring up to 1.2 cm. Indeterminate nodules in the location of the pancreatic head versus lymphadenopathy. Consider MRI pancreas without and with IV contrast.Bilateral extrarenal pelves. 4 mm left renal cyst. Soft tissue and fat herniates into the umbilical region measuring 4.7 cm indicating hernia. There is mesentery within the hernia. No bowel is within the hernia. No evidence of free air. .  Small amount of free fluid is within the abdomen and pelvis. Mesenteric edema of the abdomen and pelvis. Rectosigmoid diverticulosis without acute diverticulitis. Slightly irregular liver contour may indicate early cirrhotic changes. Free fluid within the abdomen and pelvis. Inflammation of the mesentery. 9/12/2022-CA 19-9 1004  9/15/2022 US Guided Parecentesis [facility]) Limited grayscale ultrasound evaluation to assess for peritoneal fluid. 4 quadrant evaluation shows trace fluid around the liver. No more than trace fluid at the right lower quadrant. No significant fluid is seen at the left upper quadrant or left lower quadrant. 9/19/2022 Ascites fluid,paracentesis: Malignant cells present. Adenocarcinoma. 9/19/2022 CT Abdomen WO Contrast (B[facility]):Small amount of diffuse ascites within the abdomen, likely malignant ascites given the presence of nodular peritoneal and omental soft tissue thickening compatible with carcinomatosis. Diagnostic paracentesis was performed following this examination with ultrasound guidance. Evidence previous resection of the pancreatic body and tail. There is a soft tissue implant or enlarged lymph node inferior to the celiac artery on the right which measures 1.6 x 1.6 cm, concerning for metastatic disease. There is a small cyst in subtle stable nodularity and patient replacement of the pancreatic head. 10/3/2022-essentially, findings of recurrent metastatic pancreatic adenocarcinoma. Stage IV.       Plan:  Recommend palliative Gemzar 1000 mg/M2 and Abraxane 125 mg/M2 every 2 weeks until disease progression or intolerable toxicity.  -Continue cycle 2 palliative Gemzar/Abraxane    Right upper extremity DVT port related-started on Eliquis started on 1/14/2022. Eliquis for 3 months through mid April 2022. PLAN:  RTC with MD in treatment room 4 weeks  C#2 Gemzar Abraxane every 2 weeks  Continue  Norco 7.5mg every 6 hrs as needed  Continue Phenergan as needed  Continue Emla cream to port  Continue OTC Imodium as needed  Repeat CT scans after C# 6      I, Patel Steward am pre charting  as Medical Assistant for Darío Dean MD. Electronically signed by Patel Steward MA on 10/28/2022 at 4:16 PM CDT. Mallory Humphreys am scribing for Darío Dean MD. Electronically signed by Gaudencio Reno RN on 10/28/2022 at 12:11 PM CDT. I, Dr Roula Lebron, personally performed the services described in this documentation as scribed by Gaudencio Reno RN in my presence and is both accurate and complete. I have seen, examined and reviewed this patient medication list, appropriate labs and imaging studies. I reviewed relevant medical records and others physicians notes. I discussed the plans of care with the patient. I answered all the questions to the patients satisfaction. I have also reviewed the chief complaint (CC) and part of the history (History of Present Illness (HPI), Past Family Social History [facility]), or Review of Systems (ROS) and made changes when appropriated.        (Please note that portions of this note were completed with a voice recognition program. Efforts were made to edit the dictations but occasionally words are mis-transcribed.)  Electronically signed by Darío Dean MD on 10/28/2022 at 12:15 PM

## 2022-10-28 ENCOUNTER — OFFICE VISIT (OUTPATIENT)
Dept: HEMATOLOGY | Age: 77
End: 2022-10-28
Payer: MEDICARE

## 2022-10-28 ENCOUNTER — HOSPITAL ENCOUNTER (OUTPATIENT)
Dept: INFUSION THERAPY | Age: 77
Discharge: HOME | End: 2022-10-28
Payer: MEDICARE

## 2022-10-28 VITALS
WEIGHT: 157.4 LBS | SYSTOLIC BLOOD PRESSURE: 137 MMHG | RESPIRATION RATE: 18 BRPM | HEART RATE: 85 BPM | OXYGEN SATURATION: 99 % | HEIGHT: 65 IN | TEMPERATURE: 98.2 F | BODY MASS INDEX: 26.23 KG/M2 | DIASTOLIC BLOOD PRESSURE: 80 MMHG

## 2022-10-28 DIAGNOSIS — C79.9 METASTASIS FROM PANCREATIC CANCER (HCC): Primary | ICD-10-CM

## 2022-10-28 DIAGNOSIS — Z71.89 CARE PLAN DISCUSSED WITH PATIENT: ICD-10-CM

## 2022-10-28 DIAGNOSIS — T45.1X5D ADVERSE EFFECT OF CHEMOTHERAPY, SUBSEQUENT ENCOUNTER: ICD-10-CM

## 2022-10-28 DIAGNOSIS — C79.9 METASTASIS FROM PANCREATIC CANCER (HCC): ICD-10-CM

## 2022-10-28 DIAGNOSIS — C25.2 MALIGNANT NEOPLASM OF TAIL OF PANCREAS (HCC): Primary | ICD-10-CM

## 2022-10-28 DIAGNOSIS — Z51.11 CHEMOTHERAPY MANAGEMENT, ENCOUNTER FOR: ICD-10-CM

## 2022-10-28 DIAGNOSIS — Z90.411 HISTORY OF PARTIAL PANCREATECTOMY: ICD-10-CM

## 2022-10-28 DIAGNOSIS — C25.9 ADENOCARCINOMA OF PANCREAS (HCC): ICD-10-CM

## 2022-10-28 DIAGNOSIS — C25.2 MALIGNANT NEOPLASM OF TAIL OF PANCREAS (HCC): ICD-10-CM

## 2022-10-28 DIAGNOSIS — C25.9 METASTASIS FROM PANCREATIC CANCER (HCC): Primary | ICD-10-CM

## 2022-10-28 DIAGNOSIS — C25.9 METASTASIS FROM PANCREATIC CANCER (HCC): ICD-10-CM

## 2022-10-28 DIAGNOSIS — Z90.81 HISTORY OF SPLENECTOMY: ICD-10-CM

## 2022-10-28 LAB
ALBUMIN SERPL-MCNC: 4.2 G/DL (ref 3.5–5.2)
ALP BLD-CCNC: 76 U/L (ref 35–104)
ALT SERPL-CCNC: 22 U/L (ref 9–52)
ANION GAP SERPL CALCULATED.3IONS-SCNC: 9 MMOL/L (ref 7–19)
AST SERPL-CCNC: 40 U/L (ref 14–36)
BILIRUB SERPL-MCNC: 0.4 MG/DL (ref 0.2–1.3)
BUN BLDV-MCNC: 13 MG/DL (ref 7–17)
CALCIUM SERPL-MCNC: 9.4 MG/DL (ref 8.4–10.2)
CHLORIDE BLD-SCNC: 100 MMOL/L (ref 98–111)
CO2: 28 MMOL/L (ref 22–29)
CREAT SERPL-MCNC: 0.5 MG/DL (ref 0.5–1)
GFR SERPL CREATININE-BSD FRML MDRD: >60 ML/MIN/{1.73_M2}
GLOBULIN: 3 G/DL
GLUCOSE BLD-MCNC: 97 MG/DL (ref 74–106)
HCT VFR BLD CALC: 39.6 % (ref 34.1–44.9)
HEMOGLOBIN: 13 G/DL (ref 11.2–15.7)
LYMPHOCYTES ABSOLUTE: 4.29 K/UL (ref 1.18–3.74)
LYMPHOCYTES RELATIVE PERCENT: 47.1 % (ref 19.3–53.1)
MCH RBC QN AUTO: 30 PG (ref 25.6–32.2)
MCHC RBC AUTO-ENTMCNC: 32.8 G/DL (ref 32.3–35.5)
MCV RBC AUTO: 91.5 FL (ref 79.4–94.8)
MONOCYTES ABSOLUTE: 1.36 K/UL (ref 0.24–0.82)
MONOCYTES RELATIVE PERCENT: 14.9 % (ref 4.7–12.5)
NEUTROPHILS ABSOLUTE: 3.07 K/UL (ref 1.56–6.13)
NEUTROPHILS RELATIVE PERCENT: 33.9 % (ref 34–71.1)
PDW BLD-RTO: 14.3 % (ref 11.7–14.4)
PLATELET # BLD: 469 K/UL (ref 182–369)
PMV BLD AUTO: 9.2 FL (ref 7.4–10.4)
POTASSIUM SERPL-SCNC: 4.4 MMOL/L (ref 3.5–5.1)
RBC # BLD: 4.33 M/UL (ref 3.93–5.22)
SODIUM BLD-SCNC: 137 MMOL/L (ref 137–145)
TOTAL PROTEIN: 7.2 G/DL (ref 6.3–8.2)
WBC # BLD: 9.1 K/UL (ref 3.98–10.04)

## 2022-10-28 PROCEDURE — 1123F ACP DISCUSS/DSCN MKR DOCD: CPT | Performed by: INTERNAL MEDICINE

## 2022-10-28 PROCEDURE — G8484 FLU IMMUNIZE NO ADMIN: HCPCS | Performed by: INTERNAL MEDICINE

## 2022-10-28 PROCEDURE — 99214 OFFICE O/P EST MOD 30 MIN: CPT | Performed by: INTERNAL MEDICINE

## 2022-10-28 PROCEDURE — 36415 COLL VENOUS BLD VENIPUNCTURE: CPT

## 2022-10-28 PROCEDURE — 2580000003 HC RX 258: Performed by: INTERNAL MEDICINE

## 2022-10-28 PROCEDURE — 96375 TX/PRO/DX INJ NEW DRUG ADDON: CPT

## 2022-10-28 PROCEDURE — 80053 COMPREHEN METABOLIC PANEL: CPT

## 2022-10-28 PROCEDURE — 85025 COMPLETE CBC W/AUTO DIFF WBC: CPT

## 2022-10-28 PROCEDURE — 1090F PRES/ABSN URINE INCON ASSESS: CPT | Performed by: INTERNAL MEDICINE

## 2022-10-28 PROCEDURE — 1036F TOBACCO NON-USER: CPT | Performed by: INTERNAL MEDICINE

## 2022-10-28 PROCEDURE — G8427 DOCREV CUR MEDS BY ELIG CLIN: HCPCS | Performed by: INTERNAL MEDICINE

## 2022-10-28 PROCEDURE — 6360000002 HC RX W HCPCS: Performed by: INTERNAL MEDICINE

## 2022-10-28 PROCEDURE — G8417 CALC BMI ABV UP PARAM F/U: HCPCS | Performed by: INTERNAL MEDICINE

## 2022-10-28 PROCEDURE — 96413 CHEMO IV INFUSION 1 HR: CPT

## 2022-10-28 PROCEDURE — G8400 PT W/DXA NO RESULTS DOC: HCPCS | Performed by: INTERNAL MEDICINE

## 2022-10-28 PROCEDURE — 96417 CHEMO IV INFUS EACH ADDL SEQ: CPT

## 2022-10-28 RX ORDER — SODIUM CHLORIDE 9 MG/ML
5-250 INJECTION, SOLUTION INTRAVENOUS PRN
Status: DISCONTINUED | OUTPATIENT
Start: 2022-10-28 | End: 2022-10-29 | Stop reason: HOSPADM

## 2022-10-28 RX ORDER — SODIUM CHLORIDE 0.9 % (FLUSH) 0.9 %
5-40 SYRINGE (ML) INJECTION PRN
Status: DISCONTINUED | OUTPATIENT
Start: 2022-10-28 | End: 2022-10-29 | Stop reason: HOSPADM

## 2022-10-28 RX ORDER — HEPARIN SODIUM (PORCINE) LOCK FLUSH IV SOLN 100 UNIT/ML 100 UNIT/ML
500 SOLUTION INTRAVENOUS PRN
Status: DISCONTINUED | OUTPATIENT
Start: 2022-10-28 | End: 2022-10-29 | Stop reason: HOSPADM

## 2022-10-28 RX ORDER — PALONOSETRON 0.05 MG/ML
0.25 INJECTION, SOLUTION INTRAVENOUS ONCE
Status: COMPLETED | OUTPATIENT
Start: 2022-10-28 | End: 2022-10-28

## 2022-10-28 RX ORDER — PACLITAXEL 100 MG/20ML
125 INJECTION, POWDER, LYOPHILIZED, FOR SUSPENSION INTRAVENOUS ONCE
Status: COMPLETED | OUTPATIENT
Start: 2022-10-28 | End: 2022-10-28

## 2022-10-28 RX ORDER — PALONOSETRON 0.05 MG/ML
0.25 INJECTION, SOLUTION INTRAVENOUS ONCE
Status: CANCELLED
Start: 2022-10-28 | End: 2022-10-28

## 2022-10-28 RX ADMIN — SODIUM CHLORIDE, PRESERVATIVE FREE 10 ML: 5 INJECTION INTRAVENOUS at 14:45

## 2022-10-28 RX ADMIN — GEMCITABINE HYDROCHLORIDE 1830 MG: 1 INJECTION, SOLUTION INTRAVENOUS at 14:12

## 2022-10-28 RX ADMIN — SODIUM CHLORIDE 50 ML/HR: 9 INJECTION, SOLUTION INTRAVENOUS at 12:47

## 2022-10-28 RX ADMIN — PALONOSETRON 0.25 MG: 0.05 INJECTION, SOLUTION INTRAVENOUS at 12:47

## 2022-10-28 RX ADMIN — DEXAMETHASONE SODIUM PHOSPHATE: 10 INJECTION, SOLUTION INTRAMUSCULAR; INTRAVENOUS at 12:47

## 2022-10-28 RX ADMIN — Medication 500 UNITS: at 14:45

## 2022-10-28 RX ADMIN — PACLITAXEL 229 MG: 100 INJECTION, POWDER, LYOPHILIZED, FOR SUSPENSION INTRAVENOUS at 13:20

## 2022-11-11 ENCOUNTER — HOSPITAL ENCOUNTER (OUTPATIENT)
Dept: INFUSION THERAPY | Age: 77
Discharge: HOME | End: 2022-11-11
Payer: MEDICARE

## 2022-11-11 VITALS
DIASTOLIC BLOOD PRESSURE: 75 MMHG | HEIGHT: 65 IN | TEMPERATURE: 98.1 F | OXYGEN SATURATION: 99 % | SYSTOLIC BLOOD PRESSURE: 123 MMHG | WEIGHT: 155.3 LBS | RESPIRATION RATE: 18 BRPM | HEART RATE: 80 BPM | BODY MASS INDEX: 25.87 KG/M2

## 2022-11-11 DIAGNOSIS — C79.9 METASTASIS FROM PANCREATIC CANCER (HCC): Primary | ICD-10-CM

## 2022-11-11 DIAGNOSIS — C25.9 METASTASIS FROM PANCREATIC CANCER (HCC): Primary | ICD-10-CM

## 2022-11-11 DIAGNOSIS — C25.2 MALIGNANT NEOPLASM OF TAIL OF PANCREAS (HCC): Primary | ICD-10-CM

## 2022-11-11 DIAGNOSIS — C79.9 METASTASIS FROM PANCREATIC CANCER (HCC): ICD-10-CM

## 2022-11-11 DIAGNOSIS — C25.9 METASTASIS FROM PANCREATIC CANCER (HCC): ICD-10-CM

## 2022-11-11 DIAGNOSIS — C25.2 MALIGNANT NEOPLASM OF TAIL OF PANCREAS (HCC): ICD-10-CM

## 2022-11-11 LAB
ALBUMIN SERPL-MCNC: 4.1 G/DL (ref 3.5–5.2)
ALP BLD-CCNC: 80 U/L (ref 35–104)
ALT SERPL-CCNC: 23 U/L (ref 9–52)
ANION GAP SERPL CALCULATED.3IONS-SCNC: 11 MMOL/L (ref 7–19)
AST SERPL-CCNC: 38 U/L (ref 14–36)
BILIRUB SERPL-MCNC: 0.4 MG/DL (ref 0.2–1.3)
BUN BLDV-MCNC: 12 MG/DL (ref 7–17)
CALCIUM SERPL-MCNC: 9.3 MG/DL (ref 8.4–10.2)
CHLORIDE BLD-SCNC: 101 MMOL/L (ref 98–111)
CO2: 28 MMOL/L (ref 22–29)
CREAT SERPL-MCNC: 0.5 MG/DL (ref 0.5–1)
GFR SERPL CREATININE-BSD FRML MDRD: >60 ML/MIN/{1.73_M2}
GLOBULIN: 3.1 G/DL
GLUCOSE BLD-MCNC: 118 MG/DL (ref 74–106)
HCT VFR BLD CALC: 39.7 % (ref 34.1–44.9)
HEMOGLOBIN: 13.3 G/DL (ref 11.2–15.7)
LYMPHOCYTES ABSOLUTE: 4.12 K/UL (ref 1.18–3.74)
LYMPHOCYTES RELATIVE PERCENT: 47.1 % (ref 19.3–53.1)
MCH RBC QN AUTO: 30.2 PG (ref 25.6–32.2)
MCHC RBC AUTO-ENTMCNC: 33.5 G/DL (ref 32.3–35.5)
MCV RBC AUTO: 90 FL (ref 79.4–94.8)
MONOCYTES ABSOLUTE: 1.25 K/UL (ref 0.24–0.82)
MONOCYTES RELATIVE PERCENT: 14.3 % (ref 4.7–12.5)
NEUTROPHILS ABSOLUTE: 3 K/UL (ref 1.56–6.13)
NEUTROPHILS RELATIVE PERCENT: 34.4 % (ref 34–71.1)
PDW BLD-RTO: 14.6 % (ref 11.7–14.4)
PLATELET # BLD: 353 K/UL (ref 182–369)
PMV BLD AUTO: 9.8 FL (ref 7.4–10.4)
POTASSIUM SERPL-SCNC: 4.2 MMOL/L (ref 3.5–5.1)
RBC # BLD: 4.41 M/UL (ref 3.93–5.22)
SODIUM BLD-SCNC: 140 MMOL/L (ref 137–145)
TOTAL PROTEIN: 7.2 G/DL (ref 6.3–8.2)
WBC # BLD: 8.74 K/UL (ref 3.98–10.04)

## 2022-11-11 PROCEDURE — 96417 CHEMO IV INFUS EACH ADDL SEQ: CPT

## 2022-11-11 PROCEDURE — 6360000002 HC RX W HCPCS: Performed by: INTERNAL MEDICINE

## 2022-11-11 PROCEDURE — 96375 TX/PRO/DX INJ NEW DRUG ADDON: CPT

## 2022-11-11 PROCEDURE — 2580000003 HC RX 258: Performed by: INTERNAL MEDICINE

## 2022-11-11 PROCEDURE — 36415 COLL VENOUS BLD VENIPUNCTURE: CPT

## 2022-11-11 PROCEDURE — 85025 COMPLETE CBC W/AUTO DIFF WBC: CPT

## 2022-11-11 PROCEDURE — 96413 CHEMO IV INFUSION 1 HR: CPT

## 2022-11-11 PROCEDURE — 80053 COMPREHEN METABOLIC PANEL: CPT

## 2022-11-11 RX ORDER — HEPARIN SODIUM (PORCINE) LOCK FLUSH IV SOLN 100 UNIT/ML 100 UNIT/ML
500 SOLUTION INTRAVENOUS PRN
Status: CANCELLED | OUTPATIENT
Start: 2022-11-11

## 2022-11-11 RX ORDER — SODIUM CHLORIDE 9 MG/ML
5-250 INJECTION, SOLUTION INTRAVENOUS PRN
OUTPATIENT
Start: 2022-12-02

## 2022-11-11 RX ORDER — SODIUM CHLORIDE 9 MG/ML
INJECTION, SOLUTION INTRAVENOUS CONTINUOUS
OUTPATIENT
Start: 2022-12-02

## 2022-11-11 RX ORDER — PALONOSETRON 0.05 MG/ML
0.25 INJECTION, SOLUTION INTRAVENOUS ONCE
Status: COMPLETED | OUTPATIENT
Start: 2022-11-11 | End: 2022-11-11

## 2022-11-11 RX ORDER — ONDANSETRON 2 MG/ML
8 INJECTION INTRAMUSCULAR; INTRAVENOUS
OUTPATIENT
Start: 2022-12-02

## 2022-11-11 RX ORDER — SODIUM CHLORIDE 0.9 % (FLUSH) 0.9 %
5-40 SYRINGE (ML) INJECTION PRN
OUTPATIENT
Start: 2022-12-02

## 2022-11-11 RX ORDER — PALONOSETRON 0.05 MG/ML
0.25 INJECTION, SOLUTION INTRAVENOUS ONCE
Start: 2022-12-02 | End: 2022-11-25

## 2022-11-11 RX ORDER — MEPERIDINE HYDROCHLORIDE 50 MG/ML
12.5 INJECTION INTRAMUSCULAR; INTRAVENOUS; SUBCUTANEOUS PRN
OUTPATIENT
Start: 2022-11-11

## 2022-11-11 RX ORDER — PALONOSETRON 0.05 MG/ML
0.25 INJECTION, SOLUTION INTRAVENOUS ONCE
Status: CANCELLED
Start: 2022-11-11 | End: 2022-11-11

## 2022-11-11 RX ORDER — FAMOTIDINE 10 MG/ML
20 INJECTION, SOLUTION INTRAVENOUS
OUTPATIENT
Start: 2022-11-11

## 2022-11-11 RX ORDER — SODIUM CHLORIDE 9 MG/ML
5-40 INJECTION INTRAVENOUS PRN
OUTPATIENT
Start: 2022-12-02

## 2022-11-11 RX ORDER — DIPHENHYDRAMINE HYDROCHLORIDE 50 MG/ML
50 INJECTION INTRAMUSCULAR; INTRAVENOUS
OUTPATIENT
Start: 2022-11-11

## 2022-11-11 RX ORDER — HEPARIN SODIUM (PORCINE) LOCK FLUSH IV SOLN 100 UNIT/ML 100 UNIT/ML
500 SOLUTION INTRAVENOUS PRN
OUTPATIENT
Start: 2022-12-02

## 2022-11-11 RX ORDER — PACLITAXEL 100 MG/20ML
125 INJECTION, POWDER, LYOPHILIZED, FOR SUSPENSION INTRAVENOUS ONCE
Status: COMPLETED | OUTPATIENT
Start: 2022-11-11 | End: 2022-11-11

## 2022-11-11 RX ORDER — SODIUM CHLORIDE 9 MG/ML
5-250 INJECTION, SOLUTION INTRAVENOUS PRN
OUTPATIENT
Start: 2022-11-11

## 2022-11-11 RX ORDER — ONDANSETRON 2 MG/ML
8 INJECTION INTRAMUSCULAR; INTRAVENOUS
OUTPATIENT
Start: 2022-11-11

## 2022-11-11 RX ORDER — EPINEPHRINE 1 MG/ML
0.3 INJECTION, SOLUTION, CONCENTRATE INTRAVENOUS PRN
OUTPATIENT
Start: 2022-11-11

## 2022-11-11 RX ORDER — MEPERIDINE HYDROCHLORIDE 50 MG/ML
12.5 INJECTION INTRAMUSCULAR; INTRAVENOUS; SUBCUTANEOUS PRN
OUTPATIENT
Start: 2022-12-02

## 2022-11-11 RX ORDER — DIPHENHYDRAMINE HYDROCHLORIDE 50 MG/ML
50 INJECTION INTRAMUSCULAR; INTRAVENOUS
OUTPATIENT
Start: 2022-12-02

## 2022-11-11 RX ORDER — SODIUM CHLORIDE 9 MG/ML
5-250 INJECTION, SOLUTION INTRAVENOUS PRN
Status: DISCONTINUED | OUTPATIENT
Start: 2022-11-11 | End: 2022-11-12 | Stop reason: HOSPADM

## 2022-11-11 RX ORDER — PACLITAXEL 100 MG/20ML
125 INJECTION, POWDER, LYOPHILIZED, FOR SUSPENSION INTRAVENOUS ONCE
OUTPATIENT
Start: 2022-12-02 | End: 2022-11-25

## 2022-11-11 RX ORDER — ALBUTEROL SULFATE 90 UG/1
4 AEROSOL, METERED RESPIRATORY (INHALATION) PRN
OUTPATIENT
Start: 2022-12-02

## 2022-11-11 RX ORDER — SODIUM CHLORIDE 0.9 % (FLUSH) 0.9 %
5-40 SYRINGE (ML) INJECTION PRN
Status: CANCELLED | OUTPATIENT
Start: 2022-11-11

## 2022-11-11 RX ORDER — HEPARIN SODIUM (PORCINE) LOCK FLUSH IV SOLN 100 UNIT/ML 100 UNIT/ML
500 SOLUTION INTRAVENOUS PRN
Status: DISCONTINUED | OUTPATIENT
Start: 2022-11-11 | End: 2022-11-12 | Stop reason: HOSPADM

## 2022-11-11 RX ORDER — ACETAMINOPHEN 325 MG/1
650 TABLET ORAL
OUTPATIENT
Start: 2022-11-11

## 2022-11-11 RX ORDER — ACETAMINOPHEN 325 MG/1
650 TABLET ORAL
OUTPATIENT
Start: 2022-12-02

## 2022-11-11 RX ORDER — SODIUM CHLORIDE 0.9 % (FLUSH) 0.9 %
5-40 SYRINGE (ML) INJECTION PRN
Status: DISCONTINUED | OUTPATIENT
Start: 2022-11-11 | End: 2022-11-12 | Stop reason: HOSPADM

## 2022-11-11 RX ORDER — SODIUM CHLORIDE 9 MG/ML
INJECTION, SOLUTION INTRAVENOUS CONTINUOUS
OUTPATIENT
Start: 2022-11-11

## 2022-11-11 RX ORDER — SODIUM CHLORIDE 9 MG/ML
5-250 INJECTION, SOLUTION INTRAVENOUS PRN
Status: CANCELLED | OUTPATIENT
Start: 2022-11-11

## 2022-11-11 RX ORDER — EPINEPHRINE 1 MG/ML
0.3 INJECTION, SOLUTION, CONCENTRATE INTRAVENOUS PRN
OUTPATIENT
Start: 2022-12-02

## 2022-11-11 RX ORDER — FAMOTIDINE 10 MG/ML
20 INJECTION, SOLUTION INTRAVENOUS
OUTPATIENT
Start: 2022-12-02

## 2022-11-11 RX ORDER — PACLITAXEL 100 MG/20ML
125 INJECTION, POWDER, LYOPHILIZED, FOR SUSPENSION INTRAVENOUS ONCE
Status: CANCELLED | OUTPATIENT
Start: 2022-11-11 | End: 2022-11-11

## 2022-11-11 RX ORDER — SODIUM CHLORIDE 9 MG/ML
5-40 INJECTION INTRAVENOUS PRN
OUTPATIENT
Start: 2022-11-11

## 2022-11-11 RX ORDER — ALBUTEROL SULFATE 90 UG/1
4 AEROSOL, METERED RESPIRATORY (INHALATION) PRN
OUTPATIENT
Start: 2022-11-11

## 2022-11-11 RX ADMIN — PACLITAXEL 229 MG: 100 INJECTION, POWDER, LYOPHILIZED, FOR SUSPENSION INTRAVENOUS at 12:38

## 2022-11-11 RX ADMIN — PALONOSETRON HYDROCHLORIDE 0.25 MG: 0.25 INJECTION, SOLUTION INTRAVENOUS at 12:23

## 2022-11-11 RX ADMIN — Medication 10 ML: at 14:03

## 2022-11-11 RX ADMIN — HEPARIN 500 UNITS: 100 SYRINGE at 14:03

## 2022-11-11 RX ADMIN — DEXAMETHASONE SODIUM PHOSPHATE: 10 INJECTION, SOLUTION INTRAMUSCULAR; INTRAVENOUS at 12:23

## 2022-11-11 RX ADMIN — SODIUM CHLORIDE 25 ML/HR: 9 INJECTION, SOLUTION INTRAVENOUS at 12:24

## 2022-11-11 RX ADMIN — GEMCITABINE HYDROCHLORIDE 1830 MG: 1 INJECTION, SOLUTION INTRAVENOUS at 13:28

## 2022-12-01 NOTE — PROGRESS NOTES
MEDICAL ONCOLOGY PROGRESS NOTE    Pt Name: Carrie Machado  MRN: [id number]  YOB: 1945  Date of evaluation: 12/4/2022    HISTORY OF PRESENT ILLNESS:    Patient is a pleasant 69years old female who has been diagnosed with invasive  adenocarcinoma of the pancreas, node positive. She underwent laparoscopic assisted distal pancreatectomy and splenectomy in June 2021 at [facility] she is a status post completion of adjuvant chemotherapy with Gemzar/Xeloda completed February 2022. Unfortunately, she had recurrent disease with massive ascites. CT-guided paracentesis cytology positive metastatic adenocarcinoma. CA 19-9 elevated over thousand. She was recommended palliative frontline chemotherapy with Gemzar Abraxane. She presents here today for cycle 2-day 15 (4 cycles (.  She has been tolerated treatment much better. Her abdominal pain has gotten significantly better. She has good appetite. She has lost some weight since last visit of about 1 pound. She denies any significant sensorial neuropathy. 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 Surgeon Dr. Sunday Marr [address]   8/13/2021-02/25/22-adjuvant Gemzar 1000 mg/m2 IV D 1, D8, D15 with Capecitabine 830 mg/m2 D1-21 every 28 days x6 cycles  1/14/22 Eliquis for DVT right upper extremity November 2022  10/14/22-Initiated palliative frontline Gemzar Abraxane every 2 weeks    Cancer History  Shauna Ortega was first seen by me on 8/6/2021. The patient was referred for a diagnosis of pancreatic malignancy. She had initial complaints of abdominal pain. 5/5/20 MRI abdomen MEDICAL [facility]):  Moderate fatty replacement of the pancreas with several small cystic structures within or adjacent to the pancreatic head and uncinate process most suggestive of benign cysts. A follow-up MRI of the abdomen may be obtained in 1 year to assess for stability. No solid lesion is seen. Otherwise unremarkable MRI of the abdomen. 4/22/20 CT abd/pelvis [facility]): Focal inflammatory stranding associated with the pancreatic head. Radiographically I would favor this to represent acute pancreatitis. There is mild associated stranding within the descending and proximal transverse duodenum. There are 2 well-circumscribed hypodensities one in the region of the uncinate process and one slightly anterior to the pancreatic head likely representing sequela of pancreatitis but warranting follow-up on subsequent exam. The body and tail of the pancreas are spared. No evidence of pancreatic necrosis. The patient is status post hysterectomy and cholecystectomy. Bibasilar subsegmental atelectasis. No evidence of nephrolithiasis or obstructive uropathy. Diverticulosis of the distal descending and sigmoid colon without evidence of diverticulitis. 5/5/21 MRI [facility]) There are multiple tiny-to-small foci of increased T2 signal seen within the pancreas, consistent with a combination of cysts and/or beaded dilatation of the tributaries of the main pancreatic duct, increased in size and number when compared to the prior similar study of 5/5/20. The patient is again noted to be status post cholecystectomy. The examination is otherwise within normal limits for age with no abnormal postcontrast enhancement noted. 6/3/21 Upper EUS (Dr Terra Rene/Joby): CT Visualized portion of pancreas abnormal on ultrasound. There is a 26.7 mm solid mass in pancreatic tail. It abuts the splenic vessel without occlusion or varices. Pancreatic duct is dilated to 6 mm in the tail proximal to the mass. FNA x4 yields atypical cells. Celiac axis, liver appear normal, no local nodes.  Body and head of pancreas normal.  6/3/21 Pancreas tail mass, FNA aspiration smear (x2), ThinPrep and cell block: Adenocarcinoma. 6/7/21 CA 19-9 407.1  6/28/21-she underwent Whipple procedure pancreatectomy by Dr. Dorothy Arshad: Invasive ductal carcinoma 4.3 cm in greatest dimension, invasive in the peripancreatic adipose tissue, diffuse perineural invasion. Surgical margins were negative for carcinoma. The pancreas margins involved by a low-grade dysplasia. 1/6 nodes positive for metastatic cancer with direct extension. Distal fibrosis and pancreatitis was noted. Spleen was unremarkable. Pathological stage pT2 N1 M0  8/6/2021-she was first seen by me. Recommend adjuvant chemotherapy with gemcitabine/Xeloda. She acknowledged understanding and agree with treatment. 8/09/2021 CT Chest W/Contrast No evidence of intrathoracic metastatic disease is identified. 8/09/2021 CT Abd/Pelvis W/Oral Contrast Prior distal pancreatectomy and splenectomy. There is a 1.5 cm circumscribed, nonenhancing cystic lesion along the inferior pancreatic head, perhaps a branch IPMN. Otherwise, no obvious pancreatic solid mass is seen. No suspicious adenopathy or evidence of distant metastatic disease in the abdomen or pelvis. Prior cholecystectomy. Liver steatosis. Colonic diverticulosis. 8/13/2021-initiation of adjuvant capecitabine/Gemzar   9/10/2021-I reviewed CT chest abdomen pelvis. 1/21/2022-CA 19-9 = 10  2/10/22 CT CHEST W CONTRAST No evidence of intrathoracic metastasis. Incidental findings described above. 2/10/22 CT ABDOMEN PELVIS W IV CONTRAST  Partial pancreatectomy and splenectomy. Pancreatic head cysts measuring up to 1.6 cm, very similar to 8/9/2021. Continued surveillance imaging recommended. No evidence of intra-abdominal or pelvic metastasis. 2/18/2022-I reviewed results CT chest abdomen pelvis. No evidence of recurrent disease. Pancreatic head cyst stable. 02/25/22- Completion of adjuvant chemotherapy.    4/22/2022 CA 19-9-13  5/11/2022 Bone Density ([facility]) Osteopenia. Femoral Neck T-Score -2.0, Lumbar Spine T-Score -0.9  6/7/2022 Endoscopy ([facility]) Urease was negative  8/5/2022 CT Abd/Pelvis WO Contrast [facility]) Fairly diffuse soft tissue infiltration with haziness and nodularity of there peripheral aspects of the peritoneal cavity most concerning for a process such as peritoneal carcinomatosis. Given history of pancreatic cancer,a PET scan may be considered for further evaluation. Small amount of ascites in the pelvis. Colonic diverticulosis. Large,fat-containing umbilical hernia. Tiny right pleural effusion with atelectasis in the lung bases. 8/22/2022 CT Chest W Contrast Calcified and noncalcified lung nodules may indicate inflammatory or infectious process and old granulomatous disease. Follow-up in 3 months is recommended. Small right pleural effusion. Cirrhotic liver. Free fluid in the abdomen with mesenteric edema upper abdomen. Multiple lymph nodes in region of pancreatic head. 8/22/2022-surveillance  CT Abd/Pelvis W IV Contrast (oral) Partial pancreatectomy with absence of the pancreatic body and tail. Multiple soft tissue and hypodense areas in the location of the pancreatic head indicating measuring up to 8 mm indicating cysts, residual tissue and possible soft tissue pancreatic  nodules. A cyst or soft tissue nodule is identified measuring up to 1.2 cm. Indeterminate nodules in the location of the pancreatic head versus lymphadenopathy. Consider MRI pancreas without and with IV contrast.Bilateral extrarenal pelves. 4 mm left renal cyst. Soft tissue and fat herniates into the umbilical region measuring 4.7 cm indicating hernia. There is mesentery within the hernia. No bowel is within the hernia. No evidence of free air. .  Small amount of free fluid is within the abdomen and pelvis. Mesenteric edema of the abdomen and pelvis. Rectosigmoid diverticulosis without acute diverticulitis. Slightly irregular liver contour may indicate early cirrhotic changes. Free fluid within the abdomen and pelvis. Inflammation of the mesentery. 8/28/2022-I reviewed CT abdomen/pelvis. New onset ascites. Liver is smaller when compared to prior CT scan suggesting hydrophobic disease. Radiology reported peripancreatic nodules. Recommended CA 19-9 reviewed. Recommend MRI pancreas. Recommended paracentesis (send for cytology, total protein, LDH). CMP same day of paracentesis. 9/12/2022 MRI Abdomen W WO Contrast MRCP Hepatic steatosis with a nodular contour suggestive of cirrhosis. Ascites. Absence of the spleen. Absence of the gallbladder. The patient appears to be status post partial pancreatectomy . The nodular lesions identified on the CT scan dated August 19, 2022 are poorly visualized on this examination due to respiratory artifact and technique. Small renal cysts. Midline ventral hernia. 9/12/2022-CA 19-9 1004  9/15/2022 US Guided Parecentesis [facility]) Limited grayscale ultrasound evaluation to assess for peritoneal fluid. 4 quadrant evaluation shows trace fluid around the liver. No more than trace fluid at the right lower quadrant. No significant fluid is seen at the left upper quadrant or left lower quadrant. 9/19/2022 Ascites fluid,paracentesis: Malignant cells present. Adenocarcinoma. 9/19/2022 CT Abdomen WO Contrast ([facility]):Small amount of diffuse ascites within the abdomen, likely malignant ascites given the presence of nodular peritoneal and omental soft tissue thickening compatible with carcinomatosis. Diagnostic paracentesis was performed following this examination with ultrasound guidance. Evidence previous resection of the pancreatic body and tail. There is a soft tissue implant or enlarged lymph node inferior to the celiac artery on the right which measures 1.6 x 1.6 cm, concerning for metastatic disease. There is a small cyst in subtle stable nodularity and patient replacement of the pancreatic head.    10/3/2022-essentially, findings of recurrent metastatic pancreatic adenocarcinoma. Stage IV. Recommend palliative Gemzar 1000 mg/M2 and Abraxane 125 mg/M2 every 2 weeks until disease progression or intolerable toxicity.   10/3/22 Chemotherapy consent signed  10/14/22-Initiated palliative frontline Gemzar Abraxane every 2 weeks    Past Medical History:    Past Medical History:   Diagnosis Date    Anxiety     Arthritis     Depression     Hypertension     Hypoglycemia     if fasting for very long    Pancreatic cancer ([address] 06/2021    s/p Whipple by Dr. Ramirez Holding    Shoulder pain     incompleted rcr; possible injury    Thyroid disease        Past Surgical History:    Past Surgical History:   Procedure Laterality Date    CHOLECYSTECTOMY  1991    COLONOSCOPY  2020    Dr Diomedes Reyes, COLON, DIAGNOSTIC      HYSTERECTOMY ([facility])  [address]  06/28/2021    PORT SURGERY Right 9/21/2021    Single lumen PORT INSERTION with ultrasound and fluoroscopy performed by Jessica Martinez DO at [place] [id number]. Right 1/13/2022    RIGHT SIDE MEDIPORT REMOVAL, LEFT SIDE MEDIPORT INSERTION performed by Mounika Tavarez DO at [address] Right 10/19/2017    SHOULDER ARTHROSCOPY ROTATOR CUFF REPAIR/ DEBRIDEMENT BICEPS TENODESIS/ TENOTOMY SUBACROMIAL DECOMPRESSION/ DISTAL CLAVICLE EXCISION performed by Charito Gore MD at [facility]  06/28/2021    UPPER GASTROINTESTINAL ENDOSCOPY  06/2021    Dr. Lg Lira in [address] [place] [address]       Social History:    Marital status:   Smoking status: no  ETOH status: no  Resides: [facility]    Family History:   Family History   Adopted: Yes   Problem Relation Age of Onset    Breast Cancer Maternal Aunt     Breast Cancer Daughter     Cancer Daughter         Bladder    Cancer Maternal Uncle         Bladder       Current Hospital Medications:    Current Outpatient Medications   Medication Sig Dispense Refill    lipase-protease-amylase (CREON) 22953-30820 units delayed release capsule Take 1 capsule by mouth 3 times daily (with meals) 90 capsule 5    lidocaine-prilocaine (EMLA) 2.5-2.5 % cream Apply topically as needed. 30 g 5    promethazine (PHENERGAN) 12.5 MG tablet Take 1 tablet by mouth 3 times daily as needed for Nausea 30 tablet 5    capecitabine (XELODA) 500 MG chemo tablet Take 3 tablets by mouth 2 times a day for 14 days of a 28 day cycle 84 tablet 5    losartan-hydroCHLOROthiazide (HYZAAR) 50-12.5 MG per tablet Take 1 tablet by mouth daily Indications: High Blood Pressure Disorder      levothyroxine (SYNTHROID) 137 MCG tablet Take 137 mcg by mouth Daily Indications: Underactive Thyroid      Cholecalciferol (VITAMIN D3) 10 MCG (400 UNIT) CAPS Take 1 capsule by mouth daily       Magic Mouthwash (MIRACLE MOUTHWASH) Swish and spit 5 mLs 4 times daily as needed for Irritation 1/3 viscous lidocaine, 1/3 benadryl, 1/3 Maalox. 480 mL 1    lidocaine-prilocaine (EMLA) 2.5-2.5 % cream Apply topically as needed. 30 g 2    Caltrate 600+D Plus Minerals (CALTRATE) 600-800 MG-UNIT TABS tablet Take 1 tablet by mouth daily      busPIRone (BUSPAR) 5 MG tablet Take 5 mg by mouth 3 times daily       promethazine (PHENERGAN) 12.5 MG tablet Take 1 tablet by mouth every 6 hours as needed for Nausea 30 tablet 5    ondansetron (ZOFRAN) 4 MG tablet Take 1 tablet by mouth every 8 hours as needed for Nausea or Vomiting 10 tablet 0     No current facility-administered medications for this visit. Allergies:    Allergies   Allergen Reactions    Diclofenac Other (See Comments)     Couldn't sleep and face turned blood red    Oxycodone-Acetaminophen Hives     itching    Doxycycline Rash         Subjective   REVIEW OF SYSTEMS:   CONSTITUTIONAL: no fever, no night sweats, fatigue;  HEENT: no blurring of vision, no double vision, no hearing difficulty, no tinnitus, no ulceration, no dysplasia, no epistaxis;   LUNGS: no cough, no hemoptysis, no wheeze,  no shortness of breath;  CARDIOVASCULAR: no palpitation, no chest pain, no shortness of breath;  GI: no abdominal pain, no nausea, no vomiting, no diarrhea, no constipation;  STUART: no dysuria, no hematuria, no frequency or urgency, no nephrolithiasis;  MUSCULOSKELETAL: no joint pain, no swelling, no stiffness;  ENDOCRINE: no polyuria, no polydipsia, no cold or heat intolerance;  HEMATOLOGY: no easy bruising or bleeding, no history of clotting disorder;  DERMATOLOGY: no skin rash, no eczema, no pruritus;  PSYCHIATRY: no depression, no anxiety, no panic attacks, no suicidal ideation, no homicidal ideation;  NEUROLOGY: no syncope, no seizures, no numbness or tingling of hands, mild numbness/tingling of feet, no paresis;       Objective /77   Pulse 89   Temp 97 °F (36.1 °C)   Resp 18   Ht 5' 5\" (1.651 m)   Wt 154 lb (69.9 kg)   SpO2 97%   BMI 25.63 kg/m²     PHYSICAL EXAM:  CONSTITUTIONAL: Alert, appropriate, no acute distress  EYES: Non icteric, EOM intact, pupils equal round   ENT: Mucus membranes moist, no oral pharyngeal lesions, external inspection of ears and nose are normal.  NECK: Supple, no masses. No palpable thyroid mass  CHEST/LUNGS: CTA bilaterally, normal respiratory effort   CARDIOVASCULAR: RRR, no murmurs. No lower extremity edema  ABDOMEN: soft non-tender, active bowel sounds, no HSM. No palpable masses  EXTREMITIES: warm, full ROM in all 4 extremities, no focal weakness. SKIN: warm, dry with no rashes or lesions  LYMPH: No cervical, clavicular, axillary, or inguinal lymphadenopathy  NEUROLOGIC: follows commands, non focal   PSYCH: mood and affect appropriate.   Alert and oriented to time, place, person       LABORATORY RESULTS REVIEWED/ANALYZED BY ME:  Lab Results   Component Value Date    WBC 13.05 (H) 12/02/2022    HGB 13.5 12/02/2022    HCT 40.0 12/02/2022    MCV 91.3 12/02/2022     (HH) 12/02/2022     Lab Results   Component Value Date    NEUTROABS 5.26 12/02/2022     Lab Results   Component Value Date     (L) 12/02/2022    K 4.0 12/02/2022     12/02/2022    CO2 27 12/02/2022    BUN 8 12/02/2022    CREATININE 0.5 12/02/2022    GLUCOSE 77 12/02/2022    CALCIUM 9.8 12/02/2022    PROT 7.4 12/02/2022    LABALBU 4.3 12/02/2022    BILITOT 0.4 12/02/2022    ALKPHOS 92 12/02/2022    AST 37 (H) 12/02/2022    ALT 18 12/02/2022    LABGLOM >60 12/02/2022    GFRAA >59 01/12/2022    GLOB 3.1 12/02/2022       RADIOLOGY STUDIES REVIEWED BY ME:   None    ASSESSMENT:    Orders Placed This Encounter   Procedures    CT ABDOMEN PELVIS W IV CONTRAST Additional Contrast? Oral     Standing Status:   Future     Standing Expiration Date:   6/2/2024     Scheduling Instructions:      SCHED 5 WEEKS     Order Specific Question:   Additional Contrast?     Answer:   Oral     Order Specific Question:   STAT Creatinine as needed:     Answer:   No     Order Specific Question:   Reason for exam:     Answer:   COMPARE TO PREVIOUS SCANS TO ASSESS RESPONSE TO TREATMENT    CT CHEST W CONTRAST     Standing Status:   Future     Standing Expiration Date:   6/2/2024     Scheduling Instructions:      SCHED 5 WEEKS     Order Specific Question:   STAT Creatinine as needed:     Answer:   Yes     Order Specific Question:   Reason for exam:     Answer:   COMPARE TO PREVIOUS SCANS TO ASSESS RESPONSE TO TREATMENT    CBC with Auto Differential     Standing Status:   Future     Number of Occurrences:   1     Standing Expiration Date:   12/2/2023    Comprehensive Metabolic Panel     Standing Status:   Future     Number of Occurrences:   1     Standing Expiration Date:   12/2/2023            Jovan Bray was seen today for pancreatic cancer. Diagnoses and all orders for this visit:    Metastasis from pancreatic cancer ([facility]  -     CBC with Auto Differential; Future  -     Comprehensive Metabolic Panel;  Future  -     CT ABDOMEN PELVIS W IV CONTRAST Additional Contrast? Oral; Future  -     CT CHEST W CONTRAST; Future    Adenocarcinoma of pancreas ([facility] [address]  -     CT ABDOMEN PELVIS W IV CONTRAST Additional Contrast? Oral; Future  -     CT CHEST W CONTRAST; Future    Chemotherapy management, encounter for    Adverse effect of chemotherapy, subsequent encounter    Care plan discussed with patient        Pancreatic tail adenocarcinoma pT2 N1 M0, June 2021, recurrent disease September 2022 (malignant ascites)  Essentially, node positive third of the pancreas adenocarcinoma. Status post distal pancreatectomy/splenectomy  -Anticipated completion 6-month adjuvant Gemzar/Xeloda on 2/25/2002  -Last CA 19-9 = 10  -Feb 2022Completion of adjuvant treatment with Xeloda/Gemzar. -CT chest/abdomen/pelvis in August 2022. September 2022 recurrent disease with malignant pleural effusion  8/22/2022 CT Abd/Pelvis W IV Contrast (oral) Partial pancreatectomy with absence of the pancreatic body and tail. Multiple soft tissue and hypodense areas in the location of the pancreatic head indicating measuring up to 8 mm indicating cysts, residual tissue and possible soft tissue pancreatic  nodules. A cyst or soft tissue nodule is identified measuring up to 1.2 cm. Indeterminate nodules in the location of the pancreatic head versus lymphadenopathy. Consider MRI pancreas without and with IV contrast.Bilateral extrarenal pelves. 4 mm left renal cyst. Soft tissue and fat herniates into the umbilical region measuring 4.7 cm indicating hernia. There is mesentery within the hernia. No bowel is within the hernia. No evidence of free air. .  Small amount of free fluid is within the abdomen and pelvis. Mesenteric edema of the abdomen and pelvis. Rectosigmoid diverticulosis without acute diverticulitis. Slightly irregular liver contour may indicate early cirrhotic changes. Free fluid within the abdomen and pelvis. Inflammation of the mesentery.           9/12/2022-CA 19-9 1004  9/15/2022 US Guided Parecentesis [facility]) Limited grayscale ultrasound evaluation to assess for peritoneal fluid. 4 quadrant evaluation shows trace fluid around the liver. No more than trace fluid at the right lower quadrant. No significant fluid is seen at the left upper quadrant or left lower quadrant. 9/19/2022 Ascites fluid,paracentesis: Malignant cells present. Adenocarcinoma. 9/19/2022 CT Abdomen WO Contrast ([facility]):Small amount of diffuse ascites within the abdomen, likely malignant ascites given the presence of nodular peritoneal and omental soft tissue thickening compatible with carcinomatosis. Diagnostic paracentesis was performed following this examination with ultrasound guidance. Evidence previous resection of the pancreatic body and tail. There is a soft tissue implant or enlarged lymph node inferior to the celiac artery on the right which measures 1.6 x 1.6 cm, concerning for metastatic disease. There is a small cyst in subtle stable nodularity and patient replacement of the pancreatic head. 10/3/2022-essentially, findings of recurrent metastatic pancreatic adenocarcinoma. Stage IV.    10/14/22-Initiated palliative frontline Gemzar Abraxane every 2 wee    Plan:  -Continue cycle 2-day 15 palliative Gemzar 1000 mg/M2 and Abraxane 125 mg/M2 every 2 weeks until disease progression or intolerable toxicity.  -Continue cycle 2 palliative Gemzar/Abraxane  -Repeat CT scans after 6 cycles  -Repeat CA 19-9    Right upper extremity DVT port related-started on Eliquis started on 1/14/2022. Eliquis for 3 months through mid April 2022.      PLAN:  RTC with MD in treatment room 6 weeks  Gemzar Abraxane today and every 2 weeks  Repeat CA 19-9 during next 2 chemotherapy visits  Continue Norco 7.5mg every 6 hrs as needed  Continue Phenergan as needed  Continue Emla cream to port  Continue OTC Imodium as needed  Repeat CT scans in 5 weeks  May proceed with pain management procedure after 1/15/23    Shen ROSAS am pre charting  as Medical Assistant for Alla Rankin MD. Electronically signed by Florencio Rodriguze MA on 12/4/2022 at 9:38 AM CST. Marcie Winkler, am scribing for Isiah Love MD. Electronically signed by Amaury Russell RN on 12/4/2022 at 1:27 PM CST. I, Dr Alexey Whelan, personally performed the services described in this documentation as scribed by Amaury Russell RN in my presence and is both accurate and complete. I have seen, examined and reviewed this patient medication list, appropriate labs and imaging studies. I reviewed relevant medical records and others physicians notes. I discussed the plans of care with the patient. I answered all the questions to the patients satisfaction. I have also reviewed the chief complaint (CC) and part of the history (History of Present Illness (HPI), Past Family Social History [facility]), or Review of Systems (ROS) and made changes when appropriated.        (Please note that portions of this note were completed with a voice recognition program. Efforts were made to edit the dictations but occasionally words are mis-transcribed.)  Electronically signed by Isiah Love MD on 12/4/2022 at 9:05 AM

## 2022-12-02 ENCOUNTER — HOSPITAL ENCOUNTER (OUTPATIENT)
Dept: INFUSION THERAPY | Age: 77
Discharge: HOME | End: 2022-12-02
Payer: MEDICARE

## 2022-12-02 ENCOUNTER — OFFICE VISIT (OUTPATIENT)
Dept: HEMATOLOGY | Age: 77
End: 2022-12-02
Payer: MEDICARE

## 2022-12-02 VITALS
HEIGHT: 65 IN | TEMPERATURE: 97 F | WEIGHT: 154 LBS | HEART RATE: 89 BPM | RESPIRATION RATE: 18 BRPM | OXYGEN SATURATION: 97 % | DIASTOLIC BLOOD PRESSURE: 77 MMHG | BODY MASS INDEX: 25.66 KG/M2 | SYSTOLIC BLOOD PRESSURE: 129 MMHG

## 2022-12-02 DIAGNOSIS — C25.9 ADENOCARCINOMA OF PANCREAS (HCC): ICD-10-CM

## 2022-12-02 DIAGNOSIS — C79.9 METASTASIS FROM PANCREATIC CANCER (HCC): Primary | ICD-10-CM

## 2022-12-02 DIAGNOSIS — T45.1X5D ADVERSE EFFECT OF CHEMOTHERAPY, SUBSEQUENT ENCOUNTER: ICD-10-CM

## 2022-12-02 DIAGNOSIS — Z71.89 CARE PLAN DISCUSSED WITH PATIENT: ICD-10-CM

## 2022-12-02 DIAGNOSIS — C25.9 METASTASIS FROM PANCREATIC CANCER (HCC): Primary | ICD-10-CM

## 2022-12-02 DIAGNOSIS — Z51.11 CHEMOTHERAPY MANAGEMENT, ENCOUNTER FOR: ICD-10-CM

## 2022-12-02 DIAGNOSIS — C25.2 MALIGNANT NEOPLASM OF TAIL OF PANCREAS (HCC): ICD-10-CM

## 2022-12-02 LAB
ALBUMIN SERPL-MCNC: 4.3 G/DL (ref 3.5–5.2)
ALP BLD-CCNC: 92 U/L (ref 35–104)
ALT SERPL-CCNC: 18 U/L (ref 9–52)
ANION GAP SERPL CALCULATED.3IONS-SCNC: 6 MMOL/L (ref 7–19)
AST SERPL-CCNC: 37 U/L (ref 14–36)
BILIRUB SERPL-MCNC: 0.4 MG/DL (ref 0.2–1.3)
BUN BLDV-MCNC: 8 MG/DL (ref 7–17)
CALCIUM SERPL-MCNC: 9.8 MG/DL (ref 8.4–10.2)
CHLORIDE BLD-SCNC: 101 MMOL/L (ref 98–111)
CO2: 27 MMOL/L (ref 22–29)
CREAT SERPL-MCNC: 0.5 MG/DL (ref 0.5–1)
GFR SERPL CREATININE-BSD FRML MDRD: >60 ML/MIN/{1.73_M2}
GLOBULIN: 3.1 G/DL
GLUCOSE BLD-MCNC: 77 MG/DL (ref 74–106)
HCT VFR BLD CALC: 40 % (ref 34.1–44.9)
HEMOGLOBIN: 13.5 G/DL (ref 11.2–15.7)
LYMPHOCYTES ABSOLUTE: 5.62 K/UL (ref 1.18–3.74)
LYMPHOCYTES RELATIVE PERCENT: 43.1 % (ref 19.3–53.1)
MCH RBC QN AUTO: 30.8 PG (ref 25.6–32.2)
MCHC RBC AUTO-ENTMCNC: 33.8 G/DL (ref 32.3–35.5)
MCV RBC AUTO: 91.3 FL (ref 79.4–94.8)
MONOCYTES ABSOLUTE: 1.8 K/UL (ref 0.24–0.82)
MONOCYTES RELATIVE PERCENT: 13.8 % (ref 4.7–12.5)
NEUTROPHILS ABSOLUTE: 5.26 K/UL (ref 1.56–6.13)
NEUTROPHILS RELATIVE PERCENT: 40.2 % (ref 34–71.1)
PDW BLD-RTO: 16 % (ref 11.7–14.4)
PLATELET # BLD: 583 K/UL (ref 182–369)
PMV BLD AUTO: 10 FL (ref 7.4–10.4)
POTASSIUM SERPL-SCNC: 4 MMOL/L (ref 3.5–5.1)
RBC # BLD: 4.38 M/UL (ref 3.93–5.22)
SODIUM BLD-SCNC: 134 MMOL/L (ref 137–145)
TOTAL PROTEIN: 7.4 G/DL (ref 6.3–8.2)
WBC # BLD: 13.05 K/UL (ref 3.98–10.04)

## 2022-12-02 PROCEDURE — 1090F PRES/ABSN URINE INCON ASSESS: CPT | Performed by: INTERNAL MEDICINE

## 2022-12-02 PROCEDURE — 1036F TOBACCO NON-USER: CPT | Performed by: INTERNAL MEDICINE

## 2022-12-02 PROCEDURE — 99214 OFFICE O/P EST MOD 30 MIN: CPT | Performed by: INTERNAL MEDICINE

## 2022-12-02 PROCEDURE — G8484 FLU IMMUNIZE NO ADMIN: HCPCS | Performed by: INTERNAL MEDICINE

## 2022-12-02 PROCEDURE — G8428 CUR MEDS NOT DOCUMENT: HCPCS | Performed by: INTERNAL MEDICINE

## 2022-12-02 PROCEDURE — G8400 PT W/DXA NO RESULTS DOC: HCPCS | Performed by: INTERNAL MEDICINE

## 2022-12-02 PROCEDURE — 80053 COMPREHEN METABOLIC PANEL: CPT

## 2022-12-02 PROCEDURE — 96413 CHEMO IV INFUSION 1 HR: CPT

## 2022-12-02 PROCEDURE — 96417 CHEMO IV INFUS EACH ADDL SEQ: CPT

## 2022-12-02 PROCEDURE — G8417 CALC BMI ABV UP PARAM F/U: HCPCS | Performed by: INTERNAL MEDICINE

## 2022-12-02 PROCEDURE — 6360000002 HC RX W HCPCS: Performed by: INTERNAL MEDICINE

## 2022-12-02 PROCEDURE — 2580000003 HC RX 258: Performed by: INTERNAL MEDICINE

## 2022-12-02 PROCEDURE — 85025 COMPLETE CBC W/AUTO DIFF WBC: CPT

## 2022-12-02 PROCEDURE — 96375 TX/PRO/DX INJ NEW DRUG ADDON: CPT

## 2022-12-02 PROCEDURE — 36415 COLL VENOUS BLD VENIPUNCTURE: CPT

## 2022-12-02 PROCEDURE — 1123F ACP DISCUSS/DSCN MKR DOCD: CPT | Performed by: INTERNAL MEDICINE

## 2022-12-02 RX ORDER — SODIUM CHLORIDE 9 MG/ML
5-250 INJECTION, SOLUTION INTRAVENOUS PRN
Status: DISCONTINUED | OUTPATIENT
Start: 2022-12-02 | End: 2022-12-03 | Stop reason: HOSPADM

## 2022-12-02 RX ORDER — HEPARIN SODIUM (PORCINE) LOCK FLUSH IV SOLN 100 UNIT/ML 100 UNIT/ML
500 SOLUTION INTRAVENOUS PRN
Status: DISCONTINUED | OUTPATIENT
Start: 2022-12-02 | End: 2022-12-03 | Stop reason: HOSPADM

## 2022-12-02 RX ORDER — PACLITAXEL 100 MG/20ML
125 INJECTION, POWDER, LYOPHILIZED, FOR SUSPENSION INTRAVENOUS ONCE
Status: COMPLETED | OUTPATIENT
Start: 2022-12-02 | End: 2022-12-02

## 2022-12-02 RX ORDER — SODIUM CHLORIDE 0.9 % (FLUSH) 0.9 %
5-40 SYRINGE (ML) INJECTION PRN
Status: DISCONTINUED | OUTPATIENT
Start: 2022-12-02 | End: 2022-12-03 | Stop reason: HOSPADM

## 2022-12-02 RX ORDER — PALONOSETRON 0.05 MG/ML
0.25 INJECTION, SOLUTION INTRAVENOUS ONCE
Status: COMPLETED | OUTPATIENT
Start: 2022-12-02 | End: 2022-12-02

## 2022-12-02 RX ADMIN — PALONOSETRON 0.25 MG: 0.05 INJECTION, SOLUTION INTRAVENOUS at 12:31

## 2022-12-02 RX ADMIN — HEPARIN 500 UNITS: 100 SYRINGE at 15:08

## 2022-12-02 RX ADMIN — DEXAMETHASONE SODIUM PHOSPHATE: 10 INJECTION, SOLUTION INTRAMUSCULAR; INTRAVENOUS at 12:32

## 2022-12-02 RX ADMIN — SODIUM CHLORIDE, PRESERVATIVE FREE 10 ML: 5 INJECTION INTRAVENOUS at 15:08

## 2022-12-02 RX ADMIN — SODIUM CHLORIDE 20 ML/HR: 9 INJECTION, SOLUTION INTRAVENOUS at 12:32

## 2022-12-02 RX ADMIN — GEMCITABINE HYDROCHLORIDE 1830 MG: 1 INJECTION, SOLUTION INTRAVENOUS at 14:34

## 2022-12-02 RX ADMIN — PACLITAXEL 229 MG: 100 INJECTION, POWDER, LYOPHILIZED, FOR SUSPENSION INTRAVENOUS at 13:46

## 2022-12-05 DIAGNOSIS — C25.9 METASTASIS FROM PANCREATIC CANCER (HCC): Primary | ICD-10-CM

## 2022-12-05 DIAGNOSIS — C79.9 METASTASIS FROM PANCREATIC CANCER (HCC): Primary | ICD-10-CM

## 2022-12-16 ENCOUNTER — HOSPITAL ENCOUNTER (OUTPATIENT)
Dept: INFUSION THERAPY | Age: 77
Discharge: HOME | End: 2022-12-16
Payer: MEDICARE

## 2022-12-16 VITALS
OXYGEN SATURATION: 97 % | RESPIRATION RATE: 16 BRPM | TEMPERATURE: 98.5 F | DIASTOLIC BLOOD PRESSURE: 88 MMHG | SYSTOLIC BLOOD PRESSURE: 143 MMHG | HEIGHT: 65 IN | BODY MASS INDEX: 25.33 KG/M2 | WEIGHT: 152 LBS

## 2022-12-16 DIAGNOSIS — C79.9 METASTASIS FROM PANCREATIC CANCER (HCC): Primary | ICD-10-CM

## 2022-12-16 DIAGNOSIS — C25.9 METASTASIS FROM PANCREATIC CANCER (HCC): Primary | ICD-10-CM

## 2022-12-16 DIAGNOSIS — C25.9 METASTASIS FROM PANCREATIC CANCER (HCC): ICD-10-CM

## 2022-12-16 DIAGNOSIS — C25.2 MALIGNANT NEOPLASM OF TAIL OF PANCREAS (HCC): ICD-10-CM

## 2022-12-16 DIAGNOSIS — C79.9 METASTASIS FROM PANCREATIC CANCER (HCC): ICD-10-CM

## 2022-12-16 LAB
ALBUMIN SERPL-MCNC: 4 G/DL (ref 3.5–5.2)
ALP BLD-CCNC: 97 U/L (ref 35–104)
ALT SERPL-CCNC: 26 U/L (ref 9–52)
ANION GAP SERPL CALCULATED.3IONS-SCNC: 9 MMOL/L (ref 7–19)
AST SERPL-CCNC: 49 U/L (ref 14–36)
BILIRUB SERPL-MCNC: 0.5 MG/DL (ref 0.2–1.3)
BUN BLDV-MCNC: 11 MG/DL (ref 7–17)
CA 19-9: 1384 U/ML (ref 0–35)
CALCIUM SERPL-MCNC: 9.6 MG/DL (ref 8.4–10.2)
CHLORIDE BLD-SCNC: 103 MMOL/L (ref 98–111)
CO2: 27 MMOL/L (ref 22–29)
CREAT SERPL-MCNC: 0.5 MG/DL (ref 0.5–1)
GFR SERPL CREATININE-BSD FRML MDRD: >60 ML/MIN/{1.73_M2}
GLOBULIN: 3.3 G/DL
GLUCOSE BLD-MCNC: 91 MG/DL (ref 74–106)
HCT VFR BLD CALC: 40.1 % (ref 34.1–44.9)
HEMOGLOBIN: 13.4 G/DL (ref 11.2–15.7)
LYMPHOCYTES ABSOLUTE: 6.79 K/UL (ref 1.18–3.74)
LYMPHOCYTES RELATIVE PERCENT: 55 % (ref 19.3–53.1)
MCH RBC QN AUTO: 30.2 PG (ref 25.6–32.2)
MCHC RBC AUTO-ENTMCNC: 33.4 G/DL (ref 32.3–35.5)
MCV RBC AUTO: 90.5 FL (ref 79.4–94.8)
MONOCYTES ABSOLUTE: 1.58 K/UL (ref 0.24–0.82)
MONOCYTES RELATIVE PERCENT: 12.8 % (ref 4.7–12.5)
NEUTROPHILS ABSOLUTE: 3.61 K/UL (ref 1.56–6.13)
NEUTROPHILS RELATIVE PERCENT: 29.2 % (ref 34–71.1)
PDW BLD-RTO: 16.1 % (ref 11.7–14.4)
PLATELET # BLD: 348 K/UL (ref 182–369)
PMV BLD AUTO: 10.5 FL (ref 7.4–10.4)
POTASSIUM SERPL-SCNC: 4.2 MMOL/L (ref 3.5–5.1)
RBC # BLD: 4.43 M/UL (ref 3.93–5.22)
SODIUM BLD-SCNC: 139 MMOL/L (ref 137–145)
TOTAL PROTEIN: 7.4 G/DL (ref 6.3–8.2)
WBC # BLD: 12.35 K/UL (ref 3.98–10.04)

## 2022-12-16 PROCEDURE — 96375 TX/PRO/DX INJ NEW DRUG ADDON: CPT

## 2022-12-16 PROCEDURE — 80053 COMPREHEN METABOLIC PANEL: CPT

## 2022-12-16 PROCEDURE — 36415 COLL VENOUS BLD VENIPUNCTURE: CPT

## 2022-12-16 PROCEDURE — 2580000003 HC RX 258: Performed by: INTERNAL MEDICINE

## 2022-12-16 PROCEDURE — 85025 COMPLETE CBC W/AUTO DIFF WBC: CPT

## 2022-12-16 PROCEDURE — 6360000002 HC RX W HCPCS: Performed by: INTERNAL MEDICINE

## 2022-12-16 PROCEDURE — 96417 CHEMO IV INFUS EACH ADDL SEQ: CPT

## 2022-12-16 PROCEDURE — 96413 CHEMO IV INFUSION 1 HR: CPT

## 2022-12-16 RX ORDER — EPINEPHRINE 1 MG/ML
0.3 INJECTION, SOLUTION, CONCENTRATE INTRAVENOUS PRN
OUTPATIENT
Start: 2022-12-16

## 2022-12-16 RX ORDER — HEPARIN SODIUM (PORCINE) LOCK FLUSH IV SOLN 100 UNIT/ML 100 UNIT/ML
500 SOLUTION INTRAVENOUS PRN
Status: CANCELLED | OUTPATIENT
Start: 2022-12-16

## 2022-12-16 RX ORDER — PACLITAXEL 100 MG/20ML
125 INJECTION, POWDER, LYOPHILIZED, FOR SUSPENSION INTRAVENOUS ONCE
Status: COMPLETED | OUTPATIENT
Start: 2022-12-16 | End: 2022-12-16

## 2022-12-16 RX ORDER — EPINEPHRINE 1 MG/ML
0.3 INJECTION, SOLUTION, CONCENTRATE INTRAVENOUS PRN
OUTPATIENT
Start: 2022-12-30

## 2022-12-16 RX ORDER — ALBUTEROL SULFATE 90 UG/1
4 AEROSOL, METERED RESPIRATORY (INHALATION) PRN
OUTPATIENT
Start: 2022-12-16

## 2022-12-16 RX ORDER — PALONOSETRON 0.05 MG/ML
0.25 INJECTION, SOLUTION INTRAVENOUS ONCE
Status: CANCELLED
Start: 2022-12-16 | End: 2022-12-16

## 2022-12-16 RX ORDER — SODIUM CHLORIDE 0.9 % (FLUSH) 0.9 %
5-40 SYRINGE (ML) INJECTION PRN
Status: CANCELLED | OUTPATIENT
Start: 2022-12-16

## 2022-12-16 RX ORDER — SODIUM CHLORIDE 9 MG/ML
5-250 INJECTION, SOLUTION INTRAVENOUS PRN
OUTPATIENT
Start: 2022-12-30

## 2022-12-16 RX ORDER — FAMOTIDINE 10 MG/ML
20 INJECTION, SOLUTION INTRAVENOUS
OUTPATIENT
Start: 2022-12-16

## 2022-12-16 RX ORDER — PALONOSETRON 0.05 MG/ML
0.25 INJECTION, SOLUTION INTRAVENOUS ONCE
Status: COMPLETED | OUTPATIENT
Start: 2022-12-16 | End: 2022-12-16

## 2022-12-16 RX ORDER — DIPHENHYDRAMINE HYDROCHLORIDE 50 MG/ML
50 INJECTION INTRAMUSCULAR; INTRAVENOUS
OUTPATIENT
Start: 2022-12-30

## 2022-12-16 RX ORDER — SODIUM CHLORIDE 0.9 % (FLUSH) 0.9 %
5-40 SYRINGE (ML) INJECTION PRN
OUTPATIENT
Start: 2022-12-30

## 2022-12-16 RX ORDER — ACETAMINOPHEN 325 MG/1
650 TABLET ORAL
OUTPATIENT
Start: 2022-12-16

## 2022-12-16 RX ORDER — ONDANSETRON 2 MG/ML
8 INJECTION INTRAMUSCULAR; INTRAVENOUS
OUTPATIENT
Start: 2022-12-16

## 2022-12-16 RX ORDER — DIPHENHYDRAMINE HYDROCHLORIDE 50 MG/ML
50 INJECTION INTRAMUSCULAR; INTRAVENOUS
OUTPATIENT
Start: 2022-12-16

## 2022-12-16 RX ORDER — MEPERIDINE HYDROCHLORIDE 50 MG/ML
12.5 INJECTION INTRAMUSCULAR; INTRAVENOUS; SUBCUTANEOUS PRN
OUTPATIENT
Start: 2022-12-30

## 2022-12-16 RX ORDER — PACLITAXEL 100 MG/20ML
125 INJECTION, POWDER, LYOPHILIZED, FOR SUSPENSION INTRAVENOUS ONCE
OUTPATIENT
Start: 2022-12-30 | End: 2022-12-30

## 2022-12-16 RX ORDER — SODIUM CHLORIDE 9 MG/ML
5-250 INJECTION, SOLUTION INTRAVENOUS PRN
Status: CANCELLED | OUTPATIENT
Start: 2022-12-16

## 2022-12-16 RX ORDER — PACLITAXEL 100 MG/20ML
125 INJECTION, POWDER, LYOPHILIZED, FOR SUSPENSION INTRAVENOUS ONCE
Status: CANCELLED | OUTPATIENT
Start: 2022-12-16 | End: 2022-12-16

## 2022-12-16 RX ORDER — ALBUTEROL SULFATE 90 UG/1
4 AEROSOL, METERED RESPIRATORY (INHALATION) PRN
OUTPATIENT
Start: 2022-12-30

## 2022-12-16 RX ORDER — ACETAMINOPHEN 325 MG/1
650 TABLET ORAL
OUTPATIENT
Start: 2022-12-30

## 2022-12-16 RX ORDER — FAMOTIDINE 10 MG/ML
20 INJECTION, SOLUTION INTRAVENOUS
OUTPATIENT
Start: 2022-12-30

## 2022-12-16 RX ORDER — SODIUM CHLORIDE 9 MG/ML
5-40 INJECTION INTRAVENOUS PRN
OUTPATIENT
Start: 2022-12-16

## 2022-12-16 RX ORDER — SODIUM CHLORIDE 9 MG/ML
INJECTION, SOLUTION INTRAVENOUS CONTINUOUS
OUTPATIENT
Start: 2022-12-16

## 2022-12-16 RX ORDER — PALONOSETRON 0.05 MG/ML
0.25 INJECTION, SOLUTION INTRAVENOUS ONCE
Start: 2022-12-30 | End: 2022-12-30

## 2022-12-16 RX ORDER — ONDANSETRON 2 MG/ML
8 INJECTION INTRAMUSCULAR; INTRAVENOUS
OUTPATIENT
Start: 2022-12-30

## 2022-12-16 RX ORDER — MEPERIDINE HYDROCHLORIDE 50 MG/ML
12.5 INJECTION INTRAMUSCULAR; INTRAVENOUS; SUBCUTANEOUS PRN
OUTPATIENT
Start: 2022-12-16

## 2022-12-16 RX ORDER — SODIUM CHLORIDE 9 MG/ML
5-250 INJECTION, SOLUTION INTRAVENOUS PRN
Status: DISCONTINUED | OUTPATIENT
Start: 2022-12-16 | End: 2022-12-17 | Stop reason: HOSPADM

## 2022-12-16 RX ORDER — SODIUM CHLORIDE 9 MG/ML
5-40 INJECTION INTRAVENOUS PRN
OUTPATIENT
Start: 2022-12-30

## 2022-12-16 RX ORDER — HEPARIN SODIUM (PORCINE) LOCK FLUSH IV SOLN 100 UNIT/ML 100 UNIT/ML
500 SOLUTION INTRAVENOUS PRN
Status: DISCONTINUED | OUTPATIENT
Start: 2022-12-16 | End: 2022-12-17 | Stop reason: HOSPADM

## 2022-12-16 RX ORDER — HEPARIN SODIUM (PORCINE) LOCK FLUSH IV SOLN 100 UNIT/ML 100 UNIT/ML
500 SOLUTION INTRAVENOUS PRN
OUTPATIENT
Start: 2022-12-30

## 2022-12-16 RX ORDER — SODIUM CHLORIDE 9 MG/ML
INJECTION, SOLUTION INTRAVENOUS CONTINUOUS
OUTPATIENT
Start: 2022-12-30

## 2022-12-16 RX ORDER — SODIUM CHLORIDE 0.9 % (FLUSH) 0.9 %
5-40 SYRINGE (ML) INJECTION PRN
Status: DISCONTINUED | OUTPATIENT
Start: 2022-12-16 | End: 2022-12-17 | Stop reason: HOSPADM

## 2022-12-16 RX ORDER — SODIUM CHLORIDE 9 MG/ML
5-250 INJECTION, SOLUTION INTRAVENOUS PRN
OUTPATIENT
Start: 2022-12-16

## 2022-12-16 RX ADMIN — Medication 10 ML: at 14:02

## 2022-12-16 RX ADMIN — GEMCITABINE HYDROCHLORIDE 1830 MG: 1 INJECTION, SOLUTION INTRAVENOUS at 13:27

## 2022-12-16 RX ADMIN — SODIUM CHLORIDE 75 ML/HR: 9 INJECTION, SOLUTION INTRAVENOUS at 12:35

## 2022-12-16 RX ADMIN — PALONOSETRON 0.25 MG: 0.05 INJECTION, SOLUTION INTRAVENOUS at 12:17

## 2022-12-16 RX ADMIN — PACLITAXEL 229 MG: 100 INJECTION, POWDER, LYOPHILIZED, FOR SUSPENSION INTRAVENOUS at 12:36

## 2022-12-16 RX ADMIN — DEXAMETHASONE SODIUM PHOSPHATE 10 MG: 10 INJECTION, SOLUTION INTRAMUSCULAR; INTRAVENOUS at 12:17

## 2022-12-16 RX ADMIN — Medication 500 UNITS: at 14:02

## 2022-12-16 NOTE — PROGRESS NOTES
Lab Results   Component Value Date    WBC 13.05 (H) 12/02/2022    HGB 13.5 12/02/2022    HCT 40.0 12/02/2022    MCV 91.3 12/02/2022     (HH) 12/02/2022     Lab Results   Component Value Date    NEUTROABS 5.26 12/02/2022

## 2022-12-27 ENCOUNTER — TELEPHONE (OUTPATIENT)
Dept: INFUSION THERAPY | Age: 77
End: 2022-12-27

## 2022-12-27 NOTE — TELEPHONE ENCOUNTER
Devonsummer Manny called stating she has had redness and irritation in her naval area for the last 2 weeks. She states that she is now having drainage from it. Alejandro Bai to contact her PCP as soon as possible to be seen. She verbalized understanding.

## 2022-12-30 ENCOUNTER — HOSPITAL ENCOUNTER (OUTPATIENT)
Dept: INFUSION THERAPY | Age: 77
Discharge: HOME | End: 2022-12-30
Payer: MEDICARE

## 2022-12-30 VITALS
OXYGEN SATURATION: 100 % | DIASTOLIC BLOOD PRESSURE: 79 MMHG | TEMPERATURE: 98.5 F | WEIGHT: 154.7 LBS | BODY MASS INDEX: 25.77 KG/M2 | RESPIRATION RATE: 16 BRPM | HEIGHT: 65 IN | SYSTOLIC BLOOD PRESSURE: 145 MMHG

## 2022-12-30 DIAGNOSIS — C25.9 METASTASIS FROM PANCREATIC CANCER (HCC): ICD-10-CM

## 2022-12-30 DIAGNOSIS — C25.2 MALIGNANT NEOPLASM OF TAIL OF PANCREAS (HCC): ICD-10-CM

## 2022-12-30 DIAGNOSIS — C25.2 MALIGNANT NEOPLASM OF TAIL OF PANCREAS (HCC): Primary | ICD-10-CM

## 2022-12-30 DIAGNOSIS — C79.9 METASTASIS FROM PANCREATIC CANCER (HCC): ICD-10-CM

## 2022-12-30 LAB
ALBUMIN SERPL-MCNC: 3.9 G/DL (ref 3.5–5.2)
ALP BLD-CCNC: 87 U/L (ref 35–104)
ALT SERPL-CCNC: 23 U/L (ref 9–52)
ANION GAP SERPL CALCULATED.3IONS-SCNC: 9 MMOL/L (ref 7–19)
AST SERPL-CCNC: 42 U/L (ref 14–36)
BILIRUB SERPL-MCNC: 0.4 MG/DL (ref 0.2–1.3)
BUN BLDV-MCNC: 11 MG/DL (ref 7–17)
CA 19-9: 815 U/ML (ref 0–35)
CALCIUM SERPL-MCNC: 9.6 MG/DL (ref 8.4–10.2)
CHLORIDE BLD-SCNC: 102 MMOL/L (ref 98–111)
CO2: 29 MMOL/L (ref 22–29)
CREAT SERPL-MCNC: 0.5 MG/DL (ref 0.5–1)
GFR SERPL CREATININE-BSD FRML MDRD: >60 ML/MIN/{1.73_M2}
GLOBULIN: 3.2 G/DL
GLUCOSE BLD-MCNC: 92 MG/DL (ref 74–106)
HCT VFR BLD CALC: 36.8 % (ref 34.1–44.9)
HEMOGLOBIN: 12.4 G/DL (ref 11.2–15.7)
LYMPHOCYTES ABSOLUTE: 5.09 K/UL (ref 1.18–3.74)
LYMPHOCYTES RELATIVE PERCENT: 56.1 % (ref 19.3–53.1)
MCH RBC QN AUTO: 31.2 PG (ref 25.6–32.2)
MCHC RBC AUTO-ENTMCNC: 33.7 G/DL (ref 32.3–35.5)
MCV RBC AUTO: 92.5 FL (ref 79.4–94.8)
MONOCYTES ABSOLUTE: 1.76 K/UL (ref 0.24–0.82)
MONOCYTES RELATIVE PERCENT: 19.4 % (ref 4.7–12.5)
NEUTROPHILS ABSOLUTE: 1.74 K/UL (ref 1.56–6.13)
NEUTROPHILS RELATIVE PERCENT: 19.2 % (ref 34–71.1)
PDW BLD-RTO: 17.7 % (ref 11.7–14.4)
PLATELET # BLD: 368 K/UL (ref 182–369)
PMV BLD AUTO: 10 FL (ref 7.4–10.4)
POTASSIUM SERPL-SCNC: 3.9 MMOL/L (ref 3.5–5.1)
RBC # BLD: 3.98 M/UL (ref 3.93–5.22)
SODIUM BLD-SCNC: 140 MMOL/L (ref 137–145)
TOTAL PROTEIN: 7.1 G/DL (ref 6.3–8.2)
WBC # BLD: 9.08 K/UL (ref 3.98–10.04)

## 2022-12-30 PROCEDURE — 85025 COMPLETE CBC W/AUTO DIFF WBC: CPT

## 2022-12-30 PROCEDURE — 96367 TX/PROPH/DG ADDL SEQ IV INF: CPT

## 2022-12-30 PROCEDURE — 36415 COLL VENOUS BLD VENIPUNCTURE: CPT

## 2022-12-30 PROCEDURE — 96413 CHEMO IV INFUSION 1 HR: CPT

## 2022-12-30 PROCEDURE — 6360000002 HC RX W HCPCS: Performed by: INTERNAL MEDICINE

## 2022-12-30 PROCEDURE — 80053 COMPREHEN METABOLIC PANEL: CPT

## 2022-12-30 PROCEDURE — 2580000003 HC RX 258: Performed by: INTERNAL MEDICINE

## 2022-12-30 PROCEDURE — 96417 CHEMO IV INFUS EACH ADDL SEQ: CPT

## 2022-12-30 PROCEDURE — 96375 TX/PRO/DX INJ NEW DRUG ADDON: CPT

## 2022-12-30 RX ORDER — PALONOSETRON 0.05 MG/ML
0.25 INJECTION, SOLUTION INTRAVENOUS ONCE
Status: COMPLETED | OUTPATIENT
Start: 2022-12-30 | End: 2022-12-30

## 2022-12-30 RX ORDER — SODIUM CHLORIDE 0.9 % (FLUSH) 0.9 %
5-40 SYRINGE (ML) INJECTION PRN
Status: DISCONTINUED | OUTPATIENT
Start: 2022-12-30 | End: 2022-12-31 | Stop reason: HOSPADM

## 2022-12-30 RX ORDER — SODIUM CHLORIDE 9 MG/ML
5-250 INJECTION, SOLUTION INTRAVENOUS PRN
Status: DISCONTINUED | OUTPATIENT
Start: 2022-12-30 | End: 2022-12-31 | Stop reason: HOSPADM

## 2022-12-30 RX ORDER — HEPARIN SODIUM (PORCINE) LOCK FLUSH IV SOLN 100 UNIT/ML 100 UNIT/ML
500 SOLUTION INTRAVENOUS PRN
Status: DISCONTINUED | OUTPATIENT
Start: 2022-12-30 | End: 2022-12-31 | Stop reason: HOSPADM

## 2022-12-30 RX ORDER — PACLITAXEL 100 MG/20ML
125 INJECTION, POWDER, LYOPHILIZED, FOR SUSPENSION INTRAVENOUS ONCE
Status: COMPLETED | OUTPATIENT
Start: 2022-12-30 | End: 2022-12-30

## 2022-12-30 RX ADMIN — Medication 500 UNITS: at 13:35

## 2022-12-30 RX ADMIN — PALONOSETRON 0.25 MG: 0.05 INJECTION, SOLUTION INTRAVENOUS at 12:04

## 2022-12-30 RX ADMIN — GEMCITABINE HYDROCHLORIDE 1830 MG: 1 INJECTION, SOLUTION INTRAVENOUS at 13:02

## 2022-12-30 RX ADMIN — DEXAMETHASONE SODIUM PHOSPHATE: 10 INJECTION, SOLUTION INTRAMUSCULAR; INTRAVENOUS at 12:04

## 2022-12-30 RX ADMIN — SODIUM CHLORIDE, PRESERVATIVE FREE 10 ML: 5 INJECTION INTRAVENOUS at 13:35

## 2022-12-30 RX ADMIN — SODIUM CHLORIDE 50 ML/HR: 9 INJECTION, SOLUTION INTRAVENOUS at 12:05

## 2022-12-30 RX ADMIN — PACLITAXEL 229 MG: 100 INJECTION, POWDER, LYOPHILIZED, FOR SUSPENSION INTRAVENOUS at 12:23

## 2023-01-06 ENCOUNTER — HOSPITAL ENCOUNTER (OUTPATIENT)
Dept: CT IMAGING | Age: 78
Discharge: HOME | End: 2023-01-06
Payer: MEDICARE

## 2023-01-06 DIAGNOSIS — C25.9 ADENOCARCINOMA OF PANCREAS (HCC): ICD-10-CM

## 2023-01-06 DIAGNOSIS — C25.9 METASTASIS FROM PANCREATIC CANCER (HCC): ICD-10-CM

## 2023-01-06 DIAGNOSIS — C79.9 METASTASIS FROM PANCREATIC CANCER (HCC): ICD-10-CM

## 2023-01-06 PROCEDURE — 6360000004 HC RX CONTRAST MEDICATION: Performed by: INTERNAL MEDICINE

## 2023-01-06 PROCEDURE — 74177 CT ABD & PELVIS W/CONTRAST: CPT

## 2023-01-06 PROCEDURE — 71260 CT THORAX DX C+: CPT

## 2023-01-06 RX ADMIN — IOPAMIDOL 75 ML: 755 INJECTION, SOLUTION INTRAVENOUS at 10:29

## 2023-01-12 NOTE — PROGRESS NOTES
MEDICAL ONCOLOGY PROGRESS NOTE    Pt Name: Zahraa Gomez  MRN: [id number]  YOB: 1945  Date of evaluation: 1/13/2023    HISTORY OF PRESENT ILLNESS:    Patient is a pleasant 69years old female who has been diagnosed with invasive  adenocarcinoma of the pancreas, node positive. She underwent laparoscopic assisted distal pancreatectomy and splenectomy in June 2021 at [facility] she is a status post completion of adjuvant chemotherapy with Gemzar/Xeloda completed February 2022. Unfortunately, she had recurrent disease with massive ascites. CT-guided paracentesis cytology positive metastatic adenocarcinoma. CA 19-9 elevated. She is current receiving frontline palliative chemotherapy with Gemzar/Abraxane. She has been tolerating treatments complains mainly of fatigue. She denies any nausea vomiting diarrhea. She has some very mild neuropathy. Weight has been stable. She had repeat CT scans to assess response. 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 Surgeon Dr. Liliana Joe [address] [place] [address] Drive [address]   8/13/2021-02/25/22-adjuvant Gemzar 1000 mg/m2 IV D 1, D8, D15 with Capecitabine 830 mg/m2 D1-21 every 28 days x6 cycles  1/14/22 Eliquis for DVT right upper extremity November 2022  10/14/22-Initiated palliative frontline Gemzar Abraxane every 2 weeks  1/13/23 Decrease Abraxane to 100mg/m2 with each treatment    Cancer History  Mayra Vergara was first seen by me on 8/6/2021. The patient was referred for a diagnosis of pancreatic malignancy. She had initial complaints of abdominal pain. 5/5/20 MRI abdomen [facility]):  Moderate fatty replacement of the pancreas with several small cystic structures within or adjacent to the pancreatic head and uncinate process most suggestive of benign cysts. A follow-up MRI of the abdomen may be obtained in 1 year to assess for stability. No solid lesion is seen. Otherwise unremarkable MRI of the abdomen. 4/22/20 CT abd/pelvis [facility]): Focal inflammatory stranding associated with the pancreatic head. Radiographically I would favor this to represent acute pancreatitis. There is mild associated stranding within the descending and proximal transverse duodenum. There are 2 well-circumscribed hypodensities one in the region of the uncinate process and one slightly anterior to the pancreatic head likely representing sequela of pancreatitis but warranting follow-up on subsequent exam. The body and tail of the pancreas are spared. No evidence of pancreatic necrosis. The patient is status post hysterectomy and cholecystectomy. Bibasilar subsegmental atelectasis. No evidence of nephrolithiasis or obstructive uropathy. Diverticulosis of the distal descending and sigmoid colon without evidence of diverticulitis. 5/5/21 MRI [facility]) There are multiple tiny-to-small foci of increased T2 signal seen within the pancreas, consistent with a combination of cysts and/or beaded dilatation of the tributaries of the main pancreatic duct, increased in size and number when compared to the prior similar study of 5/5/20. The patient is again noted to be status post cholecystectomy. The examination is otherwise within normal limits for age with no abnormal postcontrast enhancement noted. 6/3/21 Upper EUS (Dr Sharon Rene/[place]): CT Visualized portion of pancreas abnormal on ultrasound. There is a 26.7 mm solid mass in pancreatic tail. It abuts the splenic vessel without occlusion or varices. Pancreatic duct is dilated to 6 mm in the tail proximal to the mass. FNA x4 yields atypical cells. Celiac axis, liver appear normal, no local nodes.  Body and head of pancreas normal.  6/3/21 Pancreas tail mass, FNA aspiration smear (x2), ThinPrep and cell block: Adenocarcinoma. 6/7/21 CA 19-9 407.1  6/28/21-she underwent Whipple procedure pancreatectomy by Dr. Tj Amador: Invasive ductal carcinoma 4.3 cm in greatest dimension, invasive in the peripancreatic adipose tissue, diffuse perineural invasion. Surgical margins were negative for carcinoma. The pancreas margins involved by a low-grade dysplasia. 1/6 nodes positive for metastatic cancer with direct extension. Distal fibrosis and pancreatitis was noted. Spleen was unremarkable. Pathological stage pT2 N1 M0  8/6/2021-she was first seen by me. Recommend adjuvant chemotherapy with gemcitabine/Xeloda. She acknowledged understanding and agree with treatment. 8/09/2021 CT Chest W/Contrast No evidence of intrathoracic metastatic disease is identified. 8/09/2021 CT Abd/Pelvis W/Oral Contrast Prior distal pancreatectomy and splenectomy. There is a 1.5 cm circumscribed, nonenhancing cystic lesion along the inferior pancreatic head, perhaps a branch IPMN. Otherwise, no obvious pancreatic solid mass is seen. No suspicious adenopathy or evidence of distant metastatic disease in the abdomen or pelvis. Prior cholecystectomy. Liver steatosis. Colonic diverticulosis. 8/13/2021-initiation of adjuvant capecitabine/Gemzar   9/10/2021-I reviewed CT chest abdomen pelvis. 1/21/2022-CA 19-9 = 10  2/10/22 CT CHEST W CONTRAST No evidence of intrathoracic metastasis. Incidental findings described above. 2/10/22 CT ABDOMEN PELVIS W IV CONTRAST  Partial pancreatectomy and splenectomy. Pancreatic head cysts measuring up to 1.6 cm, very similar to 8/9/2021. Continued surveillance imaging recommended. No evidence of intra-abdominal or pelvic metastasis. 2/18/2022-I reviewed results CT chest abdomen pelvis. No evidence of recurrent disease. Pancreatic head cyst stable. 02/25/22- Completion of adjuvant chemotherapy.    4/22/2022 CA 19-9-13  5/11/2022 Bone Density ([facility]) Osteopenia. Femoral Neck T-Score -2.0, Lumbar Spine T-Score -0.9  6/7/2022 Endoscopy (P) Urease was negative  8/5/2022 CT Abd/Pelvis WO Contrast [facility]) Fairly diffuse soft tissue infiltration with haziness and nodularity of there peripheral aspects of the peritoneal cavity most concerning for a process such as peritoneal carcinomatosis. Given history of pancreatic cancer,a PET scan may be considered for further evaluation. Small amount of ascites in the pelvis. Colonic diverticulosis. Large,fat-containing umbilical hernia. Tiny right pleural effusion with atelectasis in the lung bases. 8/22/2022 CT Chest W Contrast Calcified and noncalcified lung nodules may indicate inflammatory or infectious process and old granulomatous disease. Follow-up in 3 months is recommended. Small right pleural effusion. Cirrhotic liver. Free fluid in the abdomen with mesenteric edema upper abdomen. Multiple lymph nodes in region of pancreatic head. 8/22/2022-surveillance  CT Abd/Pelvis W IV Contrast (oral) Partial pancreatectomy with absence of the pancreatic body and tail. Multiple soft tissue and hypodense areas in the location of the pancreatic head indicating measuring up to 8 mm indicating cysts, residual tissue and possible soft tissue pancreatic  nodules. A cyst or soft tissue nodule is identified measuring up to 1.2 cm. Indeterminate nodules in the location of the pancreatic head versus lymphadenopathy. Consider MRI pancreas without and with IV contrast.Bilateral extrarenal pelves. 4 mm left renal cyst. Soft tissue and fat herniates into the umbilical region measuring 4.7 cm indicating hernia. There is mesentery within the hernia. No bowel is within the hernia. No evidence of free air. .  Small amount of free fluid is within the abdomen and pelvis. Mesenteric edema of the abdomen and pelvis. Rectosigmoid diverticulosis without acute diverticulitis. Slightly irregular liver contour may indicate early cirrhotic changes. Free fluid within the abdomen and pelvis. Inflammation of the mesentery. 8/28/2022-I reviewed CT abdomen/pelvis. New onset ascites. Liver is smaller when compared to prior CT scan suggesting hydrophobic disease. Radiology reported peripancreatic nodules. Recommended CA 19-9 reviewed. Recommend MRI pancreas. Recommended paracentesis (send for cytology, total protein, LDH). CMP same day of paracentesis. 9/12/2022 MRI Abdomen W WO Contrast MRCP Hepatic steatosis with a nodular contour suggestive of cirrhosis. Ascites. Absence of the spleen. Absence of the gallbladder. The patient appears to be status post partial pancreatectomy . The nodular lesions identified on the CT scan dated August 19, 2022 are poorly visualized on this examination due to respiratory artifact and technique. Small renal cysts. Midline ventral hernia. 9/12/2022-CA 19-9 1004  9/15/2022 US Guided [facility]) Limited grayscale ultrasound evaluation to assess for peritoneal fluid. 4 quadrant evaluation shows trace fluid around the liver. No more than trace fluid at the right lower quadrant. No significant fluid is seen at the left upper quadrant or left lower quadrant. 9/19/2022 Ascites fluid,paracentesis: Malignant cells present. Adenocarcinoma. 9/19/2022 CT Abdomen WO Contrast ([facility]):Small amount of diffuse ascites within the abdomen, likely malignant ascites given the presence of nodular peritoneal and omental soft tissue thickening compatible with carcinomatosis. Diagnostic paracentesis was performed following this examination with ultrasound guidance. Evidence previous resection of the pancreatic body and tail. There is a soft tissue implant or enlarged lymph node inferior to the celiac artery on the right which measures 1.6 x 1.6 cm, concerning for metastatic disease. There is a small cyst in subtle stable nodularity and patient replacement of the pancreatic head.    10/3/2022-essentially, findings of recurrent metastatic pancreatic adenocarcinoma. Stage IV. Recommend palliative Gemzar 1000 mg/M2 and Abraxane 125 mg/M2 every 2 weeks until disease progression or intolerable toxicity. 10/3/22 Chemotherapy consent signed  10/14/22-Initiated palliative frontline Gemzar Abraxane every 2 weeks  12/30/22 CA 31-9-865  1/6/23 CT Chest W Contrast No significant interval change. 1/6/23 CT Abd/Pelvis W IV Contrast (oral) Partial pancreatectomy with absence of pancreatic body and tail. No significant change in size or number of a few cyst versus soft tissue nodules in region of pancreatic head. No retroperitoneal lymphadenopathy. Interval resolution of ascites. 1/13/2023-essentially, CT scan showed interval response to treatment. Resolution of prior ascites. Continue palliative chemotherapy with Gemzar/Abraxane. 1/13/23 Decrease Abraxane to 100mg/m2 with each treatment due to development of neuropathy.     Past Medical History:    Past Medical History:   Diagnosis Date    Anxiety     Arthritis     Depression     Hypertension     Hypoglycemia     if fasting for very long    Pancreatic cancer ([facility] [address] 06/2021    s/p Whipple by Dr. Kelsea Brunson    Shoulder pain     incompleted rcr; possible injury    Thyroid disease        Past Surgical History:    Past Surgical History:   Procedure Laterality Date    Erica Leaks  2020    Dr Guzman Carbajal, COLON, DIAGNOSTIC      HYSTERECTOMY ([facility])  [address] [place]  06/28/2021    PORT SURGERY Right 9/21/2021    Single lumen PORT INSERTION with ultrasound and fluoroscopy performed by Tato Jovel DO at [place] [address]. Right 1/13/2022    RIGHT SIDE MEDIPORT REMOVAL, LEFT SIDE MEDIPORT INSERTION performed by Ama Medrano DO at [address] [facility] ARTHROSCOP,SURG,W/ROTAT CUFF REPR Right 10/19/2017    SHOULDER ARTHROSCOPY ROTATOR CUFF REPAIR/ DEBRIDEMENT BICEPS TENODESIS/ TENOTOMY SUBACROMIAL DECOMPRESSION/ DISTAL CLAVICLE EXCISION performed by Ayleen Martinez MD at [facility] ([place], [place])  06/28/2021    UPPER GASTROINTESTINAL ENDOSCOPY  06/2021    Dr. Sukhwinder Luis in [address] [place] [address]      spur excision       Social History:    Marital status:   Smoking status: no  ETOH status: no  Resides: [place]    Family History:   Family History   Adopted: Yes   Problem Relation Age of Onset    Breast Cancer Maternal Aunt     Breast Cancer Daughter     Cancer Daughter         Bladder    Cancer Maternal Uncle         Bladder       Current Hospital Medications:    Current Outpatient Medications   Medication Sig Dispense Refill    lipase-protease-amylase (CREON) 43926-86489 units delayed release capsule Take 1 capsule by mouth 3 times daily (with meals) 90 capsule 5    lidocaine-prilocaine (EMLA) 2.5-2.5 % cream Apply topically as needed. 30 g 5    promethazine (PHENERGAN) 12.5 MG tablet Take 1 tablet by mouth 3 times daily as needed for Nausea 30 tablet 5    capecitabine (XELODA) 500 MG chemo tablet Take 3 tablets by mouth 2 times a day for 14 days of a 28 day cycle 84 tablet 5    losartan-hydroCHLOROthiazide (HYZAAR) 50-12.5 MG per tablet Take 1 tablet by mouth daily Indications: High Blood Pressure Disorder      levothyroxine (SYNTHROID) 137 MCG tablet Take 137 mcg by mouth Daily Indications: Underactive Thyroid      Cholecalciferol (VITAMIN D3) 10 MCG (400 UNIT) CAPS Take 1 capsule by mouth daily       Magic Mouthwash (MIRACLE MOUTHWASH) Swish and spit 5 mLs 4 times daily as needed for Irritation 1/3 viscous lidocaine, 1/3 benadryl, 1/3 Maalox. 480 mL 1    lidocaine-prilocaine (EMLA) 2.5-2.5 % cream Apply topically as needed.  30 g 2    Caltrate 600+D Plus Minerals (CALTRATE) 600-800 MG-UNIT TABS tablet Take 1 tablet by mouth daily      busPIRone (BUSPAR) 5 MG tablet Take 5 mg by mouth 3 times daily       promethazine (PHENERGAN) 12.5 MG tablet Take 1 tablet by mouth every 6 hours as needed for Nausea 30 tablet 5    ondansetron (ZOFRAN) 4 MG tablet Take 1 tablet by mouth every 8 hours as needed for Nausea or Vomiting 10 tablet 0     No current facility-administered medications for this visit. Facility-Administered Medications Ordered in Other Visits   Medication Dose Route Frequency Provider Last Rate Last Admin    dexamethasone 10 mg in sodium chloride 0.9 % 50 mL infusion   IntraVENous Continuous Esequiel oPol MD        palonosetron (ALOXI) injection 0.25 mg  0.25 mg IntraVENous Once Esequiel Pool MD        0.9 % sodium chloride infusion  5-250 mL/hr IntraVENous PRN Esequiel Pool MD           Allergies:    Allergies   Allergen Reactions    Diclofenac Other (See Comments)     Couldn't sleep and face turned blood red    Oxycodone-Acetaminophen Hives     itching    Doxycycline Rash         Subjective   \REVIEW OF SYSTEMS:   CONSTITUTIONAL: no fever, no night sweats, fatigue, irritated umbilicus;  HEENT: no blurring of vision, no double vision, no hearing difficulty, no tinnitus, no ulceration, no dysplasia, no epistaxis;   LUNGS: no cough, no hemoptysis, no wheeze,  no shortness of breath;  CARDIOVASCULAR: no palpitation, no chest pain, no shortness of breath;  GI: no abdominal pain, no nausea, no vomiting, no diarrhea, no constipation;  STUART: no dysuria, no hematuria, no frequency or urgency, no nephrolithiasis;  MUSCULOSKELETAL: no joint pain, no swelling, no stiffness;  ENDOCRINE: no polyuria, no polydipsia, no cold or heat intolerance;  HEMATOLOGY: no easy bruising or bleeding, no history of clotting disorder;  DERMATOLOGY: no skin rash, no eczema, no pruritus;  PSYCHIATRY: no depression, no anxiety, no panic attacks, no suicidal ideation, no homicidal ideation;  NEUROLOGY: no syncope, no seizures, no numbness or tingling of hands,  numbness/tingling of feet, no paresis;        Objective BP (!) 140/82   Pulse 80   Temp 98.3 °F (36.8 °C)   Resp 16   Ht 5' 5\" (1.651 m)   Wt 154 lb 1.6 oz (69.9 kg)   SpO2 99% BMI 25.64 kg/m²     PHYSICAL EXAM:  CONSTITUTIONAL: Alert, appropriate, no acute distress  EYES: Non icteric, EOM intact, pupils equal round   ENT: Mucus membranes moist, no oral pharyngeal lesions, external inspection of ears and nose are normal.  NECK: Supple, no masses. No palpable thyroid mass  CHEST/LUNGS: CTA bilaterally, normal respiratory effort   CARDIOVASCULAR: RRR, no murmurs. No lower extremity edema  ABDOMEN: soft non-tender, active bowel sounds, no HSM. No palpable masses  EXTREMITIES: warm, full ROM in all 4 extremities, no focal weakness. SKIN: warm, dry with no rashes or lesions  LYMPH: No cervical, clavicular, axillary, or inguinal lymphadenopathy  NEUROLOGIC: follows commands, non focal   PSYCH: mood and affect appropriate. Alert and oriented to time, place, person       LABORATORY RESULTS REVIEWED/ANALYZED BY ME:  12/30/22 CA [id number]  Lab Results   Component Value Date    WBC 8.87 01/13/2023    HGB 12.6 01/13/2023    HCT 37.1 01/13/2023    MCV 93.2 01/13/2023     01/13/2023     Lab Results   Component Value Date    NEUTROABS 3.58 01/13/2023     Lab Results   Component Value Date     01/13/2023    K 4.1 01/13/2023     01/13/2023    CO2 28 01/13/2023    BUN 10 01/13/2023    CREATININE 0.5 01/13/2023    GLUCOSE 93 01/13/2023    CALCIUM 10.0 01/13/2023    PROT 7.2 01/13/2023    LABALBU 4.1 01/13/2023    BILITOT 0.6 01/13/2023    ALKPHOS 80 01/13/2023    AST 39 (H) 01/13/2023    ALT 21 01/13/2023    LABGLOM >60 01/13/2023    GFRAA >59 01/12/2022    GLOB 3.1 01/13/2023       RADIOLOGY STUDIES REVIEWED BY ME:   1/6/23 CT Chest W Contrast No significant interval change. 1/6/23 CT Abd/Pelvis W IV Contrast (oral) Partial pancreatectomy with absence of pancreatic body and tail. No significant change in size or number of a few cyst versus soft tissue nodules in region of pancreatic head. No retroperitoneal lymphadenopathy. Interval resolution of ascites.     ASSESSMENT:    Orders Placed This Encounter   Procedures    CBC with Auto Differential     Standing Status:   Future     Number of Occurrences:   1     Standing Expiration Date:   1/13/2024    Comprehensive Metabolic Panel     Standing Status:   Future     Number of Occurrences:   1     Standing Expiration Date:   1/13/2024    Cancer Antigen 19-9     Standing Status:   Future     Number of Occurrences:   1     Standing Expiration Date:   1/13/2024    CBC With Auto Differential     Standing Status:   Future     Standing Expiration Date:   1/13/2024    Comprehensive metabolic panel     Standing Status:   Future     Standing Expiration Date:   1/13/2024    CBC With Auto Differential     Standing Status:   Future     Standing Expiration Date:   1/27/2024    Comprehensive metabolic panel     Standing Status:   Future     Standing Expiration Date:   1/27/2024              Jeremy Huang was seen today for cancer. Diagnoses and all orders for this visit:    Malignant neoplasm of tail of pancreas ([facility] [address]  -     CBC with Auto Differential; Future  -     Comprehensive Metabolic Panel; Future  -     Cancer Antigen 19-9; Future  -     CBC With Auto Differential; Future  -     Comprehensive metabolic panel; Future  -     Cancel: 0.9 % sodium chloride infusion  -     Cancel: dexamethasone 10 mg in sodium chloride 0.9 % 50 mL infusion  -     Cancel: palonosetron (ALOXI) injection 0.25 mg  -     CBC With Auto Differential; Future  -     Comprehensive metabolic panel; Future    Metastasis from pancreatic cancer (HCC)  -     CBC with Auto Differential; Future  -     Comprehensive Metabolic Panel; Future  -     Cancer Antigen 19-9; Future  -     CBC With Auto Differential; Future  -     Comprehensive metabolic panel;  Future  -     Cancel: 0.9 % sodium chloride infusion  -     Cancel: dexamethasone 10 mg in sodium chloride 0.9 % 50 mL infusion  -     Cancel: palonosetron (ALOXI) injection 0.25 mg  -     CBC With Auto Differential; Future  -     Comprehensive metabolic panel;  Future    Chemotherapy management, encounter for    Adverse effect of chemotherapy, subsequent encounter    Care plan discussed with patient    Other orders  -     PACLitaxel-protein bound (ABRAXANE) chemo IVPB 229 mg  -     gemcitabine HCl (GEMZAR) 1,830 mg in sodium chloride 0.9 % 250 mL chemo IVPB  -     sodium chloride flush 0.9 % injection 5-40 mL  -     sodium chloride (PF) 0.9 % injection 5-40 mL  -     0.9 % sodium chloride infusion  -     heparin flush 100 UNIT/ML injection 500 Units  -     alteplase (CATHFLO) 2 mg in sterile water 2 mL injection  -     0.9 % sodium chloride infusion  -     diphenhydrAMINE (BENADRYL) injection 50 mg  -     famotidine (PEPCID) injection 20 mg  -     hydrocortisone sodium succinate PF (SOLU-CORTEF) injection 100 mg  -     acetaminophen (TYLENOL) tablet 650 mg  -     meperidine (DEMEROL) injection 12.5 mg  -     ondansetron (ZOFRAN) injection 8 mg  -     EPINEPHrine PF 1 MG/ML injection (Anaphylaxis) 0.3 mg  -     albuterol sulfate HFA (PROVENTIL;VENTOLIN;PROAIR) 108 (90 Base) MCG/ACT inhaler 4 puff  -     0.9 % sodium chloride infusion  -     dexamethasone 10 mg in sodium chloride 0.9 % 50 mL infusion  -     palonosetron (ALOXI) injection 0.25 mg  -     PACLitaxel-protein bound (ABRAXANE) chemo IVPB 229 mg  -     gemcitabine HCl (GEMZAR) 1,830 mg in sodium chloride 0.9 % 250 mL chemo IVPB  -     sodium chloride flush 0.9 % injection 5-40 mL  -     sodium chloride (PF) 0.9 % injection 5-40 mL  -     0.9 % sodium chloride infusion  -     heparin flush 100 UNIT/ML injection 500 Units  -     alteplase (CATHFLO) 2 mg in sterile water 2 mL injection  -     0.9 % sodium chloride infusion  -     diphenhydrAMINE (BENADRYL) injection 50 mg  -     famotidine (PEPCID) injection 20 mg  -     hydrocortisone sodium succinate PF (SOLU-CORTEF) injection 100 mg  -     acetaminophen (TYLENOL) tablet 650 mg  -     meperidine (DEMEROL) injection 12.5 mg  -     ondansetron [facility]) injection 8 mg  -     EPINEPHrine PF 1 MG/ML injection (Anaphylaxis) 0.3 mg  -     albuterol sulfate HFA (PROVENTIL;VENTOLIN;PROAIR) 108 (90 Base) MCG/ACT inhaler 4 puff          Pancreatic tail adenocarcinoma pT2 N1 M0, June 2021, recurrent disease September 2022 (malignant ascites)  Essentially, node positive third of the pancreas adenocarcinoma. Status post distal pancreatectomy/splenectomy  -Anticipated completion 6-month adjuvant Gemzar/Xeloda on 2/25/2002  -Last CA 19-9 = 10  -Feb 2022Completion of adjuvant treatment with Xeloda/Gemzar. -CT chest/abdomen/pelvis in August 2022. September 2022 recurrent disease with malignant pleural effusion  8/22/2022 CT Abd/Pelvis W IV Contrast (oral) Partial pancreatectomy with absence of the pancreatic body and tail. Multiple soft tissue and hypodense areas in the location of the pancreatic head indicating measuring up to 8 mm indicating cysts, residual tissue and possible soft tissue pancreatic  nodules. A cyst or soft tissue nodule is identified measuring up to 1.2 cm. Indeterminate nodules in the location of the pancreatic head versus lymphadenopathy. Consider MRI pancreas without and with IV contrast.Bilateral extrarenal pelves. 4 mm left renal cyst. Soft tissue and fat herniates into the umbilical region measuring 4.7 cm indicating hernia. There is mesentery within the hernia. No bowel is within the hernia. No evidence of free air. .  Small amount of free fluid is within the abdomen and pelvis. Mesenteric edema of the abdomen and pelvis. Rectosigmoid diverticulosis without acute diverticulitis. Slightly irregular liver contour may indicate early cirrhotic changes. Free fluid within the abdomen and pelvis. Inflammation of the mesentery. 9/12/2022-CA 19-9 1004  9/15/2022 US Guided Parecentesis [facility]) Limited grayscale ultrasound evaluation to assess for peritoneal fluid. 4 quadrant evaluation shows trace fluid around the liver.  No more than trace fluid at the right lower quadrant. No significant fluid is seen at the left upper quadrant or left lower quadrant. 9/19/2022 Ascites fluid,paracentesis: Malignant cells present. Adenocarcinoma. 9/19/2022 CT Abdomen WO Contrast ([facility]):Small amount of diffuse ascites within the abdomen, likely malignant ascites given the presence of nodular peritoneal and omental soft tissue thickening compatible with carcinomatosis. Diagnostic paracentesis was performed following this examination with ultrasound guidance. Evidence previous resection of the pancreatic body and tail. There is a soft tissue implant or enlarged lymph node inferior to the celiac artery on the right which measures 1.6 x 1.6 cm, concerning for metastatic disease. There is a small cyst in subtle stable nodularity and patient replacement of the pancreatic head. 10/3/2022-essentially, findings of recurrent metastatic pancreatic adenocarcinoma. Stage IV.    10/14/22-Initiated palliative frontline Gemzar Abraxane every 2 weeks  12/30/22 CA [id number]  1/6/23 CT Chest W Contrast No significant interval change. 1/6/23 CT Abd/Pelvis W IV Contrast (oral) Partial pancreatectomy with absence of pancreatic body and tail. No significant change in size or number of a few cyst versus soft tissue nodules in region of pancreatic head. No retroperitoneal lymphadenopathy. Interval resolution of ascites. 1/13/2023-essentially, CT scan showed interval response to treatment. Resolution of prior ascites. Continue palliative chemotherapy with Gemzar/Abraxane. 1/13/23 Decrease Abraxane to 100mg/m2 with each treatment due to development of neuropathy. Plan:  -Continue cycle 2-day 15 palliative Gemzar 1000 mg/M2 and Abraxane 100 mg/M2 every 2 weeks until disease progression or intolerable toxicity. -Repeat CT scans after 6 cycles  -Repeat CA 19-9 today    Right upper extremity DVT port related-started on Eliquis started on 1/14/2022.   Eliquis for 3 months through mid April 2022.     PLAN:  RTC with MD in treatment room 4 weeks  CBC, CMP, CA 19-9 today  Continue Gemzar Abraxane today and every 2 weeks  Decrease Abraxane to 100mg/m2 with each treatment  Recommend cold gloves/socks with treatments  Continue Norco 7.5mg every 6 hrs as needed  Continue Phenergan as needed  Continue Emla cream to port  Continue OTC Imodium as needed  Repeat CT scans in 3 months, April 2023        ITrent am pre charting  as Medical Assistant for Javon Hernandez MD. Electronically signed by Trent Hogan MA on 1/13/2023 at 4:19 PM CST. Krystle Kim am scribing for Javon Hernandez MD. Electronically signed by Monse Aguilar RN on 1/13/2023 at 12:32 PM CST. I, Dr Toribio Smith, personally performed the services described in this documentation as scribed by Monse Aguilar RN in my presence and is both accurate and complete. I have seen, examined and reviewed this patient medication list, appropriate labs and imaging studies. I reviewed relevant medical records and others physicians notes. I discussed the plans of care with the patient. I answered all the questions to the patients satisfaction. I have also reviewed the chief complaint (CC) and part of the history (History of Present Illness (HPI), Past Family Social History [facility]), or Review of Systems (ROS) and made changes when appropriated. (Please note that portions of this note were completed with a voice recognition program. Efforts were made to edit the dictations but occasionally words are mis-transcribed. )Electronically signed by Javon Hernandez MD on 1/13/2023 at 12:38 PM

## 2023-01-13 ENCOUNTER — HOSPITAL ENCOUNTER (OUTPATIENT)
Dept: INFUSION THERAPY | Age: 78
Discharge: HOME | End: 2023-01-13
Payer: MEDICARE

## 2023-01-13 ENCOUNTER — OFFICE VISIT (OUTPATIENT)
Dept: HEMATOLOGY | Age: 78
End: 2023-01-13

## 2023-01-13 VITALS
DIASTOLIC BLOOD PRESSURE: 82 MMHG | TEMPERATURE: 98.3 F | HEART RATE: 80 BPM | WEIGHT: 154.1 LBS | SYSTOLIC BLOOD PRESSURE: 140 MMHG | HEIGHT: 65 IN | BODY MASS INDEX: 25.67 KG/M2 | RESPIRATION RATE: 16 BRPM | OXYGEN SATURATION: 99 %

## 2023-01-13 DIAGNOSIS — C25.9 ADENOCARCINOMA OF PANCREAS (HCC): ICD-10-CM

## 2023-01-13 DIAGNOSIS — C79.9 METASTASIS FROM PANCREATIC CANCER (HCC): ICD-10-CM

## 2023-01-13 DIAGNOSIS — C25.2 MALIGNANT NEOPLASM OF TAIL OF PANCREAS (HCC): Primary | ICD-10-CM

## 2023-01-13 DIAGNOSIS — C25.9 METASTASIS FROM PANCREATIC CANCER (HCC): ICD-10-CM

## 2023-01-13 DIAGNOSIS — G62.0 CHEMOTHERAPY-INDUCED NEUROPATHY (HCC): ICD-10-CM

## 2023-01-13 DIAGNOSIS — T45.1X5A CHEMOTHERAPY-INDUCED NEUROPATHY (HCC): ICD-10-CM

## 2023-01-13 DIAGNOSIS — Z71.89 CARE PLAN DISCUSSED WITH PATIENT: ICD-10-CM

## 2023-01-13 DIAGNOSIS — L30.4 INTERTRIGO: ICD-10-CM

## 2023-01-13 DIAGNOSIS — T45.1X5D ADVERSE EFFECT OF CHEMOTHERAPY, SUBSEQUENT ENCOUNTER: ICD-10-CM

## 2023-01-13 DIAGNOSIS — Z51.11 CHEMOTHERAPY MANAGEMENT, ENCOUNTER FOR: ICD-10-CM

## 2023-01-13 DIAGNOSIS — C25.2 MALIGNANT NEOPLASM OF TAIL OF PANCREAS (HCC): ICD-10-CM

## 2023-01-13 LAB
ALBUMIN SERPL-MCNC: 4.1 G/DL (ref 3.5–5.2)
ALP BLD-CCNC: 80 U/L (ref 35–104)
ALT SERPL-CCNC: 21 U/L (ref 9–52)
ANION GAP SERPL CALCULATED.3IONS-SCNC: 9 MMOL/L (ref 7–19)
AST SERPL-CCNC: 39 U/L (ref 14–36)
BILIRUB SERPL-MCNC: 0.6 MG/DL (ref 0.2–1.3)
BUN BLDV-MCNC: 10 MG/DL (ref 7–17)
CA 19-9: 608 U/ML (ref 0–35)
CALCIUM SERPL-MCNC: 10 MG/DL (ref 8.4–10.2)
CHLORIDE BLD-SCNC: 102 MMOL/L (ref 98–111)
CO2: 28 MMOL/L (ref 22–29)
CREAT SERPL-MCNC: 0.5 MG/DL (ref 0.5–1)
GFR SERPL CREATININE-BSD FRML MDRD: >60 ML/MIN/{1.73_M2}
GLOBULIN: 3.1 G/DL
GLUCOSE BLD-MCNC: 93 MG/DL (ref 74–106)
HCT VFR BLD CALC: 37.1 % (ref 34.1–44.9)
HEMOGLOBIN: 12.6 G/DL (ref 11.2–15.7)
LYMPHOCYTES ABSOLUTE: 2.92 K/UL (ref 1.18–3.74)
LYMPHOCYTES RELATIVE PERCENT: 32.9 % (ref 19.3–53.1)
MCH RBC QN AUTO: 31.7 PG (ref 25.6–32.2)
MCHC RBC AUTO-ENTMCNC: 34 G/DL (ref 32.3–35.5)
MCV RBC AUTO: 93.2 FL (ref 79.4–94.8)
MONOCYTES ABSOLUTE: 1.85 K/UL (ref 0.24–0.82)
MONOCYTES RELATIVE PERCENT: 20.9 % (ref 4.7–12.5)
NEUTROPHILS ABSOLUTE: 3.58 K/UL (ref 1.56–6.13)
NEUTROPHILS RELATIVE PERCENT: 40.3 % (ref 34–71.1)
PDW BLD-RTO: 18.2 % (ref 11.7–14.4)
PLATELET # BLD: 337 K/UL (ref 182–369)
PMV BLD AUTO: 10 FL (ref 7.4–10.4)
POTASSIUM SERPL-SCNC: 4.1 MMOL/L (ref 3.5–5.1)
RBC # BLD: 3.98 M/UL (ref 3.93–5.22)
SODIUM BLD-SCNC: 139 MMOL/L (ref 137–145)
TOTAL PROTEIN: 7.2 G/DL (ref 6.3–8.2)
WBC # BLD: 8.87 K/UL (ref 3.98–10.04)

## 2023-01-13 PROCEDURE — 80053 COMPREHEN METABOLIC PANEL: CPT

## 2023-01-13 PROCEDURE — 96367 TX/PROPH/DG ADDL SEQ IV INF: CPT

## 2023-01-13 PROCEDURE — 96413 CHEMO IV INFUSION 1 HR: CPT

## 2023-01-13 PROCEDURE — 36415 COLL VENOUS BLD VENIPUNCTURE: CPT

## 2023-01-13 PROCEDURE — 2580000003 HC RX 258: Performed by: INTERNAL MEDICINE

## 2023-01-13 PROCEDURE — 96417 CHEMO IV INFUS EACH ADDL SEQ: CPT

## 2023-01-13 PROCEDURE — 85025 COMPLETE CBC W/AUTO DIFF WBC: CPT

## 2023-01-13 PROCEDURE — 96375 TX/PRO/DX INJ NEW DRUG ADDON: CPT

## 2023-01-13 PROCEDURE — 6360000002 HC RX W HCPCS: Performed by: INTERNAL MEDICINE

## 2023-01-13 RX ORDER — ACETAMINOPHEN 325 MG/1
650 TABLET ORAL
OUTPATIENT
Start: 2023-01-27

## 2023-01-13 RX ORDER — ALBUTEROL SULFATE 90 UG/1
4 AEROSOL, METERED RESPIRATORY (INHALATION) PRN
OUTPATIENT
Start: 2023-01-27

## 2023-01-13 RX ORDER — PACLITAXEL 100 MG/20ML
100 INJECTION, POWDER, LYOPHILIZED, FOR SUSPENSION INTRAVENOUS ONCE
Status: COMPLETED | OUTPATIENT
Start: 2023-01-13 | End: 2023-01-13

## 2023-01-13 RX ORDER — SODIUM CHLORIDE 0.9 % (FLUSH) 0.9 %
5-40 SYRINGE (ML) INJECTION PRN
OUTPATIENT
Start: 2023-01-27

## 2023-01-13 RX ORDER — HEPARIN SODIUM (PORCINE) LOCK FLUSH IV SOLN 100 UNIT/ML 100 UNIT/ML
500 SOLUTION INTRAVENOUS PRN
Status: CANCELLED | OUTPATIENT
Start: 2023-01-13

## 2023-01-13 RX ORDER — SODIUM CHLORIDE 9 MG/ML
5-250 INJECTION, SOLUTION INTRAVENOUS PRN
OUTPATIENT
Start: 2023-01-27

## 2023-01-13 RX ORDER — SODIUM CHLORIDE 9 MG/ML
INJECTION, SOLUTION INTRAVENOUS CONTINUOUS
OUTPATIENT
Start: 2023-01-27

## 2023-01-13 RX ORDER — SODIUM CHLORIDE 9 MG/ML
5-40 INJECTION INTRAVENOUS PRN
OUTPATIENT
Start: 2023-01-13

## 2023-01-13 RX ORDER — PALONOSETRON 0.05 MG/ML
0.25 INJECTION, SOLUTION INTRAVENOUS ONCE
Start: 2023-01-27 | End: 2023-01-27

## 2023-01-13 RX ORDER — SODIUM CHLORIDE 9 MG/ML
5-250 INJECTION, SOLUTION INTRAVENOUS PRN
OUTPATIENT
Start: 2023-01-13

## 2023-01-13 RX ORDER — SODIUM CHLORIDE 9 MG/ML
INJECTION, SOLUTION INTRAVENOUS CONTINUOUS
OUTPATIENT
Start: 2023-01-13

## 2023-01-13 RX ORDER — PACLITAXEL 100 MG/20ML
100 INJECTION, POWDER, LYOPHILIZED, FOR SUSPENSION INTRAVENOUS ONCE
Status: CANCELLED | OUTPATIENT
Start: 2023-01-13 | End: 2023-01-13

## 2023-01-13 RX ORDER — EPINEPHRINE 1 MG/ML
0.3 INJECTION, SOLUTION, CONCENTRATE INTRAVENOUS PRN
OUTPATIENT
Start: 2023-01-13

## 2023-01-13 RX ORDER — ONDANSETRON 2 MG/ML
8 INJECTION INTRAMUSCULAR; INTRAVENOUS
OUTPATIENT
Start: 2023-01-13

## 2023-01-13 RX ORDER — HEPARIN SODIUM (PORCINE) LOCK FLUSH IV SOLN 100 UNIT/ML 100 UNIT/ML
500 SOLUTION INTRAVENOUS PRN
OUTPATIENT
Start: 2023-01-27

## 2023-01-13 RX ORDER — DIPHENHYDRAMINE HYDROCHLORIDE 50 MG/ML
50 INJECTION INTRAMUSCULAR; INTRAVENOUS
OUTPATIENT
Start: 2023-01-13

## 2023-01-13 RX ORDER — DIPHENHYDRAMINE HYDROCHLORIDE 50 MG/ML
50 INJECTION INTRAMUSCULAR; INTRAVENOUS
OUTPATIENT
Start: 2023-01-27

## 2023-01-13 RX ORDER — ALBUTEROL SULFATE 90 UG/1
4 AEROSOL, METERED RESPIRATORY (INHALATION) PRN
OUTPATIENT
Start: 2023-01-13

## 2023-01-13 RX ORDER — MEPERIDINE HYDROCHLORIDE 50 MG/ML
12.5 INJECTION INTRAMUSCULAR; INTRAVENOUS; SUBCUTANEOUS PRN
OUTPATIENT
Start: 2023-01-13

## 2023-01-13 RX ORDER — ONDANSETRON 2 MG/ML
8 INJECTION INTRAMUSCULAR; INTRAVENOUS
OUTPATIENT
Start: 2023-01-27

## 2023-01-13 RX ORDER — SODIUM CHLORIDE 0.9 % (FLUSH) 0.9 %
5-40 SYRINGE (ML) INJECTION PRN
Status: DISCONTINUED | OUTPATIENT
Start: 2023-01-13 | End: 2023-01-14 | Stop reason: HOSPADM

## 2023-01-13 RX ORDER — SODIUM CHLORIDE 9 MG/ML
5-250 INJECTION, SOLUTION INTRAVENOUS PRN
Status: CANCELLED | OUTPATIENT
Start: 2023-01-13

## 2023-01-13 RX ORDER — PALONOSETRON 0.05 MG/ML
0.25 INJECTION, SOLUTION INTRAVENOUS ONCE
Status: COMPLETED | OUTPATIENT
Start: 2023-01-13 | End: 2023-01-13

## 2023-01-13 RX ORDER — PALONOSETRON 0.05 MG/ML
0.25 INJECTION, SOLUTION INTRAVENOUS ONCE
Status: CANCELLED
Start: 2023-01-13 | End: 2023-01-13

## 2023-01-13 RX ORDER — MEPERIDINE HYDROCHLORIDE 50 MG/ML
12.5 INJECTION INTRAMUSCULAR; INTRAVENOUS; SUBCUTANEOUS PRN
OUTPATIENT
Start: 2023-01-27

## 2023-01-13 RX ORDER — HEPARIN SODIUM (PORCINE) LOCK FLUSH IV SOLN 100 UNIT/ML 100 UNIT/ML
500 SOLUTION INTRAVENOUS PRN
Status: DISCONTINUED | OUTPATIENT
Start: 2023-01-13 | End: 2023-01-14 | Stop reason: HOSPADM

## 2023-01-13 RX ORDER — FAMOTIDINE 10 MG/ML
20 INJECTION, SOLUTION INTRAVENOUS
OUTPATIENT
Start: 2023-01-13

## 2023-01-13 RX ORDER — FAMOTIDINE 10 MG/ML
20 INJECTION, SOLUTION INTRAVENOUS
OUTPATIENT
Start: 2023-01-27

## 2023-01-13 RX ORDER — ACETAMINOPHEN 325 MG/1
650 TABLET ORAL
OUTPATIENT
Start: 2023-01-13

## 2023-01-13 RX ORDER — PACLITAXEL 100 MG/20ML
125 INJECTION, POWDER, LYOPHILIZED, FOR SUSPENSION INTRAVENOUS ONCE
Status: CANCELLED | OUTPATIENT
Start: 2023-01-27 | End: 2023-01-27

## 2023-01-13 RX ORDER — SODIUM CHLORIDE 9 MG/ML
5-250 INJECTION, SOLUTION INTRAVENOUS PRN
Status: DISCONTINUED | OUTPATIENT
Start: 2023-01-13 | End: 2023-01-14 | Stop reason: HOSPADM

## 2023-01-13 RX ORDER — EPINEPHRINE 1 MG/ML
0.3 INJECTION, SOLUTION, CONCENTRATE INTRAVENOUS PRN
OUTPATIENT
Start: 2023-01-27

## 2023-01-13 RX ORDER — PACLITAXEL 100 MG/20ML
100 INJECTION, POWDER, LYOPHILIZED, FOR SUSPENSION INTRAVENOUS ONCE
OUTPATIENT
Start: 2023-01-27 | End: 2023-01-27

## 2023-01-13 RX ORDER — SODIUM CHLORIDE 0.9 % (FLUSH) 0.9 %
5-40 SYRINGE (ML) INJECTION PRN
Status: CANCELLED | OUTPATIENT
Start: 2023-01-13

## 2023-01-13 RX ORDER — PACLITAXEL 100 MG/20ML
125 INJECTION, POWDER, LYOPHILIZED, FOR SUSPENSION INTRAVENOUS ONCE
Status: CANCELLED | OUTPATIENT
Start: 2023-01-13 | End: 2023-01-13

## 2023-01-13 RX ORDER — SODIUM CHLORIDE 9 MG/ML
5-40 INJECTION INTRAVENOUS PRN
OUTPATIENT
Start: 2023-01-27

## 2023-01-13 RX ADMIN — SODIUM CHLORIDE, PRESERVATIVE FREE 10 ML: 5 INJECTION INTRAVENOUS at 14:56

## 2023-01-13 RX ADMIN — GEMCITABINE HYDROCHLORIDE 1830 MG: 1 INJECTION, SOLUTION INTRAVENOUS at 14:22

## 2023-01-13 RX ADMIN — HEPARIN 500 UNITS: 100 SYRINGE at 14:56

## 2023-01-13 RX ADMIN — DEXAMETHASONE SODIUM PHOSPHATE: 10 INJECTION, SOLUTION INTRAMUSCULAR; INTRAVENOUS at 13:20

## 2023-01-13 RX ADMIN — PALONOSETRON 0.25 MG: 0.05 INJECTION, SOLUTION INTRAVENOUS at 13:19

## 2023-01-13 RX ADMIN — SODIUM CHLORIDE 50 ML/HR: 9 INJECTION, SOLUTION INTRAVENOUS at 13:20

## 2023-01-13 RX ADMIN — PACLITAXEL 183 MG: 100 INJECTION, POWDER, LYOPHILIZED, FOR SUSPENSION INTRAVENOUS at 13:38

## 2023-01-27 ENCOUNTER — HOSPITAL ENCOUNTER (OUTPATIENT)
Dept: INFUSION THERAPY | Age: 78
Discharge: HOME | End: 2023-01-27
Payer: MEDICARE

## 2023-01-27 VITALS
HEIGHT: 65 IN | TEMPERATURE: 98.5 F | HEART RATE: 87 BPM | DIASTOLIC BLOOD PRESSURE: 72 MMHG | RESPIRATION RATE: 16 BRPM | BODY MASS INDEX: 25.02 KG/M2 | SYSTOLIC BLOOD PRESSURE: 140 MMHG | OXYGEN SATURATION: 98 % | WEIGHT: 150.2 LBS

## 2023-01-27 DIAGNOSIS — C25.2 MALIGNANT NEOPLASM OF TAIL OF PANCREAS (HCC): Primary | ICD-10-CM

## 2023-01-27 DIAGNOSIS — C25.9 METASTASIS FROM PANCREATIC CANCER (HCC): ICD-10-CM

## 2023-01-27 DIAGNOSIS — C79.9 METASTASIS FROM PANCREATIC CANCER (HCC): ICD-10-CM

## 2023-01-27 DIAGNOSIS — C25.9 ADENOCARCINOMA OF PANCREAS (HCC): ICD-10-CM

## 2023-01-27 DIAGNOSIS — C25.2 MALIGNANT NEOPLASM OF TAIL OF PANCREAS (HCC): ICD-10-CM

## 2023-01-27 LAB
ALBUMIN SERPL-MCNC: 3.8 G/DL (ref 3.5–5.2)
ALP BLD-CCNC: 87 U/L (ref 35–104)
ALT SERPL-CCNC: 19 U/L (ref 9–52)
ANION GAP SERPL CALCULATED.3IONS-SCNC: 6 MMOL/L (ref 7–19)
AST SERPL-CCNC: 39 U/L (ref 14–36)
BILIRUB SERPL-MCNC: 0.5 MG/DL (ref 0.2–1.3)
BUN BLDV-MCNC: 10 MG/DL (ref 7–17)
CA 19-9: 360 U/ML (ref 0–35)
CALCIUM SERPL-MCNC: 9.3 MG/DL (ref 8.4–10.2)
CHLORIDE BLD-SCNC: 104 MMOL/L (ref 98–111)
CO2: 27 MMOL/L (ref 22–29)
CREAT SERPL-MCNC: 0.6 MG/DL (ref 0.5–1)
GFR SERPL CREATININE-BSD FRML MDRD: >60 ML/MIN/{1.73_M2}
GLOBULIN: 3 G/DL
GLUCOSE BLD-MCNC: 139 MG/DL (ref 74–106)
HCT VFR BLD CALC: 34.7 % (ref 34.1–44.9)
HEMOGLOBIN: 11.8 G/DL (ref 11.2–15.7)
LYMPHOCYTES ABSOLUTE: 2.92 K/UL (ref 1.18–3.74)
LYMPHOCYTES RELATIVE PERCENT: 30.1 % (ref 19.3–53.1)
MCH RBC QN AUTO: 31.6 PG (ref 25.6–32.2)
MCHC RBC AUTO-ENTMCNC: 34 G/DL (ref 32.3–35.5)
MCV RBC AUTO: 93 FL (ref 79.4–94.8)
MONOCYTES ABSOLUTE: 1.51 K/UL (ref 0.24–0.82)
MONOCYTES RELATIVE PERCENT: 15.6 % (ref 4.7–12.5)
NEUTROPHILS ABSOLUTE: 4.72 K/UL (ref 1.56–6.13)
NEUTROPHILS RELATIVE PERCENT: 48.8 % (ref 34–71.1)
PDW BLD-RTO: 17.6 % (ref 11.7–14.4)
PLATELET # BLD: 342 K/UL (ref 182–369)
PMV BLD AUTO: 10.3 FL (ref 7.4–10.4)
POTASSIUM SERPL-SCNC: 4.1 MMOL/L (ref 3.5–5.1)
RBC # BLD: 3.73 M/UL (ref 3.93–5.22)
SODIUM BLD-SCNC: 137 MMOL/L (ref 137–145)
TOTAL PROTEIN: 6.7 G/DL (ref 6.3–8.2)
WBC # BLD: 9.69 K/UL (ref 3.98–10.04)

## 2023-01-27 PROCEDURE — 36415 COLL VENOUS BLD VENIPUNCTURE: CPT

## 2023-01-27 PROCEDURE — 96375 TX/PRO/DX INJ NEW DRUG ADDON: CPT

## 2023-01-27 PROCEDURE — 96413 CHEMO IV INFUSION 1 HR: CPT

## 2023-01-27 PROCEDURE — 2580000003 HC RX 258: Performed by: INTERNAL MEDICINE

## 2023-01-27 PROCEDURE — 85025 COMPLETE CBC W/AUTO DIFF WBC: CPT

## 2023-01-27 PROCEDURE — 96417 CHEMO IV INFUS EACH ADDL SEQ: CPT

## 2023-01-27 PROCEDURE — 6360000002 HC RX W HCPCS: Performed by: INTERNAL MEDICINE

## 2023-01-27 PROCEDURE — 80053 COMPREHEN METABOLIC PANEL: CPT

## 2023-01-27 RX ORDER — PALONOSETRON 0.05 MG/ML
0.25 INJECTION, SOLUTION INTRAVENOUS ONCE
Status: COMPLETED | OUTPATIENT
Start: 2023-01-27 | End: 2023-01-27

## 2023-01-27 RX ORDER — SODIUM CHLORIDE 9 MG/ML
5-250 INJECTION, SOLUTION INTRAVENOUS PRN
Status: DISCONTINUED | OUTPATIENT
Start: 2023-01-27 | End: 2023-01-28 | Stop reason: HOSPADM

## 2023-01-27 RX ORDER — HEPARIN SODIUM (PORCINE) LOCK FLUSH IV SOLN 100 UNIT/ML 100 UNIT/ML
500 SOLUTION INTRAVENOUS PRN
Status: DISCONTINUED | OUTPATIENT
Start: 2023-01-27 | End: 2023-01-28 | Stop reason: HOSPADM

## 2023-01-27 RX ORDER — PACLITAXEL 100 MG/20ML
100 INJECTION, POWDER, LYOPHILIZED, FOR SUSPENSION INTRAVENOUS ONCE
Status: COMPLETED | OUTPATIENT
Start: 2023-01-27 | End: 2023-01-27

## 2023-01-27 RX ORDER — SODIUM CHLORIDE 0.9 % (FLUSH) 0.9 %
5-40 SYRINGE (ML) INJECTION PRN
Status: DISCONTINUED | OUTPATIENT
Start: 2023-01-27 | End: 2023-01-28 | Stop reason: HOSPADM

## 2023-01-27 RX ADMIN — PACLITAXEL 183 MG: 100 INJECTION, POWDER, LYOPHILIZED, FOR SUSPENSION INTRAVENOUS at 11:46

## 2023-01-27 RX ADMIN — DEXAMETHASONE SODIUM PHOSPHATE: 10 INJECTION, SOLUTION INTRAMUSCULAR; INTRAVENOUS at 11:14

## 2023-01-27 RX ADMIN — SODIUM CHLORIDE 50 ML/HR: 9 INJECTION, SOLUTION INTRAVENOUS at 11:15

## 2023-01-27 RX ADMIN — GEMCITABINE HYDROCHLORIDE 1830 MG: 1 INJECTION, SOLUTION INTRAVENOUS at 12:29

## 2023-01-27 RX ADMIN — SODIUM CHLORIDE, PRESERVATIVE FREE 10 ML: 5 INJECTION INTRAVENOUS at 13:02

## 2023-01-27 RX ADMIN — Medication 500 UNITS: at 13:02

## 2023-01-27 RX ADMIN — PALONOSETRON 0.25 MG: 0.05 INJECTION, SOLUTION INTRAVENOUS at 11:14

## 2023-02-09 NOTE — PROGRESS NOTES
MEDICAL ONCOLOGY PROGRESS NOTE    Pt Name: Uche Jeong  MRN: [id number]  YOB: 1945  Date of evaluation: 2/10/2023    HISTORY OF PRESENT ILLNESS:    Patient is a pleasant 69years old female who has been diagnosed with invasive  adenocarcinoma of the pancreas, node positive. She underwent laparoscopic assisted distal pancreatectomy and splenectomy in June 2021 at [facility] she is a status post completion of adjuvant chemotherapy with Gemzar/Xeloda completed February 2022. Unfortunately, she had recurrent disease with massive ascites. CT-guided paracentesis cytology positive metastatic adenocarcinoma. CA 19-9 elevated. She is good receiving palliative chemotherapy with Gemzar/Abraxane. Abraxane was reduced 100 mg/M2. She has complaints of occasional swelling hands and feet bilaterally. He has mild sensorial neuropathy. Tolerating treatments complains of mild fatigue. She denies any abdominal complaints. 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 Surgeon Dr. Griselda [address]   8/13/2021-02/25/22-adjuvant Gemzar 1000 mg/m2 IV D 1, D8, D15 with Capecitabine 830 mg/m2 D1-21 every 28 days x6 cycles  1/14/22 Eliquis for DVT right upper extremity November 2022  10/14/22-Initiated palliative frontline Gemzar Abraxane every 2 weeks  1/13/23 Decrease Abraxane to 100mg/m2 with each treatment    Cancer History  Philomena Mayo was first seen by me on 8/6/2021. The patient was referred for a diagnosis of pancreatic malignancy. She had initial complaints of abdominal pain. 5/5/20 MRI abdomen MEDICAL [facility]):  Moderate fatty replacement of the pancreas with several small cystic structures within or adjacent to the pancreatic head and uncinate process most suggestive of benign cysts. A follow-up MRI of the abdomen may be obtained in 1 year to assess for stability. No solid lesion is seen. Otherwise unremarkable MRI of the abdomen. 4/22/20 CT abd/pelvis [facility]): Focal inflammatory stranding associated with the pancreatic head. Radiographically I would favor this to represent acute pancreatitis. There is mild associated stranding within the descending and proximal transverse duodenum. There are 2 well-circumscribed hypodensities one in the region of the uncinate process and one slightly anterior to the pancreatic head likely representing sequela of pancreatitis but warranting follow-up on subsequent exam. The body and tail of the pancreas are spared. No evidence of pancreatic necrosis. The patient is status post hysterectomy and cholecystectomy. Bibasilar subsegmental atelectasis. No evidence of nephrolithiasis or obstructive uropathy. Diverticulosis of the distal descending and sigmoid colon without evidence of diverticulitis. 5/5/21 MRI [facility]) There are multiple tiny-to-small foci of increased T2 signal seen within the pancreas, consistent with a combination of cysts and/or beaded dilatation of the tributaries of the main pancreatic duct, increased in size and number when compared to the prior similar study of 5/5/20. The patient is again noted to be status post cholecystectomy. The examination is otherwise within normal limits for age with no abnormal postcontrast enhancement noted. 6/3/21 Upper EUS (Dr Leny Rene/[place]): CT Visualized portion of pancreas abnormal on ultrasound. There is a 26.7 mm solid mass in pancreatic tail. It abuts the splenic vessel without occlusion or varices. Pancreatic duct is dilated to 6 mm in the tail proximal to the mass. FNA x4 yields atypical cells. Celiac axis, liver appear normal, no local nodes.  Body and head of pancreas normal.  6/3/21 Pancreas tail mass, FNA aspiration smear (x2), ThinPrep and cell block: Adenocarcinoma. 6/7/21 CA 19-9 407.1  6/28/21-she underwent Whipple procedure pancreatectomy by Dr. Jose Meléndez: Invasive ductal carcinoma 4.3 cm in greatest dimension, invasive in the peripancreatic adipose tissue, diffuse perineural invasion. Surgical margins were negative for carcinoma. The pancreas margins involved by a low-grade dysplasia. 1/6 nodes positive for metastatic cancer with direct extension. Distal fibrosis and pancreatitis was noted. Spleen was unremarkable. Pathological stage pT2 N1 M0  8/6/2021-she was first seen by me. Recommend adjuvant chemotherapy with gemcitabine/Xeloda. She acknowledged understanding and agree with treatment. 8/09/2021 CT Chest W/Contrast No evidence of intrathoracic metastatic disease is identified. 8/09/2021 CT Abd/Pelvis W/Oral Contrast Prior distal pancreatectomy and splenectomy. There is a 1.5 cm circumscribed, nonenhancing cystic lesion along the inferior pancreatic head, perhaps a branch IPMN. Otherwise, no obvious pancreatic solid mass is seen. No suspicious adenopathy or evidence of distant metastatic disease in the abdomen or pelvis. Prior cholecystectomy. Liver steatosis. Colonic diverticulosis. 8/13/2021-initiation of adjuvant capecitabine/Gemzar   9/10/2021-I reviewed CT chest abdomen pelvis. 1/21/2022-CA 19-9 = 10  2/10/22 CT CHEST W CONTRAST No evidence of intrathoracic metastasis. Incidental findings described above. 2/10/22 CT ABDOMEN PELVIS W IV CONTRAST  Partial pancreatectomy and splenectomy. Pancreatic head cysts measuring up to 1.6 cm, very similar to 8/9/2021. Continued surveillance imaging recommended. No evidence of intra-abdominal or pelvic metastasis. 2/18/2022-I reviewed results CT chest abdomen pelvis. No evidence of recurrent disease. Pancreatic head cyst stable. 02/25/22- Completion of adjuvant chemotherapy.    4/22/2022 CA 19-9-13  5/11/2022 Bone Density (BHP) Osteopenia. Femoral Neck T-Score -2.0, Lumbar Spine T-Score -0.9  6/7/2022 Endoscopy (P) Urease was negative  8/5/2022 CT Abd/Pelvis WO Contrast [facility]) Fairly diffuse soft tissue infiltration with haziness and nodularity of there peripheral aspects of the peritoneal cavity most concerning for a process such as peritoneal carcinomatosis. Given history of pancreatic cancer,a PET scan may be considered for further evaluation. Small amount of ascites in the pelvis. Colonic diverticulosis. Large,fat-containing umbilical hernia. Tiny right pleural effusion with atelectasis in the lung bases. 8/22/2022 CT Chest W Contrast Calcified and noncalcified lung nodules may indicate inflammatory or infectious process and old granulomatous disease. Follow-up in 3 months is recommended. Small right pleural effusion. Cirrhotic liver. Free fluid in the abdomen with mesenteric edema upper abdomen. Multiple lymph nodes in region of pancreatic head. 8/22/2022-surveillance  CT Abd/Pelvis W IV Contrast (oral) Partial pancreatectomy with absence of the pancreatic body and tail. Multiple soft tissue and hypodense areas in the location of the pancreatic head indicating measuring up to 8 mm indicating cysts, residual tissue and possible soft tissue pancreatic  nodules. A cyst or soft tissue nodule is identified measuring up to 1.2 cm. Indeterminate nodules in the location of the pancreatic head versus lymphadenopathy. Consider MRI pancreas without and with IV contrast.Bilateral extrarenal pelves. 4 mm left renal cyst. Soft tissue and fat herniates into the umbilical region measuring 4.7 cm indicating hernia. There is mesentery within the hernia. No bowel is within the hernia. No evidence of free air. .  Small amount of free fluid is within the abdomen and pelvis. Mesenteric edema of the abdomen and pelvis. Rectosigmoid diverticulosis without acute diverticulitis. Slightly irregular liver contour may indicate early cirrhotic changes. Free fluid within the abdomen and pelvis. Inflammation of the mesentery. 8/28/2022-I reviewed CT abdomen/pelvis. New onset ascites. Liver is smaller when compared to prior CT scan suggesting hydrophobic disease. Radiology reported peripancreatic nodules. Recommended CA 19-9 reviewed. Recommend MRI pancreas. Recommended paracentesis (send for cytology, total protein, LDH). CMP same day of paracentesis. 9/12/2022 MRI Abdomen W WO Contrast MRCP Hepatic steatosis with a nodular contour suggestive of cirrhosis. Ascites. Absence of the spleen. Absence of the gallbladder. The patient appears to be status post partial pancreatectomy . The nodular lesions identified on the CT scan dated August 19, 2022 are poorly visualized on this examination due to respiratory artifact and technique. Small renal cysts. Midline ventral hernia. 9/12/2022-CA 19-9 1004  9/15/2022 US Guided Parecentesis [facility]) Limited grayscale ultrasound evaluation to assess for peritoneal fluid. 4 quadrant evaluation shows trace fluid around the liver. No more than trace fluid at the right lower quadrant. No significant fluid is seen at the left upper quadrant or left lower quadrant. 9/19/2022 Ascites fluid,paracentesis: Malignant cells present. Adenocarcinoma. 9/19/2022 CT Abdomen WO Contrast ([facility]):Small amount of diffuse ascites within the abdomen, likely malignant ascites given the presence of nodular peritoneal and omental soft tissue thickening compatible with carcinomatosis. Diagnostic paracentesis was performed following this examination with ultrasound guidance. Evidence previous resection of the pancreatic body and tail. There is a soft tissue implant or enlarged lymph node inferior to the celiac artery on the right which measures 1.6 x 1.6 cm, concerning for metastatic disease. There is a small cyst in subtle stable nodularity and patient replacement of the pancreatic head.    10/3/2022-essentially, findings of recurrent metastatic pancreatic adenocarcinoma. Stage IV. Recommend palliative Gemzar 1000 mg/M2 and Abraxane 125 mg/M2 every 2 weeks until disease progression or intolerable toxicity. 10/3/22 Chemotherapy consent signed  10/14/22-Initiated palliative frontline Gemzar Abraxane every 2 weeks  12/30/22 CA 44-3-027  1/6/23 CT Chest W Contrast No significant interval change. 1/6/23 CT Abd/Pelvis W IV Contrast (oral) Partial pancreatectomy with absence of pancreatic body and tail. No significant change in size or number of a few cyst versus soft tissue nodules in region of pancreatic head. No retroperitoneal lymphadenopathy. Interval resolution of ascites. 1/13/2023-essentially, CT scan showed interval response to treatment. Resolution of prior ascites. Continue palliative chemotherapy with Gemzar/Abraxane. 1/13/23 Decrease Abraxane to 100mg/m2 with each treatment due to development of neuropathy.   1/27/23 CA 19-9-360    Past Medical History:    Past Medical History:   Diagnosis Date    Anxiety     Arthritis     Depression     Hypertension     Hypoglycemia     if fasting for very long    Pancreatic cancer ([facility] [address] 06/2021    s/p Whipple by Dr. Anaya Canada    Shoulder pain     incompleted rcr; possible injury    Thyroid disease        Past Surgical History:    Past Surgical History:   Procedure Laterality Date    Juanita Petrin  2020    Dr Ludy Mcfarlane, COLON, DIAGNOSTIC      HYSTERECTOMY ([facility])  [address] [place]  06/28/2021    PORT SURGERY Right 9/21/2021    Single lumen PORT INSERTION with ultrasound and fluoroscopy performed by Ilir Chavez DO at [facility]. [facility]. Right 1/13/2022    RIGHT SIDE MEDIPORT REMOVAL, LEFT SIDE MEDIPORT INSERTION performed by Vaibhav Burns DO at [address] [facility] ARTHROSCOP,SURG,W/ROTAT CUFF REPR Right 10/19/2017    SHOULDER ARTHROSCOPY ROTATOR CUFF REPAIR/ DEBRIDEMENT BICEPS TENODESIS/ TENOTOMY SUBACROMIAL DECOMPRESSION/ DISTAL CLAVICLE EXCISION performed by Branden Castillo MD at [facility]  06/28/2021    UPPER GASTROINTESTINAL ENDOSCOPY  06/2021    Dr. Pascale Kim in [address] [place] [address]      spur excision       Social History:    Marital status:   Smoking status: no  ETOH status: no  Resides: [place]    Family History:   Family History   Adopted: Yes   Problem Relation Age of Onset    Breast Cancer Maternal Aunt     Breast Cancer Daughter     Cancer Daughter         Bladder    Cancer Maternal Uncle         Bladder       Current Hospital Medications:    Current Outpatient Medications   Medication Sig Dispense Refill    promethazine (PHENERGAN) 12.5 MG tablet Take 1 tablet by mouth every 6 hours as needed for Nausea 30 tablet 5    HYDROcodone-acetaminophen (NORCO) 7.5-325 MG per tablet Take 1 tablet by mouth every 6 hours as needed for Pain for up to 30 days. Intended supply: 30 days Max Daily Amount: 4 tablets 120 tablet 0    lipase-protease-amylase (CREON) 87109-59160 units delayed release capsule Take 1 capsule by mouth 3 times daily (with meals) 90 capsule 5    lidocaine-prilocaine (EMLA) 2.5-2.5 % cream Apply topically as needed. 30 g 5    promethazine (PHENERGAN) 12.5 MG tablet Take 1 tablet by mouth 3 times daily as needed for Nausea 30 tablet 5    capecitabine (XELODA) 500 MG chemo tablet Take 3 tablets by mouth 2 times a day for 14 days of a 28 day cycle 84 tablet 5    losartan-hydroCHLOROthiazide (HYZAAR) 50-12.5 MG per tablet Take 1 tablet by mouth daily Indications: High Blood Pressure Disorder      levothyroxine (SYNTHROID) 137 MCG tablet Take 137 mcg by mouth Daily Indications: Underactive Thyroid      Cholecalciferol (VITAMIN D3) 10 MCG (400 UNIT) CAPS Take 1 capsule by mouth daily       Magic Mouthwash (MIRACLE MOUTHWASH) Swish and spit 5 mLs 4 times daily as needed for Irritation 1/3 viscous lidocaine, 1/3 benadryl, 1/3 Maalox.  480 mL 1    lidocaine-prilocaine (EMLA) 2.5-2.5 % cream Apply topically as needed. 30 g 2    Caltrate 600+D Plus Minerals (CALTRATE) 600-800 MG-UNIT TABS tablet Take 1 tablet by mouth daily      busPIRone (BUSPAR) 5 MG tablet Take 5 mg by mouth 3 times daily       ondansetron (ZOFRAN) 4 MG tablet Take 1 tablet by mouth every 8 hours as needed for Nausea or Vomiting 10 tablet 0     No current facility-administered medications for this visit. Facility-Administered Medications Ordered in Other Visits   Medication Dose Route Frequency Provider Last Rate Last Admin    0.9 % sodium chloride infusion  5-250 mL/hr IntraVENous PRN Gilbert Ugarte MD        dexamethasone 10 mg in sodium chloride 0.9 % 50 mL infusion   IntraVENous Continuous Gilbert Ugarte MD        palonosetron (ALOXI) injection 0.25 mg  0.25 mg IntraVENous Once Gilbert Ugarte MD        PACLitaxel-protein bound (ABRAXANE) chemo IVPB 183 mg  100 mg/m2 (Treatment Plan Recorded) IntraVENous Once Gilbert Ugarte MD        gemcitabine HCl (GEMZAR) 1,830 mg in sodium chloride 0.9 % 250 mL chemo IVPB  1,000 mg/m2 (Treatment Plan Recorded) IntraVENous Once Gilbert Ugarte MD        sodium chloride flush 0.9 % injection 5-40 mL  5-40 mL IntraVENous PRN Gilbert Ugarte MD        heparin flush 100 UNIT/ML injection 500 Units  500 Units IntraCATHeter PRN Gilbert Ugarte MD           Allergies:    Allergies   Allergen Reactions    Diclofenac Other (See Comments)     Couldn't sleep and face turned blood red    Oxycodone-Acetaminophen Hives     itching    Doxycycline Rash         Subjective   REVIEW OF SYSTEMS:   CONSTITUTIONAL: no fever, no night sweats, fatigue;  HEENT: no blurring of vision, no double vision, no hearing difficulty, no tinnitus, no ulceration, no dysplasia, no epistaxis;   LUNGS: cough, no hemoptysis, no wheeze,  no shortness of breath;  CARDIOVASCULAR: no palpitation, no chest pain, no shortness of breath;  GI: no abdominal pain, nausea, no vomiting, no diarrhea, no constipation;  STUART: no dysuria, no hematuria, no frequency or urgency, no nephrolithiasis;  MUSCULOSKELETAL: no joint pain, no swelling, no stiffness; swelling in hands and ankles  ENDOCRINE: no polyuria, no polydipsia, no cold or heat intolerance; HEMATOLOGY: no easy bruising or bleeding, no history of clotting disorder;  DERMATOLOGY: no skin rash, no eczema, no pruritus; psoriasis sores   PSYCHIATRY: no depression, no anxiety, no panic attacks, no suicidal ideation, no homicidal ideation;  NEUROLOGY: no syncope, no seizures, no numbness or tingling of hands,  numbness or tingling of feet, no paresis;         Objective BP (!) 145/90   Pulse 86   Temp 98.6 °F (37 °C)   Resp 18   Ht 5' 5\" (1.651 m)   Wt 156 lb 1.6 oz (70.8 kg)   SpO2 97%   BMI 25.98 kg/m²     Wt Readings from Last 3 Encounters:   02/10/23 156 lb 1.6 oz (70.8 kg)   01/27/23 150 lb 3.2 oz (68.1 kg)   01/13/23 154 lb 1.6 oz (69.9 kg)       PHYSICAL EXAM:  CONSTITUTIONAL: Alert, appropriate, no acute distress  EYES: Non icteric, EOM intact, pupils equal round   ENT: Mucus membranes moist, no oral pharyngeal lesions, external inspection of ears and nose are normal.  NECK: Supple, no masses. No palpable thyroid mass  CHEST/LUNGS: CTA bilaterally, normal respiratory effort   CARDIOVASCULAR: RRR, no murmurs. No lower extremity edema  ABDOMEN: soft non-tender, active bowel sounds, no HSM. No palpable masses  EXTREMITIES: warm, full ROM in all 4 extremities, no focal weakness. SKIN: warm, dry with no rashes or lesions  LYMPH: No cervical, clavicular, axillary, or inguinal lymphadenopathy  NEUROLOGIC: follows commands, non focal   PSYCH: mood and affect appropriate.   Alert and oriented to time, place, person       LABORATORY RESULTS REVIEWED/ANALYZED BY ME:  1/27/23 CA 19-9-360  Lab Results   Component Value Date    WBC 9.93 02/10/2023    HGB 11.5 02/10/2023    HCT 34.5 02/10/2023    MCV 96.1 (H) 02/10/2023     02/10/2023     Lab Results Component Value Date    NEUTROABS 4.13 02/10/2023     Lab Results   Component Value Date     02/10/2023    K 4.1 02/10/2023     02/10/2023    CO2 28 02/10/2023    BUN 13 02/10/2023    CREATININE 0.7 02/10/2023    GLUCOSE 125 (H) 02/10/2023    CALCIUM 9.2 02/10/2023    PROT 5.8 (L) 02/10/2023    LABALBU 3.7 02/10/2023    BILITOT 0.6 02/10/2023    ALKPHOS 85 02/10/2023    AST 43 (H) 02/10/2023    ALT 20 02/10/2023    LABGLOM >60 02/10/2023    GFRAA >59 01/12/2022    GLOB 2.1 02/10/2023       RADIOLOGY STUDIES REVIEWED BY ME:   None    ASSESSMENT:    Orders Placed This Encounter   Procedures    CBC with Auto Differential     Standing Status:   Future     Number of Occurrences:   1     Standing Expiration Date:   2/10/2024    Comprehensive Metabolic Panel     Standing Status:   Future     Number of Occurrences:   1     Standing Expiration Date:   2/10/2024    Cancer Antigen 19-9     Standing Status:   Future     Number of Occurrences:   1     Standing Expiration Date:   2/10/2024        Alcides Cortes was seen today for cancer. Diagnoses and all orders for this visit:    Malignant neoplasm of tail of pancreas ([facility] [address]  -     CBC with Auto Differential; Future  -     Comprehensive Metabolic Panel; Future  -     Cancer Antigen 19-9; Future    Metastasis from pancreatic cancer (HCC)  -     CBC with Auto Differential; Future  -     Comprehensive Metabolic Panel; Future  -     Cancer Antigen 19-9; Future    Adverse effect of chemotherapy, subsequent encounter    Care plan discussed with patient    Chemotherapy-induced neuropathy (HCC)    Chemotherapy-induced fatigue    Cancer related pain  -     HYDROcodone-acetaminophen (NORCO) 7.5-325 MG per tablet; Take 1 tablet by mouth every 6 hours as needed for Pain for up to 30 days. Intended supply: 30 days Max Daily Amount: 4 tablets    Chemotherapy-induced nausea  -     promethazine (PHENERGAN) 12.5 MG tablet;  Take 1 tablet by mouth every 6 hours as needed for Nausea    Chemotherapy management, encounter for    Cancer-related pain      Pancreatic tail adenocarcinoma pT2 N1 M0, June 2021, recurrent disease September 2022 (malignant ascites)  Essentially, node positive third of the pancreas adenocarcinoma. Status post distal pancreatectomy/splenectomy  -Anticipated completion 6-month adjuvant Gemzar/Xeloda on 2/25/2002  -Last CA 19-9 = 10  -Feb 2022Completion of adjuvant treatment with Xeloda/Gemzar. -CT chest/abdomen/pelvis in August 2022. September 2022 recurrent disease with malignant pleural effusion  8/22/2022 CT Abd/Pelvis W IV Contrast (oral) Partial pancreatectomy with absence of the pancreatic body and tail. Multiple soft tissue and hypodense areas in the location of the pancreatic head indicating measuring up to 8 mm indicating cysts, residual tissue and possible soft tissue pancreatic  nodules. A cyst or soft tissue nodule is identified measuring up to 1.2 cm. Indeterminate nodules in the location of the pancreatic head versus lymphadenopathy. Consider MRI pancreas without and with IV contrast.Bilateral extrarenal pelves. 4 mm left renal cyst. Soft tissue and fat herniates into the umbilical region measuring 4.7 cm indicating hernia. There is mesentery within the hernia. No bowel is within the hernia. No evidence of free air. .  Small amount of free fluid is within the abdomen and pelvis. Mesenteric edema of the abdomen and pelvis. Rectosigmoid diverticulosis without acute diverticulitis. Slightly irregular liver contour may indicate early cirrhotic changes. Free fluid within the abdomen and pelvis. Inflammation of the mesentery. 9/12/2022-CA 19-9 1004  9/15/2022 US Guided Parecentesis [facility]) Limited grayscale ultrasound evaluation to assess for peritoneal fluid. 4 quadrant evaluation shows trace fluid around the liver. No more than trace fluid at the right lower quadrant. No significant fluid is seen at the left upper quadrant or left lower quadrant. 9/19/2022 Ascites fluid,paracentesis: Malignant cells present. Adenocarcinoma. 9/19/2022 CT Abdomen WO Contrast ([facility]):Small amount of diffuse ascites within the abdomen, likely malignant ascites given the presence of nodular peritoneal and omental soft tissue thickening compatible with carcinomatosis. Diagnostic paracentesis was performed following this examination with ultrasound guidance. Evidence previous resection of the pancreatic body and tail. There is a soft tissue implant or enlarged lymph node inferior to the celiac artery on the right which measures 1.6 x 1.6 cm, concerning for metastatic disease. There is a small cyst in subtle stable nodularity and patient replacement of the pancreatic head. 10/3/2022-essentially, findings of recurrent metastatic pancreatic adenocarcinoma. Stage IV.    10/14/22-Initiated palliative frontline Gemzar Abraxane every 2 weeks  12/30/22 CA 31-1-427  1/6/23 CT Chest W Contrast No significant interval change. 1/6/23 CT Abd/Pelvis W IV Contrast (oral) Partial pancreatectomy with absence of pancreatic body and tail. No significant change in size or number of a few cyst versus soft tissue nodules in region of pancreatic head. No retroperitoneal lymphadenopathy. Interval resolution of ascites. 1/13/2023-essentially, CT scan showed interval response to treatment. Resolution of prior ascites. Continue palliative chemotherapy with Gemzar/Abraxane. 1/13/23 Decrease Abraxane to 100mg/m2 with each treatment due to development of neuropathy. Plan:  -Continue cycle 5-day 1 palliative Gemzar 1000 mg/M2 and Abraxane 100 mg/M2 every 2 weeks until disease progression or intolerable toxicity. -Repeat CT scans after April 2023  -Repeat CA 19-9 today    Right upper extremity DVT port related-started on Eliquis started on 1/14/2022. Eliquis for 3 months through mid April 2022. Denies any bleeding.   Continue Eliquis    Chemotherapy-induced neuropathy-continue cold gloves/cold socks       PLAN:  RTC with MD in treatment room 4 weeks  CBC, CMP, CA 19-9 today  Proceed C#5 Gemzar Abraxane today and every 2 weeks  Decrease Abraxane to 100mg/m2 with each treatment  Continue cold gloves/socks with treatments  Continue Norco 7.5mg every 6 hrs as needed  Continue Phenergan as needed  Continue Emla cream to port  Continue OTC Imodium as needed  Repeat CT scans in 3 months,early April 2023    IGerard, layne pre charting  as Medical Assistant for Noah Montelongo MD. Electronically signed by Gerard Hartley MA on 2/10/2023 at 3:09 PM CST. Elvira Tobar am scribing for Noah Montelongo MD. Electronically signed by Donell Caba RN on 2/10/2023 at 11:42 AM CST. I, Dr Jocelyn Viera, personally performed the services described in this documentation as scribed by Donell Caba RN in my presence and is both accurate and complete. I have seen, examined and reviewed this patient medication list, appropriate labs and imaging studies. I reviewed relevant medical records and others physicians notes. I discussed the plans of care with the patient. I answered all the questions to the patients satisfaction. I have also reviewed the chief complaint (CC) and part of the history (History of Present Illness (HPI), Past Family Social History [facility]), or Review of Systems (ROS) and made changes when appropriated. (Please note that portions of this note were completed with a voice recognition program. Efforts were made to edit the dictations but occasionally words are mis-transcribed. )Electronically signed by Noah Montelongo MD on 2/10/2023 at 11:47 AM

## 2023-02-10 ENCOUNTER — HOSPITAL ENCOUNTER (OUTPATIENT)
Dept: INFUSION THERAPY | Age: 78
Discharge: HOME | End: 2023-02-10
Payer: MEDICARE

## 2023-02-10 ENCOUNTER — OFFICE VISIT (OUTPATIENT)
Dept: HEMATOLOGY | Age: 78
End: 2023-02-10
Payer: MEDICARE

## 2023-02-10 VITALS
TEMPERATURE: 98.6 F | HEIGHT: 65 IN | HEART RATE: 86 BPM | RESPIRATION RATE: 18 BRPM | SYSTOLIC BLOOD PRESSURE: 145 MMHG | DIASTOLIC BLOOD PRESSURE: 90 MMHG | OXYGEN SATURATION: 97 % | BODY MASS INDEX: 26.01 KG/M2 | WEIGHT: 156.1 LBS

## 2023-02-10 DIAGNOSIS — C25.9 ADENOCARCINOMA OF PANCREAS (HCC): ICD-10-CM

## 2023-02-10 DIAGNOSIS — R10.84 GENERALIZED ABDOMINAL PAIN: ICD-10-CM

## 2023-02-10 DIAGNOSIS — R53.83 CHEMOTHERAPY-INDUCED FATIGUE: ICD-10-CM

## 2023-02-10 DIAGNOSIS — G62.0 CHEMOTHERAPY-INDUCED NEUROPATHY (HCC): ICD-10-CM

## 2023-02-10 DIAGNOSIS — C25.9 METASTASIS FROM PANCREATIC CANCER (HCC): ICD-10-CM

## 2023-02-10 DIAGNOSIS — G89.3 CANCER RELATED PAIN: ICD-10-CM

## 2023-02-10 DIAGNOSIS — R11.0 CHEMOTHERAPY-INDUCED NAUSEA: ICD-10-CM

## 2023-02-10 DIAGNOSIS — T45.1X5A CHEMOTHERAPY-INDUCED FATIGUE: ICD-10-CM

## 2023-02-10 DIAGNOSIS — T45.1X5A CHEMOTHERAPY-INDUCED NAUSEA: ICD-10-CM

## 2023-02-10 DIAGNOSIS — Z71.89 CARE PLAN DISCUSSED WITH PATIENT: ICD-10-CM

## 2023-02-10 DIAGNOSIS — C25.2 MALIGNANT NEOPLASM OF TAIL OF PANCREAS (HCC): Primary | ICD-10-CM

## 2023-02-10 DIAGNOSIS — T45.1X5D ADVERSE EFFECT OF CHEMOTHERAPY, SUBSEQUENT ENCOUNTER: ICD-10-CM

## 2023-02-10 DIAGNOSIS — T45.1X5A CHEMOTHERAPY-INDUCED NEUROPATHY (HCC): ICD-10-CM

## 2023-02-10 DIAGNOSIS — G89.3 CANCER-RELATED PAIN: ICD-10-CM

## 2023-02-10 DIAGNOSIS — C79.9 METASTASIS FROM PANCREATIC CANCER (HCC): ICD-10-CM

## 2023-02-10 DIAGNOSIS — Z51.11 CHEMOTHERAPY MANAGEMENT, ENCOUNTER FOR: ICD-10-CM

## 2023-02-10 LAB
ALBUMIN SERPL-MCNC: 3.7 G/DL (ref 3.5–5.2)
ALP BLD-CCNC: 85 U/L (ref 35–104)
ALT SERPL-CCNC: 20 U/L (ref 9–52)
ANION GAP SERPL CALCULATED.3IONS-SCNC: 4 MMOL/L (ref 7–19)
AST SERPL-CCNC: 43 U/L (ref 14–36)
BILIRUB SERPL-MCNC: 0.6 MG/DL (ref 0.2–1.3)
BUN BLDV-MCNC: 13 MG/DL (ref 7–17)
CALCIUM SERPL-MCNC: 9.2 MG/DL (ref 8.4–10.2)
CHLORIDE BLD-SCNC: 106 MMOL/L (ref 98–111)
CO2: 28 MMOL/L (ref 22–29)
CREAT SERPL-MCNC: 0.7 MG/DL (ref 0.5–1)
GFR SERPL CREATININE-BSD FRML MDRD: >60 ML/MIN/{1.73_M2}
GLOBULIN: 2.1 G/DL
GLUCOSE BLD-MCNC: 125 MG/DL (ref 74–106)
HCT VFR BLD CALC: 34.5 % (ref 34.1–44.9)
HEMOGLOBIN: 11.5 G/DL (ref 11.2–15.7)
LYMPHOCYTES ABSOLUTE: 3.28 K/UL (ref 1.18–3.74)
LYMPHOCYTES RELATIVE PERCENT: 33 % (ref 19.3–53.1)
MCH RBC QN AUTO: 32 PG (ref 25.6–32.2)
MCHC RBC AUTO-ENTMCNC: 33.3 G/DL (ref 32.3–35.5)
MCV RBC AUTO: 96.1 FL (ref 79.4–94.8)
MONOCYTES ABSOLUTE: 1.95 K/UL (ref 0.24–0.82)
MONOCYTES RELATIVE PERCENT: 19.6 % (ref 4.7–12.5)
NEUTROPHILS ABSOLUTE: 4.13 K/UL (ref 1.56–6.13)
NEUTROPHILS RELATIVE PERCENT: 41.7 % (ref 34–71.1)
PDW BLD-RTO: 17.1 % (ref 11.7–14.4)
PLATELET # BLD: 358 K/UL (ref 182–369)
PMV BLD AUTO: 10.7 FL (ref 7.4–10.4)
POTASSIUM SERPL-SCNC: 4.1 MMOL/L (ref 3.5–5.1)
RBC # BLD: 3.59 M/UL (ref 3.93–5.22)
SODIUM BLD-SCNC: 138 MMOL/L (ref 137–145)
TOTAL PROTEIN: 5.8 G/DL (ref 6.3–8.2)
WBC # BLD: 9.93 K/UL (ref 3.98–10.04)

## 2023-02-10 PROCEDURE — 1123F ACP DISCUSS/DSCN MKR DOCD: CPT | Performed by: INTERNAL MEDICINE

## 2023-02-10 PROCEDURE — 36415 COLL VENOUS BLD VENIPUNCTURE: CPT

## 2023-02-10 PROCEDURE — G8400 PT W/DXA NO RESULTS DOC: HCPCS | Performed by: INTERNAL MEDICINE

## 2023-02-10 PROCEDURE — G8484 FLU IMMUNIZE NO ADMIN: HCPCS | Performed by: INTERNAL MEDICINE

## 2023-02-10 PROCEDURE — 6360000002 HC RX W HCPCS: Performed by: INTERNAL MEDICINE

## 2023-02-10 PROCEDURE — 80053 COMPREHEN METABOLIC PANEL: CPT

## 2023-02-10 PROCEDURE — 1036F TOBACCO NON-USER: CPT | Performed by: INTERNAL MEDICINE

## 2023-02-10 PROCEDURE — 85025 COMPLETE CBC W/AUTO DIFF WBC: CPT

## 2023-02-10 PROCEDURE — 2580000003 HC RX 258: Performed by: INTERNAL MEDICINE

## 2023-02-10 PROCEDURE — G8427 DOCREV CUR MEDS BY ELIG CLIN: HCPCS | Performed by: INTERNAL MEDICINE

## 2023-02-10 PROCEDURE — G8417 CALC BMI ABV UP PARAM F/U: HCPCS | Performed by: INTERNAL MEDICINE

## 2023-02-10 PROCEDURE — 96367 TX/PROPH/DG ADDL SEQ IV INF: CPT

## 2023-02-10 PROCEDURE — 96375 TX/PRO/DX INJ NEW DRUG ADDON: CPT

## 2023-02-10 PROCEDURE — 1090F PRES/ABSN URINE INCON ASSESS: CPT | Performed by: INTERNAL MEDICINE

## 2023-02-10 PROCEDURE — 96413 CHEMO IV INFUSION 1 HR: CPT

## 2023-02-10 PROCEDURE — 96417 CHEMO IV INFUS EACH ADDL SEQ: CPT

## 2023-02-10 PROCEDURE — 99214 OFFICE O/P EST MOD 30 MIN: CPT | Performed by: INTERNAL MEDICINE

## 2023-02-10 RX ORDER — SODIUM CHLORIDE 9 MG/ML
5-250 INJECTION, SOLUTION INTRAVENOUS PRN
Status: DISCONTINUED | OUTPATIENT
Start: 2023-02-10 | End: 2023-02-11 | Stop reason: HOSPADM

## 2023-02-10 RX ORDER — SODIUM CHLORIDE 0.9 % (FLUSH) 0.9 %
5-40 SYRINGE (ML) INJECTION PRN
Status: DISCONTINUED | OUTPATIENT
Start: 2023-02-10 | End: 2023-02-11 | Stop reason: HOSPADM

## 2023-02-10 RX ORDER — PACLITAXEL 100 MG/20ML
100 INJECTION, POWDER, LYOPHILIZED, FOR SUSPENSION INTRAVENOUS ONCE
Status: COMPLETED | OUTPATIENT
Start: 2023-02-10 | End: 2023-02-10

## 2023-02-10 RX ORDER — HYDROCODONE BITARTRATE AND ACETAMINOPHEN 7.5; 325 MG/1; MG/1
1 TABLET ORAL EVERY 6 HOURS PRN
Qty: 60 TABLET | Refills: 0 | Status: SHIPPED | OUTPATIENT
Start: 2023-02-10 | End: 2023-03-12

## 2023-02-10 RX ORDER — HYDROCODONE BITARTRATE AND ACETAMINOPHEN 7.5; 325 MG/1; MG/1
1 TABLET ORAL EVERY 6 HOURS PRN
Qty: 120 TABLET | Refills: 0 | Status: SHIPPED | OUTPATIENT
Start: 2023-02-10 | End: 2023-03-12

## 2023-02-10 RX ORDER — PALONOSETRON 0.05 MG/ML
0.25 INJECTION, SOLUTION INTRAVENOUS ONCE
Status: COMPLETED | OUTPATIENT
Start: 2023-02-10 | End: 2023-02-10

## 2023-02-10 RX ORDER — HEPARIN SODIUM (PORCINE) LOCK FLUSH IV SOLN 100 UNIT/ML 100 UNIT/ML
500 SOLUTION INTRAVENOUS PRN
Status: DISCONTINUED | OUTPATIENT
Start: 2023-02-10 | End: 2023-02-11 | Stop reason: HOSPADM

## 2023-02-10 RX ORDER — PROMETHAZINE HYDROCHLORIDE 12.5 MG/1
12.5 TABLET ORAL EVERY 6 HOURS PRN
Qty: 30 TABLET | Refills: 5 | Status: SHIPPED | OUTPATIENT
Start: 2023-02-10

## 2023-02-10 RX ADMIN — PACLITAXEL 183 MG: 100 INJECTION, POWDER, LYOPHILIZED, FOR SUSPENSION INTRAVENOUS at 12:08

## 2023-02-10 RX ADMIN — PALONOSETRON 0.25 MG: 0.05 INJECTION, SOLUTION INTRAVENOUS at 11:49

## 2023-02-10 RX ADMIN — Medication 500 UNITS: at 13:29

## 2023-02-10 RX ADMIN — GEMCITABINE HYDROCHLORIDE 1830 MG: 1 INJECTION, SOLUTION INTRAVENOUS at 12:53

## 2023-02-10 RX ADMIN — SODIUM CHLORIDE 50 ML/HR: 9 INJECTION, SOLUTION INTRAVENOUS at 11:50

## 2023-02-10 RX ADMIN — SODIUM CHLORIDE, PRESERVATIVE FREE 10 ML: 5 INJECTION INTRAVENOUS at 13:29

## 2023-02-10 RX ADMIN — DEXAMETHASONE SODIUM PHOSPHATE: 100 INJECTION INTRAMUSCULAR; INTRAVENOUS at 11:49

## 2023-02-24 ENCOUNTER — HOSPITAL ENCOUNTER (OUTPATIENT)
Dept: INFUSION THERAPY | Age: 78
Discharge: HOME | End: 2023-02-24
Payer: MEDICARE

## 2023-02-24 VITALS
TEMPERATURE: 98.3 F | WEIGHT: 158.5 LBS | HEART RATE: 87 BPM | SYSTOLIC BLOOD PRESSURE: 148 MMHG | DIASTOLIC BLOOD PRESSURE: 88 MMHG | OXYGEN SATURATION: 98 % | HEIGHT: 65 IN | BODY MASS INDEX: 26.41 KG/M2 | RESPIRATION RATE: 20 BRPM

## 2023-02-24 DIAGNOSIS — C25.9 ADENOCARCINOMA OF PANCREAS (HCC): Primary | ICD-10-CM

## 2023-02-24 DIAGNOSIS — C25.9 ADENOCARCINOMA OF PANCREAS (HCC): ICD-10-CM

## 2023-02-24 DIAGNOSIS — C25.2 MALIGNANT NEOPLASM OF TAIL OF PANCREAS (HCC): ICD-10-CM

## 2023-02-24 DIAGNOSIS — C25.9 METASTASIS FROM PANCREATIC CANCER (HCC): ICD-10-CM

## 2023-02-24 DIAGNOSIS — C79.9 METASTASIS FROM PANCREATIC CANCER (HCC): ICD-10-CM

## 2023-02-24 LAB
ALBUMIN SERPL-MCNC: 3.3 G/DL (ref 3.5–5.2)
ALP BLD-CCNC: 69 U/L (ref 35–104)
ALT SERPL-CCNC: 23 U/L (ref 9–52)
ANION GAP SERPL CALCULATED.3IONS-SCNC: 2 MMOL/L (ref 7–19)
AST SERPL-CCNC: 47 U/L (ref 14–36)
BILIRUB SERPL-MCNC: 0.8 MG/DL (ref 0.2–1.3)
BUN BLDV-MCNC: 14 MG/DL (ref 7–17)
CA 19-9: 171 U/ML (ref 0–35)
CALCIUM SERPL-MCNC: 8.7 MG/DL (ref 8.4–10.2)
CHLORIDE BLD-SCNC: 101 MMOL/L (ref 98–111)
CO2: 28 MMOL/L (ref 22–29)
CREAT SERPL-MCNC: 0.7 MG/DL (ref 0.5–1)
GFR SERPL CREATININE-BSD FRML MDRD: >60 ML/MIN/{1.73_M2}
GLOBULIN: 2.4 G/DL
GLUCOSE BLD-MCNC: 121 MG/DL (ref 74–106)
HCT VFR BLD CALC: 34.3 % (ref 34.1–44.9)
HEMOGLOBIN: 11.3 G/DL (ref 11.2–15.7)
LYMPHOCYTES ABSOLUTE: 2.12 K/UL (ref 1.18–3.74)
LYMPHOCYTES RELATIVE PERCENT: 25.8 % (ref 19.3–53.1)
MCH RBC QN AUTO: 32.1 PG (ref 25.6–32.2)
MCHC RBC AUTO-ENTMCNC: 32.9 G/DL (ref 32.3–35.5)
MCV RBC AUTO: 97.4 FL (ref 79.4–94.8)
MONOCYTES ABSOLUTE: 1.24 K/UL (ref 0.24–0.82)
MONOCYTES RELATIVE PERCENT: 15.1 % (ref 4.7–12.5)
NEUTROPHILS ABSOLUTE: 4.69 K/UL (ref 1.56–6.13)
NEUTROPHILS RELATIVE PERCENT: 57 % (ref 34–71.1)
PDW BLD-RTO: 16.6 % (ref 11.7–14.4)
PLATELET # BLD: 347 K/UL (ref 182–369)
PMV BLD AUTO: 10.3 FL (ref 7.4–10.4)
POTASSIUM SERPL-SCNC: 3.9 MMOL/L (ref 3.5–5.1)
RBC # BLD: 3.52 M/UL (ref 3.93–5.22)
SODIUM BLD-SCNC: 131 MMOL/L (ref 137–145)
TOTAL PROTEIN: 5.7 G/DL (ref 6.3–8.2)
WBC # BLD: 8.22 K/UL (ref 3.98–10.04)

## 2023-02-24 PROCEDURE — 85025 COMPLETE CBC W/AUTO DIFF WBC: CPT

## 2023-02-24 PROCEDURE — 2580000003 HC RX 258: Performed by: INTERNAL MEDICINE

## 2023-02-24 PROCEDURE — 80053 COMPREHEN METABOLIC PANEL: CPT

## 2023-02-24 PROCEDURE — 96417 CHEMO IV INFUS EACH ADDL SEQ: CPT

## 2023-02-24 PROCEDURE — 36415 COLL VENOUS BLD VENIPUNCTURE: CPT

## 2023-02-24 PROCEDURE — 6360000002 HC RX W HCPCS: Performed by: INTERNAL MEDICINE

## 2023-02-24 PROCEDURE — 96413 CHEMO IV INFUSION 1 HR: CPT

## 2023-02-24 PROCEDURE — 96375 TX/PRO/DX INJ NEW DRUG ADDON: CPT

## 2023-02-24 RX ORDER — SODIUM CHLORIDE 0.9 % (FLUSH) 0.9 %
5-40 SYRINGE (ML) INJECTION PRN
Status: DISCONTINUED | OUTPATIENT
Start: 2023-02-24 | End: 2023-02-25 | Stop reason: HOSPADM

## 2023-02-24 RX ORDER — PACLITAXEL 100 MG/20ML
100 INJECTION, POWDER, LYOPHILIZED, FOR SUSPENSION INTRAVENOUS ONCE
Status: COMPLETED | OUTPATIENT
Start: 2023-02-24 | End: 2023-02-24

## 2023-02-24 RX ORDER — SODIUM CHLORIDE 9 MG/ML
5-250 INJECTION, SOLUTION INTRAVENOUS PRN
Status: DISCONTINUED | OUTPATIENT
Start: 2023-02-24 | End: 2023-02-25 | Stop reason: HOSPADM

## 2023-02-24 RX ORDER — HEPARIN SODIUM (PORCINE) LOCK FLUSH IV SOLN 100 UNIT/ML 100 UNIT/ML
500 SOLUTION INTRAVENOUS PRN
Status: DISCONTINUED | OUTPATIENT
Start: 2023-02-24 | End: 2023-02-25 | Stop reason: HOSPADM

## 2023-02-24 RX ORDER — PALONOSETRON 0.05 MG/ML
0.25 INJECTION, SOLUTION INTRAVENOUS ONCE
Status: COMPLETED | OUTPATIENT
Start: 2023-02-24 | End: 2023-02-24

## 2023-02-24 RX ADMIN — DEXAMETHASONE SODIUM PHOSPHATE: 10 INJECTION, SOLUTION INTRAMUSCULAR; INTRAVENOUS at 11:52

## 2023-02-24 RX ADMIN — PALONOSETRON 0.25 MG: 0.05 INJECTION, SOLUTION INTRAVENOUS at 11:51

## 2023-02-24 RX ADMIN — HEPARIN 500 UNITS: 100 SYRINGE at 13:26

## 2023-02-24 RX ADMIN — SODIUM CHLORIDE, PRESERVATIVE FREE 10 ML: 5 INJECTION INTRAVENOUS at 13:26

## 2023-02-24 RX ADMIN — PACLITAXEL 183 MG: 100 INJECTION, POWDER, LYOPHILIZED, FOR SUSPENSION INTRAVENOUS at 12:11

## 2023-02-24 RX ADMIN — GEMCITABINE HYDROCHLORIDE 1830 MG: 1 INJECTION, SOLUTION INTRAVENOUS at 12:52

## 2023-02-24 RX ADMIN — SODIUM CHLORIDE 100 ML/HR: 9 INJECTION, SOLUTION INTRAVENOUS at 11:52

## 2023-02-28 ENCOUNTER — APPOINTMENT (OUTPATIENT)
Dept: GENERAL RADIOLOGY | Age: 78
End: 2023-02-28
Payer: MEDICARE

## 2023-02-28 ENCOUNTER — HOSPITAL ENCOUNTER (EMERGENCY)
Age: 78
Discharge: HOME | End: 2023-02-28
Payer: MEDICARE

## 2023-02-28 VITALS
DIASTOLIC BLOOD PRESSURE: 80 MMHG | RESPIRATION RATE: 16 BRPM | TEMPERATURE: 98.7 F | SYSTOLIC BLOOD PRESSURE: 156 MMHG | OXYGEN SATURATION: 94 % | HEART RATE: 78 BPM

## 2023-02-28 DIAGNOSIS — J34.89 RHINORRHEA: Primary | ICD-10-CM

## 2023-02-28 DIAGNOSIS — C80.1 CANCER (HCC): ICD-10-CM

## 2023-02-28 LAB
ADENOVIRUS BY PCR: NOT DETECTED
ALBUMIN SERPL-MCNC: 3.2 G/DL (ref 3.5–5.2)
ALP BLD-CCNC: 71 U/L (ref 35–104)
ALT SERPL-CCNC: 32 U/L (ref 5–33)
ANION GAP SERPL CALCULATED.3IONS-SCNC: 9 MMOL/L (ref 7–19)
ANISOCYTOSIS: ABNORMAL
AST SERPL-CCNC: 64 U/L (ref 5–32)
BACTERIA: NEGATIVE /HPF
BASOPHILS ABSOLUTE: 0.1 K/UL (ref 0–0.2)
BASOPHILS RELATIVE PERCENT: 2 % (ref 0–1)
BILIRUB SERPL-MCNC: 0.8 MG/DL (ref 0.2–1.2)
BILIRUBIN URINE: NEGATIVE
BLOOD, URINE: ABNORMAL
BORDETELLA PARAPERTUSSIS BY PCR: NOT DETECTED
BORDETELLA PERTUSSIS BY PCR: NOT DETECTED
BUN BLDV-MCNC: 15 MG/DL (ref 8–23)
BURR CELLS: ABNORMAL
CALCIUM SERPL-MCNC: 8.8 MG/DL (ref 8.8–10.2)
CHLAMYDOPHILIA PNEUMONIAE BY PCR: NOT DETECTED
CHLORIDE BLD-SCNC: 97 MMOL/L (ref 98–111)
CLARITY: CLEAR
CO2: 25 MMOL/L (ref 22–29)
COLOR: YELLOW
CORONAVIRUS 229E BY PCR: NOT DETECTED
CORONAVIRUS HKU1 BY PCR: NOT DETECTED
CORONAVIRUS NL63 BY PCR: NOT DETECTED
CORONAVIRUS OC43 BY PCR: NOT DETECTED
CREAT SERPL-MCNC: 0.5 MG/DL (ref 0.5–0.9)
CRYSTALS, UA: ABNORMAL /HPF
EOSINOPHILS ABSOLUTE: 0.07 K/UL (ref 0–0.6)
EOSINOPHILS RELATIVE PERCENT: 1 % (ref 0–5)
EPITHELIAL CELLS, UA: 6 /HPF (ref 0–5)
GFR SERPL CREATININE-BSD FRML MDRD: >60 ML/MIN/{1.73_M2}
GLUCOSE BLD-MCNC: 109 MG/DL (ref 74–109)
GLUCOSE URINE: NEGATIVE MG/DL
HCT VFR BLD CALC: 32.4 % (ref 37–47)
HEMOGLOBIN: 11 G/DL (ref 12–16)
HUMAN METAPNEUMOVIRUS BY PCR: NOT DETECTED
HUMAN RHINOVIRUS/ENTEROVIRUS BY PCR: NOT DETECTED
HYALINE CASTS: 5 /HPF (ref 0–8)
IMMATURE GRANULOCYTES #: 0 K/UL
INFLUENZA A BY PCR: NOT DETECTED
INFLUENZA B BY PCR: NOT DETECTED
KETONES, URINE: NEGATIVE MG/DL
LEUKOCYTE ESTERASE, URINE: ABNORMAL
LYMPHOCYTES ABSOLUTE: 1.8 K/UL (ref 1.1–4.5)
LYMPHOCYTES RELATIVE PERCENT: 25 % (ref 20–40)
MACROCYTES: ABNORMAL
MCH RBC QN AUTO: 32.4 PG (ref 27–31)
MCHC RBC AUTO-ENTMCNC: 34 G/DL (ref 33–37)
MCV RBC AUTO: 95.3 FL (ref 81–99)
MONOCYTES ABSOLUTE: 0.1 K/UL (ref 0–0.9)
MONOCYTES RELATIVE PERCENT: 1 % (ref 0–10)
MYCOPLASMA PNEUMONIAE BY PCR: NOT DETECTED
NEUTROPHILS ABSOLUTE: 5 K/UL (ref 1.5–7.5)
NEUTROPHILS RELATIVE PERCENT: 71 % (ref 50–65)
NITRITE, URINE: NEGATIVE
PARAINFLUENZA VIRUS 1 BY PCR: NOT DETECTED
PARAINFLUENZA VIRUS 2 BY PCR: NOT DETECTED
PARAINFLUENZA VIRUS 3 BY PCR: NOT DETECTED
PARAINFLUENZA VIRUS 4 BY PCR: NOT DETECTED
PDW BLD-RTO: 15.9 % (ref 11.5–14.5)
PH UA: 7 (ref 5–8)
PLATELET # BLD: 415 K/UL (ref 130–400)
PLATELET SLIDE REVIEW: ABNORMAL
PMV BLD AUTO: 11.6 FL (ref 9.4–12.3)
POTASSIUM REFLEX MAGNESIUM: 4.3 MMOL/L (ref 3.5–5)
PROTEIN UA: 100 MG/DL
RBC # BLD: 3.4 M/UL (ref 4.2–5.4)
RBC UA: 43 /HPF (ref 0–4)
RESPIRATORY SYNCYTIAL VIRUS BY PCR: NOT DETECTED
S PYO AG THROAT QL: NEGATIVE
SARS-COV-2, PCR: NOT DETECTED
SODIUM BLD-SCNC: 131 MMOL/L (ref 136–145)
SPECIFIC GRAVITY UA: 1.01 (ref 1–1.03)
TOTAL PROTEIN: 6.3 G/DL (ref 6.6–8.7)
UROBILINOGEN, URINE: 1 E.U./DL
WBC # BLD: 7.1 K/UL (ref 4.8–10.8)
WBC UA: 7 /HPF (ref 0–5)

## 2023-02-28 PROCEDURE — 87880 STREP A ASSAY W/OPTIC: CPT

## 2023-02-28 PROCEDURE — 80053 COMPREHEN METABOLIC PANEL: CPT

## 2023-02-28 PROCEDURE — 2580000003 HC RX 258: Performed by: NURSE PRACTITIONER

## 2023-02-28 PROCEDURE — 85025 COMPLETE CBC W/AUTO DIFF WBC: CPT

## 2023-02-28 PROCEDURE — 0202U NFCT DS 22 TRGT SARS-COV-2: CPT

## 2023-02-28 PROCEDURE — 36415 COLL VENOUS BLD VENIPUNCTURE: CPT

## 2023-02-28 PROCEDURE — 71045 X-RAY EXAM CHEST 1 VIEW: CPT

## 2023-02-28 PROCEDURE — 81001 URINALYSIS AUTO W/SCOPE: CPT

## 2023-02-28 PROCEDURE — 87081 CULTURE SCREEN ONLY: CPT

## 2023-02-28 PROCEDURE — 99284 EMERGENCY DEPT VISIT MOD MDM: CPT

## 2023-02-28 RX ORDER — LORATADINE 10 MG/1
10 TABLET ORAL DAILY
Qty: 30 TABLET | Refills: 0 | Status: SHIPPED | OUTPATIENT
Start: 2023-02-28

## 2023-02-28 RX ORDER — 0.9 % SODIUM CHLORIDE 0.9 %
500 INTRAVENOUS SOLUTION INTRAVENOUS ONCE
Status: COMPLETED | OUTPATIENT
Start: 2023-02-28 | End: 2023-02-28

## 2023-02-28 RX ORDER — HEPARIN SODIUM (PORCINE) LOCK FLUSH IV SOLN 100 UNIT/ML 100 UNIT/ML
SOLUTION INTRAVENOUS
Status: DISCONTINUED
Start: 2023-02-28 | End: 2023-02-28 | Stop reason: HOSPADM

## 2023-02-28 RX ADMIN — SODIUM CHLORIDE 500 ML: 9 INJECTION, SOLUTION INTRAVENOUS at 10:05

## 2023-02-28 ASSESSMENT — ENCOUNTER SYMPTOMS
NAUSEA: 1
COUGH: 1
ABDOMINAL PAIN: 0
VOMITING: 0
SORE THROAT: 1
RHINORRHEA: 1

## 2023-02-28 NOTE — ED NOTES
Called Dr. Howie Gallego office for Conner Curiel. Left call back.  Dr. Chema Hunt is covering     Nhanalycia Weeks  02/28/23 6713

## 2023-02-28 NOTE — ED PROVIDER NOTES
[facility]  eMERGENCY dEPARTMENT eNCOUnter      Pt Name: Davin Willson  MRN: [id number]  [place] 1945  Date of evaluation: 2/28/2023  Provider: Charlie Santizo, [address]       Chief Complaint   Patient presents with    Pharyngitis     X1 week      Headache     X1 week      Nausea     From chemo ( hx pancreatic cx )         HISTORY OF PRESENT ILLNESS   (Location/Symptom, Timing/Onset,Context/Setting, Quality, Duration, Modifying Factors, Severity)  Note limiting factors. Davin Willson is a 69 y.o. female who presents to the emergency department with a runny nose x several months. + sore throat from it and 100.4 fever last week once. No fever since then or now. Pt is a a pancreatitc cancer pt. Has chemo every 2 weeks and see Claudino. Complains of getting weaker and weaker. Has had several respiratory panels that have been negative     The history is provided by the patient. Pharyngitis  Associated symptoms: cough, fever, headaches and rhinorrhea    Associated symptoms: no abdominal pain    Headache  Associated symptoms: congestion, cough, fever, nausea and sore throat    Associated symptoms: no abdominal pain and no vomiting      NursingNotes were reviewed. REVIEW OF SYSTEMS    (2-9 systems for level 4, 10 or more for level 5)     Review of Systems   Constitutional:  Positive for fever. HENT:  Positive for congestion, rhinorrhea and sore throat. Respiratory:  Positive for cough. Gastrointestinal:  Positive for nausea. Negative for abdominal pain and vomiting. Neurological:  Positive for headaches. Except as noted above the remainder of the review of systems was reviewed and negative.        PAST MEDICAL HISTORY     Past Medical History:   Diagnosis Date    Anxiety     Arthritis     Depression     Hypertension     Hypoglycemia     if fasting for very long    Pancreatic cancer ([facility] [address] 06/2021    s/p [place] by Dr. Louann Reyes    Shoulder pain     incompleted rcr; possible injury    Thyroid disease          SURGICALHISTORY       Past Surgical History:   Procedure Laterality Date    CHOLECYSTECTOMY  1991    COLONOSCOPY  2020    Dr Maicol De La Torre, COLON, DIAGNOSTIC      HYSTERECTOMY (CERVIX STATUS UNKNOWN)  1994    PANCREATECTOMY  06/28/2021    PORT SURGERY Right 9/21/2021    Single lumen PORT INSERTION with ultrasound and fluoroscopy performed by Gladys Andrade DO at [facility] [address] Right 1/13/2022    RIGHT SIDE MEDIPORT REMOVAL, LEFT SIDE MEDIPORT INSERTION [facility] ARTHROSCOPY SHOULDER W/ROTATOR CUFF RPR Right 10/19/2017    SHOULDER ARTHROSCOPY ROTATOR CUFF REPAIR/ DEBRIDEMENT BICEPS TENODESIS/ TENOTOMY SUBACROMIAL DECOMPRESSION/ DISTAL CLAVICLE EXCISION performed by Greg Morse MD at [facility]  06/28/2021    UPPER GASTROINTESTINAL ENDOSCOPY  06/2021    Dr. Brenda Hernandez in [address] [place] [address]      spur excision         CURRENT MEDICATIONS       Discharge Medication List as of 2/28/2023 12:54 PM        CONTINUE these medications which have NOT CHANGED    Details   !! HYDROcodone-acetaminophen (NORCO) 7.5-325 MG per tablet Take 1 tablet by mouth every 6 hours as needed for Pain for up to 30 days. Intended supply: 30 days, Disp-60 tablet, R-0Normal      !! promethazine (PHENERGAN) 12.5 MG tablet Take 1 tablet by mouth every 6 hours as needed for Nausea, Disp-30 tablet, R-5Normal      !! HYDROcodone-acetaminophen (NORCO) 7.5-325 MG per tablet Take 1 tablet by mouth every 6 hours as needed for Pain for up to 30 days. Intended supply: 30 days Max Daily Amount: 4 tablets, Disp-120 tablet, R-0Normal      lipase-protease-amylase (CREON) 66331-70521 units delayed release capsule Take 1 capsule by mouth 3 times daily (with meals), Disp-90 capsule, R-5Normal      !! lidocaine-prilocaine (EMLA) 2.5-2.5 % cream Apply topically as needed. , Disp-30 g, R-5, Normal      !! promethazine (PHENERGAN) 12.5 MG tablet Take 1 tablet by mouth 3 times daily as needed for Nausea, Disp-30 tablet, R-5Normal      capecitabine (XELODA) 500 MG chemo tablet Take 3 tablets by mouth 2 times a day for 14 days of a 28 day cycle, Disp-84 tablet, R-5Normal      losartan-hydroCHLOROthiazide (HYZAAR) 50-12.5 MG per tablet Take 1 tablet by mouth daily Indications: High Blood Pressure DisorderHistorical Med      levothyroxine (SYNTHROID) 137 MCG tablet Take 137 mcg by mouth Daily Indications: Underactive ThyroidHistorical Med      Cholecalciferol (VITAMIN D3) 10 MCG (400 UNIT) CAPS Take 1 capsule by mouth daily Historical Med      Magic Mouthwash (MIRACLE MOUTHWASH) Swish and spit 5 mLs 4 times daily as needed for Irritation 1/3 viscous lidocaine, 1/3 benadryl, 1/3 Maalox. , Disp-480 mL, R-1Normal      !! lidocaine-prilocaine (EMLA) 2.5-2.5 % cream Apply topically as needed. , Disp-30 g, R-2, Normal      Caltrate 600+D Plus Minerals (CALTRATE) 600-800 MG-UNIT TABS tablet Take 1 tablet by mouth dailyHistorical Med      busPIRone (BUSPAR) 5 MG tablet Take 5 mg by mouth 3 times daily Historical Med      ondansetron (ZOFRAN) 4 MG tablet Take 1 tablet by mouth every 8 hours as needed for Nausea or Vomiting, Disp-10 tablet, R-0Print       !! - Potential duplicate medications found. Please discuss with provider.                Diclofenac, Oxycodone-acetaminophen, and Doxycycline    FAMILY HISTORY       Family History   Adopted: Yes   Problem Relation Age of Onset    Breast Cancer Maternal Aunt     Breast Cancer Daughter     Cancer Daughter         Bladder    Cancer Maternal Uncle         Bladder          SOCIAL HISTORY       Social History     Socioeconomic History    Marital status:      Spouse name: None    Number of children: None    Years of education: None    Highest education level: None   Tobacco Use    Smoking status: Never    Smokeless tobacco: Never   Vaping Use    Vaping Use: Never used   Substance and Sexual Activity    Alcohol use: Not Currently     Comment: rare    Drug use: No       SCREENINGS    Gabriella Coma Scale  Eye Opening: Spontaneous  Best Verbal Response: Oriented  Best Motor Response: Obeys commands  Gabriella Coma Scale Score: 15        PHYSICAL EXAM    (up to 7 for level 4, 8 or more for level 5)     ED Triage Vitals [02/28/23 0912]   BP Temp Temp Source Heart Rate Resp SpO2 Height Weight   (!) 175/95 98.7 °F (37.1 °C) Oral 99 18 95 % -- --       Physical Exam  Vitals and nursing note reviewed. Constitutional:       Appearance: Normal appearance. She is well-developed. HENT:      Head: Normocephalic and atraumatic. Right Ear: There is impacted cerumen. Left Ear: There is impacted cerumen. Mouth/Throat:      Mouth: Mucous membranes are moist.   Eyes:      General: No scleral icterus. Right eye: No discharge. Left eye: No discharge. Cardiovascular:      Rate and Rhythm: Normal rate. Pulmonary:      Effort: No respiratory distress. Abdominal:      Palpations: Abdomen is soft. Tenderness: There is no abdominal tenderness. Musculoskeletal:      Cervical back: Normal range of motion and neck supple. Neurological:      Mental Status: She is alert and oriented to person, place, and time. Psychiatric:         Behavior: Behavior normal.       RESULTS     EKG: All EKG's are interpreted by the Emergency Department Physician who either signs or Co-signsthis chart in the absence of a cardiologist.        RADIOLOGY:   Non-plain filmimages such as CT, Ultrasound and MRI are read by the radiologist. Plain radiographic images are visualized and preliminarily interpreted by the emergency physician with the below findings:      Interpretation per the Radiologist below, if available at the time of this note:    XR CHEST PORTABLE   Final Result   NO ACUTE CARDIOPULMONARY PROCESS. NO EVIDENCE OF AIRSPACE CONSOLIDATION OR PULMONARY VENOUS CONGESTION.             ED BEDSIDEULTRASOUND:   Performed by ED Physician -none    LABS:  Labs Reviewed   CBC WITH AUTO DIFFERENTIAL - Abnormal; Notable for the following components:       Result Value    RBC 3.40 (*)     Hemoglobin 11.0 (*)     Hematocrit 32.4 (*)     MCH 32.4 (*)     RDW 15.9 (*)     Platelets 217 (*)     Neutrophils % 71.0 (*)     Basophils % 2.0 (*)     Anisocytosis 1+ (*)     Macrocytes 1+ (*)     Ramu Cells Occasional (*)     All other components within normal limits   COMPREHENSIVE METABOLIC PANEL W/ REFLEX TO MG FOR LOW K - Abnormal; Notable for the following components:    Sodium 131 (*)     Chloride 97 (*)     Total Protein 6.3 (*)     Albumin 3.2 (*)     AST 64 (*)     All other components within normal limits   URINALYSIS WITH REFLEX TO CULTURE - Abnormal; Notable for the following components:    Blood, Urine MODERATE (*)     Protein,  (*)     Leukocyte Esterase, Urine SMALL (*)     All other components within normal limits   MICROSCOPIC URINALYSIS - Abnormal; Notable for the following components:    Bacteria, UA NEGATIVE (*)     Crystals, UA NEG (*)     WBC, UA 7 (*)     RBC, UA 43 (*)     All other components within normal limits   RESPIRATORY PANEL, MOLECULAR, WITH COVID-19   RAPID STREP SCREEN   CULTURE, BETA STREP CONFIRM PLATES       All other labs were within normal range or not returned as of this dictation. EMERGENCY DEPARTMENT COURSE and DIFFERENTIALDIAGNOSIS/MDM:   Vitals:    Vitals:    02/28/23 0912 02/28/23 1130 02/28/23 1308   BP: (!) 175/95 (!) 174/90 (!) 156/80   Pulse: 99 88 78   Resp: 18 17 16   Temp: 98.7 °F (37.1 °C)     TempSrc: Oral     SpO2: 95% 92% 94%           MDM  Number of Diagnoses or Management Options  Cancer [facility]): new and requires workup  Rhinorrhea: new and requires workup  Diagnosis management comments: Workup is benign and pt has had fluids. NO reason for admission. Spoke to Dr Carlitos Ferrell. Will followup with [facility] as scheduled and skip the next chemo.   Will try an antihistamine for her runny nose Amount and/or Complexity of Data Reviewed  Clinical lab tests: ordered and reviewed  Tests in the radiology section of CPT®: ordered  Discuss the patient with other providers: yes               CONSULTS:  None    PROCEDURES:  Unless otherwise noted below, none     Procedures    FINAL IMPRESSION      1. Rhinorrhea    2. Cancer [facility])        DISPOSITION/PLAN   DISPOSITION Decision To Discharge 02/28/2023 12:16:22 PM      PATIENT REFERRED TO:  No follow-up provider specified.     DISCHARGE MEDICATIONS:  Discharge Medication List as of 2/28/2023 12:54 PM        START taking these medications    Details   loratadine (CLARITIN) 10 MG tablet Take 1 tablet by mouth daily, Disp-30 tablet, R-0Normal                (Please note that portions of this note were completed with a voice recognitionprogram.  Efforts were made to edit the dictations but occasionally words are mis-transcribed.)    MACHELLE Pierce (electronically signed)           MACHELLE Pierce  02/28/23 5000

## 2023-03-02 LAB — S PYO THROAT QL CULT: NORMAL

## 2023-03-08 NOTE — PROGRESS NOTES
MEDICAL ONCOLOGY PROGRESS NOTE    Pt Name: Tracy Wood  MRN: [id number]  YOB: 1945  Date of evaluation: 3/10/2023    HISTORY OF PRESENT ILLNESS:    Patient is a pleasant 69years old female who has been diagnosed with invasive  adenocarcinoma of the pancreas, node positive. She underwent laparoscopic assisted distal pancreatectomy and splenectomy in June 2021 at [facility] she is a status post completion of adjuvant chemotherapy with Gemzar/Xeloda completed February 2022. Unfortunately, she had recurrent disease with massive ascites. CT-guided paracentesis cytology positive metastatic adenocarcinoma. CA 19-9 elevated. She is good receiving palliative chemotherapy with Gemzar/Abraxane. Abraxane was reduced 100 mg/M2. She has complaints of occasional swelling hands and feet bilaterally. He has mild sensorial neuropathy. Tolerating treatments complains of mild fatigue. She denies any abdominal complaints. 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 Surgeon Dr. Dick Aguayo [address] [place] [address] Drive Ne   8/13/2021-02/25/22-adjuvant Gemzar 1000 mg/m2 IV D 1, D8, D15 with Capecitabine 830 mg/m2 D1-21 every 28 days x6 cycles  1/14/22 Eliquis for DVT right upper extremity November 2022  10/14/22-Initiated palliative frontline Gemzar Abraxane every 2 weeks  1/13/23 Decrease Abraxane to 100mg/m2 with each treatment    Cancer History  Serafin Prado was first seen by me on 8/6/2021. The patient was referred for a diagnosis of pancreatic malignancy. She had initial complaints of abdominal pain. 5/5/20 MRI abdomen MEDICAL [facility]):  Moderate fatty replacement of the pancreas with several small cystic structures within or adjacent to the pancreatic head and uncinate process most suggestive of benign cysts. A follow-up MRI of the abdomen may be obtained in 1 year to assess for stability. No solid lesion is seen. Otherwise unremarkable MRI of the abdomen. 4/22/20 CT abd/pelvis [facility]): Focal inflammatory stranding associated with the pancreatic head. Radiographically I would favor this to represent acute pancreatitis. There is mild associated stranding within the descending and proximal transverse duodenum. There are 2 well-circumscribed hypodensities one in the region of the uncinate process and one slightly anterior to the pancreatic head likely representing sequela of pancreatitis but warranting follow-up on subsequent exam. The body and tail of the pancreas are spared. No evidence of pancreatic necrosis. The patient is status post hysterectomy and cholecystectomy. Bibasilar subsegmental atelectasis. No evidence of nephrolithiasis or obstructive uropathy. Diverticulosis of the distal descending and sigmoid colon without evidence of diverticulitis. 5/5/21 MRI [facility]) There are multiple tiny-to-small foci of increased T2 signal seen within the pancreas, consistent with a combination of cysts and/or beaded dilatation of the tributaries of the main pancreatic duct, increased in size and number when compared to the prior similar study of 5/5/20. The patient is again noted to be status post cholecystectomy. The examination is otherwise within normal limits for age with no abnormal postcontrast enhancement noted. 6/3/21 Upper EUS (Dr Angelina Rene/[place]): CT Visualized portion of pancreas abnormal on ultrasound. There is a 26.7 mm solid mass in pancreatic tail. It abuts the splenic vessel without occlusion or varices. Pancreatic duct is dilated to 6 mm in the tail proximal to the mass. FNA x4 yields atypical cells. Celiac axis, liver appear normal, no local nodes. Body and head of pancreas normal.  6/3/21 Pancreas tail mass, FNA aspiration smear (x2), ThinPrep and cell block: Adenocarcinoma. 6/7/21 CA 19-9 407.1  6/28/21-she underwent Whipple procedure pancreatectomy by Dr. Ethel Zamudio: Invasive ductal carcinoma 4.3 cm in greatest dimension, invasive in the peripancreatic adipose tissue, diffuse perineural invasion. Surgical margins were negative for carcinoma. The pancreas margins involved by a low-grade dysplasia. 1/6 nodes positive for metastatic cancer with direct extension. Distal fibrosis and pancreatitis was noted. Spleen was unremarkable. Pathological stage pT2 N1 M0  8/6/2021-she was first seen by me. Recommend adjuvant chemotherapy with gemcitabine/Xeloda. She acknowledged understanding and agree with treatment. 8/09/2021 CT Chest W/Contrast No evidence of intrathoracic metastatic disease is identified. 8/09/2021 CT Abd/Pelvis W/Oral Contrast Prior distal pancreatectomy and splenectomy. There is a 1.5 cm circumscribed, nonenhancing cystic lesion along the inferior pancreatic head, perhaps a branch IPMN. Otherwise, no obvious pancreatic solid mass is seen. No suspicious adenopathy or evidence of distant metastatic disease in the abdomen or pelvis. Prior cholecystectomy. Liver steatosis. Colonic diverticulosis. 8/13/2021-initiation of adjuvant capecitabine/Gemzar   9/10/2021-I reviewed CT chest abdomen pelvis. 1/21/2022-CA 19-9 = 10  2/10/22 CT CHEST W CONTRAST No evidence of intrathoracic metastasis. Incidental findings described above. 2/10/22 CT ABDOMEN PELVIS W IV CONTRAST  Partial pancreatectomy and splenectomy. Pancreatic head cysts measuring up to 1.6 cm, very similar to 8/9/2021. Continued surveillance imaging recommended. No evidence of intra-abdominal or pelvic metastasis. 2/18/2022-I reviewed results CT chest abdomen pelvis. No evidence of recurrent disease. Pancreatic head cyst stable. 02/25/22- Completion of adjuvant chemotherapy. 4/22/2022 CA 19-9-13  5/11/2022 Bone Density (BHP) Osteopenia. Femoral Neck T-Score -2.0, Lumbar Spine T-Score -0.9  6/7/2022 Endoscopy ([facility]) Urease was negative  8/5/2022 CT Abd/Pelvis WO Contrast [facility]) Fairly diffuse soft tissue infiltration with haziness and nodularity of there peripheral aspects of the peritoneal cavity most concerning for a process such as peritoneal carcinomatosis. Given history of pancreatic cancer,a PET scan may be considered for further evaluation. Small amount of ascites in the pelvis. Colonic diverticulosis. Large,fat-containing umbilical hernia. Tiny right pleural effusion with atelectasis in the lung bases. 8/22/2022 CT Chest W Contrast Calcified and noncalcified lung nodules may indicate inflammatory or infectious process and old granulomatous disease. Follow-up in 3 months is recommended. Small right pleural effusion. Cirrhotic liver. Free fluid in the abdomen with mesenteric edema upper abdomen. Multiple lymph nodes in region of pancreatic head. 8/22/2022-surveillance  CT Abd/Pelvis W IV Contrast (oral) Partial pancreatectomy with absence of the pancreatic body and tail. Multiple soft tissue and hypodense areas in the location of the pancreatic head indicating measuring up to 8 mm indicating cysts, residual tissue and possible soft tissue pancreatic  nodules. A cyst or soft tissue nodule is identified measuring up to 1.2 cm. Indeterminate nodules in the location of the pancreatic head versus lymphadenopathy. Consider MRI pancreas without and with IV contrast.Bilateral extrarenal pelves. 4 mm left renal cyst. Soft tissue and fat herniates into the umbilical region measuring 4.7 cm indicating hernia. There is mesentery within the hernia. No bowel is within the hernia. No evidence of free air. .  Small amount of free fluid is within the abdomen and pelvis. Mesenteric edema of the abdomen and pelvis. Rectosigmoid diverticulosis without acute diverticulitis. Slightly irregular liver contour may indicate early cirrhotic changes. Free fluid within the abdomen and pelvis. Inflammation of the mesentery. 8/28/2022-I reviewed CT abdomen/pelvis. New onset ascites. Liver is smaller when compared to prior CT scan suggesting hydrophobic disease. Radiology reported peripancreatic nodules. Recommended CA 19-9 reviewed. Recommend MRI pancreas. Recommended paracentesis (send for cytology, total protein, LDH). CMP same day of paracentesis. 9/12/2022 MRI Abdomen W WO Contrast MRCP Hepatic steatosis with a nodular contour suggestive of cirrhosis. Ascites. Absence of the spleen. Absence of the gallbladder. The patient appears to be status post partial pancreatectomy . The nodular lesions identified on the CT scan dated August 19, 2022 are poorly visualized on this examination due to respiratory artifact and technique. Small renal cysts. Midline ventral hernia. 9/12/2022-CA 19-9 1004  9/15/2022 US Guided Parecentesis [facility]) Limited grayscale ultrasound evaluation to assess for peritoneal fluid. 4 quadrant evaluation shows trace fluid around the liver. No more than trace fluid at the right lower quadrant. No significant fluid is seen at the left upper quadrant or left lower quadrant. 9/19/2022 Ascites fluid,paracentesis: Malignant cells present. Adenocarcinoma. 9/19/2022 CT Abdomen WO Contrast ([facility]):Small amount of diffuse ascites within the abdomen, likely malignant ascites given the presence of nodular peritoneal and omental soft tissue thickening compatible with carcinomatosis. Diagnostic paracentesis was performed following this examination with ultrasound guidance. Evidence previous resection of the pancreatic body and tail. There is a soft tissue implant or enlarged lymph node inferior to the celiac artery on the right which measures 1.6 x 1.6 cm, concerning for metastatic disease. There is a small cyst in subtle stable nodularity and patient replacement of the pancreatic head. 10/3/2022-essentially, findings of recurrent metastatic pancreatic adenocarcinoma. Stage IV. Recommend palliative Gemzar 1000 mg/M2 and Abraxane 125 mg/M2 every 2 weeks until disease progression or intolerable toxicity. 10/3/22 Chemotherapy consent signed  10/14/22-Initiated palliative frontline Gemzar Abraxane every 2 weeks  12/30/22 CA 14-4-451  1/6/23 CT Chest W Contrast No significant interval change. 1/6/23 CT Abd/Pelvis W IV Contrast (oral) Partial pancreatectomy with absence of pancreatic body and tail. No significant change in size or number of a few cyst versus soft tissue nodules in region of pancreatic head. No retroperitoneal lymphadenopathy. Interval resolution of ascites. 1/13/2023-essentially, CT scan showed interval response to treatment. Resolution of prior ascites. Continue palliative chemotherapy with Gemzar/Abraxane. 1/13/23 Decrease Abraxane to 100mg/m2 with each treatment due to development of neuropathy.   1/27/23 CA 19-9-360  2/10/23 CA 19-9-238  2/24/23 CA 19-9-171    Past Medical History:    Past Medical History:   Diagnosis Date    Anxiety     Arthritis     Depression     Hypertension     Hypoglycemia     if fasting for very long    Pancreatic cancer ([facility] [address] 06/2021    s/p Whipple by Dr. Braulio Lange    Shoulder pain     incompleted rcr; possible injury    Thyroid disease        Past Surgical History:    Past Surgical History:   Procedure Laterality Date    CHOLECYSTECTOMY  1991    COLONOSCOPY  2020    Dr Iftikhar Haque, COLON, DIAGNOSTIC      HYSTERECTOMY ([address])  [address]  06/28/2021    PORT SURGERY Right 9/21/2021    Single lumen PORT INSERTION with ultrasound and fluoroscopy performed by Arnulfo Rodriguez DO at [address] Right 1/13/2022    RIGHT SIDE MEDIPORT REMOVAL, LEFT SIDE MEDIPORT INSERTION performed by Lorelei Harris DO at [address] ARTHROSCOPY SHOULDER W/ROTATOR CUFF RPR Right 10/19/2017 SHOULDER ARTHROSCOPY ROTATOR CUFF REPAIR/ DEBRIDEMENT BICEPS TENODESIS/ TENOTOMY SUBACROMIAL DECOMPRESSION/ DISTAL CLAVICLE EXCISION performed by Nuris Duggan MD at [address]  06/28/2021    UPPER GASTROINTESTINAL ENDOSCOPY  06/2021    Dr. Elaine Douglas in [address] [place] [address]      spur excision       Social History:    Marital status:   Smoking status: no  ETOH status: no  Resides: [place]    Family History:   Family History   Adopted: Yes   Problem Relation Age of Onset    Breast Cancer Maternal Aunt     Breast Cancer Daughter     Cancer Daughter         Bladder    Cancer Maternal Uncle         Bladder       Current Hospital Medications:    Current Outpatient Medications   Medication Sig Dispense Refill    azithromycin (ZITHROMAX) 250 MG tablet Take 1 tablet by mouth See Admin Instructions for 5 days 500mg on day 1 followed by 250mg on days 2 - 5 6 tablet 0    methylPREDNISolone (MEDROL DOSEPACK) 4 MG tablet Take by mouth. 1 kit 0    loratadine (CLARITIN) 10 MG tablet Take 1 tablet by mouth daily 30 tablet 0    HYDROcodone-acetaminophen (NORCO) 7.5-325 MG per tablet Take 1 tablet by mouth every 6 hours as needed for Pain for up to 30 days. Intended supply: 30 days 60 tablet 0    promethazine (PHENERGAN) 12.5 MG tablet Take 1 tablet by mouth every 6 hours as needed for Nausea 30 tablet 5    HYDROcodone-acetaminophen (NORCO) 7.5-325 MG per tablet Take 1 tablet by mouth every 6 hours as needed for Pain for up to 30 days. Intended supply: 30 days Max Daily Amount: 4 tablets 120 tablet 0    lipase-protease-amylase (CREON) 68298-98158 units delayed release capsule Take 1 capsule by mouth 3 times daily (with meals) 90 capsule 5    lidocaine-prilocaine (EMLA) 2.5-2.5 % cream Apply topically as needed.  30 g 5    promethazine (PHENERGAN) 12.5 MG tablet Take 1 tablet by mouth 3 times daily as needed for Nausea 30 tablet 5    capecitabine (XELODA) 500 MG chemo tablet Take 3 tablets by mouth 2 times a day for 14 days of a 28 day cycle 84 tablet 5    losartan-hydroCHLOROthiazide (HYZAAR) 50-12.5 MG per tablet Take 1 tablet by mouth daily Indications: High Blood Pressure Disorder      levothyroxine (SYNTHROID) 137 MCG tablet Take 137 mcg by mouth Daily Indications: Underactive Thyroid      Cholecalciferol (VITAMIN D3) 10 MCG (400 UNIT) CAPS Take 1 capsule by mouth daily       Magic Mouthwash (MIRACLE MOUTHWASH) Swish and spit 5 mLs 4 times daily as needed for Irritation 1/3 viscous lidocaine, 1/3 benadryl, 1/3 Maalox. 480 mL 1    lidocaine-prilocaine (EMLA) 2.5-2.5 % cream Apply topically as needed. 30 g 2    Caltrate 600+D Plus Minerals (CALTRATE) 600-800 MG-UNIT TABS tablet Take 1 tablet by mouth daily      busPIRone (BUSPAR) 5 MG tablet Take 5 mg by mouth 3 times daily       ondansetron (ZOFRAN) 4 MG tablet Take 1 tablet by mouth every 8 hours as needed for Nausea or Vomiting 10 tablet 0     No current facility-administered medications for this visit. Facility-Administered Medications Ordered in Other Visits   Medication Dose Route Frequency Provider Last Rate Last Admin    sodium chloride flush 0.9 % injection 5-40 mL  5-40 mL IntraVENous PRN Troy Nieves MD   10 mL at 03/10/23 1151    heparin flush 100 UNIT/ML injection 500 Units  500 Units IntraCATHeter PRN Troy Nieves MD   500 Units at 03/10/23 1151       Allergies:    Allergies   Allergen Reactions    Diclofenac Other (See Comments)     Couldn't sleep and face turned blood red    Oxycodone-Acetaminophen Hives     itching    Doxycycline Rash         Subjective   REVIEW OF SYSTEMS:   CONSTITUTIONAL: fever, no night sweats, fatigue;  HEENT: runny nose, no blurring of vision, no double vision, no hearing difficulty, no tinnitus, no ulceration, no dysplasia, no epistaxis;   LUNGS: no cough, no hemoptysis, no wheeze,  no shortness of breath;  CARDIOVASCULAR: no palpitation, no chest pain, no shortness of breath;  GI: no abdominal pain, no nausea, no vomiting, no diarrhea, no constipation;  STUART: no dysuria, no hematuria, no frequency or urgency, no nephrolithiasis;  MUSCULOSKELETAL: no joint pain, no swelling, no stiffness;  ENDOCRINE: no polyuria, no polydipsia, no cold or heat intolerance;  HEMATOLOGY: no easy bruising or bleeding, no history of clotting disorder;  DERMATOLOGY: no skin rash, no eczema, no pruritus;  PSYCHIATRY: no depression, no anxiety, no panic attacks, no suicidal ideation, no homicidal ideation;  NEUROLOGY: headaches, no syncope, no seizures, no numbness or tingling of hands, no numbness or tingling of feet, no paresis;          Objective BP (!) 166/94   Pulse 98   Temp 98.3 °F (36.8 °C)   Resp 20   Ht 5' 5\" (1.651 m)   Wt 155 lb 6.4 oz (70.5 kg)   SpO2 97%   BMI 25.86 kg/m²     PHYSICAL EXAM:  CONSTITUTIONAL: Alert, appropriate, no acute distress  EYES: Non icteric, EOM intact, pupils equal round   ENT: Mucus membranes moist, no oral pharyngeal lesions, external inspection of ears and nose are normal.  NECK: Supple, no masses. No palpable thyroid mass  CHEST/LUNGS: CTA bilaterally, normal respiratory effort   CARDIOVASCULAR: RRR, no murmurs. No lower extremity edema  ABDOMEN: soft non-tender, active bowel sounds, no HSM. No palpable masses  EXTREMITIES: warm, full ROM in all 4 extremities, no focal weakness. SKIN: warm, dry with no rashes or lesions  LYMPH: No cervical, clavicular, axillary, or inguinal lymphadenopathy  NEUROLOGIC: follows commands, non focal   PSYCH: mood and affect appropriate.   Alert and oriented to time, place, person     LABORATORY RESULTS REVIEWED/ANALYZED BY ME:  2/24/23 CA 19-9-171  Lab Results   Component Value Date    WBC 8.74 03/10/2023    HGB 11.5 03/10/2023    HCT 34.5 03/10/2023    MCV 95.8 (H) 03/10/2023     03/10/2023     Lab Results   Component Value Date    NEUTROABS 4.68 03/10/2023     RADIOLOGY STUDIES REVIEWED BY ME:   None    ASSESSMENT:    Orders Placed This Encounter Procedures    Culture, Blood 1     Standing Status:   Future     Number of Occurrences:   1     Standing Expiration Date:   3/10/2024    Culture, Blood 2     Standing Status:   Future     Number of Occurrences:   1     Standing Expiration Date:   3/10/2024    Culture, Blood 1    Culture, Blood 2    MRI BRAIN W WO CONTRAST     Standing Status:   Future     Number of Occurrences:   1     Standing Expiration Date:   3/10/2024     Order Specific Question:   STAT Creatinine as needed:     Answer:   Yes     Order Specific Question:   Reason for exam:     Answer:   NEW ONSET HEADACHES     Order Specific Question:   What is the sedation requirement? Answer:   None          Gaby Aldrich was seen today for pancreatic cancer. Diagnoses and all orders for this visit:    New onset of headache in cancer patient  -     [address]; Future    Recent unexplained fever  -     Culture, Blood 1; Future  -     Culture, Blood 2; Future  -     azithromycin (ZITHROMAX) 250 MG tablet; Take 1 tablet by mouth See Admin Instructions for 5 days 500mg on day 1 followed by 250mg on days 2 - 5  -     methylPREDNISolone (MEDROL DOSEPACK) 4 MG tablet; Take by mouth. Care plan discussed with patient    Subacute maxillary sinusitis    Malignant neoplasm of tail of pancreas ([address]    Metastasis from pancreatic cancer ([address]    Adverse effect of chemotherapy, subsequent encounter    Other orders  -     Culture, Blood 1  -     Culture, Blood 2        Pancreatic tail adenocarcinoma pT2 N1 M0, June 2021, recurrent disease September 2022 (malignant ascites)  Essentially, node positive third of the pancreas adenocarcinoma. Status post distal pancreatectomy/splenectomy  -Anticipated completion 6-month adjuvant Gemzar/Xeloda on 2/25/2002  -Last CA 19-9 = 10  -Feb 2022Completion of adjuvant treatment with Xeloda/Gemzar. -CT chest/abdomen/pelvis in August 2022.   September 2022 recurrent disease with malignant pleural effusion  8/22/2022 CT Abd/Pelvis W IV Contrast (oral) Partial pancreatectomy with absence of the pancreatic body and tail. Multiple soft tissue and hypodense areas in the location of the pancreatic head indicating measuring up to 8 mm indicating cysts, residual tissue and possible soft tissue pancreatic  nodules. A cyst or soft tissue nodule is identified measuring up to 1.2 cm. Indeterminate nodules in the location of the pancreatic head versus lymphadenopathy. Consider MRI pancreas without and with IV contrast.Bilateral extrarenal pelves. 4 mm left renal cyst. Soft tissue and fat herniates into the umbilical region measuring 4.7 cm indicating hernia. There is mesentery within the hernia. No bowel is within the hernia. No evidence of free air. .  Small amount of free fluid is within the abdomen and pelvis. Mesenteric edema of the abdomen and pelvis. Rectosigmoid diverticulosis without acute diverticulitis. Slightly irregular liver contour may indicate early cirrhotic changes. Free fluid within the abdomen and pelvis. Inflammation of the mesentery. 9/12/2022-CA 19-9 1004  9/15/2022 US Guided Parecentesis [facility]) Limited grayscale ultrasound evaluation to assess for peritoneal fluid. 4 quadrant evaluation shows trace fluid around the liver. No more than trace fluid at the right lower quadrant. No significant fluid is seen at the left upper quadrant or left lower quadrant. 9/19/2022 Ascites fluid,paracentesis: Malignant cells present. Adenocarcinoma. 9/19/2022 CT Abdomen WO Contrast ([facility]):Small amount of diffuse ascites within the abdomen, likely malignant ascites given the presence of nodular peritoneal and omental soft tissue thickening compatible with carcinomatosis. Diagnostic paracentesis was performed following this examination with ultrasound guidance. Evidence previous resection of the pancreatic body and tail.  There is a soft tissue implant or enlarged lymph node inferior to the celiac artery on the right which measures 1.6 x 1.6 cm, concerning for metastatic disease. There is a small cyst in subtle stable nodularity and patient replacement of the pancreatic head. 10/3/2022-essentially, findings of recurrent metastatic pancreatic adenocarcinoma. Stage IV.    10/14/22-Initiated palliative frontline Gemzar Abraxane every 2 weeks  12/30/22 CA 22-7-626  1/6/23 CT Chest W Contrast No significant interval change. 1/6/23 CT Abd/Pelvis W IV Contrast (oral) Partial pancreatectomy with absence of pancreatic body and tail. No significant change in size or number of a few cyst versus soft tissue nodules in region of pancreatic head. No retroperitoneal lymphadenopathy. Interval resolution of ascites. 1/13/2023-essentially, CT scan showed interval response to treatment. Resolution of prior ascites. Continue palliative chemotherapy with Gemzar/Abraxane. 1/13/23 Decrease Abraxane to 100mg/m2 with each treatment due to development of neuropathy. Plan:  -Continue cycle 5-day 1 palliative Gemzar 1000 mg/M2 and Abraxane 100 mg/M2 every 2 weeks until disease progression or intolerable toxicity. -Repeat CT scans after April 2023  -Repeat CA 19-9 today    Right upper extremity DVT port related-started on Eliquis started on 1/14/2022. Eliquis for 3 months through mid April 2022. Denies any bleeding. 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 Kathia Lundberg      PLAN:  RTC with MD in treatment room 1 week  CBC, CMP, CA 19-9, blood cultures x2 today  HOLD C#6 Gemzar Abraxane today and every 2 weeks  Blood cultures today  MRI brain ASAP for new onset headaches  Recommend Claritin D as needed  Recommend Medrol dose pack-script sent  Recommend Z-pack-script sent  Continue cold gloves/socks with treatments  Continue Norco 7.5mg every 6 hrs as needed  Continue Phenergan as needed  Continue Emla cream to port  Continue OTC Imodium as needed  Repeat CT scans in 3 months in early April 2023     Luisa ROSAS am pre charting  as Medical Assistant for Radha Rosenthal MD. Electronically signed by Luisa Acharya MA on 3/10/2023 at 12:55 PM CST.    Marciruz ROSAS am scribing for Radha Rosenthal MD. Electronically signed by Maricruz Wiseamn RN on 3/10/2023 at 11:22 AM CST.    I, Dr Radha Rosenthal, personally performed the services described in this documentation as scribed by Maricruz Wiseman RN in my presence and is both accurate and complete.    I have seen, examined and reviewed this patient medication list, appropriate labs and imaging studies. I reviewed relevant medical records and others physician’s notes. I discussed the plans of care with the patient. I answered all the questions to the patient’s satisfaction. I have also reviewed the chief complaint (CC) and part of the history (History of Present Illness (HPI), Past Family Social History (PFSH), or Review of Systems (ROS) and made changes when appropriated.       (Please note that portions of this note were completed with a voice recognition program. Efforts were made to edit the dictations but occasionally words are mis-transcribed.)  Electronically signed by Radha Rosenthal MD on 3/10/2023 at 1:57 PM

## 2023-03-10 ENCOUNTER — OFFICE VISIT (OUTPATIENT)
Dept: HEMATOLOGY | Age: 78
End: 2023-03-10
Payer: MEDICARE

## 2023-03-10 ENCOUNTER — HOSPITAL ENCOUNTER (OUTPATIENT)
Dept: MRI IMAGING | Age: 78
Discharge: HOME | End: 2023-03-10
Payer: MEDICARE

## 2023-03-10 ENCOUNTER — HOSPITAL ENCOUNTER (OUTPATIENT)
Dept: INFUSION THERAPY | Age: 78
Discharge: HOME | End: 2023-03-10
Payer: MEDICARE

## 2023-03-10 VITALS
TEMPERATURE: 98.3 F | HEIGHT: 65 IN | RESPIRATION RATE: 20 BRPM | BODY MASS INDEX: 25.89 KG/M2 | WEIGHT: 155.4 LBS | OXYGEN SATURATION: 97 % | HEART RATE: 98 BPM | DIASTOLIC BLOOD PRESSURE: 94 MMHG | SYSTOLIC BLOOD PRESSURE: 166 MMHG

## 2023-03-10 DIAGNOSIS — C25.9 ADENOCARCINOMA OF PANCREAS (HCC): ICD-10-CM

## 2023-03-10 DIAGNOSIS — C25.2 MALIGNANT NEOPLASM OF TAIL OF PANCREAS (HCC): Primary | ICD-10-CM

## 2023-03-10 DIAGNOSIS — R50.9 RECENT UNEXPLAINED FEVER: ICD-10-CM

## 2023-03-10 DIAGNOSIS — Z71.89 CARE PLAN DISCUSSED WITH PATIENT: ICD-10-CM

## 2023-03-10 DIAGNOSIS — J01.00 SUBACUTE MAXILLARY SINUSITIS: ICD-10-CM

## 2023-03-10 DIAGNOSIS — R51.9 NEW ONSET OF HEADACHE IN CANCER PATIENT: ICD-10-CM

## 2023-03-10 DIAGNOSIS — C25.9 METASTASIS FROM PANCREATIC CANCER (HCC): ICD-10-CM

## 2023-03-10 DIAGNOSIS — C25.2 MALIGNANT NEOPLASM OF TAIL OF PANCREAS (HCC): ICD-10-CM

## 2023-03-10 DIAGNOSIS — T45.1X5D ADVERSE EFFECT OF CHEMOTHERAPY, SUBSEQUENT ENCOUNTER: ICD-10-CM

## 2023-03-10 DIAGNOSIS — R51.9 NEW ONSET OF HEADACHE IN CANCER PATIENT: Primary | ICD-10-CM

## 2023-03-10 DIAGNOSIS — C79.9 METASTASIS FROM PANCREATIC CANCER (HCC): ICD-10-CM

## 2023-03-10 LAB
ALBUMIN SERPL-MCNC: 3.5 G/DL (ref 3.5–5.2)
ALP BLD-CCNC: 84 U/L (ref 35–104)
ALT SERPL-CCNC: 13 U/L (ref 5–33)
ANION GAP SERPL CALCULATED.3IONS-SCNC: 10 MMOL/L (ref 7–19)
AST SERPL-CCNC: 37 U/L (ref 5–32)
BILIRUB SERPL-MCNC: 0.3 MG/DL (ref 0.2–1.2)
BUN BLDV-MCNC: 12 MG/DL (ref 8–23)
CALCIUM SERPL-MCNC: 8.8 MG/DL (ref 8.8–10.2)
CHLORIDE BLD-SCNC: 95 MMOL/L (ref 98–111)
CO2: 24 MMOL/L (ref 22–29)
CREAT SERPL-MCNC: 0.9 MG/DL (ref 0.5–0.9)
GFR SERPL CREATININE-BSD FRML MDRD: >60 ML/MIN/{1.73_M2}
GLUCOSE BLD-MCNC: 132 MG/DL (ref 74–109)
HCT VFR BLD CALC: 34.5 % (ref 34.1–44.9)
HEMOGLOBIN: 11.5 G/DL (ref 11.2–15.7)
LYMPHOCYTES ABSOLUTE: 2.42 K/UL (ref 1.18–3.74)
LYMPHOCYTES RELATIVE PERCENT: 27.7 % (ref 19.3–53.1)
MCH RBC QN AUTO: 31.9 PG (ref 25.6–32.2)
MCHC RBC AUTO-ENTMCNC: 33.3 G/DL (ref 32.3–35.5)
MCV RBC AUTO: 95.8 FL (ref 79.4–94.8)
MONOCYTES ABSOLUTE: 1.46 K/UL (ref 0.24–0.82)
MONOCYTES RELATIVE PERCENT: 16.7 % (ref 4.7–12.5)
NEUTROPHILS ABSOLUTE: 4.68 K/UL (ref 1.56–6.13)
NEUTROPHILS RELATIVE PERCENT: 53.6 % (ref 34–71.1)
PDW BLD-RTO: 15.9 % (ref 11.7–14.4)
PLATELET # BLD: 348 K/UL (ref 182–369)
PMV BLD AUTO: 10.3 FL (ref 7.4–10.4)
POTASSIUM SERPL-SCNC: 4.5 MMOL/L (ref 3.5–5)
RBC # BLD: 3.6 M/UL (ref 3.93–5.22)
SODIUM BLD-SCNC: 129 MMOL/L (ref 136–145)
TOTAL PROTEIN: 6.6 G/DL (ref 6.6–8.7)
WBC # BLD: 8.74 K/UL (ref 3.98–10.04)

## 2023-03-10 PROCEDURE — G8400 PT W/DXA NO RESULTS DOC: HCPCS | Performed by: INTERNAL MEDICINE

## 2023-03-10 PROCEDURE — G8417 CALC BMI ABV UP PARAM F/U: HCPCS | Performed by: INTERNAL MEDICINE

## 2023-03-10 PROCEDURE — 85025 COMPLETE CBC W/AUTO DIFF WBC: CPT

## 2023-03-10 PROCEDURE — 1090F PRES/ABSN URINE INCON ASSESS: CPT | Performed by: INTERNAL MEDICINE

## 2023-03-10 PROCEDURE — 2580000003 HC RX 258: Performed by: INTERNAL MEDICINE

## 2023-03-10 PROCEDURE — G8427 DOCREV CUR MEDS BY ELIG CLIN: HCPCS | Performed by: INTERNAL MEDICINE

## 2023-03-10 PROCEDURE — 6360000004 HC RX CONTRAST MEDICATION: Performed by: INTERNAL MEDICINE

## 2023-03-10 PROCEDURE — 96523 IRRIG DRUG DELIVERY DEVICE: CPT

## 2023-03-10 PROCEDURE — 6360000002 HC RX W HCPCS: Performed by: INTERNAL MEDICINE

## 2023-03-10 PROCEDURE — 70553 MRI BRAIN STEM W/O & W/DYE: CPT

## 2023-03-10 PROCEDURE — 99212 OFFICE O/P EST SF 10 MIN: CPT

## 2023-03-10 PROCEDURE — 99214 OFFICE O/P EST MOD 30 MIN: CPT | Performed by: INTERNAL MEDICINE

## 2023-03-10 PROCEDURE — 1036F TOBACCO NON-USER: CPT | Performed by: INTERNAL MEDICINE

## 2023-03-10 PROCEDURE — G8484 FLU IMMUNIZE NO ADMIN: HCPCS | Performed by: INTERNAL MEDICINE

## 2023-03-10 PROCEDURE — 1123F ACP DISCUSS/DSCN MKR DOCD: CPT | Performed by: INTERNAL MEDICINE

## 2023-03-10 PROCEDURE — A9577 INJ MULTIHANCE: HCPCS | Performed by: INTERNAL MEDICINE

## 2023-03-10 PROCEDURE — 36415 COLL VENOUS BLD VENIPUNCTURE: CPT

## 2023-03-10 RX ORDER — AZITHROMYCIN 250 MG/1
250 TABLET, FILM COATED ORAL SEE ADMIN INSTRUCTIONS
Qty: 6 TABLET | Refills: 0 | Status: SHIPPED | OUTPATIENT
Start: 2023-03-10 | End: 2023-03-15

## 2023-03-10 RX ORDER — METHYLPREDNISOLONE 4 MG/1
TABLET ORAL
Qty: 1 KIT | Refills: 0 | Status: SHIPPED | OUTPATIENT
Start: 2023-03-10 | End: 2023-03-16

## 2023-03-10 RX ORDER — SODIUM CHLORIDE 0.9 % (FLUSH) 0.9 %
5-40 SYRINGE (ML) INJECTION PRN
Status: DISCONTINUED | OUTPATIENT
Start: 2023-03-10 | End: 2023-03-11 | Stop reason: HOSPADM

## 2023-03-10 RX ORDER — HEPARIN SODIUM (PORCINE) LOCK FLUSH IV SOLN 100 UNIT/ML 100 UNIT/ML
500 SOLUTION INTRAVENOUS PRN
Status: DISCONTINUED | OUTPATIENT
Start: 2023-03-10 | End: 2023-03-11 | Stop reason: HOSPADM

## 2023-03-10 RX ADMIN — HEPARIN 500 UNITS: 100 SYRINGE at 11:51

## 2023-03-10 RX ADMIN — SODIUM CHLORIDE, PRESERVATIVE FREE 10 ML: 5 INJECTION INTRAVENOUS at 11:51

## 2023-03-10 RX ADMIN — GADOBENATE DIMEGLUMINE 14 ML: 529 INJECTION, SOLUTION INTRAVENOUS at 13:33

## 2023-03-11 LAB
BLOOD CULTURE, ROUTINE: NORMAL
CULTURE, BLOOD 2: NORMAL

## 2023-03-15 NOTE — PROGRESS NOTES
MEDICAL ONCOLOGY PROGRESS NOTE    Hugh Elena   [id number]  3/16/2023     Chief Complaint   Patient presents with    Cancer     Malignant neoplasm of tail of pancreas         Pt Name: Hugh Elena  MRN: [id number]  YOB: 1945  Date of evaluation: 3/16/2023    HISTORY OF PRESENT ILLNESS:    Patient is a pleasant 69years old female who has been diagnosed with invasive  adenocarcinoma of the pancreas, node positive. She underwent laparoscopic assisted distal pancreatectomy and splenectomy in June 2021 at [facility] she is a status post completion of adjuvant chemotherapy with Gemzar/Xeloda completed February 2022. Unfortunately, she had recurrent disease with massive ascites. CT-guided paracentesis cytology positive metastatic adenocarcinoma. CA 19-9 elevated. She is good receiving palliative chemotherapy with Gemzar/Abraxane. Abraxane was reduced 100 mg/M2. She has complaints of occasional swelling hands and feet bilaterally. He has mild sensorial neuropathy. Tolerating treatments complains of mild fatigue. She denies any abdominal complaints. She has several c/o upper respiratory. She was prescribed steroids and a Z-Jack. She has felt significantly better. 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  Surgeon Dr. Estrada Grimaldo[address]   8/13/2021-02/25/22-adjuvant Gemzar 1000 mg/m2 IV D 1, D8, D15 with Capecitabine 830 mg/m2 D1-21 every 28 days x6 cycles  1/14/22 Eliquis for DVT right upper extremity November 2022  10/14/22-Initiated palliative frontline Gemzar Abraxane every 2 weeks  1/13/23 Decrease Abraxane to 100mg/m2 with each treatment    Cancer History  Akira Gordon was first seen by me on 8/6/2021. The patient was referred for a diagnosis of pancreatic malignancy. She had initial complaints of abdominal pain. 5/5/20 MRI abdomen [facility]): Moderate fatty replacement of the pancreas with several small cystic structures within or adjacent to the pancreatic head and uncinate process most suggestive of benign cysts. A follow-up MRI of the abdomen may be obtained in 1 year to assess for stability. No solid lesion is seen. Otherwise unremarkable MRI of the abdomen. 4/22/20 CT abd/pelvis [facility]): Focal inflammatory stranding associated with the pancreatic head. Radiographically I would favor this to represent acute pancreatitis. There is mild associated stranding within the descending and proximal transverse duodenum. There are 2 well-circumscribed hypodensities one in the region of the uncinate process and one slightly anterior to the pancreatic head likely representing sequela of pancreatitis but warranting follow-up on subsequent exam. The body and tail of the pancreas are spared. No evidence of pancreatic necrosis. The patient is status post hysterectomy and cholecystectomy. Bibasilar subsegmental atelectasis. No evidence of nephrolithiasis or obstructive uropathy. Diverticulosis of the distal descending and sigmoid colon without evidence of diverticulitis. 5/5/21 MRI [facility]) There are multiple tiny-to-small foci of increased T2 signal seen within the pancreas, consistent with a combination of cysts and/or beaded dilatation of the tributaries of the main pancreatic duct, increased in size and number when compared to the prior similar study of 5/5/20. The patient is again noted to be status post cholecystectomy. The examination is otherwise within normal limits for age with no abnormal postcontrast enhancement noted. 6/3/21 Upper EUS (Dr Randa Rene/[place]): CT Visualized portion of pancreas abnormal on ultrasound.  There is a 26.7 mm solid mass in pancreatic tail. It abuts the splenic vessel without occlusion or varices. Pancreatic duct is dilated to 6 mm in the tail proximal to the mass. FNA x4 yields atypical cells. Celiac axis, liver appear normal, no local nodes. Body and head of pancreas normal.  6/3/21 Pancreas tail mass, FNA aspiration smear (x2), ThinPrep and cell block: Adenocarcinoma. 6/7/21 CA 19-9 407.1  6/28/21-she underwent Whipple procedure pancreatectomy by Dr. Heath Washington: Invasive ductal carcinoma 4.3 cm in greatest dimension, invasive in the peripancreatic adipose tissue, diffuse perineural invasion. Surgical margins were negative for carcinoma. The pancreas margins involved by a low-grade dysplasia. 1/6 nodes positive for metastatic cancer with direct extension. Distal fibrosis and pancreatitis was noted. Spleen was unremarkable. Pathological stage pT2 N1 M0  8/6/2021-she was first seen by me. Recommend adjuvant chemotherapy with gemcitabine/Xeloda. She acknowledged understanding and agree with treatment. 8/09/2021 CT Chest W/Contrast No evidence of intrathoracic metastatic disease is identified. 8/09/2021 CT Abd/Pelvis W/Oral Contrast Prior distal pancreatectomy and splenectomy. There is a 1.5 cm circumscribed, nonenhancing cystic lesion along the inferior pancreatic head, perhaps a branch IPMN. Otherwise, no obvious pancreatic solid mass is seen. No suspicious adenopathy or evidence of distant metastatic disease in the abdomen or pelvis. Prior cholecystectomy. Liver steatosis. Colonic diverticulosis. 8/13/2021-initiation of adjuvant capecitabine/Gemzar   9/10/2021-I reviewed CT chest abdomen pelvis. 1/21/2022-CA 19-9 = 10  2/10/22 CT CHEST W CONTRAST No evidence of intrathoracic metastasis. Incidental findings described above. 2/10/22 CT ABDOMEN PELVIS W IV CONTRAST  Partial pancreatectomy and splenectomy. Pancreatic head cysts measuring up to 1.6 cm, very similar to 8/9/2021. Continued surveillance imaging recommended.  No evidence of intra-abdominal or pelvic metastasis. 2/18/2022-I reviewed results CT chest abdomen pelvis. No evidence of recurrent disease. Pancreatic head cyst stable. 02/25/22- Completion of adjuvant chemotherapy. 4/22/2022 CA 19-9-13  5/11/2022 Bone Density ([facility]) Osteopenia. Femoral Neck T-Score -2.0, Lumbar Spine T-Score -0.9  6/7/2022 Endoscopy ([facility]) Urease was negative  8/5/2022 CT Abd/Pelvis WO Contrast [facility]) Fairly diffuse soft tissue infiltration with haziness and nodularity of there peripheral aspects of the peritoneal cavity most concerning for a process such as peritoneal carcinomatosis. Given history of pancreatic cancer,a PET scan may be considered for further evaluation. Small amount of ascites in the pelvis. Colonic diverticulosis. Large,fat-containing umbilical hernia. Tiny right pleural effusion with atelectasis in the lung bases. 8/22/2022 CT Chest W Contrast Calcified and noncalcified lung nodules may indicate inflammatory or infectious process and old granulomatous disease. Follow-up in 3 months is recommended. Small right pleural effusion. Cirrhotic liver. Free fluid in the abdomen with mesenteric edema upper abdomen. Multiple lymph nodes in region of pancreatic head. 8/22/2022-surveillance  CT Abd/Pelvis W IV Contrast (oral) Partial pancreatectomy with absence of the pancreatic body and tail. Multiple soft tissue and hypodense areas in the location of the pancreatic head indicating measuring up to 8 mm indicating cysts, residual tissue and possible soft tissue pancreatic  nodules. A cyst or soft tissue nodule is identified measuring up to 1.2 cm. Indeterminate nodules in the location of the pancreatic head versus lymphadenopathy. Consider MRI pancreas without and with IV contrast.Bilateral extrarenal pelves. 4 mm left renal cyst. Soft tissue and fat herniates into the umbilical region measuring 4.7 cm indicating hernia. There is mesentery within the hernia.   No bowel is within the hernia. No evidence of free air. .  Small amount of free fluid is within the abdomen and pelvis. Mesenteric edema of the abdomen and pelvis. Rectosigmoid diverticulosis without acute diverticulitis. Slightly irregular liver contour may indicate early cirrhotic changes. Free fluid within the abdomen and pelvis. Inflammation of the mesentery. 8/28/2022-I reviewed CT abdomen/pelvis. New onset ascites. Liver is smaller when compared to prior CT scan suggesting hydrophobic disease. Radiology reported peripancreatic nodules. Recommended CA 19-9 reviewed. Recommend MRI pancreas. Recommended paracentesis (send for cytology, total protein, LDH). CMP same day of paracentesis. 9/12/2022 MRI Abdomen W WO Contrast MRCP Hepatic steatosis with a nodular contour suggestive of cirrhosis. Ascites. Absence of the spleen. Absence of the gallbladder. The patient appears to be status post partial pancreatectomy . The nodular lesions identified on the CT scan dated August 19, 2022 are poorly visualized on this examination due to respiratory artifact and technique. Small renal cysts. Midline ventral hernia. 9/12/2022-CA 19-9 1004  9/15/2022 US Guided Parecentesis [facility]) Limited grayscale ultrasound evaluation to assess for peritoneal fluid. 4 quadrant evaluation shows trace fluid around the liver. No more than trace fluid at the right lower quadrant. No significant fluid is seen at the left upper quadrant or left lower quadrant. 9/19/2022 Ascites fluid,paracentesis: Malignant cells present. Adenocarcinoma. 9/19/2022 CT Abdomen WO Contrast ([facility]):Small amount of diffuse ascites within the abdomen, likely malignant ascites given the presence of nodular peritoneal and omental soft tissue thickening compatible with carcinomatosis. Diagnostic paracentesis was performed following this examination with ultrasound guidance. Evidence previous resection of the pancreatic body and tail.  There is a soft tissue implant or enlarged lymph node inferior to the celiac artery on the right which measures 1.6 x 1.6 cm, concerning for metastatic disease. There is a small cyst in subtle stable nodularity and patient replacement of the pancreatic head. 10/3/2022-essentially, findings of recurrent metastatic pancreatic adenocarcinoma. Stage IV. Recommend palliative Gemzar 1000 mg/M2 and Abraxane 125 mg/M2 every 2 weeks until disease progression or intolerable toxicity. 10/3/22 Chemotherapy consent signed  10/14/22-Initiated palliative frontline Gemzar Abraxane every 2 weeks  12/30/22 CA 15-4-589  1/6/23 CT Chest W Contrast No significant interval change. 1/6/23 CT Abd/Pelvis W IV Contrast (oral) Partial pancreatectomy with absence of pancreatic body and tail. No significant change in size or number of a few cyst versus soft tissue nodules in region of pancreatic head. No retroperitoneal lymphadenopathy. Interval resolution of ascites. 1/13/2023-essentially, CT scan showed interval response to treatment. Resolution of prior ascites. Continue palliative chemotherapy with Gemzar/Abraxane. 1/13/23 Decrease Abraxane to 100mg/m2 with each treatment due to development of neuropathy. 1/27/23 CA 19-9-360  2/10/23 CA 19-9-238  2/24/23 CA 19-9-171  3/10/23 MRI Brain W WO Contrast No acute intracranial abnormality notified. No evidence of metastatic disease in the brain. Scattered T2 FLAIR white matter hyperintensities are nonspecific, but commonly seen in the setting of chronic microvascular ischemic disease.     Past Medical History:    Past Medical History:   Diagnosis Date    Anxiety     Arthritis     Depression     Hypertension     Hypoglycemia     if fasting for very long    Pancreatic cancer ([facility] [address] 06/2021    s/p Whipple by Dr. Tom Caro    Shoulder pain     incompleted rcr; possible injury    Thyroid disease        Past Surgical History:    Past Surgical History:   Procedure Laterality Date    CHOLECYSTECTOMY  1991    COLONOSCOPY  2020    Dr Gilberto Hudson ENDOSCOPY, COLON, DIAGNOSTIC      HYSTERECTOMY (CERVIX STATUS UNKNOWN)  1994    PANCREATECTOMY  06/28/2021    PORT SURGERY Right 9/21/2021    Single lumen PORT INSERTION with ultrasound and fluoroscopy performed by Vivian Grant DO at [facility] OR    PORT SURGERY Right 1/13/2022    RIGHT SIDE MEDIPORT REMOVAL, LEFT SIDE MEDIPORT INSERTION performed by Yareli Henry DO at [facility] OR    GA SURGICAL ARTHROSCOPY SHOULDER W/ROTATOR CUFF RPR Right 10/19/2017    SHOULDER ARTHROSCOPY ROTATOR CUFF REPAIR/ DEBRIDEMENT BICEPS TENODESIS/ TENOTOMY SUBACROMIAL DECOMPRESSION/ DISTAL CLAVICLE EXCISION performed by Johnson Shields MD at [facility] OR    SPLENECTOMY  06/28/2021    UPPER GASTROINTESTINAL ENDOSCOPY  06/2021    Dr. Rene in [place]    WRIST SURGERY      spur excision       Social History:    Marital status:   Smoking status: no  ETOH status: no  Resides: [place], [place]    Family History:   Family History   Adopted: Yes   Problem Relation Age of Onset    Breast Cancer Maternal Aunt     Breast Cancer Daughter     Cancer Daughter         Bladder    Cancer Maternal Uncle         Bladder       Current Hospital Medications:    Current Outpatient Medications   Medication Sig Dispense Refill    methylPREDNISolone (MEDROL DOSEPACK) 4 MG tablet Take by mouth. 1 kit 0    loratadine (CLARITIN) 10 MG tablet Take 1 tablet by mouth daily 30 tablet 0    promethazine (PHENERGAN) 12.5 MG tablet Take 1 tablet by mouth every 6 hours as needed for Nausea 30 tablet 5    lipase-protease-amylase (CREON) 60619-69307 units delayed release capsule Take 1 capsule by mouth 3 times daily (with meals) 90 capsule 5    lidocaine-prilocaine (EMLA) 2.5-2.5 % cream Apply topically as needed. 30 g 5    promethazine (PHENERGAN) 12.5 MG tablet Take 1 tablet by mouth 3 times daily as needed for Nausea 30 tablet 5    capecitabine (XELODA) 500 MG chemo tablet Take 3 tablets by mouth 2 times a day for 14 days of a 28 day cycle 84 tablet 5     losartan-hydroCHLOROthiazide (HYZAAR) 50-12.5 MG per tablet Take 1 tablet by mouth daily Indications: High Blood Pressure Disorder      levothyroxine (SYNTHROID) 137 MCG tablet Take 137 mcg by mouth Daily Indications: Underactive Thyroid      Cholecalciferol (VITAMIN D3) 10 MCG (400 UNIT) CAPS Take 1 capsule by mouth daily       Magic Mouthwash (MIRACLE MOUTHWASH) Swish and spit 5 mLs 4 times daily as needed for Irritation 1/3 viscous lidocaine, 1/3 benadryl, 1/3 Maalox. 480 mL 1    lidocaine-prilocaine (EMLA) 2.5-2.5 % cream Apply topically as needed. 30 g 2    Caltrate 600+D Plus Minerals (CALTRATE) 600-800 MG-UNIT TABS tablet Take 1 tablet by mouth daily      busPIRone (BUSPAR) 5 MG tablet Take 5 mg by mouth 3 times daily       ondansetron (ZOFRAN) 4 MG tablet Take 1 tablet by mouth every 8 hours as needed for Nausea or Vomiting 10 tablet 0     No current facility-administered medications for this visit. Facility-Administered Medications Ordered in Other Visits   Medication Dose Route Frequency Provider Last Rate Last Admin    0.9 % sodium chloride infusion  5-250 mL/hr IntraVENous PRN Clarice Pimentel MD        dexamethasone 10 mg in sodium chloride 0.9 % 50 mL infusion   IntraVENous Once Clarice Pimentel MD        palonosetron (ALOXI) injection 0.25 mg  0.25 mg IntraVENous Once Clarice Pimentel MD        PACLitaxel-protein bound (ABRAXANE) chemo IVPB 183 mg  100 mg/m2 (Treatment Plan Recorded) IntraVENous Once Clarice Pimentel MD        gemcitabine HCl (GEMZAR) 1,830 mg in sodium chloride 0.9 % 250 mL chemo IVPB  1,000 mg/m2 (Treatment Plan Recorded) IntraVENous Once Clarice Pimentel MD        sodium chloride flush 0.9 % injection 5-40 mL  5-40 mL IntraVENous PRN Clarice Pimentel MD        heparin flush 100 UNIT/ML injection 500 Units  500 Units IntraCATHeter PRN Clarice Pimentel MD           Allergies:    Allergies   Allergen Reactions    Diclofenac Other (See Comments) Couldn't sleep and face turned blood red    Oxycodone-Acetaminophen Hives     itching    Doxycycline Rash       Subjective   REVIEW OF SYSTEMS:   CONSTITUTIONAL: no fever, no night sweats,  fatigue;  HEENT: no blurring of vision, no double vision, no hearing difficulty, no tinnitus, no ulceration, no dysplasia, no epistaxis;   LUNGS: no cough, no hemoptysis, no wheeze,  no shortness of breath;  CARDIOVASCULAR: no palpitation, no chest pain, no shortness of breath;  GI: no abdominal pain, no nausea, no vomiting, no diarrhea, no constipation;  STUART: no dysuria, no hematuria, no frequency or urgency, no nephrolithiasis;  MUSCULOSKELETAL: no joint pain, no swelling, no stiffness;  ENDOCRINE: no polyuria, no polydipsia, no cold or heat intolerance;  HEMATOLOGY: no easy bruising or bleeding, no history of clotting disorder;  DERMATOLOGY: no skin rash, no eczema, no pruritus;  PSYCHIATRY: no depression, no anxiety, no panic attacks, no suicidal ideation, no homicidal ideation;  NEUROLOGY: no syncope, no seizures, no numbness or tingling of hands, no numbness or tingling of feet, no paresis;           Objective BP (!) 185/98   Pulse 82   Temp 97.9 °F (36.6 °C)   Resp 18   Ht 5' 5\" (1.651 m)   Wt 150 lb 11.2 oz (68.4 kg)   SpO2 98%   BMI 25.08 kg/m²     PHYSICAL EXAM:  CONSTITUTIONAL: Alert, appropriate, no acute distress  EYES: Non icteric, EOM intact, pupils equal round   ENT: Mucus membranes moist, no oral pharyngeal lesions, external inspection of ears and nose are normal.  NECK: Supple, no masses. No palpable thyroid mass  CHEST/LUNGS: CTA bilaterally, normal respiratory effort   CARDIOVASCULAR: RRR, no murmurs. No lower extremity edema  ABDOMEN: soft non-tender, active bowel sounds, no HSM. No palpable masses  EXTREMITIES: warm, full ROM in all 4 extremities, no focal weakness.   SKIN: warm, dry with no rashes or lesions  LYMPH: No cervical, clavicular, axillary, or inguinal lymphadenopathy  NEUROLOGIC: follows commands, non focal   PSYCH: mood and affect appropriate. Alert and oriented to time, place, person     LABORATORY RESULTS REVIEWED/ANALYZED BY ME:  Lab Results   Component Value Date    WBC 15.07 (H) 03/16/2023    HGB 11.9 03/16/2023    HCT 35.8 03/16/2023    MCV 95.0 (H) 03/16/2023     (HH) 03/16/2023     Lab Results   Component Value Date    NEUTROABS 6.52 (H) 03/16/2023       RADIOLOGY STUDIES REVIEWED BY ME:   3/10/23 MRI Brain W WO Contrast No acute intracranial abnormality notified. No evidence of metastatic disease in the brain. Scattered T2 FLAIR white matter hyperintensities are nonspecific, but commonly seen in the setting of chronic microvascular ischemic disease. ASSESSMENT:    Orders Placed This Encounter   Procedures    CBC With Auto Differential     Standing Status:   Future     Standing Expiration Date:   3/16/2024    Comprehensive metabolic panel     Standing Status:   Future     Standing Expiration Date:   3/16/2024            Calvin Irene was seen today for cancer. Diagnoses and all orders for this visit:    Malignant neoplasm of tail of pancreas ([facility] [address]  -     CBC With Auto Differential; Future  -     Comprehensive metabolic panel;  Future  -     Cancel: 0.9 % sodium chloride infusion  -     Cancel: dexamethasone 10 mg in sodium chloride 0.9 % 50 mL infusion  -     Cancel: palonosetron (ALOXI) injection 0.25 mg  -     Cancel: PACLitaxel-protein bound (ABRAXANE) chemo IVPB 183 mg  -     Cancel: gemcitabine HCl (GEMZAR) 1,830 mg in sodium chloride 0.9 % 250 mL chemo IVPB  -     Cancel: sodium chloride flush 0.9 % injection 5-40 mL  -     Cancel: heparin flush 100 UNIT/ML injection 500 Units    New onset of headache in cancer patient    Recent unexplained fever    Care plan discussed with patient    Chemotherapy management, encounter for    Adverse effect of chemotherapy, subsequent encounter    Metastasis from pancreatic cancer ([facility] [address]  -     CBC With Auto Differential; Future  -     Comprehensive metabolic panel; Future  -     Cancel: 0.9 % sodium chloride infusion  -     Cancel: dexamethasone 10 mg in sodium chloride 0.9 % 50 mL infusion  -     Cancel: palonosetron (ALOXI) injection 0.25 mg  -     Cancel: PACLitaxel-protein bound (ABRAXANE) chemo IVPB 183 mg  -     Cancel: gemcitabine HCl (GEMZAR) 1,830 mg in sodium chloride 0.9 % 250 mL chemo IVPB  -     Cancel: sodium chloride flush 0.9 % injection 5-40 mL  -     Cancel: heparin flush 100 UNIT/ML injection 500 Units    Chemotherapy-induced fatigue    Other orders  -     sodium chloride (PF) 0.9 % injection 5-40 mL  -     0.9 % sodium chloride infusion  -     alteplase (CATHFLO) 2 mg in sterile water 2 mL injection  -     0.9 % sodium chloride infusion  -     diphenhydrAMINE (BENADRYL) injection 50 mg  -     famotidine (PEPCID) injection 20 mg  -     hydrocortisone sodium succinate PF (SOLU-CORTEF) injection 100 mg  -     acetaminophen (TYLENOL) tablet 650 mg  -     meperidine (DEMEROL) injection 12.5 mg  -     ondansetron (ZOFRAN) injection 8 mg  -     EPINEPHrine PF 1 MG/ML injection (Anaphylaxis) 0.3 mg  -     albuterol sulfate HFA (PROVENTIL;VENTOLIN;PROAIR) 108 (90 Base) MCG/ACT inhaler 4 puff      Pancreatic tail adenocarcinoma pT2 N1 M0, June 2021, recurrent disease September 2022 (malignant ascites)  Essentially, node positive third of the pancreas adenocarcinoma. Status post distal pancreatectomy/splenectomy  -Anticipated completion 6-month adjuvant Gemzar/Xeloda on 2/25/2002  -Last CA 19-9 = 10  -Feb 2022Completion of adjuvant treatment with Xeloda/Gemzar. -CT chest/abdomen/pelvis in August 2022. September 2022 recurrent disease with malignant pleural effusion  8/22/2022 CT Abd/Pelvis W IV Contrast (oral) Partial pancreatectomy with absence of the pancreatic body and tail.   Multiple soft tissue and hypodense areas in the location of the pancreatic head indicating measuring up to 8 mm indicating cysts, residual tissue and possible soft tissue pancreatic  nodules. A cyst or soft tissue nodule is identified measuring up to 1.2 cm. Indeterminate nodules in the location of the pancreatic head versus lymphadenopathy. Consider MRI pancreas without and with IV contrast.Bilateral extrarenal pelves. 4 mm left renal cyst. Soft tissue and fat herniates into the umbilical region measuring 4.7 cm indicating hernia. There is mesentery within the hernia. No bowel is within the hernia. No evidence of free air. .  Small amount of free fluid is within the abdomen and pelvis. Mesenteric edema of the abdomen and pelvis. Rectosigmoid diverticulosis without acute diverticulitis. Slightly irregular liver contour may indicate early cirrhotic changes. Free fluid within the abdomen and pelvis. Inflammation of the mesentery. 9/12/2022-CA 19-9 1004  9/15/2022 US Guided Parecentesis [facility]) Limited grayscale ultrasound evaluation to assess for peritoneal fluid. 4 quadrant evaluation shows trace fluid around the liver. No more than trace fluid at the right lower quadrant. No significant fluid is seen at the left upper quadrant or left lower quadrant. 9/19/2022 Ascites fluid,paracentesis: Malignant cells present. Adenocarcinoma. 9/19/2022 CT Abdomen WO Contrast ([facility]):Small amount of diffuse ascites within the abdomen, likely malignant ascites given the presence of nodular peritoneal and omental soft tissue thickening compatible with carcinomatosis. Diagnostic paracentesis was performed following this examination with ultrasound guidance. Evidence previous resection of the pancreatic body and tail. There is a soft tissue implant or enlarged lymph node inferior to the celiac artery on the right which measures 1.6 x 1.6 cm, concerning for metastatic disease. There is a small cyst in subtle stable nodularity and patient replacement of the pancreatic head. 10/3/2022-essentially, findings of recurrent metastatic pancreatic adenocarcinoma.   Stage IV.    10/14/22-Initiated palliative frontline Gemzar Abraxane every 2 weeks  12/30/22 CA 46-3-815  1/6/23 CT Chest W Contrast No significant interval change. 1/6/23 CT Abd/Pelvis W IV Contrast (oral) Partial pancreatectomy with absence of pancreatic body and tail. No significant change in size or number of a few cyst versus soft tissue nodules in region of pancreatic head. No retroperitoneal lymphadenopathy. Interval resolution of ascites. 1/13/2023-essentially, CT scan showed interval response to treatment. Resolution of prior ascites. Continue palliative chemotherapy with Gemzar/Abraxane. 1/13/23 Decrease Abraxane to 100mg/m2 with each treatment due to development of neuropathy. Plan:  -Continue cycle 6-day 1 palliative Gemzar 1000 mg/M2 and Abraxane 100 mg/M2 every 2 weeks until disease progression or intolerable toxicity. -Repeat CT scans after April 2023  -Repeat CA 19-9 next visit    Right upper extremity DVT port related-started on Eliquis started on 1/14/2022. Eliquis for 3 months through mid April 2022. Denies any bleeding. 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 No evidence of metastatic disease in the brain. Scattered T2 FLAIR white matter hyperintensities are nonspecific, but commonly seen in the setting of chronic microvascular ischemic disease.  - No evidence of metastatic disease. Sinusitis-trial of steroids and Claritin-D Zithromax. She has felt significantly better. She will resume chemotherapy today.      PLAN:  RTC with MD in treatment room 6 week  CBC, CMP today  Proceed with C#6 Gemzar Abraxane today and every 2 weeks  Repeat CT scans in 5 weeks  Continue Claritin D as needed  Continue cold gloves/socks with treatments  Continue Norco 7.5mg every 6 hrs as needed  Continue Phenergan as needed  Continue Emla cream to port  Continue OTC Imodium as needed      Kane ROSAS, layne pre charting  as Medical Assistant for Flor Regan MD. Electronically signed by Kane Branham MA on 3/16/2023 at 7:33 AM CDT. MyrandaIDilcia am scribing for Flor Regan MD. Electronically signed by Dilcia Jama RN on 3/16/2023 at 10:45 AM CDT. I, Dr Tr Reynolds, personally performed the services described in this documentation as scribed by Dilcia Jama RN in my presence and is both accurate and complete. I have seen, examined and reviewed this patient medication list, appropriate labs and imaging studies. I reviewed relevant medical records and others physicians notes. I discussed the plans of care with the patient. I answered all the questions to the patients satisfaction. I have also reviewed the chief comp tumor regarding sending this laint (CC) and part of the history (History of Present Illness (HPI), Past Family Social History [facility]), or Review of Systems (ROS) and made changes when appropriated.        (Please note that portions of this note were completed with a voice recognition program. Efforts were made to edit the dictations but occasionally words are mis-transcribed.)  Electronically signed by Flor Regan MD on 3/16/2023 at 10:59 AM

## 2023-03-16 ENCOUNTER — HOSPITAL ENCOUNTER (OUTPATIENT)
Dept: INFUSION THERAPY | Age: 78
Discharge: HOME | End: 2023-03-16
Payer: MEDICARE

## 2023-03-16 ENCOUNTER — OFFICE VISIT (OUTPATIENT)
Dept: HEMATOLOGY | Age: 78
End: 2023-03-16
Payer: MEDICARE

## 2023-03-16 VITALS
RESPIRATION RATE: 18 BRPM | HEIGHT: 65 IN | OXYGEN SATURATION: 98 % | HEART RATE: 82 BPM | DIASTOLIC BLOOD PRESSURE: 98 MMHG | TEMPERATURE: 97.9 F | SYSTOLIC BLOOD PRESSURE: 185 MMHG | BODY MASS INDEX: 25.11 KG/M2 | WEIGHT: 150.7 LBS

## 2023-03-16 DIAGNOSIS — R53.83 CHEMOTHERAPY-INDUCED FATIGUE: ICD-10-CM

## 2023-03-16 DIAGNOSIS — R50.9 RECENT UNEXPLAINED FEVER: ICD-10-CM

## 2023-03-16 DIAGNOSIS — T45.1X5A CHEMOTHERAPY-INDUCED FATIGUE: ICD-10-CM

## 2023-03-16 DIAGNOSIS — C25.9 ADENOCARCINOMA OF PANCREAS (HCC): ICD-10-CM

## 2023-03-16 DIAGNOSIS — T45.1X5D ADVERSE EFFECT OF CHEMOTHERAPY, SUBSEQUENT ENCOUNTER: ICD-10-CM

## 2023-03-16 DIAGNOSIS — C79.9 METASTASIS FROM PANCREATIC CANCER (HCC): ICD-10-CM

## 2023-03-16 DIAGNOSIS — C25.2 MALIGNANT NEOPLASM OF TAIL OF PANCREAS (HCC): Primary | ICD-10-CM

## 2023-03-16 DIAGNOSIS — Z51.11 CHEMOTHERAPY MANAGEMENT, ENCOUNTER FOR: ICD-10-CM

## 2023-03-16 DIAGNOSIS — Z71.89 CARE PLAN DISCUSSED WITH PATIENT: ICD-10-CM

## 2023-03-16 DIAGNOSIS — R51.9 NEW ONSET OF HEADACHE IN CANCER PATIENT: ICD-10-CM

## 2023-03-16 DIAGNOSIS — C25.9 METASTASIS FROM PANCREATIC CANCER (HCC): ICD-10-CM

## 2023-03-16 LAB
ALBUMIN SERPL-MCNC: 3.4 G/DL (ref 3.5–5.2)
ALP SERPL-CCNC: 107 U/L (ref 35–104)
ALT SERPL-CCNC: 33 U/L (ref 9–52)
ANION GAP SERPL CALCULATED.3IONS-SCNC: 4 MMOL/L (ref 7–19)
AST SERPL-CCNC: 54 U/L (ref 14–36)
BILIRUB SERPL-MCNC: 0.3 MG/DL (ref 0.2–1.3)
BUN SERPL-MCNC: 23 MG/DL (ref 7–17)
CALCIUM SERPL-MCNC: 9.3 MG/DL (ref 8.4–10.2)
CHLORIDE SERPL-SCNC: 98 MMOL/L (ref 98–111)
CO2 SERPL-SCNC: 30 MMOL/L (ref 22–29)
CREAT SERPL-MCNC: 0.7 MG/DL (ref 0.5–1)
ERYTHROCYTE [DISTWIDTH] IN BLOOD BY AUTOMATED COUNT: 16.2 % (ref 11.7–14.4)
GLOBULIN: 2.5 G/DL
GLUCOSE SERPL-MCNC: 116 MG/DL (ref 74–106)
HCT VFR BLD AUTO: 35.8 % (ref 34.1–44.9)
HGB BLD-MCNC: 11.9 G/DL (ref 11.2–15.7)
LYMPHOCYTES # BLD: 4.23 K/UL (ref 1.18–3.74)
LYMPHOCYTES NFR BLD: 28.1 % (ref 19.3–53.1)
MCH RBC QN AUTO: 31.6 PG (ref 25.6–32.2)
MCHC RBC AUTO-ENTMCNC: 33.2 G/DL (ref 32.3–35.5)
MCV RBC AUTO: 95 FL (ref 79.4–94.8)
MONOCYTES # BLD: 3.31 K/UL (ref 0.24–0.82)
MONOCYTES NFR BLD: 22 % (ref 4.7–12.5)
NEUTROPHILS # BLD: 6.52 K/UL (ref 1.56–6.13)
NEUTS SEG NFR BLD: 43.1 % (ref 34–71.1)
PLATELET # BLD AUTO: 687 K/UL (ref 182–369)
PMV BLD AUTO: 10.4 FL (ref 7.4–10.4)
POTASSIUM SERPL-SCNC: 4.1 MMOL/L (ref 3.5–5.1)
PROT SERPL-MCNC: 6 G/DL (ref 6.3–8.2)
RBC # BLD AUTO: 3.77 M/UL (ref 3.93–5.22)
SODIUM SERPL-SCNC: 132 MMOL/L (ref 137–145)
WBC # BLD AUTO: 15.07 K/UL (ref 3.98–10.04)

## 2023-03-16 PROCEDURE — G8484 FLU IMMUNIZE NO ADMIN: HCPCS | Performed by: INTERNAL MEDICINE

## 2023-03-16 PROCEDURE — 1123F ACP DISCUSS/DSCN MKR DOCD: CPT | Performed by: INTERNAL MEDICINE

## 2023-03-16 PROCEDURE — 99213 OFFICE O/P EST LOW 20 MIN: CPT | Performed by: INTERNAL MEDICINE

## 2023-03-16 PROCEDURE — 1036F TOBACCO NON-USER: CPT | Performed by: INTERNAL MEDICINE

## 2023-03-16 PROCEDURE — 96413 CHEMO IV INFUSION 1 HR: CPT

## 2023-03-16 PROCEDURE — 1090F PRES/ABSN URINE INCON ASSESS: CPT | Performed by: INTERNAL MEDICINE

## 2023-03-16 PROCEDURE — 80053 COMPREHEN METABOLIC PANEL: CPT

## 2023-03-16 PROCEDURE — 85025 COMPLETE CBC W/AUTO DIFF WBC: CPT

## 2023-03-16 PROCEDURE — 36415 COLL VENOUS BLD VENIPUNCTURE: CPT

## 2023-03-16 PROCEDURE — 2580000003 HC RX 258: Performed by: INTERNAL MEDICINE

## 2023-03-16 PROCEDURE — G8400 PT W/DXA NO RESULTS DOC: HCPCS | Performed by: INTERNAL MEDICINE

## 2023-03-16 PROCEDURE — 96417 CHEMO IV INFUS EACH ADDL SEQ: CPT

## 2023-03-16 PROCEDURE — G8417 CALC BMI ABV UP PARAM F/U: HCPCS | Performed by: INTERNAL MEDICINE

## 2023-03-16 PROCEDURE — 6360000002 HC RX W HCPCS: Performed by: INTERNAL MEDICINE

## 2023-03-16 PROCEDURE — G8427 DOCREV CUR MEDS BY ELIG CLIN: HCPCS | Performed by: INTERNAL MEDICINE

## 2023-03-16 PROCEDURE — 96367 TX/PROPH/DG ADDL SEQ IV INF: CPT

## 2023-03-16 PROCEDURE — 96375 TX/PRO/DX INJ NEW DRUG ADDON: CPT

## 2023-03-16 RX ORDER — SODIUM CHLORIDE 9 MG/ML
INJECTION, SOLUTION INTRAVENOUS CONTINUOUS
OUTPATIENT
Start: 2023-03-16

## 2023-03-16 RX ORDER — PALONOSETRON 0.05 MG/ML
0.25 INJECTION, SOLUTION INTRAVENOUS ONCE
Status: CANCELLED
Start: 2023-03-16 | End: 2023-03-16

## 2023-03-16 RX ORDER — HEPARIN SODIUM (PORCINE) LOCK FLUSH IV SOLN 100 UNIT/ML 100 UNIT/ML
500 SOLUTION INTRAVENOUS PRN
Status: CANCELLED | OUTPATIENT
Start: 2023-03-16

## 2023-03-16 RX ORDER — ONDANSETRON 2 MG/ML
8 INJECTION INTRAMUSCULAR; INTRAVENOUS
OUTPATIENT
Start: 2023-03-16

## 2023-03-16 RX ORDER — DIPHENHYDRAMINE HYDROCHLORIDE 50 MG/ML
50 INJECTION INTRAMUSCULAR; INTRAVENOUS
OUTPATIENT
Start: 2023-03-16

## 2023-03-16 RX ORDER — SODIUM CHLORIDE 9 MG/ML
5-250 INJECTION, SOLUTION INTRAVENOUS PRN
Status: CANCELLED | OUTPATIENT
Start: 2023-03-16

## 2023-03-16 RX ORDER — EPINEPHRINE 1 MG/ML
0.3 INJECTION, SOLUTION, CONCENTRATE INTRAVENOUS PRN
OUTPATIENT
Start: 2023-03-16

## 2023-03-16 RX ORDER — PACLITAXEL 100 MG/20ML
100 INJECTION, POWDER, LYOPHILIZED, FOR SUSPENSION INTRAVENOUS ONCE
Status: CANCELLED | OUTPATIENT
Start: 2023-03-16 | End: 2023-03-16

## 2023-03-16 RX ORDER — PALONOSETRON 0.05 MG/ML
0.25 INJECTION, SOLUTION INTRAVENOUS ONCE
Status: COMPLETED | OUTPATIENT
Start: 2023-03-16 | End: 2023-03-16

## 2023-03-16 RX ORDER — FAMOTIDINE 10 MG/ML
20 INJECTION, SOLUTION INTRAVENOUS
OUTPATIENT
Start: 2023-03-16

## 2023-03-16 RX ORDER — HEPARIN SODIUM (PORCINE) LOCK FLUSH IV SOLN 100 UNIT/ML 100 UNIT/ML
500 SOLUTION INTRAVENOUS PRN
Status: DISCONTINUED | OUTPATIENT
Start: 2023-03-16 | End: 2023-03-17 | Stop reason: HOSPADM

## 2023-03-16 RX ORDER — SODIUM CHLORIDE 0.9 % (FLUSH) 0.9 %
5-40 SYRINGE (ML) INJECTION PRN
Status: CANCELLED | OUTPATIENT
Start: 2023-03-16

## 2023-03-16 RX ORDER — SODIUM CHLORIDE 9 MG/ML
5-250 INJECTION, SOLUTION INTRAVENOUS PRN
OUTPATIENT
Start: 2023-03-16

## 2023-03-16 RX ORDER — PACLITAXEL 100 MG/20ML
100 INJECTION, POWDER, LYOPHILIZED, FOR SUSPENSION INTRAVENOUS ONCE
Status: COMPLETED | OUTPATIENT
Start: 2023-03-16 | End: 2023-03-16

## 2023-03-16 RX ORDER — SODIUM CHLORIDE 9 MG/ML
5-250 INJECTION, SOLUTION INTRAVENOUS PRN
Status: DISCONTINUED | OUTPATIENT
Start: 2023-03-16 | End: 2023-03-17 | Stop reason: HOSPADM

## 2023-03-16 RX ORDER — MEPERIDINE HYDROCHLORIDE 50 MG/ML
12.5 INJECTION INTRAMUSCULAR; INTRAVENOUS; SUBCUTANEOUS PRN
OUTPATIENT
Start: 2023-03-16

## 2023-03-16 RX ORDER — ACETAMINOPHEN 325 MG/1
650 TABLET ORAL
OUTPATIENT
Start: 2023-03-16

## 2023-03-16 RX ORDER — SODIUM CHLORIDE 0.9 % (FLUSH) 0.9 %
5-40 SYRINGE (ML) INJECTION PRN
Status: DISCONTINUED | OUTPATIENT
Start: 2023-03-16 | End: 2023-03-17 | Stop reason: HOSPADM

## 2023-03-16 RX ORDER — ALBUTEROL SULFATE 90 UG/1
4 AEROSOL, METERED RESPIRATORY (INHALATION) PRN
OUTPATIENT
Start: 2023-03-16

## 2023-03-16 RX ORDER — SODIUM CHLORIDE 9 MG/ML
5-40 INJECTION INTRAVENOUS PRN
OUTPATIENT
Start: 2023-03-16

## 2023-03-16 RX ADMIN — DEXAMETHASONE SODIUM PHOSPHATE: 10 INJECTION, SOLUTION INTRAMUSCULAR; INTRAVENOUS at 11:09

## 2023-03-16 RX ADMIN — HEPARIN 500 UNITS: 100 SYRINGE at 12:46

## 2023-03-16 RX ADMIN — GEMCITABINE HYDROCHLORIDE 1830 MG: 1 INJECTION, SOLUTION INTRAVENOUS at 12:10

## 2023-03-16 RX ADMIN — PACLITAXEL 183 MG: 100 INJECTION, POWDER, LYOPHILIZED, FOR SUSPENSION INTRAVENOUS at 11:29

## 2023-03-16 RX ADMIN — SODIUM CHLORIDE, PRESERVATIVE FREE 10 ML: 5 INJECTION INTRAVENOUS at 12:45

## 2023-03-16 RX ADMIN — SODIUM CHLORIDE 20 ML/HR: 9 INJECTION, SOLUTION INTRAVENOUS at 11:09

## 2023-03-16 RX ADMIN — PALONOSETRON 0.25 MG: 0.05 INJECTION, SOLUTION INTRAVENOUS at 11:10

## 2023-03-24 ENCOUNTER — APPOINTMENT (OUTPATIENT)
Dept: INFUSION THERAPY | Age: 78
End: 2023-03-24
Payer: MEDICARE

## 2023-03-28 ENCOUNTER — TELEPHONE (OUTPATIENT)
Dept: INTERNAL MEDICINE | Facility: CLINIC | Age: 78
End: 2023-03-28
Payer: MEDICARE

## 2023-03-28 ENCOUNTER — OFFICE VISIT (OUTPATIENT)
Dept: INTERNAL MEDICINE | Facility: CLINIC | Age: 78
End: 2023-03-28
Payer: MEDICARE

## 2023-03-28 ENCOUNTER — PATIENT ROUNDING (BHMG ONLY) (OUTPATIENT)
Dept: INTERNAL MEDICINE | Facility: CLINIC | Age: 78
End: 2023-03-28
Payer: MEDICARE

## 2023-03-28 VITALS
WEIGHT: 152.6 LBS | BODY MASS INDEX: 25.43 KG/M2 | HEART RATE: 81 BPM | SYSTOLIC BLOOD PRESSURE: 215 MMHG | HEIGHT: 65 IN | OXYGEN SATURATION: 98 % | TEMPERATURE: 98 F | DIASTOLIC BLOOD PRESSURE: 98 MMHG

## 2023-03-28 DIAGNOSIS — E03.9 HYPOTHYROIDISM, UNSPECIFIED TYPE: ICD-10-CM

## 2023-03-28 DIAGNOSIS — Z12.31 ENCOUNTER FOR SCREENING MAMMOGRAM FOR MALIGNANT NEOPLASM OF BREAST: ICD-10-CM

## 2023-03-28 DIAGNOSIS — J41.0 SIMPLE CHRONIC BRONCHITIS: ICD-10-CM

## 2023-03-28 DIAGNOSIS — F41.9 ANXIETY: ICD-10-CM

## 2023-03-28 DIAGNOSIS — R73.9 HYPERGLYCEMIA: ICD-10-CM

## 2023-03-28 DIAGNOSIS — F41.9 ANXIETY: Primary | ICD-10-CM

## 2023-03-28 DIAGNOSIS — R63.0 APPETITE ABSENT: ICD-10-CM

## 2023-03-28 PROBLEM — K86.89 PANCREATIC MASS: Status: ACTIVE | Noted: 2023-03-28

## 2023-03-28 PROBLEM — C80.1 CANCER: Status: ACTIVE | Noted: 2023-03-28

## 2023-03-28 PROBLEM — C25.2 MALIGNANT NEOPLASM OF TAIL OF PANCREAS: Status: ACTIVE | Noted: 2021-06-24

## 2023-03-28 PROBLEM — C25.1 MALIGNANT NEOPLASM OF BODY OF PANCREAS: Status: ACTIVE | Noted: 2023-03-28

## 2023-03-28 PROBLEM — C79.9 METASTASIS FROM PANCREATIC CANCER: Status: ACTIVE | Noted: 2022-10-03

## 2023-03-28 PROBLEM — I87.8 POOR VENOUS ACCESS: Status: ACTIVE | Noted: 2022-10-03

## 2023-03-28 PROBLEM — C25.9 METASTASIS FROM PANCREATIC CANCER: Status: ACTIVE | Noted: 2022-10-03

## 2023-03-28 PROCEDURE — 1159F MED LIST DOCD IN RCRD: CPT | Performed by: INTERNAL MEDICINE

## 2023-03-28 PROCEDURE — 1160F RVW MEDS BY RX/DR IN RCRD: CPT | Performed by: INTERNAL MEDICINE

## 2023-03-28 PROCEDURE — 3077F SYST BP >= 140 MM HG: CPT | Performed by: INTERNAL MEDICINE

## 2023-03-28 PROCEDURE — 99204 OFFICE O/P NEW MOD 45 MIN: CPT | Performed by: INTERNAL MEDICINE

## 2023-03-28 PROCEDURE — 3080F DIAST BP >= 90 MM HG: CPT | Performed by: INTERNAL MEDICINE

## 2023-03-28 RX ORDER — BUSPIRONE HYDROCHLORIDE 5 MG/1
5 TABLET ORAL 3 TIMES DAILY
COMMUNITY

## 2023-03-28 RX ORDER — TACROLIMUS 1 MG/G
OINTMENT TOPICAL
COMMUNITY
Start: 2022-12-05

## 2023-03-28 RX ORDER — LEVOTHYROXINE SODIUM 137 UG/1
1 TABLET ORAL DAILY
COMMUNITY
Start: 2023-02-27

## 2023-03-28 RX ORDER — SODIUM FLUORIDE 1.1 G/100G
GEL, DENTIFRICE ORAL
COMMUNITY
Start: 2023-01-26

## 2023-03-28 RX ORDER — ALBUTEROL SULFATE 90 UG/1
2 AEROSOL, METERED RESPIRATORY (INHALATION) EVERY 4 HOURS PRN
Qty: 18 G | Refills: 3 | Status: SHIPPED | OUTPATIENT
Start: 2023-03-28

## 2023-03-28 RX ORDER — HYDROCODONE BITARTRATE AND ACETAMINOPHEN 7.5; 325 MG/1; MG/1
7.5 TABLET ORAL EVERY 6 HOURS PRN
COMMUNITY
Start: 2023-02-10

## 2023-03-28 RX ORDER — ALPRAZOLAM 0.25 MG/1
0.25 TABLET ORAL 2 TIMES DAILY PRN
Qty: 60 TABLET | Refills: 2 | Status: SHIPPED | OUTPATIENT
Start: 2023-03-28 | End: 2023-03-28 | Stop reason: SDUPTHER

## 2023-03-28 RX ORDER — LORATADINE 10 MG/1
1 TABLET ORAL DAILY
COMMUNITY
Start: 2023-02-28

## 2023-03-28 RX ORDER — ALPRAZOLAM 0.25 MG/1
0.25 TABLET ORAL 2 TIMES DAILY PRN
Qty: 60 TABLET | Refills: 2 | OUTPATIENT
Start: 2023-03-28

## 2023-03-28 RX ORDER — LIDOCAINE AND PRILOCAINE 25; 25 MG/G; MG/G
CREAM TOPICAL
COMMUNITY
Start: 2022-10-03

## 2023-03-28 RX ORDER — KETOCONAZOLE 20 MG/G
CREAM TOPICAL NIGHTLY
COMMUNITY
Start: 2022-12-05

## 2023-03-28 RX ORDER — ALPRAZOLAM 0.25 MG/1
0.25 TABLET ORAL 2 TIMES DAILY PRN
Qty: 60 TABLET | Refills: 2 | Status: CANCELLED | OUTPATIENT
Start: 2023-03-28

## 2023-03-28 RX ORDER — PROMETHAZINE HYDROCHLORIDE 12.5 MG/1
12.5 TABLET ORAL EVERY 6 HOURS PRN
COMMUNITY
Start: 2023-02-10

## 2023-03-28 RX ORDER — MEGESTROL ACETATE 125 MG/ML
625 SUSPENSION ORAL DAILY
Qty: 150 ML | Refills: 3 | Status: SHIPPED | OUTPATIENT
Start: 2023-03-28 | End: 2023-04-27

## 2023-03-28 RX ORDER — ALPRAZOLAM 0.5 MG/1
0.25 TABLET ORAL 2 TIMES DAILY PRN
Qty: 30 TABLET | Refills: 1 | Status: SHIPPED | OUTPATIENT
Start: 2023-03-28

## 2023-03-28 RX ORDER — TRIAMCINOLONE ACETONIDE 1 MG/G
CREAM TOPICAL
COMMUNITY
Start: 2023-01-11

## 2023-03-28 NOTE — TELEPHONE ENCOUNTER
Caller: GAY READ     Relationship: SELF     Best call back number:    [phone] (Mobile)         Requested Prescriptions:      ALPRAZolam (Xanax) 0.25 MG tablet  0.25 mg, 2 Times Daily PRN         Pharmacy where request should be sent:    [id number] [address] - [address] [facility] [address] - [phone]  - [phone] FX  [phone]  Last office visit with prescribing clinician: 3/28/2023   Last telemedicine visit with prescribing clinician: 7/24/2023   Next office visit with prescribing clinician: 7/24/2023     Additional details provided by patient: STATES [facility] AT [facility]     Does the patient have less than a 3 day supply:  [x] Yes  [] No    Would you like a call back once the refill request has been completed: [x] Yes [] No    If the office needs to give you a call back, can they leave a voicemail: [x] Yes [] No    Cy Thomas Rep   03/28/23 14:05 CDT

## 2023-03-28 NOTE — PROGRESS NOTES
Subjective     Chief Complaint   Patient presents with   • Hypertension     Establish care     • Thyroid Problem       History of Present Illness  She states that she has white coat syndrome and her BP is high when she goes to the doctor or dentist. She does sheck her BP at home and states that it has been fine.     She recently had a sinus infection and ended up in the ED. Dr. Mendiola started her on     She has chemo every 2 weeks. The next one is this Thursday. She has nausea and pain in her stomach. Mild neuropathy in her feet.     She has pancreatic cancer. She states that it was found in follow up of a pancreatitis attack. She has seen Dr. Zamarripa in the past. She has had the tail of her pancrease removed and her spleen removed.     She has no appetite.     Patient's PMR from outside medical facility reviewed and noted.    Review of Systems   Constitutional: Negative for chills and fever.   HENT: Negative for congestion and rhinorrhea.    Respiratory:        Breathing Ok most of the time.    Cardiovascular: Negative for chest pain and leg swelling.   Gastrointestinal: Negative for blood in stool, constipation and diarrhea.   Genitourinary: Negative for dysuria and hematuria.   Psychiatric/Behavioral: Negative for decreased concentration. The patient is nervous/anxious.       Otherwise complete ROS reviewed and negative except as mentioned in the HPI.    Past Medical History:   Past Medical History:   Diagnosis Date   • Acute bronchitis    • Arthritis     Osteoarthritis   • Cancer (HCC)    • Disease of thyroid gland    • Gallbladder abscess    • Hypertension      Past Surgical History:  Past Surgical History:   Procedure Laterality Date   • ABDOMINAL HYSTERECTOMY     • CHOLECYSTECTOMY  1994   • COLONOSCOPY  04/23/2015    two polyps both removed  extensive diverticulosis   • COLONOSCOPY N/A 06/17/2020    Procedure: COLONOSCOPY WITH ANESTHESIA;  Surgeon: Homer Zamarripa DO;  Location: [facility] ENDOSCOPY;   Service: Gastroenterology;  Laterality: N/A;  pre: Hx of polyps  post:  Anupama Isra   • ENDOSCOPY  08/16/2016    normal   • ENDOSCOPY N/A 06/17/2020    Procedure: ESOPHAGOGASTRODUODENOSCOPY WITH ANESTHESIA;  Surgeon: Homer Zamarripa DO;  Location: [facility] ENDOSCOPY;  Service: Gastroenterology;  Laterality: N/A;  pre: nausea  post:    • ENDOSCOPY N/A 6/7/2022    Procedure: ESOPHAGOGASTRODUODENOSCOPY WITH ANESTHESIA;  Surgeon: Homer Zamarripa DO;  Location: [facility] ENDOSCOPY;  Service: Gastroenterology;  Laterality: N/A;  pre cough  post normal  Anupama Dhaliwal DO   • PANCREAS SURGERY      Partial removal due to tumor   • ROTATOR CUFF REPAIR  10/19/2017   • SPLENECTOMY       Social History:  reports that she has never smoked. She has never used smokeless tobacco. She reports that she does not drink alcohol and does not use drugs.    Family History: family history includes Alcohol abuse in her father; Bone cancer in her maternal aunt; Breast cancer in her maternal aunt; Leukemia in her maternal aunt.       Allergies:  Allergies   Allergen Reactions   • Diclofenac Other (See Comments) and Unknown - High Severity     Couldn't sleep and face turned blood red   • Oxycodone-Acetaminophen Hives and Rash     itching  itching     • Doxycycline Rash     Medications:  Prior to Admission medications    Medication Sig Start Date End Date Taking? Authorizing Provider   albuterol (PROVENTIL HFA;VENTOLIN HFA) 108 (90 Base) MCG/ACT inhaler Inhale 2 puffs Every 6 (Six) Hours As Needed for Wheezing or Shortness of Air. 3/20/18  Yes Summer Cota APRN   busPIRone (BUSPAR) 5 MG tablet Take 1 tablet by mouth 3 (Three) Times a Day.   Yes ProviderAlmita MD   Cholecalciferol (VITAMIN D3 PO) Take 2,000 mg by mouth Daily.   Yes ProviderAlmita MD   Denta 5000 Plus 1.1 % cream APPLY WITH TOOTHBRUSH ONCE DAILY AS NEEDED 1/26/23  Yes Almita Tate MD   diphenhydrAMINE (BENADRYL) 25 MG tablet Take 1  tablet by mouth At Night As Needed for Itching or Sleep.   Yes Almita Tate MD   fluticasone (FLONASE) 50 MCG/ACT nasal spray 2 sprays into the nostril(s) as directed by provider Daily. 7/10/19  Yes Scotty Jaffe, DO   gemcitabine in sodium chloride 0.9 % 250 mL Infuse  into a venous catheter 1 (One) Time.   Yes Almita Tate MD   HYDROcodone-acetaminophen (NORCO) 7.5-325 MG per tablet Take 7.5 tablets by mouth Every 6 (Six) Hours As Needed. 2/10/23  Yes Almita Tate MD   ketoconazole (NIZORAL) 2 % cream Every Night. 12/5/22  Yes Almita Tate MD   levothyroxine (SYNTHROID, LEVOTHROID) 137 MCG tablet Take 1 tablet by mouth Daily. 2/27/23  Yes Almita Tate MD   lidocaine-prilocaine (EMLA) 2.5-2.5 % cream Apply topically as needed. 10/3/22  Yes Almita Tate MD   loratadine (CLARITIN) 10 MG tablet Take 1 tablet by mouth Daily. 2/28/23  Yes Almita Tate MD   losartan-hydrochlorothiazide (HYZAAR) 50-12.5 MG per tablet Take 1 tablet by mouth Daily.   Yes Almita Tate MD   PACLitaxel Protein-Bound Part (ABRAXANE IV) Infuse  into a venous catheter.   Yes Almita Tate MD   pancrelipase, Lip-Prot-Amyl, (CREON) 28036-90246 units capsule delayed-release particles capsule Take 1 capsule by mouth 3 (Three) Times a Day With Meals.   Yes Almita Tate MD   promethazine (PHENERGAN) 12.5 MG tablet Take 1 tablet by mouth Every 6 (Six) Hours As Needed. for nausea 2/10/23  Yes Almita Tate MD   tacrolimus (PROTOPIC) 0.1 % ointment APPLY ONCE DAILY TO PSORIASIS IN GROIN 12/5/22  Yes Almita Tate MD   triamcinolone (KENALOG) 0.1 % cream APPLY TWICE DAILY ALONG WITH NYSTATIN CREAM 1/11/23  Yes Almita Tate MD   Cholecalciferol 10 MCG (400 UNIT) capsule Take 1 capsule by mouth Daily.  Patient not taking: Reported on 3/28/2023    Almita Tate MD   Calcium Carbonate-Vit D-Min (CALTRATE 600+D PLUS MINERALS) 600-800  "MG-UNIT tablet Take 1 tablet by mouth Daily.  Patient not taking: Reported on 3/28/2023  3/28/23  Almita Tate MD   carBAMazepine (TEGretol) 200 MG tablet Every 12 (Twelve) Hours.  Patient not taking: Reported on 3/28/2023  3/28/23  Almita Tate MD   levothyroxine (SYNTHROID, LEVOTHROID) 125 MCG tablet Take 1 tablet by mouth Daily. 4/10/19 3/28/23  Serafin Andrade DO   losartan (COZAAR) 50 MG tablet Take 50 mg by mouth Daily.  Patient not taking: Reported on 3/28/2023  3/28/23  Almita Tate MD   omeprazole (priLOSEC) 20 MG capsule Take 20 mg by mouth Daily.  Patient not taking: Reported on 3/28/2023 5/2/22 3/28/23  Almita Tate MD       Objective     Vital Signs: BP (!) 215/98 (BP Location: Left arm, Patient Position: Sitting, Cuff Size: Adult)   Pulse 81   Temp 98 °F (36.7 °C) (Infrared)   Ht 165.1 cm (65\")   Wt 69.2 kg (152 lb 9.6 oz)   SpO2 98%   BMI 25.39 kg/m²   Physical Exam  Vitals reviewed.   Constitutional:       Appearance: She is ill-appearing.   HENT:      Head: Normocephalic and atraumatic.      Right Ear: External ear normal.      Left Ear: External ear normal.      Nose: Nose normal.   Eyes:      General: No scleral icterus.     Conjunctiva/sclera: Conjunctivae normal.   Cardiovascular:      Rate and Rhythm: Normal rate and regular rhythm.      Heart sounds: Normal heart sounds.   Pulmonary:      Effort: Pulmonary effort is normal.      Breath sounds: Normal breath sounds.   Musculoskeletal:         General: No swelling or tenderness.      Cervical back: Normal range of motion and neck supple.   Skin:     General: Skin is warm and dry.   Neurological:      General: No focal deficit present.      Mental Status: She is alert.      Cranial Nerves: No cranial nerve deficit.   Psychiatric:         Mood and Affect: Mood normal.         Behavior: Behavior normal.       BMI is >= 25 and <30. (Overweight) The following options were offered after discussion;: " nutrition counseling/recommendations      Results Reviewed:  Glucose   Date Value Ref Range Status   10/14/2022 92 74 - 106 mg/dL Final     BUN   Date Value Ref Range Status   10/14/2022 11 7 - 17 mg/dL Final     Creatinine   Date Value Ref Range Status   10/14/2022 0.6 0.5 - 1.0 mg/dL Final     Sodium   Date Value Ref Range Status   10/14/2022 137 137 - 145 mmol/L Final     Potassium   Date Value Ref Range Status   10/14/2022 4.4 3.5 - 5.1 mmol/L Final     Chloride   Date Value Ref Range Status   10/14/2022 100 98 - 111 mmol/L Final     CO2   Date Value Ref Range Status   10/14/2022 26 22 - 29 mmol/L Final     Calcium   Date Value Ref Range Status   10/14/2022 9.5 8.4 - 10.2 mg/dL Final     ALT (SGPT)   Date Value Ref Range Status   10/14/2022 16 9 - 52 U/L Final     AST (SGOT)   Date Value Ref Range Status   10/14/2022 40 (H) 14 - 36 U/L Final     WBC   Date Value Ref Range Status   03/16/2023 15.07 (H) 3.98 - 10.04 K/uL Final     Hematocrit   Date Value Ref Range Status   03/16/2023 35.8 34.1 - 44.9 % Final     Platelets   Date Value Ref Range Status   03/16/2023 687 (C) 182 - 369 K/uL Final     Total Cholesterol   Date Value Ref Range Status   04/10/2019 235 (H) 130 - 200 mg/dL Final     Triglycerides   Date Value Ref Range Status   04/10/2019 188 (H) 0 - 149 mg/dL Final     HDL Cholesterol   Date Value Ref Range Status   04/10/2019 46 (L) >=50 mg/dL Final     LDL Cholesterol    Date Value Ref Range Status   04/10/2019 143 (H) 0 - 99 mg/dL Final     LDL/HDL Ratio   Date Value Ref Range Status   04/10/2019 3.29  Final       Assessment / Plan     Assessment/Plan:  1. Anxiety  - ALPRAZolam (Xanax) 0.25 MG tablet; Take 1 tablet by mouth 2 (Two) Times a Day As Needed for Anxiety.  Dispense: 60 tablet; Refill: 2    2. Hypothyroidism, unspecified type  - T4  - TSH    3. Simple chronic bronchitis (HCC)  - albuterol sulfate  (90 Base) MCG/ACT inhaler; Inhale 2 puffs Every 4 (Four) Hours As Needed for Wheezing.   Dispense: 18 g; Refill: 3    4. Encounter for screening mammogram for malignant neoplasm of breast  - Mammo Screening Bilateral With CAD; Future    5. Hyperglycemia  - Hemoglobin A1c    6. Appetite absent  - megestrol (Megace ES) 625 MG/5ML suspension; Take 5 mL by mouth Daily for 30 days.  Dispense: 150 mL; Refill: 3        Return in about 3 months (around 6/28/2023) for Recheck, Next scheduled follow up. unless patient needs to be seen sooner or acute issues arise.    Code Status: Full    I have discussed the patient results/orders and and plan/recommendation with them at today's visit.      Adrián Ayala, DO   03/28/2023

## 2023-03-28 NOTE — TELEPHONE ENCOUNTER
Rx Refill Note  Requested Prescriptions     Pending Prescriptions Disp Refills   • ALPRAZolam (Xanax) 0.25 MG tablet 60 tablet 2     Sig: Take 1 tablet by mouth 2 (Two) Times a Day As Needed for Anxiety.      Last office visit with prescribing clinician: 3/28/2023      Next office visit with prescribing clinician: 7/24/2023     UDS: none on file    DATE OF LAST REFILL: Patient asking for med to be resent to [facility].              Marta Boudreaux MA  03/28/23, 14:15 CDT

## 2023-03-28 NOTE — TELEPHONE ENCOUNTER
Patient is a 27-year-old female with history of of [place] who comes into the clinic accompanied by her spouse for evaluation of heavy menstrual bleeding for the last 3 weeks.  She does have a history of irregular periods.  She is followed by gynecologist, Dr. Larsen.  She has been going through 6-8 pads per day.  Patient was previously on hormonal contraception but stopped by her gynecologist secondary to elevated triglycerides. Vital signs stable.  Patient is nontoxic-appearing. Motrin given here in clinic.  CBC shows no acute lab abnormality.  Lab results discussed with patient.  Urine hCG negative.  Transvaginal pelvic ultrasound shows unremarkable endometrium and ovaries per radiologist's read.  Ultrasound findings discussed with patient.  Motrin 600mg every 6-8 hours.  If worsening symptoms, or any new concerns, patient understands she is to proceed immediately to the nearest emergency department for further evaluation and treatment.  Recommend close follow-up with her gynecologist, Dr. Larsen in the next 1 to 2 days for reevaluation.    Final diagnosis: 1. Heavy menstrual bleeding   Patient had this resent so it could be sent to a different pharmacy since her regular pharmacy was out of stock.

## 2023-03-28 NOTE — TELEPHONE ENCOUNTER
Attempted to call [facility] back in regards to message below. Automated system answers and will not send me through to a human. Attempted x3.       Caller: DELFIN     Relationship: [facility]     Best call back number: [phone]     What is the best time to reach you: ANY     Who are you requesting to speak with (clinical staff, provider,  specific staff member):  CLINICAL     What was the call regarding: DELFIN FROM [facility] STATES THAT THEY NEED ADDITIONAL INFORMATION REGARDING THE   PATIENT'S RESPONSE TO megestrol (Megace ES) 625 MG/5ML suspension.  DELFIN STATES THAT SHE WOULD LIKE TO KNOW IF THE PATIENT EXPERIENCED A NEGATIVE RESPONSE TO THE MEDICATION OR AN INADEQUATE RESPONSE.      Do you require a callback:YES

## 2023-03-28 NOTE — TELEPHONE ENCOUNTER
Spoke with Dr Ayala,  We changed it to the Megestrol  40mg 1 tablet daily  #30 x 5 refills.    Spoke with pharmacy

## 2023-03-28 NOTE — PROGRESS NOTES
March 28, 2023    Hello, may I speak with Ирина Randolph?    My name is ALEXANDRA      I am  with [facility]  [address] [place] [address] [phone].    Before we get started may I verify your date of birth? 1945    I am calling to officially welcome you to our practice and ask about your recent visit. Is this a good time to talk? yes    Tell me about your visit with us. What things went well?     PT said she felt so blessed to have found Dr Ayala.  She's informative, she addressed what she needed at this time and she listened to her.  She feels her prayers have been answered finding Dr Ayala.         We're always looking for ways to make our patients' experiences even better. Do you have recommendations on ways we may improve?  no    Overall were you satisfied with your first visit to our practice? Yes  Pt stated she loved Mariaelena, she was so kind and thorough.  Mara who took her labs, she is the first person who has ever stuck her and it didn't hurt and she has had many sticks going through Chemo.  Pt said she really appreciated Alexandra praying for her and she was so kind.  Her children even said they couldn't believe the sound in her voice was so good that the visit was for sure where she needed to be!       I appreciate you taking the time to speak with me today. Is there anything else I can do for you? no      Thank you, and have a great day.

## 2023-03-28 NOTE — TELEPHONE ENCOUNTER
Caller: DELFIN    Relationship: [facility]    Best call back number: [phone]    What is the best time to reach you: ANY    Who are you requesting to speak with (clinical staff, provider,  specific staff member):  CLINICAL    What was the call regarding: DELFIN FROM [facility] STATES THAT THEY NEED ADDITIONAL INFORMATION REGARDING THE   PATIENT'S RESPONSE TO megestrol (Megace ES) 625 MG/5ML suspension.  DELFIN STATES THAT SHE WOULD LIKE TO KNOW IF THE PATIENT EXPERIENCED A NEGATIVE RESPONSE TO THE MEDICATION OR AN INADEQUATE RESPONSE.     Do you require a callback:YES

## 2023-03-29 LAB
HBA1C MFR BLD: 6.1 % (ref 4.8–5.6)
T4 SERPL-MCNC: 11.5 UG/DL (ref 4.5–12)
TSH SERPL DL<=0.005 MIU/L-ACNC: 5.42 UIU/ML (ref 0.45–4.5)

## 2023-03-30 ENCOUNTER — HOSPITAL ENCOUNTER (OUTPATIENT)
Dept: INFUSION THERAPY | Age: 78
Discharge: HOME | End: 2023-03-30
Payer: MEDICARE

## 2023-03-30 VITALS
RESPIRATION RATE: 18 BRPM | WEIGHT: 153.5 LBS | DIASTOLIC BLOOD PRESSURE: 92 MMHG | HEART RATE: 88 BPM | SYSTOLIC BLOOD PRESSURE: 158 MMHG | HEIGHT: 65 IN | OXYGEN SATURATION: 98 % | BODY MASS INDEX: 25.58 KG/M2 | TEMPERATURE: 98.9 F

## 2023-03-30 DIAGNOSIS — C25.2 MALIGNANT NEOPLASM OF TAIL OF PANCREAS (HCC): Primary | ICD-10-CM

## 2023-03-30 DIAGNOSIS — C25.9 METASTASIS FROM PANCREATIC CANCER (HCC): ICD-10-CM

## 2023-03-30 DIAGNOSIS — C25.9 METASTASIS FROM PANCREATIC CANCER (HCC): Primary | ICD-10-CM

## 2023-03-30 DIAGNOSIS — C25.2 MALIGNANT NEOPLASM OF TAIL OF PANCREAS (HCC): ICD-10-CM

## 2023-03-30 DIAGNOSIS — C79.9 METASTASIS FROM PANCREATIC CANCER (HCC): Primary | ICD-10-CM

## 2023-03-30 DIAGNOSIS — C25.9 ADENOCARCINOMA OF PANCREAS (HCC): ICD-10-CM

## 2023-03-30 DIAGNOSIS — C79.9 METASTASIS FROM PANCREATIC CANCER (HCC): ICD-10-CM

## 2023-03-30 LAB
ALBUMIN SERPL-MCNC: 3.2 G/DL (ref 3.5–5.2)
ALP SERPL-CCNC: 96 U/L (ref 35–104)
ALT SERPL-CCNC: 18 U/L (ref 9–52)
ANION GAP SERPL CALCULATED.3IONS-SCNC: 2 MMOL/L (ref 7–19)
AST SERPL-CCNC: 37 U/L (ref 14–36)
BILIRUB SERPL-MCNC: 0.4 MG/DL (ref 0.2–1.3)
BUN SERPL-MCNC: 18 MG/DL (ref 7–17)
CALCIUM SERPL-MCNC: 8.9 MG/DL (ref 8.4–10.2)
CANCER AG19-9 SERPL-ACNC: 98 U/ML (ref 0–35)
CHLORIDE SERPL-SCNC: 100 MMOL/L (ref 98–111)
CO2 SERPL-SCNC: 30 MMOL/L (ref 22–29)
CREAT SERPL-MCNC: 0.8 MG/DL (ref 0.5–1)
ERYTHROCYTE [DISTWIDTH] IN BLOOD BY AUTOMATED COUNT: 16.1 % (ref 11.7–14.4)
GLOBULIN: 3 G/DL
GLUCOSE SERPL-MCNC: 111 MG/DL (ref 74–106)
HCT VFR BLD AUTO: 31.2 % (ref 34.1–44.9)
HGB BLD-MCNC: 10.4 G/DL (ref 11.2–15.7)
LYMPHOCYTES # BLD: 3.1 K/UL (ref 1.18–3.74)
LYMPHOCYTES NFR BLD: 34 % (ref 19.3–53.1)
MCH RBC QN AUTO: 31.9 PG (ref 25.6–32.2)
MCHC RBC AUTO-ENTMCNC: 33.3 G/DL (ref 32.3–35.5)
MCV RBC AUTO: 95.7 FL (ref 79.4–94.8)
MONOCYTES # BLD: 2.06 K/UL (ref 0.24–0.82)
MONOCYTES NFR BLD: 22.6 % (ref 4.7–12.5)
NEUTROPHILS # BLD: 3.61 K/UL (ref 1.56–6.13)
NEUTS SEG NFR BLD: 39.6 % (ref 34–71.1)
PLATELET # BLD AUTO: 243 K/UL (ref 182–369)
PMV BLD AUTO: 10.3 FL (ref 7.4–10.4)
POTASSIUM SERPL-SCNC: 3.8 MMOL/L (ref 3.5–5.1)
PROT SERPL-MCNC: 6.2 G/DL (ref 6.3–8.2)
RBC # BLD AUTO: 3.26 M/UL (ref 3.93–5.22)
SODIUM SERPL-SCNC: 132 MMOL/L (ref 137–145)
WBC # BLD AUTO: 9.12 K/UL (ref 3.98–10.04)

## 2023-03-30 PROCEDURE — 96375 TX/PRO/DX INJ NEW DRUG ADDON: CPT

## 2023-03-30 PROCEDURE — 2580000003 HC RX 258: Performed by: INTERNAL MEDICINE

## 2023-03-30 PROCEDURE — 80053 COMPREHEN METABOLIC PANEL: CPT

## 2023-03-30 PROCEDURE — 36415 COLL VENOUS BLD VENIPUNCTURE: CPT

## 2023-03-30 PROCEDURE — 96413 CHEMO IV INFUSION 1 HR: CPT

## 2023-03-30 PROCEDURE — 6360000002 HC RX W HCPCS: Performed by: INTERNAL MEDICINE

## 2023-03-30 PROCEDURE — 96417 CHEMO IV INFUS EACH ADDL SEQ: CPT

## 2023-03-30 PROCEDURE — 85025 COMPLETE CBC W/AUTO DIFF WBC: CPT

## 2023-03-30 RX ORDER — ALPRAZOLAM 1 MG/1
1 TABLET ORAL 2 TIMES DAILY PRN
Qty: 30 TABLET | Refills: 0 | OUTPATIENT
Start: 2023-03-30

## 2023-03-30 RX ORDER — HEPARIN SODIUM (PORCINE) LOCK FLUSH IV SOLN 100 UNIT/ML 100 UNIT/ML
500 SOLUTION INTRAVENOUS PRN
Status: DISCONTINUED | OUTPATIENT
Start: 2023-03-30 | End: 2023-03-31 | Stop reason: HOSPADM

## 2023-03-30 RX ORDER — ONDANSETRON 2 MG/ML
8 INJECTION INTRAMUSCULAR; INTRAVENOUS
OUTPATIENT
Start: 2023-03-30

## 2023-03-30 RX ORDER — ACETAMINOPHEN 325 MG/1
650 TABLET ORAL
OUTPATIENT
Start: 2023-03-30

## 2023-03-30 RX ORDER — SODIUM CHLORIDE 9 MG/ML
INJECTION, SOLUTION INTRAVENOUS CONTINUOUS
OUTPATIENT
Start: 2023-03-30

## 2023-03-30 RX ORDER — SODIUM CHLORIDE 0.9 % (FLUSH) 0.9 %
5-40 SYRINGE (ML) INJECTION PRN
Status: CANCELLED | OUTPATIENT
Start: 2023-03-30

## 2023-03-30 RX ORDER — SODIUM CHLORIDE 0.9 % (FLUSH) 0.9 %
5-40 SYRINGE (ML) INJECTION PRN
Status: DISCONTINUED | OUTPATIENT
Start: 2023-03-30 | End: 2023-03-31 | Stop reason: HOSPADM

## 2023-03-30 RX ORDER — FAMOTIDINE 10 MG/ML
20 INJECTION, SOLUTION INTRAVENOUS
OUTPATIENT
Start: 2023-03-30

## 2023-03-30 RX ORDER — DIPHENHYDRAMINE HYDROCHLORIDE 50 MG/ML
50 INJECTION INTRAMUSCULAR; INTRAVENOUS
OUTPATIENT
Start: 2023-03-30

## 2023-03-30 RX ORDER — PACLITAXEL 100 MG/20ML
100 INJECTION, POWDER, LYOPHILIZED, FOR SUSPENSION INTRAVENOUS ONCE
Status: COMPLETED | OUTPATIENT
Start: 2023-03-30 | End: 2023-03-30

## 2023-03-30 RX ORDER — PALONOSETRON 0.05 MG/ML
0.25 INJECTION, SOLUTION INTRAVENOUS ONCE
Status: CANCELLED
Start: 2023-03-30 | End: 2023-03-30

## 2023-03-30 RX ORDER — EPINEPHRINE 1 MG/ML
0.3 INJECTION, SOLUTION, CONCENTRATE INTRAVENOUS PRN
OUTPATIENT
Start: 2023-03-30

## 2023-03-30 RX ORDER — SODIUM CHLORIDE 9 MG/ML
5-40 INJECTION INTRAVENOUS PRN
OUTPATIENT
Start: 2023-03-30

## 2023-03-30 RX ORDER — ALBUTEROL SULFATE 90 UG/1
4 AEROSOL, METERED RESPIRATORY (INHALATION) PRN
OUTPATIENT
Start: 2023-03-30

## 2023-03-30 RX ORDER — MEPERIDINE HYDROCHLORIDE 50 MG/ML
12.5 INJECTION INTRAMUSCULAR; INTRAVENOUS; SUBCUTANEOUS PRN
OUTPATIENT
Start: 2023-03-30

## 2023-03-30 RX ORDER — PACLITAXEL 100 MG/20ML
100 INJECTION, POWDER, LYOPHILIZED, FOR SUSPENSION INTRAVENOUS ONCE
Status: CANCELLED | OUTPATIENT
Start: 2023-03-30 | End: 2023-03-30

## 2023-03-30 RX ORDER — HEPARIN SODIUM (PORCINE) LOCK FLUSH IV SOLN 100 UNIT/ML 100 UNIT/ML
500 SOLUTION INTRAVENOUS PRN
Status: CANCELLED | OUTPATIENT
Start: 2023-03-30

## 2023-03-30 RX ORDER — SODIUM CHLORIDE 9 MG/ML
5-250 INJECTION, SOLUTION INTRAVENOUS PRN
Status: CANCELLED | OUTPATIENT
Start: 2023-03-30

## 2023-03-30 RX ORDER — PALONOSETRON 0.05 MG/ML
0.25 INJECTION, SOLUTION INTRAVENOUS ONCE
Status: COMPLETED | OUTPATIENT
Start: 2023-03-30 | End: 2023-03-30

## 2023-03-30 RX ORDER — SODIUM CHLORIDE 9 MG/ML
5-250 INJECTION, SOLUTION INTRAVENOUS PRN
OUTPATIENT
Start: 2023-03-30

## 2023-03-30 RX ORDER — SODIUM CHLORIDE 9 MG/ML
5-250 INJECTION, SOLUTION INTRAVENOUS PRN
Status: DISCONTINUED | OUTPATIENT
Start: 2023-03-30 | End: 2023-03-31 | Stop reason: HOSPADM

## 2023-03-30 RX ADMIN — Medication 500 UNITS: at 15:39

## 2023-03-30 RX ADMIN — GEMCITABINE 1830 MG: 38 INJECTION, SOLUTION INTRAVENOUS at 15:07

## 2023-03-30 RX ADMIN — DEXAMETHASONE SODIUM PHOSPHATE: 10 INJECTION, SOLUTION INTRAMUSCULAR; INTRAVENOUS at 14:04

## 2023-03-30 RX ADMIN — SODIUM CHLORIDE, PRESERVATIVE FREE 10 ML: 5 INJECTION INTRAVENOUS at 15:39

## 2023-03-30 RX ADMIN — PACLITAXEL 183 MG: 100 INJECTION, POWDER, LYOPHILIZED, FOR SUSPENSION INTRAVENOUS at 14:21

## 2023-03-30 RX ADMIN — PALONOSETRON 0.25 MG: 0.05 INJECTION, SOLUTION INTRAVENOUS at 14:04

## 2023-03-30 RX ADMIN — SODIUM CHLORIDE 25 ML/HR: 9 INJECTION, SOLUTION INTRAVENOUS at 14:03

## 2023-03-30 NOTE — PROGRESS NOTES
Lab Results   Component Value Date    WBC 9.12 03/30/2023    HGB 10.4 (L) 03/30/2023    HCT 31.2 (L) 03/30/2023    MCV 95.7 (H) 03/30/2023     03/30/2023     Lab Results   Component Value Date    NEUTROABS 3.61 03/30/2023     Lab Results   Component Value Date     (L) 03/30/2023    K 3.8 03/30/2023     03/30/2023    CO2 30 (H) 03/30/2023    BUN 18 (H) 03/30/2023    CREATININE 0.8 03/30/2023    GLUCOSE 111 (H) 03/30/2023    CALCIUM 8.9 03/30/2023    PROT 6.2 (L) 03/30/2023    LABALBU 3.2 (L) 03/30/2023    BILITOT 0.4 03/30/2023    ALKPHOS 96 03/30/2023    AST 37 (H) 03/30/2023    ALT 18 03/30/2023    LABGLOM >60 03/30/2023    GFRAA >59 01/12/2022    GLOB 3.0 03/30/2023

## 2023-04-13 ENCOUNTER — HOSPITAL ENCOUNTER (OUTPATIENT)
Dept: INFUSION THERAPY | Age: 78
Discharge: HOME | End: 2023-04-13
Payer: MEDICARE

## 2023-04-13 VITALS
OXYGEN SATURATION: 97 % | HEIGHT: 65 IN | RESPIRATION RATE: 18 BRPM | BODY MASS INDEX: 25.89 KG/M2 | DIASTOLIC BLOOD PRESSURE: 87 MMHG | HEART RATE: 87 BPM | TEMPERATURE: 98.1 F | SYSTOLIC BLOOD PRESSURE: 177 MMHG | WEIGHT: 155.4 LBS

## 2023-04-13 DIAGNOSIS — C25.9 ADENOCARCINOMA OF PANCREAS (HCC): ICD-10-CM

## 2023-04-13 DIAGNOSIS — C79.9 METASTASIS FROM PANCREATIC CANCER (HCC): ICD-10-CM

## 2023-04-13 DIAGNOSIS — C25.9 METASTASIS FROM PANCREATIC CANCER (HCC): ICD-10-CM

## 2023-04-13 DIAGNOSIS — C25.2 MALIGNANT NEOPLASM OF TAIL OF PANCREAS (HCC): Primary | ICD-10-CM

## 2023-04-13 LAB
ALBUMIN SERPL-MCNC: 3.5 G/DL (ref 3.5–5.2)
ALP SERPL-CCNC: 101 U/L (ref 35–104)
ALT SERPL-CCNC: 19 U/L (ref 9–52)
ANION GAP SERPL CALCULATED.3IONS-SCNC: 9 MMOL/L (ref 7–19)
AST SERPL-CCNC: 40 U/L (ref 14–36)
BILIRUB SERPL-MCNC: 0.3 MG/DL (ref 0.2–1.3)
BUN SERPL-MCNC: 21 MG/DL (ref 7–17)
CALCIUM SERPL-MCNC: 9.1 MG/DL (ref 8.4–10.2)
CHLORIDE SERPL-SCNC: 105 MMOL/L (ref 98–111)
CO2 SERPL-SCNC: 22 MMOL/L (ref 22–29)
CREAT SERPL-MCNC: 1 MG/DL (ref 0.5–1)
ERYTHROCYTE [DISTWIDTH] IN BLOOD BY AUTOMATED COUNT: 16.7 % (ref 11.7–14.4)
GLOBULIN: 3.1 G/DL
GLUCOSE SERPL-MCNC: 147 MG/DL (ref 74–106)
HCT VFR BLD AUTO: 28.8 % (ref 34.1–44.9)
HGB BLD-MCNC: 9.8 G/DL (ref 11.2–15.7)
LYMPHOCYTES # BLD: 1.9 K/UL (ref 1.18–3.74)
LYMPHOCYTES NFR BLD: 27.6 % (ref 19.3–53.1)
MCH RBC QN AUTO: 32.2 PG (ref 25.6–32.2)
MCHC RBC AUTO-ENTMCNC: 34 G/DL (ref 32.3–35.5)
MCV RBC AUTO: 94.7 FL (ref 79.4–94.8)
MONOCYTES # BLD: 1.3 K/UL (ref 0.24–0.82)
MONOCYTES NFR BLD: 18.9 % (ref 4.7–12.5)
NEUTROPHILS # BLD: 3.51 K/UL (ref 1.56–6.13)
NEUTS SEG NFR BLD: 50.9 % (ref 34–71.1)
PLATELET # BLD AUTO: 443 K/UL (ref 182–369)
PMV BLD AUTO: 10.6 FL (ref 7.4–10.4)
POTASSIUM SERPL-SCNC: 3.8 MMOL/L (ref 3.5–5.1)
PROT SERPL-MCNC: 6.4 G/DL (ref 6.3–8.2)
RBC # BLD AUTO: 3.04 M/UL (ref 3.93–5.22)
SODIUM SERPL-SCNC: 136 MMOL/L (ref 137–145)
WBC # BLD AUTO: 6.89 K/UL (ref 3.98–10.04)

## 2023-04-13 PROCEDURE — 2580000003 HC RX 258: Performed by: INTERNAL MEDICINE

## 2023-04-13 PROCEDURE — 85025 COMPLETE CBC W/AUTO DIFF WBC: CPT

## 2023-04-13 PROCEDURE — 80053 COMPREHEN METABOLIC PANEL: CPT

## 2023-04-13 PROCEDURE — 96417 CHEMO IV INFUS EACH ADDL SEQ: CPT

## 2023-04-13 PROCEDURE — 6360000002 HC RX W HCPCS: Performed by: INTERNAL MEDICINE

## 2023-04-13 PROCEDURE — 96413 CHEMO IV INFUSION 1 HR: CPT

## 2023-04-13 PROCEDURE — 36415 COLL VENOUS BLD VENIPUNCTURE: CPT

## 2023-04-13 PROCEDURE — 96375 TX/PRO/DX INJ NEW DRUG ADDON: CPT

## 2023-04-13 RX ORDER — PALONOSETRON 0.05 MG/ML
0.25 INJECTION, SOLUTION INTRAVENOUS ONCE
Status: COMPLETED | OUTPATIENT
Start: 2023-04-13 | End: 2023-04-13

## 2023-04-13 RX ORDER — SODIUM CHLORIDE 0.9 % (FLUSH) 0.9 %
5-40 SYRINGE (ML) INJECTION PRN
Status: DISCONTINUED | OUTPATIENT
Start: 2023-04-13 | End: 2023-04-14 | Stop reason: HOSPADM

## 2023-04-13 RX ORDER — HEPARIN SODIUM (PORCINE) LOCK FLUSH IV SOLN 100 UNIT/ML 100 UNIT/ML
500 SOLUTION INTRAVENOUS PRN
Status: DISCONTINUED | OUTPATIENT
Start: 2023-04-13 | End: 2023-04-14 | Stop reason: HOSPADM

## 2023-04-13 RX ORDER — PACLITAXEL 100 MG/20ML
100 INJECTION, POWDER, LYOPHILIZED, FOR SUSPENSION INTRAVENOUS ONCE
Status: COMPLETED | OUTPATIENT
Start: 2023-04-13 | End: 2023-04-13

## 2023-04-13 RX ADMIN — SODIUM CHLORIDE, PRESERVATIVE FREE 10 ML: 5 INJECTION INTRAVENOUS at 12:28

## 2023-04-13 RX ADMIN — Medication 500 UNITS: at 12:28

## 2023-04-13 RX ADMIN — GEMCITABINE 1830 MG: 38 INJECTION, SOLUTION INTRAVENOUS at 11:55

## 2023-04-13 RX ADMIN — PACLITAXEL 183 MG: 100 INJECTION, POWDER, LYOPHILIZED, FOR SUSPENSION INTRAVENOUS at 11:17

## 2023-04-13 RX ADMIN — DEXAMETHASONE SODIUM PHOSPHATE: 10 INJECTION, SOLUTION INTRAMUSCULAR; INTRAVENOUS at 11:02

## 2023-04-13 RX ADMIN — PALONOSETRON 0.25 MG: 0.05 INJECTION, SOLUTION INTRAVENOUS at 11:02

## 2023-04-20 ENCOUNTER — HOSPITAL ENCOUNTER (OUTPATIENT)
Dept: CT IMAGING | Age: 78
Discharge: HOME | End: 2023-04-20
Payer: MEDICARE

## 2023-04-20 DIAGNOSIS — C25.2 MALIGNANT NEOPLASM OF TAIL OF PANCREAS (HCC): ICD-10-CM

## 2023-04-20 PROCEDURE — 71260 CT THORAX DX C+: CPT

## 2023-04-20 PROCEDURE — 74177 CT ABD & PELVIS W/CONTRAST: CPT

## 2023-04-20 PROCEDURE — 74177 CT ABD & PELVIS W/CONTRAST: CPT | Performed by: RADIOLOGY

## 2023-04-20 PROCEDURE — 6360000004 HC RX CONTRAST MEDICATION: Performed by: INTERNAL MEDICINE

## 2023-04-20 PROCEDURE — 71260 CT THORAX DX C+: CPT | Performed by: RADIOLOGY

## 2023-04-20 RX ADMIN — IOPAMIDOL 75 ML: 755 INJECTION, SOLUTION INTRAVENOUS at 11:17

## 2023-04-23 ENCOUNTER — APPOINTMENT (OUTPATIENT)
Dept: GENERAL RADIOLOGY | Age: 78
DRG: 189 | End: 2023-04-23
Payer: MEDICARE

## 2023-04-23 ENCOUNTER — HOSPITAL ENCOUNTER (INPATIENT)
Age: 78
LOS: 5 days | Discharge: HOME | DRG: 189 | End: 2023-04-28
Attending: EMERGENCY MEDICINE
Payer: MEDICARE

## 2023-04-23 ENCOUNTER — APPOINTMENT (OUTPATIENT)
Dept: CT IMAGING | Age: 78
DRG: 189 | End: 2023-04-23
Payer: MEDICARE

## 2023-04-23 DIAGNOSIS — R51.9 ACUTE NONINTRACTABLE HEADACHE, UNSPECIFIED HEADACHE TYPE: Primary | ICD-10-CM

## 2023-04-23 DIAGNOSIS — I16.0 HYPERTENSIVE URGENCY: ICD-10-CM

## 2023-04-23 DIAGNOSIS — J81.0 ACUTE PULMONARY EDEMA (HCC): ICD-10-CM

## 2023-04-23 DIAGNOSIS — R09.02 HYPOXIA: ICD-10-CM

## 2023-04-23 PROBLEM — I16.1 HYPERTENSIVE EMERGENCY: Status: ACTIVE | Noted: 2023-04-23

## 2023-04-23 LAB
ACANTHOCYTES BLD QL SMEAR: ABNORMAL
ALBUMIN SERPL-MCNC: 3.4 G/DL (ref 3.5–5.2)
ALP SERPL-CCNC: 90 U/L (ref 35–104)
ALT SERPL-CCNC: 18 U/L (ref 5–33)
ANION GAP SERPL CALCULATED.3IONS-SCNC: 10 MMOL/L (ref 7–19)
APTT PPP: 29.8 SEC (ref 26–36.2)
AST SERPL-CCNC: 40 U/L (ref 5–32)
B PARAP IS1001 DNA NPH QL NAA+NON-PROBE: NOT DETECTED
B PERT.PT PRMT NPH QL NAA+NON-PROBE: NOT DETECTED
BACTERIA URNS QL MICRO: NEGATIVE /HPF
BASOPHILS # BLD: 0 K/UL (ref 0–0.2)
BASOPHILS NFR BLD: 0 % (ref 0–1)
BILIRUB SERPL-MCNC: 0.4 MG/DL (ref 0.2–1.2)
BILIRUB UR QL STRIP: NEGATIVE
BNP BLD-MCNC: 4289 PG/ML (ref 0–449)
BUN SERPL-MCNC: 19 MG/DL (ref 8–23)
C PNEUM DNA NPH QL NAA+NON-PROBE: NOT DETECTED
CALCIUM SERPL-MCNC: 8.5 MG/DL (ref 8.8–10.2)
CHLORIDE SERPL-SCNC: 100 MMOL/L (ref 98–111)
CLARITY UR: CLEAR
CO2 SERPL-SCNC: 22 MMOL/L (ref 22–29)
COLOR UR: YELLOW
CREAT SERPL-MCNC: 1.1 MG/DL (ref 0.5–0.9)
CRYSTALS URNS MICRO: ABNORMAL /HPF
D DIMER PPP FEU-MCNC: 3.6 UG/ML FEU (ref 0–0.48)
DACRYOCYTES BLD QL SMEAR: ABNORMAL
EOSINOPHIL # BLD: 0 K/UL (ref 0–0.6)
EOSINOPHIL NFR BLD: 0 % (ref 0–5)
EPI CELLS #/AREA URNS AUTO: 4 /HPF (ref 0–5)
ERYTHROCYTE [DISTWIDTH] IN BLOOD BY AUTOMATED COUNT: 17.1 % (ref 11.5–14.5)
FLUAV RNA NPH QL NAA+NON-PROBE: NOT DETECTED
FLUBV RNA NPH QL NAA+NON-PROBE: NOT DETECTED
GLUCOSE SERPL-MCNC: 155 MG/DL (ref 74–109)
GLUCOSE UR STRIP.AUTO-MCNC: NEGATIVE MG/DL
HADV DNA NPH QL NAA+NON-PROBE: NOT DETECTED
HCOV 229E RNA NPH QL NAA+NON-PROBE: NOT DETECTED
HCOV HKU1 RNA NPH QL NAA+NON-PROBE: NOT DETECTED
HCOV NL63 RNA NPH QL NAA+NON-PROBE: NOT DETECTED
HCOV OC43 RNA NPH QL NAA+NON-PROBE: NOT DETECTED
HCT VFR BLD AUTO: 29.2 % (ref 37–47)
HGB BLD-MCNC: 9.7 G/DL (ref 12–16)
HGB UR STRIP.AUTO-MCNC: ABNORMAL MG/L
HMPV RNA NPH QL NAA+NON-PROBE: NOT DETECTED
HPIV1 RNA NPH QL NAA+NON-PROBE: NOT DETECTED
HPIV2 RNA NPH QL NAA+NON-PROBE: NOT DETECTED
HPIV3 RNA NPH QL NAA+NON-PROBE: NOT DETECTED
HPIV4 RNA NPH QL NAA+NON-PROBE: NOT DETECTED
HYALINE CASTS #/AREA URNS AUTO: 8 /HPF (ref 0–8)
IMM GRANULOCYTES # BLD: 0.2 K/UL
INR PPP: 1.04 (ref 0.88–1.18)
KETONES UR STRIP.AUTO-MCNC: NEGATIVE MG/DL
LEUKOCYTE ESTERASE UR QL STRIP.AUTO: ABNORMAL
LYMPHOCYTES # BLD: 2.6 K/UL (ref 1.1–4.5)
LYMPHOCYTES NFR BLD: 16 % (ref 20–40)
M PNEUMO DNA NPH QL NAA+NON-PROBE: NOT DETECTED
MAGNESIUM SERPL-MCNC: 1.9 MG/DL (ref 1.6–2.4)
MCH RBC QN AUTO: 31.6 PG (ref 27–31)
MCHC RBC AUTO-ENTMCNC: 33.2 G/DL (ref 33–37)
MCV RBC AUTO: 95.1 FL (ref 81–99)
MONOCYTES # BLD: 2.4 K/UL (ref 0–0.9)
MONOCYTES NFR BLD: 16 % (ref 0–10)
NEUTROPHILS # BLD: 10.3 K/UL (ref 1.5–7.5)
NEUTS SEG NFR BLD: 67 % (ref 50–65)
NITRITE UR QL STRIP.AUTO: NEGATIVE
PH UR STRIP.AUTO: 6.5 [PH] (ref 5–8)
PHOSPHATE SERPL-MCNC: 3.6 MG/DL (ref 2.5–4.5)
PLATELET # BLD AUTO: 338 K/UL (ref 130–400)
PLATELET SLIDE REVIEW: ADEQUATE
PMV BLD AUTO: 9.6 FL (ref 9.4–12.3)
POIKILOCYTOSIS BLD QL SMEAR: ABNORMAL
POTASSIUM SERPL-SCNC: 4.6 MMOL/L (ref 3.5–5)
PROT SERPL-MCNC: 6.1 G/DL (ref 6.6–8.7)
PROT UR STRIP.AUTO-MCNC: 300 MG/DL
PROTHROMBIN TIME: 13.5 SEC (ref 12–14.6)
RBC # BLD AUTO: 3.07 M/UL (ref 4.2–5.4)
RBC #/AREA URNS AUTO: 161 /HPF (ref 0–4)
RSV RNA NPH QL NAA+NON-PROBE: NOT DETECTED
RV+EV RNA NPH QL NAA+NON-PROBE: NOT DETECTED
SARS-COV-2 RNA NPH QL NAA+NON-PROBE: NOT DETECTED
SCHISTOCYTES BLD QL SMEAR: ABNORMAL
SODIUM SERPL-SCNC: 132 MMOL/L (ref 136–145)
SP GR UR STRIP.AUTO: 1.01 (ref 1–1.03)
TARGETS BLD QL SMEAR: ABNORMAL
TROPONIN T SERPL-MCNC: <0.01 NG/ML (ref 0–0.03)
TROPONIN T SERPL-MCNC: <0.01 NG/ML (ref 0–0.03)
UROBILINOGEN UR STRIP.AUTO-MCNC: 0.2 E.U./DL
VARIANT LYMPHS NFR BLD: 1 % (ref 0–8)
WBC # BLD AUTO: 15.3 K/UL (ref 4.8–10.8)
WBC #/AREA URNS AUTO: 8 /HPF (ref 0–5)

## 2023-04-23 PROCEDURE — 80053 COMPREHEN METABOLIC PANEL: CPT

## 2023-04-23 PROCEDURE — 96374 THER/PROPH/DIAG INJ IV PUSH: CPT

## 2023-04-23 PROCEDURE — 85610 PROTHROMBIN TIME: CPT

## 2023-04-23 PROCEDURE — 36415 COLL VENOUS BLD VENIPUNCTURE: CPT

## 2023-04-23 PROCEDURE — 85025 COMPLETE CBC W/AUTO DIFF WBC: CPT

## 2023-04-23 PROCEDURE — 83735 ASSAY OF MAGNESIUM: CPT

## 2023-04-23 PROCEDURE — 2500000003 HC RX 250 WO HCPCS: Performed by: EMERGENCY MEDICINE

## 2023-04-23 PROCEDURE — 6370000000 HC RX 637 (ALT 250 FOR IP): Performed by: HOSPITALIST

## 2023-04-23 PROCEDURE — 2580000003 HC RX 258: Performed by: EMERGENCY MEDICINE

## 2023-04-23 PROCEDURE — 2580000003 HC RX 258: Performed by: HOSPITALIST

## 2023-04-23 PROCEDURE — 85379 FIBRIN DEGRADATION QUANT: CPT

## 2023-04-23 PROCEDURE — 83880 ASSAY OF NATRIURETIC PEPTIDE: CPT

## 2023-04-23 PROCEDURE — 99285 EMERGENCY DEPT VISIT HI MDM: CPT

## 2023-04-23 PROCEDURE — 0202U NFCT DS 22 TRGT SARS-COV-2: CPT

## 2023-04-23 PROCEDURE — 71045 X-RAY EXAM CHEST 1 VIEW: CPT

## 2023-04-23 PROCEDURE — 70450 CT HEAD/BRAIN W/O DYE: CPT

## 2023-04-23 PROCEDURE — 84100 ASSAY OF PHOSPHORUS: CPT

## 2023-04-23 PROCEDURE — 84484 ASSAY OF TROPONIN QUANT: CPT

## 2023-04-23 PROCEDURE — 81001 URINALYSIS AUTO W/SCOPE: CPT

## 2023-04-23 PROCEDURE — 96375 TX/PRO/DX INJ NEW DRUG ADDON: CPT

## 2023-04-23 PROCEDURE — 85730 THROMBOPLASTIN TIME PARTIAL: CPT

## 2023-04-23 PROCEDURE — 6360000002 HC RX W HCPCS: Performed by: EMERGENCY MEDICINE

## 2023-04-23 PROCEDURE — 2000000000 HC ICU R&B

## 2023-04-23 RX ORDER — CALCIUM CARBONATE 200(500)MG
500 TABLET,CHEWABLE ORAL 3 TIMES DAILY PRN
Status: DISCONTINUED | OUTPATIENT
Start: 2023-04-23 | End: 2023-04-28 | Stop reason: HOSPADM

## 2023-04-23 RX ORDER — HYDROCHLOROTHIAZIDE 25 MG/1
12.5 TABLET ORAL DAILY
Status: DISCONTINUED | OUTPATIENT
Start: 2023-04-24 | End: 2023-04-24

## 2023-04-23 RX ORDER — LABETALOL HYDROCHLORIDE 5 MG/ML
10 INJECTION, SOLUTION INTRAVENOUS ONCE
Status: COMPLETED | OUTPATIENT
Start: 2023-04-23 | End: 2023-04-23

## 2023-04-23 RX ORDER — SODIUM CHLORIDE 9 MG/ML
INJECTION, SOLUTION INTRAVENOUS PRN
Status: DISCONTINUED | OUTPATIENT
Start: 2023-04-23 | End: 2023-04-28 | Stop reason: HOSPADM

## 2023-04-23 RX ORDER — ONDANSETRON 2 MG/ML
4 INJECTION INTRAMUSCULAR; INTRAVENOUS ONCE
Status: COMPLETED | OUTPATIENT
Start: 2023-04-23 | End: 2023-04-23

## 2023-04-23 RX ORDER — LIDOCAINE 40 MG/G
CREAM TOPICAL PRN
Status: DISCONTINUED | OUTPATIENT
Start: 2023-04-23 | End: 2023-04-28 | Stop reason: HOSPADM

## 2023-04-23 RX ORDER — LOSARTAN POTASSIUM 50 MG/1
50 TABLET ORAL DAILY
Status: DISCONTINUED | OUTPATIENT
Start: 2023-04-24 | End: 2023-04-24

## 2023-04-23 RX ORDER — POLYETHYLENE GLYCOL 3350 17 G/17G
17 POWDER, FOR SOLUTION ORAL DAILY PRN
Status: DISCONTINUED | OUTPATIENT
Start: 2023-04-23 | End: 2023-04-28 | Stop reason: HOSPADM

## 2023-04-23 RX ORDER — MECOBALAMIN 5000 MCG
5 TABLET,DISINTEGRATING ORAL NIGHTLY PRN
Status: DISCONTINUED | OUTPATIENT
Start: 2023-04-23 | End: 2023-04-28 | Stop reason: HOSPADM

## 2023-04-23 RX ORDER — SODIUM CHLORIDE 0.9 % (FLUSH) 0.9 %
5-40 SYRINGE (ML) INJECTION EVERY 12 HOURS SCHEDULED
Status: DISCONTINUED | OUTPATIENT
Start: 2023-04-23 | End: 2023-04-24

## 2023-04-23 RX ORDER — CETIRIZINE HYDROCHLORIDE 10 MG/1
10 TABLET ORAL DAILY PRN
Status: DISCONTINUED | OUTPATIENT
Start: 2023-04-24 | End: 2023-04-28 | Stop reason: HOSPADM

## 2023-04-23 RX ORDER — METOCLOPRAMIDE HYDROCHLORIDE 5 MG/ML
10 INJECTION INTRAMUSCULAR; INTRAVENOUS ONCE
Status: COMPLETED | OUTPATIENT
Start: 2023-04-23 | End: 2023-04-23

## 2023-04-23 RX ORDER — SODIUM CHLORIDE 0.9 % (FLUSH) 0.9 %
5-40 SYRINGE (ML) INJECTION PRN
Status: DISCONTINUED | OUTPATIENT
Start: 2023-04-23 | End: 2023-04-28 | Stop reason: HOSPADM

## 2023-04-23 RX ORDER — ACETAMINOPHEN 325 MG/1
650 TABLET ORAL EVERY 4 HOURS PRN
Status: DISCONTINUED | OUTPATIENT
Start: 2023-04-23 | End: 2023-04-28 | Stop reason: HOSPADM

## 2023-04-23 RX ORDER — ONDANSETRON 2 MG/ML
4 INJECTION INTRAMUSCULAR; INTRAVENOUS EVERY 6 HOURS PRN
Status: DISCONTINUED | OUTPATIENT
Start: 2023-04-23 | End: 2023-04-27

## 2023-04-23 RX ORDER — FUROSEMIDE 10 MG/ML
40 INJECTION INTRAMUSCULAR; INTRAVENOUS ONCE
Status: COMPLETED | OUTPATIENT
Start: 2023-04-23 | End: 2023-04-23

## 2023-04-23 RX ORDER — ACETAMINOPHEN 650 MG/1
650 SUPPOSITORY RECTAL EVERY 4 HOURS PRN
Status: DISCONTINUED | OUTPATIENT
Start: 2023-04-23 | End: 2023-04-28 | Stop reason: HOSPADM

## 2023-04-23 RX ORDER — ALPRAZOLAM 0.5 MG/1
0.25 TABLET ORAL 2 TIMES DAILY PRN
COMMUNITY
Start: 2023-03-28

## 2023-04-23 RX ORDER — LOSARTAN POTASSIUM AND HYDROCHLOROTHIAZIDE 12.5; 5 MG/1; MG/1
1 TABLET ORAL DAILY
Status: DISCONTINUED | OUTPATIENT
Start: 2023-04-24 | End: 2023-04-23 | Stop reason: SDUPTHER

## 2023-04-23 RX ORDER — ALPRAZOLAM 0.25 MG/1
0.25 TABLET ORAL 2 TIMES DAILY PRN
Status: DISCONTINUED | OUTPATIENT
Start: 2023-04-23 | End: 2023-04-28 | Stop reason: HOSPADM

## 2023-04-23 RX ORDER — ONDANSETRON 4 MG/1
4 TABLET, ORALLY DISINTEGRATING ORAL EVERY 8 HOURS PRN
Status: DISCONTINUED | OUTPATIENT
Start: 2023-04-23 | End: 2023-04-27

## 2023-04-23 RX ORDER — ENOXAPARIN SODIUM 100 MG/ML
40 INJECTION SUBCUTANEOUS DAILY
Status: DISCONTINUED | OUTPATIENT
Start: 2023-04-24 | End: 2023-04-28 | Stop reason: HOSPADM

## 2023-04-23 RX ORDER — BUSPIRONE HYDROCHLORIDE 5 MG/1
5 TABLET ORAL 3 TIMES DAILY
Status: DISCONTINUED | OUTPATIENT
Start: 2023-04-23 | End: 2023-04-28 | Stop reason: HOSPADM

## 2023-04-23 RX ORDER — MECOBALAMIN 5000 MCG
5 TABLET,DISINTEGRATING ORAL NIGHTLY
Status: DISCONTINUED | OUTPATIENT
Start: 2023-04-23 | End: 2023-04-28 | Stop reason: HOSPADM

## 2023-04-23 RX ORDER — PROMETHAZINE HYDROCHLORIDE 12.5 MG/1
12.5 TABLET ORAL EVERY 6 HOURS PRN
Status: DISCONTINUED | OUTPATIENT
Start: 2023-04-23 | End: 2023-04-28 | Stop reason: HOSPADM

## 2023-04-23 RX ADMIN — FUROSEMIDE 40 MG: 10 INJECTION, SOLUTION INTRAMUSCULAR; INTRAVENOUS at 21:00

## 2023-04-23 RX ADMIN — BUSPIRONE HYDROCHLORIDE 5 MG: 5 TABLET ORAL at 22:34

## 2023-04-23 RX ADMIN — SODIUM CHLORIDE, PRESERVATIVE FREE 10 ML: 5 INJECTION INTRAVENOUS at 22:34

## 2023-04-23 RX ADMIN — METOCLOPRAMIDE 10 MG: 5 INJECTION, SOLUTION INTRAMUSCULAR; INTRAVENOUS at 16:52

## 2023-04-23 RX ADMIN — LABETALOL HYDROCHLORIDE 10 MG: 5 INJECTION, SOLUTION INTRAVENOUS at 16:55

## 2023-04-23 RX ADMIN — NICARDIPINE HYDROCHLORIDE 5 MG/HR: 2.5 INJECTION, SOLUTION INTRAVENOUS at 21:11

## 2023-04-23 RX ADMIN — ONDANSETRON 4 MG: 2 INJECTION INTRAMUSCULAR; INTRAVENOUS at 16:50

## 2023-04-23 RX ADMIN — Medication 5 MG: at 22:34

## 2023-04-23 ASSESSMENT — ENCOUNTER SYMPTOMS
EYES NEGATIVE: 1
ABDOMINAL PAIN: 0
NAUSEA: 1
SHORTNESS OF BREATH: 1
COUGH: 1

## 2023-04-23 ASSESSMENT — PAIN SCALES - GENERAL: PAINLEVEL_OUTOF10: 0

## 2023-04-24 LAB
EKG P AXIS: 56 DEGREES
EKG P-R INTERVAL: 136 MS
EKG Q-T INTERVAL: 376 MS
EKG QRS DURATION: 72 MS
EKG QTC CALCULATION (BAZETT): 418 MS
EKG T AXIS: 63 DEGREES
LV EF: 76 %
LVEF MODALITY: NORMAL
TROPONIN T SERPL-MCNC: <0.01 NG/ML (ref 0–0.03)

## 2023-04-24 PROCEDURE — 2580000003 HC RX 258: Performed by: HOSPITALIST

## 2023-04-24 PROCEDURE — 6370000000 HC RX 637 (ALT 250 FOR IP): Performed by: HOSPITALIST

## 2023-04-24 PROCEDURE — 6370000000 HC RX 637 (ALT 250 FOR IP): Performed by: INTERNAL MEDICINE

## 2023-04-24 PROCEDURE — 84484 ASSAY OF TROPONIN QUANT: CPT

## 2023-04-24 PROCEDURE — 94760 N-INVAS EAR/PLS OXIMETRY 1: CPT

## 2023-04-24 PROCEDURE — 93005 ELECTROCARDIOGRAM TRACING: CPT

## 2023-04-24 PROCEDURE — 2500000003 HC RX 250 WO HCPCS: Performed by: INTERNAL MEDICINE

## 2023-04-24 PROCEDURE — 6360000004 HC RX CONTRAST MEDICATION: Performed by: HOSPITALIST

## 2023-04-24 PROCEDURE — 6360000002 HC RX W HCPCS: Performed by: HOSPITALIST

## 2023-04-24 PROCEDURE — 6360000002 HC RX W HCPCS

## 2023-04-24 PROCEDURE — 1210000000 HC MED SURG R&B

## 2023-04-24 PROCEDURE — 51798 US URINE CAPACITY MEASURE: CPT

## 2023-04-24 PROCEDURE — 99223 1ST HOSP IP/OBS HIGH 75: CPT | Performed by: INTERNAL MEDICINE

## 2023-04-24 PROCEDURE — C8929 TTE W OR WO FOL WCON,DOPPLER: HCPCS

## 2023-04-24 RX ORDER — FENTANYL CITRATE 50 UG/ML
12.5 INJECTION, SOLUTION INTRAMUSCULAR; INTRAVENOUS ONCE
Status: COMPLETED | OUTPATIENT
Start: 2023-04-24 | End: 2023-04-24

## 2023-04-24 RX ORDER — HYDROCHLOROTHIAZIDE 25 MG/1
25 TABLET ORAL DAILY
Status: DISCONTINUED | OUTPATIENT
Start: 2023-04-25 | End: 2023-04-25

## 2023-04-24 RX ORDER — MORPHINE SULFATE 4 MG/ML
INJECTION, SOLUTION INTRAMUSCULAR; INTRAVENOUS
Status: COMPLETED
Start: 2023-04-24 | End: 2023-04-24

## 2023-04-24 RX ORDER — MORPHINE SULFATE 4 MG/ML
4 INJECTION, SOLUTION INTRAMUSCULAR; INTRAVENOUS
Status: DISCONTINUED | OUTPATIENT
Start: 2023-04-24 | End: 2023-04-28 | Stop reason: HOSPADM

## 2023-04-24 RX ORDER — LOSARTAN POTASSIUM 50 MG/1
50 TABLET ORAL DAILY
Status: DISCONTINUED | OUTPATIENT
Start: 2023-04-25 | End: 2023-04-25

## 2023-04-24 RX ORDER — LABETALOL HYDROCHLORIDE 5 MG/ML
20 INJECTION, SOLUTION INTRAVENOUS EVERY 4 HOURS PRN
Status: DISCONTINUED | OUTPATIENT
Start: 2023-04-24 | End: 2023-04-28 | Stop reason: HOSPADM

## 2023-04-24 RX ADMIN — ACETAMINOPHEN 650 MG: 325 TABLET ORAL at 00:41

## 2023-04-24 RX ADMIN — Medication 1 TABLET: at 09:08

## 2023-04-24 RX ADMIN — MORPHINE SULFATE 4 MG: 4 INJECTION, SOLUTION INTRAMUSCULAR; INTRAVENOUS at 06:13

## 2023-04-24 RX ADMIN — PANCRELIPASE LIPASE, PANCRELIPASE PROTEASE, PANCRELIPASE AMYLASE 10000 UNITS: 5000; 17000; 24000 CAPSULE, DELAYED RELEASE ORAL at 12:19

## 2023-04-24 RX ADMIN — SODIUM CHLORIDE, PRESERVATIVE FREE 10 ML: 5 INJECTION INTRAVENOUS at 07:23

## 2023-04-24 RX ADMIN — PANCRELIPASE LIPASE, PANCRELIPASE PROTEASE, PANCRELIPASE AMYLASE 10000 UNITS: 5000; 17000; 24000 CAPSULE, DELAYED RELEASE ORAL at 17:31

## 2023-04-24 RX ADMIN — Medication 5 MG: at 21:30

## 2023-04-24 RX ADMIN — MICONAZOLE NITRATE: 20 CREAM TOPICAL at 21:33

## 2023-04-24 RX ADMIN — PANCRELIPASE LIPASE, PANCRELIPASE PROTEASE, PANCRELIPASE AMYLASE 10000 UNITS: 5000; 17000; 24000 CAPSULE, DELAYED RELEASE ORAL at 09:08

## 2023-04-24 RX ADMIN — MICONAZOLE NITRATE: 20 CREAM TOPICAL at 09:09

## 2023-04-24 RX ADMIN — Medication 20 MG: at 21:30

## 2023-04-24 RX ADMIN — BUSPIRONE HYDROCHLORIDE 5 MG: 5 TABLET ORAL at 21:30

## 2023-04-24 RX ADMIN — LEVOTHYROXINE SODIUM 137 MCG: 25 TABLET ORAL at 05:35

## 2023-04-24 RX ADMIN — BUSPIRONE HYDROCHLORIDE 5 MG: 5 TABLET ORAL at 07:21

## 2023-04-24 RX ADMIN — Medication 20 MG: at 12:19

## 2023-04-24 RX ADMIN — PERFLUTREN 1.5 ML: 6.52 INJECTION, SUSPENSION INTRAVENOUS at 08:41

## 2023-04-24 RX ADMIN — LOSARTAN POTASSIUM 50 MG: 50 TABLET, FILM COATED ORAL at 07:21

## 2023-04-24 RX ADMIN — HYDROCHLOROTHIAZIDE 12.5 MG: 25 TABLET ORAL at 07:20

## 2023-04-24 RX ADMIN — BUSPIRONE HYDROCHLORIDE 5 MG: 5 TABLET ORAL at 14:06

## 2023-04-24 RX ADMIN — FENTANYL CITRATE 12.5 MCG: 50 INJECTION INTRAMUSCULAR; INTRAVENOUS at 02:37

## 2023-04-24 RX ADMIN — Medication 5000 UNITS: at 09:55

## 2023-04-24 RX ADMIN — ENOXAPARIN SODIUM 40 MG: 100 INJECTION SUBCUTANEOUS at 09:09

## 2023-04-24 ASSESSMENT — PAIN DESCRIPTION - LOCATION
LOCATION: HEAD

## 2023-04-24 ASSESSMENT — PAIN SCALES - GENERAL
PAINLEVEL_OUTOF10: 7
PAINLEVEL_OUTOF10: 0
PAINLEVEL_OUTOF10: 7
PAINLEVEL_OUTOF10: 0
PAINLEVEL_OUTOF10: 9
PAINLEVEL_OUTOF10: 7
PAINLEVEL_OUTOF10: 9

## 2023-04-24 ASSESSMENT — PAIN DESCRIPTION - DESCRIPTORS
DESCRIPTORS: ACHING

## 2023-04-24 ASSESSMENT — PAIN DESCRIPTION - ORIENTATION
ORIENTATION: ANTERIOR

## 2023-04-24 ASSESSMENT — PAIN - FUNCTIONAL ASSESSMENT: PAIN_FUNCTIONAL_ASSESSMENT: ACTIVITIES ARE NOT PREVENTED

## 2023-04-24 NOTE — PLAN OF CARE
Problem: Discharge Planning  Goal: Discharge to home or other facility with appropriate resources  4/24/2023 1134 by Merlyn Sr RN  Outcome: Progressing  4/24/2023 1133 by Merlyn Sr RN  Outcome: Progressing  Flowsheets (Taken 4/24/2023 0000 by Karen Francis RN)  Discharge to home or other facility with appropriate resources:   Identify barriers to discharge with patient and caregiver   Arrange for needed discharge resources and transportation as appropriate   Identify discharge learning needs (meds, wound care, etc)   Arrange for interpreters to assist at discharge as needed   Refer to discharge planning if patient needs post-hospital services based on physician order or complex needs related to functional status, cognitive ability or social support system  4/23/2023 2224 by Karen Francis RN  Outcome: Progressing     Problem: Skin/Tissue Integrity  Goal: Absence of new skin breakdown  Description: 1. Monitor for areas of redness and/or skin breakdown  2. Assess vascular access sites hourly  3. Every 4-6 hours minimum:  Change oxygen saturation probe site  4. Every 4-6 hours:  If on nasal continuous positive airway pressure, respiratory therapy assess nares and determine need for appliance change or resting period.   4/24/2023 1134 by Merlyn Sr RN  Outcome: Progressing  4/24/2023 1133 by Merlyn Sr RN  Outcome: Progressing  4/23/2023 2224 by Karen Francis RN  Outcome: Progressing     Problem: ABCDS Injury Assessment  Goal: Absence of physical injury  4/24/2023 1134 by Merlyn Sr RN  Outcome: Progressing  4/24/2023 1133 by Merlyn Sr RN  Outcome: Progressing     Problem: Safety - Adult  Goal: Free from fall injury  4/24/2023 1134 by Merlyn Sr RN  Outcome: Progressing  4/24/2023 1133 by Merlyn Sr RN  Outcome: Progressing     Problem: Pain  Goal: Verbalizes/displays adequate comfort level or baseline comfort level  4/24/2023 1134 by Merlyn Sr RN  Outcome: Progressing  4/24/2023 1133 by

## 2023-04-24 NOTE — CONSULTS
MEDICAL ONCOLOGY CONSULTATION    Pt Name: Jonas Stock  MRN: [id number]  YOB: 1945  Date of evaluation: 4/24/2023    REASON FOR CONSULTATION: Pancreatic cancer-continuity of care, hypertensive emergency  REQUESTING PHYSICIAN: Hospitalist    History Obtained From:    patient, electronic medical record    HISTORY OF PRESENT ILLNESS:  The patient is well-known to my clinic. She is a very pleasant 69years old female who has been diagnosed with pancreatic cancer. She was diagnosed in June 2021. She underwent Whipple procedure. She received adjuvant chemotherapy with Gemzar/Xeloda with poor tolerance. Unfortunate, she developed metastatic disease with recurrent ascites and elevated CA 19-9 above 1000. She was then started on palliative chemotherapy with Gemzar and Abraxane with good response. Her CA 19-9 is trending down as of 3/30/2023. She presented to the ER department yesterday with complaints of significant headache for the last 4 days. Blood pressure emergent showed systolic blood pressure above 200. She was given antihypertensive in the emergency with some improvement. CT head without contrast was unremarkable. She also had complaints of nausea, cough and congestion. She had a CT chest abdomen pelvis performed a few days ago to assess response. She had significant ascites at her diagnosis of recurrence. Ascites has resolved. However, CT of the abdomen showed interval development of a small right pleural effusion as well as a fluid collection in the right dome of the liver measuring up to 8 cm. Work-up in the emergency yesterday showed neutrophil leukocytosis. The patient has been afebrile. No chills. Blood cultures collected. Respiratory PCR panel was negative. She has been tolerating chemotherapy relatively well. Her cancer markers are trending down. 4/23/2023-CT head without contrast remarkable for no acute intracranial process.   4/3/2023-chest x-ray showed mild bibasilar

## 2023-04-24 NOTE — PLAN OF CARE
SUBJECTIVE:    Patient originally discussed at 19:00  Patient had been noted by EP to have Hypoxemia at that time which was unanticipated  He has since further reviewed & examined them    Patient has presented with severe HTN, and would appear to have had flash pulmonary edema  He had trialed several antihypertensives including lasix which is ideal as they likely have a component of this from fluid volume overload      OBJECTIVE:    BP (!) 169/95   Pulse 88   Temp 98.6 °F (37 °C) (Oral)   Resp 24   Wt 155 lb (70.3 kg)   SpO2 (!) 84%   BMI 25.79 kg/m²       ASSESSMENTS & PLANS:    Hypertensive Emergency with suspected Pulmonary Edema:  Lasix 40mg IVP already given in ED  Cardene GGT started in ED  Admit to ICU  Decreased SBP by no more than 25% of initial vlaue in first 8 hours  MAP change of >=20% to be called to provider  ProBNP checked and was markeldy elevated at over 4,000  Tn sent but not yet back - is an add on from blood drawn at arrival, sending Q4hx3 from now as well  Mag and Phos added on  Strict Is and Os  Daily Weights  Elevate HoB 30-45'  Elevate Legs to prevent sliding down in bed    Chronic medical problems: continued home regimen as/if indicated    Supportive and Prophylactic Txx:  DVT PPx: Lovenox SQ  GI (PUD) PPx: not indicated as such  PT: indicated as per age and critical illness, but needs to be deferred while ACS R/O is ongoing      Case d/w EP & Hospitalist NP in detail  Chart reviewed   Orders entered by me with CPOE

## 2023-04-24 NOTE — H&P
[facility] - History & Physical      PCP: Yobani Antunez DO    Date of Admission: 4/23/2023    Date of Service: 4/23/2023    Chief Complaint:  Headache and hypertension    History Of Present Illness: The patient is a 69 y.o. female who presented to [address] [place] [place] [address] ED for evalaution of headache and hypertension. Pt has history of hypertension, hypothyroidism and pancreatic malignancy followed by Dr. Gigi Fermin having received last chemotherapy last week. Pt has had generalized headaches over past two days. She relates that due to headaches she has been checking her blood pressure at home with systolic numbers in the 938B. She has been taking her antihypertensive medication as prescribed without recent adjustments made. She denies vision changes, speech problems, confusion as well as lateralizing numbness/weakness. She tells me that she has been short of breath with minimal walking activity and at times when talking. She has had nausea without vomiting or chest pain. She denies fevers as well as recent illness. In ED, pt was given labetalol 10mg , lasix 40mg and started on cardene infusion. She had episode of hypoxia  with sp02 of 84% while ambulating in room. CT of head-No acute intracranial process evident on noncontrast CT of the brain. Cxr-Mild bibasilar atelectasis and trace bilateral pleural effusions. sodium 132, potassium 4.6, creatinine 1.1/bun 19, bnp 4,289, wbc 15k, hgb 9.7, platelets 461N, respiratory pcr panel negative, UA micro-bacteria negative, 8 wbc, 4 epi cells. Pt is admitted to hospitalist service for further evaluation and treatment.      Past Medical History:        Diagnosis Date    Anxiety     Arthritis     Depression     Hypertension     Hypoglycemia     if fasting for very long    Pancreatic cancer ([facility] [address] 06/2021    s/p Whipple by Dr. Bianka Parson    Shoulder pain     incompleted rcr; possible injury    Thyroid disease        Past Surgical History:        Procedure

## 2023-04-25 LAB
ANION GAP SERPL CALCULATED.3IONS-SCNC: 12 MMOL/L (ref 7–19)
BASOPHILS # BLD: 0.1 K/UL (ref 0–0.2)
BASOPHILS NFR BLD: 0.5 % (ref 0–1)
BUN SERPL-MCNC: 23 MG/DL (ref 8–23)
CALCIUM SERPL-MCNC: 8.6 MG/DL (ref 8.8–10.2)
CANCER AG19-9 SERPL-ACNC: 67 U/ML (ref 0–35)
CHLORIDE SERPL-SCNC: 101 MMOL/L (ref 98–111)
CO2 SERPL-SCNC: 20 MMOL/L (ref 22–29)
CREAT SERPL-MCNC: 1.3 MG/DL (ref 0.5–0.9)
EOSINOPHIL # BLD: 0.3 K/UL (ref 0–0.6)
EOSINOPHIL NFR BLD: 2.4 % (ref 0–5)
ERYTHROCYTE [DISTWIDTH] IN BLOOD BY AUTOMATED COUNT: 17.4 % (ref 11.5–14.5)
GLUCOSE SERPL-MCNC: 108 MG/DL (ref 74–109)
HCT VFR BLD AUTO: 28 % (ref 37–47)
HGB BLD-MCNC: 9.2 G/DL (ref 12–16)
IMM GRANULOCYTES # BLD: 0.1 K/UL
LYMPHOCYTES # BLD: 2.8 K/UL (ref 1.1–4.5)
LYMPHOCYTES NFR BLD: 26 % (ref 20–40)
MAGNESIUM SERPL-MCNC: 2.1 MG/DL (ref 1.6–2.4)
MCH RBC QN AUTO: 31.4 PG (ref 27–31)
MCHC RBC AUTO-ENTMCNC: 32.9 G/DL (ref 33–37)
MCV RBC AUTO: 95.6 FL (ref 81–99)
MONOCYTES # BLD: 2 K/UL (ref 0–0.9)
MONOCYTES NFR BLD: 18.7 % (ref 0–10)
NEUTROPHILS # BLD: 5.5 K/UL (ref 1.5–7.5)
NEUTS SEG NFR BLD: 51.6 % (ref 50–65)
PLATELET # BLD AUTO: 315 K/UL (ref 130–400)
PMV BLD AUTO: 9.7 FL (ref 9.4–12.3)
POTASSIUM SERPL-SCNC: 4.5 MMOL/L (ref 3.5–5)
RBC # BLD AUTO: 2.93 M/UL (ref 4.2–5.4)
SODIUM SERPL-SCNC: 133 MMOL/L (ref 136–145)
WBC # BLD AUTO: 10.6 K/UL (ref 4.8–10.8)

## 2023-04-25 PROCEDURE — 6370000000 HC RX 637 (ALT 250 FOR IP): Performed by: HOSPITALIST

## 2023-04-25 PROCEDURE — 83735 ASSAY OF MAGNESIUM: CPT

## 2023-04-25 PROCEDURE — 99232 SBSQ HOSP IP/OBS MODERATE 35: CPT | Performed by: INTERNAL MEDICINE

## 2023-04-25 PROCEDURE — 86301 IMMUNOASSAY TUMOR CA 19-9: CPT

## 2023-04-25 PROCEDURE — 85025 COMPLETE CBC W/AUTO DIFF WBC: CPT

## 2023-04-25 PROCEDURE — 97530 THERAPEUTIC ACTIVITIES: CPT

## 2023-04-25 PROCEDURE — 1210000000 HC MED SURG R&B

## 2023-04-25 PROCEDURE — 94760 N-INVAS EAR/PLS OXIMETRY 1: CPT

## 2023-04-25 PROCEDURE — 80048 BASIC METABOLIC PNL TOTAL CA: CPT

## 2023-04-25 PROCEDURE — 6370000000 HC RX 637 (ALT 250 FOR IP): Performed by: INTERNAL MEDICINE

## 2023-04-25 PROCEDURE — 6360000002 HC RX W HCPCS: Performed by: HOSPITALIST

## 2023-04-25 PROCEDURE — 97161 PT EVAL LOW COMPLEX 20 MIN: CPT

## 2023-04-25 RX ORDER — HYDRALAZINE HYDROCHLORIDE 20 MG/ML
10 INJECTION INTRAMUSCULAR; INTRAVENOUS EVERY 6 HOURS PRN
Status: DISCONTINUED | OUTPATIENT
Start: 2023-04-25 | End: 2023-04-28 | Stop reason: HOSPADM

## 2023-04-25 RX ORDER — METOPROLOL SUCCINATE 50 MG/1
50 TABLET, EXTENDED RELEASE ORAL DAILY
Status: DISCONTINUED | OUTPATIENT
Start: 2023-04-25 | End: 2023-04-26

## 2023-04-25 RX ADMIN — PANCRELIPASE LIPASE, PANCRELIPASE PROTEASE, PANCRELIPASE AMYLASE 10000 UNITS: 5000; 17000; 24000 CAPSULE, DELAYED RELEASE ORAL at 14:13

## 2023-04-25 RX ADMIN — Medication 1 TABLET: at 08:06

## 2023-04-25 RX ADMIN — BUSPIRONE HYDROCHLORIDE 5 MG: 5 TABLET ORAL at 14:14

## 2023-04-25 RX ADMIN — Medication 5000 UNITS: at 08:06

## 2023-04-25 RX ADMIN — LEVOTHYROXINE SODIUM 137 MCG: 25 TABLET ORAL at 08:06

## 2023-04-25 RX ADMIN — Medication 5 MG: at 21:17

## 2023-04-25 RX ADMIN — MICONAZOLE NITRATE: 20 CREAM TOPICAL at 14:18

## 2023-04-25 RX ADMIN — ENOXAPARIN SODIUM 40 MG: 100 INJECTION SUBCUTANEOUS at 08:06

## 2023-04-25 RX ADMIN — ONDANSETRON 4 MG: 2 INJECTION INTRAMUSCULAR; INTRAVENOUS at 02:46

## 2023-04-25 RX ADMIN — MICONAZOLE NITRATE: 20 CREAM TOPICAL at 21:17

## 2023-04-25 RX ADMIN — BUSPIRONE HYDROCHLORIDE 5 MG: 5 TABLET ORAL at 08:06

## 2023-04-25 RX ADMIN — HYDROCHLOROTHIAZIDE 25 MG: 25 TABLET ORAL at 08:06

## 2023-04-25 RX ADMIN — METOPROLOL SUCCINATE 50 MG: 50 TABLET, EXTENDED RELEASE ORAL at 18:36

## 2023-04-25 RX ADMIN — BUSPIRONE HYDROCHLORIDE 5 MG: 5 TABLET ORAL at 21:17

## 2023-04-25 RX ADMIN — LOSARTAN POTASSIUM 50 MG: 50 TABLET, FILM COATED ORAL at 08:05

## 2023-04-25 RX ADMIN — PANCRELIPASE LIPASE, PANCRELIPASE PROTEASE, PANCRELIPASE AMYLASE 10000 UNITS: 5000; 17000; 24000 CAPSULE, DELAYED RELEASE ORAL at 18:35

## 2023-04-25 NOTE — CARE COORDINATION
Case Management Assessment  Initial Evaluation    Date/Time of Evaluation: 4/25/2023 3:11 PM  Assessment Completed by: Sumeet Patel RN    If patient is discharged prior to next notation, then this note serves as note for discharge by case management. Patient Name: Jonas Stock                   YOB: 1945  Diagnosis: Acute pulmonary edema ([facility] [address] [J81.0]  Hypertensive urgency [I16.0]  Hypertensive emergency [I16.1]  Acute nonintractable headache, unspecified headache type [R51.9]                   Date / Time: 4/23/2023  3:27 PM    Patient Admission Status: Inpatient   Readmission Risk (Low < 19, Mod (19-27), High > 27): Readmission Risk Score: 18.8    Current PCP: Toma Elmore, DO  PCP verified by CM? Yes (PCP listed was Alcides Schulte. PCP corrected to [place])    Chart Reviewed: Yes      History Provided by: Patient  Patient Orientation: Alert and Oriented, Person, Place, Situation    Patient Cognition: Alert    Hospitalization in the last 30 days (Readmission):  No    If yes, Readmission Assessment in CM Navigator will be completed. Advance Directives:      Code Status: Full Code   Patient's Primary Decision Maker is:      Primary Decision Maker: Flip Pryor - Spouse    Secondary [facility]ollie [facility] - [id number]    Discharge Planning:    Patient lives with: Spouse/Significant Other Type of Home: House  Primary Care Giver: Self  Patient Support Systems include: Spouse/Significant Other   Current Financial resources: Medicare  Current community resources:    Current services prior to admission: None            Current DME:              Type of Home Care services:  None    ADLS  Prior functional level: Independent in ADLs/IADLs  Current functional level: Independent in ADLs/IADLs    PT AM-PAC:   /24  OT AM-PAC:   /24    Family can provide assistance at DC:  Yes  Would you like Case Management to discuss the discharge plan with any other family members/significant

## 2023-04-25 NOTE — CONSULTS
Palliative Care: This is a very pleasant 69 yr old who presented to ed with headache/HTN, persistent cough and SOA with exertion. She tells me her BP is better today and was hopeful to go home. New to palliative care, initiated for support, goc, acp. Past Medical History:        Past Medical History:   Diagnosis Date    Anxiety     Arthritis     Depression     Hypertension     Hypoglycemia     if fasting for very long    Pancreatic cancer ([facility] [address] 06/2021    s/p Whipple by Dr. Kathryn Gaona    Shoulder pain     incompleted rcr; possible injury    Thyroid disease        Advance Directives:  Full Code ACP complete. No AD on file. Pain/Other Symptoms:   Headache is \"not as bad\". No other pain at this time, however, she does have some pain/nausea associated with pancreatic ca. How are symptoms affecting QOL    \"No taste for food and lack of appetite\". Pt tells me she has had chemo in 2021. Reports \"remission\" from Feb-Aug 2022. Chemo restarted in Sept 2022. Pt is independent at home, still manages her household, cooking, cleaning, shopping. She lives in the home with her spouse. She talks with me about her 2 dtrs being cancer survivors. Palliative following. Psychological/Spiritual: Pt states good family support. Attends [employer] with good support from [employer] family. Plan:  Pt hopeful to leave tomorrow once BP is better controlled with PO meds. Palliative Performance Scale:        80    Review of any needed services:    Assist with AD when ready.       Electronically signed by Jose Young RN on 4/25/2023 at 11:49 AM

## 2023-04-25 NOTE — ACP (ADVANCE CARE PLANNING)
Advance Care Planning     Advance Care Planning Activator (Inpatient)  Conversation Note      Date of ACP Conversation: 4/25/2023     [employer] with: Pt with decision making capacity. ACP Activator: Neris Moreno RN      Health Care Decision Maker:     Current Designated Health Care Decision Maker:     Primary Decision Maker: Shane Paces - Spouse    Secondary Decision MakerTracy Tin Amos - [phone]      Care Preferences    Ventilation: \"If you were in your present state of health and suddenly became very ill and were unable to breathe on your own, what would your preference be about the use of a ventilator (breathing machine) if it were available to you? \"      Would the patient desire the use of ventilator (breathing machine)?: Yes    \"If your health worsens and it becomes clear that your chance of recovery is unlikely, what would your preference be about the use of a ventilator (breathing machine) if it were available to you? \"     Would the patient desire the use of ventilator (breathing machine)?: No      Resuscitation  \"CPR works best to restart the heart when there is a sudden event, like a heart attack, in someone who is otherwise healthy. Unfortunately, CPR does not typically restart the heart for people who have serious health conditions or who are very sick. \"    \"In the event your heart stopped as a result of an underlying serious health condition, would you want attempts to be made to restart your heart (answer \"yes\" for attempt to resuscitate) or would you prefer a natural death (answer \"no\" for do not attempt to resuscitate)? \" Yes       Conversation Outcomes:  ACP discussion completed    Follow-up plan:    [] Schedule follow-up conversation to continue planning  [] Referred individual to Provider for additional questions/concerns   [] Advised patient/agent/surrogate to review completed ACP document and update if needed with changes in condition, patient preferences or care

## 2023-04-26 ENCOUNTER — APPOINTMENT (OUTPATIENT)
Dept: ULTRASOUND IMAGING | Age: 78
DRG: 189 | End: 2023-04-26
Payer: MEDICARE

## 2023-04-26 LAB
ACANTHOCYTES BLD QL SMEAR: ABNORMAL
ANION GAP SERPL CALCULATED.3IONS-SCNC: 8 MMOL/L (ref 7–19)
BASOPHILS # BLD: 0 K/UL (ref 0–0.2)
BASOPHILS NFR BLD: 0 % (ref 0–1)
BUN SERPL-MCNC: 30 MG/DL (ref 8–23)
BURR CELLS BLD QL SMEAR: ABNORMAL
CALCIUM SERPL-MCNC: 9.1 MG/DL (ref 8.8–10.2)
CHLORIDE SERPL-SCNC: 100 MMOL/L (ref 98–111)
CO2 SERPL-SCNC: 23 MMOL/L (ref 22–29)
CREAT SERPL-MCNC: 1.2 MG/DL (ref 0.5–0.9)
EOSINOPHIL # BLD: 0.46 K/UL (ref 0–0.6)
EOSINOPHIL NFR BLD: 4 % (ref 0–5)
ERYTHROCYTE [DISTWIDTH] IN BLOOD BY AUTOMATED COUNT: 17.5 % (ref 11.5–14.5)
GLUCOSE SERPL-MCNC: 118 MG/DL (ref 74–109)
HCT VFR BLD AUTO: 27.2 % (ref 37–47)
HGB BLD-MCNC: 9.1 G/DL (ref 12–16)
IMM GRANULOCYTES # BLD: 0.1 K/UL
LYMPHOCYTES # BLD: 1.5 K/UL (ref 1.1–4.5)
LYMPHOCYTES NFR BLD: 13 % (ref 20–40)
MAGNESIUM SERPL-MCNC: 2.2 MG/DL (ref 1.6–2.4)
MCH RBC QN AUTO: 31.8 PG (ref 27–31)
MCHC RBC AUTO-ENTMCNC: 33.5 G/DL (ref 33–37)
MCV RBC AUTO: 95.1 FL (ref 81–99)
MONOCYTES # BLD: 2.3 K/UL (ref 0–0.9)
MONOCYTES NFR BLD: 20 % (ref 0–10)
NEUTROPHILS # BLD: 7.2 K/UL (ref 1.5–7.5)
NEUTS SEG NFR BLD: 63 % (ref 50–65)
PLATELET # BLD AUTO: 310 K/UL (ref 130–400)
PLATELET SLIDE REVIEW: ADEQUATE
PMV BLD AUTO: 9.9 FL (ref 9.4–12.3)
POIKILOCYTOSIS BLD QL SMEAR: ABNORMAL
POTASSIUM SERPL-SCNC: 4.7 MMOL/L (ref 3.5–5)
RBC # BLD AUTO: 2.86 M/UL (ref 4.2–5.4)
SCHISTOCYTES BLD QL SMEAR: ABNORMAL
SODIUM SERPL-SCNC: 131 MMOL/L (ref 136–145)
TARGETS BLD QL SMEAR: ABNORMAL
WBC # BLD AUTO: 11.5 K/UL (ref 4.8–10.8)

## 2023-04-26 PROCEDURE — 80048 BASIC METABOLIC PNL TOTAL CA: CPT

## 2023-04-26 PROCEDURE — 6360000002 HC RX W HCPCS: Performed by: HOSPITALIST

## 2023-04-26 PROCEDURE — 76770 US EXAM ABDO BACK WALL COMP: CPT

## 2023-04-26 PROCEDURE — 85025 COMPLETE CBC W/AUTO DIFF WBC: CPT

## 2023-04-26 PROCEDURE — 6370000000 HC RX 637 (ALT 250 FOR IP): Performed by: HOSPITALIST

## 2023-04-26 PROCEDURE — 6370000000 HC RX 637 (ALT 250 FOR IP): Performed by: INTERNAL MEDICINE

## 2023-04-26 PROCEDURE — 83735 ASSAY OF MAGNESIUM: CPT

## 2023-04-26 PROCEDURE — 1210000000 HC MED SURG R&B

## 2023-04-26 PROCEDURE — 94760 N-INVAS EAR/PLS OXIMETRY 1: CPT

## 2023-04-26 PROCEDURE — 2580000003 HC RX 258: Performed by: HOSPITALIST

## 2023-04-26 PROCEDURE — 99232 SBSQ HOSP IP/OBS MODERATE 35: CPT | Performed by: INTERNAL MEDICINE

## 2023-04-26 RX ORDER — LOSARTAN POTASSIUM 25 MG/1
25 TABLET ORAL 2 TIMES DAILY
Status: DISCONTINUED | OUTPATIENT
Start: 2023-04-26 | End: 2023-04-28 | Stop reason: HOSPADM

## 2023-04-26 RX ORDER — DOXAZOSIN MESYLATE 1 MG/1
1 TABLET ORAL EVERY EVENING
Status: DISCONTINUED | OUTPATIENT
Start: 2023-04-26 | End: 2023-04-27

## 2023-04-26 RX ORDER — METOPROLOL SUCCINATE 50 MG/1
50 TABLET, EXTENDED RELEASE ORAL 2 TIMES DAILY
Status: DISCONTINUED | OUTPATIENT
Start: 2023-04-26 | End: 2023-04-28 | Stop reason: HOSPADM

## 2023-04-26 RX ADMIN — LEVOTHYROXINE SODIUM 137 MCG: 25 TABLET ORAL at 09:24

## 2023-04-26 RX ADMIN — BUSPIRONE HYDROCHLORIDE 5 MG: 5 TABLET ORAL at 20:49

## 2023-04-26 RX ADMIN — PANCRELIPASE LIPASE, PANCRELIPASE PROTEASE, PANCRELIPASE AMYLASE 10000 UNITS: 5000; 17000; 24000 CAPSULE, DELAYED RELEASE ORAL at 09:20

## 2023-04-26 RX ADMIN — DOXAZOSIN 1 MG: 1 TABLET ORAL at 17:46

## 2023-04-26 RX ADMIN — Medication 1 TABLET: at 09:20

## 2023-04-26 RX ADMIN — ONDANSETRON 4 MG: 2 INJECTION INTRAMUSCULAR; INTRAVENOUS at 04:39

## 2023-04-26 RX ADMIN — MICONAZOLE NITRATE: 20 CREAM TOPICAL at 09:25

## 2023-04-26 RX ADMIN — ENOXAPARIN SODIUM 40 MG: 100 INJECTION SUBCUTANEOUS at 09:19

## 2023-04-26 RX ADMIN — BUSPIRONE HYDROCHLORIDE 5 MG: 5 TABLET ORAL at 09:20

## 2023-04-26 RX ADMIN — Medication 5000 UNITS: at 09:20

## 2023-04-26 RX ADMIN — PANCRELIPASE LIPASE, PANCRELIPASE PROTEASE, PANCRELIPASE AMYLASE 10000 UNITS: 5000; 17000; 24000 CAPSULE, DELAYED RELEASE ORAL at 13:03

## 2023-04-26 RX ADMIN — BUSPIRONE HYDROCHLORIDE 5 MG: 5 TABLET ORAL at 13:03

## 2023-04-26 RX ADMIN — SODIUM CHLORIDE, PRESERVATIVE FREE 10 ML: 5 INJECTION INTRAVENOUS at 04:39

## 2023-04-26 RX ADMIN — METOPROLOL SUCCINATE 50 MG: 50 TABLET, EXTENDED RELEASE ORAL at 09:20

## 2023-04-26 RX ADMIN — METOPROLOL SUCCINATE 50 MG: 50 TABLET, EXTENDED RELEASE ORAL at 20:49

## 2023-04-26 RX ADMIN — PANCRELIPASE LIPASE, PANCRELIPASE PROTEASE, PANCRELIPASE AMYLASE 10000 UNITS: 5000; 17000; 24000 CAPSULE, DELAYED RELEASE ORAL at 17:46

## 2023-04-26 RX ADMIN — LOSARTAN POTASSIUM 25 MG: 25 TABLET, FILM COATED ORAL at 20:49

## 2023-04-26 ASSESSMENT — ENCOUNTER SYMPTOMS
COLOR CHANGE: 0
SHORTNESS OF BREATH: 0
BACK PAIN: 0
RHINORRHEA: 0
COUGH: 0
NAUSEA: 1
CONSTIPATION: 0
DIARRHEA: 0
VOICE CHANGE: 0
VOMITING: 1

## 2023-04-27 PROBLEM — Z51.5 PALLIATIVE CARE PATIENT: Status: ACTIVE | Noted: 2023-04-27

## 2023-04-27 LAB
ALBUMIN SERPL-MCNC: 3.1 G/DL (ref 3.5–5.2)
ALP SERPL-CCNC: 84 U/L (ref 35–104)
ALT SERPL-CCNC: 13 U/L (ref 5–33)
ANION GAP SERPL CALCULATED.3IONS-SCNC: 10 MMOL/L (ref 7–19)
AST SERPL-CCNC: 34 U/L (ref 5–32)
BASOPHILS # BLD: 0.1 K/UL (ref 0–0.2)
BASOPHILS NFR BLD: 0.6 % (ref 0–1)
BILIRUB SERPL-MCNC: 0.4 MG/DL (ref 0.2–1.2)
BUN SERPL-MCNC: 28 MG/DL (ref 8–23)
CALCIUM SERPL-MCNC: 9 MG/DL (ref 8.8–10.2)
CHLORIDE SERPL-SCNC: 99 MMOL/L (ref 98–111)
CO2 SERPL-SCNC: 21 MMOL/L (ref 22–29)
CREAT SERPL-MCNC: 1 MG/DL (ref 0.5–0.9)
EOSINOPHIL # BLD: 0.3 K/UL (ref 0–0.6)
EOSINOPHIL NFR BLD: 2.6 % (ref 0–5)
ERYTHROCYTE [DISTWIDTH] IN BLOOD BY AUTOMATED COUNT: 17.3 % (ref 11.5–14.5)
GLUCOSE SERPL-MCNC: 113 MG/DL (ref 74–109)
HCT VFR BLD AUTO: 27 % (ref 37–47)
HGB BLD-MCNC: 9.1 G/DL (ref 12–16)
IMM GRANULOCYTES # BLD: 0.1 K/UL
LYMPHOCYTES # BLD: 2 K/UL (ref 1.1–4.5)
LYMPHOCYTES NFR BLD: 20.7 % (ref 20–40)
MAGNESIUM SERPL-MCNC: 2.1 MG/DL (ref 1.6–2.4)
MCH RBC QN AUTO: 32.2 PG (ref 27–31)
MCHC RBC AUTO-ENTMCNC: 33.7 G/DL (ref 33–37)
MCV RBC AUTO: 95.4 FL (ref 81–99)
MONOCYTES # BLD: 1.5 K/UL (ref 0–0.9)
MONOCYTES NFR BLD: 15.6 % (ref 0–10)
NEUTROPHILS # BLD: 5.9 K/UL (ref 1.5–7.5)
NEUTS SEG NFR BLD: 59.8 % (ref 50–65)
PHOSPHATE SERPL-MCNC: 4.6 MG/DL (ref 2.5–4.5)
PLATELET # BLD AUTO: 389 K/UL (ref 130–400)
PMV BLD AUTO: 11.3 FL (ref 9.4–12.3)
POTASSIUM SERPL-SCNC: 4.5 MMOL/L (ref 3.5–5)
PROT SERPL-MCNC: 5.8 G/DL (ref 6.6–8.7)
RBC # BLD AUTO: 2.83 M/UL (ref 4.2–5.4)
SODIUM SERPL-SCNC: 130 MMOL/L (ref 136–145)
WBC # BLD AUTO: 9.8 K/UL (ref 4.8–10.8)

## 2023-04-27 PROCEDURE — 2580000003 HC RX 258: Performed by: HOSPITALIST

## 2023-04-27 PROCEDURE — 1210000000 HC MED SURG R&B

## 2023-04-27 PROCEDURE — 6360000002 HC RX W HCPCS: Performed by: HOSPITALIST

## 2023-04-27 PROCEDURE — 6360000002 HC RX W HCPCS: Performed by: INTERNAL MEDICINE

## 2023-04-27 PROCEDURE — 6370000000 HC RX 637 (ALT 250 FOR IP): Performed by: HOSPITALIST

## 2023-04-27 PROCEDURE — 99232 SBSQ HOSP IP/OBS MODERATE 35: CPT | Performed by: INTERNAL MEDICINE

## 2023-04-27 PROCEDURE — 83735 ASSAY OF MAGNESIUM: CPT

## 2023-04-27 PROCEDURE — 94760 N-INVAS EAR/PLS OXIMETRY 1: CPT

## 2023-04-27 PROCEDURE — APPSS15 APP SPLIT SHARED TIME 0-15 MINUTES: Performed by: NURSE PRACTITIONER

## 2023-04-27 PROCEDURE — 6370000000 HC RX 637 (ALT 250 FOR IP): Performed by: INTERNAL MEDICINE

## 2023-04-27 PROCEDURE — 85025 COMPLETE CBC W/AUTO DIFF WBC: CPT

## 2023-04-27 PROCEDURE — 80053 COMPREHEN METABOLIC PANEL: CPT

## 2023-04-27 PROCEDURE — 84100 ASSAY OF PHOSPHORUS: CPT

## 2023-04-27 RX ORDER — METOCLOPRAMIDE HYDROCHLORIDE 5 MG/ML
10 INJECTION INTRAMUSCULAR; INTRAVENOUS EVERY 6 HOURS PRN
Status: DISCONTINUED | OUTPATIENT
Start: 2023-04-27 | End: 2023-04-27

## 2023-04-27 RX ORDER — NIFEDIPINE 30 MG/1
30 TABLET, EXTENDED RELEASE ORAL 2 TIMES DAILY
Status: DISCONTINUED | OUTPATIENT
Start: 2023-04-27 | End: 2023-04-28 | Stop reason: HOSPADM

## 2023-04-27 RX ORDER — METOCLOPRAMIDE HYDROCHLORIDE 5 MG/ML
5 INJECTION INTRAMUSCULAR; INTRAVENOUS EVERY 6 HOURS PRN
Status: DISCONTINUED | OUTPATIENT
Start: 2023-04-27 | End: 2023-04-28 | Stop reason: HOSPADM

## 2023-04-27 RX ORDER — DOXAZOSIN MESYLATE 4 MG/1
2 TABLET ORAL EVERY EVENING
Status: DISCONTINUED | OUTPATIENT
Start: 2023-04-27 | End: 2023-04-28 | Stop reason: HOSPADM

## 2023-04-27 RX ADMIN — BUSPIRONE HYDROCHLORIDE 5 MG: 5 TABLET ORAL at 08:10

## 2023-04-27 RX ADMIN — BUSPIRONE HYDROCHLORIDE 5 MG: 5 TABLET ORAL at 20:45

## 2023-04-27 RX ADMIN — ACETAMINOPHEN 650 MG: 325 TABLET ORAL at 16:13

## 2023-04-27 RX ADMIN — PANCRELIPASE LIPASE, PANCRELIPASE PROTEASE, PANCRELIPASE AMYLASE 10000 UNITS: 5000; 17000; 24000 CAPSULE, DELAYED RELEASE ORAL at 08:11

## 2023-04-27 RX ADMIN — ACETAMINOPHEN 650 MG: 325 TABLET ORAL at 02:56

## 2023-04-27 RX ADMIN — PANCRELIPASE LIPASE, PANCRELIPASE PROTEASE, PANCRELIPASE AMYLASE 10000 UNITS: 5000; 17000; 24000 CAPSULE, DELAYED RELEASE ORAL at 11:40

## 2023-04-27 RX ADMIN — NIFEDIPINE 30 MG: 30 TABLET, EXTENDED RELEASE ORAL at 11:40

## 2023-04-27 RX ADMIN — ENOXAPARIN SODIUM 40 MG: 100 INJECTION SUBCUTANEOUS at 08:11

## 2023-04-27 RX ADMIN — Medication 5000 UNITS: at 08:11

## 2023-04-27 RX ADMIN — PROMETHAZINE HYDROCHLORIDE 12.5 MG: 12.5 TABLET ORAL at 16:13

## 2023-04-27 RX ADMIN — ONDANSETRON 4 MG: 4 TABLET, ORALLY DISINTEGRATING ORAL at 02:56

## 2023-04-27 RX ADMIN — LOSARTAN POTASSIUM 25 MG: 25 TABLET, FILM COATED ORAL at 21:00

## 2023-04-27 RX ADMIN — MICONAZOLE NITRATE: 20 CREAM TOPICAL at 08:13

## 2023-04-27 RX ADMIN — Medication 1 TABLET: at 08:11

## 2023-04-27 RX ADMIN — BUSPIRONE HYDROCHLORIDE 5 MG: 5 TABLET ORAL at 13:26

## 2023-04-27 RX ADMIN — SODIUM CHLORIDE, PRESERVATIVE FREE 10 ML: 5 INJECTION INTRAVENOUS at 21:02

## 2023-04-27 RX ADMIN — PANCRELIPASE LIPASE, PANCRELIPASE PROTEASE, PANCRELIPASE AMYLASE 10000 UNITS: 5000; 17000; 24000 CAPSULE, DELAYED RELEASE ORAL at 17:24

## 2023-04-27 RX ADMIN — HYDRALAZINE HYDROCHLORIDE 10 MG: 20 INJECTION INTRAMUSCULAR; INTRAVENOUS at 08:10

## 2023-04-27 RX ADMIN — METOPROLOL SUCCINATE 50 MG: 50 TABLET, EXTENDED RELEASE ORAL at 20:45

## 2023-04-27 RX ADMIN — LEVOTHYROXINE SODIUM 137 MCG: 25 TABLET ORAL at 08:10

## 2023-04-27 RX ADMIN — LOSARTAN POTASSIUM 25 MG: 25 TABLET, FILM COATED ORAL at 08:10

## 2023-04-27 RX ADMIN — MORPHINE SULFATE 4 MG: 4 INJECTION, SOLUTION INTRAMUSCULAR; INTRAVENOUS at 07:34

## 2023-04-27 RX ADMIN — NIFEDIPINE 30 MG: 30 TABLET, EXTENDED RELEASE ORAL at 20:45

## 2023-04-27 RX ADMIN — METOPROLOL SUCCINATE 50 MG: 50 TABLET, EXTENDED RELEASE ORAL at 08:13

## 2023-04-27 RX ADMIN — PROMETHAZINE HYDROCHLORIDE 12.5 MG: 12.5 TABLET ORAL at 07:34

## 2023-04-27 RX ADMIN — DOXAZOSIN 2 MG: 4 TABLET ORAL at 17:24

## 2023-04-27 ASSESSMENT — PAIN DESCRIPTION - LOCATION
LOCATION: HEAD
LOCATION: ABDOMEN;HEAD

## 2023-04-27 ASSESSMENT — ENCOUNTER SYMPTOMS
DIARRHEA: 0
VOICE CHANGE: 0
COLOR CHANGE: 0
COUGH: 0
RHINORRHEA: 0
SHORTNESS OF BREATH: 0
NAUSEA: 1
VOMITING: 0
BACK PAIN: 0
CONSTIPATION: 0

## 2023-04-27 ASSESSMENT — PAIN DESCRIPTION - DESCRIPTORS: DESCRIPTORS: ACHING;THROBBING

## 2023-04-27 ASSESSMENT — PAIN SCALES - GENERAL
PAINLEVEL_OUTOF10: 5
PAINLEVEL_OUTOF10: 10
PAINLEVEL_OUTOF10: 8

## 2023-04-28 VITALS
BODY MASS INDEX: 27.49 KG/M2 | HEIGHT: 65 IN | RESPIRATION RATE: 18 BRPM | HEART RATE: 79 BPM | OXYGEN SATURATION: 93 % | WEIGHT: 165 LBS | TEMPERATURE: 97.2 F | SYSTOLIC BLOOD PRESSURE: 128 MMHG | DIASTOLIC BLOOD PRESSURE: 68 MMHG

## 2023-04-28 PROBLEM — J81.0 ACUTE PULMONARY EDEMA (HCC): Status: RESOLVED | Noted: 2023-04-23 | Resolved: 2023-04-28

## 2023-04-28 PROBLEM — I16.1 HYPERTENSIVE EMERGENCY: Status: RESOLVED | Noted: 2023-04-23 | Resolved: 2023-04-28

## 2023-04-28 PROBLEM — R51.9 ACUTE NONINTRACTABLE HEADACHE: Status: RESOLVED | Noted: 2023-04-28 | Resolved: 2023-04-28

## 2023-04-28 PROBLEM — R51.9 ACUTE NONINTRACTABLE HEADACHE: Status: ACTIVE | Noted: 2023-04-28

## 2023-04-28 PROCEDURE — 6370000000 HC RX 637 (ALT 250 FOR IP): Performed by: HOSPITALIST

## 2023-04-28 PROCEDURE — 2580000003 HC RX 258: Performed by: HOSPITALIST

## 2023-04-28 PROCEDURE — APPSS15 APP SPLIT SHARED TIME 0-15 MINUTES: Performed by: NURSE PRACTITIONER

## 2023-04-28 PROCEDURE — 94760 N-INVAS EAR/PLS OXIMETRY 1: CPT

## 2023-04-28 PROCEDURE — 6360000002 HC RX W HCPCS: Performed by: HOSPITALIST

## 2023-04-28 PROCEDURE — 6370000000 HC RX 637 (ALT 250 FOR IP): Performed by: INTERNAL MEDICINE

## 2023-04-28 PROCEDURE — 6360000002 HC RX W HCPCS: Performed by: INTERNAL MEDICINE

## 2023-04-28 RX ORDER — DOXAZOSIN 2 MG/1
2 TABLET ORAL EVERY EVENING
Qty: 30 TABLET | Refills: 3 | Status: SHIPPED | OUTPATIENT
Start: 2023-04-28

## 2023-04-28 RX ORDER — METOPROLOL SUCCINATE 50 MG/1
50 TABLET, EXTENDED RELEASE ORAL 2 TIMES DAILY
Qty: 30 TABLET | Refills: 3 | Status: SHIPPED | OUTPATIENT
Start: 2023-04-28

## 2023-04-28 RX ORDER — HEPARIN SODIUM (PORCINE) LOCK FLUSH IV SOLN 100 UNIT/ML 100 UNIT/ML
300 SOLUTION INTRAVENOUS ONCE
Status: COMPLETED | OUTPATIENT
Start: 2023-04-28 | End: 2023-04-28

## 2023-04-28 RX ORDER — METOCLOPRAMIDE 10 MG/1
10 TABLET ORAL 3 TIMES DAILY PRN
Qty: 60 TABLET | Refills: 3 | Status: SHIPPED | OUTPATIENT
Start: 2023-04-28

## 2023-04-28 RX ORDER — LOSARTAN POTASSIUM 25 MG/1
25 TABLET ORAL 2 TIMES DAILY
Qty: 30 TABLET | Refills: 3 | Status: SHIPPED | OUTPATIENT
Start: 2023-04-28

## 2023-04-28 RX ORDER — NIFEDIPINE 30 MG/1
30 TABLET, EXTENDED RELEASE ORAL DAILY
Qty: 30 TABLET | Refills: 5 | Status: SHIPPED | OUTPATIENT
Start: 2023-04-28

## 2023-04-28 RX ADMIN — ENOXAPARIN SODIUM 40 MG: 100 INJECTION SUBCUTANEOUS at 10:17

## 2023-04-28 RX ADMIN — BUSPIRONE HYDROCHLORIDE 5 MG: 5 TABLET ORAL at 14:02

## 2023-04-28 RX ADMIN — SODIUM CHLORIDE, PRESERVATIVE FREE 10 ML: 5 INJECTION INTRAVENOUS at 10:18

## 2023-04-28 RX ADMIN — LEVOTHYROXINE SODIUM 137 MCG: 25 TABLET ORAL at 10:16

## 2023-04-28 RX ADMIN — BUSPIRONE HYDROCHLORIDE 5 MG: 5 TABLET ORAL at 10:16

## 2023-04-28 RX ADMIN — METOPROLOL SUCCINATE 50 MG: 50 TABLET, EXTENDED RELEASE ORAL at 10:16

## 2023-04-28 RX ADMIN — MICONAZOLE NITRATE: 20 CREAM TOPICAL at 11:02

## 2023-04-28 RX ADMIN — Medication 5000 UNITS: at 10:15

## 2023-04-28 RX ADMIN — LOSARTAN POTASSIUM 25 MG: 25 TABLET, FILM COATED ORAL at 10:16

## 2023-04-28 RX ADMIN — HEPARIN 300 UNITS: 100 SYRINGE at 17:51

## 2023-04-28 RX ADMIN — METOCLOPRAMIDE 5 MG: 5 INJECTION, SOLUTION INTRAMUSCULAR; INTRAVENOUS at 13:29

## 2023-04-28 RX ADMIN — NIFEDIPINE 30 MG: 30 TABLET, EXTENDED RELEASE ORAL at 10:16

## 2023-04-28 NOTE — DISCHARGE SUMMARY
organomegaly. Extremities: No clubbing or cyanosis. No peripheral edema. Peripheral pulses palpable. Neurologic: Grossly intact. Discharge Medications:         Medication List        START taking these medications      doxazosin 2 MG tablet  Commonly known as: CARDURA  Take 1 tablet by mouth every evening     losartan 25 MG tablet  Commonly known as: COZAAR  Take 1 tablet by mouth 2 times daily     metoclopramide 10 MG tablet  Commonly known as: REGLAN  Take 1 tablet by mouth 3 times daily as needed (nausea)     metoprolol succinate 50 MG extended release tablet  Commonly known as: TOPROL XL  Take 1 tablet by mouth 2 times daily     NIFEdipine 30 MG extended release tablet  Commonly known as: PROCARDIA XL  Take 1 tablet by mouth daily            CHANGE how you take these medications      promethazine 12.5 MG tablet  Commonly known as: PHENERGAN  Take 1 tablet by mouth every 6 hours as needed for Nausea  What changed: Another medication with the same name was removed. Continue taking this medication, and follow the directions you see here. CONTINUE taking these medications      ALPRAZolam 0.5 MG tablet  Commonly known as: XANAX     busPIRone 5 MG tablet  Commonly known as: BUSPAR     Caltrate 600+D Plus Minerals 600-800 MG-UNIT Tabs tablet     capecitabine 500 MG chemo tablet  Commonly known as: XELODA  Take 3 tablets by mouth 2 times a day for 14 days of a 28 day cycle     Creon [id number] units delayed release capsule  Generic drug: lipase-protease-amylase  Take 1 capsule by mouth 3 times daily (with meals)     levothyroxine 137 MCG tablet  Commonly known as: SYNTHROID     lidocaine-prilocaine 2.5-2.5 % cream  Commonly known as: EMLA  Apply topically as needed.      loratadine 10 MG tablet  Commonly known as: Claritin  Take 1 tablet by mouth daily     Magic Mouthwash  Commonly known as: Miracle Mouthwash  Swish and spit 5 mLs 4 times daily as needed for Irritation 1/3 viscous lidocaine, 1/3

## 2023-04-28 NOTE — CARE COORDINATION
Vineet presented Pt with IMM letter. Vineet explained Pt rights and imm letter. No other questions or concerns at this time.    Electronically signed by MANDEEP Zhou on 4/28/2023 at 2:52 PM     04/28/23 1450   IMM Letter   IMM Letter given to Patient/Family/Significant other/Guardian/POA/by: Vineet presented Pt with IMM   IMM Letter date given: 04/28/23   IMM Letter time given: 0745

## 2023-04-28 NOTE — PROGRESS NOTES
Follow up:    Pt is sitting on the bedside, without complaint. She tells me oncology has cleared her for discharge if stable from medical team. Pt says her blood pressure is better today and hopeful to go home. Pt reports her pain is managed for now. She has PRN pain medication available as needed. Goals to continue with her chemotherapy next week. Pt reports her  as her main support, assists with any care needs and transportation. Encouragement and support provided.       Electronically signed by Jonas Garrett RN on 4/26/2023 at 11:40 AM
Hospitalist Progress Note    Patient:  Azam Odonnell  YOB: 1945  Date of Service: 4/26/2023  MRN: [id number]   Acct: [de-identified]   Primary Care Physician: Alan Navarrete DO  Advance Directive: Full Code  Admit Date: 4/23/2023       Hospital Day: 3  Referring Provider: Ileana Barakat DO    Patient Seen, Chart, Consults, Notes, Labs, Radiology studies reviewed. Subjective:  Azam Odonnell is a 69 y.o. female  whom we are following for stage IV pancreatic carcinoma, hypertensive emergency. She has some increased fatigue and some intermittent nausea. Blood pressure is still elevated. I will make some adjustments to her oral regimen.     Allergies:  Diclofenac, Oxycodone-acetaminophen, and Doxycycline    Medicines:  Current Facility-Administered Medications   Medication Dose Route Frequency Provider Last Rate Last Admin    metoprolol succinate (TOPROL XL) extended release tablet 50 mg  50 mg Oral Daily Lasha Rojas MD   50 mg at 04/26/23 0920    hydrALAZINE (APRESOLINE) injection 10 mg  10 mg IntraVENous Q6H PRN Lasha Rojas MD        morphine sulfate (PF) injection 4 mg  4 mg IntraVENous Q3H PRN Gomez Strickland MD   4 mg at 04/24/23 0613    miconazole (MICOTIN) 2 % cream   Topical BID Gomez Strickland MD   Given at 04/26/23 0925    labetalol (NORMODYNE;TRANDATE) injection 20 mg  20 mg IntraVENous Q4H PRN Lasha Rojas MD   20 mg at 04/24/23 2130    promethazine (PHENERGAN) tablet 12.5 mg  12.5 mg Oral Q6H PRN Ivana Mcleod MD        cetirizine (ZYRTEC) tablet 10 mg  10 mg Oral Daily PRN Ivana Mcleod MD        lipase-protease-amylase (ZENPEP) delayed release capsule 10,000 Units  10,000 Units Oral TID WC Ivana Mcleod MD   10,000 Units at 04/26/23 1303    lidocaine (LMX) 4 % cream   Topical PRN Ivana Mcleod MD        levothyroxine (SYNTHROID) tablet 137 mcg  137 mcg Oral QAM AC Ivana Mcleod MD   137 mcg at 04/26/23 6403    vitamin D (CHOLECALCIFEROL) capsule 5,000 Units
Hospitalist Progress Note  [facility]     Patient: Bright Teixeira  : 89  MRN: [id number]  Code Status: Full Code    Hospital Day: 2   Date of Service: 2023    Subjective:   Patient seen and examined. Starting to feel better.     Past Medical History:   Diagnosis Date    Anxiety     Arthritis     Depression     Hypertension     Hypoglycemia     if fasting for very long    Pancreatic cancer ([address] 2021    s/p Whipple by Dr. Brii Lund    Shoulder pain     incompleted rcr; possible injury    Thyroid disease        Past Surgical History:   Procedure Laterality Date    CHOLECYSTECTOMY      COLONOSCOPY      Dr Alexandra Valencia, COLON, DIAGNOSTIC      HYSTERECTOMY ([facility])  [address]  2021    PORT SURGERY Right 2021    Single lumen PORT INSERTION with ultrasound and fluoroscopy performed by Tika Tavarez DO at [facility] [address] [facility] Right 2022    RIGHT SIDE MEDIPORT REMOVAL, LEFT SIDE MEDIPORT INSERTION performed by Joselito Sprague DO at [facility] 108 ARTHROSCOPY SHOULDER W/ROTATOR CUFF RPR Right 10/19/2017    SHOULDER ARTHROSCOPY ROTATOR CUFF REPAIR/ DEBRIDEMENT BICEPS TENODESIS/ TENOTOMY SUBACROMIAL DECOMPRESSION/ DISTAL CLAVICLE EXCISION performed by Benjie Herman MD at [facility]  2021    UPPER GASTROINTESTINAL ENDOSCOPY  2021    Dr. Jolynn Stone in [address] [place] [address]      spur excision       Family History   Adopted: Yes   Problem Relation Age of Onset    Breast Cancer Maternal Aunt     Breast Cancer Daughter     Cancer Daughter         Bladder    Cancer Maternal Uncle         Bladder       Social History     Socioeconomic History    Marital status:      Spouse name: Not on file    Number of children: Not on file    Years of education: Not on file    Highest education level: Not on file   Occupational History    Not on file   Tobacco Use    Smoking status: Never
Hospitalist Progress Note  [facility]     Patient: Zahraa Gomez  :   MRN: [id number]  Code Status: Full Code    Hospital Day: 1   Date of Service: 2023    Subjective:   Patient seen and examined. No current complaints.     Past Medical History:   Diagnosis Date    Anxiety     Arthritis     Depression     Hypertension     Hypoglycemia     if fasting for very long    Pancreatic cancer ([address] 2021    s/p Whipple by Dr. Tre Thornton    Shoulder pain     incompleted rcr; possible injury    Thyroid disease        Past Surgical History:   Procedure Laterality Date    CHOLECYSTECTOMY      COLONOSCOPY      Dr Meade Areas, COLON, DIAGNOSTIC      HYSTERECTOMY ([address] [facility])  [address]  2021    PORT SURGERY Right 2021    Single lumen PORT INSERTION with ultrasound and fluoroscopy performed by Michelle Braswell DO at [facility] [address] Right 2022    RIGHT SIDE MEDIPORT REMOVAL, LEFT SIDE MEDIPORT INSERTION performed by Nina Echavarria DO at [facility] ARTHROSCOPY SHOULDER W/ROTATOR CUFF RPR Right 10/19/2017    SHOULDER ARTHROSCOPY ROTATOR CUFF REPAIR/ DEBRIDEMENT BICEPS TENODESIS/ TENOTOMY SUBACROMIAL DECOMPRESSION/ DISTAL CLAVICLE EXCISION performed by Tammie Gallego MD at [facility]  2021    UPPER GASTROINTESTINAL ENDOSCOPY  2021    Dr. Silvia Bueno in [facility] [address]      spur excision       Family History   Adopted: Yes   Problem Relation Age of Onset    Breast Cancer Maternal Aunt     Breast Cancer Daughter     Cancer Daughter         Bladder    Cancer Maternal Uncle         Bladder       Social History     Socioeconomic History    Marital status:      Spouse name: Not on file    Number of children: Not on file    Years of education: Not on file    Highest education level: Not on file   Occupational History    Not on file   Tobacco Use    Smoking status: Never    Smokeless
MEDICAL ONCOLOGY PROGRESS NOTE     Pt Name: Grant Solano  MRN: [id number]  YOB: 1945  Date of evaluation: 4/28/2023    Subjective-resting in bed. Reports headaches have resolved. Blood pressure 140/76 this morning. Eager to be discharged home. No new complaints    HISTORY OF PRESENT ILLNESS:  The patient is well-known to my clinic. She is a very pleasant 69years old female who has been diagnosed with pancreatic cancer. She was diagnosed in June 2021. She underwent Whipple procedure. She received adjuvant chemotherapy with Gemzar/Xeloda with poor tolerance. Unfortunate, she developed metastatic disease with recurrent ascites and elevated CA 19-9 above 1000. She was then started on palliative chemotherapy with Gemzar and Abraxane with good response. Her CA 19-9 is trending down as of 3/30/2023. She presented to the ER department yesterday with complaints of significant headache for the last 4 days. Blood pressure emergent showed systolic blood pressure above 200. She was given antihypertensive in the emergency with some improvement. CT head without contrast was unremarkable. She also had complaints of nausea, cough and congestion. She had a CT chest abdomen pelvis performed a few days ago to assess response. She had significant ascites at her diagnosis of recurrence. Ascites has resolved. However, CT of the abdomen showed interval development of a small right pleural effusion as well as a fluid collection in the right dome of the liver measuring up to 8 cm. Work-up in the emergency yesterday showed neutrophil leukocytosis. The patient has been afebrile. No chills. Blood cultures collected. Respiratory PCR panel was negative. She has been tolerating chemotherapy relatively well. Her cancer markers are trending down. 4/23/2023-CT head without contrast remarkable for no acute intracranial process.   4/3/2023-chest x-ray showed mild bibasilar atelectasis and trace bilateral pleural
MEDICAL ONCOLOGY PROGRESS NOTE     Pt Name: Kayce Cartwright  MRN: [id number]  YOB: 1945  Date of evaluation: 4/26/2023    Subjective-feeling better. Afebrile. Last 24 hours BP max 187/94. Discussed with radiology yesterday regarding fluid collection dome of the liver. He is opinion they may find his does not seem to be related to an abscess. Likely benign fluid collection. The etiology is unknown. HISTORY OF PRESENT ILLNESS:  The patient is well-known to my clinic. She is a very pleasant 69years old female who has been diagnosed with pancreatic cancer. She was diagnosed in June 2021. She underwent Whipple procedure. She received adjuvant chemotherapy with Gemzar/Xeloda with poor tolerance. Unfortunate, she developed metastatic disease with recurrent ascites and elevated CA 19-9 above 1000. She was then started on palliative chemotherapy with Gemzar and Abraxane with good response. Her CA 19-9 is trending down as of 3/30/2023. She presented to the ER department yesterday with complaints of significant headache for the last 4 days. Blood pressure emergent showed systolic blood pressure above 200. She was given antihypertensive in the emergency with some improvement. CT head without contrast was unremarkable. She also had complaints of nausea, cough and congestion. She had a CT chest abdomen pelvis performed a few days ago to assess response. She had significant ascites at her diagnosis of recurrence. Ascites has resolved. However, CT of the abdomen showed interval development of a small right pleural effusion as well as a fluid collection in the right dome of the liver measuring up to 8 cm. Work-up in the emergency yesterday showed neutrophil leukocytosis. The patient has been afebrile. No chills. Blood cultures collected. Respiratory PCR panel was negative. She has been tolerating chemotherapy relatively well. Her cancer markers are trending down.   4/23/2023-CT head without
MEDICAL ONCOLOGY PROGRESS NOTE     Pt Name: Zita Hidalgo  MRN: [id number]  YOB: 1945  Date of evaluation: 4/27/2023    Subjective- Afebrile. Up in chair complaining of significant headache and nausea. Continues to have uncontrolled hypertension, /98. Nursing at bedside, has given midodrine and morphine. HISTORY OF PRESENT ILLNESS:  The patient is well-known to my clinic. She is a very pleasant 69years old female who has been diagnosed with pancreatic cancer. She was diagnosed in June 2021. She underwent Whipple procedure. She received adjuvant chemotherapy with Gemzar/Xeloda with poor tolerance. Unfortunate, she developed metastatic disease with recurrent ascites and elevated CA 19-9 above 1000. She was then started on palliative chemotherapy with Gemzar and Abraxane with good response. Her CA 19-9 is trending down as of 3/30/2023. She presented to the ER department yesterday with complaints of significant headache for the last 4 days. Blood pressure emergent showed systolic blood pressure above 200. She was given antihypertensive in the emergency with some improvement. CT head without contrast was unremarkable. She also had complaints of nausea, cough and congestion. She had a CT chest abdomen pelvis performed a few days ago to assess response. She had significant ascites at her diagnosis of recurrence. Ascites has resolved. However, CT of the abdomen showed interval development of a small right pleural effusion as well as a fluid collection in the right dome of the liver measuring up to 8 cm. Work-up in the emergency yesterday showed neutrophil leukocytosis. The patient has been afebrile. No chills. Blood cultures collected. Respiratory PCR panel was negative. She has been tolerating chemotherapy relatively well. Her cancer markers are trending down. 4/23/2023-CT head without contrast remarkable for no acute intracranial process.   4/3/2023-chest x-ray showed mild
Manual B/P 138/90.
Manual B/P is 142/90. Pt states she has a headache.
Manual B/P is 158/82.
Patient being transferred to: 431 unit via in a wheelchair  Report called to: Asif Mccormack RN    Patient condition: Alert and Oriented   Telemetry monitoring in place: Yes  LDA's continued upon transfer: N/A    Family notified: Yes           ( at bedside)    All personal belongings sent with patient.
Pharmacy Renal Adjustment    Luz Licea is a 69 y.o. female. Pharmacy has renally adjusted medications per protocol. Recent Labs     04/26/23  0440 04/27/23  0615   BUN 30* 28*       Recent Labs     04/26/23  0440 04/27/23  0615   CREATININE 1.2* 1.0*       Estimated Creatinine Clearance: 48 mL/min (A) (based on SCr of 1 mg/dL (H)). Height:   Ht Readings from Last 1 Encounters:   04/23/23 5' 5\" (1.651 m)     Weight:  Wt Readings from Last 1 Encounters:   04/27/23 165 lb (74.8 kg)         Plan: Adjust the following medications based on renal function:           Change reglan to 5mg IV every 6 hours as needed for nausea.     RAMIREZ AGARWAL, PHARM D, 4/27/2023, 12:19 PM
Physician Progress Note      PATIENT:               Ashwini Martinez  [facility] #:                  [id number]  :                       1945  ADMIT DATE:       2023 3:27 PM  [address] DATE:  RESPONDING  PROVIDER #:        Alonzo Pinon MD        QUERY TEXT:    Type of Pleural Effusion: Please provide further specificity, if known. Clinical indicators include: malignancy, pleural effusions, cancer, malignant   neoplasm, pleural effusion, adenocarcinoma, carcinoma, metastasis, malignant,   pneumothorax, pericardial effusion  Options provided:  -- Pleural effusion caused by malignancy  -- Pleural effusion caused by congestive heart failure  -- Hydrothorax or chylous effusion  -- Hemothorax  -- Influenzal pleural effusion  -- Other - I will add my own diagnosis  -- Disagree - Not applicable / Not valid  -- Disagree - Clinically Unable to determine / Unknown        PROVIDER RESPONSE TEXT:    Provider was unable to determine a response for this query. Electronically signed by:   Alonzo Pinon MD 2023 5:29 PM
Physician Progress Note      PATIENT:               Rafi Tang  CSN #:                  [id number]  :                       1945  ADMIT DATE:       2023 3:27 PM  [address] DATE:  Deidrearl Barthel  PROVIDER #:        Ada Yun MD          QUERY TEXT:    Patient admitted with hypertension. Noted documentation of hypertensive   emergency in ED Provider notes, Cardiology consult, and H&P. IM progress notes   state hypertension urgency. If possible, please document in progress notes   and discharge summary if you are evaluating and /or treating any of the   following: The medical record reflects the following:  Risk Factors: HTN, Pulmonary edema, fluid overload  Clinical Indicators: C/O headache, worsening with increase in BP. /110   197/113 160/93 169/95 173/97 180/91 189/103  184/109 183/117  Treatment: Cardene drip, Lasix 40 mg IV, consult to Cardiology and Nephrology. Options provided:  -- Hypertensive emergency  -- Hypertensive urgency  -- Other - I will add my own diagnosis  -- Disagree - Not applicable / Not valid  -- Disagree - Clinically unable to determine / Unknown  -- Refer to Clinical Documentation Reviewer    PROVIDER RESPONSE TEXT:    This patient has hypertensive urgency.     Query created by: Berna Mattson on 2023 10:00 AM      Electronically signed by:  Ada Yun MD 2023 1:31 PM
Tylenol given for headache and phenergan 12.5mg given for nausea. Will continue to monitor.
Tinneti Score       Goals  Patient Goals   Patient Goals : go home       Education  Patient Education  Education Given To: Patient  Education Provided: Role of Therapy  Education Provided Comments: use of call light, staff A  Education Method: Verbal  Barriers to Learning: None  Education Outcome: Verbalized understanding      Therapy Time   Individual Concurrent Group Co-treatment   Time In           Time Out           Minutes                   Ronnie Santacruz PT   Electronically signed by Ronnie Santacruz PT on 4/25/2023 at 1:05 PM
last 72 hours. Radiology reports as per the Radiologist  Radiology: CT HEAD WO CONTRAST    Result Date: 4/23/2023  Exam: Noncontrast CT of the brain Technique: Axial noncontrast images were obtained from the skull base to the vertex. Coronal and sagittal reconstructions were performed. Radiation dose optimization technique was utilized. Clinical information: Headache, hypertension Comparison: MRI of the brain performed on March 10, 2023 Findings: No evidence of acute hemorrhage, mass, mass effect, edema, or midline shift. The ventricular system and basal cisterns are within normal limits for the patients age. Age-related involutional changes are visualized. Gray-white differentiation is maintained. There is no evidence of extra-axial fluid collection. Bone windows reveal no evidence of acute fracture. The visualized paranasal sinuses are within normal limits. Mastoid air cells are clear. The orbits are grossly unremarkable. No acute intracranial process evident on noncontrast CT of the brain. XR CHEST PORTABLE    Result Date: 4/23/2023  Exam: Portable chest radiograph. Technique: Single AP view of the chest was obtained. Clinical information: SOA, hypertension Comparison: Contrast-enhanced CT of the chest performed on April 20, 2023 Findings: Mild bibasal atelectasis visualized. Trace bilateral pleural effusions are seen. Patients post left IJ Mediport placement with the tip at the level of the SVCâartery junction. The heart is normal in size. There are no acute osseous abnormalities. Mild bibasilar atelectasis and trace bilateral pleural effusions. Assessment     Hypertensive emergency. Begin Procardia XL 30 mg twice daily  Increase doxazosin to 2 mg at bedtime. Malignant neoplasm of tail of pancreas. Continue palliative chemo as directed by oncology.       Mickey Diallo DO
artery are normal in caliber. Right chest wall collateral veins with high-grade stenosis/near occlusion right subclavian vein. Mediport in place. ABDOMEN/PELVIS: HEPATOBILIARY: Postcholecystectomy. Right hepatic dome 8.2 cm rim-enhancing collection with adjacent subcentimeter hypodensity, new from prior. No biliary ductal dilatation. SPLEEN: Post splenectomy. PANCREAS: Post pancreatic body and tail resection with unremarkable appearance of surgical bed. ADRENAL GLANDS: Unremarkable. KIDNEYS: Symmetric renal enhancement without hydronephrosis bilaterally. Subcentimeter left renal hypodensity too small to further characterize. ABDOMINOPELVIC NODES: No lymphadenopathy. PELVIC ORGANS: Post hysterectomy. PERITONEUM/MESENTERY/BOWEL: No bowel obstruction, pneumoperitoneum or ascites. Colonic diverticulosis without evidence for diverticulitis. Normal caliber appendix. BONES/SOFT TISSUES: Postsurgical changes anterior abdominal wall. No suspicious lytic or blastic lesion. Mild degenerative changes of the lumbosacral spine. Since January 6, 2023; New right hepatic dome 8.2 cm rim-enhancing complex subcapsular collection with adjacent subcentimeter hypodensity. New small right effusion with right basilar atelectasis. Postsurgical changes of the abdomen and anterior abdominal wall. XR CHEST PORTABLE    Result Date: 4/23/2023  Exam: Portable chest radiograph. Technique: Single AP view of the chest was obtained. Clinical information: SOA, hypertension Comparison: Contrast-enhanced CT of the chest performed on April 20, 2023 Findings: Mild bibasal atelectasis visualized. Trace bilateral pleural effusions are seen. Patients post left IJ Mediport placement with the tip at the level of the SVCâartery junction. The heart is normal in size. There are no acute osseous abnormalities. Mild bibasilar atelectasis and trace bilateral pleural effusions.        My interpretation: Interval development of a new small right-sided pleural

## 2023-05-01 ENCOUNTER — OFFICE VISIT (OUTPATIENT)
Dept: INTERNAL MEDICINE | Facility: CLINIC | Age: 78
End: 2023-05-01
Payer: MEDICARE

## 2023-05-01 VITALS
DIASTOLIC BLOOD PRESSURE: 85 MMHG | HEIGHT: 65 IN | HEART RATE: 80 BPM | SYSTOLIC BLOOD PRESSURE: 132 MMHG | WEIGHT: 165 LBS | BODY MASS INDEX: 27.49 KG/M2 | OXYGEN SATURATION: 96 % | TEMPERATURE: 96 F

## 2023-05-01 DIAGNOSIS — R60.0 LOCALIZED EDEMA: Primary | ICD-10-CM

## 2023-05-01 DIAGNOSIS — K76.9 LIVER LESION: ICD-10-CM

## 2023-05-01 PROCEDURE — 1160F RVW MEDS BY RX/DR IN RCRD: CPT | Performed by: INTERNAL MEDICINE

## 2023-05-01 PROCEDURE — 3079F DIAST BP 80-89 MM HG: CPT | Performed by: INTERNAL MEDICINE

## 2023-05-01 PROCEDURE — 1159F MED LIST DOCD IN RCRD: CPT | Performed by: INTERNAL MEDICINE

## 2023-05-01 PROCEDURE — 99214 OFFICE O/P EST MOD 30 MIN: CPT | Performed by: INTERNAL MEDICINE

## 2023-05-01 PROCEDURE — 3075F SYST BP GE 130 - 139MM HG: CPT | Performed by: INTERNAL MEDICINE

## 2023-05-01 RX ORDER — POTASSIUM CHLORIDE 750 MG/1
10 TABLET, FILM COATED, EXTENDED RELEASE ORAL DAILY
Qty: 20 TABLET | Refills: 1 | Status: SHIPPED | OUTPATIENT
Start: 2023-05-01

## 2023-05-01 RX ORDER — NIFEDIPINE 30 MG/1
1 TABLET, EXTENDED RELEASE ORAL DAILY
COMMUNITY
Start: 2023-04-28

## 2023-05-01 RX ORDER — FUROSEMIDE 20 MG/1
20 TABLET ORAL DAILY
Qty: 20 TABLET | Refills: 1 | Status: SHIPPED | OUTPATIENT
Start: 2023-05-01

## 2023-05-01 RX ORDER — DOXAZOSIN 2 MG/1
1 TABLET ORAL EVERY EVENING
COMMUNITY
Start: 2023-04-28

## 2023-05-01 RX ORDER — LOSARTAN POTASSIUM 25 MG/1
1 TABLET ORAL 2 TIMES DAILY
COMMUNITY
Start: 2023-04-28

## 2023-05-01 RX ORDER — MEGESTROL ACETATE 40 MG/1
40 TABLET ORAL DAILY
COMMUNITY
Start: 2023-03-28

## 2023-05-01 RX ORDER — METOPROLOL SUCCINATE 50 MG/1
1 TABLET, EXTENDED RELEASE ORAL 2 TIMES DAILY
COMMUNITY
Start: 2023-04-28

## 2023-05-01 NOTE — PROGRESS NOTES
Subjective     Chief Complaint   Patient presents with   • Headache     Happed a week ago     • Hypertension     Went to [place]    • Edema     Marcellus feet     • Nausea   • Abdominal Pain       History of Present Illness  Patient was recently admitted to the hospital.   She was placed on a restricted diet.   She is upset and not eating.   She has LE edema.   She is upset.   BP was low this am.     We reviewed her labs and XR and CT done while in the hospital.   Discussed multi-step plan to improve her LE edema and diet.     Patient's PMR from outside medical facility reviewed and noted.    Review of Systems   Constitutional: Positive for chills. Negative for fever.   HENT: Negative for congestion and rhinorrhea.    Respiratory: Positive for cough and shortness of breath.    Cardiovascular: Positive for leg swelling. Negative for chest pain.   Gastrointestinal: Negative for constipation, diarrhea and nausea.   Genitourinary: Negative for dysuria and frequency.     Otherwise complete ROS reviewed and negative except as mentioned in the HPI.    Past Medical History:   Past Medical History:   Diagnosis Date   • Acute bronchitis    • Arthritis     Osteoarthritis   • Cancer    • Disease of thyroid gland    • Gallbladder abscess    • Hypertension      Past Surgical History:  Past Surgical History:   Procedure Laterality Date   • ABDOMINAL HYSTERECTOMY     • CHOLECYSTECTOMY  1994   • COLONOSCOPY  04/23/2015    two polyps both removed  extensive diverticulosis   • COLONOSCOPY N/A 06/17/2020    Procedure: COLONOSCOPY WITH ANESTHESIA;  Surgeon: Homer Zamarripa DO;  Location: [facility] ENDOSCOPY;  Service: Gastroenterology;  Laterality: N/A;  pre: Hx of polyps  post:  Anupama Dhaliwal   • ENDOSCOPY  08/16/2016    normal   • ENDOSCOPY N/A 06/17/2020    Procedure: ESOPHAGOGASTRODUODENOSCOPY WITH ANESTHESIA;  Surgeon: Homer Zamarripa DO;  Location: [facility] ENDOSCOPY;  Service: Gastroenterology;  Laterality: N/A;  pre:  nausea  post:    • ENDOSCOPY N/A 6/7/2022    Procedure: ESOPHAGOGASTRODUODENOSCOPY WITH ANESTHESIA;  Surgeon: Homer Zamarripa DO;  Location: [facility] ENDOSCOPY;  Service: Gastroenterology;  Laterality: N/A;  pre cough  post normal  Anupama Dhaliwal DO   • PANCREAS SURGERY      Partial removal due to tumor   • ROTATOR CUFF REPAIR  10/19/2017   • SPLENECTOMY       Social History:  reports that she has never smoked. She has never used smokeless tobacco. She reports that she does not drink alcohol and does not use drugs.    Family History: family history includes Alcohol abuse in her father; Bone cancer in her maternal aunt; Breast cancer in her maternal aunt; Leukemia in her maternal aunt.      Allergies:  Allergies   Allergen Reactions   • Diclofenac Other (See Comments) and Unknown - High Severity     Couldn't sleep and face turned blood red   • Oxycodone-Acetaminophen Hives and Rash     itching  itching     • Doxycycline Rash     Medications:  Prior to Admission medications    Medication Sig Start Date End Date Taking? Authorizing Provider   albuterol (PROVENTIL HFA;VENTOLIN HFA) 108 (90 Base) MCG/ACT inhaler Inhale 2 puffs Every 6 (Six) Hours As Needed for Wheezing or Shortness of Air. 3/20/18  Yes Summer Cota APRN   albuterol sulfate  (90 Base) MCG/ACT inhaler Inhale 2 puffs Every 4 (Four) Hours As Needed for Wheezing. 3/28/23  Yes Adrián Ayala DO   ALPRAZolam (Xanax) 0.5 MG tablet Take 0.5 tablets by mouth 2 (Two) Times a Day As Needed for Anxiety. 3/28/23  Yes Adrián Ayala DO   Cholecalciferol (VITAMIN D3 PO) Take 2,000 mg by mouth Daily.   Yes Almita Tate MD   Denta 5000 Plus 1.1 % cream APPLY WITH TOOTHBRUSH ONCE DAILY AS NEEDED 1/26/23  Yes Almita Tate MD   diphenhydrAMINE (BENADRYL) 25 MG tablet Take 1 tablet by mouth At Night As Needed for Itching or Sleep.   Yes Almita Tate MD   doxazosin (CARDURA) 2 MG tablet Take 1 tablet by mouth Every  Evening. 4/28/23  Yes Almita Tate MD   fluticasone (FLONASE) 50 MCG/ACT nasal spray 2 sprays into the nostril(s) as directed by provider Daily. 7/10/19  Yes Scotty Jaffe DO   HYDROcodone-acetaminophen (NORCO) 7.5-325 MG per tablet Take 7.5 tablets by mouth Every 6 (Six) Hours As Needed. 2/10/23  Yes Almita Tate MD   levothyroxine (SYNTHROID, LEVOTHROID) 137 MCG tablet Take 1 tablet by mouth Daily. 2/27/23  Yes Almita Tate MD   lidocaine-prilocaine (EMLA) 2.5-2.5 % cream Apply topically as needed. 10/3/22  Yes Almita Tate MD   loratadine (CLARITIN) 10 MG tablet Take 1 tablet by mouth Daily. 2/28/23  Yes Almita Tate MD   losartan (COZAAR) 25 MG tablet Take 1 tablet by mouth 2 (Two) Times a Day. 4/28/23  Yes Almita Tate MD   megestrol (MEGACE) 40 MG tablet Take 1 tablet by mouth Daily. 3/28/23  Yes Almita Tate MD   metoprolol succinate XL (TOPROL-XL) 50 MG 24 hr tablet Take 1 tablet by mouth 2 (Two) Times a Day. 4/28/23  Yes Almita Tate MD   NIFEdipine XL (PROCARDIA XL) 30 MG 24 hr tablet Take 1 tablet by mouth Daily. 4/28/23  Yes Almita Tate MD   pancrelipase, Lip-Prot-Amyl, (CREON) 16886-19672 units capsule delayed-release particles capsule Take 1 capsule by mouth 3 (Three) Times a Day With Meals.   Yes Almita Tate MD   promethazine (PHENERGAN) 12.5 MG tablet Take 1 tablet by mouth Every 6 (Six) Hours As Needed. for nausea 2/10/23  Yes Almita Tate MD   triamcinolone (KENALOG) 0.1 % cream APPLY TWICE DAILY ALONG WITH NYSTATIN CREAM 1/11/23  Yes Almita Tate MD   gemcitabine in sodium chloride 0.9 % 250 mL Infuse  into a venous catheter 1 (One) Time.    Almita Tate MD   PACLitaxel Protein-Bound Part (ABRAXANE IV) Infuse  into a venous catheter.  Patient not taking: Reported on 5/1/2023    Provider, Historical, MD   busPIRone (BUSPAR) 5 MG tablet Take 1 tablet by mouth 3 (Three) Times a  "Day.  Patient not taking: Reported on 5/1/2023 5/1/23  Almita Tate MD   Cholecalciferol 10 MCG (400 UNIT) capsule Take 1 capsule by mouth Daily.  Patient not taking: Reported on 3/28/2023  5/1/23  Almiat Tate MD   ketoconazole (NIZORAL) 2 % cream Every Night.  Patient not taking: Reported on 5/1/2023 12/5/22 5/1/23  Almita Tate MD   losartan-hydrochlorothiazide (HYZAAR) 50-12.5 MG per tablet Take 1 tablet by mouth Daily.  5/1/23  Almita Tate MD   megestrol (Megace ES) 625 MG/5ML suspension Take 5 mL by mouth Daily for 30 days. 3/28/23 5/1/23  Adrián Ayala,    tacrolimus (PROTOPIC) 0.1 % ointment APPLY ONCE DAILY TO PSORIASIS IN GROIN  Patient not taking: Reported on 5/1/2023 12/5/22 5/1/23  Almita Tate MD       Objective     Vital Signs: /85 (BP Location: Left arm, Patient Position: Sitting, Cuff Size: Adult)   Pulse 80   Temp 96 °F (35.6 °C) (Infrared)   Ht 165.1 cm (65\")   Wt 74.8 kg (165 lb)   SpO2 96%   BMI 27.46 kg/m²   Physical Exam  Vitals reviewed.   Constitutional:       Appearance: She is ill-appearing.   HENT:      Head: Normocephalic and atraumatic.      Right Ear: External ear normal.      Left Ear: External ear normal.      Nose: Nose normal.   Eyes:      General: No scleral icterus.     Conjunctiva/sclera: Conjunctivae normal.   Cardiovascular:      Rate and Rhythm: Normal rate and regular rhythm.   Pulmonary:      Effort: Pulmonary effort is normal. No respiratory distress.   Musculoskeletal:         General: Swelling present. No tenderness.      Cervical back: Normal range of motion and neck supple.      Right lower leg: Edema present.      Left lower leg: Edema present.   Skin:     General: Skin is warm and dry.   Neurological:      Mental Status: She is alert and oriented to person, place, and time.      Cranial Nerves: No cranial nerve deficit.   Psychiatric:         Mood and Affect: Mood normal.         Behavior: Behavior " normal.         BMI is >= 25 and <30. (Overweight) The following options were offered after discussion;: nutrition counseling/recommendations      Results Reviewed:  Glucose   Date Value Ref Range Status   10/14/2022 92 74 - 106 mg/dL Final     BUN   Date Value Ref Range Status   10/14/2022 11 7 - 17 mg/dL Final     Creatinine   Date Value Ref Range Status   10/14/2022 0.6 0.5 - 1.0 mg/dL Final     Sodium   Date Value Ref Range Status   10/14/2022 137 137 - 145 mmol/L Final     Potassium   Date Value Ref Range Status   10/14/2022 4.4 3.5 - 5.1 mmol/L Final     Chloride   Date Value Ref Range Status   10/14/2022 100 98 - 111 mmol/L Final     CO2   Date Value Ref Range Status   10/14/2022 26 22 - 29 mmol/L Final     Calcium   Date Value Ref Range Status   10/14/2022 9.5 8.4 - 10.2 mg/dL Final     ALT (SGPT)   Date Value Ref Range Status   10/14/2022 16 9 - 52 U/L Final     AST (SGOT)   Date Value Ref Range Status   10/14/2022 40 (H) 14 - 36 U/L Final     WBC   Date Value Ref Range Status   04/27/2023 9.8 4.8 - 10.8 K/uL Final     Hematocrit   Date Value Ref Range Status   04/27/2023 27.0 (L) 37.0 - 47.0 % Final     Platelets   Date Value Ref Range Status   04/27/2023 389 130 - 400 K/uL Final     Total Cholesterol   Date Value Ref Range Status   04/10/2019 235 (H) 130 - 200 mg/dL Final     Triglycerides   Date Value Ref Range Status   04/10/2019 188 (H) 0 - 149 mg/dL Final     HDL Cholesterol   Date Value Ref Range Status   04/10/2019 46 (L) >=50 mg/dL Final     LDL Cholesterol    Date Value Ref Range Status   04/10/2019 143 (H) 0 - 99 mg/dL Final     LDL/HDL Ratio   Date Value Ref Range Status   04/10/2019 3.29  Final     Hemoglobin A1C   Date Value Ref Range Status   03/28/2023 6.1 (H) 4.8 - 5.6 % Final     Comment:              Prediabetes: 5.7 - 6.4           Diabetes: >6.4           Glycemic control for adults with diabetes: <7.0         Assessment / Plan     Assessment/Plan:  1. Localized edema  - Leg elevation  and compression hose  - Protein  - furosemide (Lasix) 20 MG tablet; Take 1 tablet by mouth Daily.  Dispense: 20 tablet; Refill: 1  - potassium chloride 10 MEQ CR tablet; Take 1 tablet by mouth Daily.  Dispense: 20 tablet; Refill: 1    2. Liver lesion  - US Liver; Future        Return in about 3 days (around 5/4/2023) for Recheck, Next scheduled follow up. unless patient needs to be seen sooner or acute issues arise.    Code Status: Full    I have discussed the patient results/orders and and plan/recommendation with them at today's visit.      Adrián Ayala,    05/01/2023

## 2023-05-04 ENCOUNTER — OFFICE VISIT (OUTPATIENT)
Dept: INTERNAL MEDICINE | Facility: CLINIC | Age: 78
End: 2023-05-04
Payer: MEDICARE

## 2023-05-04 ENCOUNTER — HOSPITAL ENCOUNTER (OUTPATIENT)
Dept: INFUSION THERAPY | Age: 78
Discharge: HOME | End: 2023-05-04

## 2023-05-04 VITALS
BODY MASS INDEX: 26.49 KG/M2 | DIASTOLIC BLOOD PRESSURE: 76 MMHG | HEART RATE: 73 BPM | SYSTOLIC BLOOD PRESSURE: 128 MMHG | TEMPERATURE: 97.7 F | OXYGEN SATURATION: 96 % | WEIGHT: 159 LBS | HEIGHT: 65 IN

## 2023-05-04 DIAGNOSIS — R60.0 LOCALIZED EDEMA: Primary | ICD-10-CM

## 2023-05-04 DIAGNOSIS — C25.2 MALIGNANT NEOPLASM OF TAIL OF PANCREAS (HCC): ICD-10-CM

## 2023-05-04 DIAGNOSIS — C79.9 METASTASIS FROM PANCREATIC CANCER (HCC): Primary | ICD-10-CM

## 2023-05-04 DIAGNOSIS — C25.9 METASTASIS FROM PANCREATIC CANCER (HCC): Primary | ICD-10-CM

## 2023-05-04 PROBLEM — Z51.5 PALLIATIVE CARE PATIENT: Status: ACTIVE | Noted: 2023-04-27

## 2023-05-04 PROCEDURE — 99212 OFFICE O/P EST SF 10 MIN: CPT | Performed by: INTERNAL MEDICINE

## 2023-05-04 PROCEDURE — 3078F DIAST BP <80 MM HG: CPT | Performed by: INTERNAL MEDICINE

## 2023-05-04 PROCEDURE — 3074F SYST BP LT 130 MM HG: CPT | Performed by: INTERNAL MEDICINE

## 2023-05-04 RX ORDER — METOCLOPRAMIDE 10 MG/1
10 TABLET ORAL 3 TIMES DAILY PRN
COMMUNITY
Start: 2023-04-28

## 2023-05-04 NOTE — PROGRESS NOTES
Subjective     Chief Complaint   Patient presents with   • Edema       History of Present Illness   She is down 6 lbs. I believe this is fluid loss.   She has improved her eating.   She is slightly breathing better.   BP  Is much better today and she has been keeping a log at home.   She got up easier from her chair when leaving.     Patient's PMR from outside medical facility reviewed and noted.    Review of Systems   Constitutional: Negative for chills and fever.   HENT: Negative for congestion and rhinorrhea.    Respiratory: Negative for cough and shortness of breath.    Cardiovascular: Positive for leg swelling. Negative for chest pain.        Improved   Gastrointestinal: Negative for constipation and diarrhea.   Genitourinary: Negative for dysuria and frequency.      Otherwise complete ROS reviewed and negative except as mentioned in the HPI.    Past Medical History:   Past Medical History:   Diagnosis Date   • Acute bronchitis    • Arthritis     Osteoarthritis   • Cancer    • Disease of thyroid gland    • Gallbladder abscess    • Hypertension      Past Surgical History:  Past Surgical History:   Procedure Laterality Date   • ABDOMINAL HYSTERECTOMY     • CHOLECYSTECTOMY  1994   • COLONOSCOPY  04/23/2015    two polyps both removed  extensive diverticulosis   • COLONOSCOPY N/A 06/17/2020    Procedure: COLONOSCOPY WITH ANESTHESIA;  Surgeon: Homer Zamarripa DO;  Location: [facility] ENDOSCOPY;  Service: Gastroenterology;  Laterality: N/A;  pre: Hx of polyps  post:  Anupama Dhaliwal   • ENDOSCOPY  08/16/2016    normal   • ENDOSCOPY N/A 06/17/2020    Procedure: ESOPHAGOGASTRODUODENOSCOPY WITH ANESTHESIA;  Surgeon: Homer Zamarripa DO;  Location: [facility] ENDOSCOPY;  Service: Gastroenterology;  Laterality: N/A;  pre: nausea  post:    • ENDOSCOPY N/A 6/7/2022    Procedure: ESOPHAGOGASTRODUODENOSCOPY WITH ANESTHESIA;  Surgeon: Homer Zamarripa DO;  Location: [facility] ENDOSCOPY;  Service: Gastroenterology;   Laterality: N/A;  pre cough  post normal  Anupama Dhaliwal, DO   • PANCREAS SURGERY      Partial removal due to tumor   • ROTATOR CUFF REPAIR  10/19/2017   • SPLENECTOMY       Social History:  reports that she has never smoked. She has never used smokeless tobacco. She reports that she does not drink alcohol and does not use drugs.    Family History: family history includes Alcohol abuse in her father; Bone cancer in her maternal aunt; Breast cancer in her maternal aunt; Leukemia in her maternal aunt.      Allergies:  Allergies   Allergen Reactions   • Diclofenac Other (See Comments) and Unknown - High Severity     Couldn't sleep and face turned blood red   • Oxycodone-Acetaminophen Hives and Rash     itching  itching     • Doxycycline Rash     Medications:  Prior to Admission medications    Medication Sig Start Date End Date Taking? Authorizing Provider   albuterol (PROVENTIL HFA;VENTOLIN HFA) 108 (90 Base) MCG/ACT inhaler Inhale 2 puffs Every 6 (Six) Hours As Needed for Wheezing or Shortness of Air. 3/20/18  Yes Summer Cota APRN   albuterol sulfate  (90 Base) MCG/ACT inhaler Inhale 2 puffs Every 4 (Four) Hours As Needed for Wheezing. 3/28/23  Yes Adrián Ayala DO   ALPRAZolam (Xanax) 0.5 MG tablet Take 0.5 tablets by mouth 2 (Two) Times a Day As Needed for Anxiety. 3/28/23  Yes Adrián Ayala DO   Cholecalciferol (VITAMIN D3 PO) Take 2,000 mg by mouth Daily.   Yes ProviderAlmita MD   Denta 5000 Plus 1.1 % cream APPLY WITH TOOTHBRUSH ONCE DAILY AS NEEDED 1/26/23  Yes ProviderAlmita MD   diphenhydrAMINE (BENADRYL) 25 MG tablet Take 1 tablet by mouth At Night As Needed for Itching or Sleep.   Yes ProviderAlmita MD   doxazosin (CARDURA) 2 MG tablet Take 1 tablet by mouth Every Evening. 4/28/23  Yes Almita Tate MD   fluticasone (FLONASE) 50 MCG/ACT nasal spray 2 sprays into the nostril(s) as directed by provider Daily. 7/10/19  Yes Scotty Jaffe DO    furosemide (Lasix) 20 MG tablet Take 1 tablet by mouth Daily. 5/1/23  Yes Adrián Ayala,    gemcitabine in sodium chloride 0.9 % 250 mL Infuse  into a venous catheter 1 (One) Time.   Yes Almita Tate MD   HYDROcodone-acetaminophen (NORCO) 7.5-325 MG per tablet Take 7.5 tablets by mouth Every 6 (Six) Hours As Needed. 2/10/23  Yes Almita Tate MD   levothyroxine (SYNTHROID, LEVOTHROID) 137 MCG tablet Take 1 tablet by mouth Daily. 2/27/23  Yes Almita Tate MD   lidocaine-prilocaine (EMLA) 2.5-2.5 % cream Apply topically as needed. 10/3/22  Yes Almita Tate MD   loratadine (CLARITIN) 10 MG tablet Take 1 tablet by mouth Daily. 2/28/23  Yes Almita Tate MD   losartan (COZAAR) 25 MG tablet Take 1 tablet by mouth 2 (Two) Times a Day. 4/28/23  Yes Almita Tate MD   megestrol (MEGACE) 40 MG tablet Take 1 tablet by mouth Daily. 3/28/23  Yes Almita Tate MD   metoclopramide (REGLAN) 10 MG tablet Take 1 tablet by mouth 3 (Three) Times a Day As Needed. 4/28/23  Yes Almita Tate MD   metoprolol succinate XL (TOPROL-XL) 50 MG 24 hr tablet Take 1 tablet by mouth 2 (Two) Times a Day. 4/28/23  Yes Almita Tate MD   NIFEdipine XL (PROCARDIA XL) 30 MG 24 hr tablet Take 1 tablet by mouth Daily. 4/28/23  Yes Almita Tate MD   PACLitaxel Protein-Bound Part (ABRAXANE IV) Infuse  into a venous catheter.   Yes ProviderAlmita MD   pancrelipase, Lip-Prot-Amyl, (CREON) 48895-64548 units capsule delayed-release particles capsule Take 1 capsule by mouth 3 (Three) Times a Day With Meals.   Yes Almita Tate MD   potassium chloride 10 MEQ CR tablet Take 1 tablet by mouth Daily. 5/1/23  Yes Adrián Ayala,    promethazine (PHENERGAN) 12.5 MG tablet Take 1 tablet by mouth Every 6 (Six) Hours As Needed. for nausea 2/10/23  Yes ProviderAlmita MD   triamcinolone (KENALOG) 0.1 % cream APPLY TWICE DAILY ALONG WITH NYSTATIN CREAM  "1/11/23  Yes Provider, MD Almita       Objective     Vital Signs: /76 (BP Location: Left arm, Patient Position: Sitting, Cuff Size: Adult)   Pulse 73   Temp 97.7 °F (36.5 °C) (Infrared)   Ht 165.1 cm (65\")   Wt 72.1 kg (159 lb)   SpO2 96%   BMI 26.46 kg/m²   Physical Exam  Vitals reviewed.   Constitutional:       Appearance: She is ill-appearing.   HENT:      Head: Normocephalic and atraumatic.      Right Ear: External ear normal.      Left Ear: External ear normal.      Nose: Nose normal.   Eyes:      General: No scleral icterus.     Conjunctiva/sclera: Conjunctivae normal.   Cardiovascular:      Rate and Rhythm: Normal rate and regular rhythm.      Heart sounds: Normal heart sounds.   Pulmonary:      Effort: Pulmonary effort is normal.      Breath sounds: Normal breath sounds.   Musculoskeletal:         General: Swelling present. No tenderness.      Cervical back: Normal range of motion and neck supple.      Comments: Improved LE edema +1 and skin not as tight around the calf area.    Skin:     General: Skin is warm and dry.   Neurological:      Mental Status: She is alert and oriented to person, place, and time.      Cranial Nerves: No cranial nerve deficit.   Psychiatric:         Mood and Affect: Mood normal.         Behavior: Behavior normal.       BMI is >= 25 and <30. (Overweight) The following options were offered after discussion;: nutrition counseling/recommendations      Results Reviewed:  Glucose   Date Value Ref Range Status   10/14/2022 92 74 - 106 mg/dL Final     BUN   Date Value Ref Range Status   10/14/2022 11 7 - 17 mg/dL Final     Creatinine   Date Value Ref Range Status   10/14/2022 0.6 0.5 - 1.0 mg/dL Final     Sodium   Date Value Ref Range Status   10/14/2022 137 137 - 145 mmol/L Final     Potassium   Date Value Ref Range Status   10/14/2022 4.4 3.5 - 5.1 mmol/L Final     Chloride   Date Value Ref Range Status   10/14/2022 100 98 - 111 mmol/L Final     CO2   Date Value Ref " Range Status   10/14/2022 26 22 - 29 mmol/L Final     Calcium   Date Value Ref Range Status   10/14/2022 9.5 8.4 - 10.2 mg/dL Final     ALT (SGPT)   Date Value Ref Range Status   10/14/2022 16 9 - 52 U/L Final     AST (SGOT)   Date Value Ref Range Status   10/14/2022 40 (H) 14 - 36 U/L Final     WBC   Date Value Ref Range Status   04/27/2023 9.8 4.8 - 10.8 K/uL Final     Hematocrit   Date Value Ref Range Status   04/27/2023 27.0 (L) 37.0 - 47.0 % Final     Platelets   Date Value Ref Range Status   04/27/2023 389 130 - 400 K/uL Final     Total Cholesterol   Date Value Ref Range Status   04/10/2019 235 (H) 130 - 200 mg/dL Final     Triglycerides   Date Value Ref Range Status   04/10/2019 188 (H) 0 - 149 mg/dL Final     HDL Cholesterol   Date Value Ref Range Status   04/10/2019 46 (L) >=50 mg/dL Final     LDL Cholesterol    Date Value Ref Range Status   04/10/2019 143 (H) 0 - 99 mg/dL Final     LDL/HDL Ratio   Date Value Ref Range Status   04/10/2019 3.29  Final     Hemoglobin A1C   Date Value Ref Range Status   03/28/2023 6.1 (H) 4.8 - 5.6 % Final     Comment:              Prediabetes: 5.7 - 6.4           Diabetes: >6.4           Glycemic control for adults with diabetes: <7.0         Assessment / Plan     Assessment/Plan:  1. Localized edema  - Improved with less strict dietary restrictions.   - LE edema improved and weight down.  - Basic metabolic panel; Future  - Discussed weight based lasix dosing and a goal weight of 157    Will reschedule Liver US       Return if symptoms worsen or fail to improve, for Recheck, Next scheduled follow up. unless patient needs to be seen sooner or acute issues arise.    Code Status: Full    I have discussed the patient results/orders and and plan/recommendation with them at today's visit.      Adrián Ayala,    05/04/2023

## 2023-05-09 ENCOUNTER — LAB (OUTPATIENT)
Dept: INTERNAL MEDICINE | Facility: CLINIC | Age: 78
End: 2023-05-09
Payer: MEDICARE

## 2023-05-10 DIAGNOSIS — I10 ESSENTIAL HYPERTENSION: Primary | ICD-10-CM

## 2023-05-15 ENCOUNTER — APPOINTMENT (OUTPATIENT)
Dept: GENERAL RADIOLOGY | Facility: HOSPITAL | Age: 78
End: 2023-05-15
Payer: MEDICARE

## 2023-05-15 ENCOUNTER — APPOINTMENT (OUTPATIENT)
Dept: CT IMAGING | Facility: HOSPITAL | Age: 78
End: 2023-05-15
Payer: MEDICARE

## 2023-05-15 ENCOUNTER — LAB (OUTPATIENT)
Dept: INTERNAL MEDICINE | Facility: CLINIC | Age: 78
End: 2023-05-15
Payer: MEDICARE

## 2023-05-15 ENCOUNTER — APPOINTMENT (OUTPATIENT)
Dept: ULTRASOUND IMAGING | Facility: HOSPITAL | Age: 78
End: 2023-05-15
Payer: MEDICARE

## 2023-05-15 ENCOUNTER — HOSPITAL ENCOUNTER (INPATIENT)
Facility: HOSPITAL | Age: 78
LOS: 2 days | Discharge: HOME | End: 2023-05-17
Attending: EMERGENCY MEDICINE | Admitting: INTERNAL MEDICINE
Payer: MEDICARE

## 2023-05-15 DIAGNOSIS — J90 PLEURAL EFFUSION: Primary | ICD-10-CM

## 2023-05-15 DIAGNOSIS — R06.00 DYSPNEA, UNSPECIFIED TYPE: ICD-10-CM

## 2023-05-15 DIAGNOSIS — C25.9 MALIGNANT NEOPLASM OF PANCREAS, UNSPECIFIED LOCATION OF MALIGNANCY: ICD-10-CM

## 2023-05-15 DIAGNOSIS — R91.1 PULMONARY NODULE: ICD-10-CM

## 2023-05-15 DIAGNOSIS — J18.9 PNEUMONIA DUE TO INFECTIOUS ORGANISM, UNSPECIFIED LATERALITY, UNSPECIFIED PART OF LUNG: ICD-10-CM

## 2023-05-15 PROBLEM — E87.70 VOLUME OVERLOAD: Status: ACTIVE | Noted: 2023-05-15

## 2023-05-15 PROBLEM — N17.9 AKI (ACUTE KIDNEY INJURY): Status: ACTIVE | Noted: 2023-05-15
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
## 2023-05-15 LAB
ANION GAP SERPL CALCULATED.3IONS-SCNC: 9 MMOL/L (ref 5–15)
ARTERIAL PATENCY WRIST A: ABNORMAL
ATMOSPHERIC PRESS: 754 MMHG
B PARAPERT DNA SPEC QL NAA+PROBE: NOT DETECTED
B PERT DNA SPEC QL NAA+PROBE: NOT DETECTED
BASE EXCESS BLDA CALC-SCNC: -3.8 MMOL/L (ref 0–2)
BASOPHILS # BLD AUTO: 0.07 10*3/MM3 (ref 0–0.2)
BASOPHILS NFR BLD AUTO: 0.6 % (ref 0–1.5)
BDY SITE: ABNORMAL
BODY TEMPERATURE: 37 C
BUN SERPL-MCNC: 25 MG/DL (ref 8–23)
BUN/CREAT SERPL: 22.1 (ref 7–25)
C PNEUM DNA NPH QL NAA+NON-PROBE: NOT DETECTED
CALCIUM SPEC-SCNC: 9 MG/DL (ref 8.6–10.5)
CHLORIDE SERPL-SCNC: 104 MMOL/L (ref 98–107)
CO2 SERPL-SCNC: 20 MMOL/L (ref 22–29)
CREAT SERPL-MCNC: 1.13 MG/DL (ref 0.57–1)
D DIMER PPP FEU-MCNC: 5.2 MCGFEU/ML (ref 0–0.76)
D-LACTATE SERPL-SCNC: 1.2 MMOL/L (ref 0.5–2)
DEPRECATED RDW RBC AUTO: 58.3 FL (ref 37–54)
EGFRCR SERPLBLD CKD-EPI 2021: 50.5 ML/MIN/1.73
EOSINOPHIL # BLD AUTO: 0.3 10*3/MM3 (ref 0–0.4)
EOSINOPHIL NFR BLD AUTO: 2.6 % (ref 0.3–6.2)
ERYTHROCYTE [DISTWIDTH] IN BLOOD BY AUTOMATED COUNT: 17.2 % (ref 12.3–15.4)
FERRITIN SERPL-MCNC: 656.7 NG/ML (ref 13–150)
FLUAV SUBTYP SPEC NAA+PROBE: NOT DETECTED
FLUBV RNA ISLT QL NAA+PROBE: NOT DETECTED
GLUCOSE SERPL-MCNC: 99 MG/DL (ref 65–99)
HADV DNA SPEC NAA+PROBE: NOT DETECTED
HCO3 BLDA-SCNC: 19.4 MMOL/L (ref 20–26)
HCOV 229E RNA SPEC QL NAA+PROBE: NOT DETECTED
HCOV HKU1 RNA SPEC QL NAA+PROBE: NOT DETECTED
HCOV NL63 RNA SPEC QL NAA+PROBE: NOT DETECTED
HCOV OC43 RNA SPEC QL NAA+PROBE: NOT DETECTED
HCT VFR BLD AUTO: 29.8 % (ref 34–46.6)
HGB BLD-MCNC: 9.6 G/DL (ref 12–15.9)
HMPV RNA NPH QL NAA+NON-PROBE: NOT DETECTED
HPIV1 RNA ISLT QL NAA+PROBE: NOT DETECTED
HPIV2 RNA SPEC QL NAA+PROBE: NOT DETECTED
HPIV3 RNA NPH QL NAA+PROBE: NOT DETECTED
HPIV4 P GENE NPH QL NAA+PROBE: NOT DETECTED
IMM GRANULOCYTES # BLD AUTO: 0.03 10*3/MM3 (ref 0–0.05)
IMM GRANULOCYTES NFR BLD AUTO: 0.3 % (ref 0–0.5)
INR PPP: 1 (ref 0.91–1.09)
IRON 24H UR-MRATE: 43 MCG/DL (ref 37–145)
IRON SATN MFR SERPL: 13 % (ref 20–50)
L PNEUMO1 AG UR QL IA: NEGATIVE
LYMPHOCYTES # BLD AUTO: 3.36 10*3/MM3 (ref 0.7–3.1)
LYMPHOCYTES NFR BLD AUTO: 29.2 % (ref 19.6–45.3)
Lab: ABNORMAL
M PNEUMO IGG SER IA-ACNC: NOT DETECTED
MCH RBC QN AUTO: 30.6 PG (ref 26.6–33)
MCHC RBC AUTO-ENTMCNC: 32.2 G/DL (ref 31.5–35.7)
MCV RBC AUTO: 94.9 FL (ref 79–97)
MODALITY: ABNORMAL
MONOCYTES # BLD AUTO: 1.55 10*3/MM3 (ref 0.1–0.9)
MONOCYTES NFR BLD AUTO: 13.5 % (ref 5–12)
MRSA DNA SPEC QL NAA+PROBE: NORMAL
NEUTROPHILS NFR BLD AUTO: 53.8 % (ref 42.7–76)
NEUTROPHILS NFR BLD AUTO: 6.18 10*3/MM3 (ref 1.7–7)
NRBC BLD AUTO-RTO: 0 /100 WBC (ref 0–0.2)
NT-PROBNP SERPL-MCNC: 2714 PG/ML (ref 0–1800)
PCO2 BLDA: 28.3 MM HG (ref 35–45)
PCO2 TEMP ADJ BLD: 28.3 MM HG (ref 35–45)
PH BLDA: 7.44 PH UNITS (ref 7.35–7.45)
PH, TEMP CORRECTED: 7.44 PH UNITS (ref 7.35–7.45)
PLATELET # BLD AUTO: 466 10*3/MM3 (ref 140–450)
PMV BLD AUTO: 10.3 FL (ref 6–12)
PO2 BLDA: 75.5 MM HG (ref 83–108)
PO2 TEMP ADJ BLD: 75.5 MM HG (ref 83–108)
POTASSIUM SERPL-SCNC: 5.1 MMOL/L (ref 3.5–5.2)
PROCALCITONIN SERPL-MCNC: 0.1 NG/ML (ref 0–0.25)
PROTHROMBIN TIME: 13.3 SECONDS (ref 11.8–14.8)
RBC # BLD AUTO: 3.14 10*6/MM3 (ref 3.77–5.28)
RETICS # AUTO: 0.06 10*6/MM3 (ref 0.02–0.13)
RETICS/RBC NFR AUTO: 1.8 % (ref 0.7–1.9)
RHINOVIRUS RNA SPEC NAA+PROBE: NOT DETECTED
RSV RNA NPH QL NAA+NON-PROBE: NOT DETECTED
S PNEUM AG SPEC QL LA: NEGATIVE
SAO2 % BLDCOA: 97.1 % (ref 94–99)
SARS-COV-2 RNA NPH QL NAA+NON-PROBE: NOT DETECTED
SODIUM SERPL-SCNC: 133 MMOL/L (ref 136–145)
TIBC SERPL-MCNC: 334 MCG/DL (ref 298–536)
TRANSFERRIN SERPL-MCNC: 224 MG/DL (ref 200–360)
VENTILATOR MODE: ABNORMAL
WBC NRBC COR # BLD: 11.49 10*3/MM3 (ref 3.4–10.8)

## 2023-05-15 PROCEDURE — 83605 ASSAY OF LACTIC ACID: CPT | Performed by: EMERGENCY MEDICINE

## 2023-05-15 PROCEDURE — 94799 UNLISTED PULMONARY SVC/PX: CPT

## 2023-05-15 PROCEDURE — 87040 BLOOD CULTURE FOR BACTERIA: CPT | Performed by: EMERGENCY MEDICINE

## 2023-05-15 PROCEDURE — 25010000002 AZITHROMYCIN PER 500 MG: Performed by: EMERGENCY MEDICINE

## 2023-05-15 PROCEDURE — 94640 AIRWAY INHALATION TREATMENT: CPT

## 2023-05-15 PROCEDURE — 93971 EXTREMITY STUDY: CPT

## 2023-05-15 PROCEDURE — 93005 ELECTROCARDIOGRAM TRACING: CPT | Performed by: EMERGENCY MEDICINE

## 2023-05-15 PROCEDURE — 94664 DEMO&/EVAL PT USE INHALER: CPT

## 2023-05-15 PROCEDURE — 25010000002 VANCOMYCIN 10 G RECONSTITUTED SOLUTION: Performed by: NURSE PRACTITIONER

## 2023-05-15 PROCEDURE — 25010000002 PIPERACILLIN SOD-TAZOBACTAM PER 1 G: Performed by: NURSE PRACTITIONER

## 2023-05-15 PROCEDURE — 84466 ASSAY OF TRANSFERRIN: CPT | Performed by: NURSE PRACTITIONER

## 2023-05-15 PROCEDURE — 25510000001 IOPAMIDOL PER 1 ML: Performed by: EMERGENCY MEDICINE

## 2023-05-15 PROCEDURE — 80048 BASIC METABOLIC PNL TOTAL CA: CPT | Performed by: EMERGENCY MEDICINE

## 2023-05-15 PROCEDURE — 82728 ASSAY OF FERRITIN: CPT | Performed by: NURSE PRACTITIONER

## 2023-05-15 PROCEDURE — 36600 WITHDRAWAL OF ARTERIAL BLOOD: CPT

## 2023-05-15 PROCEDURE — 71045 X-RAY EXAM CHEST 1 VIEW: CPT

## 2023-05-15 PROCEDURE — 82803 BLOOD GASES ANY COMBINATION: CPT

## 2023-05-15 PROCEDURE — 83880 ASSAY OF NATRIURETIC PEPTIDE: CPT | Performed by: EMERGENCY MEDICINE

## 2023-05-15 PROCEDURE — 25010000002 FUROSEMIDE PER 20 MG: Performed by: EMERGENCY MEDICINE

## 2023-05-15 PROCEDURE — 85045 AUTOMATED RETICULOCYTE COUNT: CPT | Performed by: NURSE PRACTITIONER

## 2023-05-15 PROCEDURE — 83540 ASSAY OF IRON: CPT | Performed by: NURSE PRACTITIONER

## 2023-05-15 PROCEDURE — 87449 NOS EACH ORGANISM AG IA: CPT | Performed by: NURSE PRACTITIONER

## 2023-05-15 PROCEDURE — 25010000002 CEFTRIAXONE PER 250 MG: Performed by: EMERGENCY MEDICINE

## 2023-05-15 PROCEDURE — 85025 COMPLETE CBC W/AUTO DIFF WBC: CPT | Performed by: EMERGENCY MEDICINE

## 2023-05-15 PROCEDURE — 94761 N-INVAS EAR/PLS OXIMETRY MLT: CPT

## 2023-05-15 PROCEDURE — 87641 MR-STAPH DNA AMP PROBE: CPT | Performed by: NURSE PRACTITIONER

## 2023-05-15 PROCEDURE — 0202U NFCT DS 22 TRGT SARS-COV-2: CPT | Performed by: NURSE PRACTITIONER

## 2023-05-15 PROCEDURE — 85379 FIBRIN DEGRADATION QUANT: CPT | Performed by: EMERGENCY MEDICINE

## 2023-05-15 PROCEDURE — 25010000002 ENOXAPARIN PER 10 MG: Performed by: NURSE PRACTITIONER

## 2023-05-15 PROCEDURE — 99285 EMERGENCY DEPT VISIT HI MDM: CPT

## 2023-05-15 PROCEDURE — 85610 PROTHROMBIN TIME: CPT | Performed by: EMERGENCY MEDICINE

## 2023-05-15 PROCEDURE — 84145 PROCALCITONIN (PCT): CPT | Performed by: NURSE PRACTITIONER

## 2023-05-15 PROCEDURE — 71275 CT ANGIOGRAPHY CHEST: CPT

## 2023-05-15 RX ORDER — HYDROCODONE BITARTRATE AND ACETAMINOPHEN 7.5; 325 MG/1; MG/1
1 TABLET ORAL EVERY 6 HOURS PRN
Status: DISCONTINUED | OUTPATIENT
Start: 2023-05-15 | End: 2023-05-17 | Stop reason: HOSPADM

## 2023-05-15 RX ORDER — ONDANSETRON 2 MG/ML
4 INJECTION INTRAMUSCULAR; INTRAVENOUS EVERY 6 HOURS PRN
Status: DISCONTINUED | OUTPATIENT
Start: 2023-05-15 | End: 2023-05-17 | Stop reason: HOSPADM

## 2023-05-15 RX ORDER — CETIRIZINE HYDROCHLORIDE 10 MG/1
10 TABLET ORAL DAILY
Status: DISCONTINUED | OUTPATIENT
Start: 2023-05-15 | End: 2023-05-17 | Stop reason: HOSPADM

## 2023-05-15 RX ORDER — SODIUM CHLORIDE 0.9 % (FLUSH) 0.9 %
10 SYRINGE (ML) INJECTION AS NEEDED
Status: DISCONTINUED | OUTPATIENT
Start: 2023-05-15 | End: 2023-05-17 | Stop reason: HOSPADM

## 2023-05-15 RX ORDER — NIFEDIPINE 30 MG/1
30 TABLET, EXTENDED RELEASE ORAL DAILY
Status: DISCONTINUED | OUTPATIENT
Start: 2023-05-15 | End: 2023-05-17 | Stop reason: HOSPADM

## 2023-05-15 RX ORDER — ACETAMINOPHEN 160 MG/5ML
650 SOLUTION ORAL EVERY 4 HOURS PRN
Status: DISCONTINUED | OUTPATIENT
Start: 2023-05-15 | End: 2023-05-17 | Stop reason: HOSPADM

## 2023-05-15 RX ORDER — METOPROLOL SUCCINATE 50 MG/1
50 TABLET, EXTENDED RELEASE ORAL 2 TIMES DAILY
Status: DISCONTINUED | OUTPATIENT
Start: 2023-05-15 | End: 2023-05-17 | Stop reason: HOSPADM

## 2023-05-15 RX ORDER — IPRATROPIUM BROMIDE AND ALBUTEROL SULFATE 2.5; .5 MG/3ML; MG/3ML
3 SOLUTION RESPIRATORY (INHALATION)
Status: DISCONTINUED | OUTPATIENT
Start: 2023-05-15 | End: 2023-05-17 | Stop reason: HOSPADM

## 2023-05-15 RX ORDER — FUROSEMIDE 10 MG/ML
40 INJECTION INTRAMUSCULAR; INTRAVENOUS EVERY 12 HOURS
Status: COMPLETED | OUTPATIENT
Start: 2023-05-16 | End: 2023-05-16

## 2023-05-15 RX ORDER — METOCLOPRAMIDE 10 MG/1
10 TABLET ORAL 3 TIMES DAILY PRN
Status: DISCONTINUED | OUTPATIENT
Start: 2023-05-15 | End: 2023-05-17 | Stop reason: HOSPADM

## 2023-05-15 RX ORDER — ALPRAZOLAM 0.25 MG/1
0.25 TABLET ORAL 2 TIMES DAILY PRN
Status: DISCONTINUED | OUTPATIENT
Start: 2023-05-15 | End: 2023-05-17 | Stop reason: HOSPADM

## 2023-05-15 RX ORDER — ONDANSETRON 4 MG/1
4 TABLET, FILM COATED ORAL EVERY 6 HOURS PRN
Status: DISCONTINUED | OUTPATIENT
Start: 2023-05-15 | End: 2023-05-17 | Stop reason: HOSPADM

## 2023-05-15 RX ORDER — ENOXAPARIN SODIUM 100 MG/ML
40 INJECTION SUBCUTANEOUS EVERY 24 HOURS
Status: DISCONTINUED | OUTPATIENT
Start: 2023-05-15 | End: 2023-05-17 | Stop reason: HOSPADM

## 2023-05-15 RX ORDER — POTASSIUM CHLORIDE 750 MG/1
10 CAPSULE, EXTENDED RELEASE ORAL DAILY
Status: DISCONTINUED | OUTPATIENT
Start: 2023-05-15 | End: 2023-05-17 | Stop reason: HOSPADM

## 2023-05-15 RX ORDER — HYDROCODONE BITARTRATE AND ACETAMINOPHEN 7.5; 325 MG/1; MG/1
7.5 TABLET ORAL EVERY 6 HOURS PRN
Status: DISCONTINUED | OUTPATIENT
Start: 2023-05-15 | End: 2023-05-15

## 2023-05-15 RX ORDER — ACETAMINOPHEN 650 MG/1
650 SUPPOSITORY RECTAL EVERY 4 HOURS PRN
Status: DISCONTINUED | OUTPATIENT
Start: 2023-05-15 | End: 2023-05-17 | Stop reason: HOSPADM

## 2023-05-15 RX ORDER — SODIUM CHLORIDE 9 MG/ML
40 INJECTION, SOLUTION INTRAVENOUS AS NEEDED
Status: DISCONTINUED | OUTPATIENT
Start: 2023-05-15 | End: 2023-05-17 | Stop reason: HOSPADM

## 2023-05-15 RX ORDER — ACETAMINOPHEN 325 MG/1
650 TABLET ORAL EVERY 4 HOURS PRN
Status: DISCONTINUED | OUTPATIENT
Start: 2023-05-15 | End: 2023-05-17 | Stop reason: HOSPADM

## 2023-05-15 RX ORDER — ALBUTEROL SULFATE 2.5 MG/3ML
2.5 SOLUTION RESPIRATORY (INHALATION) ONCE
Status: COMPLETED | OUTPATIENT
Start: 2023-05-15 | End: 2023-05-15

## 2023-05-15 RX ORDER — LOSARTAN POTASSIUM 50 MG/1
25 TABLET ORAL 2 TIMES DAILY
Status: DISCONTINUED | OUTPATIENT
Start: 2023-05-15 | End: 2023-05-17 | Stop reason: HOSPADM

## 2023-05-15 RX ORDER — SODIUM CHLORIDE 0.9 % (FLUSH) 0.9 %
10 SYRINGE (ML) INJECTION EVERY 12 HOURS SCHEDULED
Status: DISCONTINUED | OUTPATIENT
Start: 2023-05-15 | End: 2023-05-17 | Stop reason: HOSPADM

## 2023-05-15 RX ORDER — FUROSEMIDE 10 MG/ML
40 INJECTION INTRAMUSCULAR; INTRAVENOUS ONCE
Status: COMPLETED | OUTPATIENT
Start: 2023-05-15 | End: 2023-05-15

## 2023-05-15 RX ORDER — FLUTICASONE PROPIONATE 50 MCG
2 SPRAY, SUSPENSION (ML) NASAL DAILY
Status: DISCONTINUED | OUTPATIENT
Start: 2023-05-15 | End: 2023-05-17 | Stop reason: HOSPADM

## 2023-05-15 RX ORDER — MEGESTROL ACETATE 40 MG/1
40 TABLET ORAL DAILY
Status: DISCONTINUED | OUTPATIENT
Start: 2023-05-15 | End: 2023-05-17 | Stop reason: HOSPADM

## 2023-05-15 RX ORDER — TERAZOSIN 2 MG/1
2 CAPSULE ORAL NIGHTLY
Status: DISCONTINUED | OUTPATIENT
Start: 2023-05-15 | End: 2023-05-17 | Stop reason: HOSPADM

## 2023-05-15 RX ADMIN — IOPAMIDOL 100 ML: 755 INJECTION, SOLUTION INTRAVENOUS at 13:06

## 2023-05-15 RX ADMIN — FUROSEMIDE 40 MG: 10 INJECTION, SOLUTION INTRAVENOUS at 15:31

## 2023-05-15 RX ADMIN — PIPERACILLIN AND TAZOBACTAM 4.5 G: 4; .5 INJECTION, POWDER, LYOPHILIZED, FOR SOLUTION INTRAVENOUS; PARENTERAL at 18:16

## 2023-05-15 RX ADMIN — CEFTRIAXONE 1 G: 1 INJECTION, POWDER, FOR SOLUTION INTRAMUSCULAR; INTRAVENOUS at 17:00

## 2023-05-15 RX ADMIN — ALPRAZOLAM 0.25 MG: 0.25 TABLET ORAL at 22:19

## 2023-05-15 RX ADMIN — ALBUTEROL SULFATE 2.5 MG: 2.5 SOLUTION RESPIRATORY (INHALATION) at 11:51

## 2023-05-15 RX ADMIN — ENOXAPARIN SODIUM 40 MG: 100 INJECTION SUBCUTANEOUS at 21:54

## 2023-05-15 RX ADMIN — VANCOMYCIN HYDROCHLORIDE 1250 MG: 10 INJECTION, POWDER, LYOPHILIZED, FOR SOLUTION INTRAVENOUS at 21:54

## 2023-05-15 RX ADMIN — IPRATROPIUM BROMIDE AND ALBUTEROL SULFATE 3 ML: 2.5; .5 SOLUTION RESPIRATORY (INHALATION) at 23:40

## 2023-05-15 RX ADMIN — IPRATROPIUM BROMIDE AND ALBUTEROL SULFATE 3 ML: 2.5; .5 SOLUTION RESPIRATORY (INHALATION) at 19:45

## 2023-05-15 RX ADMIN — Medication 10 ML: at 21:55

## 2023-05-15 RX ADMIN — AZITHROMYCIN DIHYDRATE 500 MG: 500 INJECTION, POWDER, LYOPHILIZED, FOR SOLUTION INTRAVENOUS at 18:16

## 2023-05-15 NOTE — PLAN OF CARE
Goal Outcome Evaluation:  Plan of Care Reviewed With: patient        Progress: no change  Outcome Evaluation: Admitted from ER with PNA and pleural effusion. NSR on tele. IV abx cont. Denies pain. Safety maintained. Will cont to monitor and call MD with any changes.

## 2023-05-15 NOTE — H&P
"    [facility] Medicine Services  HISTORY AND PHYSICAL    Date of Admission: 5/15/2023  Primary Care Physician: Adrián Ayala DO Subjective   Primary Historian: Patient    Chief Complaint: Shortness of breathing and cough    History of Present Illness  Ирина Randolph is a 77-year-old female with past medical history of pancreatic cancer undergoing therapy per Dr. Mendiola, oncology, hypertension, hyperlipidemia, please see below for complete list.  Patient admitted to [facility] 4/23 - 4/28, 2023 with hypertensive emergency, acute pulmonary edema and acute non-intractable headache.  Patient was seen by her primary care provider today and due to worsening cough, shortness of breathing since discharge from outlying facility she was advised to seek evaluation [facility] emergency department.  Patient diagnosed with right lower lobe pneumonia, atelectatic changes bilaterally worse right middle and lower lobes.  Also noted right moderate and small left basilar pleural effusion.  Also noted small amount of fluid in the upper abdomen.  Patient is dyspneic, on 2 L nasal cannula with 99% saturation.  Patient does not wear oxygen at home.  She is complaining of swelling \"all over\" right side lung sounds are coarse throughout.  Patient is without pain.  She has no other complaints.  She is admitted for further evaluation treatment.      Review of Systems   Otherwise complete ROS reviewed and negative except as mentioned in the HPI.    Past Medical History:   Past Medical History:   Diagnosis Date   • Acute bronchitis    • Arthritis     Osteoarthritis   • Cancer    • Disease of thyroid gland    • Gallbladder abscess    • Hypertension      Past Surgical History:  Past Surgical History:   Procedure Laterality Date   • ABDOMINAL HYSTERECTOMY     • CHOLECYSTECTOMY  1994   • COLONOSCOPY  04/23/2015    two polyps both removed  extensive diverticulosis   • COLONOSCOPY N/A " 06/17/2020    Procedure: COLONOSCOPY WITH ANESTHESIA;  Surgeon: Homer Zamarripa DO;  Location: [facility] ENDOSCOPY;  Service: Gastroenterology;  Laterality: N/A;  pre: Hx of polyps  post:  Anupama Dhaliwal   • ENDOSCOPY  08/16/2016    normal   • ENDOSCOPY N/A 06/17/2020    Procedure: ESOPHAGOGASTRODUODENOSCOPY WITH ANESTHESIA;  Surgeon: Homer Zamarripa DO;  Location: [facility] ENDOSCOPY;  Service: Gastroenterology;  Laterality: N/A;  pre: nausea  post:    • ENDOSCOPY N/A 6/7/2022    Procedure: ESOPHAGOGASTRODUODENOSCOPY WITH ANESTHESIA;  Surgeon: Homer Zamarripa DO;  Location: [facility] ENDOSCOPY;  Service: Gastroenterology;  Laterality: N/A;  pre cough  post normal  Anupama Dhaliwal DO   • PANCREAS SURGERY      Partial removal due to tumor   • ROTATOR CUFF REPAIR  10/19/2017   • SPLENECTOMY       Social History:  reports that she has never smoked. She has never used smokeless tobacco. She reports that she does not drink alcohol and does not use drugs.    Family History: family history includes Alcohol abuse in her father; Bone cancer in her maternal aunt; Breast cancer in her maternal aunt; Leukemia in her maternal aunt.       Allergies:  Allergies   Allergen Reactions   • Diclofenac Other (See Comments) and Unknown - High Severity     Couldn't sleep and face turned blood red   • Oxycodone-Acetaminophen Hives and Rash     itching  itching     • Doxycycline Rash       Medications:  Prior to Admission medications    Medication Sig Start Date End Date Taking? Authorizing Provider   albuterol (PROVENTIL HFA;VENTOLIN HFA) 108 (90 Base) MCG/ACT inhaler Inhale 2 puffs Every 6 (Six) Hours As Needed for Wheezing or Shortness of Air. 3/20/18   Summer Cota APRN   albuterol sulfate  (90 Base) MCG/ACT inhaler Inhale 2 puffs Every 4 (Four) Hours As Needed for Wheezing. 3/28/23   Adrián Ayala DO   ALPRAZolam (Xanax) 0.5 MG tablet Take 0.5 tablets by mouth 2 (Two) Times a Day As Needed for Anxiety.  3/28/23   Adrián Aylaa DO   Cholecalciferol (VITAMIN D3 PO) Take 2,000 mg by mouth Daily.    Almita Tate MD   Denta 5000 Plus 1.1 % cream APPLY WITH TOOTHBRUSH ONCE DAILY AS NEEDED 1/26/23   Almita Tate MD   diphenhydrAMINE (BENADRYL) 25 MG tablet Take 1 tablet by mouth At Night As Needed for Itching or Sleep.    Almita Tate MD   doxazosin (CARDURA) 2 MG tablet Take 1 tablet by mouth Every Evening. 4/28/23   Almita Tate MD   fluticasone (FLONASE) 50 MCG/ACT nasal spray 2 sprays into the nostril(s) as directed by provider Daily. 7/10/19   Scotty Jaffe DO   furosemide (Lasix) 20 MG tablet Take 1 tablet by mouth Daily. 5/1/23   Adrián Ayala DO   gemcitabine in sodium chloride 0.9 % 250 mL Infuse  into a venous catheter 1 (One) Time.    Almita Tate MD   HYDROcodone-acetaminophen (NORCO) 7.5-325 MG per tablet Take 7.5 tablets by mouth Every 6 (Six) Hours As Needed. 2/10/23   Almita Tate MD   levothyroxine (SYNTHROID, LEVOTHROID) 137 MCG tablet Take 1 tablet by mouth Daily. 2/27/23   Almita Tate MD   lidocaine-prilocaine (EMLA) 2.5-2.5 % cream Apply topically as needed. 10/3/22   lAmita Tate MD   loratadine (CLARITIN) 10 MG tablet Take 1 tablet by mouth Daily. 2/28/23   Almita Tate MD   losartan (COZAAR) 25 MG tablet Take 1 tablet by mouth 2 (Two) Times a Day. 4/28/23   Almita Tate MD   megestrol (MEGACE) 40 MG tablet Take 1 tablet by mouth Daily. 3/28/23   Almita Tate MD   metoclopramide (REGLAN) 10 MG tablet Take 1 tablet by mouth 3 (Three) Times a Day As Needed. 4/28/23   Almita Tate MD   metoprolol succinate XL (TOPROL-XL) 50 MG 24 hr tablet Take 1 tablet by mouth 2 (Two) Times a Day. 4/28/23   Almita Tate MD   NIFEdipine XL (PROCARDIA XL) 30 MG 24 hr tablet Take 1 tablet by mouth Daily. 4/28/23   Provider, MD Almita   PACLitaxel Protein-Bound Part (ABRAXANE IV)  "Infuse  into a venous catheter.    Almita Tate MD   pancrelipase, Lip-Prot-Amyl, (CREON) 54255-17960 units capsule delayed-release particles capsule Take 1 capsule by mouth 3 (Three) Times a Day With Meals.    Almita Tate MD   potassium chloride 10 MEQ CR tablet Take 1 tablet by mouth Daily. 5/1/23   Adrián Ayala,    promethazine (PHENERGAN) 12.5 MG tablet Take 1 tablet by mouth Every 6 (Six) Hours As Needed. for nausea 2/10/23   Almita Tate MD   triamcinolone (KENALOG) 0.1 % cream APPLY TWICE DAILY ALONG WITH NYSTATIN CREAM 1/11/23   Almita Tate MD     I have utilized all available immediate resources to obtain, update, or review the patient's current medications (including all prescriptions, over-the-counter products, herbals, cannabis/cannabidiol products, and vitamin/mineral/dietary (nutritional) supplements).    Objective     Vital Signs: /78   Pulse 84   Temp 98 °F (36.7 °C) (Tympanic)   Resp 28   Ht 165.1 cm (65\")   Wt 74.4 kg (164 lb)   SpO2 97%   BMI 27.29 kg/m²   Physical Exam  Vitals reviewed.   Constitutional:       Appearance: She is ill-appearing.   HENT:      Head: Normocephalic and atraumatic.      Mouth/Throat:      Mouth: Mucous membranes are moist.      Pharynx: Oropharynx is clear.   Eyes:      Extraocular Movements: Extraocular movements intact.      Conjunctiva/sclera: Conjunctivae normal.   Cardiovascular:      Rate and Rhythm: Normal rate and regular rhythm.   Pulmonary:      Comments: Coarseness noted throughout right lung field, dyspnea  Abdominal:      Palpations: Abdomen is soft.      Comments: Protuberant   Musculoskeletal:         General: Swelling ( Upper extremity) present.      Cervical back: Normal range of motion and neck supple.      Right lower leg: Edema present.      Left lower leg: Edema present.      Comments: Generalized weakness and debility   Skin:     General: Skin is warm and dry.   Neurological:      General: " No focal deficit present.      Mental Status: She is alert and oriented to person, place, and time.   Psychiatric:         Mood and Affect: Mood normal.         Behavior: Behavior normal.        Results Reviewed:  Lab Results (last 24 hours)     Procedure Component Value Units Date/Time    Protime-INR [[id number]]  (Normal) Collected: 05/15/23 1145    Specimen: Blood Updated: 05/15/23 1225     Protime 13.3 Seconds      INR 1.00    D-dimer, Quantitative [[id number]]  (Abnormal) Collected: 05/15/23 1145    Specimen: Blood Updated: 05/15/23 1225     D-Dimer, Quantitative 5.20 MCGFEU/mL     Basic Metabolic Panel [[id number]]  (Abnormal) Collected: 05/15/23 1145    Specimen: Blood Updated: 05/15/23 1220     Glucose 99 mg/dL      BUN 25 mg/dL      Creatinine 1.13 mg/dL      Sodium 133 mmol/L      Potassium 5.1 mmol/L      Comment: Slight hemolysis detected by analyzer. Results may be affected.        Chloride 104 mmol/L      CO2 20.0 mmol/L      Calcium 9.0 mg/dL      BUN/Creatinine Ratio 22.1     Anion Gap 9.0 mmol/L      eGFR 50.5 mL/min/1.73     BNP [[id number]]  (Abnormal) Collected: 05/15/23 1145    Specimen: Blood Updated: 05/15/23 1216     proBNP 2,714.0 pg/mL     CBC Auto Differential [[id number]]  (Abnormal) Collected: 05/15/23 1145    Specimen: Blood Updated: 05/15/23 1158     WBC 11.49 10*3/mm3      RBC 3.14 10*6/mm3      Hemoglobin 9.6 g/dL      Hematocrit 29.8 %      MCV 94.9 fL      MCH 30.6 pg      MCHC 32.2 g/dL      RDW 17.2 %      RDW-SD 58.3 fl      MPV 10.3 fL      Platelets 466 10*3/mm3      Neutrophil % 53.8 %      Lymphocyte % 29.2 %      Monocyte % 13.5 %      Eosinophil % 2.6 %      Basophil % 0.6 %      Immature Grans % 0.3 %      Neutrophils, Absolute 6.18 10*3/mm3      Lymphocytes, Absolute 3.36 10*3/mm3      Monocytes, Absolute 1.55 10*3/mm3      Eosinophils, Absolute 0.30 10*3/mm3      Basophils, Absolute 0.07 10*3/mm3      Immature Grans, Absolute 0.03 10*3/mm3      nRBC 0.0 /100 WBC     Blood  Gas, Arterial - [[id number]]  (Abnormal) Collected: 05/15/23 1156    Specimen: Arterial Blood Updated: 05/15/23 1153     Site Right Brachial     Nav's Test N/A     pH, Arterial 7.444 pH units      pCO2, Arterial 28.3 mm Hg      Comment: 84 Value below reference range        pO2, Arterial 75.5 mm Hg      Comment: 84 Value below reference range        HCO3, Arterial 19.4 mmol/L      Comment: 84 Value below reference range        Base Excess, Arterial -3.8 mmol/L      Comment: 84 Value below reference range        O2 Saturation, Arterial 97.1 %      Temperature 37.0 C      Barometric Pressure for Blood Gas 754 mmHg      Modality Room Air     Ventilator Mode NA     Collected by [id number]     Comment: Meter: [id number]     :  [id number]        pCO2, Temperature Corrected 28.3 mm Hg      pH, Temp Corrected 7.444 pH Units      pO2, Temperature Corrected 75.5 mm Hg         Imaging Results (Last 24 Hours)     Procedure Component Value Units Date/Time    CT Angiogram Chest [[id number]] Collected: 05/15/23 1320     Updated: 05/15/23 1343    Narrative:      EXAMINATION: CT ANGIOGRAM CHEST-      5/15/2023 12:56 PM CDT     HISTORY: Pulmonary embolism (PE) suspected, unknown D-dimer     In order to have a CT radiation dose as low as reasonably achievable  Automated Exposure Control was utilized for adjustment of the mA and/or  KV according to patient size.     DLP in mGycm= 160     The CT angiography of the chest is performed after intravenous contrast  enhancement.     Images are acquired in axial plane and subsequent reconstruction in  coronal and sagittal planes.     There is no previous similar study for comparison. Correlation made with  CT scan of the chest dated 10/01/2019.     There is normal opacification of the pulmonary arteries and branches  bilaterally. There are no filling defects in the opacified pulmonary  arterial bed.     RV/LV ratio is 35/48 which is normal. No finding to suggest right heart  strain.      Atheromatous changes of the thoracic aorta is seen. No aneurysmal  dilatation.     There is moderate dilatation of the main pulmonary artery or right and  left pulmonary artery which may suggest pulmonary arterial hypertension.     Atheromatous changes of the coronary arteries are seen.     There is no evidence of mediastinal or hilar mass or lymphadenopathy.     The lungs are poorly expanded. There are scattered areas of discoid  atelectasis more pronounced in the right middle right lower lobe and to  lesser extent left lower lobe and lingular segment of left upper lobe.  There is a small nodule in the left upper lobe, image #41 in series 6,  which measures 3 mm in diameter. Another similar nodule is seen in the  right upper lobe medially, image #44 and series 6, measuring 3 mm. The  central airway is patent. No endotracheal or endobronchial  nodule/lesions.     There is a right lower lobar consolidation/atelectasis.     There is moderate right and small left basal pleural effusion.     Limited visualized soft tissues of the back are poorly visualized or  evaluated due to extensive right chest collaterals due to possible  blockage of the right brachiocephalic veins.     The limited visualized abdomen appears unremarkable. The spleen is not  visualized this study is limited due to previous splenectomy. The  pancreas, the kidneys are incompletely visualized and not evaluated.  There is small amount of fluid around the liver and in the left  subphrenic diaphragmatic region.     Images are reviewed in bone window show chronic degenerative changes of  the thoracolumbar spine. No acute bony abnormality.     A left internal jugular approach [id number] is seen in place with  distal end at the atriocaval junction.       Impression:      1. No evidence of pulmonary embolism, aortic aneurysm or dissection.  2. Atheromatous changes of coronary arteries.  3. Moderate pulmonary arterial hypertension.  4. Right lower lobar  consolidation/atelectasis. Atelectatic changes in  the lungs bilaterally pronounced in the right middle and lower lobe and  lingular segment of left upper lobe.  5. A tiny noncalcified nodule in the left upper lobe probably represent  noncalcified granulomas. This was not seen in the previous study in  2019. A follow-up examination in 6 months is recommended to ensure  stability.  6. A moderate right and a small left basal pleural effusion. A small  amount of fluid in the upper abdomen.  7. The spleen is absent. The abdomen is incompletely included and not  well evaluated.  8. A significant collateral vascularity of the right chest wall which is  probably due to occlusion of the right brachiocephalic vein?.      This report was finalized on 05/15/2023 13:40 by Dr. Amber Regalado MD.    XR Chest 1 View [[id number]] Collected: 05/15/23 1146     Updated: 05/15/23 1152    Narrative:      EXAM: XR CHEST 1 VW-      DATE: 5/15/2023 11:36 AM CDT     HISTORY: Shortness of air       COMPARISON: 05/15/2023.      TECHNIQUE:  Single view. Frontal view of the chest. 1 images.       FINDINGS:    LEFT chest port with grossly intact catheter, tip projects at upper  RIGHT atrium. No measurable pneumothorax. Similar linear opacity at the  RIGHT midlung. Similar blunting of the RIGHT costophrenic angle and  elevation of the RIGHT hemidiaphragm versus basilar opacity. Milder  streaky opacity at the LEFT lung base, which may represent atelectasis.  Lungs appear hyperinflated, which can be seen with chronic lung disease.  Similar enlarged cardiac silhouette. Upper mediastinum appears within  normal limits. Calcified aortic atherosclerosis. Widening of the RIGHT  acromioclavicular joint, most likely postoperative. No acute bony  finding.          Impression:      1. Similar findings compared to earlier same day including linear  opacity at the RIGHT midlung, small pleural fluid and RIGHT  hemidiaphragm elevation versus basilar opacity.  2.  Similar milder streaky opacity at the LEFT lung base, may represent  atelectasis.  3. Lungs appear hyperinflated, which can be seen with chronic lung  disease.  4. Similar enlarged cardiac silhouette.  This report was finalized on 05/15/2023 11:48 by Dr Judy Wu MD.          Assessment / Plan   Assessment:   Active Hospital Problems    Diagnosis    • **Pleural effusion    • Hospital-acquired pneumonia    • Volume overload    • SHAVON (acute kidney injury)    • Pancreatic mass    • Essential hypertension        Treatment Plan  1.  The patient will be admitted to Dr. Aguayo's service here at [facility].   2.  Treat for hospital-acquired pneumonia (at [facility] 4/23-4/29, 2023)  3.  Zosyn and pharmacy to dose vancomycin  4.  Lasix 40 mg IV every 12 hours x2 more doses  5.  Echocardiogram in a.m.  6.  Supplemental oxygen, DuoNebs, incentive spirometry, continuous pulse oximetry  7.  Labs in a.m, blood cultures ordered, urine studies for pneumonia, MRSA screen, respiratory culture, COVID panel  8.   Apply external catheter  9.  Home medications reviewed and restarted as appropriate  10.  DVT prophylaxis with SCD  11.  Consult PT and OT for strengthening  12.  Consult oncology, Dr. Mendiola for continuity of care    Medical Decision Making  Number and Complexity of problems: 6  Differential Diagnosis: None    Conditions and Status        Condition is unchanged.     [facility] Data  External documents reviewed: [facility] records  Cardiac tracing (EKG, telemetry) interpretation: Reviewed  Radiology interpretation: Reviewed  Labs reviewed: Yes  Any tests that were considered but not ordered: No     Decision rules/scores evaluated (example [id number], Wells, etc): No     Discussed with: Patient,  and Dr. Aguayo     Care Planning  Shared decision making: Patient,  and Dr. Aguayo  Code status and discussions: Full    Disposition  Social Determinants of Health that impact treatment or disposition: None    Estimated length of stay is 2+ days.     I confirmed that the patient's advanced care plan is present, code status is documented, and a surrogate decision maker is listed in the patient's medical record.     The patient's surrogate decision maker is  Cristo.     The patient was seen and examined by me on 5/15/2023 at 3:10 PM.    Electronically signed by SKYLER Gaona, 05/15/23, 16:00 CDT.             vera

## 2023-05-15 NOTE — PROGRESS NOTES
"Pharmacy Dosing Service  Pharmacokinetics  Vancomycin Initial Evaluation  Assessment/Action/Plan:  Loading dose?: N/A  Current Order: Vancomycin 1250 mg IVPB every 24 hours x 7 days  Current end date: 05/21/2023  Levels: Obtain Vancomycin trough prior to dose on 5/18 at 1900  Additional antimicrobial agent(s): Zosyn    Vancomycin dosage initiated based on population pharmacokinetic parameters. Pharmacy will continue to follow daily and adjust dose accordingly.     Subjective:  Ирина Randolph is a 77 y.o. female with a Vancomycin \"Pharmacy to Dose\" consult for the treatment of Pneumonia , day 1 of 7 of treatment.    AUC Model Data:  Loading dose: N/A  Regimen: 1250 mg IV every 24 hours.  Start time: 20:00 on 05/15/2023  Exposure target: AUC24 (range) 400-600 mg/L.hr   AUC24,ss: 522 mg/L.hr  PAUC*: 79 %  Ctrough,ss: 16 mg/L  Pconc*: 27 %  Tox.: 11 %        Objective:  Ht: 165.1 cm (65\"); Wt: 74.4 kg (164 lb)  Estimated Creatinine Clearance: 42.8 mL/min (A) (by C-G formula based on SCr of 1.13 mg/dL (H)).   Creatinine   Date Value Ref Range Status   05/15/2023 1.13 (H) 0.57 - 1.00 mg/dL Final   05/09/2023 1.14 (H) 0.57 - 1.00 mg/dL Final   10/14/2022 0.6 0.5 - 1.0 mg/dL Final   09/19/2022 0.62 0.57 - 1.00 mg/dL Final   09/12/2022 0.6 0.5 - 1.0 mg/dL Final   03/04/2022 0.6 0.5 - 1.0 mg/dL Final      Lab Results   Component Value Date    WBC 11.49 (H) 05/15/2023    WBC 9.8 04/27/2023    WBC 11.5 (H) 04/26/2023      Baseline culture results:  Microbiology Results (last 10 days)       Procedure Component Value - Date/Time    MRSA Screen, PCR (Inpatient) - Swab, Nares [[id number]]  (Normal) Collected: 05/15/23 1703    Lab Status: Final result Specimen: Swab from Nares Updated: 05/15/23 1829     MRSA PCR No MRSA Detected    Narrative:      The negative predictive value of this diagnostic test is high and should only be used to consider de-escalating anti-MRSA therapy. A positive result may indicate colonization with MRSA and " must be correlated clinically.    Respiratory Panel PCR w/COVID-19(SARS-CoV-2) CHARISSE/SONIA/POLY/PAD/COR/MAD/IVETTE In-House, NP Swab in UTM/VTM, 3-4 HR TAT - Swab, Nasopharynx [[id number]]  (Normal) Collected: 05/15/23 1703    Lab Status: Final result Specimen: Swab from Nasopharynx Updated: 05/15/23 1803     ADENOVIRUS, PCR Not Detected     Coronavirus 229E Not Detected     Coronavirus HKU1 Not Detected     Coronavirus NL63 Not Detected     Coronavirus OC43 Not Detected     COVID19 Not Detected     Human Metapneumovirus Not Detected     Human Rhinovirus/Enterovirus Not Detected     Influenza A PCR Not Detected     Influenza B PCR Not Detected     Parainfluenza Virus 1 Not Detected     Parainfluenza Virus 2 Not Detected     Parainfluenza Virus 3 Not Detected     Parainfluenza Virus 4 Not Detected     RSV, PCR Not Detected     Bordetella pertussis pcr Not Detected     Bordetella parapertussis PCR Not Detected     Chlamydophila pneumoniae PCR Not Detected     Mycoplasma pneumo by PCR Not Detected    Narrative:      In the setting of a positive respiratory panel with a viral infection PLUS a negative procalcitonin without other underlying concern for bacterial infection, consider observing off antibiotics or discontinuation of antibiotics and continue supportive care. If the respiratory panel is positive for atypical bacterial infection (Bordetella pertussis, Chlamydophila pneumoniae, or Mycoplasma pneumoniae), consider antibiotic de-escalation to target atypical bacterial infection.            Aiyana Bazan, PharmD  05/15/23 18:56 CDT

## 2023-05-15 NOTE — ED PROVIDER NOTES
Subjective   History of Present Illness  Patient 77 years old is got history of pancreatic cancer came to the ED with shortness of breath cough worsening.    Shortness of Breath  Severity:  Moderate  Onset quality:  Gradual  Timing:  Constant  Progression:  Worsening  Chronicity:  New  Context: activity    Context: not animal exposure, not emotional upset, not occupational exposure, not pollens, not strong odors and not URI    Relieved by:  Nothing  Worsened by:  Exertion  Ineffective treatments:  None tried  Associated symptoms: cough, PND and wheezing    Associated symptoms: no abdominal pain, no chest pain, no diaphoresis, no ear pain, no fever, no headaches, no hemoptysis, no neck pain, no rash, no sore throat, no sputum production, no syncope and no vomiting    Risk factors: hx of cancer and obesity    Risk factors: no recent alcohol use and no hx of PE/DVT        Review of Systems   Constitutional: Negative.  Negative for activity change, appetite change, chills, diaphoresis, fatigue and fever.   HENT: Negative for congestion, drooling, ear pain, facial swelling, hearing loss, sinus pressure and sore throat.    Eyes: Negative.  Negative for discharge.   Respiratory: Positive for cough, shortness of breath and wheezing. Negative for hemoptysis and sputum production.    Cardiovascular: Positive for PND. Negative for chest pain and syncope.   Gastrointestinal: Negative for abdominal distention, abdominal pain, blood in stool, diarrhea, nausea and vomiting.   Endocrine: Negative.  Negative for cold intolerance, heat intolerance, polydipsia, polyphagia and polyuria.   Genitourinary: Negative.  Negative for dysuria, flank pain and urgency.   Musculoskeletal: Negative.  Negative for arthralgias, back pain, myalgias, neck pain and neck stiffness.   Skin: Negative.  Negative for color change, pallor and rash.   Allergic/Immunologic: Negative.    Neurological: Negative.  Negative for dizziness, seizures, speech  difficulty, weakness, numbness and headaches.   Hematological: Negative.  Negative for adenopathy.   All other systems reviewed and are negative.      Past Medical History:   Diagnosis Date   • Acute bronchitis    • Arthritis     Osteoarthritis   • Cancer    • Disease of thyroid gland    • Gallbladder abscess    • Hypertension        Allergies   Allergen Reactions   • Diclofenac Other (See Comments) and Unknown - High Severity     Couldn't sleep and face turned blood red   • Oxycodone-Acetaminophen Hives and Rash     itching  itching     • Doxycycline Rash       Past Surgical History:   Procedure Laterality Date   • ABDOMINAL HYSTERECTOMY     • CHOLECYSTECTOMY  1994   • COLONOSCOPY  04/23/2015    two polyps both removed  extensive diverticulosis   • COLONOSCOPY N/A 06/17/2020    Procedure: COLONOSCOPY WITH ANESTHESIA;  Surgeon: Homer Zamarripa DO;  Location: [facility] ENDOSCOPY;  Service: Gastroenterology;  Laterality: N/A;  pre: Hx of polyps  post:  Anupama Dhaliwal   • ENDOSCOPY  08/16/2016    normal   • ENDOSCOPY N/A 06/17/2020    Procedure: ESOPHAGOGASTRODUODENOSCOPY WITH ANESTHESIA;  Surgeon: Homer Zamarripa DO;  Location: [facility] ENDOSCOPY;  Service: Gastroenterology;  Laterality: N/A;  pre: nausea  post:    • ENDOSCOPY N/A 6/7/2022    Procedure: ESOPHAGOGASTRODUODENOSCOPY WITH ANESTHESIA;  Surgeon: Homer Zamarripa DO;  Location: [facility] ENDOSCOPY;  Service: Gastroenterology;  Laterality: N/A;  pre cough  post normal  Anupama Dhaliwal DO   • PANCREAS SURGERY      Partial removal due to tumor   • ROTATOR CUFF REPAIR  10/19/2017   • SPLENECTOMY         Family History   Problem Relation Age of Onset   • Alcohol abuse Father    • Breast cancer Maternal Aunt    • Bone cancer Maternal Aunt    • Leukemia Maternal Aunt    • Colon cancer Neg Hx    • Colon polyps Neg Hx    • Esophageal cancer Neg Hx        Social History     Socioeconomic History   • Marital status:    Tobacco Use   • Smoking status:  Never   • Smokeless tobacco: Never   Vaping Use   • Vaping Use: Never used   Substance and Sexual Activity   • Alcohol use: No   • Drug use: No   • Sexual activity: Yes     Partners: Male           Objective   Physical Exam  Vitals and nursing note reviewed. Exam conducted with a chaperone present.   Constitutional:       General: She is not in acute distress.     Appearance: Normal appearance. She is well-developed. She is not toxic-appearing or diaphoretic.   HENT:      Head: Normocephalic and atraumatic.      Right Ear: External ear normal.      Nose: Nose normal.      Mouth/Throat:      Mouth: Mucous membranes are moist.   Eyes:      Conjunctiva/sclera: Conjunctivae normal.      Pupils: Pupils are equal, round, and reactive to light.   Neck:      Thyroid: No thyromegaly.   Cardiovascular:      Rate and Rhythm: Regular rhythm. Tachycardia present.      Chest Wall: PMI is not displaced.      Pulses: Normal pulses. No decreased pulses.      Heart sounds: Normal heart sounds. No murmur heard.  Pulmonary:      Effort: Tachypnea, accessory muscle usage and respiratory distress present.      Breath sounds: No stridor. Examination of the right-middle field reveals rales. Examination of the left-middle field reveals rales. Examination of the right-lower field reveals rhonchi and rales. Examination of the left-lower field reveals rhonchi and rales. Rhonchi and rales present. No decreased breath sounds or wheezing.   Chest:      Chest wall: No tenderness or crepitus.   Abdominal:      General: Bowel sounds are normal. There is no distension.      Palpations: Abdomen is soft.      Tenderness: There is no abdominal tenderness.   Musculoskeletal:         General: No swelling or tenderness. Normal range of motion.      Cervical back: Normal range of motion and neck supple. No rigidity.      Comments: Lower extremity exam bilaterally is unremarkable.  There is no right or left calf tenderness .  There is no palpable venous  cord.  No obvious difference in the size of the legs.  No pitting edema.  The dorsalis pedis and posterior tibial femoral and popliteal pulses are palpable and +2 bilaterally.  Homans sign is negative   Skin:     General: Skin is warm.      Capillary Refill: Capillary refill takes 2 to 3 seconds.      Coloration: Skin is not cyanotic or jaundiced.      Findings: No erythema or rash.   Neurological:      General: No focal deficit present.      Mental Status: She is alert and oriented to person, place, and time. Mental status is at baseline.      Cranial Nerves: No cranial nerve deficit.      Motor: No weakness.      Coordination: Coordination normal.      Deep Tendon Reflexes: Reflexes are normal and symmetric. Reflexes normal.   Psychiatric:         Mood and Affect: Mood normal.         Procedures           ED Course  ED Course as of 05/15/23 1453   Mon May 15, 2023   1402 No evidence of pulmonary embolism, aortic aneurysm or dissection.  2. Atheromatous changes of coronary arteries.  3. Moderate pulmonary arterial hypertension.  4. Right lower lobar consolidation/atelectasis. Atelectatic changes in  the lungs bilaterally pronounced in the right middle and lower lobe and  lingular segment of left upper lobe.  5. A tiny noncalcified nodule in the left upper lobe probably represent  noncalcified granulomas. This was not seen in the previous study in  2019. A follow-up examination in 6 months is recommended to ensure  stability.  6. A moderate right and a small left basal pleural effusion. A small  amount of fluid in the upper abdomen.  7. The spleen is absent. The abdomen is incompletely included and not  well evaluated.  8. A significant collateral vascularity of the right chest wall which is  probably due to occlusion of the right brachiocephalic vein?.    [TS]   1404 Patient was sent to the ER by primary MD for admission because of worsening shortness of breath cough and wheezing and dyspnea exertion oxygen  saturation on room air not that bad but she is tachypneic has got elevated BNP with pleural effusion and possibility of pneumonia with a pulmonary nodule.  The patient was given IV antibiotics and diuretics and neb treatment.  She is doing better at this time.  Will be admitted to medicine service. [TS]   1451 Curb 65 score will be 3  Patient BUN is more than 19 respiratory rate was 32 and age is more than 65 [TS]      ED Course User Index  [TS] Anurag Patrick MD                                           Medical Decision Making  Differential Diagnosis:  I considered pulmonary etiology, asthma, chronic obstructive pulmonary disease, pneumonia, pulmonary embolism, adult respiratory distress syndrome, pneumothorax, pleural effusion, pulmonary fibrosis, cardiac etiology, congestive heart failure, myocardial infarction, metabolic etiology, diabetic ketoacidosis, uremia, acidosis, sepsis, anemia, drug related etiology, hyperventilation and CNS disease as a possible cause of dyspnea in this patient. This is a partial list of diagnoses considered.         Dyspnea, unspecified type:     Details: Acute on chronic dyspnea getting worse has pleural effusion with possible pneumonia I have been given antibiotics and diuretics  Malignant neoplasm of pancreas, unspecified location of malignancy: chronic illness or injury  Pleural effusion:     Details: Pleural effusion  Pneumonia due to infectious organism, unspecified laterality, unspecified part of lung: acute illness or injury  Pulmonary nodule: undiagnosed new problem with uncertain prognosis     Details: Will need outpatient follow-up and management  Amount and/or Complexity of Data Reviewed  Labs: ordered.     Details: Lab work was reviewed  Radiology: ordered.     Details: CT scan reports reviewed  ECG/medicine tests: ordered.  Discussion of management or test interpretation with external provider(s): Case discussed with Dr. Manas Damico  Prescription drug  management.    Risk Details: Patient came in with shortness of breath cough dyspnea on exertion was given diuretics neb treatments antibiotics has got a pleural effusion extremity edema and concerns of possible early pneumonia will be admitted to medicine service she is immunosuppressed.  With a curb 65 score of 3    Well score of 6        Final diagnoses:   Pleural effusion   Dyspnea, unspecified type   Pulmonary nodule   Malignant neoplasm of pancreas, unspecified location of malignancy   Pneumonia due to infectious organism, unspecified laterality, unspecified part of lung       ED Disposition  ED Disposition     ED Disposition   Decision to Admit    Condition   --    Comment   Level of Care: Telemetry [5]   Diagnosis: Pleural effusion [941100]   Admitting Physician: LISA SMALL [[id number]]   Attending Physician: LISA SMALL [[id number]]   Certification: I Certify That Inpatient Hospital Services Are Medically Necessary For Greater Than 2 Midnights               No follow-up provider specified.       Medication List      No changes were made to your prescriptions during this visit.          Anurag Patrick MD  05/15/23 3720       Anurag Patrick MD  05/15/23 9738

## 2023-05-15 NOTE — CONSULTS
MEDICAL ONCOLOGY CONSULTATION      Pt Name: Ирина Randolph  MRN: [id number]  [id number]  YOB: 1945  Date of evaluation: 5/15/2023    Reason for Consultation: Recurrent metastatic pancreatic carcinoma  Requesting Physician: Dr. Brynn Aguayo    History Obtained From:  PATIENT and CHART    HISTORY OF PRESENT ILLNESS:    Ms. Ирина Randolph is a pleasant 77-year-old  female whom is well-known to our clinic and followed by Dr. Mendiola for recurrent metastatic pancreatic cancer with pleural effusion.  She was initially diagnosed in June 2021 with pancreatic carcinoma.  She is status post Whipple procedure and adjuvant chemotherapy with Gemzar/Xeloda with poor tolerance.  Unfortunately, she developed metastatic disease with recurrent ascites and elevated CA 19-9 above 1000 in October 2022 and was initiated on palliative chemotherapy with Gemzar and Abraxane. Her CA 19-9 is trending down, 67 on 4/25/2023 and CT scan of the abdomen and pelvis on 4/27/2023 suggest a response to treatment. She was recently hospitalized at [facility] from 4/23/2023-4/28/2023 for hypertensive crisis and pulmonary edema and was scheduled to follow-up with Dr. Mendiola in clinic on 5/18/2023.     Ms. Randolph presented to [facility] emergency room on 5/15/2023 under the direction of her PCP after presenting with worsening cough, and shortness of breath since discharge from [facility] on 4/28/2023.   Work-up and evaluation in the emergency room revealed right lower lobe pneumonia and bilateral pleural effusions.  She has been admitted for treatment of hospital-acquired pneumonia.    Oncology consultation has been requested for continuation of care.      DETAILED CANCER HISTORY FROM OFFICE RECORDS:     Diagnosis  Invasive ductal adenocarcinoma, pancreas, June 2021  pT2 N1 M0  Strong family history for breast cancer  Positive KEIRY gene mutation  DVT right upper extremity, Jan 2022  Recurrent adenocarcinoma pancreas, Sept 2022     Treatment  Summary  6/28/21 Laparoscopic assisted distal pancreatectomy and splenectomy with 1 of 6 lymph nodes positive. Surgeon Dr. Audi Molina/[facility]   8/13/2021-02/25/22-adjuvant Gemzar 1000 mg/m2 IV D 1, D8, D15 with Capecitabine 830 mg/m2 D1-21 every 28 days x6 cycles  1/14/22 Eliquis for DVT right upper extremity November 2022  10/14/22-Initiated palliative frontline Gemzar Abraxane every 2 weeks  1/13/23 Decrease Abraxane to 100mg/m2 with each treatment     Cancer History  Ирина Randolph was first seen by me on 8/6/2021.  The patient was referred for a diagnosis of pancreatic malignancy.  She had initial complaints of abdominal pain.  5/5/20 MRI abdomen ([facility]): Moderate fatty replacement of the pancreas with several small cystic structures within or adjacent to the pancreatic head and uncinate process most suggestive of benign cysts. A follow-up MRI of the abdomen may be obtained in 1 year to assess for stability. No solid lesion is seen. Otherwise unremarkable MRI of the abdomen.  4/22/20 CT abd/pelvis ([facility]): Focal inflammatory stranding associated with the pancreatic head. Radiographically I would favor this to represent acute pancreatitis. There is mild associated stranding within the descending and proximal transverse duodenum. There are 2 well-circumscribed hypodensities one in the region of the uncinate process and one slightly anterior to the pancreatic head likely representing sequela of pancreatitis but warranting follow-up on subsequent exam. The body and tail of the pancreas are spared. No evidence of pancreatic necrosis. The patient is status post hysterectomy and cholecystectomy. Bibasilar subsegmental atelectasis. No evidence of nephrolithiasis or obstructive uropathy. Diverticulosis of the distal descending and sigmoid colon without evidence of diverticulitis.  5/5/21 MRI abd ([facility]) There are multiple tiny-to-small foci of increased T2 signal seen within the  pancreas, consistent with a combination of cysts and/or beaded dilatation of the tributaries of the main pancreatic duct, increased in size and number when compared to the prior similar study of 5/5/20. The patient is again noted to be status post cholecystectomy. The examination is otherwise within normal limits for age with no abnormal postcontrast enhancement noted.  6/3/21 Upper EUS (Dr Ruperto Gale/[place]): CT Visualized portion of pancreas abnormal on ultrasound. There is a 26.7 mm solid mass in pancreatic tail. It abuts the splenic vessel without occlusion or varices. Pancreatic duct is dilated to 6 mm in the tail proximal to the mass. FNA x4 yields atypical cells. Celiac axis, liver appear normal, no local nodes. Body and head of pancreas normal.  6/3/21 Pancreas tail mass, FNA aspiration smear (x2), ThinPrep and cell block: Adenocarcinoma.  6/7/21 CA 19-9 407.1  6/28/21-she underwent Whipple procedure pancreatectomy by Dr. Audi Molina: Invasive ductal carcinoma 4.3 cm in greatest dimension, invasive in the peripancreatic adipose tissue, diffuse perineural invasion. Surgical margins were negative for carcinoma. The pancreas margins involved by a low-grade dysplasia. 1/6 nodes positive for metastatic cancer with direct extension. Distal fibrosis and pancreatitis was noted. Spleen was unremarkable.  Pathological stage pT2 N1 M0  8/6/2021-she was first seen by me.  Recommend adjuvant chemotherapy with gemcitabine/Xeloda.  She acknowledged understanding and agree with treatment.  8/09/2021 CT Chest W/Contrast No evidence of intrathoracic metastatic disease is identified.  8/09/2021 CT Abd/Pelvis W/Oral Contrast Prior distal pancreatectomy and splenectomy. There is a 1.5 cm circumscribed, nonenhancing cystic lesion along the inferior pancreatic head, perhaps a branch IPMN. Otherwise, no obvious pancreatic solid mass is seen. No suspicious adenopathy or evidence of distant metastatic disease in the abdomen or  pelvis.Prior cholecystectomy.Liver steatosis. Colonic diverticulosis.    8/13/2021-initiation of adjuvant capecitabine/Gemzar   9/10/2021-I reviewed CT chest abdomen pelvis.  1/21/2022-CA 19-9 = 10  2/10/22 CT CHEST W CONTRAST No evidence of intrathoracic metastasis. Incidental findings described above.   2/10/22 CT ABDOMEN PELVIS W IV CONTRAST  Partial pancreatectomy and splenectomy. Pancreatic head cysts measuring up to 1.6 cm, very similar to 8/9/2021. Continued surveillance imaging recommended. No evidence of intra-abdominal or pelvic metastasis.   2/18/2022-I reviewed results CT chest abdomen pelvis.  No evidence of recurrent disease.  Pancreatic head cyst stable.  02/25/22- Completion of adjuvant chemotherapy.   4/22/2022 CA 19-9-13  5/11/2022 Bone Density ([facility]) Osteopenia. Femoral Neck T-Score -2.0, Lumbar Spine T-Score -0.9  6/7/2022 Endoscopy (P) Urease was negative  8/5/2022 CT Abd/Pelvis WO Contrast ([facility]) Fairly diffuse soft tissue infiltration with haziness and nodularity of there peripheral aspects of the peritoneal cavity most concerning for a process such as peritoneal carcinomatosis. Given history of pancreatic cancer,a PET scan may be considered for further evaluation. Small amount of ascites in the pelvis. Colonic diverticulosis.Large,fat-containing umbilical hernia. Tiny right pleural effusion with atelectasis in the lung bases.  8/22/2022 CT Chest W Contrast Calcified and noncalcified lung nodules may indicate inflammatory or infectious process and old granulomatous disease.  Follow-up in 3 months is recommended.Small right pleural effusion.Cirrhotic liver.  Free fluid in the abdomen with mesenteric edema upper abdomen.  Multiple lymph nodes in region of pancreatic head.  8/22/2022-surveillance  CT Abd/Pelvis W IV Contrast (oral) Partial pancreatectomy with absence of the pancreatic body and tail.  Multiple soft tissue and hypodense areas in the location of the pancreatic head indicating  measuring up to 8 mm indicating cysts, residual tissue and possible soft tissue pancreatic  nodules.  A cyst or soft tissue nodule is identified measuring up to 1.2 cm.Indeterminate nodules in the location of the pancreatic head versus lymphadenopathy.  Consider MRI pancreas without and with IV contrast.Bilateral extrarenal pelves.  4 mm left renal cyst. Soft tissue and fat herniates into the umbilical region measuring 4.7 cm indicating hernia.  There is mesentery within the hernia.  No bowel is within the hernia.  No evidence of free air..  Small amount of free fluid is within the abdomen and pelvis.  Mesenteric edema of the abdomen and pelvis.Rectosigmoid diverticulosis without acute diverticulitis.Slightly irregular liver contour may indicate early cirrhotic changes.Free fluid within the abdomen and pelvis.  Inflammation of the mesentery.  8/28/2022-I reviewed CT abdomen/pelvis.  New onset ascites.  Liver is smaller when compared to prior CT scan suggesting hydrophobic disease.  Radiology reported peripancreatic nodules.  Recommended CA 19-9 reviewed.  Recommend MRI pancreas.  Recommended paracentesis (send for cytology, total protein, LDH).  CMP same day of paracentesis.  9/12/2022 MRI Abdomen W WO Contrast MRCP Hepatic steatosis with a nodular contour suggestive of cirrhosis.Ascites.Absence of the spleen.Absence of the gallbladder.The patient appears to be status post partial pancreatectomy .  The nodular lesions identified on the CT scan dated August 19, 2022 are poorly visualized on this examination due to respiratory artifact and technique.Small renal cysts.Midline ventral hernia.  9/12/2022-CA 19-9 1004  9/15/2022 US Guided Parecentesis ([facility]) Limited grayscale ultrasound evaluation to assess for peritoneal fluid.4 quadrant evaluation shows trace fluid around the liver. No more than trace fluid at the right lower quadrant. No significant fluid is seen at the left upper quadrant or left lower quadrant.    9/19/2022 Ascites fluid,paracentesis: Malignant cells present.Adenocarcinoma.  9/19/2022 CT Abdomen WO Contrast ([facility]):Small amount of diffuse ascites within the abdomen, likely malignant ascites given the presence of nodular peritoneal and omental soft tissue thickening compatible with carcinomatosis. Diagnostic paracentesis was performed following this examination with ultrasound guidance.Evidence previous resection of the pancreatic body and tail. There is a soft tissue implant or enlarged lymph node inferior to the celiac artery on the right which measures 1.6 x 1.6 cm, concerning for metastatic disease. There is a small cyst in subtle stable nodularity and patient replacement of the pancreatic head.   10/3/2022-essentially, findings of recurrent metastatic pancreatic adenocarcinoma.  Stage IV.  Recommend palliative Gemzar 1000 mg/M2 and Abraxane 125 mg/M2 every 2 weeks until disease progression or intolerable toxicity.  10/3/22 Chemotherapy consent signed  10/14/22-Initiated palliative frontline Gemzar Abraxane every 2 weeks  12/30/22 CA 19-9-815  1/6/23 CT Chest W Contrast No significant interval change.  1/6/23 CT Abd/Pelvis W IV Contrast (oral) Partial pancreatectomy with absence of pancreatic body and tail.  No significant change in size or number of a few cyst versus soft tissue nodules in region of pancreatic head. No retroperitoneal lymphadenopathy.Interval resolution of ascites.  1/13/2023-essentially, CT scan showed interval response to treatment.  Resolution of prior ascites.  Continue palliative chemotherapy with Gemzar/Abraxane.  1/13/23 Decrease Abraxane to 100mg/m2 with each treatment due to development of neuropathy.  1/27/23 CA 19-9-360  2/10/23 CA 19-9-238  2/24/23 CA 19-9-171  3/10/23 MRI Brain W WO Contrast No acute intracranial abnormality notified.No evidence of metastatic disease in the brain. Scattered T2 FLAIR white matter hyperintensities are nonspecific, but commonly seen in the  setting of chronic microvascular ischemic disease.  4/23/2023-CT head without contrast remarkable for no acute intracranial process.  4/27/2023 -CT scan of the abdomen and pelvis with IV contrast reported a new right hepatic dome 8.2 cm rim-enhancing complex subcapsular collection with adjacent subcentimeter hypodensity. New small right effusion with right basilar atelectasis.          Past Medical History:   Past Medical History:   Diagnosis Date    Acute bronchitis     Arthritis     Osteoarthritis    Cancer     Disease of thyroid gland     Gallbladder abscess     Hypertension      Past Surgical History:   Past Surgical History:   Procedure Laterality Date    ABDOMINAL HYSTERECTOMY      CHOLECYSTECTOMY  1994    COLONOSCOPY  04/23/2015    two polyps both removed  extensive diverticulosis    COLONOSCOPY N/A 06/17/2020    Procedure: COLONOSCOPY WITH ANESTHESIA;  Surgeon: Homer Zamarripa DO;  Location: [facility] ENDOSCOPY;  Service: Gastroenterology;  Laterality: N/A;  pre: Hx of polyps  post:  Anupama Dhaliwal    ENDOSCOPY  08/16/2016    normal    ENDOSCOPY N/A 06/17/2020    Procedure: ESOPHAGOGASTRODUODENOSCOPY WITH ANESTHESIA;  Surgeon: Homer Zamarripa DO;  Location: [facility] ENDOSCOPY;  Service: Gastroenterology;  Laterality: N/A;  pre: nausea  post:     ENDOSCOPY N/A 6/7/2022    Procedure: ESOPHAGOGASTRODUODENOSCOPY WITH ANESTHESIA;  Surgeon: Homer Zamarripa DO;  Location: [facility] ENDOSCOPY;  Service: Gastroenterology;  Laterality: N/A;  pre cough  post Anupama Zimmerman DO    PANCREAS SURGERY      Partial removal due to tumor    ROTATOR CUFF REPAIR  10/19/2017    SPLENECTOMY       Social History:   Social History     Socioeconomic History    Marital status:    Tobacco Use    Smoking status: Never    Smokeless tobacco: Never   Vaping Use    Vaping Use: Never used   Substance and Sexual Activity    Alcohol use: No    Drug use: No    Sexual activity: Yes     Partners: Male     Family  History:   Family History   Problem Relation Age of Onset    Alcohol abuse Father     Breast cancer Maternal Aunt     Bone cancer Maternal Aunt     Leukemia Maternal Aunt     Colon cancer Neg Hx     Colon polyps Neg Hx     Esophageal cancer Neg Hx          Medications:    Current Facility-Administered Medications   Medication Dose Route Frequency Provider Last Rate Last Admin    acetaminophen (TYLENOL) tablet 650 mg  650 mg Oral Q4H PRN Lissa Locke APRN        Or    acetaminophen (TYLENOL) 160 MG/5ML solution 650 mg  650 mg Oral Q4H PRN Lissa Locke APRN        Or    acetaminophen (TYLENOL) suppository 650 mg  650 mg Rectal Q4H PRN Lissa Locke APRN        ALPRAZolam (XANAX) tablet 0.25 mg  0.25 mg Oral BID PRN Lissa Locke APRN        azithromycin (ZITHROMAX) 500 mg in sodium chloride 0.9 % 250 mL IVPB-VTB  500 mg Intravenous Once Anurag Patrick MD        cetirizine (zyrTEC) tablet 10 mg  10 mg Oral Daily Lissa Locke APRN        fluticasone (FLONASE) 50 MCG/ACT nasal spray 2 spray  2 spray Nasal Daily Lissa Locke APRN        [START ON 5/16/2023] furosemide (LASIX) injection 40 mg  40 mg Intravenous Q12H Lissa Locke APRN        HYDROcodone-acetaminophen (NORCO) 7.5-325 MG per tablet 1 tablet  1 tablet Oral Q6H PRN Lissa Locke APRN        ipratropium-albuterol (DUO-NEB) nebulizer solution 3 mL  3 mL Nebulization Q4H - RT Lissa Locke APRN        levothyroxine (SYNTHROID, LEVOTHROID) tablet 137 mcg  137 mcg Oral Daily Lissa Locke APRN        losartan (COZAAR) tablet 25 mg  25 mg Oral BID Lissa Locke APRN        megestrol (MEGACE) tablet 40 mg  40 mg Oral Daily Lissa Locke APRN        metoclopramide (REGLAN) tablet 10 mg  10 mg Oral TID PRN Lissa Locke APRN        metoprolol succinate XL (TOPROL-XL) 24 hr tablet 50 mg  50 mg Oral BID Lissa Locke APRN        NIFEdipine XL (PROCARDIA XL) 24 hr tablet 30 mg  30 mg Oral Daily  Lissa Locke APRN        ondansetron (ZOFRAN) tablet 4 mg  4 mg Oral Q6H PRN Lissa Locke APRN        Or    ondansetron (ZOFRAN) injection 4 mg  4 mg Intravenous Q6H PRN Lissa Locke APRN        pancrelipase (Lip-Prot-Amyl) (CREON) capsule 12,000 units of lipase  12,000 units of lipase Oral TID With Meals Lissa Locke APRN        vancomycin 1500 mg/500 mL 0.9% NS IVPB ([facility])  20 mg/kg Intravenous Once Lissa Locke APRN        And    Pharmacy to dose vancomycin   Does not apply Continuous PRN Lissa Locke APRN        piperacillin-tazobactam (ZOSYN) IVPB 4.5 g in 100 mL NS (CD)  4.5 g Intravenous Once Lissa Locke APRN        piperacillin-tazobactam (ZOSYN) IVPB 4.5 g in 100 mL NS (CD)  4.5 g Intravenous Q8H Lissa Locke APRN        potassium chloride (MICRO-K) CR capsule 10 mEq  10 mEq Oral Daily Lissa Locke APRN        sodium chloride 0.9 % flush 10 mL  10 mL Intravenous Q12H Lissa Locke APRN        sodium chloride 0.9 % flush 10 mL  10 mL Intravenous PRN Lissa Locke APRN        sodium chloride 0.9 % infusion 40 mL  40 mL Intravenous PRN Lissa Locke APRN        terazosin (HYTRIN) capsule 2 mg  2 mg Oral Nightly Lissa Locke APRN           ALLERGIES:    Allergies   Allergen Reactions    Diclofenac Other (See Comments) and Unknown - High Severity     Couldn't sleep and face turned blood red    Oxycodone-Acetaminophen Hives and Rash     itching  itching      Doxycycline Rash       Objective      Vitals:    05/15/23 1631 05/15/23 1651 05/15/23 1701 05/15/23 1707   BP: 130/77 143/78 142/97    Pulse: 85 84 85    Resp:       Temp:    98.3 °F (36.8 °C)   TempSrc:       SpO2: 100% 97% 90%    Weight:       Height:                  View : No data to display.                  Lab Results:    Lab Results (last 72 hours)       Procedure Component Value Units Date/Time    Lactic Acid, Plasma [[id number]]  (Normal) Collected: 05/15/23 0336    Specimen:  "Blood Updated: 05/15/23 1736     Lactate 1.2 mmol/L     Blood Culture - Blood, Wrist, Left [[id number]] Collected: 05/15/23 1649    Specimen: Blood from Wrist, Left Updated: 05/15/23 1727    Blood Culture - Blood, Hand, Left [[id number]] Collected: 05/15/23 1626    Specimen: Blood from Hand, Left Updated: 05/15/23 1726    Respiratory Panel PCR w/COVID-19(SARS-CoV-2) CHARISSE/SONIA/POLY/PAD/COR/MAD/IVETTE In-House, NP Swab in UTM/VTM, 3-4 HR TAT - Swab, Nasopharynx [[id number]] Collected: 05/15/23 1703    Specimen: Swab from Nasopharynx Updated: 05/15/23 1709    MRSA Screen, PCR (Inpatient) - Swab, Nares [[id number]] Collected: 05/15/23 1703    Specimen: Swab from Nares Updated: 05/15/23 1707    Procalcitonin [[id number]]  (Normal) Collected: 05/15/23 1145    Specimen: Blood Updated: 05/15/23 1602     Procalcitonin 0.10 ng/mL     Narrative:      As a Marker for Sepsis (Non-Neonates):    1. <0.5 ng/mL represents a low risk of severe sepsis and/or septic shock.  2. >2 ng/mL represents a high risk of severe sepsis and/or septic shock.    As a Marker for Lower Respiratory Tract Infections that require antibiotic therapy:    PCT on Admission    Antibiotic Therapy       6-12 Hrs later    >0.5                Strongly Recommended  >0.25 - <0.5        Recommended   0.1 - 0.25          Discouraged              Remeasure/reassess PCT  <0.1                Strongly Discouraged     Remeasure/reassess PCT    As 28 day mortality risk marker: \"Change in Procalcitonin Result\" (>80% or <=80%) if Day 0 (or Day 1) and Day 4 values are available. Refer to http://www.[facility]-pct-calculator.com    Change in PCT <=80%  A decrease of PCT levels below or equal to 80% defines a positive change in PCT test result representing a higher risk for 28-day all-cause mortality of patients diagnosed with severe sepsis for septic shock.    Change in PCT >80%  A decrease of PCT levels of more than 80% defines a negative change in PCT result representing a lower risk for " "28-day all-cause mortality of patients diagnosed with severe sepsis or septic shock.       Protime-INR [[id number]]  (Normal) Collected: 05/15/23 1145    Specimen: Blood Updated: 05/15/23 1225     Protime 13.3 Seconds      INR 1.00    D-dimer, Quantitative [[id number]]  (Abnormal) Collected: 05/15/23 1145    Specimen: Blood Updated: 05/15/23 1225     D-Dimer, Quantitative 5.20 MCGFEU/mL     Narrative:      According to the assay 's published package insert, a normal (<0.50 MCGFEU/mL) D-dimer result in conjunction with a non-high clinical probability assessment, excludes deep vein thrombosis (DVT) and pulmonary embolism (PE) with high sensitivity.    D-dimer values increase with age and this can make VTE exclusion of an older population difficult. To address this, the [employer], based on best available evidence and recent guidelines, recommends that clinicians use age-adjusted D-dimer thresholds in patients greater than 50 years of age with: a) a low probability of PE who do not meet all Pulmonary Embolism Rule Out Criteria, or b) in those with intermediate probability of PE.   The formula for an age-adjusted D-dimer cut-off is \"age/100\".  For example, a 60 year old patient would have an age-adjusted cut-off of 0.60 MCGFEU/mL and an 80 year old 0.80 MCGFEU/mL.    Basic Metabolic Panel [[id number]]  (Abnormal) Collected: 05/15/23 1145    Specimen: Blood Updated: 05/15/23 1220     Glucose 99 mg/dL      BUN 25 mg/dL      Creatinine 1.13 mg/dL      Sodium 133 mmol/L      Potassium 5.1 mmol/L      Comment: Slight hemolysis detected by analyzer. Results may be affected.        Chloride 104 mmol/L      CO2 20.0 mmol/L      Calcium 9.0 mg/dL      BUN/Creatinine Ratio 22.1     Anion Gap 9.0 mmol/L      eGFR 50.5 mL/min/1.73     Narrative:      GFR Normal >60  Chronic Kidney Disease <60  Kidney Failure <15    The GFR formula is only valid for adults with stable renal function between ages 18 and " 70.    BNP [[id number]]  (Abnormal) Collected: 05/15/23 1145    Specimen: Blood Updated: 05/15/23 1216     proBNP 2,714.0 pg/mL     Narrative:      Among patients with dyspnea, NT-proBNP is highly sensitive for the detection of acute congestive heart failure. In addition NT-proBNP of <300 pg/ml effectively rules out acute congestive heart failure with 99% negative predictive value.    Results may be falsely decreased if patient taking Biotin.      CBC & Differential [[id number]]  (Abnormal) Collected: 05/15/23 1145    Specimen: Blood Updated: 05/15/23 1158    Narrative:      The following orders were created for panel order CBC & Differential.  Procedure                               Abnormality         Status                     ---------                               -----------         ------                     CBC Auto Differential[[id number]]        Abnormal            Final result                 Please view results for these tests on the individual orders.    CBC Auto Differential [[id number]]  (Abnormal) Collected: 05/15/23 1145    Specimen: Blood Updated: 05/15/23 1158     WBC 11.49 10*3/mm3      RBC 3.14 10*6/mm3      Hemoglobin 9.6 g/dL      Hematocrit 29.8 %      MCV 94.9 fL      MCH 30.6 pg      MCHC 32.2 g/dL      RDW 17.2 %      RDW-SD 58.3 fl      MPV 10.3 fL      Platelets 466 10*3/mm3      Neutrophil % 53.8 %      Lymphocyte % 29.2 %      Monocyte % 13.5 %      Eosinophil % 2.6 %      Basophil % 0.6 %      Immature Grans % 0.3 %      Neutrophils, Absolute 6.18 10*3/mm3      Lymphocytes, Absolute 3.36 10*3/mm3      Monocytes, Absolute 1.55 10*3/mm3      Eosinophils, Absolute 0.30 10*3/mm3      Basophils, Absolute 0.07 10*3/mm3      Immature Grans, Absolute 0.03 10*3/mm3      nRBC 0.0 /100 WBC     Blood Gas, Arterial - [[id number]]  (Abnormal) Collected: 05/15/23 1156    Specimen: Arterial Blood Updated: 05/15/23 1153     Site Right Brachial     Nav's Test N/A     pH, Arterial 7.444 pH units      pCO2,  Arterial 28.3 mm Hg      Comment: 84 Value below reference range        pO2, Arterial 75.5 mm Hg      Comment: 84 Value below reference range        HCO3, Arterial 19.4 mmol/L      Comment: 84 Value below reference range        Base Excess, Arterial -3.8 mmol/L      Comment: 84 Value below reference range        O2 Saturation, Arterial 97.1 %      Temperature 37.0 C      Barometric Pressure for Blood Gas 754 mmHg      Modality Room Air     Ventilator Mode NA     Collected by [id number]     Comment: Meter: [id number]     :  [id number]        pCO2, Temperature Corrected 28.3 mm Hg      pH, Temp Corrected 7.444 pH Units      pO2, Temperature Corrected 75.5 mm Hg             Radiology Results:    Imaging Results (Last 72 Hours)       Procedure Component Value Units Date/Time    CT Angiogram Chest [[id number]] Collected: 05/15/23 1320     Updated: 05/15/23 1343    Narrative:      EXAMINATION: CT ANGIOGRAM CHEST-      5/15/2023 12:56 PM CDT     HISTORY: Pulmonary embolism (PE) suspected, unknown D-dimer     In order to have a CT radiation dose as low as reasonably achievable  Automated Exposure Control was utilized for adjustment of the mA and/or  KV according to patient size.     DLP in mGycm= 160     The CT angiography of the chest is performed after intravenous contrast  enhancement.     Images are acquired in axial plane and subsequent reconstruction in  coronal and sagittal planes.     There is no previous similar study for comparison. Correlation made with  CT scan of the chest dated 10/01/2019.     There is normal opacification of the pulmonary arteries and branches  bilaterally. There are no filling defects in the opacified pulmonary  arterial bed.     RV/LV ratio is 35/48 which is normal. No finding to suggest right heart  strain.     Atheromatous changes of the thoracic aorta is seen. No aneurysmal  dilatation.     There is moderate dilatation of the main pulmonary artery or right and  left pulmonary  artery which may suggest pulmonary arterial hypertension.     Atheromatous changes of the coronary arteries are seen.     There is no evidence of mediastinal or hilar mass or lymphadenopathy.     The lungs are poorly expanded. There are scattered areas of discoid  atelectasis more pronounced in the right middle right lower lobe and to  lesser extent left lower lobe and lingular segment of left upper lobe.  There is a small nodule in the left upper lobe, image #41 in series 6,  which measures 3 mm in diameter. Another similar nodule is seen in the  right upper lobe medially, image #44 and series 6, measuring 3 mm. The  central airway is patent. No endotracheal or endobronchial  nodule/lesions.     There is a right lower lobar consolidation/atelectasis.     There is moderate right and small left basal pleural effusion.     Limited visualized soft tissues of the back are poorly visualized or  evaluated due to extensive right chest collaterals due to possible  blockage of the right brachiocephalic veins.     The limited visualized abdomen appears unremarkable. The spleen is not  visualized this study is limited due to previous splenectomy. The  pancreas, the kidneys are incompletely visualized and not evaluated.  There is small amount of fluid around the liver and in the left  subphrenic diaphragmatic region.     Images are reviewed in bone window show chronic degenerative changes of  the thoracolumbar spine. No acute bony abnormality.     A left internal jugular approach [id number] is seen in place with  distal end at the atriocaval junction.       Impression:      1. No evidence of pulmonary embolism, aortic aneurysm or dissection.  2. Atheromatous changes of coronary arteries.  3. Moderate pulmonary arterial hypertension.  4. Right lower lobar consolidation/atelectasis. Atelectatic changes in  the lungs bilaterally pronounced in the right middle and lower lobe and  lingular segment of left upper lobe.  5. A tiny  noncalcified nodule in the left upper lobe probably represent  noncalcified granulomas. This was not seen in the previous study in  2019. A follow-up examination in 6 months is recommended to ensure  stability.  6. A moderate right and a small left basal pleural effusion. A small  amount of fluid in the upper abdomen.  7. The spleen is absent. The abdomen is incompletely included and not  well evaluated.  8. A significant collateral vascularity of the right chest wall which is  probably due to occlusion of the right brachiocephalic vein?.                             This report was finalized on 05/15/2023 13:40 by Dr. Amber Regalado MD.    XR Chest 1 View [[id number]] Collected: 05/15/23 1146     Updated: 05/15/23 1152    Narrative:      EXAM: XR CHEST 1 VW-      DATE: 5/15/2023 11:36 AM CDT     HISTORY: Shortness of air       COMPARISON: 05/15/2023.      TECHNIQUE:  Single view. Frontal view of the chest. 1 images.       FINDINGS:    LEFT chest port with grossly intact catheter, tip projects at upper  RIGHT atrium. No measurable pneumothorax. Similar linear opacity at the  RIGHT midlung. Similar blunting of the RIGHT costophrenic angle and  elevation of the RIGHT hemidiaphragm versus basilar opacity. Milder  streaky opacity at the LEFT lung base, which may represent atelectasis.  Lungs appear hyperinflated, which can be seen with chronic lung disease.  Similar enlarged cardiac silhouette. Upper mediastinum appears within  normal limits. Calcified aortic atherosclerosis. Widening of the RIGHT  acromioclavicular joint, most likely postoperative. No acute bony  finding.          Impression:      1. Similar findings compared to earlier same day including linear  opacity at the RIGHT midlung, small pleural fluid and RIGHT  hemidiaphragm elevation versus basilar opacity.  2. Similar milder streaky opacity at the LEFT lung base, may represent  atelectasis.  3. Lungs appear hyperinflated, which can be seen with chronic  lung  disease.  4. Similar enlarged cardiac silhouette.  This report was finalized on 05/15/2023 11:48 by Dr Judy Wu MD.            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  She is status post Whipple procedure and adjuvant chemotherapy with Gemzar/Xeloda with poor tolerance.  Unfortunate, she developed metastatic disease with recurrent ascites and elevated CA 19-9 above 1000 in October 2022 and was initiated on palliative chemotherapy with Gemzar and Abraxane. Her CA 19-9 is trending down, 67 on 4/25/2023 and CT scan of the abdomen and pelvis on 4/27/2023 suggest a response to treatment.     Cycle #7 of palliative treatment with Gemzar and Abraxane delivered on 4/13/2023, Cycle #8 was tentatively planned for 5/18/2023 at follow-up appointment with Dr. Mendiola      #2  Hospital-acquired pneumonia-managed by attending  Received a dose of azithromycin and ceftriaxone  Currently receiving Zosyn and vancomycin       CT angiogram of the chest on 5/15/2023   No evidence of pulmonary embolism, aortic aneurysm or dissection.   Moderate pulmonary arterial hypertension.   Right lower lobar consolidation/atelectasis. Atelectatic changes in the lungs bilaterally pronounced   in the right middle and lower lobe and lingular segment of left upper lobe.   A tiny noncalcified nodule in the left upper lobe probably represent noncalcified granulomas.   A moderate right and a small left basal pleural effusion.   A small amount of fluid in the upper abdomen.   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 twice daily  Metoprolol XL 50 mg every 24 hours  Terazosin 2 mg p.o. nightly    Received Lasix 40 mg IV x1 and planned for an additional 2 doses (40 mg IV every 12 hours x 2)    #6  Normocytic anemia  Hemoglobin 9.6 with a hematocrit 29.8 today, 5/15/2023    Requested iron panel to include ferritin/folate/vitamin B12  Blood occult stool    Monitor CBC      SKYLER Lovett    5/15/2023    17:58 CDT      Ирина JUAN Randolph was seen and examined with Kristy HOLLAND   Information was obtained from the patient and the medical record.  I agree with the recommendations and plans as developed with Kristy HOLLAND as outlined.   I will dictate a  consultation confirming the above assessment, recommendations and plan to comply with CMS guidelines and regulations.    Electronically signed by  Marek Stone MD    05/20/23  08:45 CDT

## 2023-05-16 ENCOUNTER — APPOINTMENT (OUTPATIENT)
Dept: CARDIOLOGY | Facility: HOSPITAL | Age: 78
End: 2023-05-16
Payer: MEDICARE

## 2023-05-16 LAB
ANION GAP SERPL CALCULATED.3IONS-SCNC: 13 MMOL/L (ref 5–15)
BH CV ECHO LEFT VENTRICLE GLOBAL LONGITUDINAL STRAIN: -8.2 %
BH CV ECHO MEAS - AO MAX PG: 7.3 MMHG
BH CV ECHO MEAS - AO MEAN PG: 4 MMHG
BH CV ECHO MEAS - AO ROOT DIAM: 2.5 CM
BH CV ECHO MEAS - AO V2 MAX: 135 CM/SEC
BH CV ECHO MEAS - AO V2 VTI: 25.9 CM
BH CV ECHO MEAS - AVA(I,D): 1.97 CM2
BH CV ECHO MEAS - EDV(CUBED): 95.4 ML
BH CV ECHO MEAS - EDV(MOD-SP4): 44 ML
BH CV ECHO MEAS - EF(MOD-SP4): 67.7 %
BH CV ECHO MEAS - ESV(CUBED): 25.9 ML
BH CV ECHO MEAS - ESV(MOD-SP4): 14.2 ML
BH CV ECHO MEAS - FS: 35.2 %
BH CV ECHO MEAS - IVS/LVPW: 0.77 CM
BH CV ECHO MEAS - IVSD: 0.89 CM
BH CV ECHO MEAS - LA DIMENSION: 3.8 CM
BH CV ECHO MEAS - LAT PEAK E' VEL: 7.8 CM/SEC
BH CV ECHO MEAS - LV DIASTOLIC VOL/BSA (35-75): 24.2 CM2
BH CV ECHO MEAS - LV MASS(C)D: 162.7 GRAMS
BH CV ECHO MEAS - LV MAX PG: 5 MMHG
BH CV ECHO MEAS - LV MEAN PG: 3 MMHG
BH CV ECHO MEAS - LV SYSTOLIC VOL/BSA (12-30): 7.8 CM2
BH CV ECHO MEAS - LV V1 MAX: 112 CM/SEC
BH CV ECHO MEAS - LV V1 VTI: 20 CM
BH CV ECHO MEAS - LVIDD: 4.6 CM
BH CV ECHO MEAS - LVIDS: 3 CM
BH CV ECHO MEAS - LVOT AREA: 2.5 CM2
BH CV ECHO MEAS - LVOT DIAM: 1.8 CM
BH CV ECHO MEAS - LVPWD: 1.16 CM
BH CV ECHO MEAS - MED PEAK E' VEL: 6.9 CM/SEC
BH CV ECHO MEAS - MV A MAX VEL: 97.4 CM/SEC
BH CV ECHO MEAS - MV DEC TIME: 0.18 MSEC
BH CV ECHO MEAS - MV E MAX VEL: 151 CM/SEC
BH CV ECHO MEAS - MV E/A: 1.55
BH CV ECHO MEAS - RAP SYSTOLE: 5 MMHG
BH CV ECHO MEAS - RVSP: 35 MMHG
BH CV ECHO MEAS - SI(MOD-SP4): 16.4 ML/M2
BH CV ECHO MEAS - SV(LVOT): 50.9 ML
BH CV ECHO MEAS - SV(MOD-SP4): 29.8 ML
BH CV ECHO MEAS - TR MAX PG: 30 MMHG
BH CV ECHO MEAS - TR MAX VEL: 274 CM/SEC
BH CV ECHO MEASUREMENTS AVERAGE E/E' RATIO: 20.54
BUN SERPL-MCNC: 25 MG/DL (ref 8–23)
BUN/CREAT SERPL: 20 (ref 7–25)
CALCIUM SPEC-SCNC: 8.4 MG/DL (ref 8.6–10.5)
CHLORIDE SERPL-SCNC: 105 MMOL/L (ref 98–107)
CO2 SERPL-SCNC: 17 MMOL/L (ref 22–29)
CREAT SERPL-MCNC: 1.25 MG/DL (ref 0.57–1)
DEPRECATED RDW RBC AUTO: 58.4 FL (ref 37–54)
EGFRCR SERPLBLD CKD-EPI 2021: 44.8 ML/MIN/1.73
ERYTHROCYTE [DISTWIDTH] IN BLOOD BY AUTOMATED COUNT: 17.2 % (ref 12.3–15.4)
FOLATE SERPL-MCNC: >20 NG/ML (ref 4.78–24.2)
GLUCOSE SERPL-MCNC: 88 MG/DL (ref 65–99)
HCT VFR BLD AUTO: 28.2 % (ref 34–46.6)
HEMOCCULT STL QL: NEGATIVE
HGB BLD-MCNC: 8.9 G/DL (ref 12–15.9)
LEFT ATRIUM VOLUME INDEX: 31.3 ML/M2
LEFT ATRIUM VOLUME: 57 ML
MCH RBC QN AUTO: 30.1 PG (ref 26.6–33)
MCHC RBC AUTO-ENTMCNC: 31.6 G/DL (ref 31.5–35.7)
MCV RBC AUTO: 95.3 FL (ref 79–97)
PLATELET # BLD AUTO: 443 10*3/MM3 (ref 140–450)
PMV BLD AUTO: 10.2 FL (ref 6–12)
POTASSIUM SERPL-SCNC: 4.5 MMOL/L (ref 3.5–5.2)
RBC # BLD AUTO: 2.96 10*6/MM3 (ref 3.77–5.28)
SODIUM SERPL-SCNC: 135 MMOL/L (ref 136–145)
VIT B12 BLD-MCNC: 342 PG/ML (ref 211–946)
WBC NRBC COR # BLD: 12.61 10*3/MM3 (ref 3.4–10.8)

## 2023-05-16 PROCEDURE — 93306 TTE W/DOPPLER COMPLETE: CPT

## 2023-05-16 PROCEDURE — 36415 COLL VENOUS BLD VENIPUNCTURE: CPT | Performed by: NURSE PRACTITIONER

## 2023-05-16 PROCEDURE — 94799 UNLISTED PULMONARY SVC/PX: CPT

## 2023-05-16 PROCEDURE — 25010000002 VANCOMYCIN 10 G RECONSTITUTED SOLUTION: Performed by: INTERNAL MEDICINE

## 2023-05-16 PROCEDURE — 93356 MYOCRD STRAIN IMG SPCKL TRCK: CPT

## 2023-05-16 PROCEDURE — 80048 BASIC METABOLIC PNL TOTAL CA: CPT | Performed by: NURSE PRACTITIONER

## 2023-05-16 PROCEDURE — 82607 VITAMIN B-12: CPT | Performed by: NURSE PRACTITIONER

## 2023-05-16 PROCEDURE — 82272 OCCULT BLD FECES 1-3 TESTS: CPT | Performed by: INTERNAL MEDICINE

## 2023-05-16 PROCEDURE — 85027 COMPLETE CBC AUTOMATED: CPT | Performed by: NURSE PRACTITIONER

## 2023-05-16 PROCEDURE — 25010000002 FUROSEMIDE PER 20 MG: Performed by: NURSE PRACTITIONER

## 2023-05-16 PROCEDURE — 97165 OT EVAL LOW COMPLEX 30 MIN: CPT

## 2023-05-16 PROCEDURE — 82746 ASSAY OF FOLIC ACID SERUM: CPT | Performed by: NURSE PRACTITIONER

## 2023-05-16 PROCEDURE — 25010000002 PIPERACILLIN SOD-TAZOBACTAM PER 1 G: Performed by: NURSE PRACTITIONER

## 2023-05-16 PROCEDURE — 93356 MYOCRD STRAIN IMG SPCKL TRCK: CPT | Performed by: EMERGENCY MEDICINE

## 2023-05-16 PROCEDURE — 94761 N-INVAS EAR/PLS OXIMETRY MLT: CPT

## 2023-05-16 PROCEDURE — 94664 DEMO&/EVAL PT USE INHALER: CPT

## 2023-05-16 PROCEDURE — 25010000002 NA FERRIC GLUC CPLX PER 12.5 MG: Performed by: NURSE PRACTITIONER

## 2023-05-16 PROCEDURE — 93306 TTE W/DOPPLER COMPLETE: CPT | Performed by: EMERGENCY MEDICINE

## 2023-05-16 PROCEDURE — 94762 N-INVAS EAR/PLS OXIMTRY CONT: CPT

## 2023-05-16 PROCEDURE — 25010000002 ENOXAPARIN PER 10 MG: Performed by: NURSE PRACTITIONER

## 2023-05-16 RX ORDER — METOCLOPRAMIDE 10 MG/1
10 TABLET ORAL 3 TIMES DAILY PRN
COMMUNITY

## 2023-05-16 RX ORDER — TACROLIMUS 1 MG/G
1 OINTMENT TOPICAL DAILY
COMMUNITY

## 2023-05-16 RX ORDER — NYSTATIN 100000 U/G
1 CREAM TOPICAL DAILY PRN
Status: ON HOLD | COMMUNITY
End: 2023-05-16

## 2023-05-16 RX ADMIN — IPRATROPIUM BROMIDE AND ALBUTEROL SULFATE 3 ML: 2.5; .5 SOLUTION RESPIRATORY (INHALATION) at 10:29

## 2023-05-16 RX ADMIN — TAZOBACTAM SODIUM AND PIPERACILLIN SODIUM 4.5 G: 500; 4 INJECTION, SOLUTION INTRAVENOUS at 00:36

## 2023-05-16 RX ADMIN — METOPROLOL SUCCINATE 50 MG: 50 TABLET, EXTENDED RELEASE ORAL at 20:08

## 2023-05-16 RX ADMIN — NIFEDIPINE 30 MG: 30 TABLET, FILM COATED, EXTENDED RELEASE ORAL at 08:47

## 2023-05-16 RX ADMIN — PANCRELIPASE 12000 UNITS OF LIPASE: 60000; 12000; 38000 CAPSULE, DELAYED RELEASE PELLETS ORAL at 17:22

## 2023-05-16 RX ADMIN — IPRATROPIUM BROMIDE AND ALBUTEROL SULFATE 3 ML: 2.5; .5 SOLUTION RESPIRATORY (INHALATION) at 19:30

## 2023-05-16 RX ADMIN — SODIUM CHLORIDE 250 MG: 9 INJECTION, SOLUTION INTRAVENOUS at 12:18

## 2023-05-16 RX ADMIN — MEGESTROL ACETATE 40 MG: 40 TABLET ORAL at 08:47

## 2023-05-16 RX ADMIN — TERAZOSIN HYDROCHLORIDE 2 MG: 2 CAPSULE ORAL at 20:08

## 2023-05-16 RX ADMIN — TAZOBACTAM SODIUM AND PIPERACILLIN SODIUM 4.5 G: 500; 4 INJECTION, SOLUTION INTRAVENOUS at 08:47

## 2023-05-16 RX ADMIN — FUROSEMIDE 40 MG: 10 INJECTION, SOLUTION INTRAVENOUS at 05:47

## 2023-05-16 RX ADMIN — CETIRIZINE HYDROCHLORIDE 10 MG: 10 TABLET ORAL at 08:47

## 2023-05-16 RX ADMIN — POTASSIUM CHLORIDE 10 MEQ: 10 CAPSULE, COATED, EXTENDED RELEASE ORAL at 08:46

## 2023-05-16 RX ADMIN — IPRATROPIUM BROMIDE AND ALBUTEROL SULFATE 3 ML: 2.5; .5 SOLUTION RESPIRATORY (INHALATION) at 14:05

## 2023-05-16 RX ADMIN — TAZOBACTAM SODIUM AND PIPERACILLIN SODIUM 4.5 G: 500; 4 INJECTION, SOLUTION INTRAVENOUS at 16:37

## 2023-05-16 RX ADMIN — VANCOMYCIN HYDROCHLORIDE 1250 MG: 10 INJECTION, POWDER, LYOPHILIZED, FOR SOLUTION INTRAVENOUS at 08:47

## 2023-05-16 RX ADMIN — Medication 10 ML: at 08:49

## 2023-05-16 RX ADMIN — LOSARTAN POTASSIUM 25 MG: 50 TABLET, FILM COATED ORAL at 08:47

## 2023-05-16 RX ADMIN — IPRATROPIUM BROMIDE AND ALBUTEROL SULFATE 3 ML: 2.5; .5 SOLUTION RESPIRATORY (INHALATION) at 23:52

## 2023-05-16 RX ADMIN — METOPROLOL SUCCINATE 50 MG: 50 TABLET, EXTENDED RELEASE ORAL at 08:46

## 2023-05-16 RX ADMIN — IPRATROPIUM BROMIDE AND ALBUTEROL SULFATE 3 ML: 2.5; .5 SOLUTION RESPIRATORY (INHALATION) at 07:15

## 2023-05-16 RX ADMIN — FLUTICASONE PROPIONATE 2 SPRAY: 50 SPRAY, METERED NASAL at 08:48

## 2023-05-16 RX ADMIN — ENOXAPARIN SODIUM 40 MG: 100 INJECTION SUBCUTANEOUS at 20:09

## 2023-05-16 RX ADMIN — METOPROLOL SUCCINATE 50 MG: 50 TABLET, EXTENDED RELEASE ORAL at 00:37

## 2023-05-16 RX ADMIN — TERAZOSIN HYDROCHLORIDE 2 MG: 2 CAPSULE ORAL at 00:37

## 2023-05-16 RX ADMIN — PANCRELIPASE 12000 UNITS OF LIPASE: 60000; 12000; 38000 CAPSULE, DELAYED RELEASE PELLETS ORAL at 12:18

## 2023-05-16 RX ADMIN — FUROSEMIDE 40 MG: 10 INJECTION, SOLUTION INTRAVENOUS at 16:37

## 2023-05-16 RX ADMIN — Medication 10 ML: at 20:12

## 2023-05-16 RX ADMIN — PANCRELIPASE 12000 UNITS OF LIPASE: 60000; 12000; 38000 CAPSULE, DELAYED RELEASE PELLETS ORAL at 08:46

## 2023-05-16 RX ADMIN — LEVOTHYROXINE SODIUM 137 MCG: 112 TABLET ORAL at 05:47

## 2023-05-16 NOTE — THERAPY DISCHARGE NOTE
Acute Care - Occupational Therapy Discharge  [facility]    Patient Name: Ирина Randolph  : 1945    MRN: [id number]                              Today's Date: 2023       Admit Date: 5/15/2023    Visit Dx:     ICD-10-CM ICD-9-CM   1. Pleural effusion  J90 511.9   2. Dyspnea, unspecified type  R06.00 786.09   3. Pulmonary nodule  R91.1 793.11   4. Malignant neoplasm of pancreas, unspecified location of malignancy  C25.9 157.9   5. Pneumonia due to infectious organism, unspecified laterality, unspecified part of lung  J18.9 486     Patient Active Problem List   Diagnosis    Essential hypertension    Acquired hypothyroidism    Osteopenia of multiple sites    Osteoarthritis of multiple joints    Mixed hyperlipidemia    Heel spur, right    Post-nasal drip    History of colon polyps    Cough    Cancer    Malignant neoplasm of body of pancreas    Malignant neoplasm of tail of pancreas    Metastasis from pancreatic cancer    Pancreatic mass    Poor venous access    Palliative care patient    Pleural effusion    Hospital-acquired pneumonia    Volume overload    SHAVON (acute kidney injury)     Past Medical History:   Diagnosis Date    Acute bronchitis     Arthritis     Osteoarthritis    Cancer     Disease of thyroid gland     Gallbladder abscess     Hypertension      Past Surgical History:   Procedure Laterality Date    ABDOMINAL HYSTERECTOMY      CHOLECYSTECTOMY      COLONOSCOPY  2015    two polyps both removed  extensive diverticulosis    COLONOSCOPY N/A 2020    Procedure: COLONOSCOPY WITH ANESTHESIA;  Surgeon: Homer Zamarripa DO;  Location: [facility] ENDOSCOPY;  Service: Gastroenterology;  Laterality: N/A;  pre: Hx of polyps  post:  Anupama Dhaliwal    ENDOSCOPY  2016    normal    ENDOSCOPY N/A 2020    Procedure: ESOPHAGOGASTRODUODENOSCOPY WITH ANESTHESIA;  Surgeon: Homer Zamarripa DO;  Location: [facility] ENDOSCOPY;  Service: Gastroenterology;  Laterality: N/A;  pre: nausea  post:      ENDOSCOPY N/A 6/7/2022    Procedure: ESOPHAGOGASTRODUODENOSCOPY WITH ANESTHESIA;  Surgeon: Homer Zamarripa DO;  Location: [facility] ENDOSCOPY;  Service: Gastroenterology;  Laterality: N/A;  pre cough  post normal  Anupama Dhaliwal DO    PANCREAS SURGERY      Partial removal due to tumor    ROTATOR CUFF REPAIR  10/19/2017    SPLENECTOMY        General Information       Row Name 05/16/23 0840          OT Time and Intention    Document Type evaluation  Presented to ED with worsening cough and SOB. Dx: Pleural effusion, Hospital-acquired pneumonia, Volume overload. H/o pancreatic cancer currently undergoing chemotherapy.  -LS     Mode of Treatment occupational therapy  -LS       Row Name 05/16/23 08          General Information    Patient Profile Reviewed yes  -LS     Prior Level of Function independent:;ADL's;all household mobility;community mobility;home management;cooking;cleaning;driving;shopping  -LS     Barriers to Rehab medically complex  -LS       Row Name 05/16/23 0840          Occupational Profile    Environmental Supports and Barriers (Occupational Profile) Tub shower with 1 grab bar and no shower chair. Pt has a standard height toilet with 2 grab bars beside it. Pt sleeps in a regular bed. AD/DME: none.  -LS       Row Name 05/16/23 0840          Living Environment    People in Home spouse  -LS       Row Name 05/16/23 08          Home Main Entrance    Number of Stairs, Main Entrance one  -LS     Stair Railings, Main Entrance none  -LS       Row Name 05/16/23 0840          Stairs Within Home, Primary    Number of Stairs, Within Home, Primary none  -LS       Row Name 05/16/23 0840          Cognition    Orientation Status (Cognition) oriented x 4  -LS               User Key  (r) = Recorded By, (t) = Taken By, (c) = Cosigned By      Initials Name Provider Type    LS Marga Guadalupe, OTR/L Occupational Therapist                   Mobility/ADL's       Row Name 05/16/23 0840          Bed Mobility    Bed  Mobility supine-sit;sit-supine  -LS     Supine-Sit [place] (Bed Mobility) independent  -LS     Sit-Supine [place] (Bed Mobility) independent  -       Row Name 05/16/23 0840          Transfers    Transfers sit-stand transfer;stand-sit transfer;toilet transfer  -       Row Name 05/16/23 0840          Sit-Stand Transfer    Sit-Stand [place] (Transfers) independent  -       Row Name 05/16/23 0840          Stand-Sit Transfer    Stand-Sit [place] (Transfers) independent  -       Row Name 05/16/23 0840          Toilet Transfer    Type (Toilet Transfer) sit-stand;stand-sit  -LS     [place] Level (Toilet Transfer) independent  -       Row Name 05/16/23 0840          Functional Mobility    Functional Mobility- Ind. Level independent  Pt ambulated from bed<>BR utilizing no AD I.  -       Row Name 05/16/23 0840          Activities of Daily Living    BADL Assessment/Intervention upper body dressing;lower body dressing;toileting  -       Row Name 05/16/23 0840          Upper Body Dressing Assessment/Training    [place] Level (Upper Body Dressing) upper body dressing skills;independent  -LS     Position (Upper Body Dressing) edge of bed sitting  -       Row Name 05/16/23 0840          Lower Body Dressing Assessment/Training    [place] Level (Lower Body Dressing) lower body dressing skills;independent  -LS     Position (Lower Body Dressing) edge of bed sitting;unsupported standing  -       Row Name 05/16/23 0840          Toileting Assessment/Training    [place] Level (Toileting) toileting skills;independent  -LS     Position (Toileting) unsupported sitting;unsupported standing  -               User Key  (r) = Recorded By, (t) = Taken By, (c) = Cosigned By      Initials Name Provider Type    Marga Dunbar, MAXIMILIANOR/L Occupational Therapist                   Obj/Interventions       Row Name 05/16/23 0840          Sensory Assessment (Somatosensory)    Sensory Assessment  (Somatosensory) UE sensation intact  -       Row Name 05/16/23 0840          Vision Assessment/Intervention    Visual Impairment/Limitations WFL;corrective lenses full-time  -       Row Name 05/16/23 08          Range of Motion Comprehensive    General Range of Motion bilateral upper extremity ROM WNL  -       Row Name 05/16/23 08          Strength Comprehensive (MMT)    Comment, General Manual Muscle Testing (MMT) Assessment BUE strength grossly 4/5; Pt reports strength is at baseline.  -       Row Name 05/16/23 08          Balance    Balance Assessment sitting static balance;sitting dynamic balance;standing static balance;standing dynamic balance  -     Static Sitting Balance independent  -LS     Dynamic Sitting Balance independent  -LS     Static Standing Balance independent  -LS     Dynamic Standing Balance independent  -LS     Position/Device Used, Standing Balance unsupported  -               User Key  (r) = Recorded By, (t) = Taken By, (c) = Cosigned By      Initials Name Provider Type     Marga Guadalupe OTR/L Occupational Therapist                   Goals/Plan    No documentation.                  Clinical Impression       Row Name 05/16/23 0840          Pain Assessment    Pretreatment Pain Rating 0/10 - no pain  -     Posttreatment Pain Rating 0/10 - no pain  -[facility] Name 05/16/23 08          Plan of Care Review    Plan of Care Reviewed With patient;spouse  -     Progress no change  -     Outcome Evaluation OT eval completed. Pt in fowlers upon therapist arrival; A&Ox4; No pain reported; Pt's  also present. Pt reports I with all BADLs and fxl ambulation at OF. Pt performed supine<>sit I. Pt donned B shoes I. Pt performed all transfers I. Pt ambulated from bed<>BR I. BUE strength 4/5 with Pt reporting strength is at baseline. Skilled OT intervention not indicated at this time. Recommend home at discharge.  -       Row Name 05/16/23 0840          Therapy  Assessment/Plan (OT)    Criteria for Skilled Therapeutic Interventions Met (OT) no;does not meet criteria for skilled intervention  -LS     Therapy Frequency (OT) evaluation only  -LS       Row Name 05/16/23 0840          Therapy Plan Review/Discharge Plan (OT)    Anticipated Discharge Disposition (OT) home  -       Row Name 05/16/23 0840          Positioning and Restraints    Pre-Treatment Position in bed  -LS     Post Treatment Position bed  -LS     In Bed fowlers;call light within reach;encouraged to call for assist;side rails up x2  -LS               User Key  (r) = Recorded By, (t) = Taken By, (c) = Cosigned By      Initials Name Provider Type    LS Marga Guadalupe, OTR/L Occupational Therapist                   Outcome Measures       Row Name 05/16/23 0840          How much help from another is currently needed...    Putting on and taking off regular lower body clothing? 4  -LS     Bathing (including washing, rinsing, and drying) 4  -LS     Toileting (which includes using toilet bed pan or urinal) 4  -LS     Putting on and taking off regular upper body clothing 4  -LS     Taking care of personal grooming (such as brushing teeth) 4  -LS     Eating meals 4  -LS     AM-PAC 6 Clicks Score (OT) 24  -LS       Row Name 05/16/23 0847          How much help from another person do you currently need...    Turning from your back to your side while in flat bed without using bedrails? 4  -AC     Moving from lying on back to sitting on the side of a flat bed without bedrails? 4  -AC     Moving to and from a bed to a chair (including a wheelchair)? 4  -AC     Standing up from a chair using your arms (e.g., wheelchair, bedside chair)? 4  -AC     Climbing 3-5 steps with a railing? 3  -AC     To walk in hospital room? 4  -AC     AM-PAC 6 Clicks Score (PT) 23  -AC     Highest level of mobility 7 --> Walked 25 feet or more  -AC       Row Name 05/16/23 0840          Functional Assessment    Outcome Measure Options AM-PAC 6 Clicks  Daily Activity (OT)  -               User Key  (r) = Recorded By, (t) = Taken By, (c) = Cosigned By      Initials Name Provider Type    Bryon Lloyd, RN Registered Nurse    Marga Dunbar OTR/L Occupational Therapist                    OT Recommendation and Plan  Therapy Frequency (OT): evaluation only  Plan of Care Review  Plan of Care Reviewed With: patient, spouse  Progress: no change  Outcome Evaluation: OT eval completed. Pt in fowlers upon therapist arrival; A&Ox4; No pain reported; Pt's  also present. Pt reports I with all BADLs and fxl ambulation at PLOF. Pt performed supine<>sit I. Pt donned B shoes I. Pt performed all transfers I. Pt ambulated from bed<>BR I. BUE strength 4/5 with Pt reporting strength is at baseline. Skilled OT intervention not indicated at this time. Recommend home at discharge.  Plan of Care Reviewed With: patient, spouse  Outcome Evaluation: OT eval completed. Pt in fowlers upon therapist arrival; A&Ox4; No pain reported; Pt's  also present. Pt reports I with all BADLs and fxl ambulation at PLOF. Pt performed supine<>sit I. Pt donned B shoes I. Pt performed all transfers I. Pt ambulated from bed<>BR I. BUE strength 4/5 with Pt reporting strength is at baseline. Skilled OT intervention not indicated at this time. Recommend home at discharge.     Time Calculation:    Time Calculation- OT       Row Name 05/16/23 0840             Time Calculation- OT    OT Start Time 0835  +10 minutes chart review  -      OT Stop Time 0915  -      OT Time Calculation (min) 40 min  -      OT Non-Billable Time (min) 50 min  -      OT Received On 05/16/23  -                User Key  (r) = Recorded By, (t) = Taken By, (c) = Cosigned By      Initials Name Provider Type    Marga Dunbar OTR/L Occupational Therapist                  Therapy Charges for Today       Code Description Service Date Service Provider Modifiers Qty    [id number]  OT EVAL LOW COMPLEXITY 3  5/16/2023 Marga Guadalupe, OTR/L GO 1               OT Discharge Summary  Anticipated Discharge Disposition (OT): home  Reason for Discharge: Independent  Outcomes Achieved: Other (Eval only)  Discharge Destination: Home    Marga Guadalupe OTR/LAURITA  5/16/2023

## 2023-05-16 NOTE — PLAN OF CARE
Goal Outcome Evaluation:                 Pt screened for PT services. Pt is up independent.  Expresses no concerns or need for skilled PT.  PT to sign off.

## 2023-05-16 NOTE — CONSULTS
MEDICAL ONCOLOGY CONSULTATION    Pt Name: Ирина Randolph  MRN: [id number]  [id number]  YOB: 1945  Date of evaluation: 5/15/2023    Reason for Consultation: Continuity of care regarding diagnosis of stage IV metastatic pancreatic cancer    Requesting Provider: SKYLER Carr    History Obtained From: Patient and EMR    HISTORY OF PRESENT ILLNESS:      Ирина Randolph is a 77-year-old  female undergoing systemic chemotherapy under the direction of Dr. Mendiola with a diagnosis of recurrent metastatic pancreatic cancer and a pleural effusion.  Her most recent cycle #7 of palliative chemotherapy with Gemzar and Abraxane was delivered on 4/13/2023 anticipating cycle #8 on 5/18/2023.    She has been responding to the above chemotherapy evidenced by a decremental trend in tumor marker CA 19-9 that was > 1000 in October 2022 when Gemzar Abraxane was initiated.  CA 19-9 was down to 67 on 4/25/2023 shortly after cycle #7 of Gemzar Abraxane.    Ms. Randolph presented to [facility] emergency room on 5/15/2023 under the direction of her PCP after presenting with worsening cough, and shortness of breath since discharge from [facility] on 4/28/2023.   Work-up and evaluation in the emergency room revealed right lower lobe pneumonia and bilateral pleural effusions.  She has been admitted for treatment of hospital-acquired pneumonia.    Medical oncology consultation requested in continuity of care      DETAILED CANCER HISTORY FROM OFFICE RECORDS:      Diagnosis  • Invasive ductal adenocarcinoma, pancreas, June 2021  • pT2 N1 M0  • Strong family history for breast cancer  • Positive KEIRY gene mutation  • DVT right upper extremity, Jan 2022  • Recurrent adenocarcinoma pancreas, Sept 2022     Treatment Summary  • 6/28/21 Laparoscopic assisted distal pancreatectomy and splenectomy with 1 of 6 lymph nodes positive. Surgeon Dr. Audi Molina/[facility]   • 8/13/2021-02/25/22-adjuvant Gemzar 1000 mg/m2 IV D 1, D8,  D15 with Capecitabine 830 mg/m2 D1-21 every 28 days x6 cycles  • 1/14/22 Eliquis for DVT right upper extremity November 2022  • 10/14/22-Initiated palliative frontline Gemzar Abraxane every 2 weeks  • 1/13/23 Decrease Abraxane to 100mg/m2 with each treatment     Cancer History  Ирина Randolph was first seen by me on 8/6/2021.  The patient was referred for a diagnosis of pancreatic malignancy.  She had initial complaints of abdominal pain.  • 5/5/20 MRI abdomen ([facility]): Moderate fatty replacement of the pancreas with several small cystic structures within or adjacent to the pancreatic head and uncinate process most suggestive of benign cysts. A follow-up MRI of the abdomen may be obtained in 1 year to assess for stability. No solid lesion is seen. Otherwise unremarkable MRI of the abdomen.  • 4/22/20 CT abd/pelvis ([facility]): Focal inflammatory stranding associated with the pancreatic head. Radiographically I would favor this to represent acute pancreatitis. There is mild associated stranding within the descending and proximal transverse duodenum. There are 2 well-circumscribed hypodensities one in the region of the uncinate process and one slightly anterior to the pancreatic head likely representing sequela of pancreatitis but warranting follow-up on subsequent exam. The body and tail of the pancreas are spared. No evidence of pancreatic necrosis. The patient is status post hysterectomy and cholecystectomy. Bibasilar subsegmental atelectasis. No evidence of nephrolithiasis or obstructive uropathy. Diverticulosis of the distal descending and sigmoid colon without evidence of diverticulitis.  • 5/5/21 MRI abd ([facility]) There are multiple tiny-to-small foci of increased T2 signal seen within the pancreas, consistent with a combination of cysts and/or beaded dilatation of the tributaries of the main pancreatic duct, increased in size and number when compared to the prior similar study of 5/5/20. The patient  is again noted to be status post cholecystectomy. The examination is otherwise within normal limits for age with no abnormal postcontrast enhancement noted.  • 6/3/21 Upper EUS (Dr Ruperto Gale/[place]): CT Visualized portion of pancreas abnormal on ultrasound. There is a 26.7 mm solid mass in pancreatic tail. It abuts the splenic vessel without occlusion or varices. Pancreatic duct is dilated to 6 mm in the tail proximal to the mass. FNA x4 yields atypical cells. Celiac axis, liver appear normal, no local nodes. Body and head of pancreas normal.  • 6/3/21 Pancreas tail mass, FNA aspiration smear (x2), ThinPrep and cell block: Adenocarcinoma.  • 6/7/21 CA 19-9 407.1  • 6/28/21-she underwent Whipple procedure pancreatectomy by Dr. Audi Molina: Invasive ductal carcinoma 4.3 cm in greatest dimension, invasive in the peripancreatic adipose tissue, diffuse perineural invasion. Surgical margins were negative for carcinoma. The pancreas margins involved by a low-grade dysplasia. 1/6 nodes positive for metastatic cancer with direct extension. Distal fibrosis and pancreatitis was noted. Spleen was unremarkable.  Pathological stage pT2 N1 M0  • 8/6/2021-she was first seen by me.  Recommend adjuvant chemotherapy with gemcitabine/Xeloda.  She acknowledged understanding and agree with treatment.  • 8/09/2021 CT Chest W/Contrast No evidence of intrathoracic metastatic disease is identified.  • 8/09/2021 CT Abd/Pelvis W/Oral Contrast Prior distal pancreatectomy and splenectomy. There is a 1.5 cm circumscribed, nonenhancing cystic lesion along the inferior pancreatic head, perhaps a branch IPMN. Otherwise, no obvious pancreatic solid mass is seen. No suspicious adenopathy or evidence of distant metastatic disease in the abdomen or pelvis.Prior cholecystectomy.Liver steatosis. Colonic diverticulosis.    • 8/13/2021-initiation of adjuvant capecitabine/Gemzar   • 9/10/2021-I reviewed CT chest abdomen pelvis.  • 1/21/2022-CA 19-9 =  10  • 2/10/22 CT CHEST W CONTRAST No evidence of intrathoracic metastasis. Incidental findings described above.   • 2/10/22 CT ABDOMEN PELVIS W IV CONTRAST  Partial pancreatectomy and splenectomy. Pancreatic head cysts measuring up to 1.6 cm, very similar to 8/9/2021. Continued surveillance imaging recommended. No evidence of intra-abdominal or pelvic metastasis.   • 2/18/2022-I reviewed results CT chest abdomen pelvis.  No evidence of recurrent disease.  Pancreatic head cyst stable.  • 02/25/22- Completion of adjuvant chemotherapy.   • 4/22/2022 CA 19-9-13  • 5/11/2022 Bone Density ([facility]) Osteopenia. Femoral Neck T-Score -2.0, Lumbar Spine T-Score -0.9  • 6/7/2022 Endoscopy ([facility]) Urease was negative  • 8/5/2022 CT Abd/Pelvis WO Contrast ([facility]) Fairly diffuse soft tissue infiltration with haziness and nodularity of there peripheral aspects of the peritoneal cavity most concerning for a process such as peritoneal carcinomatosis. Given history of pancreatic cancer,a PET scan may be considered for further evaluation. Small amount of ascites in the pelvis. Colonic diverticulosis.Large,fat-containing umbilical hernia. Tiny right pleural effusion with atelectasis in the lung bases.  • 8/22/2022 CT Chest W Contrast Calcified and noncalcified lung nodules may indicate inflammatory or infectious process and old granulomatous disease.  Follow-up in 3 months is recommended.Small right pleural effusion.Cirrhotic liver.  Free fluid in the abdomen with mesenteric edema upper abdomen.  Multiple lymph nodes in region of pancreatic head.  • 8/22/2022-surveillance  CT Abd/Pelvis W IV Contrast (oral) Partial pancreatectomy with absence of the pancreatic body and tail.  Multiple soft tissue and hypodense areas in the location of the pancreatic head indicating measuring up to 8 mm indicating cysts, residual tissue and possible soft tissue pancreatic  nodules.  A cyst or soft tissue nodule is identified measuring up to 1.2 cm.Indeterminate  nodules in the location of the pancreatic head versus lymphadenopathy.  Consider MRI pancreas without and with IV contrast.Bilateral extrarenal pelves.  4 mm left renal cyst. Soft tissue and fat herniates into the umbilical region measuring 4.7 cm indicating hernia.  There is mesentery within the hernia.  No bowel is within the hernia.  No evidence of free air..  Small amount of free fluid is within the abdomen and pelvis.  Mesenteric edema of the abdomen and pelvis.Rectosigmoid diverticulosis without acute diverticulitis.Slightly irregular liver contour may indicate early cirrhotic changes.Free fluid within the abdomen and pelvis.  Inflammation of the mesentery.  • 8/28/2022-I reviewed CT abdomen/pelvis.  New onset ascites.  Liver is smaller when compared to prior CT scan suggesting hydrophobic disease.  Radiology reported peripancreatic nodules.  Recommended CA 19-9 reviewed.  Recommend MRI pancreas.  Recommended paracentesis (send for cytology, total protein, LDH).  CMP same day of paracentesis.  • 9/12/2022 MRI Abdomen W WO Contrast MRCP Hepatic steatosis with a nodular contour suggestive of cirrhosis.Ascites.Absence of the spleen.Absence of the gallbladder.The patient appears to be status post partial pancreatectomy .  The nodular lesions identified on the CT scan dated August 19, 2022 are poorly visualized on this examination due to respiratory artifact and technique.Small renal cysts.Midline ventral hernia.  • 9/12/2022-CA 19-9 1004  • 9/15/2022 US Guided Parecentesis ([facility]) Limited grayscale ultrasound evaluation to assess for peritoneal fluid.4 quadrant evaluation shows trace fluid around the liver. No more than trace fluid at the right lower quadrant. No significant fluid is seen at the left upper quadrant or left lower quadrant.   • 9/19/2022 Ascites fluid,paracentesis: Malignant cells present.Adenocarcinoma.  • 9/19/2022 CT Abdomen WO Contrast ([facility]):Small amount of diffuse ascites within the abdomen,  likely malignant ascites given the presence of nodular peritoneal and omental soft tissue thickening compatible with carcinomatosis. Diagnostic paracentesis was performed following this examination with ultrasound guidance.Evidence previous resection of the pancreatic body and tail. There is a soft tissue implant or enlarged lymph node inferior to the celiac artery on the right which measures 1.6 x 1.6 cm, concerning for metastatic disease. There is a small cyst in subtle stable nodularity and patient replacement of the pancreatic head.   • 10/3/2022-essentially, findings of recurrent metastatic pancreatic adenocarcinoma.  Stage IV.  Recommend palliative Gemzar 1000 mg/M2 and Abraxane 125 mg/M2 every 2 weeks until disease progression or intolerable toxicity.  • 10/3/22 Chemotherapy consent signed  • 10/14/22-Initiated palliative frontline Gemzar Abraxane every 2 weeks  • 12/30/22 CA 19-9-815  • 1/6/23 CT Chest W Contrast No significant interval change.  • 1/6/23 CT Abd/Pelvis W IV Contrast (oral) Partial pancreatectomy with absence of pancreatic body and tail.  No significant change in size or number of a few cyst versus soft tissue nodules in region of pancreatic head. No retroperitoneal lymphadenopathy.Interval resolution of ascites.  • 1/13/2023-essentially, CT scan showed interval response to treatment.  Resolution of prior ascites.  Continue palliative chemotherapy with Gemzar/Abraxane.  • 1/13/23 Decrease Abraxane to 100mg/m2 with each treatment due to development of neuropathy.  • 1/27/23 CA 19-9-360  • 2/10/23 CA 19-9-238  • 2/24/23 CA 19-9-171  • 3/10/23 MRI Brain W WO Contrast No acute intracranial abnormality notified.No evidence of metastatic disease in the brain. Scattered T2 FLAIR white matter hyperintensities are nonspecific, but commonly seen in the setting of chronic microvascular ischemic disease.  • 4/23/2023-CT head without contrast remarkable for no acute intracranial process.  • 4/27/2023 -CT  scan of the abdomen and pelvis with IV contrast reported a new right hepatic dome 8.2 cm rim-enhancing complex subcapsular collection with adjacent subcentimeter hypodensity. New small right effusion with right basilar atelectasis.           Past Medical History:    Past Medical History:   Diagnosis Date   • Acute bronchitis    • Arthritis     Osteoarthritis   • Cancer    • Disease of thyroid gland    • Gallbladder abscess    • Hypertension        Past Surgical History:    Past Surgical History:   Procedure Laterality Date   • ABDOMINAL HYSTERECTOMY     • CHOLECYSTECTOMY  1994   • COLONOSCOPY  04/23/2015    two polyps both removed  extensive diverticulosis   • COLONOSCOPY N/A 06/17/2020    Procedure: COLONOSCOPY WITH ANESTHESIA;  Surgeon: Homer Zamarripa DO;  Location: [facility] ENDOSCOPY;  Service: Gastroenterology;  Laterality: N/A;  pre: Hx of polyps  post:  Anupama Dhaliwal   • ENDOSCOPY  08/16/2016    normal   • ENDOSCOPY N/A 06/17/2020    Procedure: ESOPHAGOGASTRODUODENOSCOPY WITH ANESTHESIA;  Surgeon: Homer Zamarripa DO;  Location: [facility] ENDOSCOPY;  Service: Gastroenterology;  Laterality: N/A;  pre: nausea  post:    • ENDOSCOPY N/A 6/7/2022    Procedure: ESOPHAGOGASTRODUODENOSCOPY WITH ANESTHESIA;  Surgeon: Homer Zamarripa DO;  Location: [facility] ENDOSCOPY;  Service: Gastroenterology;  Laterality: N/A;  pre cough  post normal  Anupama Dhaliwal DO   • PANCREAS SURGERY      Partial removal due to tumor   • ROTATOR CUFF REPAIR  10/19/2017   • SPLENECTOMY         Current Hospital Medications:      Current Facility-Administered Medications:   •  acetaminophen (TYLENOL) tablet 650 mg, 650 mg, Oral, Q4H PRN **OR** acetaminophen (TYLENOL) 160 MG/5ML solution 650 mg, 650 mg, Oral, Q4H PRN **OR** acetaminophen (TYLENOL) suppository 650 mg, 650 mg, Rectal, Q4H PRN, Lissa Locke, APRN  •  ALPRAZolam (XANAX) tablet 0.25 mg, 0.25 mg, Oral, BID PRN, Lissa Locke, APRN  •  cetirizine (zyrTEC) tablet  10 mg, 10 mg, Oral, Daily, Lissa Locke APRN  •  Enoxaparin Sodium (LOVENOX) syringe 40 mg, 40 mg, Subcutaneous, Q24H, Kristy Somers, SKYLER  •  fluticasone (FLONASE) 50 MCG/ACT nasal spray 2 spray, 2 spray, Nasal, Daily, Lissa Locke APRN  •  [START ON 5/16/2023] furosemide (LASIX) injection 40 mg, 40 mg, Intravenous, Q12H, Lissa Locke APRN  •  HYDROcodone-acetaminophen (NORCO) 7.5-325 MG per tablet 1 tablet, 1 tablet, Oral, Q6H PRN, Lissa Locke APRN  •  ipratropium-albuterol (DUO-NEB) nebulizer solution 3 mL, 3 mL, Nebulization, Q4H - RT, Lissa Locke APRN, 3 mL at 05/15/23 1945  •  [START ON 5/16/2023] levothyroxine (SYNTHROID, LEVOTHROID) tablet 137 mcg, 137 mcg, Oral, Q AM, Lissa Locke APRN  •  losartan (COZAAR) tablet 25 mg, 25 mg, Oral, BID, Lissa Locke APRN  •  megestrol (MEGACE) tablet 40 mg, 40 mg, Oral, Daily, Lissa Locke APRN  •  metoclopramide (REGLAN) tablet 10 mg, 10 mg, Oral, TID PRN, Lissa Locke APRN  •  metoprolol succinate XL (TOPROL-XL) 24 hr tablet 50 mg, 50 mg, Oral, BID, Lissa Locke APRN  •  NIFEdipine XL (PROCARDIA XL) 24 hr tablet 30 mg, 30 mg, Oral, Daily, Lissa Locke APRN  •  ondansetron (ZOFRAN) tablet 4 mg, 4 mg, Oral, Q6H PRN **OR** ondansetron (ZOFRAN) injection 4 mg, 4 mg, Intravenous, Q6H PRN, Lissa Locke, SKYLER  •  pancrelipase (Lip-Prot-Amyl) (CREON) capsule 12,000 units of lipase, 12,000 units of lipase, Oral, TID With Meals, Lissa Locke, SKYLER  •  [START ON 5/16/2023] piperacillin-tazobactam (ZOSYN) 4.5 g in iso-osmotic dextrose 100 mL IVPB (premix), 4.5 g, Intravenous, Q8H, Lissa Locke APRN  •  potassium chloride (MICRO-K) CR capsule 10 mEq, 10 mEq, Oral, Daily, Lissa Locke APRN  •  sodium chloride 0.9 % flush 10 mL, 10 mL, Intravenous, Q12H, Lissa Locke APRN  •  sodium chloride 0.9 % flush 10 mL, 10 mL, Intravenous, PRN, Lissa Locke APRN  •  sodium chloride 0.9 %  infusion 40 mL, 40 mL, Intravenous, PRN, Lissa Locke, APRN  •  terazosin (HYTRIN) capsule 2 mg, 2 mg, Oral, Nightly, Lissa Locke APRN  •  vancomycin 1250 mg/250 mL 0.9% NS IVPB ([facility]), 1,250 mg, Intravenous, Q24H, Lissa Locke, APRN    Allergies:   Allergies   Allergen Reactions   • Diclofenac Other (See Comments) and Unknown - High Severity     Couldn't sleep and face turned blood red   • Oxycodone-Acetaminophen Hives and Rash     itching  itching     • Doxycycline Rash       Social History:    Social History     Socioeconomic History   • Marital status:    Tobacco Use   • Smoking status: Never   • Smokeless tobacco: Never   Vaping Use   • Vaping Use: Never used   Substance and Sexual Activity   • Alcohol use: No   • Drug use: No   • Sexual activity: Yes     Partners: Male       Family History:   Family History   Problem Relation Age of Onset   • Alcohol abuse Father    • Breast cancer Maternal Aunt    • Bone cancer Maternal Aunt    • Leukemia Maternal Aunt    • Colon cancer Neg Hx    • Colon polyps Neg Hx    • Esophageal cancer Neg Hx        REVIEW OF SYSTEMS:    Constitutional: Dyspnea on minimal exertion and at rest, fatigue  HEENT: no blurring of vision, no double vision, no hearing difficulty, no tinnitus,no ulceration, no dental caries, no dysphagia     Lungs: Persistent and increasing cough, wheezing and dyspnea  CVS: no palpitation, no chest pain, positive for shortness of breath  GI: no abdominal pain, no nausea , no vomiting, no constipation  PJ: no dysuria, frequency and urgency, no hematuria, no kidney stones  Musculoskeletal: no joint pain, stiffness, positive for lower extremity edema  Endocrine: no polyuria, polydipsia, no cold or heat intolerance  Hematology: Anemia, leukocytosis and thrombocytosis  Dermatology: no skin rash, no eczema, no pruritus  Psychiatry: no depression, no anxiety,no panic attacks, no suicide ideation  Neurology: no syncope, no seizures, no numbness or  "tingling of hands, no numbness or tingling of feet, no paresis    Vitals:    BP 99/65 (BP Location: Left arm, Patient Position: Lying)   Pulse 89   Temp 98.1 °F (36.7 °C) (Oral)   Resp 20   Ht 165.1 cm (65\")   Wt 74.4 kg (164 lb)   SpO2 100%   BMI 27.29 kg/m²     PHYSICAL EXAM:    CONSTITUTIONAL: Alert, appropriate, complaining of dyspnea at rest  ENT: Mucous membranes moist, no oropharyngeal lesions   NECK: Supple, no masses   CHEST/LUNGS: Scattered rales in upper and lower fields anterior and posteriorly diffusely.  Mild to moderate wheezing also appreciated on auscultation.  CARDIOVASCULAR: RRR, no murmurs  ABDOMEN: soft non-tender, active bowel sounds, no HSM  EXTREMITIES: Significant lower extremity edema noted  SKIN: warm, dry with no rashes or lesions  LYMPH: No cervical, clavicular, axillary, or inguinal lymphadenopathy  NEUROLOGIC: follows commands, non focal   PSYCH: mood and affect appropriate, a bit anxious and very concerned regarding recent developments    CBC  Results from last 7 days   Lab Units 05/15/23  1145   WBC 10*3/mm3 11.49*   HEMOGLOBIN g/dL 9.6*   HEMATOCRIT % 29.8*   PLATELETS 10*3/mm3 466*         Lab Results   Component Value Date     (L) 05/15/2023    K 5.1 05/15/2023     05/15/2023    CO2 20.0 (L) 05/15/2023    BUN 25 (H) 05/15/2023    CREATININE 1.13 (H) 05/15/2023    GLUCOSE 99 05/15/2023    CALCIUM 9.0 05/15/2023    BILITOT 0.5 10/14/2022    ALKPHOS 87 10/14/2022    AST 40 (H) 10/14/2022    ALT 16 10/14/2022    AGRATIO 1.3 09/19/2022    GLOB 3.1 10/14/2022       Lab Results   Component Value Date    INR 1.00 05/15/2023    INR 1.04 04/23/2023    INR 1.02 09/15/2022    PROTIME 13.3 05/15/2023    PROTIME 13.5 04/23/2023    PROTIME 13.0 09/15/2022       Cultures:    No results found for: BLOODCX  No components found for: URINCX    ASSESSMENT/PLAN:    Ирина Randolph is a 77-year-old  female undergoing systemic chemotherapy under the direction of Dr. Mendiola " with a diagnosis of recurrent metastatic pancreatic cancer and a pleural effusion.  Her most recent cycle #7 of palliative chemotherapy with Gemzar and Abraxane was delivered on 4/13/2023 anticipating cycle #8 on 5/18/2023.    She has been responding to the above chemotherapy evidenced by a decremental trend in tumor marker CA 19-9 that was > 1000 in October 2022 when Gemzar Abraxane was initiated.  CA 19-9 was down to 67 on 4/25/2023 shortly after cycle #7 of Gemzar Abraxane.    Ms. Randolph presented to [facility] emergency room on 5/15/2023 under the direction of her PCP after presenting with worsening cough, and shortness of breath since discharge from [facility] on 4/28/2023.   Work-up and evaluation in the emergency room revealed right lower lobe pneumonia and bilateral pleural effusions.  She has been admitted for treatment of hospital-acquired pneumonia.      #1  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  She is status post Whipple procedure and adjuvant chemotherapy with Gemzar/Xeloda with poor tolerance.  Unfortunate, she developed metastatic disease with recurrent ascites and elevated CA 19-9 above 1000 in October 2022 and was initiated on palliative chemotherapy with Gemzar and Abraxane. Her CA 19-9 is trending down, 67 on 4/25/2023 and CT scan of the abdomen and pelvis on 4/27/2023 suggest a response to treatment.      Cycle #7 of palliative treatment with Gemzar and Abraxane delivered on 4/13/2023, Cycle #8 was tentatively planned for 5/18/2023 at follow-up appointment with Dr. Mendiola        #2  Hospital-acquired pneumonia-managed by attending    Chest x-ray 5/15/2023        Received a dose of azithromycin and ceftriaxone  Currently receiving Zosyn and vancomycin        CT angiogram of the chest on 5/15/2023  •  No evidence of pulmonary embolism, aortic aneurysm or dissection.  •  Moderate pulmonary arterial  hypertension.  •  Right lower lobar consolidation/atelectasis. Atelectatic changes in the lungs bilaterally pronounced   in the right middle and lower lobe and lingular segment of left upper lobe.  •  A tiny noncalcified nodule in the left upper lobe probably represent noncalcified granulomas.  •  A moderate right and a small left basal pleural effusion.  •  A small amount of fluid in the upper abdomen.  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 twice daily  • Metoprolol XL 50 mg every 24 hours  • Terazosin 2 mg p.o. nightly     Received Lasix 40 mg IV x1 and planned for an additional 2 doses (40 mg IV every 12 hours x 2)     #6  Normocytic anemia  Hemoglobin 9.6 with a hematocrit 29.8 today, 5/15/2023     Requested iron panel to include ferritin/folate/vitamin B12  Blood occult stool     Monitor CBC     Marek Stone MD    05/15/23  20:22 CDT

## 2023-05-16 NOTE — PROGRESS NOTES
Pt Name: Ирина Randolph  MRN: [id number]  [id number]  YOB: 1945  Date of evaluation: 5/16/2023    Chief Compliant    Subjective: Reports feeling a little better this morning.  Transport at bedside, anticipating 2D echocardiogram    HISTORY OF PRESENT ILLNESS:    Ирина Randolph is a 77-year-old  female undergoing systemic chemotherapy under the direction of Dr. Mendiola with a diagnosis of recurrent metastatic pancreatic cancer and a pleural effusion.  Her most recent cycle #7 of palliative chemotherapy with Gemzar and Abraxane was delivered on 4/13/2023 anticipating cycle #8 on 5/18/2023.    She has been responding to the above chemotherapy evidenced by a decremental trend in tumor marker CA 19-9 that was > 1000 in October 2022 when Gemzar Abraxane was initiated.  CA 19-9 was down to 67 on 4/25/2023 shortly after cycle #7 of Gemzar Abraxane.     Ms. Randolph presented to [facility] emergency room on 5/15/2023 under the direction of her PCP after presenting with worsening cough, and shortness of breath since discharge from [facility] on 4/28/2023.   Work-up and evaluation in the emergency room revealed right lower lobe pneumonia and bilateral pleural effusions.  She has been admitted for treatment of hospital-acquired pneumonia.     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 Surgeon Dr. Audi Molina/[facility]   8/13/2021-02/25/22-adjuvant Gemzar 1000 mg/m2 IV D 1, D8, D15 with Capecitabine 830 mg/m2 D1-21 every 28 days x6 cycles  1/14/22 Eliquis for DVT right upper  extremity November 2022  10/14/22-Initiated palliative frontline Gemzar Abraxane every 2 weeks  1/13/23 Decrease Abraxane to 100mg/m2 with each treatment     Cancer History  Ирина Randolph was first seen by me on 8/6/2021.  The patient was referred for a diagnosis of pancreatic malignancy.  She had initial complaints of abdominal pain.  5/5/20 MRI abdomen ([facility]): Moderate fatty replacement of the pancreas with several small cystic structures within or adjacent to the pancreatic head and uncinate process most suggestive of benign cysts. A follow-up MRI of the abdomen may be obtained in 1 year to assess for stability. No solid lesion is seen. Otherwise unremarkable MRI of the abdomen.  4/22/20 CT abd/pelvis (P): Focal inflammatory stranding associated with the pancreatic head. Radiographically I would favor this to represent acute pancreatitis. There is mild associated stranding within the descending and proximal transverse duodenum. There are 2 well-circumscribed hypodensities one in the region of the uncinate process and one slightly anterior to the pancreatic head likely representing sequela of pancreatitis but warranting follow-up on subsequent exam. The body and tail of the pancreas are spared. No evidence of pancreatic necrosis. The patient is status post hysterectomy and cholecystectomy. Bibasilar subsegmental atelectasis. No evidence of nephrolithiasis or obstructive uropathy. Diverticulosis of the distal descending and sigmoid colon without evidence of diverticulitis.  5/5/21 MRI abd ([facility]) There are multiple tiny-to-small foci of increased T2 signal seen within the pancreas, consistent with a combination of cysts and/or beaded dilatation of the tributaries of the main pancreatic duct, increased in size and number when compared to the prior similar study of 5/5/20. The patient is again noted to be status post cholecystectomy. The examination is otherwise within normal limits for age  with no abnormal postcontrast enhancement noted.  6/3/21 Upper EUS (Dr Ruperto Gale/[place]): CT Visualized portion of pancreas abnormal on ultrasound. There is a 26.7 mm solid mass in pancreatic tail. It abuts the splenic vessel without occlusion or varices. Pancreatic duct is dilated to 6 mm in the tail proximal to the mass. FNA x4 yields atypical cells. Celiac axis, liver appear normal, no local nodes. Body and head of pancreas normal.  6/3/21 Pancreas tail mass, FNA aspiration smear (x2), ThinPrep and cell block: Adenocarcinoma.  6/7/21 CA 19-9 407.1  6/28/21-she underwent Whipple procedure pancreatectomy by Dr. Audi Molina: Invasive ductal carcinoma 4.3 cm in greatest dimension, invasive in the peripancreatic adipose tissue, diffuse perineural invasion. Surgical margins were negative for carcinoma. The pancreas margins involved by a low-grade dysplasia. 1/6 nodes positive for metastatic cancer with direct extension. Distal fibrosis and pancreatitis was noted. Spleen was unremarkable.  Pathological stage pT2 N1 M0  8/6/2021-she was first seen by me.  Recommend adjuvant chemotherapy with gemcitabine/Xeloda.  She acknowledged understanding and agree with treatment.  8/09/2021 CT Chest W/Contrast No evidence of intrathoracic metastatic disease is identified.  8/09/2021 CT Abd/Pelvis W/Oral Contrast Prior distal pancreatectomy and splenectomy. There is a 1.5 cm circumscribed, nonenhancing cystic lesion along the inferior pancreatic head, perhaps a branch IPMN. Otherwise, no obvious pancreatic solid mass is seen. No suspicious adenopathy or evidence of distant metastatic disease in the abdomen or pelvis.Prior cholecystectomy.Liver steatosis. Colonic diverticulosis.    8/13/2021-initiation of adjuvant capecitabine/Gemzar   9/10/2021-I reviewed CT chest abdomen pelvis.  1/21/2022-CA 19-9 = 10  2/10/22 CT CHEST W CONTRAST No evidence of intrathoracic metastasis. Incidental findings described above.   2/10/22 CT  ABDOMEN PELVIS W IV CONTRAST  Partial pancreatectomy and splenectomy. Pancreatic head cysts measuring up to 1.6 cm, very similar to 8/9/2021. Continued surveillance imaging recommended. No evidence of intra-abdominal or pelvic metastasis.   2/18/2022-I reviewed results CT chest abdomen pelvis.  No evidence of recurrent disease.  Pancreatic head cyst stable.  02/25/22- Completion of adjuvant chemotherapy.   4/22/2022 CA 19-9-13  5/11/2022 Bone Density ([facility]) Osteopenia. Femoral Neck T-Score -2.0, Lumbar Spine T-Score -0.9  6/7/2022 Endoscopy ([facility]) Urease was negative  8/5/2022 CT Abd/Pelvis WO Contrast ([facility]) Fairly diffuse soft tissue infiltration with haziness and nodularity of there peripheral aspects of the peritoneal cavity most concerning for a process such as peritoneal carcinomatosis. Given history of pancreatic cancer,a PET scan may be considered for further evaluation. Small amount of ascites in the pelvis. Colonic diverticulosis.Large,fat-containing umbilical hernia. Tiny right pleural effusion with atelectasis in the lung bases.  8/22/2022 CT Chest W Contrast Calcified and noncalcified lung nodules may indicate inflammatory or infectious process and old granulomatous disease.  Follow-up in 3 months is recommended.Small right pleural effusion.Cirrhotic liver.  Free fluid in the abdomen with mesenteric edema upper abdomen.  Multiple lymph nodes in region of pancreatic head.  8/22/2022-surveillance  CT Abd/Pelvis W IV Contrast (oral) Partial pancreatectomy with absence of the pancreatic body and tail.  Multiple soft tissue and hypodense areas in the location of the pancreatic head indicating measuring up to 8 mm indicating cysts, residual tissue and possible soft tissue pancreatic  nodules.  A cyst or soft tissue nodule is identified measuring up to 1.2 cm.Indeterminate nodules in the location of the pancreatic head versus lymphadenopathy.  Consider MRI pancreas without and with IV contrast.Bilateral  extrarenal pelves.  4 mm left renal cyst. Soft tissue and fat herniates into the umbilical region measuring 4.7 cm indicating hernia.  There is mesentery within the hernia.  No bowel is within the hernia.  No evidence of free air..  Small amount of free fluid is within the abdomen and pelvis.  Mesenteric edema of the abdomen and pelvis.Rectosigmoid diverticulosis without acute diverticulitis.Slightly irregular liver contour may indicate early cirrhotic changes.Free fluid within the abdomen and pelvis.  Inflammation of the mesentery.  8/28/2022-I reviewed CT abdomen/pelvis.  New onset ascites.  Liver is smaller when compared to prior CT scan suggesting hydrophobic disease.  Radiology reported peripancreatic nodules.  Recommended CA 19-9 reviewed.  Recommend MRI pancreas.  Recommended paracentesis (send for cytology, total protein, LDH).  CMP same day of paracentesis.  9/12/2022 MRI Abdomen W WO Contrast MRCP Hepatic steatosis with a nodular contour suggestive of cirrhosis.Ascites.Absence of the spleen.Absence of the gallbladder.The patient appears to be status post partial pancreatectomy .  The nodular lesions identified on the CT scan dated August 19, 2022 are poorly visualized on this examination due to respiratory artifact and technique.Small renal cysts.Midline ventral hernia.  9/12/2022-CA 19-9 1004  9/15/2022 US Guided Parecentesis ([facility]) Limited grayscale ultrasound evaluation to assess for peritoneal fluid.4 quadrant evaluation shows trace fluid around the liver. No more than trace fluid at the right lower quadrant. No significant fluid is seen at the left upper quadrant or left lower quadrant.   9/19/2022 Ascites fluid,paracentesis: Malignant cells present.Adenocarcinoma.  9/19/2022 CT Abdomen WO Contrast ([facility]):Small amount of diffuse ascites within the abdomen, likely malignant ascites given the presence of nodular peritoneal and omental soft tissue thickening compatible with carcinomatosis. Diagnostic  paracentesis was performed following this examination with ultrasound guidance.Evidence previous resection of the pancreatic body and tail. There is a soft tissue implant or enlarged lymph node inferior to the celiac artery on the right which measures 1.6 x 1.6 cm, concerning for metastatic disease. There is a small cyst in subtle stable nodularity and patient replacement of the pancreatic head.   10/3/2022-essentially, findings of recurrent metastatic pancreatic adenocarcinoma.  Stage IV.  Recommend palliative Gemzar 1000 mg/M2 and Abraxane 125 mg/M2 every 2 weeks until disease progression or intolerable toxicity.  10/3/22 Chemotherapy consent signed  10/14/22-Initiated palliative frontline Gemzar Abraxane every 2 weeks  12/30/22 CA 19-9-815  1/6/23 CT Chest W Contrast No significant interval change.  1/6/23 CT Abd/Pelvis W IV Contrast (oral) Partial pancreatectomy with absence of pancreatic body and tail.  No significant change in size or number of a few cyst versus soft tissue nodules in region of pancreatic head. No retroperitoneal lymphadenopathy.Interval resolution of ascites.  1/13/2023-essentially, CT scan showed interval response to treatment.  Resolution of prior ascites.  Continue palliative chemotherapy with Gemzar/Abraxane.  1/13/23 Decrease Abraxane to 100mg/m2 with each treatment due to development of neuropathy.  1/27/23 CA 19-9-360  2/10/23 CA 19-9-238  2/24/23 CA 19-9-171  3/10/23 MRI Brain W WO Contrast No acute intracranial abnormality notified.No evidence of metastatic disease in the brain. Scattered T2 FLAIR white matter hyperintensities are nonspecific, but commonly seen in the setting of chronic microvascular ischemic disease.  4/23/2023-CT head without contrast remarkable for no acute intracranial process.  4/27/2023 -CT scan of the abdomen and pelvis with IV contrast reported a new right hepatic dome 8.2 cm rim-enhancing complex subcapsular collection with adjacent subcentimeter  hypodensity. New small right effusion with right basilar atelectasis.          Past Medical History:   Past Medical History:   Diagnosis Date    Acute bronchitis     Arthritis     Osteoarthritis    Cancer     Disease of thyroid gland     Gallbladder abscess     Hypertension      Past Surgical History:   Past Surgical History:   Procedure Laterality Date    ABDOMINAL HYSTERECTOMY      CHOLECYSTECTOMY  1994    COLONOSCOPY  04/23/2015    two polyps both removed  extensive diverticulosis    COLONOSCOPY N/A 06/17/2020    Procedure: COLONOSCOPY WITH ANESTHESIA;  Surgeon: Homer Zamarripa DO;  Location: [facility] ENDOSCOPY;  Service: Gastroenterology;  Laterality: N/A;  pre: Hx of polyps  post:  Anupama Dhaliwal    ENDOSCOPY  08/16/2016    normal    ENDOSCOPY N/A 06/17/2020    Procedure: ESOPHAGOGASTRODUODENOSCOPY WITH ANESTHESIA;  Surgeon: Homer Zamarripa DO;  Location: [facility] ENDOSCOPY;  Service: Gastroenterology;  Laterality: N/A;  pre: nausea  post:     ENDOSCOPY N/A 6/7/2022    Procedure: ESOPHAGOGASTRODUODENOSCOPY WITH ANESTHESIA;  Surgeon: Homer Zamarripa DO;  Location: [facility] ENDOSCOPY;  Service: Gastroenterology;  Laterality: N/A;  pre cough  post Anupama Zimmerman DO    PANCREAS SURGERY      Partial removal due to tumor    ROTATOR CUFF REPAIR  10/19/2017    SPLENECTOMY       Social History:   Social History     Socioeconomic History    Marital status:    Tobacco Use    Smoking status: Never    Smokeless tobacco: Never   Vaping Use    Vaping Use: Never used   Substance and Sexual Activity    Alcohol use: No    Drug use: No    Sexual activity: Yes     Partners: Male     Family History:   Family History   Problem Relation Age of Onset    Alcohol abuse Father     Breast cancer Maternal Aunt     Bone cancer Maternal Aunt     Leukemia Maternal Aunt     Colon cancer Neg Hx     Colon polyps Neg Hx     Esophageal cancer Neg Hx        Objective      Vitals:    05/15/23 2325 05/15/23 2340  05/15/23 2349 05/16/23 0324   BP: 123/64   130/63   BP Location: Left arm   Left arm   Patient Position: Lying   Lying   Pulse: 81 81 91 87   Resp: 18 20 18 18   Temp: 98.5 °F (36.9 °C)   98.3 °F (36.8 °C)   TempSrc: Oral   Oral   SpO2: 98% 98% 99% 97%   Weight:       Height:                  View : No data to display.                  Lab Results:    Lab Results (last 72 hours)       Procedure Component Value Units Date/Time    Basic Metabolic Panel [[id number]]  (Abnormal) Collected: 05/16/23 0519    Specimen: Blood Updated: 05/16/23 0618     Glucose 88 mg/dL      BUN 25 mg/dL      Creatinine 1.25 mg/dL      Sodium 135 mmol/L      Potassium 4.5 mmol/L      Chloride 105 mmol/L      CO2 17.0 mmol/L      Calcium 8.4 mg/dL      BUN/Creatinine Ratio 20.0     Anion Gap 13.0 mmol/L      eGFR 44.8 mL/min/1.73     Narrative:      GFR Normal >60  Chronic Kidney Disease <60  Kidney Failure <15    The GFR formula is only valid for adults with stable renal function between ages 18 and 70.    CBC (No Diff) [[id number]]  (Abnormal) Collected: 05/16/23 0519    Specimen: Blood Updated: 05/16/23 0600     WBC 12.61 10*3/mm3      RBC 2.96 10*6/mm3      Hemoglobin 8.9 g/dL      Hematocrit 28.2 %      MCV 95.3 fL      MCH 30.1 pg      MCHC 31.6 g/dL      RDW 17.2 %      RDW-SD 58.4 fl      MPV 10.2 fL      Platelets 443 10*3/mm3     Folate [[id number]] Collected: 05/16/23 0519    Specimen: Blood Updated: 05/16/23 0552    Vitamin B12 [[id number]] Collected: 05/16/23 0519    Specimen: Blood Updated: 05/16/23 0552    Iron Profile [[id number]]  (Abnormal) Collected: 05/15/23 1145    Specimen: Blood Updated: 05/15/23 1943     Iron 43 mcg/dL      Iron Saturation 13 %      Transferrin 224 mg/dL      TIBC 334 mcg/dL     Ferritin [[id number]]  (Abnormal) Collected: 05/15/23 1145    Specimen: Blood Updated: 05/15/23 1943     Ferritin 656.70 ng/mL     Narrative:      Results may be falsely decreased if patient taking Biotin.      Reticulocytes  [[id number]]  (Normal) Collected: 05/15/23 1145    Specimen: Blood Updated: 05/15/23 1922     Reticulocyte % 1.80 %      Reticulocyte Absolute 0.0556 10*6/mm3     S. Pneumo Ag Urine or CSF - Urine, Urine, Clean Catch [[id number]]  (Normal) Collected: 05/15/23 1817    Specimen: Urine, Clean Catch Updated: 05/15/23 1900     Strep Pneumo Ag Negative    Legionella Antigen, Urine - Urine, Urine, Clean Catch [[id number]]  (Normal) Collected: 05/15/23 1817    Specimen: Urine, Clean Catch Updated: 05/15/23 1859     LEGIONELLA ANTIGEN, URINE Negative    MRSA Screen, PCR (Inpatient) - Swab, Nares [[id number]]  (Normal) Collected: 05/15/23 1703    Specimen: Swab from Nares Updated: 05/15/23 1829     MRSA PCR No MRSA Detected    Narrative:      The negative predictive value of this diagnostic test is high and should only be used to consider de-escalating anti-MRSA therapy. A positive result may indicate colonization with MRSA and must be correlated clinically.    Respiratory Panel PCR w/COVID-19(SARS-CoV-2) CHARISSE/SONIA/POLY/PAD/COR/MAD/IVETTE In-House, NP Swab in UTM/VTM, 3-4 HR TAT - Swab, Nasopharynx [[id number]]  (Normal) Collected: 05/15/23 1703    Specimen: Swab from Nasopharynx Updated: 05/15/23 1803     ADENOVIRUS, PCR Not Detected     Coronavirus 229E Not Detected     Coronavirus HKU1 Not Detected     Coronavirus NL63 Not Detected     Coronavirus OC43 Not Detected     COVID19 Not Detected     Human Metapneumovirus Not Detected     Human Rhinovirus/Enterovirus Not Detected     Influenza A PCR Not Detected     Influenza B PCR Not Detected     Parainfluenza Virus 1 Not Detected     Parainfluenza Virus 2 Not Detected     Parainfluenza Virus 3 Not Detected     Parainfluenza Virus 4 Not Detected     RSV, PCR Not Detected     Bordetella pertussis pcr Not Detected     Bordetella parapertussis PCR Not Detected     Chlamydophila pneumoniae PCR Not Detected     Mycoplasma pneumo by PCR Not Detected    Narrative:      In the setting of a  "positive respiratory panel with a viral infection PLUS a negative procalcitonin without other underlying concern for bacterial infection, consider observing off antibiotics or discontinuation of antibiotics and continue supportive care. If the respiratory panel is positive for atypical bacterial infection (Bordetella pertussis, Chlamydophila pneumoniae, or Mycoplasma pneumoniae), consider antibiotic de-escalation to target atypical bacterial infection.    Lactic Acid, Plasma [[id number]]  (Normal) Collected: 05/15/23 1649    Specimen: Blood Updated: 05/15/23 1736     Lactate 1.2 mmol/L     Blood Culture - Blood, Wrist, Left [[id number]] Collected: 05/15/23 1649    Specimen: Blood from Wrist, Left Updated: 05/15/23 1727    Blood Culture - Blood, Hand, Left [[id number]] Collected: 05/15/23 1626    Specimen: Blood from Hand, Left Updated: 05/15/23 1726    Procalcitonin [[id number]]  (Normal) Collected: 05/15/23 1145    Specimen: Blood Updated: 05/15/23 1602     Procalcitonin 0.10 ng/mL     Narrative:      As a Marker for Sepsis (Non-Neonates):    1. <0.5 ng/mL represents a low risk of severe sepsis and/or septic shock.  2. >2 ng/mL represents a high risk of severe sepsis and/or septic shock.    As a Marker for Lower Respiratory Tract Infections that require antibiotic therapy:    PCT on Admission    Antibiotic Therapy       6-12 Hrs later    >0.5                Strongly Recommended  >0.25 - <0.5        Recommended   0.1 - 0.25          Discouraged              Remeasure/reassess PCT  <0.1                Strongly Discouraged     Remeasure/reassess PCT    As 28 day mortality risk marker: \"Change in Procalcitonin Result\" (>80% or <=80%) if Day 0 (or Day 1) and Day 4 values are available. Refer to http://www.[employer]-pct-calculator.com    Change in PCT <=80%  A decrease of PCT levels below or equal to 80% defines a positive change in PCT test result representing a higher risk for 28-day all-cause mortality of patients diagnosed " "with severe sepsis for septic shock.    Change in PCT >80%  A decrease of PCT levels of more than 80% defines a negative change in PCT result representing a lower risk for 28-day all-cause mortality of patients diagnosed with severe sepsis or septic shock.       Protime-INR [[id number]]  (Normal) Collected: 05/15/23 1145    Specimen: Blood Updated: 05/15/23 1225     Protime 13.3 Seconds      INR 1.00    D-dimer, Quantitative [[id number]]  (Abnormal) Collected: 05/15/23 1145    Specimen: Blood Updated: 05/15/23 1225     D-Dimer, Quantitative 5.20 MCGFEU/mL     Narrative:      According to the assay 's published package insert, a normal (<0.50 MCGFEU/mL) D-dimer result in conjunction with a non-high clinical probability assessment, excludes deep vein thrombosis (DVT) and pulmonary embolism (PE) with high sensitivity.    D-dimer values increase with age and this can make VTE exclusion of an older population difficult. To address this, the [employer], based on best available evidence and recent guidelines, recommends that clinicians use age-adjusted D-dimer thresholds in patients greater than 50 years of age with: a) a low probability of PE who do not meet all Pulmonary Embolism Rule Out Criteria, or b) in those with intermediate probability of PE.   The formula for an age-adjusted D-dimer cut-off is \"age/100\".  For example, a 60 year old patient would have an age-adjusted cut-off of 0.60 MCGFEU/mL and an 80 year old 0.80 MCGFEU/mL.    Basic Metabolic Panel [[id number]]  (Abnormal) Collected: 05/15/23 1145    Specimen: Blood Updated: 05/15/23 1220     Glucose 99 mg/dL      BUN 25 mg/dL      Creatinine 1.13 mg/dL      Sodium 133 mmol/L      Potassium 5.1 mmol/L      Comment: Slight hemolysis detected by analyzer. Results may be affected.        Chloride 104 mmol/L      CO2 20.0 mmol/L      Calcium 9.0 mg/dL      BUN/Creatinine Ratio 22.1     Anion Gap 9.0 mmol/L      eGFR 50.5 mL/min/1.73     " Narrative:      GFR Normal >60  Chronic Kidney Disease <60  Kidney Failure <15    The GFR formula is only valid for adults with stable renal function between ages 18 and 70.    BNP [[id number]]  (Abnormal) Collected: 05/15/23 1145    Specimen: Blood Updated: 05/15/23 1216     proBNP 2,714.0 pg/mL     Narrative:      Among patients with dyspnea, NT-proBNP is highly sensitive for the detection of acute congestive heart failure. In addition NT-proBNP of <300 pg/ml effectively rules out acute congestive heart failure with 99% negative predictive value.    Results may be falsely decreased if patient taking Biotin.      CBC & Differential [[id number]]  (Abnormal) Collected: 05/15/23 1145    Specimen: Blood Updated: 05/15/23 1158    Narrative:      The following orders were created for panel order CBC & Differential.  Procedure                               Abnormality         Status                     ---------                               -----------         ------                     CBC Auto Differential[[id number]]        Abnormal            Final result                 Please view results for these tests on the individual orders.    CBC Auto Differential [[id number]]  (Abnormal) Collected: 05/15/23 1145    Specimen: Blood Updated: 05/15/23 1158     WBC 11.49 10*3/mm3      RBC 3.14 10*6/mm3      Hemoglobin 9.6 g/dL      Hematocrit 29.8 %      MCV 94.9 fL      MCH 30.6 pg      MCHC 32.2 g/dL      RDW 17.2 %      RDW-SD 58.3 fl      MPV 10.3 fL      Platelets 466 10*3/mm3      Neutrophil % 53.8 %      Lymphocyte % 29.2 %      Monocyte % 13.5 %      Eosinophil % 2.6 %      Basophil % 0.6 %      Immature Grans % 0.3 %      Neutrophils, Absolute 6.18 10*3/mm3      Lymphocytes, Absolute 3.36 10*3/mm3      Monocytes, Absolute 1.55 10*3/mm3      Eosinophils, Absolute 0.30 10*3/mm3      Basophils, Absolute 0.07 10*3/mm3      Immature Grans, Absolute 0.03 10*3/mm3      nRBC 0.0 /100 WBC     Blood Gas, Arterial - [[id number]]   (Abnormal) Collected: 05/15/23 1156    Specimen: Arterial Blood Updated: 05/15/23 1153     Site Right Brachial     Nav's Test N/A     pH, Arterial 7.444 pH units      pCO2, Arterial 28.3 mm Hg      Comment: 84 Value below reference range        pO2, Arterial 75.5 mm Hg      Comment: 84 Value below reference range        HCO3, Arterial 19.4 mmol/L      Comment: 84 Value below reference range        Base Excess, Arterial -3.8 mmol/L      Comment: 84 Value below reference range        O2 Saturation, Arterial 97.1 %      Temperature 37.0 C      Barometric Pressure for Blood Gas 754 mmHg      Modality Room Air     Ventilator Mode NA     Collected by [id number]     Comment: Meter: [id number]     :  [id number]        pCO2, Temperature Corrected 28.3 mm Hg      pH, Temp Corrected 7.444 pH Units      pO2, Temperature Corrected 75.5 mm Hg             Radiology Results:    Imaging Results (Last 72 Hours)       Procedure Component Value Units Date/Time    US Venous Doppler Upper Extremity Right (duplex) [[id number]] Resulted: 05/15/23 1939     Updated: 05/15/23 2006    CT Angiogram Chest [[id number]] Collected: 05/15/23 1320     Updated: 05/15/23 1343    Narrative:      EXAMINATION: CT ANGIOGRAM CHEST-      5/15/2023 12:56 PM CDT     HISTORY: Pulmonary embolism (PE) suspected, unknown D-dimer     In order to have a CT radiation dose as low as reasonably achievable  Automated Exposure Control was utilized for adjustment of the mA and/or  KV according to patient size.     DLP in mGycm= 160     The CT angiography of the chest is performed after intravenous contrast  enhancement.     Images are acquired in axial plane and subsequent reconstruction in  coronal and sagittal planes.     There is no previous similar study for comparison. Correlation made with  CT scan of the chest dated 10/01/2019.     There is normal opacification of the pulmonary arteries and branches  bilaterally. There are no filling defects in the  opacified pulmonary  arterial bed.     RV/LV ratio is 35/48 which is normal. No finding to suggest right heart  strain.     Atheromatous changes of the thoracic aorta is seen. No aneurysmal  dilatation.     There is moderate dilatation of the main pulmonary artery or right and  left pulmonary artery which may suggest pulmonary arterial hypertension.     Atheromatous changes of the coronary arteries are seen.     There is no evidence of mediastinal or hilar mass or lymphadenopathy.     The lungs are poorly expanded. There are scattered areas of discoid  atelectasis more pronounced in the right middle right lower lobe and to  lesser extent left lower lobe and lingular segment of left upper lobe.  There is a small nodule in the left upper lobe, image #41 in series 6,  which measures 3 mm in diameter. Another similar nodule is seen in the  right upper lobe medially, image #44 and series 6, measuring 3 mm. The  central airway is patent. No endotracheal or endobronchial  nodule/lesions.     There is a right lower lobar consolidation/atelectasis.     There is moderate right and small left basal pleural effusion.     Limited visualized soft tissues of the back are poorly visualized or  evaluated due to extensive right chest collaterals due to possible  blockage of the right brachiocephalic veins.     The limited visualized abdomen appears unremarkable. The spleen is not  visualized this study is limited due to previous splenectomy. The  pancreas, the kidneys are incompletely visualized and not evaluated.  There is small amount of fluid around the liver and in the left  subphrenic diaphragmatic region.     Images are reviewed in bone window show chronic degenerative changes of  the thoracolumbar spine. No acute bony abnormality.     A left internal jugular approach [id number] is seen in place with  distal end at the atriocaval junction.       Impression:      1. No evidence of pulmonary embolism, aortic aneurysm or  dissection.  2. Atheromatous changes of coronary arteries.  3. Moderate pulmonary arterial hypertension.  4. Right lower lobar consolidation/atelectasis. Atelectatic changes in  the lungs bilaterally pronounced in the right middle and lower lobe and  lingular segment of left upper lobe.  5. A tiny noncalcified nodule in the left upper lobe probably represent  noncalcified granulomas. This was not seen in the previous study in  2019. A follow-up examination in 6 months is recommended to ensure  stability.  6. A moderate right and a small left basal pleural effusion. A small  amount of fluid in the upper abdomen.  7. The spleen is absent. The abdomen is incompletely included and not  well evaluated.  8. A significant collateral vascularity of the right chest wall which is  probably due to occlusion of the right brachiocephalic vein?.                             This report was finalized on 05/15/2023 13:40 by Dr. Amber Regalado MD.    XR Chest 1 View [[id number]] Collected: 05/15/23 1146     Updated: 05/15/23 1152    Narrative:      EXAM: XR CHEST 1 VW-      DATE: 5/15/2023 11:36 AM CDT     HISTORY: Shortness of air       COMPARISON: 05/15/2023.      TECHNIQUE:  Single view. Frontal view of the chest. 1 images.       FINDINGS:    LEFT chest port with grossly intact catheter, tip projects at upper  RIGHT atrium. No measurable pneumothorax. Similar linear opacity at the  RIGHT midlung. Similar blunting of the RIGHT costophrenic angle and  elevation of the RIGHT hemidiaphragm versus basilar opacity. Milder  streaky opacity at the LEFT lung base, which may represent atelectasis.  Lungs appear hyperinflated, which can be seen with chronic lung disease.  Similar enlarged cardiac silhouette. Upper mediastinum appears within  normal limits. Calcified aortic atherosclerosis. Widening of the RIGHT  acromioclavicular joint, most likely postoperative. No acute bony  finding.          Impression:      1. Similar findings compared  to earlier same day including linear  opacity at the RIGHT midlung, small pleural fluid and RIGHT  hemidiaphragm elevation versus basilar opacity.  2. Similar milder streaky opacity at the LEFT lung base, may represent  atelectasis.  3. Lungs appear hyperinflated, which can be seen with chronic lung  disease.  4. Similar enlarged cardiac silhouette.  This report was finalized on 05/15/2023 11:48 by Dr Judy uW MD.          PHYSICAL EXAM:    CONSTITUTIONAL: Alert, appropriate, dyspnea with exertion. SOA improved somewhat while at rest  ENT: Mucous membranes moist, no oropharyngeal lesions   NECK: Supple, no masses   CHEST/LUNGS: Scattered rales in upper and lower fields anterior and posteriorly diffusely.  Mild to moderate wheezing also appreciated on auscultation.  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  She is status post Whipple procedure and adjuvant chemotherapy with Gemzar/Xeloda with poor tolerance.  Unfortunate, she developed metastatic disease with recurrent ascites and elevated CA 19-9 above 1000 in October 2022 and was initiated on palliative chemotherapy with Gemzar and Abraxane. Her CA 19-9 is trending down, 67 on 4/25/2023 and CT scan of the abdomen and pelvis on 4/27/2023 suggest a response to treatment.      Cycle #7 of palliative treatment with Gemzar and Abraxane delivered on 4/13/2023, Cycle #8 was tentatively planned for 5/18/2023 at  follow-up appointment with Dr. Mendiola        #2  Hospital-acquired pneumonia-managed by attending     Chest x-ray 5/15/2023          Received a dose of azithromycin and ceftriaxone  Currently receiving Zosyn and vancomycin        CT angiogram of the chest on 5/15/2023   No evidence of pulmonary embolism, aortic aneurysm or dissection.   Moderate pulmonary arterial hypertension.   Right lower lobar consolidation/atelectasis. Atelectatic changes in the lungs bilaterally pronounced   in the right middle and lower lobe and lingular segment of left upper lobe.   A tiny noncalcified nodule in the left upper lobe probably represent noncalcified granulomas.   A moderate right and a small left basal pleural effusion.   A small amount of fluid in the upper abdomen.   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 twice daily  Metoprolol XL 50 mg every 24 hours  Terazosin 2 mg p.o. nightly     Received Lasix 40 mg IV x1 and planned for an additional 2 doses (40 mg IV every 12 hours x 2) today, 5/16/2023     #6  Normocytic anemia             Ferrlecit 250 mg IV daily x4 first dose today 5/16/2023    Blood occult stool requested     Monitor CBC     Discussed plan of care with Ирина Somers, SKYLER    5/16/2023    06:32 CDT    Physicians attestation and  contribution:    I, Marek Stone, personally and independently performed an evaluation on Ирина Randolph  I have reviewed relevant medical information/data to include but not limited to the medication list, relevant appropriate lab work and imaging when applicable.  I reviewed other physician's notes, ancillary services and nurses assessments.  I have reviewed the above documentation completed by Kristy HOLLAND  Please see my additional addended and/or modified contributions to the history of present illness, physical examination and assessment/medical decision-making and plan that reflects my findings and impressions.  I discussed the essential elements of the care plan with Kristy HOLLAND and the patient.  I have encouraged and answered all the questions raised to the patient's understanding and satisfaction.  I concur with the above stated.    Subjective: Awake, alert and feeling a little bit better but still short of breath at rest in bed.  She was using her incentive spirometer when I came in early this morning.    Objective: Continues with bilateral rales and crackles and wheezes in upper and lower lung fields diffusely.    Assessment/plan:    #1  Stage IV pancreatic cancer   Cycle #7 of palliative treatment with Gemzar and Abraxane delivered on 4/13/2023, Cycle #8 was tentatively planned for 5/18/2023 at follow-up appointment with Dr. Mendiola.  This appointment will need to be pushed out at least 1 week, rescheduling will be performed.    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

## 2023-05-16 NOTE — PROGRESS NOTES
[facility]  INPATIENT PROGRESS NOTE    Patient Name: Ирина Randolph  Date of Admission: 5/15/2023  Today's Date: 05/16/23  Length of Stay: 1  Primary Care Physician: Adrián Ayala DO    Subjective   Chief Complaint: Shortness of breath  HPI  Ms. Randolph is a 77-year-old female who presented to [facility] on 5/15 shortness of breath and cough.  Of note, she was admitted to [facility] on 4/23 to 4/28/2023 with hypertensive emergency, acute pulmonary edema and acute intractable headache.  Patient saw her primary care provider due to worsening cough and was advised to seek evaluation at [facility].  Work-up in the ER revealed right lower lobular consolidation/atelectasis.  No evidence of pulmonary embolism.  Respiratory panel, PCR negative.    Sitting up in bed.  States she is feeling much better today.  On room air.  No fever.  Continues to have a nonproductive cough.  Her main complaint yesterday was generalized weakness.  She has been up independently out of bed today.  No PT/OT needs.    Review of Systems   All pertinent negatives and positives are as above. All other systems have been reviewed and are negative unless otherwise stated.     Objective    Temp:  [98.1 °F (36.7 °C)-98.5 °F (36.9 °C)] 98.5 °F (36.9 °C)  Heart Rate:  [81-93] 87  Resp:  [18-22] 18  BP: ()/(63-97) 114/97  Physical Exam  Vitals reviewed.   Constitutional:       General: She is not in acute distress.     Appearance: She is not toxic-appearing.      Comments: Up in bed.  No acute distress.  On room air.  No family at bedside.   HENT:      Head: Normocephalic and atraumatic.      Mouth/Throat:      Mouth: Mucous membranes are moist.      Pharynx: Oropharynx is clear.   Eyes:      Extraocular Movements: Extraocular movements intact.      Conjunctiva/sclera: Conjunctivae normal.      Pupils: Pupils are equal, round, and reactive to light.   Cardiovascular:       Rate and Rhythm: Normal rate and regular rhythm.      Pulses: Normal pulses.      Heart sounds: No murmur heard.  Pulmonary:      Effort: Pulmonary effort is normal. No respiratory distress.      Breath sounds: Normal breath sounds. No wheezing or rhonchi.   Abdominal:      General: Bowel sounds are normal. There is no distension.      Palpations: Abdomen is soft.      Tenderness: There is no abdominal tenderness.   Musculoskeletal:         General: No swelling or tenderness. Normal range of motion.      Cervical back: Normal range of motion and neck supple. No muscular tenderness.   Skin:     General: Skin is warm and dry.      Findings: No erythema or rash.   Neurological:      General: No focal deficit present.      Mental Status: She is alert and oriented to person, place, and time.      Cranial Nerves: No cranial nerve deficit.      Motor: No weakness.   Psychiatric:         Mood and Affect: Mood normal.         Behavior: Behavior normal.     Results Review:  I have reviewed the labs, radiology results, and diagnostic studies.    Laboratory Data:   Results from last 7 days   Lab Units 05/16/23  0519 05/15/23  1145   WBC 10*3/mm3 12.61* 11.49*   HEMOGLOBIN g/dL 8.9* 9.6*   HEMATOCRIT % 28.2* 29.8*   PLATELETS 10*3/mm3 443 466*        Results from last 7 days   Lab Units 05/16/23  0519 05/15/23  1145 05/15/23  0726   SODIUM mmol/L 135* 133* 137   POTASSIUM mmol/L 4.5 5.1 5.5*   CHLORIDE mmol/L 105 104 106   TOTAL CO2 mmol/L  --   --  16*   CO2 mmol/L 17.0* 20.0*  --    BUN mg/dL 25* 25* 25   CREATININE mg/dL 1.25* 1.13* 1.14*   CALCIUM mg/dL 8.4* 9.0 9.0   GLUCOSE mg/dL 88 99 130*       Culture Data:   Microbiology Results (last 10 days)       Procedure Component Value - Date/Time    S. Pneumo Ag Urine or CSF - Urine, Urine, Clean Catch [[id number]]  (Normal) Collected: 05/15/23 1817    Lab Status: Final result Specimen: Urine, Clean Catch Updated: 05/15/23 1900     Strep Pneumo Ag Negative    Legionella  Antigen, Urine - Urine, Urine, Clean Catch [[id number]]  (Normal) Collected: 05/15/23 1817    Lab Status: Final result Specimen: Urine, Clean Catch Updated: 05/15/23 1859     LEGIONELLA ANTIGEN, URINE Negative    MRSA Screen, PCR (Inpatient) - Swab, Nares [[id number]]  (Normal) Collected: 05/15/23 1703    Lab Status: Final result Specimen: Swab from Nares Updated: 05/15/23 1829     MRSA PCR No MRSA Detected    Narrative:      The negative predictive value of this diagnostic test is high and should only be used to consider de-escalating anti-MRSA therapy. A positive result may indicate colonization with MRSA and must be correlated clinically.    Respiratory Panel PCR w/COVID-19(SARS-CoV-2) CHARISSE/SONIA/POLY/PAD/COR/MAD/IVETTE In-House, NP Swab in UTM/VTM, 3-4 HR TAT - Swab, Nasopharynx [[id number]]  (Normal) Collected: 05/15/23 1703    Lab Status: Final result Specimen: Swab from Nasopharynx Updated: 05/15/23 1803     ADENOVIRUS, PCR Not Detected     Coronavirus 229E Not Detected     Coronavirus HKU1 Not Detected     Coronavirus NL63 Not Detected     Coronavirus OC43 Not Detected     COVID19 Not Detected     Human Metapneumovirus Not Detected     Human Rhinovirus/Enterovirus Not Detected     Influenza A PCR Not Detected     Influenza B PCR Not Detected     Parainfluenza Virus 1 Not Detected     Parainfluenza Virus 2 Not Detected     Parainfluenza Virus 3 Not Detected     Parainfluenza Virus 4 Not Detected     RSV, PCR Not Detected     Bordetella pertussis pcr Not Detected     Bordetella parapertussis PCR Not Detected     Chlamydophila pneumoniae PCR Not Detected     Mycoplasma pneumo by PCR Not Detected    Narrative:      In the setting of a positive respiratory panel with a viral infection PLUS a negative procalcitonin without other underlying concern for bacterial infection, consider observing off antibiotics or discontinuation of antibiotics and continue supportive care. If the respiratory panel is positive for atypical  bacterial infection (Bordetella pertussis, Chlamydophila pneumoniae, or Mycoplasma pneumoniae), consider antibiotic de-escalation to target atypical bacterial infection.            Radiology Data:   Imaging Results (Last 24 Hours)       Procedure Component Value Units Date/Time    US Venous Doppler Upper Extremity Right (duplex) [[id number]] Resulted: 05/15/23 1939     Updated: 05/15/23 2006    CT Angiogram Chest [[id number]] Collected: 05/15/23 1320     Updated: 05/15/23 1343    Narrative:      EXAMINATION: CT ANGIOGRAM CHEST-      5/15/2023 12:56 PM CDT     HISTORY: Pulmonary embolism (PE) suspected, unknown D-dimer     In order to have a CT radiation dose as low as reasonably achievable  Automated Exposure Control was utilized for adjustment of the mA and/or  KV according to patient size.     DLP in mGycm= 160     The CT angiography of the chest is performed after intravenous contrast  enhancement.     Images are acquired in axial plane and subsequent reconstruction in  coronal and sagittal planes.     There is no previous similar study for comparison. Correlation made with  CT scan of the chest dated 10/01/2019.     There is normal opacification of the pulmonary arteries and branches  bilaterally. There are no filling defects in the opacified pulmonary  arterial bed.     RV/LV ratio is 35/48 which is normal. No finding to suggest right heart  strain.     Atheromatous changes of the thoracic aorta is seen. No aneurysmal  dilatation.     There is moderate dilatation of the main pulmonary artery or right and  left pulmonary artery which may suggest pulmonary arterial hypertension.     Atheromatous changes of the coronary arteries are seen.     There is no evidence of mediastinal or hilar mass or lymphadenopathy.     The lungs are poorly expanded. There are scattered areas of discoid  atelectasis more pronounced in the right middle right lower lobe and to  lesser extent left lower lobe and lingular segment of left  upper lobe.  There is a small nodule in the left upper lobe, image #41 in series 6,  which measures 3 mm in diameter. Another similar nodule is seen in the  right upper lobe medially, image #44 and series 6, measuring 3 mm. The  central airway is patent. No endotracheal or endobronchial  nodule/lesions.     There is a right lower lobar consolidation/atelectasis.     There is moderate right and small left basal pleural effusion.     Limited visualized soft tissues of the back are poorly visualized or  evaluated due to extensive right chest collaterals due to possible  blockage of the right brachiocephalic veins.     The limited visualized abdomen appears unremarkable. The spleen is not  visualized this study is limited due to previous splenectomy. The  pancreas, the kidneys are incompletely visualized and not evaluated.  There is small amount of fluid around the liver and in the left  subphrenic diaphragmatic region.     Images are reviewed in bone window show chronic degenerative changes of  the thoracolumbar spine. No acute bony abnormality.     A left internal jugular approach [id number] is seen in place with  distal end at the atriocaval junction.       Impression:      1. No evidence of pulmonary embolism, aortic aneurysm or dissection.  2. Atheromatous changes of coronary arteries.  3. Moderate pulmonary arterial hypertension.  4. Right lower lobar consolidation/atelectasis. Atelectatic changes in  the lungs bilaterally pronounced in the right middle and lower lobe and  lingular segment of left upper lobe.  5. A tiny noncalcified nodule in the left upper lobe probably represent  noncalcified granulomas. This was not seen in the previous study in  2019. A follow-up examination in 6 months is recommended to ensure  stability.  6. A moderate right and a small left basal pleural effusion. A small  amount of fluid in the upper abdomen.  7. The spleen is absent. The abdomen is incompletely included and not  well  evaluated.  8. A significant collateral vascularity of the right chest wall which is  probably due to occlusion of the right brachiocephalic vein?.                             This report was finalized on 05/15/2023 13:40 by Dr. Amber Regalado MD.            I have reviewed the patient's current medications.     Assessment/Plan   Assessment  Active Hospital Problems    Diagnosis     **Pleural effusion     Hospital-acquired pneumonia     Volume overload     SHAVON (acute kidney injury)     Pancreatic mass     Essential hypertension        Treatment Plan  CTA chest revealed right lower lobular consolidation/atelectasis. Respiratory panel, PCR negative.  Strep pneumo and Legionella urinary antigens negative.  MRSA nasal screen negative.  WBC 11.  Procalcitonin and lactate normal.  No fever.  On room air.  SKYLER Lagos started her on vancomycin and Zosyn for concern for hospital-acquired pneumonia.  Vancomycin discontinued as MRSA nasal screen negative.  On Zosyn.  Follow blood culture.    CTA noted significant collateral vascularity of the right chest wall which is probably due to occlusion of the right brachiocephalic vein.  Venous duplex preliminary shows no thrombus in right upper extremity. Lovenox for VTE prophylaxis.    Results for orders placed during the hospital encounter of 05/15/23    Adult Transthoracic Echo Complete W/ Cont if Necessary Per Protocol    Interpretation Summary    Left ventricular systolic function is normal. Left ventricular ejection fraction appears to be 61 - 65%.    Abnormal global longitudinal LV strain (GLS) = -8.2    The following segments are hypokinetic: mid inferolateral, mid anterolateral, apical anterior, apical inferior, apical lateral and apex.    Left ventricular diastolic dysfunction is noted.    Moderate mitral annular calcification is present. Mild to moderate mitral valve regurgitation is present with an anteriorly-directed jet noted    Mild pulmonary hypertension is  present.    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     Will need follow-up CT chest to monitor tiny noncalcified nodule in the left upper lobe.    Medical Decision Making  Number and Complexity of problems: 2 acute problems in the form of possible pneumonia right lower lobe and possible occlusion of the right brachiocephalic vein  Differential Diagnosis: None considered at present    Conditions and Status        Condition is improving.     [facility] Data  External documents reviewed: prior epic records  Cardiac tracing (EKG, telemetry) interpretation: Sinus rhythm  Radiology interpretation: interpreted by radiology  Labs reviewed: As above  Any tests that were considered but not ordered: None considered at present     Decision rules/scores evaluated (example [id number], Wells, etc): none considered at present     Discussed with: Patient and Dr. Donahue     Care Planning  Shared decision making: Patient is agreeable to ongoing work-up and treatment  Code status and discussions: Full code with full interventions    Disposition  Social Determinants of Health that impact treatment or disposition: None identified at present  I expect the patient to be discharged to home in 1-2 days.     Electronically signed by SKYLER Kurtz, 05/16/23, 12:42 CDT.

## 2023-05-16 NOTE — CASE MANAGEMENT/SOCIAL WORK
Discharge Planning Assessment  [facility]     Patient Name: Ирина Randolph  MRN: [id number]  Today's Date: 5/16/2023    Admit Date: 5/15/2023    Plan: Patient lives with spouse and has good family support.  Does not currently use dme or outside services. Will continue to follow for dc needs.   Discharge Needs Assessment       Row Name 05/16/23 1449       Living Environment    People in Home spouse    Current Living Arrangements home    Quality of Family Relationships supportive    Able to Return to Prior Arrangements yes       Resource/Environmental Concerns    Resource/Environmental Concerns none       Transition Planning    Patient/Family Anticipates Transition to home                   Discharge Plan       Row Name 05/16/23 7020       Plan    Plan Patient lives with spouse and has good family support.  Does not currently use dme or outside services. Will continue to follow for dc needs.                  Continued Care and Services - Admitted Since 5/15/2023    Coordination has not been started for this encounter.          Demographic Summary    No documentation.                  Functional Status    No documentation.                  Psychosocial    No documentation.                  Abuse/Neglect    No documentation.                  Legal    No documentation.                  Substance Abuse    No documentation.                  Patient Forms    No documentation.                     Merlina A Fletcher, RN

## 2023-05-16 NOTE — PLAN OF CARE
Goal Outcome Evaluation:  Plan of Care Reviewed With: patient, spouse        Progress: no change  Outcome Evaluation: OT eval completed. Pt in fowlers upon therapist arrival; A&Ox4; No pain reported; Pt's  also present. Pt reports I with all BADLs and fxl ambulation at PLOF. Pt performed supine<>sit I. Pt donned B shoes I. Pt performed all transfers I. Pt ambulated from bed<>BR I. BUE strength 4/5 with Pt reporting strength is at baseline. Skilled OT intervention not indicated at this time. Recommend home at discharge.

## 2023-05-16 NOTE — PLAN OF CARE
Goal Outcome Evaluation:  Plan of Care Reviewed With: patient        Progress: no change  Outcome Evaluation: VSS. Denies pain this shift. NSR 76-94 on tele. IV Lasix given with good results. OB stool done today, negative. IV abx cont. IV iron given. PT/OT working with pt. Echo done, see results. Safety maintained. Will cont to monitor and call MD with any changes.

## 2023-05-16 NOTE — PROGRESS NOTES
"Pharmacy Dosing Service  Pharmacokinetics  Vancomycin Follow-up Evaluation    Assessment/Action/Plan:  Current Order: Vancomycin 1250 mg IVPB every 24 hours  Current end date:5/21/23  Levels: ordered, due at 0800 5/18  Additional antimicrobial agent(s): Zosyn    Renal function stable.  Continues on Vancomycin 1250 mg iv q 24hrs for Pneumonia.  Trough level due at 0800 5/18. Pharmacy will continue to follow daily and adjust dose accordingly.     Subjective:  Ирина Randolph is a 77 y.o. female currently on Vancomycin 1250 mg IV every 24 hours for the treatment of Pneumonia, day 2 of treatment.    AUC Model Data:  Regimen: 1250 mg IV every 24 hours.  Exposure target: AUC24 (range)400-600 mg/L.hr   AUC24,ss: 583 mg/L.hr  PAUC*: 87 %  Ctrough,ss: 18.4 mg/L  Pconc*: 42 %  Tox.: 15 %    Objective:  Ht: 165.1 cm (65\"); Wt: 74.4 kg (164 lb)  Estimated Creatinine Clearance: 38.7 mL/min (A) (by C-G formula based on SCr of 1.25 mg/dL (H)).   Creatinine   Date Value Ref Range Status   05/16/2023 1.25 (H) 0.57 - 1.00 mg/dL Final   05/15/2023 1.13 (H) 0.57 - 1.00 mg/dL Final   05/15/2023 1.14 (H) 0.57 - 1.00 mg/dL Final   05/09/2023 1.14 (H) 0.57 - 1.00 mg/dL Final   10/14/2022 0.6 0.5 - 1.0 mg/dL Final   09/19/2022 0.62 0.57 - 1.00 mg/dL Final   09/12/2022 0.6 0.5 - 1.0 mg/dL Final   03/04/2022 0.6 0.5 - 1.0 mg/dL Final      Lab Results   Component Value Date    WBC 12.61 (H) 05/16/2023    WBC 11.49 (H) 05/15/2023    WBC 9.8 04/27/2023       No results found for: VANCOPEAK, VANCOTROUGH, VANCORANDOM    Culture Results:  Microbiology Results (last 10 days)       Procedure Component Value - Date/Time    S. Pneumo Ag Urine or CSF - Urine, Urine, Clean Catch [[id number]]  (Normal) Collected: 05/15/23 1817    Lab Status: Final result Specimen: Urine, Clean Catch Updated: 05/15/23 1900     Strep Pneumo Ag Negative    Legionella Antigen, Urine - Urine, Urine, Clean Catch [[id number]]  (Normal) Collected: 05/15/23 1817    Lab Status: Final " result Specimen: Urine, Clean Catch Updated: 05/15/23 1859     LEGIONELLA ANTIGEN, URINE Negative    MRSA Screen, PCR (Inpatient) - Swab, Nares [[id number]]  (Normal) Collected: 05/15/23 1703    Lab Status: Final result Specimen: Swab from Nares Updated: 05/15/23 1829     MRSA PCR No MRSA Detected    Narrative:      The negative predictive value of this diagnostic test is high and should only be used to consider de-escalating anti-MRSA therapy. A positive result may indicate colonization with MRSA and must be correlated clinically.    Respiratory Panel PCR w/COVID-19(SARS-CoV-2) CHARISSE/SONIA/POLY/PAD/COR/MAD/IVETTE In-House, NP Swab in UTM/VTM, 3-4 HR TAT - Swab, Nasopharynx [[id number]]  (Normal) Collected: 05/15/23 1703    Lab Status: Final result Specimen: Swab from Nasopharynx Updated: 05/15/23 1803     ADENOVIRUS, PCR Not Detected     Coronavirus 229E Not Detected     Coronavirus HKU1 Not Detected     Coronavirus NL63 Not Detected     Coronavirus OC43 Not Detected     COVID19 Not Detected     Human Metapneumovirus Not Detected     Human Rhinovirus/Enterovirus Not Detected     Influenza A PCR Not Detected     Influenza B PCR Not Detected     Parainfluenza Virus 1 Not Detected     Parainfluenza Virus 2 Not Detected     Parainfluenza Virus 3 Not Detected     Parainfluenza Virus 4 Not Detected     RSV, PCR Not Detected     Bordetella pertussis pcr Not Detected     Bordetella parapertussis PCR Not Detected     Chlamydophila pneumoniae PCR Not Detected     Mycoplasma pneumo by PCR Not Detected    Narrative:      In the setting of a positive respiratory panel with a viral infection PLUS a negative procalcitonin without other underlying concern for bacterial infection, consider observing off antibiotics or discontinuation of antibiotics and continue supportive care. If the respiratory panel is positive for atypical bacterial infection (Bordetella pertussis, Chlamydophila pneumoniae, or Mycoplasma pneumoniae), consider antibiotic  de-escalation to target atypical bacterial infection.            LAURITA Jaffe [facility]   05/16/23 07:37 CDT

## 2023-05-17 ENCOUNTER — TELEPHONE (OUTPATIENT)
Dept: INTERNAL MEDICINE | Facility: CLINIC | Age: 78
End: 2023-05-17
Payer: MEDICARE

## 2023-05-17 ENCOUNTER — READMISSION MANAGEMENT (OUTPATIENT)
Dept: CALL CENTER | Facility: HOSPITAL | Age: 78
End: 2023-05-17
Payer: MEDICARE

## 2023-05-17 VITALS
BODY MASS INDEX: 27.09 KG/M2 | HEART RATE: 101 BPM | DIASTOLIC BLOOD PRESSURE: 73 MMHG | SYSTOLIC BLOOD PRESSURE: 116 MMHG | RESPIRATION RATE: 20 BRPM | HEIGHT: 65 IN | TEMPERATURE: 98.6 F | WEIGHT: 162.6 LBS | OXYGEN SATURATION: 94 %

## 2023-05-17 LAB
DEPRECATED RDW RBC AUTO: 58.8 FL (ref 37–54)
ERYTHROCYTE [DISTWIDTH] IN BLOOD BY AUTOMATED COUNT: 17.4 % (ref 12.3–15.4)
HCT VFR BLD AUTO: 26.4 % (ref 34–46.6)
HGB BLD-MCNC: 8.3 G/DL (ref 12–15.9)
MCH RBC QN AUTO: 29.5 PG (ref 26.6–33)
MCHC RBC AUTO-ENTMCNC: 31.4 G/DL (ref 31.5–35.7)
MCV RBC AUTO: 94 FL (ref 79–97)
PLATELET # BLD AUTO: 413 10*3/MM3 (ref 140–450)
PMV BLD AUTO: 11 FL (ref 6–12)
QT INTERVAL: 354 MS
QTC INTERVAL: 415 MS
RBC # BLD AUTO: 2.81 10*6/MM3 (ref 3.77–5.28)
WBC NRBC COR # BLD: 10.57 10*3/MM3 (ref 3.4–10.8)

## 2023-05-17 PROCEDURE — 94664 DEMO&/EVAL PT USE INHALER: CPT

## 2023-05-17 PROCEDURE — 85027 COMPLETE CBC AUTOMATED: CPT | Performed by: NURSE PRACTITIONER

## 2023-05-17 PROCEDURE — 25010000002 NA FERRIC GLUC CPLX PER 12.5 MG: Performed by: INTERNAL MEDICINE

## 2023-05-17 PROCEDURE — 25010000002 FUROSEMIDE PER 20 MG: Performed by: NURSE PRACTITIONER

## 2023-05-17 PROCEDURE — 94799 UNLISTED PULMONARY SVC/PX: CPT

## 2023-05-17 PROCEDURE — 25010000002 PIPERACILLIN SOD-TAZOBACTAM PER 1 G: Performed by: NURSE PRACTITIONER

## 2023-05-17 RX ORDER — DEXTROMETHORPHAN POLISTIREX 30 MG/5ML
60 SUSPENSION ORAL EVERY 12 HOURS SCHEDULED
Qty: 280 ML | Refills: 0 | Status: SHIPPED | OUTPATIENT
Start: 2023-05-17

## 2023-05-17 RX ORDER — FUROSEMIDE 10 MG/ML
40 INJECTION INTRAMUSCULAR; INTRAVENOUS ONCE
Status: COMPLETED | OUTPATIENT
Start: 2023-05-17 | End: 2023-05-17

## 2023-05-17 RX ORDER — AMOXICILLIN AND CLAVULANATE POTASSIUM 875; 125 MG/1; MG/1
1 TABLET, FILM COATED ORAL 2 TIMES DAILY
Qty: 6 TABLET | Refills: 0 | Status: SHIPPED | OUTPATIENT
Start: 2023-05-17 | End: 2023-05-20

## 2023-05-17 RX ADMIN — NIFEDIPINE 30 MG: 30 TABLET, FILM COATED, EXTENDED RELEASE ORAL at 09:04

## 2023-05-17 RX ADMIN — PANCRELIPASE 12000 UNITS OF LIPASE: 60000; 12000; 38000 CAPSULE, DELAYED RELEASE PELLETS ORAL at 08:59

## 2023-05-17 RX ADMIN — PANCRELIPASE 12000 UNITS OF LIPASE: 60000; 12000; 38000 CAPSULE, DELAYED RELEASE PELLETS ORAL at 12:38

## 2023-05-17 RX ADMIN — POTASSIUM CHLORIDE 10 MEQ: 10 CAPSULE, COATED, EXTENDED RELEASE ORAL at 08:59

## 2023-05-17 RX ADMIN — TAZOBACTAM SODIUM AND PIPERACILLIN SODIUM 4.5 G: 500; 4 INJECTION, SOLUTION INTRAVENOUS at 08:57

## 2023-05-17 RX ADMIN — SODIUM CHLORIDE 250 MG: 9 INJECTION, SOLUTION INTRAVENOUS at 12:34

## 2023-05-17 RX ADMIN — LEVOTHYROXINE SODIUM 137 MCG: 112 TABLET ORAL at 05:28

## 2023-05-17 RX ADMIN — TAZOBACTAM SODIUM AND PIPERACILLIN SODIUM 4.5 G: 500; 4 INJECTION, SOLUTION INTRAVENOUS at 00:12

## 2023-05-17 RX ADMIN — IPRATROPIUM BROMIDE AND ALBUTEROL SULFATE 3 ML: 2.5; .5 SOLUTION RESPIRATORY (INHALATION) at 10:08

## 2023-05-17 RX ADMIN — MEGESTROL ACETATE 40 MG: 40 TABLET ORAL at 09:04

## 2023-05-17 RX ADMIN — IPRATROPIUM BROMIDE AND ALBUTEROL SULFATE 3 ML: 2.5; .5 SOLUTION RESPIRATORY (INHALATION) at 06:50

## 2023-05-17 RX ADMIN — LOSARTAN POTASSIUM 25 MG: 50 TABLET, FILM COATED ORAL at 08:59

## 2023-05-17 RX ADMIN — FUROSEMIDE 40 MG: 10 INJECTION, SOLUTION INTRAVENOUS at 12:38

## 2023-05-17 RX ADMIN — Medication 10 ML: at 09:06

## 2023-05-17 RX ADMIN — LOSARTAN POTASSIUM 25 MG: 50 TABLET, FILM COATED ORAL at 00:11

## 2023-05-17 RX ADMIN — FLUTICASONE PROPIONATE 2 SPRAY: 50 SPRAY, METERED NASAL at 08:58

## 2023-05-17 RX ADMIN — METOPROLOL SUCCINATE 50 MG: 50 TABLET, EXTENDED RELEASE ORAL at 08:57

## 2023-05-17 RX ADMIN — ALPRAZOLAM 0.25 MG: 0.25 TABLET ORAL at 00:11

## 2023-05-17 NOTE — OUTREACH NOTE
Prep Survey    Flowsheet Row Responses   [facility] patient discharged from? [place]   Is LACE score < 7 ? No   Eligibility [facility]   Date of Admission 05/15/23   Date of Discharge 05/17/23   Discharge Disposition Home or Self Care   Discharge diagnosis Pleural effusion,Hospital-acquired pneumonia   Does the patient have one of the following disease processes/diagnoses(primary or secondary)? Pneumonia   Does the patient have Home health ordered? No   Is there a DME ordered? No   Medication alerts for this patient see AVS for meds--po antibx   Prep survey completed? Yes          Daniela MOHAN - Registered Nurse

## 2023-05-17 NOTE — PROGRESS NOTES
Pt Name: Ирина Randolph  MRN: [id number]  [id number]  YOB: 1945  Date of evaluation: 5/17/2023    Chief Compliant    Subjective: Reports feeling better, currently on room air and reports SOA at rest and with exertion has improved.    HISTORY OF PRESENT ILLNESS:    Ирина Randolph is a 77-year-old  female undergoing systemic chemotherapy under the direction of Dr. Mendiola with a diagnosis of recurrent metastatic pancreatic cancer and a pleural effusion.  Her most recent cycle #7 of palliative chemotherapy with Gemzar and Abraxane was delivered on 4/13/2023 anticipating cycle #8 on 5/18/2023.    She has been responding to the above chemotherapy evidenced by a decremental trend in tumor marker CA 19-9 that was > 1000 in October 2022 when Gemzar Abraxane was initiated.  CA 19-9 was down to 67 on 4/25/2023 shortly after cycle #7 of Gemzar Abraxane.     Ms. Randolph presented to [facility] emergency room on 5/15/2023 under the direction of her PCP after presenting with worsening cough, and shortness of breath since discharge from [facility] on 4/28/2023.   Work-up and evaluation in the emergency room revealed right lower lobe pneumonia and bilateral pleural effusions.  She has been admitted for treatment of hospital-acquired pneumonia.     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 Surgeon Dr. Audi Molina/[facility], [place], operated by [facility]   8/13/2021-02/25/22-adjuvant Gemzar 1000 mg/m2 IV D 1, D8, D15 with Capecitabine 830 mg/m2 D1-21 every 28 days x6 cycles  1/14/22 Eliquis for DVT right upper  extremity November 2022  10/14/22-Initiated palliative frontline Gemzar Abraxane every 2 weeks  1/13/23 Decrease Abraxane to 100mg/m2 with each treatment     Cancer History  Ирина Randolph was first seen by me on 8/6/2021.  The patient was referred for a diagnosis of pancreatic malignancy.  She had initial complaints of abdominal pain.  5/5/20 MRI abdomen ([facility]): Moderate fatty replacement of the pancreas with several small cystic structures within or adjacent to the pancreatic head and uncinate process most suggestive of benign cysts. A follow-up MRI of the abdomen may be obtained in 1 year to assess for stability. No solid lesion is seen. Otherwise unremarkable MRI of the abdomen.  4/22/20 CT abd/pelvis (P): Focal inflammatory stranding associated with the pancreatic head. Radiographically I would favor this to represent acute pancreatitis. There is mild associated stranding within the descending and proximal transverse duodenum. There are 2 well-circumscribed hypodensities one in the region of the uncinate process and one slightly anterior to the pancreatic head likely representing sequela of pancreatitis but warranting follow-up on subsequent exam. The body and tail of the pancreas are spared. No evidence of pancreatic necrosis. The patient is status post hysterectomy and cholecystectomy. Bibasilar subsegmental atelectasis. No evidence of nephrolithiasis or obstructive uropathy. Diverticulosis of the distal descending and sigmoid colon without evidence of diverticulitis.  5/5/21 MRI abd ([facility]) There are multiple tiny-to-small foci of increased T2 signal seen within the pancreas, consistent with a combination of cysts and/or beaded dilatation of the tributaries of the main pancreatic duct, increased in size and number when compared to the prior similar study of 5/5/20. The patient is again noted to be status post cholecystectomy. The examination is otherwise within normal limits for age  with no abnormal postcontrast enhancement noted.  6/3/21 Upper EUS (Dr Ruperto Gale/[place]): CT Visualized portion of pancreas abnormal on ultrasound. There is a 26.7 mm solid mass in pancreatic tail. It abuts the splenic vessel without occlusion or varices. Pancreatic duct is dilated to 6 mm in the tail proximal to the mass. FNA x4 yields atypical cells. Celiac axis, liver appear normal, no local nodes. Body and head of pancreas normal.  6/3/21 Pancreas tail mass, FNA aspiration smear (x2), ThinPrep and cell block: Adenocarcinoma.  6/7/21 CA 19-9 407.1  6/28/21-she underwent Whipple procedure pancreatectomy by Dr. Audi Molina: Invasive ductal carcinoma 4.3 cm in greatest dimension, invasive in the peripancreatic adipose tissue, diffuse perineural invasion. Surgical margins were negative for carcinoma. The pancreas margins involved by a low-grade dysplasia. 1/6 nodes positive for metastatic cancer with direct extension. Distal fibrosis and pancreatitis was noted. Spleen was unremarkable.  Pathological stage pT2 N1 M0  8/6/2021-she was first seen by me.  Recommend adjuvant chemotherapy with gemcitabine/Xeloda.  She acknowledged understanding and agree with treatment.  8/09/2021 CT Chest W/Contrast No evidence of intrathoracic metastatic disease is identified.  8/09/2021 CT Abd/Pelvis W/Oral Contrast Prior distal pancreatectomy and splenectomy. There is a 1.5 cm circumscribed, nonenhancing cystic lesion along the inferior pancreatic head, perhaps a branch IPMN. Otherwise, no obvious pancreatic solid mass is seen. No suspicious adenopathy or evidence of distant metastatic disease in the abdomen or pelvis.Prior cholecystectomy.Liver steatosis. Colonic diverticulosis.    8/13/2021-initiation of adjuvant capecitabine/Gemzar   9/10/2021-I reviewed CT chest abdomen pelvis.  1/21/2022-CA 19-9 = 10  2/10/22 CT CHEST W CONTRAST No evidence of intrathoracic metastasis. Incidental findings described above.   2/10/22 CT  ABDOMEN PELVIS W IV CONTRAST  Partial pancreatectomy and splenectomy. Pancreatic head cysts measuring up to 1.6 cm, very similar to 8/9/2021. Continued surveillance imaging recommended. No evidence of intra-abdominal or pelvic metastasis.   2/18/2022-I reviewed results CT chest abdomen pelvis.  No evidence of recurrent disease.  Pancreatic head cyst stable.  02/25/22- Completion of adjuvant chemotherapy.   4/22/2022 CA 19-9-13  5/11/2022 Bone Density ([facility]) Osteopenia. Femoral Neck T-Score -2.0, Lumbar Spine T-Score -0.9  6/7/2022 Endoscopy ([facility]) Urease was negative  8/5/2022 CT Abd/Pelvis WO Contrast ([facility]) Fairly diffuse soft tissue infiltration with haziness and nodularity of there peripheral aspects of the peritoneal cavity most concerning for a process such as peritoneal carcinomatosis. Given history of pancreatic cancer,a PET scan may be considered for further evaluation. Small amount of ascites in the pelvis. Colonic diverticulosis.Large,fat-containing umbilical hernia. Tiny right pleural effusion with atelectasis in the lung bases.  8/22/2022 CT Chest W Contrast Calcified and noncalcified lung nodules may indicate inflammatory or infectious process and old granulomatous disease.  Follow-up in 3 months is recommended.Small right pleural effusion.Cirrhotic liver.  Free fluid in the abdomen with mesenteric edema upper abdomen.  Multiple lymph nodes in region of pancreatic head.  8/22/2022-surveillance  CT Abd/Pelvis W IV Contrast (oral) Partial pancreatectomy with absence of the pancreatic body and tail.  Multiple soft tissue and hypodense areas in the location of the pancreatic head indicating measuring up to 8 mm indicating cysts, residual tissue and possible soft tissue pancreatic  nodules.  A cyst or soft tissue nodule is identified measuring up to 1.2 cm.Indeterminate nodules in the location of the pancreatic head versus lymphadenopathy.  Consider MRI pancreas without and with IV contrast.Bilateral  extrarenal pelves.  4 mm left renal cyst. Soft tissue and fat herniates into the umbilical region measuring 4.7 cm indicating hernia.  There is mesentery within the hernia.  No bowel is within the hernia.  No evidence of free air..  Small amount of free fluid is within the abdomen and pelvis.  Mesenteric edema of the abdomen and pelvis.Rectosigmoid diverticulosis without acute diverticulitis.Slightly irregular liver contour may indicate early cirrhotic changes.Free fluid within the abdomen and pelvis.  Inflammation of the mesentery.  8/28/2022-I reviewed CT abdomen/pelvis.  New onset ascites.  Liver is smaller when compared to prior CT scan suggesting hydrophobic disease.  Radiology reported peripancreatic nodules.  Recommended CA 19-9 reviewed.  Recommend MRI pancreas.  Recommended paracentesis (send for cytology, total protein, LDH).  CMP same day of paracentesis.  9/12/2022 MRI Abdomen W WO Contrast MRCP Hepatic steatosis with a nodular contour suggestive of cirrhosis.Ascites.Absence of the spleen.Absence of the gallbladder.The patient appears to be status post partial pancreatectomy .  The nodular lesions identified on the CT scan dated August 19, 2022 are poorly visualized on this examination due to respiratory artifact and technique.Small renal cysts.Midline ventral hernia.  9/12/2022-CA 19-9 1004  9/15/2022 US Guided Parecentesis ([facility]) Limited grayscale ultrasound evaluation to assess for peritoneal fluid.4 quadrant evaluation shows trace fluid around the liver. No more than trace fluid at the right lower quadrant. No significant fluid is seen at the left upper quadrant or left lower quadrant.   9/19/2022 Ascites fluid,paracentesis: Malignant cells present.Adenocarcinoma.  9/19/2022 CT Abdomen WO Contrast ([facility]):Small amount of diffuse ascites within the abdomen, likely malignant ascites given the presence of nodular peritoneal and omental soft tissue thickening compatible with carcinomatosis. Diagnostic  paracentesis was performed following this examination with ultrasound guidance.Evidence previous resection of the pancreatic body and tail. There is a soft tissue implant or enlarged lymph node inferior to the celiac artery on the right which measures 1.6 x 1.6 cm, concerning for metastatic disease. There is a small cyst in subtle stable nodularity and patient replacement of the pancreatic head.   10/3/2022-essentially, findings of recurrent metastatic pancreatic adenocarcinoma.  Stage IV.  Recommend palliative Gemzar 1000 mg/M2 and Abraxane 125 mg/M2 every 2 weeks until disease progression or intolerable toxicity.  10/3/22 Chemotherapy consent signed  10/14/22-Initiated palliative frontline Gemzar Abraxane every 2 weeks  12/30/22 CA 19-9-815  1/6/23 CT Chest W Contrast No significant interval change.  1/6/23 CT Abd/Pelvis W IV Contrast (oral) Partial pancreatectomy with absence of pancreatic body and tail.  No significant change in size or number of a few cyst versus soft tissue nodules in region of pancreatic head. No retroperitoneal lymphadenopathy.Interval resolution of ascites.  1/13/2023-essentially, CT scan showed interval response to treatment.  Resolution of prior ascites.  Continue palliative chemotherapy with Gemzar/Abraxane.  1/13/23 Decrease Abraxane to 100mg/m2 with each treatment due to development of neuropathy.  1/27/23 CA 19-9-360  2/10/23 CA 19-9-238  2/24/23 CA 19-9-171  3/10/23 MRI Brain W WO Contrast No acute intracranial abnormality notified.No evidence of metastatic disease in the brain. Scattered T2 FLAIR white matter hyperintensities are nonspecific, but commonly seen in the setting of chronic microvascular ischemic disease.  4/23/2023-CT head without contrast remarkable for no acute intracranial process.  4/27/2023 -CT scan of the abdomen and pelvis with IV contrast reported a new right hepatic dome 8.2 cm rim-enhancing complex subcapsular collection with adjacent subcentimeter  hypodensity. New small right effusion with right basilar atelectasis.          Past Medical History:   Past Medical History:   Diagnosis Date    Acute bronchitis     Arthritis     Osteoarthritis    Cancer     Disease of thyroid gland     Gallbladder abscess     Hypertension      Past Surgical History:   Past Surgical History:   Procedure Laterality Date    ABDOMINAL HYSTERECTOMY      CHOLECYSTECTOMY  1994    COLONOSCOPY  04/23/2015    two polyps both removed  extensive diverticulosis    COLONOSCOPY N/A 06/17/2020    Procedure: COLONOSCOPY WITH ANESTHESIA;  Surgeon: Homer Zamarripa DO;  Location: [facility] ENDOSCOPY;  Service: Gastroenterology;  Laterality: N/A;  pre: Hx of polyps  post:  Anupama Dhaliwal    ENDOSCOPY  08/16/2016    normal    ENDOSCOPY N/A 06/17/2020    Procedure: ESOPHAGOGASTRODUODENOSCOPY WITH ANESTHESIA;  Surgeon: Homer Zamarripa DO;  Location: [facility] ENDOSCOPY;  Service: Gastroenterology;  Laterality: N/A;  pre: nausea  post:     ENDOSCOPY N/A 6/7/2022    Procedure: ESOPHAGOGASTRODUODENOSCOPY WITH ANESTHESIA;  Surgeon: Homer Zamarripa DO;  Location: [facility] ENDOSCOPY;  Service: Gastroenterology;  Laterality: N/A;  pre cough  post Anupama Zimmerman DO    PANCREAS SURGERY      Partial removal due to tumor    ROTATOR CUFF REPAIR  10/19/2017    SPLENECTOMY       Social History:   Social History     Socioeconomic History    Marital status:    Tobacco Use    Smoking status: Never    Smokeless tobacco: Never   Vaping Use    Vaping Use: Never used   Substance and Sexual Activity    Alcohol use: No    Drug use: No    Sexual activity: Yes     Partners: Male     Family History:   Family History   Problem Relation Age of Onset    Alcohol abuse Father     Breast cancer Maternal Aunt     Bone cancer Maternal Aunt     Leukemia Maternal Aunt     Colon cancer Neg Hx     Colon polyps Neg Hx     Esophageal cancer Neg Hx        Objective      Vitals:    05/16/23 2311 05/16/23 2352  05/17/23 0001 05/17/23 0325   BP: 126/66   119/51   BP Location: Left arm   Right arm   Patient Position: Lying   Lying   Pulse: 88 86 89 86   Resp: 16 18 18 16   Temp: 98.7 °F (37.1 °C)   98.4 °F (36.9 °C)   TempSrc: Oral   Oral   SpO2: 95% 95% 98% 92%   Weight:       Height:                  View : No data to display.                    Lab Results:    Lab Results (last 72 hours)       Procedure Component Value Units Date/Time    Basic Metabolic Panel [[id number]]  (Abnormal) Collected: 05/16/23 0519    Specimen: Blood Updated: 05/16/23 0618     Glucose 88 mg/dL      BUN 25 mg/dL      Creatinine 1.25 mg/dL      Sodium 135 mmol/L      Potassium 4.5 mmol/L      Chloride 105 mmol/L      CO2 17.0 mmol/L      Calcium 8.4 mg/dL      BUN/Creatinine Ratio 20.0     Anion Gap 13.0 mmol/L      eGFR 44.8 mL/min/1.73     Narrative:      GFR Normal >60  Chronic Kidney Disease <60  Kidney Failure <15    The GFR formula is only valid for adults with stable renal function between ages 18 and 70.    CBC (No Diff) [[id number]]  (Abnormal) Collected: 05/16/23 0519    Specimen: Blood Updated: 05/16/23 0600     WBC 12.61 10*3/mm3      RBC 2.96 10*6/mm3      Hemoglobin 8.9 g/dL      Hematocrit 28.2 %      MCV 95.3 fL      MCH 30.1 pg      MCHC 31.6 g/dL      RDW 17.2 %      RDW-SD 58.4 fl      MPV 10.2 fL      Platelets 443 10*3/mm3     Folate [[id number]] Collected: 05/16/23 0519    Specimen: Blood Updated: 05/16/23 0552    Vitamin B12 [[id number]] Collected: 05/16/23 0519    Specimen: Blood Updated: 05/16/23 0552    Iron Profile [[id number]]  (Abnormal) Collected: 05/15/23 1145    Specimen: Blood Updated: 05/15/23 1943     Iron 43 mcg/dL      Iron Saturation 13 %      Transferrin 224 mg/dL      TIBC 334 mcg/dL     Ferritin [[id number]]  (Abnormal) Collected: 05/15/23 1145    Specimen: Blood Updated: 05/15/23 1943     Ferritin 656.70 ng/mL     Narrative:      Results may be falsely decreased if patient taking Biotin.      Reticulocytes  [[id number]]  (Normal) Collected: 05/15/23 1145    Specimen: Blood Updated: 05/15/23 1922     Reticulocyte % 1.80 %      Reticulocyte Absolute 0.0556 10*6/mm3     S. Pneumo Ag Urine or CSF - Urine, Urine, Clean Catch [[id number]]  (Normal) Collected: 05/15/23 1817    Specimen: Urine, Clean Catch Updated: 05/15/23 1900     Strep Pneumo Ag Negative    Legionella Antigen, Urine - Urine, Urine, Clean Catch [[id number]]  (Normal) Collected: 05/15/23 1817    Specimen: Urine, Clean Catch Updated: 05/15/23 1859     LEGIONELLA ANTIGEN, URINE Negative    MRSA Screen, PCR (Inpatient) - Swab, Nares [[id number]]  (Normal) Collected: 05/15/23 1703    Specimen: Swab from Nares Updated: 05/15/23 1829     MRSA PCR No MRSA Detected    Narrative:      The negative predictive value of this diagnostic test is high and should only be used to consider de-escalating anti-MRSA therapy. A positive result may indicate colonization with MRSA and must be correlated clinically.    Respiratory Panel PCR w/COVID-19(SARS-CoV-2) CHARISSE/SONIA/POLY/PAD/COR/MAD/IVETTE In-House, NP Swab in UTM/VTM, 3-4 HR TAT - Swab, Nasopharynx [[id number]]  (Normal) Collected: 05/15/23 1703    Specimen: Swab from Nasopharynx Updated: 05/15/23 1803     ADENOVIRUS, PCR Not Detected     Coronavirus 229E Not Detected     Coronavirus HKU1 Not Detected     Coronavirus NL63 Not Detected     Coronavirus OC43 Not Detected     COVID19 Not Detected     Human Metapneumovirus Not Detected     Human Rhinovirus/Enterovirus Not Detected     Influenza A PCR Not Detected     Influenza B PCR Not Detected     Parainfluenza Virus 1 Not Detected     Parainfluenza Virus 2 Not Detected     Parainfluenza Virus 3 Not Detected     Parainfluenza Virus 4 Not Detected     RSV, PCR Not Detected     Bordetella pertussis pcr Not Detected     Bordetella parapertussis PCR Not Detected     Chlamydophila pneumoniae PCR Not Detected     Mycoplasma pneumo by PCR Not Detected    Narrative:      In the setting of a  "positive respiratory panel with a viral infection PLUS a negative procalcitonin without other underlying concern for bacterial infection, consider observing off antibiotics or discontinuation of antibiotics and continue supportive care. If the respiratory panel is positive for atypical bacterial infection (Bordetella pertussis, Chlamydophila pneumoniae, or Mycoplasma pneumoniae), consider antibiotic de-escalation to target atypical bacterial infection.    Lactic Acid, Plasma [[id number]]  (Normal) Collected: 05/15/23 1649    Specimen: Blood Updated: 05/15/23 1736     Lactate 1.2 mmol/L     Blood Culture - Blood, Wrist, Left [[id number]] Collected: 05/15/23 1649    Specimen: Blood from Wrist, Left Updated: 05/15/23 1727    Blood Culture - Blood, Hand, Left [[id number]] Collected: 05/15/23 1626    Specimen: Blood from Hand, Left Updated: 05/15/23 1726    Procalcitonin [[id number]]  (Normal) Collected: 05/15/23 1145    Specimen: Blood Updated: 05/15/23 1602     Procalcitonin 0.10 ng/mL     Narrative:      As a Marker for Sepsis (Non-Neonates):    1. <0.5 ng/mL represents a low risk of severe sepsis and/or septic shock.  2. >2 ng/mL represents a high risk of severe sepsis and/or septic shock.    As a Marker for Lower Respiratory Tract Infections that require antibiotic therapy:    PCT on Admission    Antibiotic Therapy       6-12 Hrs later    >0.5                Strongly Recommended  >0.25 - <0.5        Recommended   0.1 - 0.25          Discouraged              Remeasure/reassess PCT  <0.1                Strongly Discouraged     Remeasure/reassess PCT    As 28 day mortality risk marker: \"Change in Procalcitonin Result\" (>80% or <=80%) if Day 0 (or Day 1) and Day 4 values are available. Refer to http://www.[employer]-pct-calculator.com    Change in PCT <=80%  A decrease of PCT levels below or equal to 80% defines a positive change in PCT test result representing a higher risk for 28-day all-cause mortality of patients diagnosed " "with severe sepsis for septic shock.    Change in PCT >80%  A decrease of PCT levels of more than 80% defines a negative change in PCT result representing a lower risk for 28-day all-cause mortality of patients diagnosed with severe sepsis or septic shock.       Protime-INR [[id number]]  (Normal) Collected: 05/15/23 1145    Specimen: Blood Updated: 05/15/23 1225     Protime 13.3 Seconds      INR 1.00    D-dimer, Quantitative [[id number]]  (Abnormal) Collected: 05/15/23 1145    Specimen: Blood Updated: 05/15/23 1225     D-Dimer, Quantitative 5.20 MCGFEU/mL     Narrative:      According to the assay 's published package insert, a normal (<0.50 MCGFEU/mL) D-dimer result in conjunction with a non-high clinical probability assessment, excludes deep vein thrombosis (DVT) and pulmonary embolism (PE) with high sensitivity.    D-dimer values increase with age and this can make VTE exclusion of an older population difficult. To address this, the [employer], based on best available evidence and recent guidelines, recommends that clinicians use age-adjusted D-dimer thresholds in patients greater than 50 years of age with: a) a low probability of PE who do not meet all Pulmonary Embolism Rule Out Criteria, or b) in those with intermediate probability of PE.   The formula for an age-adjusted D-dimer cut-off is \"age/100\".  For example, a 60 year old patient would have an age-adjusted cut-off of 0.60 MCGFEU/mL and an 80 year old 0.80 MCGFEU/mL.    Basic Metabolic Panel [[id number]]  (Abnormal) Collected: 05/15/23 1145    Specimen: Blood Updated: 05/15/23 1220     Glucose 99 mg/dL      BUN 25 mg/dL      Creatinine 1.13 mg/dL      Sodium 133 mmol/L      Potassium 5.1 mmol/L      Comment: Slight hemolysis detected by analyzer. Results may be affected.        Chloride 104 mmol/L      CO2 20.0 mmol/L      Calcium 9.0 mg/dL      BUN/Creatinine Ratio 22.1     Anion Gap 9.0 mmol/L      eGFR 50.5 mL/min/1.73     " Narrative:      GFR Normal >60  Chronic Kidney Disease <60  Kidney Failure <15    The GFR formula is only valid for adults with stable renal function between ages 18 and 70.    BNP [[id number]]  (Abnormal) Collected: 05/15/23 1145    Specimen: Blood Updated: 05/15/23 1216     proBNP 2,714.0 pg/mL     Narrative:      Among patients with dyspnea, NT-proBNP is highly sensitive for the detection of acute congestive heart failure. In addition NT-proBNP of <300 pg/ml effectively rules out acute congestive heart failure with 99% negative predictive value.    Results may be falsely decreased if patient taking Biotin.      CBC & Differential [[id number]]  (Abnormal) Collected: 05/15/23 1145    Specimen: Blood Updated: 05/15/23 1158    Narrative:      The following orders were created for panel order CBC & Differential.  Procedure                               Abnormality         Status                     ---------                               -----------         ------                     CBC Auto Differential[[id number]]        Abnormal            Final result                 Please view results for these tests on the individual orders.    CBC Auto Differential [[id number]]  (Abnormal) Collected: 05/15/23 1145    Specimen: Blood Updated: 05/15/23 1158     WBC 11.49 10*3/mm3      RBC 3.14 10*6/mm3      Hemoglobin 9.6 g/dL      Hematocrit 29.8 %      MCV 94.9 fL      MCH 30.6 pg      MCHC 32.2 g/dL      RDW 17.2 %      RDW-SD 58.3 fl      MPV 10.3 fL      Platelets 466 10*3/mm3      Neutrophil % 53.8 %      Lymphocyte % 29.2 %      Monocyte % 13.5 %      Eosinophil % 2.6 %      Basophil % 0.6 %      Immature Grans % 0.3 %      Neutrophils, Absolute 6.18 10*3/mm3      Lymphocytes, Absolute 3.36 10*3/mm3      Monocytes, Absolute 1.55 10*3/mm3      Eosinophils, Absolute 0.30 10*3/mm3      Basophils, Absolute 0.07 10*3/mm3      Immature Grans, Absolute 0.03 10*3/mm3      nRBC 0.0 /100 WBC     Blood Gas, Arterial - [[id number]]   (Abnormal) Collected: 05/15/23 1156    Specimen: Arterial Blood Updated: 05/15/23 1153     Site Right Brachial     Nav's Test N/A     pH, Arterial 7.444 pH units      pCO2, Arterial 28.3 mm Hg      Comment: 84 Value below reference range        pO2, Arterial 75.5 mm Hg      Comment: 84 Value below reference range        HCO3, Arterial 19.4 mmol/L      Comment: 84 Value below reference range        Base Excess, Arterial -3.8 mmol/L      Comment: 84 Value below reference range        O2 Saturation, Arterial 97.1 %      Temperature 37.0 C      Barometric Pressure for Blood Gas 754 mmHg      Modality Room Air     Ventilator Mode NA     Collected by [id number]     Comment: Meter: [id number]     :  [id number]        pCO2, Temperature Corrected 28.3 mm Hg      pH, Temp Corrected 7.444 pH Units      pO2, Temperature Corrected 75.5 mm Hg             Radiology Results:    Imaging Results (Last 72 Hours)       Procedure Component Value Units Date/Time    US Venous Doppler Upper Extremity Right (duplex) [[id number]] Collected: 05/16/23 1530     Updated: 05/16/23 1534    Narrative:      History: Pain and swelling       Impression:      Impression: There is no evidence of deep venous thrombosis or  superficial thrombophlebitis of the right upper extremity.     Comments: Right upper extremity venous duplex exam was performed using  color Doppler flow, Doppler wave form analysis, and grayscale imaging,  with and without compression. There is no evidence of deep venous  thrombosis of the internal jugular, subclavian, axillary, brachial,  radial, and ulnar veins. There is no evidence of superficial  thrombophlebitis involving the cephalic or basilic veins.      This report was finalized on 05/16/2023 15:31 by Dr. Gage Morin MD.    CT Angiogram Chest [[id number]] Collected: 05/15/23 1320     Updated: 05/15/23 1343    Narrative:      EXAMINATION: CT ANGIOGRAM CHEST-      5/15/2023 12:56 PM CDT     HISTORY: Pulmonary  embolism (PE) suspected, unknown D-dimer     In order to have a CT radiation dose as low as reasonably achievable  Automated Exposure Control was utilized for adjustment of the mA and/or  KV according to patient size.     DLP in mGycm= 160     The CT angiography of the chest is performed after intravenous contrast  enhancement.     Images are acquired in axial plane and subsequent reconstruction in  coronal and sagittal planes.     There is no previous similar study for comparison. Correlation made with  CT scan of the chest dated 10/01/2019.     There is normal opacification of the pulmonary arteries and branches  bilaterally. There are no filling defects in the opacified pulmonary  arterial bed.     RV/LV ratio is 35/48 which is normal. No finding to suggest right heart  strain.     Atheromatous changes of the thoracic aorta is seen. No aneurysmal  dilatation.     There is moderate dilatation of the main pulmonary artery or right and  left pulmonary artery which may suggest pulmonary arterial hypertension.     Atheromatous changes of the coronary arteries are seen.     There is no evidence of mediastinal or hilar mass or lymphadenopathy.     The lungs are poorly expanded. There are scattered areas of discoid  atelectasis more pronounced in the right middle right lower lobe and to  lesser extent left lower lobe and lingular segment of left upper lobe.  There is a small nodule in the left upper lobe, image #41 in series 6,  which measures 3 mm in diameter. Another similar nodule is seen in the  right upper lobe medially, image #44 and series 6, measuring 3 mm. The  central airway is patent. No endotracheal or endobronchial  nodule/lesions.     There is a right lower lobar consolidation/atelectasis.     There is moderate right and small left basal pleural effusion.     Limited visualized soft tissues of the back are poorly visualized or  evaluated due to extensive right chest collaterals due to possible  blockage of  the right brachiocephalic veins.     The limited visualized abdomen appears unremarkable. The spleen is not  visualized this study is limited due to previous splenectomy. The  pancreas, the kidneys are incompletely visualized and not evaluated.  There is small amount of fluid around the liver and in the left  subphrenic diaphragmatic region.     Images are reviewed in bone window show chronic degenerative changes of  the thoracolumbar spine. No acute bony abnormality.     A left internal jugular approach [id number] is seen in place with  distal end at the atriocaval junction.       Impression:      1. No evidence of pulmonary embolism, aortic aneurysm or dissection.  2. Atheromatous changes of coronary arteries.  3. Moderate pulmonary arterial hypertension.  4. Right lower lobar consolidation/atelectasis. Atelectatic changes in  the lungs bilaterally pronounced in the right middle and lower lobe and  lingular segment of left upper lobe.  5. A tiny noncalcified nodule in the left upper lobe probably represent  noncalcified granulomas. This was not seen in the previous study in  2019. A follow-up examination in 6 months is recommended to ensure  stability.  6. A moderate right and a small left basal pleural effusion. A small  amount of fluid in the upper abdomen.  7. The spleen is absent. The abdomen is incompletely included and not  well evaluated.  8. A significant collateral vascularity of the right chest wall which is  probably due to occlusion of the right brachiocephalic vein?.                             This report was finalized on 05/15/2023 13:40 by Dr. Amber Regalado MD.    XR Chest 1 View [[id number]] Collected: 05/15/23 1146     Updated: 05/15/23 1152    Narrative:      EXAM: XR CHEST 1 VW-      DATE: 5/15/2023 11:36 AM CDT     HISTORY: Shortness of air       COMPARISON: 05/15/2023.      TECHNIQUE:  Single view. Frontal view of the chest. 1 images.       FINDINGS:    LEFT chest port with grossly intact  catheter, tip projects at upper  RIGHT atrium. No measurable pneumothorax. Similar linear opacity at the  RIGHT midlung. Similar blunting of the RIGHT costophrenic angle and  elevation of the RIGHT hemidiaphragm versus basilar opacity. Milder  streaky opacity at the LEFT lung base, which may represent atelectasis.  Lungs appear hyperinflated, which can be seen with chronic lung disease.  Similar enlarged cardiac silhouette. Upper mediastinum appears within  normal limits. Calcified aortic atherosclerosis. Widening of the RIGHT  acromioclavicular joint, most likely postoperative. No acute bony  finding.          Impression:      1. Similar findings compared to earlier same day including linear  opacity at the RIGHT midlung, small pleural fluid and RIGHT  hemidiaphragm elevation versus basilar opacity.  2. Similar milder streaky opacity at the LEFT lung base, may represent  atelectasis.  3. Lungs appear hyperinflated, which can be seen with chronic lung  disease.  4. Similar enlarged cardiac silhouette.  This report was finalized on 05/15/2023 11:48 by Dr Judy Wu MD.          PHYSICAL EXAM:    CONSTITUTIONAL: Alert, appropriate, dyspnea with exertion. SOA improved somewhat while at rest  ENT: Mucous membranes moist, no oropharyngeal lesions   NECK: Supple, no masses   CHEST/LUNGS: CTA with diminished bases  CARDIOVASCULAR: RRR, no murmurs  ABDOMEN: soft non-tender, active bowel sounds, no HSM  EXTREMITIES: Bilateral lower extremity edema noted  SKIN: warm, dry with no rashes or lesions  LYMPH: No cervical, clavicular, axillary, or inguinal lymphadenopathy  NEUROLOGIC: follows commands, non focal   PSYCH: mood and affect appropriate     Cultures:   No results found for: BLOODCX, URINECX, WOUNDCX, MRSACX, RESPCX, STOOLCX          ASSESSMENT/PLAN:    #1  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  She is  status post Whipple procedure and adjuvant chemotherapy with Gemzar/Xeloda with poor tolerance.  Unfortunate, she developed metastatic disease with recurrent ascites and elevated CA 19-9 above 1000 in October 2022 and was initiated on palliative chemotherapy with Gemzar and Abraxane. Her CA 19-9 is trending down, 67 on 4/25/2023 and CT scan of the abdomen and pelvis on 4/27/2023 suggest a response to treatment.      Cycle #7 of palliative treatment with Gemzar and Abraxane delivered on 4/13/2023, Cycle #8 was tentatively planned for 5/18/2023  and will be postponed for 1 week due to acute illness,  follow-up appointment with Dr. Mendiola on 5/25/2023 at 10 AM.        #2  Hospital-acquired pneumonia-managed by attending     Chest x-ray 5/15/2023          Received a dose of azithromycin and ceftriaxone  Vancomycin discontinued on 5/16/2023  Currently receiving Zosyn      Blood cultures x2 on 5/15/2023 with no growth to date     CT angiogram of the chest on 5/15/2023   No evidence of pulmonary embolism, aortic aneurysm or dissection.   Moderate pulmonary arterial hypertension.   Right lower lobar consolidation/atelectasis. Atelectatic changes in the lungs bilaterally pronounced   in the right middle and lower lobe and lingular segment of left upper lobe.   A tiny noncalcified nodule in the left upper lobe probably represent noncalcified granulomas.   A moderate right and a small left basal pleural effusion.   A small amount of fluid in the upper abdomen.   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    Placed an VTE prophylaxis with  Lovenox 40 mg subcu every 24 hours     #4  Acute kidney injury, with average baseline creatinine of 0.8         Monitor     #5  Hypertension/fluid overload-managed by attending      /51 and heart rate 86 this morning, 5/17/2023    2D echo on 5/16/2023  Left ventricular ejection fraction appears to be 61 - 65%.   Abnormal global longitudinal LV strain (GLS) = -8.2  Left ventricular diastolic dysfunction is noted.  Moderate mitral annular calcification is present.   Mild to moderate mitral valve regurgitation is present with an anteriorly  Mild pulmonary hypertension is present.      Resumed on home regimen:  losartan dosing 25 mg p.o. twice daily  Metoprolol XL 50 mg every 24 hours  Terazosin 2 mg p.o. nightly     Completed diuresis with Lasix 40 mg IV x3 doses on 5/16/2023     #6  Normocytic anemia             Ferrlecit 250 mg IV daily x4  initiated on 5/16/2023, day 2 of 4 today, 5/17/2023    Blood occult stool requested     Monitor CBC     Discussed plan of care with Ирина  Follow-up appointment with Dr. Mendiola has been changed to 5/25/2023 at 10 AM.    SKYLER Lovett    5/17/2023    06:33 CDT    Physicians attestation and contribution:    I, Marek Stone, personally and independently performed an evaluation on Ирина Randolph  I have reviewed relevant medical information/data to include but not limited to the medication list, relevant appropriate lab work and imaging when applicable.  I reviewed other physician's notes, ancillary services and nurses assessments.  I have reviewed the above documentation completed by Kristy HOLLAND  Please see my additional addended and/or modified contributions to the history of present illness, physical examination and assessment/medical decision-making and plan that reflects my findings and impressions.  I discussed the essential elements of the care plan with Kristy HOLLAND and the patient.  I have encouraged and answered all the questions raised to the patient's  understanding and satisfaction.  I concur with the above stated.    Subjective: Awake, alert, feeling better today than yesterday.  Was able to walk up and down the halls yesterday, slowly improving clinically    Objective: Lungs have good breath sounds bilaterally in upper and lower lung fields with slightly less wheezing today than yesterday    Assessment/plan:    #1  Stage IV pancreatic cancer   Cycle #7 of palliative treatment with Gemzar and Abraxane delivered on 4/13/2023, Cycle #8 was tentatively planned for 5/18/2023 at follow-up appointment with Dr. Mendiola.    Follow-up appointment with Dr. Mendiola has been changed to 5/25/2023 at 10 AM.     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

## 2023-05-17 NOTE — TELEPHONE ENCOUNTER
Ирина Randolph was discharged on 5/17/23 from [facility]. Patient is schedule to see SKYLER Mccrary on 05/18/2023. Please contact patient for necessary TCM documentation if applicable at [phone].

## 2023-05-17 NOTE — PLAN OF CARE
Problem: Adult Inpatient Plan of Care  Goal: Plan of Care Review  Outcome: Ongoing, Progressing  Flowsheets (Taken 5/17/2023 9576)  Progress: no change  Plan of Care Reviewed With: patient  Outcome Evaluation: Pt c/o anxiety at times, PRN PO Xanax given with relief. Pt rested throughout the shift. No c/o pain noted. Room air. Sinus HR: 81-92 on tele. IV abx given as ordered. Pt up x standby assist. Possible d/c home in 1-2 days. Safety maintained.

## 2023-05-17 NOTE — DISCHARGE SUMMARY
[facility] Medicine Services  DISCHARGE SUMMARY       Date of Admission: 5/15/2023  Date of Discharge:  5/17/2023  Primary Care Physician: Adrián Ayala DO    Presenting Problem/History of Present Illness:  Shortness of breath and cough    Final Discharge Diagnoses:  Active Hospital Problems    Diagnosis     **Pleural effusion     Hospital-acquired pneumonia     Volume overload     SHAVON (acute kidney injury)     Pancreatic mass     Essential hypertension        Consults: Dr. Stone with oncology.    Procedures Performed: none.    Pertinent Test Results:   Results for orders placed during the hospital encounter of 05/15/23    Adult Transthoracic Echo Complete W/ Cont if Necessary Per Protocol    Interpretation Summary    Left ventricular systolic function is normal. Left ventricular ejection fraction appears to be 61 - 65%.    Abnormal global longitudinal LV strain (GLS) = -8.2    The following segments are hypokinetic: mid inferolateral, mid anterolateral, apical anterior, apical inferior, apical lateral and apex.    Left ventricular diastolic dysfunction is noted.    Moderate mitral annular calcification is present. Mild to moderate mitral valve regurgitation is present with an anteriorly-directed jet noted    Mild pulmonary hypertension is present.      Imaging Results (All)       Procedure Component Value Units Date/Time    US Venous Doppler Upper Extremity Right (duplex) [[id number]] Collected: 05/16/23 1530     Updated: 05/16/23 1534    Narrative:      History: Pain and swelling       Impression:      Impression: There is no evidence of deep venous thrombosis or  superficial thrombophlebitis of the right upper extremity.     Comments: Right upper extremity venous duplex exam was performed using  color Doppler flow, Doppler wave form analysis, and grayscale imaging,  with and without compression. There is no evidence of deep venous  thrombosis of the internal jugular,  subclavian, axillary, brachial,  radial, and ulnar veins. There is no evidence of superficial  thrombophlebitis involving the cephalic or basilic veins.      This report was finalized on 05/16/2023 15:31 by Dr. Gage Morin MD.    CT Angiogram Chest [[id number]] Collected: 05/15/23 1320     Updated: 05/15/23 1343    Narrative:      EXAMINATION: CT ANGIOGRAM CHEST-      5/15/2023 12:56 PM CDT     HISTORY: Pulmonary embolism (PE) suspected, unknown D-dimer     In order to have a CT radiation dose as low as reasonably achievable  Automated Exposure Control was utilized for adjustment of the mA and/or  KV according to patient size.     DLP in mGycm= 160     The CT angiography of the chest is performed after intravenous contrast  enhancement.     Images are acquired in axial plane and subsequent reconstruction in  coronal and sagittal planes.     There is no previous similar study for comparison. Correlation made with  CT scan of the chest dated 10/01/2019.     There is normal opacification of the pulmonary arteries and branches  bilaterally. There are no filling defects in the opacified pulmonary  arterial bed.     RV/LV ratio is 35/48 which is normal. No finding to suggest right heart  strain.     Atheromatous changes of the thoracic aorta is seen. No aneurysmal  dilatation.     There is moderate dilatation of the main pulmonary artery or right and  left pulmonary artery which may suggest pulmonary arterial hypertension.     Atheromatous changes of the coronary arteries are seen.     There is no evidence of mediastinal or hilar mass or lymphadenopathy.     The lungs are poorly expanded. There are scattered areas of discoid  atelectasis more pronounced in the right middle right lower lobe and to  lesser extent left lower lobe and lingular segment of left upper lobe.  There is a small nodule in the left upper lobe, image #41 in series 6,  which measures 3 mm in diameter. Another similar nodule is seen in the  right  upper lobe medially, image #44 and series 6, measuring 3 mm. The  central airway is patent. No endotracheal or endobronchial  nodule/lesions.     There is a right lower lobar consolidation/atelectasis.     There is moderate right and small left basal pleural effusion.     Limited visualized soft tissues of the back are poorly visualized or  evaluated due to extensive right chest collaterals due to possible  blockage of the right brachiocephalic veins.     The limited visualized abdomen appears unremarkable. The spleen is not  visualized this study is limited due to previous splenectomy. The  pancreas, the kidneys are incompletely visualized and not evaluated.  There is small amount of fluid around the liver and in the left  subphrenic diaphragmatic region.     Images are reviewed in bone window show chronic degenerative changes of  the thoracolumbar spine. No acute bony abnormality.     A left internal jugular approach [id number] is seen in place with  distal end at the atriocaval junction.       Impression:      1. No evidence of pulmonary embolism, aortic aneurysm or dissection.  2. Atheromatous changes of coronary arteries.  3. Moderate pulmonary arterial hypertension.  4. Right lower lobar consolidation/atelectasis. Atelectatic changes in  the lungs bilaterally pronounced in the right middle and lower lobe and  lingular segment of left upper lobe.  5. A tiny noncalcified nodule in the left upper lobe probably represent  noncalcified granulomas. This was not seen in the previous study in  2019. A follow-up examination in 6 months is recommended to ensure  stability.  6. A moderate right and a small left basal pleural effusion. A small  amount of fluid in the upper abdomen.  7. The spleen is absent. The abdomen is incompletely included and not  well evaluated.  8. A significant collateral vascularity of the right chest wall which is  probably due to occlusion of the right brachiocephalic vein?.                              This report was finalized on 05/15/2023 13:40 by Dr. Amber Regalado MD.    XR Chest 1 View [[id number]] Collected: 05/15/23 1146     Updated: 05/15/23 1152    Narrative:      EXAM: XR CHEST 1 VW-      DATE: 5/15/2023 11:36 AM CDT     HISTORY: Shortness of air       COMPARISON: 05/15/2023.      TECHNIQUE:  Single view. Frontal view of the chest. 1 images.       FINDINGS:    LEFT chest port with grossly intact catheter, tip projects at upper  RIGHT atrium. No measurable pneumothorax. Similar linear opacity at the  RIGHT midlung. Similar blunting of the RIGHT costophrenic angle and  elevation of the RIGHT hemidiaphragm versus basilar opacity. Milder  streaky opacity at the LEFT lung base, which may represent atelectasis.  Lungs appear hyperinflated, which can be seen with chronic lung disease.  Similar enlarged cardiac silhouette. Upper mediastinum appears within  normal limits. Calcified aortic atherosclerosis. Widening of the RIGHT  acromioclavicular joint, most likely postoperative. No acute bony  finding.          Impression:      1. Similar findings compared to earlier same day including linear  opacity at the RIGHT midlung, small pleural fluid and RIGHT  hemidiaphragm elevation versus basilar opacity.  2. Similar milder streaky opacity at the LEFT lung base, may represent  atelectasis.  3. Lungs appear hyperinflated, which can be seen with chronic lung  disease.  4. Similar enlarged cardiac silhouette.  This report was finalized on 05/15/2023 11:48 by Dr Judy Wu MD.          LAB RESULTS:      Lab 05/17/23  0452 05/16/23  0519 05/15/23  1649 05/15/23  1145   WBC 10.57 12.61*  --  11.49*   HEMOGLOBIN 8.3* 8.9*  --  9.6*   HEMATOCRIT 26.4* 28.2*  --  29.8*   PLATELETS 413 443  --  466*   NEUTROS ABS  --   --   --  6.18   IMMATURE GRANS (ABS)  --   --   --  0.03   LYMPHS ABS  --   --   --  3.36*   MONOS ABS  --   --   --  1.55*   EOS ABS  --   --   --  0.30   MCV 94.0 95.3  --  94.9   PROCALCITONIN   --   --   --  0.10   LACTATE  --   --  1.2  --    PROTIME  --   --   --  13.3   D DIMER QUANT  --   --   --  5.20*         Lab 05/16/23  0519 05/15/23  1145 05/15/23  0726   SODIUM 135* 133* 137   POTASSIUM 4.5 5.1 5.5*   CHLORIDE 105 104 106   TOTAL CO2  --   --  16*   CO2 17.0* 20.0*  --    ANION GAP 13.0 9.0  --    BUN 25* 25* 25   CREATININE 1.25* 1.13* 1.14*   EGFR 44.8* 50.5*  --    GLUCOSE 88 99 130*   CALCIUM 8.4* 9.0 9.0             Lab 05/15/23  1145   PROBNP 2,714.0*   PROTIME 13.3   INR 1.00             Lab 05/16/23  0519 05/15/23  1145   IRON  --  43   IRON SATURATION  --  13*   TIBC  --  334   TRANSFERRIN  --  224   FERRITIN  --  656.70*   FOLATE >20.00  --    VITAMIN B 12 342  --          Lab 05/15/23  1156   PH, ARTERIAL 7.444   PCO2, ARTERIAL 28.3*   PO2 ART 75.5*   O2 SATURATION ART 97.1   HCO3 ART 19.4*   BASE EXCESS ART -3.8*     Brief Urine Lab Results  (Last result in the past 365 days)        Color   Clarity   Blood   Leuk Est   Nitrite   Protein   CREAT   Urine HCG        04/23/23 1908 YELLOW   Clear   LARGE   TRACE  Comment: Culture Urine under CMS guidelines and criteria will auto reflex on a sole  criteria that is based on manual microscopic count of more than 10/hpf for  WBC. If Culture Urine is warranted aside from this criteria, place a  requisition or order within 24 hours of collection.   Negative                   Microbiology Results (last 10 days)       Procedure Component Value - Date/Time    S. Pneumo Ag Urine or CSF - Urine, Urine, Clean Catch [[id number]]  (Normal) Collected: 05/15/23 1817    Lab Status: Final result Specimen: Urine, Clean Catch Updated: 05/15/23 1900     Strep Pneumo Ag Negative    Legionella Antigen, Urine - Urine, Urine, Clean Catch [[id number]]  (Normal) Collected: 05/15/23 1817    Lab Status: Final result Specimen: Urine, Clean Catch Updated: 05/15/23 1859     LEGIONELLA ANTIGEN, URINE Negative    MRSA Screen, PCR (Inpatient) - Swab, Nares [[id number]]   (Normal) Collected: 05/15/23 1703    Lab Status: Final result Specimen: Swab from Nares Updated: 05/15/23 1829     MRSA PCR No MRSA Detected    Narrative:      The negative predictive value of this diagnostic test is high and should only be used to consider de-escalating anti-MRSA therapy. A positive result may indicate colonization with MRSA and must be correlated clinically.    Respiratory Panel PCR w/COVID-19(SARS-CoV-2) CHARISSE/SONIA/POLY/[facility]/MAD/IVETTE In-House, NP Swab in UTM/VTM, 3-4 HR TAT - Swab, Nasopharynx [[id number]]  (Normal) Collected: 05/15/23 1703    Lab Status: Final result Specimen: Swab from Nasopharynx Updated: 05/15/23 1803     ADENOVIRUS, PCR Not Detected     Coronavirus 229E Not Detected     Coronavirus HKU1 Not Detected     Coronavirus NL63 Not Detected     Coronavirus OC43 Not Detected     COVID19 Not Detected     Human Metapneumovirus Not Detected     Human Rhinovirus/Enterovirus Not Detected     Influenza A PCR Not Detected     Influenza B PCR Not Detected     Parainfluenza Virus 1 Not Detected     Parainfluenza Virus 2 Not Detected     Parainfluenza Virus 3 Not Detected     Parainfluenza Virus 4 Not Detected     RSV, PCR Not Detected     Bordetella pertussis pcr Not Detected     Bordetella parapertussis PCR Not Detected     Chlamydophila pneumoniae PCR Not Detected     Mycoplasma pneumo by PCR Not Detected    Narrative:      In the setting of a positive respiratory panel with a viral infection PLUS a negative procalcitonin without other underlying concern for bacterial infection, consider observing off antibiotics or discontinuation of antibiotics and continue supportive care. If the respiratory panel is positive for atypical bacterial infection (Bordetella pertussis, Chlamydophila pneumoniae, or Mycoplasma pneumoniae), consider antibiotic de-escalation to target atypical bacterial infection.    Blood Culture - Blood, Wrist, Left [[id number]]  (Normal) Collected: 05/15/23 1641    Lab Status:  Preliminary result Specimen: Blood from Wrist, Left Updated: 05/16/23 1730     Blood Culture No growth at 24 hours    Blood Culture - Blood, Hand, Left [[id number]]  (Normal) Collected: 05/15/23 1626    Lab Status: Preliminary result Specimen: Blood from Hand, Left Updated: 05/16/23 1730     Blood Culture No growth at 24 hours            Hospital Course:   Ms. Randolph is a 77-year-old female who presented to [facility] on 5/15 shortness of breath and cough.  Of note, she was admitted to [facility] on 4/23 to 4/28/2023 with hypertensive emergency, acute pulmonary edema and acute intractable headache.  Patient saw her primary care provider due to worsening cough and was advised to seek evaluation at [facility].  Work-up in the ER revealed right lower lobular consolidation/atelectasis.  No evidence of pulmonary embolism.  Respiratory panel, PCR negative.     CTA chest revealed right lower lobular consolidation/atelectasis. Respiratory panel, PCR negative.  Strep pneumo and Legionella urinary antigens negative.  MRSA nasal screen negative.  WBC 11.  Procalcitonin and lactate normal.  No fever.  On room air.  SKYLER Lagos started her on vancomycin and Zosyn for concern for hospital-acquired pneumonia.  Vancomycin discontinued as MRSA nasal screen negative.  Blood culture with no growth today.  Has received 2 days of IV Zosyn, transition to Augmentin to complete a short course of antibiotics.     CTA noted significant collateral vascularity of the right chest wall which is probably due to occlusion of the right brachiocephalic vein.  Venous duplex shows no thrombus in right upper extremity. Lovenox for VTE prophylaxis.     Results for orders placed during the hospital encounter of 05/15/23     Adult Transthoracic Echo Complete W/ Cont if Necessary Per Protocol     Interpretation Summary    Left ventricular systolic function is normal. Left ventricular ejection fraction appears to be 61 - 65%.     "Abnormal global longitudinal LV strain (GLS) = -8.2    The following segments are hypokinetic: mid inferolateral, mid anterolateral, apical anterior, apical inferior, apical lateral and apex.    Left ventricular diastolic dysfunction is noted.    Moderate mitral annular calcification is present. Mild to moderate mitral valve regurgitation is present with an anteriorly-directed jet noted    Mild pulmonary hypertension is present.  She denies chest pain.  CTA noted moderate right and small left basal pleural effusion.  She received 3 doses of IV Lasix.  Swelling overall improved.  Will give 1 additional dose of IV Lasix.  Recommend for her to continue home Lasix 20 mg daily.  Continue Losartan and Toprol-XL.    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      Will need follow-up CT chest to monitor tiny noncalcified nodule in the left upper lobe.    She has no PT/OT needs.    Overall, she is feeling much better.  On room air.  She has been up ambulating in the hallway without difficulty.  She feels ready for discharge.  She has reached maximum benefit of hospitalization.  She is medically stable and appropriate for discharge today.    Physical Exam on Discharge:  /73 (BP Location: Right arm, Patient Position: Lying)   Pulse 101   Temp 98 °F (36.7 °C) (Oral)   Resp 20   Ht 165.1 cm (65\")   Wt 73.8 kg (162 lb 9.6 oz)   SpO2 94%   BMI 27.06 kg/m²   Physical Exam  Vitals reviewed.   Constitutional:       General: She is not in acute distress.     Appearance: She is not toxic-appearing.      Comments: Up in bed.  No acute distress.  On room air.  No family at bedside.   HENT:      Head: Normocephalic and atraumatic.      Mouth/Throat:      Mouth: Mucous membranes are moist.      Pharynx: Oropharynx is clear.   Eyes:      Extraocular Movements: Extraocular movements intact.      Conjunctiva/sclera: " Conjunctivae normal.      Pupils: Pupils are equal, round, and reactive to light.   Cardiovascular:      Rate and Rhythm: Normal rate and regular rhythm.      Pulses: Normal pulses.      Heart sounds: No murmur heard.  Pulmonary:      Effort: Pulmonary effort is normal. No respiratory distress.      Breath sounds: Normal breath sounds. No wheezing or rhonchi.   Abdominal:      General: Bowel sounds are normal. There is no distension.      Palpations: Abdomen is soft.      Tenderness: There is no abdominal tenderness.   Musculoskeletal:         General: No swelling or tenderness. Normal range of motion.      Cervical back: Normal range of motion and neck supple. No muscular tenderness.   Skin:     General: Skin is warm and dry.      Findings: No erythema or rash.   Neurological:      General: No focal deficit present.      Mental Status: She is alert and oriented to person, place, and time.      Cranial Nerves: No cranial nerve deficit.      Motor: No weakness.   Psychiatric:         Mood and Affect: Mood normal.         Behavior: Behavior normal    Condition on Discharge: medically stable.    Discharge Disposition:  Home or Self Care    Discharge Medications:     Discharge Medications        New Medications        Instructions Start Date   amoxicillin-clavulanate 875-125 MG per tablet  Commonly known as: Augmentin   1 tablet, Oral, 2 Times Daily      dextromethorphan polistirex ER 30 MG/5ML Suspension Extended Release oral suspension  Commonly known as: Delsym   60 mg, Oral, Every 12 Hours Scheduled             Continue These Medications        Instructions Start Date   albuterol sulfate  (90 Base) MCG/ACT inhaler  Commonly known as: PROVENTIL HFA;VENTOLIN HFA;PROAIR HFA   2 puffs, Inhalation, Every 4 Hours PRN      ALPRAZolam 0.5 MG tablet  Commonly known as: Xanax   0.25 mg, Oral, 2 Times Daily PRN      Denta 5000 Plus 1.1 % cream  Generic drug: Sodium Fluoride   Apply 1 application topically to the  appropriate area as directed Daily As Needed. Apply with toothbrush      diphenhydrAMINE 25 MG tablet  Commonly known as: BENADRYL   25 mg, Oral, Nightly PRN      doxazosin 2 MG tablet  Commonly known as: CARDURA   1 tablet, Oral, Every Evening      furosemide 20 MG tablet  Commonly known as: Lasix   20 mg, Oral, Daily      HYDROcodone-acetaminophen 7.5-325 MG per tablet  Commonly known as: NORCO   1 tablet, Oral, Every 6 Hours PRN      levothyroxine 137 MCG tablet  Commonly known as: SYNTHROID, LEVOTHROID   1 tablet, Oral, Daily      loratadine 10 MG tablet  Commonly known as: CLARITIN   1 tablet, Oral, Daily PRN      losartan 25 MG tablet  Commonly known as: COZAAR   1 tablet, Oral, 2 Times Daily      megestrol 40 MG tablet  Commonly known as: MEGACE   40 mg, Oral, Daily      metoclopramide 10 MG tablet  Commonly known as: REGLAN   10 mg, Oral, 3 Times Daily PRN      metoprolol succinate XL 50 MG 24 hr tablet  Commonly known as: TOPROL-XL   1 tablet, Oral, 2 Times Daily      NIFEdipine XL 30 MG 24 hr tablet  Commonly known as: PROCARDIA XL   1 tablet, Oral, Daily      pancrelipase (Lip-Prot-Amyl) 75555-17606 units capsule delayed-release particles capsule  Commonly known as: CREON   12,000 units of lipase, Oral, 3 Times Daily With Meals      potassium chloride 10 MEQ CR tablet   10 mEq, Oral, Daily      promethazine 12.5 MG tablet  Commonly known as: PHENERGAN   12.5 mg, Oral, Every 6 Hours PRN, for nausea      tacrolimus 0.1 % ointment  Commonly known as: PROTOPIC   1 application, Topical, Daily, To psoriasis in groin              ASK your doctor about these medications        Instructions Start Date   VITAMIN D3 PO   2,000 mg, Oral, Daily               Discharge Diet:   Diet Instructions       Diet: Regular/House Diet, Cardiac Diets; Healthy Heart (2-3 Na+); Regular Texture (IDDSI 7); Thin (IDDSI 0)      Discharge Diet:  Regular/House Diet  Cardiac Diets       Cardiac Diet: Healthy Heart (2-3 Na+)    Texture:  Regular Texture (IDDSI 7)    Fluid Consistency: Thin (IDDSI 0)            Activity at Discharge:   Activity Instructions       Activity as Tolerated              Follow-up Appointments:   1.  Follow-up with Dr. Eid in 1 week.  Future Appointments   Date Time Provider Department Center   7/24/2023 10:30 AM Adrián Ayala DO [facility]       Test Results Pending at Discharge: none.    Electronically signed by SKYLER Kurtz, 05/17/23, 12:18 CDT.    Time: 35 minutes.

## 2023-05-18 ENCOUNTER — OFFICE VISIT (OUTPATIENT)
Dept: INTERNAL MEDICINE | Facility: CLINIC | Age: 78
End: 2023-05-18
Payer: MEDICARE

## 2023-05-18 ENCOUNTER — TRANSITIONAL CARE MANAGEMENT TELEPHONE ENCOUNTER (OUTPATIENT)
Dept: CALL CENTER | Facility: HOSPITAL | Age: 78
End: 2023-05-18
Payer: MEDICARE

## 2023-05-18 VITALS
TEMPERATURE: 97.8 F | BODY MASS INDEX: 26.99 KG/M2 | WEIGHT: 162 LBS | HEART RATE: 85 BPM | DIASTOLIC BLOOD PRESSURE: 88 MMHG | OXYGEN SATURATION: 97 % | HEIGHT: 65 IN | SYSTOLIC BLOOD PRESSURE: 163 MMHG

## 2023-05-18 DIAGNOSIS — I10 ESSENTIAL HYPERTENSION: ICD-10-CM
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
## 2023-05-18 DIAGNOSIS — R60.0 LOCALIZED EDEMA: ICD-10-CM

## 2023-05-18 DIAGNOSIS — J90 PLEURAL EFFUSION: Primary | ICD-10-CM

## 2023-05-18 DIAGNOSIS — J18.9 HOSPITAL-ACQUIRED PNEUMONIA: ICD-10-CM

## 2023-05-18 DIAGNOSIS — L98.9 SKIN LESION: ICD-10-CM

## 2023-05-18 RX ORDER — GINSENG 100 MG
1 CAPSULE ORAL 2 TIMES DAILY
Qty: 28 G | Refills: 0 | Status: SHIPPED | OUTPATIENT
Start: 2023-05-18

## 2023-05-18 NOTE — PROGRESS NOTES
Transitional Care Follow Up Visit  Subjective     Ирина Randolph is a 77 y.o. female who presents for a transitional care management visit.    Within 48 business hours after discharge our office contacted her via telephone to coordinate her care and needs.      I reviewed and discussed the details of that call along with the discharge summary, hospital problems, inpatient lab results, inpatient diagnostic studies, and consultation reports with Ирина.     Current outpatient and discharge medications have been reconciled for the patient.  Reviewed by: SKYLER Mccrary          5/17/2023     2:30 PM   Date of TCM Phone Call   Hospital [facility]   Date of Admission 5/15/2023   Date of Discharge 5/17/2023   Discharge Disposition Home or Self Care     Risk for Readmission (LACE) Score: 11 (5/17/2023  5:00 AM)      History of Present Illness   Course During Hospital Stay: Ms. Randolph is a 77-year-old female who presented to [facility] on 5/15/2023 with shortness of breath and cough.  Her work-up revealed right lower lobe consolidation/atelectasis.  No evidence of pulmonary embolism.  Respiratory panel PCR was negative.  She had no fever and remained on room air.  Initially started on vancomycin and Zosyn.  Vancomycin was discontinued after MRSA nasal screen was negative.  She received 2 days of IV Zosyn before being transition to Augmentin PO.  CTA noted moderate right and small left basal pleural effusions which she received  3 doses of IV Lasix with improvement in swelling.   Ultrasound venous Doppler of her right upper extremity was completed for pain and swelling, No evidence of deep venous thrombosis or superficial thrombophlebitis was seen.  White blood cells on admission were 11.49 increasing to 12.61 on 5/16/2023 for declining to 10.57 on day of discharge 5/17/2023.  She was discharged with new prescriptions for Augmentin 875-125 oral twice a day for 3 days and Delsym 30mg/5ml to take 10  mL by mouth every 12 hours.    Adult Transthoracic Echo Complete W/ Cont if Necessary Per Protocol     Interpretation Summary    Left ventricular systolic function is normal. Left ventricular ejection fraction appears to be 61 - 65%.    Abnormal global longitudinal LV strain (GLS) = -8.2    The following segments are hypokinetic: mid inferolateral, mid anterolateral, apical anterior, apical inferior, apical lateral and apex.    Left ventricular diastolic dysfunction is noted.    Moderate mitral annular calcification is present. Mild to moderate mitral valve regurgitation is present with an anteriorly-directed jet noted    Mild pulmonary hypertension is present.      The patient reports feeling stronger and has been able to be up and active more. She does complain of her cough beginning to become productive. Her sputum has been white.     Continues to complain of lower extremity edema, but states it is improving.     Her blood pressure is elevated this visit. Patient reports that she keeps track of her blood pressure at home and it has not been elevated.     Complains of a skin lesion around her belly button. She does follow with dermatology for psoriasis. She states that it is starting to develop a plaque on this site but that it did not start like her other psoriasis spots. She has tried a nystatin cream from another provider with no improvement and is currently using her psoriasis lotion. Denies drainage.      The following portions of the patient's history were reviewed and updated as appropriate: allergies, current medications, past family history, past medical history, past social history, past surgical history, and problem list.    Review of Systems   Constitutional:  Positive for fatigue. Negative for activity change.   HENT:  Negative for congestion.    Eyes: Negative.    Respiratory:  Positive for cough (productive with white sputum). Negative for chest tightness, shortness of breath and wheezing.     Cardiovascular:  Positive for leg swelling. Negative for chest pain.   Gastrointestinal:  Positive for diarrhea (Loose stools since taking antibiotic). Negative for constipation.   Genitourinary:  Negative for difficulty urinating, dysuria and frequency.   Musculoskeletal:  Negative for arthralgias, back pain, gait problem and myalgias.   Skin:  Positive for wound.   Neurological:  Negative for dizziness, weakness and light-headedness.     Objective   Physical Exam  Vitals and nursing note reviewed.   Constitutional:       Appearance: Normal appearance.   HENT:      Head: Normocephalic.      Right Ear: External ear normal.      Left Ear: External ear normal.      Nose: Nose normal. No congestion or rhinorrhea.      Mouth/Throat:      Mouth: Mucous membranes are moist.      Pharynx: Oropharynx is clear. No posterior oropharyngeal erythema.   Eyes:      Pupils: Pupils are equal, round, and reactive to light.   Cardiovascular:      Rate and Rhythm: Normal rate and regular rhythm.      Pulses: Normal pulses.      Heart sounds: Normal heart sounds. No murmur heard.    No gallop.   Pulmonary:      Effort: Pulmonary effort is normal. No respiratory distress.      Breath sounds: Examination of the right-lower field reveals decreased breath sounds. No wheezing or rhonchi.   Chest:      Chest wall: No tenderness.   Abdominal:      Palpations: Abdomen is soft.      Tenderness: There is no abdominal tenderness.   Musculoskeletal:         General: Normal range of motion.      Cervical back: Normal range of motion.      Right lower le+ Edema present.      Left lower le+ Edema present.   Skin:     General: Skin is warm and dry.      Findings: Lesion (Moist erythematous lesion surrounding umbilicus) present.   Neurological:      Mental Status: She is alert.       Assessment & Plan   Diagnoses and all orders for this visit:    1. Pleural effusion (Primary)    2. Hospital-acquired pneumonia    3. Localized edema    4.  Essential hypertension    5. Skin lesion  -     bacitracin 500 UNIT/GM ointment; Apply 1 application topically to the appropriate area as directed 2 (Two) Times a Day.  Dispense: 28 g; Refill: 0    Discussed continuing Augmentin to completion and to continue taking Delsym.     Advised to continue wearing compression stockings and taking lasix as previously prescribed.     Discussed skin lesion likely psoriasis, but with different initial presentation and failure of nystatin treatment will proceed with bacitracin ointment and hydrocortisone cream.

## 2023-05-18 NOTE — OUTREACH NOTE
Call Center TCM Note    Flowsheet Row Responses   [facility] patient discharged from? [place]   Does the patient have one of the following disease processes/diagnoses(primary or secondary)? Pneumonia   TCM attempt successful? Yes   Discharge diagnosis Pleural effusion,Hospital-acquired pneumonia   Does the patient have an appointment with their PCP within 7 days of discharge? Yes  [5/18/23 at 1:00 PM]   TCM call completed? Yes   Wrap up additional comments Pt had PCP [facility] fu appt today, 5/18/23.          Yissel Mendez RN    5/18/2023, 13:23 CDT

## 2023-05-20 LAB
BACTERIA SPEC AEROBE CULT: NORMAL
BACTERIA SPEC AEROBE CULT: NORMAL

## 2023-05-24 NOTE — PROGRESS NOTES
MEDICAL ONCOLOGY PROGRESS NOTE    Corinne Marino   [birth year]  5/25/2023     Chief Complaint   Patient presents with    Cancer     Malignant neoplasm of tail of pancreas [facility])         Pt Name: Corinne Marino  MRN: [id number]  YOB: 1945  Date of evaluation: 5/25/2023    HISTORY OF PRESENT ILLNESS:    Patient is a pleasant 69years old female who has been diagnosed with invasive  adenocarcinoma of the pancreas, node positive. She underwent laparoscopic assisted distal pancreatectomy and splenectomy in June 2021 at [facility] she is a status post completion of adjuvant chemotherapy with Gemzar/Xeloda completed February 2022. Unfortunately, she had recurrent disease with massive ascites. CT-guided paracentesis cytology positive metastatic adenocarcinoma. CA 19-9 elevated. She is currently receiving palliative chemotherapy with Gemzar/Abraxane. Abraxane was reduced 100 mg/M2. She was recent hospitalized at [facility] for pneumonia. She is staying in the hospital for this 5 days. She received significant IV fluids replacement. She has significant bilateral lower extremity swelling. She still feels quite fatigued. 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 Surgeon Dr. Kalyan Hsu [address] [place] [address] [address]   8/13/2021-02/25/22-adjuvant Gemzar 1000 mg/m2 IV D 1, D8, D15 with Capecitabine 830 mg/m2 D1-21 every 28 days x6 cycles  1/14/22 Eliquis for DVT right upper extremity November 2022  10/14/22-Initiated palliative frontline Gemzar Abraxane every 2 weeks  1/13/23 Decrease Abraxane to 100mg/m2 with each treatment    Cancer History  Tariqmckenna Yee was first seen by me on 8/6/2021.   The patient

## 2023-05-25 ENCOUNTER — OFFICE VISIT (OUTPATIENT)
Dept: HEMATOLOGY | Age: 78
End: 2023-05-25
Payer: MEDICARE

## 2023-05-25 ENCOUNTER — HOSPITAL ENCOUNTER (OUTPATIENT)
Dept: INFUSION THERAPY | Age: 78
Discharge: HOME | End: 2023-05-25
Payer: MEDICARE

## 2023-05-25 VITALS
DIASTOLIC BLOOD PRESSURE: 82 MMHG | RESPIRATION RATE: 20 BRPM | HEART RATE: 82 BPM | WEIGHT: 164.7 LBS | BODY MASS INDEX: 27.44 KG/M2 | OXYGEN SATURATION: 98 % | HEIGHT: 65 IN | TEMPERATURE: 97.9 F | SYSTOLIC BLOOD PRESSURE: 147 MMHG

## 2023-05-25 DIAGNOSIS — T45.1X5A CHEMOTHERAPY-INDUCED FATIGUE: ICD-10-CM

## 2023-05-25 DIAGNOSIS — R53.81 PHYSICAL DECONDITIONING: ICD-10-CM

## 2023-05-25 DIAGNOSIS — R06.02 SHORTNESS OF BREATH ON EXERTION: ICD-10-CM

## 2023-05-25 DIAGNOSIS — R53.83 CHEMOTHERAPY-INDUCED FATIGUE: ICD-10-CM

## 2023-05-25 DIAGNOSIS — Z51.11 CHEMOTHERAPY MANAGEMENT, ENCOUNTER FOR: ICD-10-CM

## 2023-05-25 DIAGNOSIS — C25.2 MALIGNANT NEOPLASM OF TAIL OF PANCREAS (HCC): Primary | ICD-10-CM

## 2023-05-25 DIAGNOSIS — D64.81 ANTINEOPLASTIC CHEMOTHERAPY INDUCED ANEMIA: ICD-10-CM

## 2023-05-25 DIAGNOSIS — R54 FRAILTY SYNDROME IN GERIATRIC PATIENT: ICD-10-CM

## 2023-05-25 DIAGNOSIS — M79.89 LOCALIZED SWELLING OF BOTH LOWER EXTREMITIES: ICD-10-CM

## 2023-05-25 DIAGNOSIS — C25.2 MALIGNANT NEOPLASM OF TAIL OF PANCREAS (HCC): ICD-10-CM

## 2023-05-25 DIAGNOSIS — T45.1X5A ANTINEOPLASTIC CHEMOTHERAPY INDUCED ANEMIA: ICD-10-CM

## 2023-05-25 DIAGNOSIS — C79.9 METASTASIS FROM PANCREATIC CANCER (HCC): Primary | ICD-10-CM

## 2023-05-25 DIAGNOSIS — Z71.89 CARE PLAN DISCUSSED WITH PATIENT: ICD-10-CM

## 2023-05-25 DIAGNOSIS — C25.9 METASTASIS FROM PANCREATIC CANCER (HCC): ICD-10-CM

## 2023-05-25 DIAGNOSIS — C25.9 METASTASIS FROM PANCREATIC CANCER (HCC): Primary | ICD-10-CM

## 2023-05-25 DIAGNOSIS — E87.6 HYPOKALEMIA: ICD-10-CM

## 2023-05-25 DIAGNOSIS — C79.9 METASTASIS FROM PANCREATIC CANCER (HCC): ICD-10-CM

## 2023-05-25 LAB
ALBUMIN SERPL-MCNC: 3.4 G/DL (ref 3.5–5.2)
ALP SERPL-CCNC: 81 U/L (ref 35–104)
ALT SERPL-CCNC: 17 U/L (ref 9–52)
ANION GAP SERPL CALCULATED.3IONS-SCNC: 8 MMOL/L (ref 7–19)
AST SERPL-CCNC: 40 U/L (ref 14–36)
BILIRUB SERPL-MCNC: 0.4 MG/DL (ref 0.2–1.3)
BUN SERPL-MCNC: 17 MG/DL (ref 7–17)
CALCIUM SERPL-MCNC: 8.9 MG/DL (ref 8.4–10.2)
CANCER AG19-9 SERPL-ACNC: 105 U/ML (ref 0–35)
CHLORIDE SERPL-SCNC: 108 MMOL/L (ref 98–111)
CO2 SERPL-SCNC: 20 MMOL/L (ref 22–29)
CREAT SERPL-MCNC: 1 MG/DL (ref 0.5–1)
ERYTHROCYTE [DISTWIDTH] IN BLOOD BY AUTOMATED COUNT: 17.8 % (ref 11.7–14.4)
GLOBULIN: 3.5 G/DL
GLUCOSE SERPL-MCNC: 83 MG/DL (ref 74–106)
HCT VFR BLD AUTO: 28.8 % (ref 34.1–44.9)
HGB BLD-MCNC: 9.3 G/DL (ref 11.2–15.7)
LYMPHOCYTES # BLD: 3.92 K/UL (ref 1.18–3.74)
LYMPHOCYTES NFR BLD: 35.1 % (ref 19.3–53.1)
MCH RBC QN AUTO: 30.5 PG (ref 25.6–32.2)
MCHC RBC AUTO-ENTMCNC: 32.3 G/DL (ref 32.3–35.5)
MCV RBC AUTO: 94.4 FL (ref 79.4–94.8)
MONOCYTES # BLD: 1.44 K/UL (ref 0.24–0.82)
MONOCYTES NFR BLD: 12.9 % (ref 4.7–12.5)
NEUTROPHILS # BLD: 5.32 K/UL (ref 1.56–6.13)
NEUTS SEG NFR BLD: 47.5 % (ref 34–71.1)
PLATELET # BLD AUTO: 458 K/UL (ref 182–369)
PMV BLD AUTO: 10.3 FL (ref 7.4–10.4)
POTASSIUM SERPL-SCNC: 4.7 MMOL/L (ref 3.5–5.1)
PROT SERPL-MCNC: 6.9 G/DL (ref 6.3–8.2)
RBC # BLD AUTO: 3.05 M/UL (ref 3.93–5.22)
SODIUM SERPL-SCNC: 136 MMOL/L (ref 137–145)
WBC # BLD AUTO: 11.18 K/UL (ref 3.98–10.04)

## 2023-05-25 PROCEDURE — 1036F TOBACCO NON-USER: CPT | Performed by: INTERNAL MEDICINE

## 2023-05-25 PROCEDURE — G8400 PT W/DXA NO RESULTS DOC: HCPCS | Performed by: INTERNAL MEDICINE

## 2023-05-25 PROCEDURE — 1123F ACP DISCUSS/DSCN MKR DOCD: CPT | Performed by: INTERNAL MEDICINE

## 2023-05-25 PROCEDURE — 85025 COMPLETE CBC W/AUTO DIFF WBC: CPT

## 2023-05-25 PROCEDURE — 6360000002 HC RX W HCPCS: Performed by: INTERNAL MEDICINE

## 2023-05-25 PROCEDURE — G8428 CUR MEDS NOT DOCUMENT: HCPCS | Performed by: INTERNAL MEDICINE

## 2023-05-25 PROCEDURE — 96523 IRRIG DRUG DELIVERY DEVICE: CPT

## 2023-05-25 PROCEDURE — 99212 OFFICE O/P EST SF 10 MIN: CPT

## 2023-05-25 PROCEDURE — 80053 COMPREHEN METABOLIC PANEL: CPT

## 2023-05-25 PROCEDURE — 99214 OFFICE O/P EST MOD 30 MIN: CPT | Performed by: INTERNAL MEDICINE

## 2023-05-25 PROCEDURE — 1111F DSCHRG MED/CURRENT MED MERGE: CPT | Performed by: INTERNAL MEDICINE

## 2023-05-25 PROCEDURE — G8417 CALC BMI ABV UP PARAM F/U: HCPCS | Performed by: INTERNAL MEDICINE

## 2023-05-25 PROCEDURE — 2580000003 HC RX 258: Performed by: INTERNAL MEDICINE

## 2023-05-25 PROCEDURE — 36415 COLL VENOUS BLD VENIPUNCTURE: CPT

## 2023-05-25 PROCEDURE — 1090F PRES/ABSN URINE INCON ASSESS: CPT | Performed by: INTERNAL MEDICINE

## 2023-05-25 RX ORDER — PACLITAXEL 100 MG/20ML
100 INJECTION, POWDER, LYOPHILIZED, FOR SUSPENSION INTRAVENOUS ONCE
OUTPATIENT
Start: 2023-06-02 | End: 2023-05-25

## 2023-05-25 RX ORDER — SODIUM CHLORIDE 9 MG/ML
5-250 INJECTION, SOLUTION INTRAVENOUS PRN
OUTPATIENT
Start: 2023-06-02

## 2023-05-25 RX ORDER — FAMOTIDINE 10 MG/ML
20 INJECTION, SOLUTION INTRAVENOUS
OUTPATIENT
Start: 2023-06-02

## 2023-05-25 RX ORDER — MEPERIDINE HYDROCHLORIDE 50 MG/ML
12.5 INJECTION INTRAMUSCULAR; INTRAVENOUS; SUBCUTANEOUS PRN
OUTPATIENT
Start: 2023-06-02

## 2023-05-25 RX ORDER — SODIUM CHLORIDE 0.9 % (FLUSH) 0.9 %
5-40 SYRINGE (ML) INJECTION PRN
OUTPATIENT
Start: 2023-06-02

## 2023-05-25 RX ORDER — EPINEPHRINE 1 MG/ML
0.3 INJECTION, SOLUTION, CONCENTRATE INTRAVENOUS PRN
OUTPATIENT
Start: 2023-06-02

## 2023-05-25 RX ORDER — ONDANSETRON 2 MG/ML
8 INJECTION INTRAMUSCULAR; INTRAVENOUS
OUTPATIENT
Start: 2023-06-02

## 2023-05-25 RX ORDER — DIPHENHYDRAMINE HYDROCHLORIDE 50 MG/ML
50 INJECTION INTRAMUSCULAR; INTRAVENOUS
OUTPATIENT
Start: 2023-06-02

## 2023-05-25 RX ORDER — HEPARIN SODIUM (PORCINE) LOCK FLUSH IV SOLN 100 UNIT/ML 100 UNIT/ML
500 SOLUTION INTRAVENOUS PRN
OUTPATIENT
Start: 2023-06-02

## 2023-05-25 RX ORDER — HEPARIN SODIUM (PORCINE) LOCK FLUSH IV SOLN 100 UNIT/ML 100 UNIT/ML
500 SOLUTION INTRAVENOUS PRN
Status: DISCONTINUED | OUTPATIENT
Start: 2023-05-25 | End: 2023-05-26 | Stop reason: HOSPADM

## 2023-05-25 RX ORDER — FUROSEMIDE 20 MG/1
20 TABLET ORAL DAILY
COMMUNITY
Start: 2023-05-22

## 2023-05-25 RX ORDER — SODIUM CHLORIDE 0.9 % (FLUSH) 0.9 %
5-40 SYRINGE (ML) INJECTION PRN
Status: DISCONTINUED | OUTPATIENT
Start: 2023-05-25 | End: 2023-05-26 | Stop reason: HOSPADM

## 2023-05-25 RX ORDER — ACETAMINOPHEN 325 MG/1
650 TABLET ORAL
OUTPATIENT
Start: 2023-06-02

## 2023-05-25 RX ORDER — SODIUM CHLORIDE 9 MG/ML
INJECTION, SOLUTION INTRAVENOUS CONTINUOUS
OUTPATIENT
Start: 2023-06-02

## 2023-05-25 RX ORDER — POTASSIUM CHLORIDE 750 MG/1
20 CAPSULE, EXTENDED RELEASE ORAL 2 TIMES DAILY
Qty: 120 CAPSULE | Refills: 5 | Status: SHIPPED | OUTPATIENT
Start: 2023-05-25

## 2023-05-25 RX ORDER — ALBUTEROL SULFATE 90 UG/1
4 AEROSOL, METERED RESPIRATORY (INHALATION) PRN
OUTPATIENT
Start: 2023-06-02

## 2023-05-25 RX ORDER — PALONOSETRON 0.05 MG/ML
0.25 INJECTION, SOLUTION INTRAVENOUS ONCE
Start: 2023-06-02 | End: 2023-05-25

## 2023-05-25 RX ADMIN — HEPARIN 500 UNITS: 100 SYRINGE at 12:11

## 2023-05-25 RX ADMIN — SODIUM CHLORIDE, PRESERVATIVE FREE 10 ML: 5 INJECTION INTRAVENOUS at 12:11

## 2023-05-25 NOTE — PROGRESS NOTES
Lab Results   Component Value Date    WBC 11.18 (H) 05/25/2023    HGB 9.3 (L) 05/25/2023    HCT 28.8 (L) 05/25/2023    MCV 94.4 05/25/2023     (H) 05/25/2023     Lab Results   Component Value Date    NEUTROABS 5.32 05/25/2023     Lab Results   Component Value Date     (L) 05/25/2023    K 4.7 05/25/2023     05/25/2023    CO2 20 (L) 05/25/2023    BUN 17 05/25/2023    CREATININE 1.0 05/25/2023    GLUCOSE 83 05/25/2023    CALCIUM 8.9 05/25/2023    PROT 6.9 05/25/2023    LABALBU 3.4 (L) 05/25/2023    BILITOT 0.4 05/25/2023    ALKPHOS 81 05/25/2023    AST 40 (H) 05/25/2023    ALT 17 05/25/2023    LABGLOM 58 (A) 05/25/2023    GFRAA >59 01/12/2022    GLOB 3.5 05/25/2023

## 2023-05-28 DIAGNOSIS — R60.0 LOCALIZED EDEMA: ICD-10-CM

## 2023-05-28 RX ORDER — FUROSEMIDE 20 MG/1
20 TABLET ORAL 2 TIMES DAILY
Qty: 60 TABLET | Refills: 1 | Status: SHIPPED | OUTPATIENT
Start: 2023-05-28

## 2023-06-02 ENCOUNTER — HOSPITAL ENCOUNTER (OUTPATIENT)
Dept: INFUSION THERAPY | Age: 78
Discharge: HOME | End: 2023-06-02
Payer: MEDICARE

## 2023-06-02 VITALS
OXYGEN SATURATION: 98 % | DIASTOLIC BLOOD PRESSURE: 85 MMHG | HEART RATE: 79 BPM | BODY MASS INDEX: 26.66 KG/M2 | WEIGHT: 160 LBS | TEMPERATURE: 98.2 F | SYSTOLIC BLOOD PRESSURE: 143 MMHG | RESPIRATION RATE: 20 BRPM | HEIGHT: 65 IN

## 2023-06-02 DIAGNOSIS — C25.9 METASTASIS FROM PANCREATIC CANCER (HCC): ICD-10-CM

## 2023-06-02 DIAGNOSIS — C79.9 METASTASIS FROM PANCREATIC CANCER (HCC): ICD-10-CM

## 2023-06-02 DIAGNOSIS — C25.2 MALIGNANT NEOPLASM OF TAIL OF PANCREAS (HCC): Primary | ICD-10-CM

## 2023-06-02 LAB
ALBUMIN SERPL-MCNC: 3.5 G/DL (ref 3.5–5.2)
ALP SERPL-CCNC: 72 U/L (ref 35–104)
ALT SERPL-CCNC: 17 U/L (ref 9–52)
ANION GAP SERPL CALCULATED.3IONS-SCNC: 9 MMOL/L (ref 7–19)
AST SERPL-CCNC: 38 U/L (ref 14–36)
BILIRUB SERPL-MCNC: 0.3 MG/DL (ref 0.2–1.3)
BUN SERPL-MCNC: 19 MG/DL (ref 7–17)
CALCIUM SERPL-MCNC: 8.9 MG/DL (ref 8.4–10.2)
CHLORIDE SERPL-SCNC: 107 MMOL/L (ref 98–111)
CO2 SERPL-SCNC: 19 MMOL/L (ref 22–29)
CREAT SERPL-MCNC: 1 MG/DL (ref 0.5–1)
ERYTHROCYTE [DISTWIDTH] IN BLOOD BY AUTOMATED COUNT: 17.6 % (ref 11.7–14.4)
GLOBULIN: 3.5 G/DL
GLUCOSE SERPL-MCNC: 85 MG/DL (ref 74–106)
HCT VFR BLD AUTO: 30.3 % (ref 34.1–44.9)
HGB BLD-MCNC: 9.6 G/DL (ref 11.2–15.7)
LYMPHOCYTES # BLD: 4.09 K/UL (ref 1.18–3.74)
LYMPHOCYTES NFR BLD: 36 % (ref 19.3–53.1)
MCH RBC QN AUTO: 30.2 PG (ref 25.6–32.2)
MCHC RBC AUTO-ENTMCNC: 31.7 G/DL (ref 32.3–35.5)
MCV RBC AUTO: 95.3 FL (ref 79.4–94.8)
MONOCYTES # BLD: 1.32 K/UL (ref 0.24–0.82)
MONOCYTES NFR BLD: 11.6 % (ref 4.7–12.5)
NEUTROPHILS # BLD: 5.55 K/UL (ref 1.56–6.13)
NEUTS SEG NFR BLD: 48.8 % (ref 34–71.1)
PLATELET # BLD AUTO: 485 K/UL (ref 182–369)
PMV BLD AUTO: 9.8 FL (ref 7.4–10.4)
POTASSIUM SERPL-SCNC: 5.1 MMOL/L (ref 3.5–5.1)
PROT SERPL-MCNC: 7.1 G/DL (ref 6.3–8.2)
RBC # BLD AUTO: 3.18 M/UL (ref 3.93–5.22)
SODIUM SERPL-SCNC: 135 MMOL/L (ref 137–145)
WBC # BLD AUTO: 11.36 K/UL (ref 3.98–10.04)

## 2023-06-02 PROCEDURE — 96375 TX/PRO/DX INJ NEW DRUG ADDON: CPT

## 2023-06-02 PROCEDURE — 6360000002 HC RX W HCPCS: Performed by: INTERNAL MEDICINE

## 2023-06-02 PROCEDURE — 80053 COMPREHEN METABOLIC PANEL: CPT

## 2023-06-02 PROCEDURE — 2580000003 HC RX 258: Performed by: INTERNAL MEDICINE

## 2023-06-02 PROCEDURE — 96413 CHEMO IV INFUSION 1 HR: CPT

## 2023-06-02 PROCEDURE — 85025 COMPLETE CBC W/AUTO DIFF WBC: CPT

## 2023-06-02 PROCEDURE — 96417 CHEMO IV INFUS EACH ADDL SEQ: CPT

## 2023-06-02 RX ORDER — SODIUM CHLORIDE 0.9 % (FLUSH) 0.9 %
5-40 SYRINGE (ML) INJECTION PRN
Status: DISCONTINUED | OUTPATIENT
Start: 2023-06-02 | End: 2023-06-03 | Stop reason: HOSPADM

## 2023-06-02 RX ORDER — PALONOSETRON 0.05 MG/ML
0.25 INJECTION, SOLUTION INTRAVENOUS ONCE
Status: COMPLETED | OUTPATIENT
Start: 2023-06-02 | End: 2023-06-02

## 2023-06-02 RX ORDER — SODIUM CHLORIDE 9 MG/ML
5-250 INJECTION, SOLUTION INTRAVENOUS PRN
Status: DISCONTINUED | OUTPATIENT
Start: 2023-06-02 | End: 2023-06-03 | Stop reason: HOSPADM

## 2023-06-02 RX ORDER — HEPARIN SODIUM (PORCINE) LOCK FLUSH IV SOLN 100 UNIT/ML 100 UNIT/ML
500 SOLUTION INTRAVENOUS PRN
Status: DISCONTINUED | OUTPATIENT
Start: 2023-06-02 | End: 2023-06-03 | Stop reason: HOSPADM

## 2023-06-02 RX ORDER — PACLITAXEL 100 MG/20ML
100 INJECTION, POWDER, LYOPHILIZED, FOR SUSPENSION INTRAVENOUS ONCE
Status: COMPLETED | OUTPATIENT
Start: 2023-06-02 | End: 2023-06-02

## 2023-06-02 RX ADMIN — Medication 500 UNITS: at 12:45

## 2023-06-02 RX ADMIN — PALONOSETRON 0.25 MG: 0.05 INJECTION, SOLUTION INTRAVENOUS at 11:14

## 2023-06-02 RX ADMIN — PACLITAXEL 183 MG: 100 INJECTION, POWDER, LYOPHILIZED, FOR SUSPENSION INTRAVENOUS at 11:31

## 2023-06-02 RX ADMIN — DEXAMETHASONE SODIUM PHOSPHATE: 10 INJECTION, SOLUTION INTRAMUSCULAR; INTRAVENOUS at 11:14

## 2023-06-02 RX ADMIN — GEMCITABINE 1830 MG: 38 INJECTION, SOLUTION INTRAVENOUS at 12:12

## 2023-06-02 RX ADMIN — SODIUM CHLORIDE, PRESERVATIVE FREE 10 ML: 5 INJECTION INTRAVENOUS at 12:45

## 2023-06-02 RX ADMIN — SODIUM CHLORIDE 75 ML/HR: 9 INJECTION, SOLUTION INTRAVENOUS at 11:14

## 2023-06-05 ENCOUNTER — READMISSION MANAGEMENT (OUTPATIENT)
Dept: CALL CENTER | Facility: HOSPITAL | Age: 78
End: 2023-06-05
Payer: MEDICARE

## 2023-06-05 NOTE — OUTREACH NOTE
COPD/PN Week 3 Survey      Flowsheet Row Responses   [facility] patient discharged from? [place]   Does the patient have one of the following disease processes/diagnoses(primary or secondary)? Pneumonia   Week 3 attempt successful? Yes   Call start time 1612   Call end time 1613   Discharge diagnosis Pleural effusion,Hospital-acquired pneumonia   Person spoke with today (if not patient) and relationship Patient   Did the patient receive a copy of their discharge instructions? Yes   Nursing interventions Reviewed instructions with patient   What is the patient's perception of their health status since discharge? Improving   Nursing Interventions Nurse provided patient education   Is the patient/caregiver able to teach back the hierarchy of who to call/visit for symptoms/problems? PCP, Specialist, Home health nurse, Urgent Care, ED, 911 Yes   Week 3 call completed? Yes   Wrap up additional comments Patient states she is doing very well no concerns or questions noted.            Nichelle REILLY - Registered Nurse

## 2023-06-06 ENCOUNTER — CLINICAL SUPPORT (OUTPATIENT)
Dept: INTERNAL MEDICINE | Facility: CLINIC | Age: 78
End: 2023-06-06
Payer: MEDICARE

## 2023-06-06 ENCOUNTER — TELEPHONE (OUTPATIENT)
Dept: INTERNAL MEDICINE | Facility: CLINIC | Age: 78
End: 2023-06-06
Payer: MEDICARE

## 2023-06-06 DIAGNOSIS — R82.90 CLOUDY URINE: Primary | ICD-10-CM

## 2023-06-06 LAB
BILIRUB BLD-MCNC: NEGATIVE MG/DL
CLARITY, POC: ABNORMAL
COLOR UR: YELLOW
GLUCOSE UR STRIP-MCNC: NEGATIVE MG/DL
KETONES UR QL: NEGATIVE
LEUKOCYTE EST, POC: ABNORMAL
NITRITE UR-MCNC: POSITIVE MG/ML
PH UR: 6 [PH] (ref 5–8)
PROT UR STRIP-MCNC: ABNORMAL MG/DL
RBC # UR STRIP: ABNORMAL /UL
SP GR UR: 1.01 (ref 1–1.03)
UROBILINOGEN UR QL: NORMAL

## 2023-06-06 RX ORDER — CEFDINIR 300 MG/1
300 CAPSULE ORAL 2 TIMES DAILY
Qty: 14 CAPSULE | Refills: 0 | Status: SHIPPED | OUTPATIENT
Start: 2023-06-06

## 2023-06-06 NOTE — TELEPHONE ENCOUNTER
Bronwyn is good with patient coming in for a urinalysis. Can you call her and place her on the nurse/ma schedule please. Thank you!

## 2023-06-06 NOTE — TELEPHONE ENCOUNTER
PT called, having cloudy urine and wants to be checked for a UTI because of frequent voiding and she is on Lasix too.

## 2023-06-06 NOTE — PROGRESS NOTES
Patient presents with symptoms of cloudy urine. Urine specimen collected, clean catch. Patient tolerated well.

## 2023-06-08 ENCOUNTER — TELEPHONE (OUTPATIENT)
Dept: INTERNAL MEDICINE | Facility: CLINIC | Age: 78
End: 2023-06-08
Payer: MEDICARE

## 2023-06-08 NOTE — TELEPHONE ENCOUNTER
Caller: [facility]    Relationship: Other    Best call back number:  [phone]    What form or medical record are you requesting:  [facility] SAYS THAT A FAX ONCE SENT OVER FOR GENETIC TESTING.  ONCE OFFICE TO REFAX    Who is requesting this form or medical record from you: [facility]         Timeframe paperwork needed: 48HOURS     Additional notes:

## 2023-06-13 LAB
BACTERIA UR CULT: ABNORMAL
BACTERIA UR CULT: ABNORMAL
Lab: NORMAL
OTHER ANTIBIOTIC SUSC ISLT: ABNORMAL

## 2023-06-16 ENCOUNTER — HOSPITAL ENCOUNTER (OUTPATIENT)
Dept: INFUSION THERAPY | Age: 78
Discharge: HOME | End: 2023-06-16
Payer: MEDICARE

## 2023-06-16 DIAGNOSIS — C25.9 METASTASIS FROM PANCREATIC CANCER (HCC): ICD-10-CM

## 2023-06-16 DIAGNOSIS — C79.9 METASTASIS FROM PANCREATIC CANCER (HCC): ICD-10-CM

## 2023-06-16 DIAGNOSIS — C25.9 ADENOCARCINOMA OF PANCREAS (HCC): ICD-10-CM

## 2023-06-16 DIAGNOSIS — C25.2 MALIGNANT NEOPLASM OF TAIL OF PANCREAS (HCC): Primary | ICD-10-CM

## 2023-06-16 LAB
ALBUMIN SERPL-MCNC: 3.7 G/DL (ref 3.5–5.2)
ALP SERPL-CCNC: 82 U/L (ref 35–104)
ALT SERPL-CCNC: 19 U/L (ref 9–52)
ANION GAP SERPL CALCULATED.3IONS-SCNC: 11 MMOL/L (ref 7–19)
AST SERPL-CCNC: 39 U/L (ref 14–36)
BILIRUB SERPL-MCNC: <0.2 MG/DL (ref 0.2–1.3)
BUN SERPL-MCNC: 16 MG/DL (ref 7–17)
CALCIUM SERPL-MCNC: 9 MG/DL (ref 8.4–10.2)
CANCER AG19-9 SERPL-ACNC: 280 U/ML (ref 0–35)
CHLORIDE SERPL-SCNC: 106 MMOL/L (ref 98–111)
CO2 SERPL-SCNC: 20 MMOL/L (ref 22–29)
CREAT SERPL-MCNC: 1.1 MG/DL (ref 0.5–1)
ERYTHROCYTE [DISTWIDTH] IN BLOOD BY AUTOMATED COUNT: 18.6 % (ref 11.7–14.4)
GLOBULIN: 3.2 G/DL
GLUCOSE SERPL-MCNC: 131 MG/DL (ref 74–106)
HCT VFR BLD AUTO: 30.8 % (ref 34.1–44.9)
HGB BLD-MCNC: 9.9 G/DL (ref 11.2–15.7)
LYMPHOCYTES # BLD: 3.54 K/UL (ref 1.18–3.74)
LYMPHOCYTES NFR BLD: 36 % (ref 19.3–53.1)
MCH RBC QN AUTO: 30.7 PG (ref 25.6–32.2)
MCHC RBC AUTO-ENTMCNC: 32.1 G/DL (ref 32.3–35.5)
MCV RBC AUTO: 95.4 FL (ref 79.4–94.8)
MONOCYTES # BLD: 1.93 K/UL (ref 0.24–0.82)
MONOCYTES NFR BLD: 19.7 % (ref 4.7–12.5)
NEUTROPHILS # BLD: 3.79 K/UL (ref 1.56–6.13)
NEUTS SEG NFR BLD: 38.6 % (ref 34–71.1)
PLATELET # BLD AUTO: 528 K/UL (ref 182–369)
PMV BLD AUTO: 9.5 FL (ref 7.4–10.4)
POTASSIUM SERPL-SCNC: 4.8 MMOL/L (ref 3.5–5.1)
PROT SERPL-MCNC: 6.8 G/DL (ref 6.3–8.2)
RBC # BLD AUTO: 3.23 M/UL (ref 3.93–5.22)
SODIUM SERPL-SCNC: 137 MMOL/L (ref 137–145)
WBC # BLD AUTO: 9.82 K/UL (ref 3.98–10.04)

## 2023-06-16 PROCEDURE — 96417 CHEMO IV INFUS EACH ADDL SEQ: CPT

## 2023-06-16 PROCEDURE — 85025 COMPLETE CBC W/AUTO DIFF WBC: CPT

## 2023-06-16 PROCEDURE — 2580000003 HC RX 258: Performed by: INTERNAL MEDICINE

## 2023-06-16 PROCEDURE — 6360000002 HC RX W HCPCS

## 2023-06-16 PROCEDURE — 6360000002 HC RX W HCPCS: Performed by: INTERNAL MEDICINE

## 2023-06-16 PROCEDURE — 80053 COMPREHEN METABOLIC PANEL: CPT

## 2023-06-16 PROCEDURE — 36415 COLL VENOUS BLD VENIPUNCTURE: CPT

## 2023-06-16 PROCEDURE — 96375 TX/PRO/DX INJ NEW DRUG ADDON: CPT

## 2023-06-16 PROCEDURE — 96413 CHEMO IV INFUSION 1 HR: CPT

## 2023-06-16 RX ORDER — HEPARIN SODIUM 100 [USP'U]/ML
500 INJECTION, SOLUTION INTRAVENOUS PRN
Status: DISCONTINUED | OUTPATIENT
Start: 2023-06-16 | End: 2023-06-17 | Stop reason: HOSPADM

## 2023-06-16 RX ORDER — HEPARIN SODIUM 100 [USP'U]/ML
INJECTION, SOLUTION INTRAVENOUS
Status: COMPLETED
Start: 2023-06-16 | End: 2023-06-16

## 2023-06-16 RX ORDER — SODIUM CHLORIDE 9 MG/ML
5-250 INJECTION, SOLUTION INTRAVENOUS PRN
Status: DISCONTINUED | OUTPATIENT
Start: 2023-06-16 | End: 2023-06-17 | Stop reason: HOSPADM

## 2023-06-16 RX ORDER — PACLITAXEL 100 MG/20ML
100 INJECTION, POWDER, LYOPHILIZED, FOR SUSPENSION INTRAVENOUS ONCE
Status: COMPLETED | OUTPATIENT
Start: 2023-06-16 | End: 2023-06-16

## 2023-06-16 RX ORDER — PALONOSETRON 0.05 MG/ML
INJECTION, SOLUTION INTRAVENOUS
Status: DISPENSED
Start: 2023-06-16 | End: 2023-06-16

## 2023-06-16 RX ORDER — PALONOSETRON 0.05 MG/ML
0.25 INJECTION, SOLUTION INTRAVENOUS ONCE
Status: COMPLETED | OUTPATIENT
Start: 2023-06-16 | End: 2023-06-16

## 2023-06-16 RX ORDER — SODIUM CHLORIDE 0.9 % (FLUSH) 0.9 %
5-40 SYRINGE (ML) INJECTION PRN
Status: DISCONTINUED | OUTPATIENT
Start: 2023-06-16 | End: 2023-06-17 | Stop reason: HOSPADM

## 2023-06-16 RX ADMIN — GEMCITABINE 1830 MG: 38 INJECTION, SOLUTION INTRAVENOUS at 11:59

## 2023-06-16 RX ADMIN — HEPARIN 500 UNITS: 100 SYRINGE at 12:35

## 2023-06-16 RX ADMIN — HEPARIN SODIUM 500 UNITS: 100 INJECTION, SOLUTION INTRAVENOUS at 12:35

## 2023-06-16 RX ADMIN — PALONOSETRON 0.25 MG: 0.05 INJECTION, SOLUTION INTRAVENOUS at 11:04

## 2023-06-16 RX ADMIN — DEXAMETHASONE SODIUM PHOSPHATE: 10 INJECTION, SOLUTION INTRAMUSCULAR; INTRAVENOUS at 11:04

## 2023-06-16 RX ADMIN — PACLITAXEL 183 MG: 100 INJECTION, POWDER, LYOPHILIZED, FOR SUSPENSION INTRAVENOUS at 11:17

## 2023-06-16 RX ADMIN — SODIUM CHLORIDE, PRESERVATIVE FREE 10 ML: 5 INJECTION INTRAVENOUS at 12:35

## 2023-06-19 ENCOUNTER — TELEPHONE (OUTPATIENT)
Dept: HEMATOLOGY | Age: 78
End: 2023-06-19

## 2023-06-19 DIAGNOSIS — F41.9 ANXIETY: ICD-10-CM

## 2023-06-19 RX ORDER — ALPRAZOLAM 0.5 MG/1
0.25 TABLET ORAL 2 TIMES DAILY PRN
Qty: 30 TABLET | Refills: 1 | Status: SHIPPED | OUTPATIENT
Start: 2023-06-19

## 2023-06-19 NOTE — TELEPHONE ENCOUNTER
Attempted to contact pt to inform her of her scans sched 7-11 @ 930am but sent to  . Gordon Johnson mailed letter to address on file with appt date/time. 129

## 2023-06-19 NOTE — TELEPHONE ENCOUNTER
Rx Refill Note  Requested Prescriptions     Pending Prescriptions Disp Refills    ALPRAZolam (Xanax) 0.5 MG tablet 30 tablet 1     Sig: Take 0.5 tablets by mouth 2 (Two) Times a Day As Needed for Anxiety.   Med last filled:  3/28/23  Last office visit with prescribing clinician: 5/4/2023   Next office visit with prescribing clinician: 7/24/2023     NO UDS                           Would you like a call back once the refill request has been completed: [] Yes [] No    If the office needs to give you a call back, can they leave a voicemail: [] Yes [] No    Neida William RN  06/19/23, 09:28 CDT

## 2023-06-19 NOTE — TELEPHONE ENCOUNTER
Caller: Ирина Randolph    Relationship: Self    Best call back number: [phone]    Requested Prescriptions:   Requested Prescriptions     Pending Prescriptions Disp Refills    ALPRAZolam (Xanax) 0.5 MG tablet 30 tablet 1     Sig: Take 0.5 tablets by mouth 2 (Two) Times a Day As Needed for Anxiety.        Pharmacy where request should be sent: [id number] - [phone] AT [facility] [address] - [phone]  - [phone] FX     Last office visit with prescribing clinician: 5/4/2023   Last telemedicine visit with prescribing clinician: Visit date not found   Next office visit with prescribing clinician: 7/24/2023     Does the patient have less than a 3 day supply:  [x] Yes  [] No    Would you like a call back once the refill request has been completed: [x] Yes [] No    If the office needs to give you a call back, can they leave a voicemail: [x] Yes [] No    Cy Taylor Rep   06/19/23 09:23 CDT

## 2023-06-30 ENCOUNTER — HOSPITAL ENCOUNTER (OUTPATIENT)
Dept: INFUSION THERAPY | Age: 78
Discharge: HOME | End: 2023-06-30
Payer: MEDICARE

## 2023-06-30 VITALS
BODY MASS INDEX: 26.81 KG/M2 | DIASTOLIC BLOOD PRESSURE: 62 MMHG | SYSTOLIC BLOOD PRESSURE: 118 MMHG | TEMPERATURE: 98.6 F | HEIGHT: 65 IN | WEIGHT: 160.9 LBS | HEART RATE: 77 BPM | RESPIRATION RATE: 18 BRPM | OXYGEN SATURATION: 100 %

## 2023-06-30 DIAGNOSIS — C25.2 MALIGNANT NEOPLASM OF TAIL OF PANCREAS (HCC): Primary | ICD-10-CM

## 2023-06-30 DIAGNOSIS — C25.2 MALIGNANT NEOPLASM OF TAIL OF PANCREAS (HCC): ICD-10-CM

## 2023-06-30 DIAGNOSIS — C25.9 METASTASIS FROM PANCREATIC CANCER (HCC): ICD-10-CM

## 2023-06-30 DIAGNOSIS — C79.9 METASTASIS FROM PANCREATIC CANCER (HCC): ICD-10-CM

## 2023-06-30 LAB
ALBUMIN SERPL-MCNC: 3.4 G/DL (ref 3.5–5.2)
ALP SERPL-CCNC: 83 U/L (ref 35–104)
ALT SERPL-CCNC: 16 U/L (ref 9–52)
ANION GAP SERPL CALCULATED.3IONS-SCNC: 8 MMOL/L (ref 7–19)
AST SERPL-CCNC: 35 U/L (ref 14–36)
BILIRUB SERPL-MCNC: <0.2 MG/DL (ref 0.2–1.3)
BUN SERPL-MCNC: 18 MG/DL (ref 7–17)
CALCIUM SERPL-MCNC: 8.6 MG/DL (ref 8.4–10.2)
CANCER AG19-9 SERPL-ACNC: 343 U/ML (ref 0–35)
CHLORIDE SERPL-SCNC: 108 MMOL/L (ref 98–111)
CO2 SERPL-SCNC: 20 MMOL/L (ref 22–29)
CREAT SERPL-MCNC: 1 MG/DL (ref 0.5–1)
ERYTHROCYTE [DISTWIDTH] IN BLOOD BY AUTOMATED COUNT: 19.5 % (ref 11.7–14.4)
GLOBULIN: 3.2 G/DL
GLUCOSE SERPL-MCNC: 111 MG/DL (ref 74–106)
HCT VFR BLD AUTO: 29.8 % (ref 34.1–44.9)
HGB BLD-MCNC: 9.6 G/DL (ref 11.2–15.7)
LYMPHOCYTES # BLD: 3.4 K/UL (ref 1.18–3.74)
LYMPHOCYTES NFR BLD: 37.7 % (ref 19.3–53.1)
MCH RBC QN AUTO: 31.1 PG (ref 25.6–32.2)
MCHC RBC AUTO-ENTMCNC: 32.2 G/DL (ref 32.3–35.5)
MCV RBC AUTO: 96.4 FL (ref 79.4–94.8)
MONOCYTES # BLD: 2.04 K/UL (ref 0.24–0.82)
MONOCYTES NFR BLD: 22.6 % (ref 4.7–12.5)
NEUTROPHILS # BLD: 3.02 K/UL (ref 1.56–6.13)
NEUTS SEG NFR BLD: 33.4 % (ref 34–71.1)
PLATELET # BLD AUTO: 441 K/UL (ref 182–369)
PMV BLD AUTO: 9.6 FL (ref 7.4–10.4)
POTASSIUM SERPL-SCNC: 5.1 MMOL/L (ref 3.5–5.1)
PROT SERPL-MCNC: 6.6 G/DL (ref 6.3–8.2)
RBC # BLD AUTO: 3.09 M/UL (ref 3.93–5.22)
SODIUM SERPL-SCNC: 136 MMOL/L (ref 137–145)
WBC # BLD AUTO: 9.02 K/UL (ref 3.98–10.04)

## 2023-06-30 PROCEDURE — 96413 CHEMO IV INFUSION 1 HR: CPT

## 2023-06-30 PROCEDURE — 96417 CHEMO IV INFUS EACH ADDL SEQ: CPT

## 2023-06-30 PROCEDURE — 96375 TX/PRO/DX INJ NEW DRUG ADDON: CPT

## 2023-06-30 PROCEDURE — 85025 COMPLETE CBC W/AUTO DIFF WBC: CPT

## 2023-06-30 PROCEDURE — 80053 COMPREHEN METABOLIC PANEL: CPT

## 2023-06-30 PROCEDURE — 2580000003 HC RX 258: Performed by: INTERNAL MEDICINE

## 2023-06-30 PROCEDURE — 36415 COLL VENOUS BLD VENIPUNCTURE: CPT

## 2023-06-30 PROCEDURE — 6360000002 HC RX W HCPCS: Performed by: INTERNAL MEDICINE

## 2023-06-30 RX ORDER — PALONOSETRON 0.05 MG/ML
0.25 INJECTION, SOLUTION INTRAVENOUS ONCE
Status: COMPLETED | OUTPATIENT
Start: 2023-06-30 | End: 2023-06-30

## 2023-06-30 RX ORDER — SODIUM CHLORIDE 9 MG/ML
5-250 INJECTION, SOLUTION INTRAVENOUS PRN
Status: DISCONTINUED | OUTPATIENT
Start: 2023-06-30 | End: 2023-07-01 | Stop reason: HOSPADM

## 2023-06-30 RX ORDER — SODIUM CHLORIDE 0.9 % (FLUSH) 0.9 %
5-40 SYRINGE (ML) INJECTION PRN
Status: DISCONTINUED | OUTPATIENT
Start: 2023-06-30 | End: 2023-07-01 | Stop reason: HOSPADM

## 2023-06-30 RX ORDER — HEPARIN SODIUM 100 [USP'U]/ML
500 INJECTION, SOLUTION INTRAVENOUS PRN
Status: DISCONTINUED | OUTPATIENT
Start: 2023-06-30 | End: 2023-07-01 | Stop reason: HOSPADM

## 2023-06-30 RX ORDER — PACLITAXEL 100 MG/20ML
100 INJECTION, POWDER, LYOPHILIZED, FOR SUSPENSION INTRAVENOUS ONCE
Status: COMPLETED | OUTPATIENT
Start: 2023-06-30 | End: 2023-06-30

## 2023-06-30 RX ADMIN — PALONOSETRON 0.25 MG: 0.05 INJECTION, SOLUTION INTRAVENOUS at 12:22

## 2023-06-30 RX ADMIN — SODIUM CHLORIDE, PRESERVATIVE FREE 10 ML: 5 INJECTION INTRAVENOUS at 13:55

## 2023-06-30 RX ADMIN — PACLITAXEL 183 MG: 100 INJECTION, POWDER, LYOPHILIZED, FOR SUSPENSION INTRAVENOUS at 12:37

## 2023-06-30 RX ADMIN — SODIUM CHLORIDE 75 ML/HR: 9 INJECTION, SOLUTION INTRAVENOUS at 12:20

## 2023-06-30 RX ADMIN — DEXAMETHASONE SODIUM PHOSPHATE: 10 INJECTION, SOLUTION INTRAMUSCULAR; INTRAVENOUS at 12:21

## 2023-06-30 RX ADMIN — HEPARIN 500 UNITS: 100 SYRINGE at 13:55

## 2023-06-30 RX ADMIN — GEMCITABINE 1830 MG: 38 INJECTION, SOLUTION INTRAVENOUS at 13:19

## 2023-07-11 ENCOUNTER — HOSPITAL ENCOUNTER (OUTPATIENT)
Dept: CT IMAGING | Age: 78
Discharge: HOME | End: 2023-07-11
Payer: MEDICARE

## 2023-07-11 DIAGNOSIS — C25.9 METASTASIS FROM PANCREATIC CANCER (HCC): ICD-10-CM

## 2023-07-11 DIAGNOSIS — C79.9 METASTASIS FROM PANCREATIC CANCER (HCC): ICD-10-CM

## 2023-07-11 PROCEDURE — 71260 CT THORAX DX C+: CPT

## 2023-07-11 PROCEDURE — 74177 CT ABD & PELVIS W/CONTRAST: CPT

## 2023-07-11 PROCEDURE — 6360000004 HC RX CONTRAST MEDICATION: Performed by: INTERNAL MEDICINE

## 2023-07-11 RX ADMIN — IOPAMIDOL 75 ML: 755 INJECTION, SOLUTION INTRAVENOUS at 11:03

## 2023-07-17 ENCOUNTER — OFFICE VISIT (OUTPATIENT)
Dept: HEMATOLOGY | Age: 78
End: 2023-07-17
Payer: MEDICARE

## 2023-07-17 ENCOUNTER — HOSPITAL ENCOUNTER (OUTPATIENT)
Dept: INFUSION THERAPY | Age: 78
Discharge: HOME | End: 2023-07-17
Payer: MEDICARE

## 2023-07-17 VITALS
DIASTOLIC BLOOD PRESSURE: 72 MMHG | HEIGHT: 65 IN | HEART RATE: 78 BPM | WEIGHT: 159.4 LBS | SYSTOLIC BLOOD PRESSURE: 124 MMHG | OXYGEN SATURATION: 96 % | BODY MASS INDEX: 26.56 KG/M2 | TEMPERATURE: 98.1 F | RESPIRATION RATE: 18 BRPM

## 2023-07-17 DIAGNOSIS — Z51.12 ENCOUNTER FOR ANTINEOPLASTIC IMMUNOTHERAPY: ICD-10-CM

## 2023-07-17 DIAGNOSIS — C25.2 MALIGNANT NEOPLASM OF TAIL OF PANCREAS (HCC): Primary | ICD-10-CM

## 2023-07-17 DIAGNOSIS — C25.9 METASTASIS FROM PANCREATIC CANCER (HCC): ICD-10-CM

## 2023-07-17 DIAGNOSIS — K52.1 CHEMOTHERAPY-INDUCED DIARRHEA: ICD-10-CM

## 2023-07-17 DIAGNOSIS — C25.2 MALIGNANT NEOPLASM OF TAIL OF PANCREAS (HCC): ICD-10-CM

## 2023-07-17 DIAGNOSIS — T45.1X5A CHEMOTHERAPY-INDUCED DIARRHEA: ICD-10-CM

## 2023-07-17 DIAGNOSIS — T45.1X5D ADVERSE EFFECT OF CHEMOTHERAPY, SUBSEQUENT ENCOUNTER: ICD-10-CM

## 2023-07-17 DIAGNOSIS — C79.9 METASTASIS FROM PANCREATIC CANCER (HCC): ICD-10-CM

## 2023-07-17 DIAGNOSIS — Z51.11 CHEMOTHERAPY MANAGEMENT, ENCOUNTER FOR: ICD-10-CM

## 2023-07-17 DIAGNOSIS — C25.9 ADENOCARCINOMA OF PANCREAS (HCC): ICD-10-CM

## 2023-07-17 LAB
ALBUMIN SERPL-MCNC: 3.6 G/DL (ref 3.5–5.2)
ALP SERPL-CCNC: 78 U/L (ref 35–104)
ALT SERPL-CCNC: 15 U/L (ref 9–52)
ANION GAP SERPL CALCULATED.3IONS-SCNC: 10 MMOL/L (ref 7–19)
AST SERPL-CCNC: 39 U/L (ref 14–36)
BILIRUB SERPL-MCNC: <0.2 MG/DL (ref 0.2–1.3)
BUN SERPL-MCNC: 17 MG/DL (ref 7–17)
CALCIUM SERPL-MCNC: 8.8 MG/DL (ref 8.4–10.2)
CHLORIDE SERPL-SCNC: 107 MMOL/L (ref 98–111)
CO2 SERPL-SCNC: 21 MMOL/L (ref 22–29)
CREAT SERPL-MCNC: 0.9 MG/DL (ref 0.5–1)
ERYTHROCYTE [DISTWIDTH] IN BLOOD BY AUTOMATED COUNT: 20.6 % (ref 11.7–14.4)
GLOBULIN: 2.9 G/DL
GLUCOSE SERPL-MCNC: 78 MG/DL (ref 74–106)
HCT VFR BLD AUTO: 32 % (ref 34.1–44.9)
HGB BLD-MCNC: 10.3 G/DL (ref 11.2–15.7)
LYMPHOCYTES # BLD: 6.23 K/UL (ref 1.18–3.74)
LYMPHOCYTES NFR BLD: 45.2 % (ref 19.3–53.1)
MCH RBC QN AUTO: 31.7 PG (ref 25.6–32.2)
MCHC RBC AUTO-ENTMCNC: 32.2 G/DL (ref 32.3–35.5)
MCV RBC AUTO: 98.5 FL (ref 79.4–94.8)
MONOCYTES # BLD: 2.56 K/UL (ref 0.24–0.82)
MONOCYTES NFR BLD: 18.6 % (ref 4.7–12.5)
NEUTROPHILS # BLD: 4.44 K/UL (ref 1.56–6.13)
NEUTS SEG NFR BLD: 32.2 % (ref 34–71.1)
PLATELET # BLD AUTO: 620 K/UL (ref 182–369)
PMV BLD AUTO: 10.1 FL (ref 7.4–10.4)
POTASSIUM SERPL-SCNC: 4.8 MMOL/L (ref 3.5–5.1)
PROT SERPL-MCNC: 6.5 G/DL (ref 6.3–8.2)
RBC # BLD AUTO: 3.25 M/UL (ref 3.93–5.22)
SODIUM SERPL-SCNC: 138 MMOL/L (ref 137–145)
WBC # BLD AUTO: 13.77 K/UL (ref 3.98–10.04)

## 2023-07-17 PROCEDURE — G8400 PT W/DXA NO RESULTS DOC: HCPCS | Performed by: INTERNAL MEDICINE

## 2023-07-17 PROCEDURE — 1123F ACP DISCUSS/DSCN MKR DOCD: CPT | Performed by: INTERNAL MEDICINE

## 2023-07-17 PROCEDURE — 6360000002 HC RX W HCPCS: Performed by: INTERNAL MEDICINE

## 2023-07-17 PROCEDURE — 2580000003 HC RX 258: Performed by: INTERNAL MEDICINE

## 2023-07-17 PROCEDURE — 99212 OFFICE O/P EST SF 10 MIN: CPT

## 2023-07-17 PROCEDURE — G8417 CALC BMI ABV UP PARAM F/U: HCPCS | Performed by: INTERNAL MEDICINE

## 2023-07-17 PROCEDURE — 36415 COLL VENOUS BLD VENIPUNCTURE: CPT

## 2023-07-17 PROCEDURE — 1036F TOBACCO NON-USER: CPT | Performed by: INTERNAL MEDICINE

## 2023-07-17 PROCEDURE — 99214 OFFICE O/P EST MOD 30 MIN: CPT | Performed by: INTERNAL MEDICINE

## 2023-07-17 PROCEDURE — 80053 COMPREHEN METABOLIC PANEL: CPT

## 2023-07-17 PROCEDURE — 1090F PRES/ABSN URINE INCON ASSESS: CPT | Performed by: INTERNAL MEDICINE

## 2023-07-17 PROCEDURE — 96523 IRRIG DRUG DELIVERY DEVICE: CPT

## 2023-07-17 PROCEDURE — G8427 DOCREV CUR MEDS BY ELIG CLIN: HCPCS | Performed by: INTERNAL MEDICINE

## 2023-07-17 PROCEDURE — 85025 COMPLETE CBC W/AUTO DIFF WBC: CPT

## 2023-07-17 RX ORDER — HEPARIN 100 UNIT/ML
500 SYRINGE INTRAVENOUS PRN
Status: DISCONTINUED | OUTPATIENT
Start: 2023-07-17 | End: 2023-07-18 | Stop reason: HOSPADM

## 2023-07-17 RX ORDER — DIPHENOXYLATE HYDROCHLORIDE AND ATROPINE SULFATE 2.5; .025 MG/1; MG/1
1 TABLET ORAL EVERY 6 HOURS PRN
Qty: 60 TABLET | Refills: 0 | Status: CANCELLED | OUTPATIENT
Start: 2023-07-17 | End: 2023-08-01

## 2023-07-17 RX ORDER — SODIUM CHLORIDE 0.9 % (FLUSH) 0.9 %
5-40 SYRINGE (ML) INJECTION PRN
Status: DISCONTINUED | OUTPATIENT
Start: 2023-07-17 | End: 2023-07-18 | Stop reason: HOSPADM

## 2023-07-17 RX ADMIN — HEPARIN 500 UNITS: 100 SYRINGE at 13:22

## 2023-07-17 RX ADMIN — SODIUM CHLORIDE, PRESERVATIVE FREE 10 ML: 5 INJECTION INTRAVENOUS at 13:22

## 2023-07-18 LAB
Lab: NORMAL
REPORT: NORMAL
THIS TEST SENT TO: NORMAL

## 2023-07-19 ENCOUNTER — TELEPHONE (OUTPATIENT)
Dept: PALLATIVE CARE | Age: 78
End: 2023-07-19

## 2023-07-19 NOTE — TELEPHONE ENCOUNTER
I left a voicemail for the patient to call [facility]. I would like to discuss the referral that we received. I will call back at a later date. I can be reached at [phone].

## 2023-07-24 ENCOUNTER — OFFICE VISIT (OUTPATIENT)
Dept: INTERNAL MEDICINE | Facility: CLINIC | Age: 78
End: 2023-07-24
Payer: MEDICARE

## 2023-07-24 VITALS
HEART RATE: 78 BPM | DIASTOLIC BLOOD PRESSURE: 80 MMHG | TEMPERATURE: 97.7 F | BODY MASS INDEX: 26.33 KG/M2 | OXYGEN SATURATION: 97 % | SYSTOLIC BLOOD PRESSURE: 137 MMHG | WEIGHT: 158 LBS | HEIGHT: 65 IN

## 2023-07-24 DIAGNOSIS — I10 ESSENTIAL HYPERTENSION: Primary | ICD-10-CM

## 2023-07-24 DIAGNOSIS — E03.9 HYPOTHYROIDISM, UNSPECIFIED TYPE: ICD-10-CM

## 2023-07-24 DIAGNOSIS — Z74.09 DECREASED AMBULATION STATUS: ICD-10-CM

## 2023-07-24 RX ORDER — LOSARTAN POTASSIUM 25 MG/1
25 TABLET ORAL DAILY
Qty: 90 TABLET | Refills: 1 | Status: SHIPPED | OUTPATIENT
Start: 2023-07-24

## 2023-07-24 RX ORDER — NIFEDIPINE 30 MG/1
30 TABLET, EXTENDED RELEASE ORAL DAILY
Qty: 90 TABLET | Refills: 1 | Status: SHIPPED | OUTPATIENT
Start: 2023-07-24

## 2023-07-24 RX ORDER — DOXAZOSIN 2 MG/1
2 TABLET ORAL EVERY EVENING
Qty: 90 TABLET | Refills: 1 | Status: SHIPPED | OUTPATIENT
Start: 2023-07-24

## 2023-07-24 NOTE — PROGRESS NOTES
Chief Complaint  Hypertension    Subjective        Ирина Randolph presents to [facility]  History of Present Illness  Patient is a 77-year-old female following up on her hypertension today. Reports it has been controlled with her current medications. She reports checking her blood pressure at least once daily with it running good.     Her chemotherapy is being adjusted with new infusions. She was told this can be a rough treatment. Has been weak and has been considering starting using a walker.     Past Medical History:   Diagnosis Date    Acute bronchitis     Arthritis     Osteoarthritis    Cancer     Disease of thyroid gland     Gallbladder abscess     Hypertension      Past Surgical History:   Procedure Laterality Date    ABDOMINAL HYSTERECTOMY      CHOLECYSTECTOMY  1994    COLONOSCOPY  04/23/2015    two polyps both removed  extensive diverticulosis    COLONOSCOPY N/A 06/17/2020    Procedure: COLONOSCOPY WITH ANESTHESIA;  Surgeon: Homer Zamarripa DO;  Location: [facility] ENDOSCOPY;  Service: Gastroenterology;  Laterality: N/A;  pre: Hx of polyps  post:  Anupama Dhaliwal    ENDOSCOPY  08/16/2016    normal    ENDOSCOPY N/A 06/17/2020    Procedure: ESOPHAGOGASTRODUODENOSCOPY WITH ANESTHESIA;  Surgeon: Homer Zamarripa DO;  Location: [facility] ENDOSCOPY;  Service: Gastroenterology;  Laterality: N/A;  pre: nausea  post:     ENDOSCOPY N/A 6/7/2022    Procedure: ESOPHAGOGASTRODUODENOSCOPY WITH ANESTHESIA;  Surgeon: Homer Zamarripa DO;  Location: [facility] ENDOSCOPY;  Service: Gastroenterology;  Laterality: N/A;  pre cough  post Anupama Zimmerman DO    PANCREAS SURGERY      Partial removal due to tumor    ROTATOR CUFF REPAIR  10/19/2017    SPLENECTOMY       Social History     Socioeconomic History    Marital status:    Tobacco Use    Smoking status: Never    Smokeless tobacco: Never   Vaping Use    Vaping Use: Never used   Substance and Sexual Activity    Alcohol use:  "No    Drug use: No    Sexual activity: Yes     Partners: Male     Family History   Problem Relation Age of Onset    Alcohol abuse Father     Breast cancer Maternal Aunt     Bone cancer Maternal Aunt     Leukemia Maternal Aunt     Colon cancer Neg Hx     Colon polyps Neg Hx     Esophageal cancer Neg Hx        Objective   Vital Signs:  /80 (BP Location: Left arm, Patient Position: Sitting, Cuff Size: Adult)   Pulse 78   Temp 97.7 °F (36.5 °C) (Infrared)   Ht 165.1 cm (65\")   Wt 71.7 kg (158 lb)   SpO2 97%   BMI 26.29 kg/m²   Estimated body mass index is 26.29 kg/m² as calculated from the following:    Height as of this encounter: 165.1 cm (65\").    Weight as of this encounter: 71.7 kg (158 lb).         Physical Exam  Vitals and nursing note reviewed.   Constitutional:       Appearance: Normal appearance.   HENT:      Head: Normocephalic.      Nose: Nose normal.   Eyes:      Pupils: Pupils are equal, round, and reactive to light.   Cardiovascular:      Rate and Rhythm: Normal rate and regular rhythm.   Pulmonary:      Effort: Pulmonary effort is normal.      Breath sounds: Normal breath sounds.   Abdominal:      Palpations: Abdomen is soft.   Musculoskeletal:         General: Normal range of motion.      Cervical back: Normal range of motion.   Skin:     General: Skin is warm and dry.      Capillary Refill: Capillary refill takes less than 2 seconds.   Neurological:      General: No focal deficit present.      Mental Status: She is alert.      Motor: Weakness present.      Result Review :                   Assessment and Plan   Diagnoses and all orders for this visit:    1. Essential hypertension (Primary)  -     doxazosin (CARDURA) 2 MG tablet; Take 1 tablet by mouth Every Evening.  Dispense: 90 tablet; Refill: 1  -     losartan (COZAAR) 25 MG tablet; Take 1 tablet by mouth Daily.  Dispense: 90 tablet; Refill: 1  -     NIFEdipine XL (PROCARDIA XL) 30 MG 24 hr tablet; Take 1 tablet by mouth Daily.  " Dispense: 90 tablet; Refill: 1    2. Hypothyroidism, unspecified type  -     TSH  -     T4, Free    3. Decreased ambulation status  -     Miscellaneous DME             Follow Up   Return in about 3 months (around 10/24/2023) for Recheck.  Patient was given instructions and counseling regarding her condition or for health maintenance advice. Please see specific information pulled into the AVS if appropriate.

## 2023-07-25 ENCOUNTER — HOSPITAL ENCOUNTER (OUTPATIENT)
Dept: INFUSION THERAPY | Age: 78
Discharge: HOME | End: 2023-07-25
Payer: MEDICARE

## 2023-07-25 VITALS
HEIGHT: 65 IN | WEIGHT: 159 LBS | DIASTOLIC BLOOD PRESSURE: 71 MMHG | BODY MASS INDEX: 26.49 KG/M2 | SYSTOLIC BLOOD PRESSURE: 121 MMHG | TEMPERATURE: 98.2 F | RESPIRATION RATE: 20 BRPM | HEART RATE: 68 BPM | OXYGEN SATURATION: 98 %

## 2023-07-25 DIAGNOSIS — C25.2 MALIGNANT NEOPLASM OF TAIL OF PANCREAS (HCC): ICD-10-CM

## 2023-07-25 DIAGNOSIS — E03.9 ACQUIRED HYPOTHYROIDISM: Primary | ICD-10-CM

## 2023-07-25 DIAGNOSIS — C79.9 METASTASIS FROM PANCREATIC CANCER (HCC): ICD-10-CM

## 2023-07-25 DIAGNOSIS — C25.2 MALIGNANT NEOPLASM OF TAIL OF PANCREAS (HCC): Primary | ICD-10-CM

## 2023-07-25 DIAGNOSIS — C25.9 METASTASIS FROM PANCREATIC CANCER (HCC): ICD-10-CM

## 2023-07-25 LAB
ALBUMIN SERPL-MCNC: 3.6 G/DL (ref 3.5–5.2)
ALP SERPL-CCNC: 84 U/L (ref 35–104)
ALT SERPL-CCNC: 14 U/L (ref 9–52)
ANION GAP SERPL CALCULATED.3IONS-SCNC: 10 MMOL/L (ref 7–19)
AST SERPL-CCNC: 35 U/L (ref 14–36)
BILIRUB SERPL-MCNC: <0.2 MG/DL (ref 0.2–1.3)
BUN SERPL-MCNC: 15 MG/DL (ref 7–17)
CALCIUM SERPL-MCNC: 9 MG/DL (ref 8.4–10.2)
CANCER AG19-9 SERPL-ACNC: 507 U/ML (ref 0–35)
CHLORIDE SERPL-SCNC: 110 MMOL/L (ref 98–111)
CO2 SERPL-SCNC: 18 MMOL/L (ref 22–29)
CREAT SERPL-MCNC: 0.9 MG/DL (ref 0.5–1)
ERYTHROCYTE [DISTWIDTH] IN BLOOD BY AUTOMATED COUNT: 19.3 % (ref 11.7–14.4)
GLOBULIN: 3.3 G/DL
GLUCOSE SERPL-MCNC: 116 MG/DL (ref 74–106)
HCT VFR BLD AUTO: 31.2 % (ref 34.1–44.9)
HGB BLD-MCNC: 10.1 G/DL (ref 11.2–15.7)
LYMPHOCYTES # BLD: 3.58 K/UL (ref 1.18–3.74)
LYMPHOCYTES NFR BLD: 26.5 % (ref 19.3–53.1)
MCH RBC QN AUTO: 31.6 PG (ref 25.6–32.2)
MCHC RBC AUTO-ENTMCNC: 32.4 G/DL (ref 32.3–35.5)
MCV RBC AUTO: 97.5 FL (ref 79.4–94.8)
MONOCYTES # BLD: 1.99 K/UL (ref 0.24–0.82)
MONOCYTES NFR BLD: 14.7 % (ref 4.7–12.5)
NEUTROPHILS # BLD: 7.11 K/UL (ref 1.56–6.13)
NEUTS SEG NFR BLD: 52.8 % (ref 34–71.1)
PLATELET # BLD AUTO: 571 K/UL (ref 182–369)
PMV BLD AUTO: 9.9 FL (ref 7.4–10.4)
POTASSIUM SERPL-SCNC: 5.3 MMOL/L (ref 3.5–5.1)
PROT SERPL-MCNC: 6.9 G/DL (ref 6.3–8.2)
RBC # BLD AUTO: 3.2 M/UL (ref 3.93–5.22)
SODIUM SERPL-SCNC: 138 MMOL/L (ref 137–145)
T4 FREE SERPL-MCNC: 1.44 NG/DL (ref 0.82–1.77)
TSH SERPL DL<=0.005 MIU/L-ACNC: 14.8 UIU/ML (ref 0.45–4.5)
WBC # BLD AUTO: 13.5 K/UL (ref 3.98–10.04)

## 2023-07-25 PROCEDURE — 80053 COMPREHEN METABOLIC PANEL: CPT

## 2023-07-25 PROCEDURE — 96415 CHEMO IV INFUSION ADDL HR: CPT

## 2023-07-25 PROCEDURE — 96416 CHEMO PROLONG INFUSE W/PUMP: CPT

## 2023-07-25 PROCEDURE — 36415 COLL VENOUS BLD VENIPUNCTURE: CPT

## 2023-07-25 PROCEDURE — 96413 CHEMO IV INFUSION 1 HR: CPT

## 2023-07-25 PROCEDURE — 2580000003 HC RX 258: Performed by: INTERNAL MEDICINE

## 2023-07-25 PROCEDURE — G0498 CHEMO EXTEND IV INFUS W/PUMP: HCPCS

## 2023-07-25 PROCEDURE — 85025 COMPLETE CBC W/AUTO DIFF WBC: CPT

## 2023-07-25 PROCEDURE — 96367 TX/PROPH/DG ADDL SEQ IV INF: CPT

## 2023-07-25 PROCEDURE — 6360000002 HC RX W HCPCS: Performed by: INTERNAL MEDICINE

## 2023-07-25 PROCEDURE — 96375 TX/PRO/DX INJ NEW DRUG ADDON: CPT

## 2023-07-25 PROCEDURE — 86301 IMMUNOASSAY TUMOR CA 19-9: CPT

## 2023-07-25 RX ORDER — ATROPINE SULFATE 0.4 MG/ML
0.4 INJECTION, SOLUTION INTRAMUSCULAR; INTRAVENOUS; SUBCUTANEOUS
Status: CANCELLED | OUTPATIENT
Start: 2023-07-25

## 2023-07-25 RX ORDER — EPINEPHRINE 1 MG/ML
0.3 INJECTION, SOLUTION, CONCENTRATE INTRAVENOUS PRN
Status: CANCELLED | OUTPATIENT
Start: 2023-07-25

## 2023-07-25 RX ORDER — MEPERIDINE HYDROCHLORIDE 50 MG/ML
12.5 INJECTION INTRAMUSCULAR; INTRAVENOUS; SUBCUTANEOUS PRN
Status: CANCELLED | OUTPATIENT
Start: 2023-07-25

## 2023-07-25 RX ORDER — SODIUM CHLORIDE 9 MG/ML
INJECTION, SOLUTION INTRAVENOUS CONTINUOUS
Status: CANCELLED | OUTPATIENT
Start: 2023-07-25

## 2023-07-25 RX ORDER — ONDANSETRON 2 MG/ML
8 INJECTION INTRAMUSCULAR; INTRAVENOUS
Status: CANCELLED | OUTPATIENT
Start: 2023-07-25

## 2023-07-25 RX ORDER — PALONOSETRON 0.05 MG/ML
0.25 INJECTION, SOLUTION INTRAVENOUS ONCE
Status: CANCELLED | OUTPATIENT
Start: 2023-07-25 | End: 2023-07-25

## 2023-07-25 RX ORDER — DEXAMETHASONE SODIUM PHOSPHATE 10 MG/ML
10 INJECTION, SOLUTION INTRAMUSCULAR; INTRAVENOUS ONCE
Status: COMPLETED | OUTPATIENT
Start: 2023-07-25 | End: 2023-07-25

## 2023-07-25 RX ORDER — HEPARIN SODIUM (PORCINE) LOCK FLUSH IV SOLN 100 UNIT/ML 100 UNIT/ML
500 SOLUTION INTRAVENOUS PRN
Status: CANCELLED | OUTPATIENT
Start: 2023-07-27

## 2023-07-25 RX ORDER — PALONOSETRON 0.05 MG/ML
0.25 INJECTION, SOLUTION INTRAVENOUS ONCE
Status: COMPLETED | OUTPATIENT
Start: 2023-07-25 | End: 2023-07-25

## 2023-07-25 RX ORDER — DIPHENHYDRAMINE HYDROCHLORIDE 50 MG/ML
50 INJECTION INTRAMUSCULAR; INTRAVENOUS
Status: CANCELLED | OUTPATIENT
Start: 2023-07-25

## 2023-07-25 RX ORDER — HEPARIN SODIUM (PORCINE) LOCK FLUSH IV SOLN 100 UNIT/ML 100 UNIT/ML
500 SOLUTION INTRAVENOUS PRN
Status: CANCELLED | OUTPATIENT
Start: 2023-07-25

## 2023-07-25 RX ORDER — SODIUM CHLORIDE 0.9 % (FLUSH) 0.9 %
5-40 SYRINGE (ML) INJECTION PRN
Status: CANCELLED | OUTPATIENT
Start: 2023-07-25

## 2023-07-25 RX ORDER — ATROPINE SULFATE 0.4 MG/ML
0.4 INJECTION, SOLUTION INTRAVENOUS ONCE
Status: COMPLETED | OUTPATIENT
Start: 2023-07-25 | End: 2023-07-25

## 2023-07-25 RX ORDER — SODIUM CHLORIDE 9 MG/ML
5-250 INJECTION, SOLUTION INTRAVENOUS PRN
OUTPATIENT
Start: 2023-07-27

## 2023-07-25 RX ORDER — ACETAMINOPHEN 325 MG/1
650 TABLET ORAL
Status: CANCELLED | OUTPATIENT
Start: 2023-07-25

## 2023-07-25 RX ORDER — MEGESTROL ACETATE 40 MG/1
TABLET ORAL
Qty: 90 TABLET | Refills: 1 | Status: SHIPPED | OUTPATIENT
Start: 2023-07-25

## 2023-07-25 RX ORDER — SODIUM CHLORIDE 9 MG/ML
5-250 INJECTION, SOLUTION INTRAVENOUS PRN
Status: CANCELLED | OUTPATIENT
Start: 2023-07-25

## 2023-07-25 RX ORDER — ALBUTEROL SULFATE 90 UG/1
4 AEROSOL, METERED RESPIRATORY (INHALATION) PRN
Status: CANCELLED | OUTPATIENT
Start: 2023-07-25

## 2023-07-25 RX ORDER — FAMOTIDINE 10 MG/ML
20 INJECTION, SOLUTION INTRAVENOUS
Status: CANCELLED | OUTPATIENT
Start: 2023-07-25

## 2023-07-25 RX ORDER — SODIUM CHLORIDE 9 MG/ML
5-250 INJECTION, SOLUTION INTRAVENOUS PRN
Status: DISCONTINUED | OUTPATIENT
Start: 2023-07-25 | End: 2023-07-26 | Stop reason: HOSPADM

## 2023-07-25 RX ORDER — ATROPINE SULFATE 0.4 MG/ML
0.4 INJECTION, SOLUTION INTRAMUSCULAR; INTRAVENOUS; SUBCUTANEOUS ONCE
Status: CANCELLED | OUTPATIENT
Start: 2023-07-25 | End: 2023-07-25

## 2023-07-25 RX ORDER — LEVOTHYROXINE SODIUM 0.15 MG/1
150 TABLET ORAL DAILY
Qty: 90 TABLET | Refills: 1 | Status: SHIPPED | OUTPATIENT
Start: 2023-07-25

## 2023-07-25 RX ORDER — SODIUM CHLORIDE 0.9 % (FLUSH) 0.9 %
5-40 SYRINGE (ML) INJECTION PRN
Status: CANCELLED | OUTPATIENT
Start: 2023-07-27

## 2023-07-25 RX ADMIN — SODIUM CHLORIDE 25 ML/HR: 9 INJECTION, SOLUTION INTRAVENOUS at 10:36

## 2023-07-25 RX ADMIN — IRINOTECAN HYDROCHLORIDE 86 MG: 4.3 INJECTION, POWDER, FOR SOLUTION INTRAVENOUS at 11:11

## 2023-07-25 RX ADMIN — ATROPINE SULFATE 0.4 MG: 0.4 INJECTION, SOLUTION INTRAVENOUS at 11:07

## 2023-07-25 RX ADMIN — FLUOROURACIL 4000 MG: 50 INJECTION, SOLUTION INTRAVENOUS at 13:25

## 2023-07-25 RX ADMIN — LEUCOVORIN CALCIUM 200 MG: 200 INJECTION, POWDER, LYOPHILIZED, FOR SOLUTION INTRAMUSCULAR; INTRAVENOUS at 12:53

## 2023-07-25 RX ADMIN — DEXAMETHASONE SODIUM PHOSPHATE 10 MG: 10 INJECTION, SOLUTION INTRAMUSCULAR; INTRAVENOUS at 10:40

## 2023-07-25 RX ADMIN — FOSAPREPITANT DIMEGLUMINE 150 MG: 150 INJECTION, POWDER, LYOPHILIZED, FOR SOLUTION INTRAVENOUS at 10:44

## 2023-07-25 RX ADMIN — PALONOSETRON 0.25 MG: 0.05 INJECTION, SOLUTION INTRAVENOUS at 10:37

## 2023-07-25 NOTE — PROGRESS NOTES
Lab Results   Component Value Date    WBC 13.50 (H) 07/25/2023    HGB 10.1 (L) 07/25/2023    HCT 31.2 (L) 07/25/2023    MCV 97.5 (H) 07/25/2023     (HH) 07/25/2023     Lab Results   Component Value Date    NEUTROABS 7.11 (H) 07/25/2023     Lab Results   Component Value Date     07/25/2023    K 5.3 (H) 07/25/2023     07/25/2023    CO2 18 (L) 07/25/2023    BUN 15 07/25/2023    CREATININE 0.9 07/25/2023    GLUCOSE 116 (H) 07/25/2023    CALCIUM 9.0 07/25/2023    PROT 6.9 07/25/2023    LABALBU 3.6 07/25/2023    BILITOT <0.2 07/25/2023    ALKPHOS 84 07/25/2023    AST 35 07/25/2023    ALT 14 07/25/2023    LABGLOM >60 07/25/2023    GFRAA >59 01/12/2022    GLOB 3.3 07/25/2023

## 2023-07-25 NOTE — PROGRESS NOTES
Pt is here for C1 Onivyde/Leuco/5FU. Labs stable and ok to proceed per Dr. Juan David Swann. She will decrease her PO KCL to 10mEq BID for K+ 5.3.

## 2023-07-25 NOTE — TELEPHONE ENCOUNTER
Rx Refill Note  Requested Prescriptions     Pending Prescriptions Disp Refills    megestrol (MEGACE) 40 MG tablet [Pharmacy Med Name: MEGESTROL 40 MG TABLET] 90 tablet 1     Sig: TAKE 1 TABLET BY MOUTH EVERY DAY      Last office visit with prescribing clinician: 5/4/2023   Last telemedicine visit with prescribing clinician: Visit date not found   Next office visit with prescribing clinician: Visit date not found                         Would you like a call back once the refill request has been completed: [] Yes [] No    If the office needs to give you a call back, can they leave a voicemail: [] Yes [] No    Marta Boudreaux MA  07/25/23, 07:06 CDT

## 2023-07-27 ENCOUNTER — HOSPITAL ENCOUNTER (OUTPATIENT)
Dept: INFUSION THERAPY | Age: 78
Discharge: HOME | End: 2023-07-27
Payer: MEDICARE

## 2023-07-27 DIAGNOSIS — C79.9 METASTASIS FROM PANCREATIC CANCER (HCC): Primary | ICD-10-CM

## 2023-07-27 DIAGNOSIS — C25.2 MALIGNANT NEOPLASM OF TAIL OF PANCREAS (HCC): ICD-10-CM

## 2023-07-27 DIAGNOSIS — C25.9 METASTASIS FROM PANCREATIC CANCER (HCC): Primary | ICD-10-CM

## 2023-07-27 LAB — MISCELLANEOUS LAB TEST ORDER: NORMAL

## 2023-07-27 PROCEDURE — 6360000002 HC RX W HCPCS: Performed by: INTERNAL MEDICINE

## 2023-07-27 PROCEDURE — 2580000003 HC RX 258: Performed by: INTERNAL MEDICINE

## 2023-07-27 PROCEDURE — 96523 IRRIG DRUG DELIVERY DEVICE: CPT

## 2023-07-27 RX ORDER — HEPARIN 100 UNIT/ML
500 SYRINGE INTRAVENOUS PRN
Status: DISCONTINUED | OUTPATIENT
Start: 2023-07-27 | End: 2023-07-28 | Stop reason: HOSPADM

## 2023-07-27 RX ORDER — SODIUM CHLORIDE 0.9 % (FLUSH) 0.9 %
5-40 SYRINGE (ML) INJECTION PRN
Status: DISCONTINUED | OUTPATIENT
Start: 2023-07-27 | End: 2023-07-28 | Stop reason: HOSPADM

## 2023-07-27 RX ADMIN — SODIUM CHLORIDE, PRESERVATIVE FREE 10 ML: 5 INJECTION INTRAVENOUS at 14:13

## 2023-07-27 RX ADMIN — HEPARIN 500 UNITS: 100 SYRINGE at 14:13

## 2023-07-28 DIAGNOSIS — R60.0 LOCALIZED EDEMA: ICD-10-CM

## 2023-07-28 RX ORDER — FUROSEMIDE 20 MG/1
TABLET ORAL
Qty: 60 TABLET | Refills: 1 | Status: SHIPPED | OUTPATIENT
Start: 2023-07-28

## 2023-07-28 NOTE — TELEPHONE ENCOUNTER
Dr. Ayala patient     Rx Refill Note  Requested Prescriptions     Pending Prescriptions Disp Refills    furosemide (LASIX) 20 MG tablet [Pharmacy Med Name: FUROSEMIDE 20 MG TABLET] 60 tablet 1     Sig: TAKE 1 TABLET BY MOUTH TWICE A DAY      Last office visit with prescribing clinician: 7/24/23  Next office visit with prescribing clinician: 7/10/24                        Would you like a call back once the refill request has been completed: [] Yes [] No    If the office needs to give you a call back, can they leave a voicemail: [] Yes [] No    Neida William RN  07/28/23, 07:10 CDT

## 2023-07-31 ENCOUNTER — TELEPHONE (OUTPATIENT)
Dept: PALLATIVE CARE | Age: 78
End: 2023-07-31

## 2023-07-31 ENCOUNTER — APPOINTMENT (OUTPATIENT)
Dept: INFUSION THERAPY | Age: 78
End: 2023-07-31
Payer: MEDICARE

## 2023-07-31 NOTE — TELEPHONE ENCOUNTER
I left a voicemail for the patient to call [facility]. I would like to discuss the referral that we received. I will call back at a later date. I can be reached at [phone].

## 2023-08-01 ENCOUNTER — CLINICAL SUPPORT (OUTPATIENT)
Dept: INTERNAL MEDICINE | Facility: CLINIC | Age: 78
End: 2023-08-01
Payer: MEDICARE

## 2023-08-01 DIAGNOSIS — R30.0 DYSURIA: Primary | ICD-10-CM

## 2023-08-01 LAB
BILIRUB BLD-MCNC: NEGATIVE MG/DL
CLARITY, POC: ABNORMAL
COLOR UR: YELLOW
GLUCOSE UR STRIP-MCNC: NEGATIVE MG/DL
KETONES UR QL: NEGATIVE
LEUKOCYTE EST, POC: ABNORMAL
NITRITE UR-MCNC: POSITIVE MG/ML
PH UR: 7 [PH] (ref 5–8)
PROT UR STRIP-MCNC: ABNORMAL MG/DL
RBC # UR STRIP: ABNORMAL /UL
SP GR UR: 1.01 (ref 1–1.03)
UROBILINOGEN UR QL: ABNORMAL

## 2023-08-01 PROCEDURE — 81003 URINALYSIS AUTO W/O SCOPE: CPT

## 2023-08-01 RX ORDER — LEVOFLOXACIN 500 MG/1
500 TABLET, FILM COATED ORAL DAILY
Qty: 5 TABLET | Refills: 0 | Status: SHIPPED | OUTPATIENT
Start: 2023-08-01 | End: 2023-08-06

## 2023-08-02 ENCOUNTER — TELEPHONE (OUTPATIENT)
Dept: PALLATIVE CARE | Age: 78
End: 2023-08-02

## 2023-08-02 NOTE — TELEPHONE ENCOUNTER
I left a voicemail for the patient to call [facility]. I would like to discuss the referral that we received. I will call back at a later date. I can be reached at [phone].

## 2023-08-05 LAB
BACTERIA UR CULT: ABNORMAL
BACTERIA UR CULT: ABNORMAL
OTHER ANTIBIOTIC SUSC ISLT: ABNORMAL

## 2023-08-07 NOTE — PROGRESS NOTES
MEDICAL ONCOLOGY PROGRESS NOTE    Alonzo Pettit   [birth year]  8/8/2023     Chief Complaint   Patient presents with    Pancreatic Cancer     Pt Name: Alonzo Pettit  MRN: [id number]  YOB: 1945  Date of evaluation: 8/8/2023    HISTORY OF PRESENT ILLNESS:    Reason for MD visit-toxicity assessment-disease management. The patient is a very pleasant 69years old female who has been diagnosed with invasive adenocarcinoma of the pancreas, node positive status post laparoscopic assisted distal pancreatectomy and splenectomy in June 2021 at [facility].  She underwent completion of adjuvant chemotherapy with Gemzar/Xeloda completed February 2022. Unfortunately, she developed recurrent disease with malignant ascites. She is currently receiving second line therapy with Onivyde, 5-FU and leucovorin. She tolerated with no complaint of significant fatigue. 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 Treatment Summary  6/28/21 Laparoscopic assisted distal pancreatectomy and splenectomy with 1 of 6 lymph nodes positive. Surgeon Dr. Ilia Villagran [facility]   8/13/2021-02/25/22-adjuvant Gemzar 1000 mg/m2 IV D 1, D8, D15 with Capecitabine 830 mg/m2 D1-21 every 28 days x6 cycles  1/14/22 Eliquis for DVT right upper extremity November 2022  10/14/22-Initiated palliative frontline Gemzar Abraxane every 2 weeks  1/13/23 Decrease Abraxane to 100mg/m2 with each treatment  7/17/23 Discontinue Abraxane Gemzar due to progression  7/25/23 Initiated Onivyde, Leucovorin, CI 5FU every 2 weeks    Cancer History  Bianka Dickinson was first seen by me on 8/6/2021. The patient was referred for a diagnosis of pancreatic malignancy. She had initial complaints of abdominal pain.   5/5/20 MRI abdomen [facility]

## 2023-08-08 ENCOUNTER — SOCIAL WORK (OUTPATIENT)
Dept: HEMATOLOGY | Age: 78
End: 2023-08-08

## 2023-08-08 ENCOUNTER — OFFICE VISIT (OUTPATIENT)
Dept: HEMATOLOGY | Age: 78
End: 2023-08-08
Payer: MEDICARE

## 2023-08-08 ENCOUNTER — HOSPITAL ENCOUNTER (OUTPATIENT)
Dept: INFUSION THERAPY | Age: 78
Discharge: HOME | End: 2023-08-08
Payer: MEDICARE

## 2023-08-08 VITALS
DIASTOLIC BLOOD PRESSURE: 79 MMHG | WEIGHT: 154.1 LBS | TEMPERATURE: 98.2 F | SYSTOLIC BLOOD PRESSURE: 147 MMHG | BODY MASS INDEX: 25.67 KG/M2 | HEART RATE: 73 BPM | RESPIRATION RATE: 18 BRPM | HEIGHT: 65 IN | OXYGEN SATURATION: 100 %

## 2023-08-08 DIAGNOSIS — T45.1X5A ANTINEOPLASTIC CHEMOTHERAPY INDUCED ANEMIA: ICD-10-CM

## 2023-08-08 DIAGNOSIS — C25.2 MALIGNANT NEOPLASM OF TAIL OF PANCREAS (HCC): ICD-10-CM

## 2023-08-08 DIAGNOSIS — R53.83 CHEMOTHERAPY-INDUCED FATIGUE: ICD-10-CM

## 2023-08-08 DIAGNOSIS — C25.9 METASTASIS FROM PANCREATIC CANCER (HCC): ICD-10-CM

## 2023-08-08 DIAGNOSIS — C79.9 METASTASIS FROM PANCREATIC CANCER (HCC): ICD-10-CM

## 2023-08-08 DIAGNOSIS — T45.1X5D ADVERSE EFFECT OF CHEMOTHERAPY, SUBSEQUENT ENCOUNTER: ICD-10-CM

## 2023-08-08 DIAGNOSIS — C25.2 MALIGNANT NEOPLASM OF TAIL OF PANCREAS (HCC): Primary | ICD-10-CM

## 2023-08-08 DIAGNOSIS — C25.9 ADENOCARCINOMA OF PANCREAS (HCC): ICD-10-CM

## 2023-08-08 DIAGNOSIS — D64.81 ANTINEOPLASTIC CHEMOTHERAPY INDUCED ANEMIA: ICD-10-CM

## 2023-08-08 DIAGNOSIS — R60.0 BILATERAL LEG EDEMA: ICD-10-CM

## 2023-08-08 DIAGNOSIS — G89.3 CANCER-RELATED PAIN: ICD-10-CM

## 2023-08-08 DIAGNOSIS — Z51.11 CHEMOTHERAPY MANAGEMENT, ENCOUNTER FOR: ICD-10-CM

## 2023-08-08 DIAGNOSIS — K52.1 CHEMOTHERAPY-INDUCED DIARRHEA: ICD-10-CM

## 2023-08-08 DIAGNOSIS — T45.1X5A CHEMOTHERAPY-INDUCED DIARRHEA: ICD-10-CM

## 2023-08-08 DIAGNOSIS — T45.1X5A CHEMOTHERAPY-INDUCED FATIGUE: ICD-10-CM

## 2023-08-08 DIAGNOSIS — Z71.89 CARE PLAN DISCUSSED WITH PATIENT: ICD-10-CM

## 2023-08-08 LAB
ALBUMIN SERPL-MCNC: 3.4 G/DL (ref 3.5–5.2)
ALP SERPL-CCNC: 79 U/L (ref 35–104)
ALT SERPL-CCNC: 15 U/L (ref 9–52)
ANION GAP SERPL CALCULATED.3IONS-SCNC: 12 MMOL/L (ref 7–19)
AST SERPL-CCNC: 30 U/L (ref 14–36)
BILIRUB SERPL-MCNC: <0.2 MG/DL (ref 0.2–1.3)
BUN SERPL-MCNC: 11 MG/DL (ref 7–17)
CALCIUM SERPL-MCNC: 9 MG/DL (ref 8.4–10.2)
CANCER AG19-9 SERPL-ACNC: 552 U/ML (ref 0–35)
CEA SERPL-MCNC: 7.7 NG/ML (ref 0–4.7)
CHLORIDE SERPL-SCNC: 105 MMOL/L (ref 98–111)
CO2 SERPL-SCNC: 21 MMOL/L (ref 22–29)
CREAT SERPL-MCNC: 0.8 MG/DL (ref 0.5–1)
ERYTHROCYTE [DISTWIDTH] IN BLOOD BY AUTOMATED COUNT: 18.6 % (ref 11.7–14.4)
GLOBULIN: 3 G/DL
GLUCOSE SERPL-MCNC: 134 MG/DL (ref 74–106)
HCT VFR BLD AUTO: 29 % (ref 34.1–44.9)
HGB BLD-MCNC: 9.8 G/DL (ref 11.2–15.7)
LYMPHOCYTES # BLD: 2.29 K/UL (ref 1.18–3.74)
LYMPHOCYTES NFR BLD: 34.5 % (ref 19.3–53.1)
MCH RBC QN AUTO: 31.3 PG (ref 25.6–32.2)
MCHC RBC AUTO-ENTMCNC: 33.8 G/DL (ref 32.3–35.5)
MCV RBC AUTO: 92.7 FL (ref 79.4–94.8)
MONOCYTES # BLD: 0.88 K/UL (ref 0.24–0.82)
MONOCYTES NFR BLD: 13.3 % (ref 4.7–12.5)
NEUTROPHILS # BLD: 2.49 K/UL (ref 1.56–6.13)
NEUTS SEG NFR BLD: 37.4 % (ref 34–71.1)
PLATELET # BLD AUTO: 391 K/UL (ref 182–369)
PMV BLD AUTO: 8.9 FL (ref 7.4–10.4)
POTASSIUM SERPL-SCNC: 4.5 MMOL/L (ref 3.5–5.1)
PROT SERPL-MCNC: 6.4 G/DL (ref 6.3–8.2)
RBC # BLD AUTO: 3.13 M/UL (ref 3.93–5.22)
SODIUM SERPL-SCNC: 138 MMOL/L (ref 137–145)
WBC # BLD AUTO: 6.64 K/UL (ref 3.98–10.04)

## 2023-08-08 PROCEDURE — 1090F PRES/ABSN URINE INCON ASSESS: CPT | Performed by: INTERNAL MEDICINE

## 2023-08-08 PROCEDURE — 96367 TX/PROPH/DG ADDL SEQ IV INF: CPT

## 2023-08-08 PROCEDURE — 36415 COLL VENOUS BLD VENIPUNCTURE: CPT

## 2023-08-08 PROCEDURE — G8417 CALC BMI ABV UP PARAM F/U: HCPCS | Performed by: INTERNAL MEDICINE

## 2023-08-08 PROCEDURE — G0498 CHEMO EXTEND IV INFUS W/PUMP: HCPCS

## 2023-08-08 PROCEDURE — 80053 COMPREHEN METABOLIC PANEL: CPT

## 2023-08-08 PROCEDURE — 1036F TOBACCO NON-USER: CPT | Performed by: INTERNAL MEDICINE

## 2023-08-08 PROCEDURE — 6360000002 HC RX W HCPCS: Performed by: INTERNAL MEDICINE

## 2023-08-08 PROCEDURE — G8400 PT W/DXA NO RESULTS DOC: HCPCS | Performed by: INTERNAL MEDICINE

## 2023-08-08 PROCEDURE — 85025 COMPLETE CBC W/AUTO DIFF WBC: CPT

## 2023-08-08 PROCEDURE — 96375 TX/PRO/DX INJ NEW DRUG ADDON: CPT

## 2023-08-08 PROCEDURE — 96376 TX/PRO/DX INJ SAME DRUG ADON: CPT

## 2023-08-08 PROCEDURE — 96413 CHEMO IV INFUSION 1 HR: CPT

## 2023-08-08 PROCEDURE — G8427 DOCREV CUR MEDS BY ELIG CLIN: HCPCS | Performed by: INTERNAL MEDICINE

## 2023-08-08 PROCEDURE — 2580000003 HC RX 258: Performed by: INTERNAL MEDICINE

## 2023-08-08 PROCEDURE — 99214 OFFICE O/P EST MOD 30 MIN: CPT | Performed by: INTERNAL MEDICINE

## 2023-08-08 PROCEDURE — 96415 CHEMO IV INFUSION ADDL HR: CPT

## 2023-08-08 PROCEDURE — 1123F ACP DISCUSS/DSCN MKR DOCD: CPT | Performed by: INTERNAL MEDICINE

## 2023-08-08 RX ORDER — FUROSEMIDE 20 MG/1
20 TABLET ORAL DAILY PRN
Qty: 60 TABLET | Refills: 3 | Status: SHIPPED | OUTPATIENT
Start: 2023-08-08

## 2023-08-08 RX ORDER — PALONOSETRON 0.05 MG/ML
0.25 INJECTION, SOLUTION INTRAVENOUS ONCE
Status: CANCELLED | OUTPATIENT
Start: 2023-08-08 | End: 2023-08-08

## 2023-08-08 RX ORDER — SODIUM CHLORIDE 9 MG/ML
5-250 INJECTION, SOLUTION INTRAVENOUS PRN
Status: DISCONTINUED | OUTPATIENT
Start: 2023-08-08 | End: 2023-08-09 | Stop reason: HOSPADM

## 2023-08-08 RX ORDER — SODIUM CHLORIDE 0.9 % (FLUSH) 0.9 %
5-40 SYRINGE (ML) INJECTION PRN
Status: CANCELLED | OUTPATIENT
Start: 2023-08-10

## 2023-08-08 RX ORDER — HYDROCODONE BITARTRATE AND ACETAMINOPHEN 7.5; 325 MG/1; MG/1
1 TABLET ORAL EVERY 6 HOURS PRN
Qty: 120 TABLET | Refills: 0 | Status: SHIPPED | OUTPATIENT
Start: 2023-08-08 | End: 2023-08-09 | Stop reason: SDUPTHER

## 2023-08-08 RX ORDER — HEPARIN SODIUM (PORCINE) LOCK FLUSH IV SOLN 100 UNIT/ML 100 UNIT/ML
500 SOLUTION INTRAVENOUS PRN
OUTPATIENT
Start: 2023-08-08

## 2023-08-08 RX ORDER — ONDANSETRON 2 MG/ML
8 INJECTION INTRAMUSCULAR; INTRAVENOUS
OUTPATIENT
Start: 2023-08-08

## 2023-08-08 RX ORDER — DEXAMETHASONE SODIUM PHOSPHATE 10 MG/ML
10 INJECTION, SOLUTION INTRAMUSCULAR; INTRAVENOUS ONCE
Status: COMPLETED | OUTPATIENT
Start: 2023-08-08 | End: 2023-08-08

## 2023-08-08 RX ORDER — LIDOCAINE AND PRILOCAINE 25; 25 MG/G; MG/G
CREAM TOPICAL
Qty: 30 G | Refills: 5 | Status: SHIPPED | OUTPATIENT
Start: 2023-08-08

## 2023-08-08 RX ORDER — ATROPINE SULFATE 0.4 MG/ML
0.4 INJECTION, SOLUTION INTRAMUSCULAR; INTRAVENOUS; SUBCUTANEOUS
Status: CANCELLED | OUTPATIENT
Start: 2023-08-08

## 2023-08-08 RX ORDER — ATROPINE SULFATE 0.4 MG/ML
0.4 INJECTION, SOLUTION INTRAVENOUS
Status: DISCONTINUED | OUTPATIENT
Start: 2023-08-08 | End: 2023-08-10 | Stop reason: HOSPADM

## 2023-08-08 RX ORDER — PALONOSETRON 0.05 MG/ML
0.25 INJECTION, SOLUTION INTRAVENOUS ONCE
Status: COMPLETED | OUTPATIENT
Start: 2023-08-08 | End: 2023-08-08

## 2023-08-08 RX ORDER — SODIUM CHLORIDE 9 MG/ML
5-250 INJECTION, SOLUTION INTRAVENOUS PRN
OUTPATIENT
Start: 2023-08-08

## 2023-08-08 RX ORDER — FAMOTIDINE 10 MG/ML
20 INJECTION, SOLUTION INTRAVENOUS
OUTPATIENT
Start: 2023-08-08

## 2023-08-08 RX ORDER — SODIUM CHLORIDE 0.9 % (FLUSH) 0.9 %
5-40 SYRINGE (ML) INJECTION PRN
OUTPATIENT
Start: 2023-08-08

## 2023-08-08 RX ORDER — ACETAMINOPHEN 325 MG/1
650 TABLET ORAL
OUTPATIENT
Start: 2023-08-08

## 2023-08-08 RX ORDER — SODIUM CHLORIDE 9 MG/ML
5-250 INJECTION, SOLUTION INTRAVENOUS PRN
Status: CANCELLED | OUTPATIENT
Start: 2023-08-08

## 2023-08-08 RX ORDER — ALBUTEROL SULFATE 90 UG/1
4 AEROSOL, METERED RESPIRATORY (INHALATION) PRN
OUTPATIENT
Start: 2023-08-08

## 2023-08-08 RX ORDER — SODIUM CHLORIDE 9 MG/ML
5-250 INJECTION, SOLUTION INTRAVENOUS PRN
OUTPATIENT
Start: 2023-08-10

## 2023-08-08 RX ORDER — SODIUM CHLORIDE 9 MG/ML
INJECTION, SOLUTION INTRAVENOUS CONTINUOUS
OUTPATIENT
Start: 2023-08-08

## 2023-08-08 RX ORDER — MEPERIDINE HYDROCHLORIDE 50 MG/ML
12.5 INJECTION INTRAMUSCULAR; INTRAVENOUS; SUBCUTANEOUS PRN
OUTPATIENT
Start: 2023-08-08

## 2023-08-08 RX ORDER — DIPHENHYDRAMINE HYDROCHLORIDE 50 MG/ML
50 INJECTION INTRAMUSCULAR; INTRAVENOUS
OUTPATIENT
Start: 2023-08-08

## 2023-08-08 RX ORDER — HEPARIN SODIUM (PORCINE) LOCK FLUSH IV SOLN 100 UNIT/ML 100 UNIT/ML
500 SOLUTION INTRAVENOUS PRN
Status: CANCELLED | OUTPATIENT
Start: 2023-08-10

## 2023-08-08 RX ORDER — ATROPINE SULFATE 0.4 MG/ML
0.4 INJECTION, SOLUTION INTRAMUSCULAR; INTRAVENOUS; SUBCUTANEOUS ONCE
Status: CANCELLED | OUTPATIENT
Start: 2023-08-08 | End: 2023-08-08

## 2023-08-08 RX ORDER — EPINEPHRINE 1 MG/ML
0.3 INJECTION, SOLUTION, CONCENTRATE INTRAVENOUS PRN
OUTPATIENT
Start: 2023-08-08

## 2023-08-08 RX ORDER — ATROPINE SULFATE 0.4 MG/ML
0.4 INJECTION, SOLUTION INTRAVENOUS ONCE
Status: COMPLETED | OUTPATIENT
Start: 2023-08-08 | End: 2023-08-08

## 2023-08-08 RX ADMIN — LEUCOVORIN CALCIUM 200 MG: 200 INJECTION, POWDER, LYOPHILIZED, FOR SOLUTION INTRAMUSCULAR; INTRAVENOUS at 13:01

## 2023-08-08 RX ADMIN — DEXAMETHASONE SODIUM PHOSPHATE 10 MG: 10 INJECTION, SOLUTION INTRAMUSCULAR; INTRAVENOUS at 10:58

## 2023-08-08 RX ADMIN — SODIUM CHLORIDE 250 ML/HR: 9 INJECTION, SOLUTION INTRAVENOUS at 10:54

## 2023-08-08 RX ADMIN — PALONOSETRON HYDROCHLORIDE 0.25 MG: 0.25 INJECTION INTRAVENOUS at 10:58

## 2023-08-08 RX ADMIN — FLUOROURACIL 4000 MG: 50 INJECTION, SOLUTION INTRAVENOUS at 13:32

## 2023-08-08 RX ADMIN — ATROPINE SULFATE 0.4 MG: 0.4 INJECTION, SOLUTION INTRAVENOUS at 12:59

## 2023-08-08 RX ADMIN — IRINOTECAN HYDROCHLORIDE 86 MG: 4.3 INJECTION, POWDER, FOR SOLUTION INTRAVENOUS at 11:20

## 2023-08-08 RX ADMIN — FOSAPREPITANT 150 MG: 150 INJECTION, POWDER, LYOPHILIZED, FOR SOLUTION INTRAVENOUS at 10:56

## 2023-08-08 RX ADMIN — ATROPINE SULFATE 0.4 MG: 0.4 INJECTION, SOLUTION INTRAVENOUS at 11:18

## 2023-08-08 NOTE — PROGRESS NOTES
JOELLE Ivory completed in person visit with Nehemias Magaña. Susana Carr is a 69year old female. She is accompanied by her  of 15 years Liliana Allan. SW introduced self and explained role and source of support. Susana Carr has 4 children who live locally and are supportive. Susana Carr describes her support system as \"awesome\". Carlota Soto RN notified this SW the patient was in need of financial assistance for gas. This SW provided the patient with a gas card to [employer] for $20.00. Susana Carr voiced appreciation.

## 2023-08-09 DIAGNOSIS — G89.3 CANCER-RELATED PAIN: ICD-10-CM

## 2023-08-09 RX ORDER — HYDROCODONE BITARTRATE AND ACETAMINOPHEN 7.5; 325 MG/1; MG/1
1 TABLET ORAL EVERY 6 HOURS PRN
Qty: 120 TABLET | Refills: 0 | Status: SHIPPED | OUTPATIENT
Start: 2023-08-09 | End: 2023-09-08

## 2023-08-10 ENCOUNTER — HOSPITAL ENCOUNTER (OUTPATIENT)
Dept: INFUSION THERAPY | Age: 78
Discharge: HOME | End: 2023-08-10
Payer: MEDICARE

## 2023-08-10 ENCOUNTER — TELEPHONE (OUTPATIENT)
Dept: PALLATIVE CARE | Age: 78
End: 2023-08-10

## 2023-08-10 DIAGNOSIS — C25.9 METASTASIS FROM PANCREATIC CANCER (HCC): Primary | ICD-10-CM

## 2023-08-10 DIAGNOSIS — C25.2 MALIGNANT NEOPLASM OF TAIL OF PANCREAS (HCC): ICD-10-CM

## 2023-08-10 DIAGNOSIS — C79.9 METASTASIS FROM PANCREATIC CANCER (HCC): Primary | ICD-10-CM

## 2023-08-10 PROCEDURE — 6360000002 HC RX W HCPCS: Performed by: INTERNAL MEDICINE

## 2023-08-10 PROCEDURE — 2580000003 HC RX 258: Performed by: INTERNAL MEDICINE

## 2023-08-10 PROCEDURE — 96523 IRRIG DRUG DELIVERY DEVICE: CPT

## 2023-08-10 RX ORDER — HEPARIN 100 UNIT/ML
500 SYRINGE INTRAVENOUS PRN
Status: DISCONTINUED | OUTPATIENT
Start: 2023-08-10 | End: 2023-08-11 | Stop reason: HOSPADM

## 2023-08-10 RX ORDER — SODIUM CHLORIDE 0.9 % (FLUSH) 0.9 %
5-40 SYRINGE (ML) INJECTION PRN
Status: DISCONTINUED | OUTPATIENT
Start: 2023-08-10 | End: 2023-08-11 | Stop reason: HOSPADM

## 2023-08-10 RX ADMIN — SODIUM CHLORIDE, PRESERVATIVE FREE 10 ML: 5 INJECTION INTRAVENOUS at 13:46

## 2023-08-10 RX ADMIN — HEPARIN 500 UNITS: 100 SYRINGE at 13:46

## 2023-08-10 NOTE — TELEPHONE ENCOUNTER
I was unable to talk to the patient about [facility]. She had company at the time of my call. I will try to call her back another time. I can be reached at [phone].

## 2023-08-14 ENCOUNTER — APPOINTMENT (OUTPATIENT)
Dept: INFUSION THERAPY | Age: 78
End: 2023-08-14
Payer: MEDICARE

## 2023-08-22 ENCOUNTER — HOSPITAL ENCOUNTER (OUTPATIENT)
Dept: INFUSION THERAPY | Age: 78
Discharge: HOME | End: 2023-08-22
Payer: MEDICARE

## 2023-08-22 ENCOUNTER — OFFICE VISIT (OUTPATIENT)
Dept: PALLATIVE CARE | Age: 78
End: 2023-08-22
Payer: MEDICARE

## 2023-08-22 VITALS
RESPIRATION RATE: 18 BRPM | OXYGEN SATURATION: 95 % | HEART RATE: 73 BPM | TEMPERATURE: 98.2 F | BODY MASS INDEX: 24.66 KG/M2 | HEIGHT: 65 IN | SYSTOLIC BLOOD PRESSURE: 135 MMHG | WEIGHT: 148 LBS | DIASTOLIC BLOOD PRESSURE: 73 MMHG

## 2023-08-22 DIAGNOSIS — Z51.5 ENCOUNTER FOR PALLIATIVE CARE: ICD-10-CM

## 2023-08-22 DIAGNOSIS — C25.9 METASTASIS FROM PANCREATIC CANCER (HCC): ICD-10-CM

## 2023-08-22 DIAGNOSIS — C25.2 MALIGNANT NEOPLASM OF TAIL OF PANCREAS (HCC): Primary | ICD-10-CM

## 2023-08-22 DIAGNOSIS — F41.9 ANXIETY: ICD-10-CM

## 2023-08-22 DIAGNOSIS — R63.4 WEIGHT LOSS: ICD-10-CM

## 2023-08-22 DIAGNOSIS — R97.8 ABNORMAL TUMOR MARKERS: ICD-10-CM

## 2023-08-22 DIAGNOSIS — C79.9 METASTASIS FROM PANCREATIC CANCER (HCC): ICD-10-CM

## 2023-08-22 DIAGNOSIS — R63.0 DECREASED APPETITE: ICD-10-CM

## 2023-08-22 DIAGNOSIS — F41.9 ANXIETY IN CANCER PATIENT: Primary | ICD-10-CM

## 2023-08-22 DIAGNOSIS — L30.4 INTERTRIGO: Primary | ICD-10-CM

## 2023-08-22 DIAGNOSIS — G89.3 CANCER-RELATED PAIN: Primary | ICD-10-CM

## 2023-08-22 DIAGNOSIS — R63.0 POOR APPETITE: ICD-10-CM

## 2023-08-22 DIAGNOSIS — C25.2 MALIGNANT NEOPLASM OF TAIL OF PANCREAS (HCC): Chronic | ICD-10-CM

## 2023-08-22 LAB
ALBUMIN SERPL-MCNC: 3.5 G/DL (ref 3.5–5.2)
ALP SERPL-CCNC: 79 U/L (ref 35–104)
ALT SERPL-CCNC: 16 U/L (ref 9–52)
ANION GAP SERPL CALCULATED.3IONS-SCNC: 11 MMOL/L (ref 7–19)
AST SERPL-CCNC: 30 U/L (ref 14–36)
BILIRUB SERPL-MCNC: 0.4 MG/DL (ref 0.2–1.3)
BUN SERPL-MCNC: 13 MG/DL (ref 7–17)
CALCIUM SERPL-MCNC: 9 MG/DL (ref 8.4–10.2)
CANCER AG19-9 SERPL-ACNC: 649 U/ML (ref 0–35)
CEA SERPL-MCNC: 9.1 NG/ML (ref 0–4.7)
CHLORIDE SERPL-SCNC: 105 MMOL/L (ref 98–111)
CO2 SERPL-SCNC: 22 MMOL/L (ref 22–29)
CREAT SERPL-MCNC: 0.8 MG/DL (ref 0.5–1)
ERYTHROCYTE [DISTWIDTH] IN BLOOD BY AUTOMATED COUNT: 18.3 % (ref 11.7–14.4)
GLOBULIN: 2.9 G/DL
GLUCOSE SERPL-MCNC: 115 MG/DL (ref 74–106)
HCT VFR BLD AUTO: 30.4 % (ref 34.1–44.9)
HGB BLD-MCNC: 10.2 G/DL (ref 11.2–15.7)
LYMPHOCYTES # BLD: 2.97 K/UL (ref 1.18–3.74)
LYMPHOCYTES NFR BLD: 38.1 % (ref 19.3–53.1)
MCH RBC QN AUTO: 31.5 PG (ref 25.6–32.2)
MCHC RBC AUTO-ENTMCNC: 33.6 G/DL (ref 32.3–35.5)
MCV RBC AUTO: 93.8 FL (ref 79.4–94.8)
MONOCYTES # BLD: 1.25 K/UL (ref 0.24–0.82)
MONOCYTES NFR BLD: 16 % (ref 4.7–12.5)
NEUTROPHILS # BLD: 2.86 K/UL (ref 1.56–6.13)
NEUTS SEG NFR BLD: 36.8 % (ref 34–71.1)
PLATELET # BLD AUTO: 482 K/UL (ref 182–369)
PMV BLD AUTO: 9.3 FL (ref 7.4–10.4)
POTASSIUM SERPL-SCNC: 4.5 MMOL/L (ref 3.5–5.1)
PROT SERPL-MCNC: 6.4 G/DL (ref 6.3–8.2)
RBC # BLD AUTO: 3.24 M/UL (ref 3.93–5.22)
SODIUM SERPL-SCNC: 138 MMOL/L (ref 137–145)
WBC # BLD AUTO: 7.79 K/UL (ref 3.98–10.04)

## 2023-08-22 PROCEDURE — 96375 TX/PRO/DX INJ NEW DRUG ADDON: CPT

## 2023-08-22 PROCEDURE — 36415 COLL VENOUS BLD VENIPUNCTURE: CPT

## 2023-08-22 PROCEDURE — G8400 PT W/DXA NO RESULTS DOC: HCPCS | Performed by: NURSE PRACTITIONER

## 2023-08-22 PROCEDURE — G8428 CUR MEDS NOT DOCUMENT: HCPCS | Performed by: NURSE PRACTITIONER

## 2023-08-22 PROCEDURE — 1123F ACP DISCUSS/DSCN MKR DOCD: CPT | Performed by: NURSE PRACTITIONER

## 2023-08-22 PROCEDURE — 99215 OFFICE O/P EST HI 40 MIN: CPT | Performed by: NURSE PRACTITIONER

## 2023-08-22 PROCEDURE — G8420 CALC BMI NORM PARAMETERS: HCPCS | Performed by: NURSE PRACTITIONER

## 2023-08-22 PROCEDURE — 6360000002 HC RX W HCPCS: Performed by: INTERNAL MEDICINE

## 2023-08-22 PROCEDURE — 2580000003 HC RX 258: Performed by: INTERNAL MEDICINE

## 2023-08-22 PROCEDURE — 80053 COMPREHEN METABOLIC PANEL: CPT

## 2023-08-22 PROCEDURE — 85025 COMPLETE CBC W/AUTO DIFF WBC: CPT

## 2023-08-22 PROCEDURE — 1090F PRES/ABSN URINE INCON ASSESS: CPT | Performed by: NURSE PRACTITIONER

## 2023-08-22 PROCEDURE — 1036F TOBACCO NON-USER: CPT | Performed by: NURSE PRACTITIONER

## 2023-08-22 PROCEDURE — G0498 CHEMO EXTEND IV INFUS W/PUMP: HCPCS

## 2023-08-22 PROCEDURE — 96413 CHEMO IV INFUSION 1 HR: CPT

## 2023-08-22 PROCEDURE — 96367 TX/PROPH/DG ADDL SEQ IV INF: CPT

## 2023-08-22 RX ORDER — ALPRAZOLAM 0.5 MG/1
0.5 TABLET ORAL 2 TIMES DAILY
Qty: 60 TABLET | Refills: 0 | Status: CANCELLED | OUTPATIENT
Start: 2023-08-22 | End: 2023-09-21

## 2023-08-22 RX ORDER — SODIUM CHLORIDE 9 MG/ML
5-250 INJECTION, SOLUTION INTRAVENOUS PRN
Status: CANCELLED | OUTPATIENT
Start: 2023-08-22

## 2023-08-22 RX ORDER — DIPHENHYDRAMINE HYDROCHLORIDE 50 MG/ML
50 INJECTION INTRAMUSCULAR; INTRAVENOUS
Status: CANCELLED | OUTPATIENT
Start: 2023-08-22

## 2023-08-22 RX ORDER — ATROPINE SULFATE 0.4 MG/ML
0.4 INJECTION, SOLUTION ENDOTRACHEAL; INTRAMEDULLARY; INTRAMUSCULAR; INTRAVENOUS; SUBCUTANEOUS
Status: CANCELLED | OUTPATIENT
Start: 2023-08-22

## 2023-08-22 RX ORDER — SODIUM CHLORIDE 0.9 % (FLUSH) 0.9 %
5-40 SYRINGE (ML) INJECTION PRN
Status: CANCELLED | OUTPATIENT
Start: 2023-08-22

## 2023-08-22 RX ORDER — DEXAMETHASONE SODIUM PHOSPHATE 10 MG/ML
10 INJECTION, SOLUTION INTRAMUSCULAR; INTRAVENOUS ONCE
Status: CANCELLED | OUTPATIENT
Start: 2023-08-22 | End: 2023-08-22

## 2023-08-22 RX ORDER — DEXAMETHASONE SODIUM PHOSPHATE 10 MG/ML
10 INJECTION, SOLUTION INTRAMUSCULAR; INTRAVENOUS ONCE
Status: COMPLETED | OUTPATIENT
Start: 2023-08-22 | End: 2023-08-22

## 2023-08-22 RX ORDER — MEGESTROL ACETATE 20 MG/1
20 TABLET ORAL DAILY
COMMUNITY

## 2023-08-22 RX ORDER — PALONOSETRON 0.05 MG/ML
0.25 INJECTION, SOLUTION INTRAVENOUS ONCE
Status: CANCELLED | OUTPATIENT
Start: 2023-08-22 | End: 2023-08-22

## 2023-08-22 RX ORDER — ALBUTEROL SULFATE 90 UG/1
4 AEROSOL, METERED RESPIRATORY (INHALATION) PRN
Status: CANCELLED | OUTPATIENT
Start: 2023-08-22

## 2023-08-22 RX ORDER — ATROPINE SULFATE 0.4 MG/ML
0.4 INJECTION, SOLUTION INTRAVENOUS ONCE
Status: DISCONTINUED | OUTPATIENT
Start: 2023-08-22 | End: 2023-08-22

## 2023-08-22 RX ORDER — ACETAMINOPHEN 325 MG/1
650 TABLET ORAL
Status: CANCELLED | OUTPATIENT
Start: 2023-08-22

## 2023-08-22 RX ORDER — HEPARIN 100 UNIT/ML
500 SYRINGE INTRAVENOUS PRN
Status: CANCELLED | OUTPATIENT
Start: 2023-08-22

## 2023-08-22 RX ORDER — HEPARIN 100 UNIT/ML
500 SYRINGE INTRAVENOUS PRN
Status: CANCELLED | OUTPATIENT
Start: 2023-08-24

## 2023-08-22 RX ORDER — SODIUM CHLORIDE 0.9 % (FLUSH) 0.9 %
5-40 SYRINGE (ML) INJECTION PRN
Status: CANCELLED | OUTPATIENT
Start: 2023-08-24

## 2023-08-22 RX ORDER — EPINEPHRINE 1 MG/ML
0.3 INJECTION, SOLUTION, CONCENTRATE INTRAVENOUS PRN
Status: CANCELLED | OUTPATIENT
Start: 2023-08-22

## 2023-08-22 RX ORDER — ATROPINE SULFATE 0.4 MG/ML
0.4 INJECTION, SOLUTION INTRAVENOUS ONCE
Status: COMPLETED | OUTPATIENT
Start: 2023-08-22 | End: 2023-08-22

## 2023-08-22 RX ORDER — SODIUM CHLORIDE 9 MG/ML
5-250 INJECTION, SOLUTION INTRAVENOUS PRN
OUTPATIENT
Start: 2023-08-24

## 2023-08-22 RX ORDER — ATROPINE SULFATE 0.4 MG/ML
0.4 INJECTION, SOLUTION ENDOTRACHEAL; INTRAMEDULLARY; INTRAMUSCULAR; INTRAVENOUS; SUBCUTANEOUS ONCE
Status: CANCELLED | OUTPATIENT
Start: 2023-08-22 | End: 2023-08-22

## 2023-08-22 RX ORDER — SODIUM CHLORIDE 9 MG/ML
INJECTION, SOLUTION INTRAVENOUS CONTINUOUS
Status: CANCELLED | OUTPATIENT
Start: 2023-08-22

## 2023-08-22 RX ORDER — DRONABINOL 2.5 MG/1
2.5 CAPSULE ORAL
Qty: 60 CAPSULE | Refills: 0 | Status: SHIPPED | OUTPATIENT
Start: 2023-08-22 | End: 2023-09-21

## 2023-08-22 RX ORDER — PALONOSETRON 0.05 MG/ML
0.25 INJECTION, SOLUTION INTRAVENOUS ONCE
Status: COMPLETED | OUTPATIENT
Start: 2023-08-22 | End: 2023-08-22

## 2023-08-22 RX ORDER — SODIUM CHLORIDE 9 MG/ML
5-250 INJECTION, SOLUTION INTRAVENOUS PRN
Status: DISCONTINUED | OUTPATIENT
Start: 2023-08-22 | End: 2023-08-23 | Stop reason: HOSPADM

## 2023-08-22 RX ORDER — ATROPINE SULFATE 0.4 MG/ML
0.4 INJECTION, SOLUTION INTRAVENOUS
Status: DISCONTINUED | OUTPATIENT
Start: 2023-08-22 | End: 2023-08-24 | Stop reason: HOSPADM

## 2023-08-22 RX ORDER — ONDANSETRON 2 MG/ML
8 INJECTION INTRAMUSCULAR; INTRAVENOUS
Status: CANCELLED | OUTPATIENT
Start: 2023-08-22

## 2023-08-22 RX ORDER — ALPRAZOLAM 0.5 MG/1
0.5 TABLET ORAL 2 TIMES DAILY
Qty: 60 TABLET | Refills: 0 | Status: SHIPPED | OUTPATIENT
Start: 2023-08-22 | End: 2023-09-21

## 2023-08-22 RX ORDER — NYSTATIN 100000 [USP'U]/G
POWDER TOPICAL
Qty: 30 EACH | Refills: 1 | Status: SHIPPED | OUTPATIENT
Start: 2023-08-22

## 2023-08-22 RX ORDER — MEPERIDINE HYDROCHLORIDE 25 MG/ML
12.5 INJECTION INTRAMUSCULAR; INTRAVENOUS; SUBCUTANEOUS PRN
Status: CANCELLED | OUTPATIENT
Start: 2023-08-22

## 2023-08-22 RX ADMIN — IRINOTECAN HYDROCHLORIDE 86 MG: 4.3 INJECTION, POWDER, FOR SOLUTION INTRAVENOUS at 11:13

## 2023-08-22 RX ADMIN — ATROPINE SULFATE 0.4 MG: 0.4 INJECTION, SOLUTION INTRAVENOUS at 11:09

## 2023-08-22 RX ADMIN — FLUOROURACIL 4000 MG: 50 INJECTION, SOLUTION INTRAVENOUS at 13:36

## 2023-08-22 RX ADMIN — LEUCOVORIN CALCIUM 200 MG: 200 INJECTION, POWDER, LYOPHILIZED, FOR SOLUTION INTRAMUSCULAR; INTRAVENOUS at 13:03

## 2023-08-22 RX ADMIN — PALONOSETRON 0.25 MG: 0.05 INJECTION, SOLUTION INTRAVENOUS at 10:38

## 2023-08-22 RX ADMIN — DEXAMETHASONE SODIUM PHOSPHATE 10 MG: 10 INJECTION, SOLUTION INTRAMUSCULAR; INTRAVENOUS at 10:42

## 2023-08-22 RX ADMIN — FOSAPREPITANT 150 MG: 150 INJECTION, POWDER, LYOPHILIZED, FOR SOLUTION INTRAVENOUS at 10:46

## 2023-08-22 NOTE — PROGRESS NOTES
Palliative care nurse requested refill for patient while PCP is out of town. New script sent to MD for approval. Minnie Randolph will be sent to [facility] on [facility] side.  Electronically signed by Alicia Arriola RN on 8/22/2023 at 1:24 PM

## 2023-08-22 NOTE — PROGRESS NOTES
Pt is here for C3 Onivyde/Leucovorin/5FU. She has lost 6lbs in 2 weeks and states that she just does not have an appetite. She looks well today. She is taking Megace 20mg tabs PO daily. I discussed with Dr. Bee Jones and we will go ahead with treatment today and send a rx for Marinol 2.5mg BID. Repeat tumor markers done today. Plan is to repeat CT's after C4.

## 2023-08-22 NOTE — PROGRESS NOTES
Lab Results   Component Value Date    WBC 7.79 08/22/2023    HGB 10.2 (L) 08/22/2023    HCT 30.4 (L) 08/22/2023    MCV 93.8 08/22/2023     (H) 08/22/2023     Lab Results   Component Value Date    NEUTROABS 2.86 08/22/2023     Lab Results   Component Value Date     08/22/2023    K 4.5 08/22/2023     08/22/2023    CO2 22 08/22/2023    BUN 13 08/22/2023    CREATININE 0.8 08/22/2023    GLUCOSE 115 (H) 08/22/2023    CALCIUM 9.0 08/22/2023    PROT 6.4 08/22/2023    LABALBU 3.5 08/22/2023    BILITOT 0.4 08/22/2023    ALKPHOS 79 08/22/2023    AST 30 08/22/2023    ALT 16 08/22/2023    LABGLOM >60 08/22/2023    GFRAA >59 01/12/2022    GLOB 2.9 08/22/2023

## 2023-08-22 NOTE — PROGRESS NOTES
potassium chloride (MICRO-K) 10 MEQ extended release capsule, Take 2 capsules by mouth 2 times daily (Patient taking differently: Take 1 capsule by mouth 2 times daily), Disp: 120 capsule, Rfl: 5    doxazosin (CARDURA) 2 MG tablet, Take 1 tablet by mouth every evening, Disp: 30 tablet, Rfl: 3    losartan (COZAAR) 25 MG tablet, Take 1 tablet by mouth 2 times daily, Disp: 30 tablet, Rfl: 3    metoclopramide (REGLAN) 10 MG tablet, Take 1 tablet by mouth 3 times daily as needed (nausea), Disp: 60 tablet, Rfl: 3    metoprolol succinate (TOPROL XL) 50 MG extended release tablet, Take 1 tablet by mouth 2 times daily, Disp: 30 tablet, Rfl: 3    NIFEdipine (PROCARDIA XL) 30 MG extended release tablet, Take 1 tablet by mouth daily, Disp: 30 tablet, Rfl: 5    ALPRAZolam (XANAX) 0.5 MG tablet, Take 0.5 tablets by mouth 2 times daily as needed. , Disp: , Rfl:     loratadine (CLARITIN) 10 MG tablet, Take 1 tablet by mouth daily, Disp: 30 tablet, Rfl: 0    promethazine (PHENERGAN) 12.5 MG tablet, Take 1 tablet by mouth every 6 hours as needed for Nausea, Disp: 30 tablet, Rfl: 5    lipase-protease-amylase (CREON) 92663-28116 units delayed release capsule, Take 1 capsule by mouth 3 times daily (with meals), Disp: 90 capsule, Rfl: 5    levothyroxine (SYNTHROID) 137 MCG tablet, Take 1 tablet by mouth Daily Indications: Underactive Thyroid, Disp: , Rfl:     Cholecalciferol (VITAMIN D3) 10 MCG (400 UNIT) CAPS, Take 1 capsule by mouth daily, Disp: , Rfl:     Magic Mouthwash (MIRACLE MOUTHWASH), Swish and spit 5 mLs 4 times daily as needed for Irritation 1/3 viscous lidocaine, 1/3 benadryl, 1/3 Maalox. , Disp: 480 mL, Rfl: 1    Caltrate 600+D Plus Minerals (CALTRATE) 600-800 MG-UNIT TABS tablet, Take 1 tablet by mouth daily, Disp: , Rfl:     busPIRone (BUSPAR) 5 MG tablet, Take 1 tablet by mouth 3 times daily, Disp: , Rfl:     ondansetron (ZOFRAN) 4 MG tablet, Take 1 tablet by mouth every 8 hours as needed for Nausea or Vomiting, Disp: 10

## 2023-08-22 NOTE — TELEPHONE ENCOUNTER
Patient seen by palliative care. Patient needs refill for xanax 0.5 mg BID PRN. Sent script to MD for approval and script will be sent to [facility] on the [facility] side.  Electronically signed by Allyson Fuentes RN on 8/22/2023 at 1:27 PM

## 2023-08-24 ENCOUNTER — HOSPITAL ENCOUNTER (OUTPATIENT)
Dept: INFUSION THERAPY | Age: 78
Discharge: HOME | End: 2023-08-24
Payer: MEDICARE

## 2023-08-24 DIAGNOSIS — C25.9 METASTASIS FROM PANCREATIC CANCER (HCC): Primary | ICD-10-CM

## 2023-08-24 DIAGNOSIS — C25.2 MALIGNANT NEOPLASM OF TAIL OF PANCREAS (HCC): ICD-10-CM

## 2023-08-24 DIAGNOSIS — C79.9 METASTASIS FROM PANCREATIC CANCER (HCC): Primary | ICD-10-CM

## 2023-08-24 PROCEDURE — 96523 IRRIG DRUG DELIVERY DEVICE: CPT

## 2023-08-24 PROCEDURE — 2580000003 HC RX 258: Performed by: INTERNAL MEDICINE

## 2023-08-24 PROCEDURE — 6360000002 HC RX W HCPCS: Performed by: INTERNAL MEDICINE

## 2023-08-24 RX ORDER — HEPARIN 100 UNIT/ML
500 SYRINGE INTRAVENOUS PRN
Status: DISCONTINUED | OUTPATIENT
Start: 2023-08-24 | End: 2023-08-25 | Stop reason: HOSPADM

## 2023-08-24 RX ORDER — SODIUM CHLORIDE 0.9 % (FLUSH) 0.9 %
5-40 SYRINGE (ML) INJECTION PRN
Status: DISCONTINUED | OUTPATIENT
Start: 2023-08-24 | End: 2023-08-25 | Stop reason: HOSPADM

## 2023-08-24 RX ADMIN — HEPARIN 500 UNITS: 100 SYRINGE at 13:34

## 2023-08-24 RX ADMIN — SODIUM CHLORIDE, PRESERVATIVE FREE 10 ML: 5 INJECTION INTRAVENOUS at 13:34

## 2023-08-28 RX ORDER — DIPHENHYDRAMINE HCL 25 MG
25 CAPSULE ORAL EVERY 6 HOURS PRN
COMMUNITY

## 2023-08-30 DIAGNOSIS — R60.0 LOCALIZED EDEMA: ICD-10-CM

## 2023-08-30 RX ORDER — FUROSEMIDE 20 MG/1
TABLET ORAL
Qty: 60 TABLET | Refills: 1 | OUTPATIENT
Start: 2023-08-30

## 2023-09-05 ENCOUNTER — OFFICE VISIT (OUTPATIENT)
Dept: HEMATOLOGY | Age: 78
End: 2023-09-05
Payer: MEDICARE

## 2023-09-05 ENCOUNTER — OFFICE VISIT (OUTPATIENT)
Dept: HEMATOLOGY | Age: 78
End: 2023-09-05

## 2023-09-05 ENCOUNTER — HOSPITAL ENCOUNTER (OUTPATIENT)
Dept: INFUSION THERAPY | Age: 78
Discharge: HOME | End: 2023-09-05
Payer: MEDICARE

## 2023-09-05 VITALS
OXYGEN SATURATION: 100 % | HEIGHT: 65 IN | RESPIRATION RATE: 18 BRPM | WEIGHT: 147.4 LBS | DIASTOLIC BLOOD PRESSURE: 64 MMHG | SYSTOLIC BLOOD PRESSURE: 99 MMHG | TEMPERATURE: 98.4 F | BODY MASS INDEX: 24.56 KG/M2 | HEART RATE: 76 BPM

## 2023-09-05 DIAGNOSIS — Z51.11 CHEMOTHERAPY MANAGEMENT, ENCOUNTER FOR: ICD-10-CM

## 2023-09-05 DIAGNOSIS — C25.2 MALIGNANT NEOPLASM OF TAIL OF PANCREAS (HCC): Primary | ICD-10-CM

## 2023-09-05 DIAGNOSIS — C25.9 METASTASIS FROM PANCREATIC CANCER (HCC): Primary | ICD-10-CM

## 2023-09-05 DIAGNOSIS — E44.0 MODERATE PROTEIN-CALORIE MALNUTRITION (HCC): ICD-10-CM

## 2023-09-05 DIAGNOSIS — T45.1X5D ADVERSE EFFECT OF CHEMOTHERAPY, SUBSEQUENT ENCOUNTER: ICD-10-CM

## 2023-09-05 DIAGNOSIS — R63.0 DECREASED APPETITE: ICD-10-CM

## 2023-09-05 DIAGNOSIS — C79.9 METASTASIS FROM PANCREATIC CANCER (HCC): Primary | ICD-10-CM

## 2023-09-05 DIAGNOSIS — Z71.89 CARE PLAN DISCUSSED WITH PATIENT: ICD-10-CM

## 2023-09-05 DIAGNOSIS — C25.2 MALIGNANT NEOPLASM OF TAIL OF PANCREAS (HCC): ICD-10-CM

## 2023-09-05 DIAGNOSIS — R18.8 OTHER ASCITES: ICD-10-CM

## 2023-09-05 DIAGNOSIS — L40.9 PSORIASIS: Primary | ICD-10-CM

## 2023-09-05 DIAGNOSIS — C25.9 METASTASIS FROM PANCREATIC CANCER (HCC): ICD-10-CM

## 2023-09-05 DIAGNOSIS — T45.1X5A ANTINEOPLASTIC CHEMOTHERAPY INDUCED ANEMIA: ICD-10-CM

## 2023-09-05 DIAGNOSIS — D64.81 ANTINEOPLASTIC CHEMOTHERAPY INDUCED ANEMIA: ICD-10-CM

## 2023-09-05 DIAGNOSIS — C79.9 METASTASIS FROM PANCREATIC CANCER (HCC): ICD-10-CM

## 2023-09-05 LAB
ALBUMIN SERPL-MCNC: 3.4 G/DL (ref 3.5–5.2)
ALP SERPL-CCNC: 102 U/L (ref 35–104)
ALT SERPL-CCNC: 19 U/L (ref 9–52)
ANION GAP SERPL CALCULATED.3IONS-SCNC: 11 MMOL/L (ref 7–19)
AST SERPL-CCNC: 34 U/L (ref 14–36)
BILIRUB SERPL-MCNC: 0.3 MG/DL (ref 0.2–1.3)
BUN SERPL-MCNC: 14 MG/DL (ref 7–17)
CALCIUM SERPL-MCNC: 8.9 MG/DL (ref 8.4–10.2)
CANCER AG19-9 SERPL-ACNC: 596 U/ML (ref 0–35)
CEA SERPL-MCNC: 9.3 NG/ML (ref 0–4.7)
CHLORIDE SERPL-SCNC: 105 MMOL/L (ref 98–111)
CO2 SERPL-SCNC: 21 MMOL/L (ref 22–29)
CREAT SERPL-MCNC: 0.8 MG/DL (ref 0.5–1)
ERYTHROCYTE [DISTWIDTH] IN BLOOD BY AUTOMATED COUNT: 17.5 % (ref 11.7–14.4)
GLOBULIN: 3 G/DL
GLUCOSE SERPL-MCNC: 147 MG/DL (ref 74–106)
HCT VFR BLD AUTO: 31 % (ref 34.1–44.9)
HGB BLD-MCNC: 10.3 G/DL (ref 11.2–15.7)
LYMPHOCYTES # BLD: 3.86 K/UL (ref 1.18–3.74)
LYMPHOCYTES NFR BLD: 49.7 % (ref 19.3–53.1)
MCH RBC QN AUTO: 31.3 PG (ref 25.6–32.2)
MCHC RBC AUTO-ENTMCNC: 33.2 G/DL (ref 32.3–35.5)
MCV RBC AUTO: 94.2 FL (ref 79.4–94.8)
MONOCYTES # BLD: 1.01 K/UL (ref 0.24–0.82)
MONOCYTES NFR BLD: 13 % (ref 4.7–12.5)
NEUTROPHILS # BLD: 2.35 K/UL (ref 1.56–6.13)
NEUTS SEG NFR BLD: 30.2 % (ref 34–71.1)
PLATELET # BLD AUTO: 412 K/UL (ref 182–369)
PMV BLD AUTO: 9.5 FL (ref 7.4–10.4)
POTASSIUM SERPL-SCNC: 4.7 MMOL/L (ref 3.5–5.1)
PROT SERPL-MCNC: 6.1 G/DL (ref 6.6–8.7)
PROT SERPL-MCNC: 6.4 G/DL (ref 6.3–8.2)
RBC # BLD AUTO: 3.29 M/UL (ref 3.93–5.22)
SODIUM SERPL-SCNC: 137 MMOL/L (ref 137–145)
WBC # BLD AUTO: 7.77 K/UL (ref 3.98–10.04)

## 2023-09-05 PROCEDURE — G8420 CALC BMI NORM PARAMETERS: HCPCS | Performed by: INTERNAL MEDICINE

## 2023-09-05 PROCEDURE — G0498 CHEMO EXTEND IV INFUS W/PUMP: HCPCS

## 2023-09-05 PROCEDURE — 96376 TX/PRO/DX INJ SAME DRUG ADON: CPT

## 2023-09-05 PROCEDURE — 6360000002 HC RX W HCPCS

## 2023-09-05 PROCEDURE — 1123F ACP DISCUSS/DSCN MKR DOCD: CPT | Performed by: INTERNAL MEDICINE

## 2023-09-05 PROCEDURE — 36415 COLL VENOUS BLD VENIPUNCTURE: CPT

## 2023-09-05 PROCEDURE — G8400 PT W/DXA NO RESULTS DOC: HCPCS | Performed by: INTERNAL MEDICINE

## 2023-09-05 PROCEDURE — 2580000003 HC RX 258: Performed by: INTERNAL MEDICINE

## 2023-09-05 PROCEDURE — 1036F TOBACCO NON-USER: CPT | Performed by: INTERNAL MEDICINE

## 2023-09-05 PROCEDURE — 85025 COMPLETE CBC W/AUTO DIFF WBC: CPT

## 2023-09-05 PROCEDURE — 1090F PRES/ABSN URINE INCON ASSESS: CPT | Performed by: INTERNAL MEDICINE

## 2023-09-05 PROCEDURE — G8427 DOCREV CUR MEDS BY ELIG CLIN: HCPCS | Performed by: INTERNAL MEDICINE

## 2023-09-05 PROCEDURE — 96415 CHEMO IV INFUSION ADDL HR: CPT

## 2023-09-05 PROCEDURE — 99214 OFFICE O/P EST MOD 30 MIN: CPT | Performed by: INTERNAL MEDICINE

## 2023-09-05 PROCEDURE — 96375 TX/PRO/DX INJ NEW DRUG ADDON: CPT

## 2023-09-05 PROCEDURE — 96413 CHEMO IV INFUSION 1 HR: CPT

## 2023-09-05 PROCEDURE — 80053 COMPREHEN METABOLIC PANEL: CPT

## 2023-09-05 PROCEDURE — 96367 TX/PROPH/DG ADDL SEQ IV INF: CPT

## 2023-09-05 PROCEDURE — 6360000002 HC RX W HCPCS: Performed by: INTERNAL MEDICINE

## 2023-09-05 RX ORDER — EPINEPHRINE 1 MG/ML
0.3 INJECTION, SOLUTION, CONCENTRATE INTRAVENOUS PRN
Status: CANCELLED | OUTPATIENT
Start: 2023-09-05

## 2023-09-05 RX ORDER — FAMOTIDINE 10 MG/ML
20 INJECTION, SOLUTION INTRAVENOUS
Status: CANCELLED | OUTPATIENT
Start: 2023-09-05

## 2023-09-05 RX ORDER — ONDANSETRON 2 MG/ML
8 INJECTION INTRAMUSCULAR; INTRAVENOUS
Status: CANCELLED | OUTPATIENT
Start: 2023-09-05

## 2023-09-05 RX ORDER — SODIUM CHLORIDE 9 MG/ML
5-250 INJECTION, SOLUTION INTRAVENOUS PRN
Status: CANCELLED | OUTPATIENT
Start: 2023-09-05

## 2023-09-05 RX ORDER — DIPHENHYDRAMINE HYDROCHLORIDE 50 MG/ML
50 INJECTION INTRAMUSCULAR; INTRAVENOUS
Status: CANCELLED | OUTPATIENT
Start: 2023-09-05

## 2023-09-05 RX ORDER — ATROPINE SULFATE 0.4 MG/ML
INJECTION, SOLUTION INTRAVENOUS
Status: COMPLETED
Start: 2023-09-05 | End: 2023-09-05

## 2023-09-05 RX ORDER — SODIUM CHLORIDE 9 MG/ML
INJECTION, SOLUTION INTRAVENOUS CONTINUOUS
Status: CANCELLED | OUTPATIENT
Start: 2023-09-05

## 2023-09-05 RX ORDER — DEXAMETHASONE SODIUM PHOSPHATE 10 MG/ML
10 INJECTION, SOLUTION INTRAMUSCULAR; INTRAVENOUS ONCE
Status: COMPLETED | OUTPATIENT
Start: 2023-09-05 | End: 2023-09-05

## 2023-09-05 RX ORDER — ATROPINE SULFATE 0.4 MG/ML
0.4 INJECTION, SOLUTION INTRAVENOUS
Status: DISCONTINUED | OUTPATIENT
Start: 2023-09-05 | End: 2023-09-07 | Stop reason: HOSPADM

## 2023-09-05 RX ORDER — SODIUM CHLORIDE 0.9 % (FLUSH) 0.9 %
5-40 SYRINGE (ML) INJECTION PRN
Status: CANCELLED | OUTPATIENT
Start: 2023-09-07

## 2023-09-05 RX ORDER — ACETAMINOPHEN 325 MG/1
650 TABLET ORAL
Status: CANCELLED | OUTPATIENT
Start: 2023-09-05

## 2023-09-05 RX ORDER — SODIUM CHLORIDE 9 MG/ML
5-250 INJECTION, SOLUTION INTRAVENOUS PRN
Status: DISCONTINUED | OUTPATIENT
Start: 2023-09-05 | End: 2023-09-06 | Stop reason: HOSPADM

## 2023-09-05 RX ORDER — MEPERIDINE HYDROCHLORIDE 50 MG/ML
12.5 INJECTION INTRAMUSCULAR; INTRAVENOUS; SUBCUTANEOUS PRN
Status: CANCELLED | OUTPATIENT
Start: 2023-09-05

## 2023-09-05 RX ORDER — ATROPINE SULFATE 0.4 MG/ML
0.4 INJECTION, SOLUTION INTRAVENOUS ONCE
Status: COMPLETED | OUTPATIENT
Start: 2023-09-05 | End: 2023-09-05

## 2023-09-05 RX ORDER — PALONOSETRON 0.05 MG/ML
0.25 INJECTION, SOLUTION INTRAVENOUS ONCE
Status: COMPLETED | OUTPATIENT
Start: 2023-09-05 | End: 2023-09-05

## 2023-09-05 RX ORDER — PALONOSETRON 0.05 MG/ML
0.25 INJECTION, SOLUTION INTRAVENOUS ONCE
Status: CANCELLED | OUTPATIENT
Start: 2023-09-05 | End: 2023-09-05

## 2023-09-05 RX ORDER — TRIAMCINOLONE ACETONIDE 1 MG/G
CREAM TOPICAL
Qty: 453 G | Refills: 1 | Status: SHIPPED | OUTPATIENT
Start: 2023-09-05

## 2023-09-05 RX ORDER — HEPARIN SODIUM (PORCINE) LOCK FLUSH IV SOLN 100 UNIT/ML 100 UNIT/ML
500 SOLUTION INTRAVENOUS PRN
Status: CANCELLED | OUTPATIENT
Start: 2023-09-05

## 2023-09-05 RX ORDER — HEPARIN SODIUM (PORCINE) LOCK FLUSH IV SOLN 100 UNIT/ML 100 UNIT/ML
500 SOLUTION INTRAVENOUS PRN
Status: CANCELLED | OUTPATIENT
Start: 2023-09-07

## 2023-09-05 RX ORDER — SODIUM CHLORIDE 9 MG/ML
5-250 INJECTION, SOLUTION INTRAVENOUS PRN
OUTPATIENT
Start: 2023-09-07

## 2023-09-05 RX ORDER — ATROPINE SULFATE 0.4 MG/ML
0.4 INJECTION, SOLUTION INTRAMUSCULAR; INTRAVENOUS; SUBCUTANEOUS
Status: CANCELLED | OUTPATIENT
Start: 2023-09-05

## 2023-09-05 RX ORDER — ALBUTEROL SULFATE 90 UG/1
4 AEROSOL, METERED RESPIRATORY (INHALATION) PRN
Status: CANCELLED | OUTPATIENT
Start: 2023-09-05

## 2023-09-05 RX ORDER — SODIUM CHLORIDE 0.9 % (FLUSH) 0.9 %
5-40 SYRINGE (ML) INJECTION PRN
Status: CANCELLED | OUTPATIENT
Start: 2023-09-05

## 2023-09-05 RX ORDER — ATROPINE SULFATE 0.4 MG/ML
0.4 INJECTION, SOLUTION INTRAMUSCULAR; INTRAVENOUS; SUBCUTANEOUS ONCE
Status: CANCELLED | OUTPATIENT
Start: 2023-09-05 | End: 2023-09-05

## 2023-09-05 RX ADMIN — IRINOTECAN HYDROCHLORIDE 86 MG: 4.3 INJECTION, POWDER, FOR SOLUTION INTRAVENOUS at 12:22

## 2023-09-05 RX ADMIN — ATROPINE SULFATE 0.4 MG: 0.4 INJECTION, SOLUTION INTRAVENOUS at 12:20

## 2023-09-05 RX ADMIN — ATROPINE SULFATE 0.4 MG: 0.4 INJECTION, SOLUTION INTRAVENOUS at 14:06

## 2023-09-05 RX ADMIN — FOSAPREPITANT 150 MG: 150 INJECTION, POWDER, LYOPHILIZED, FOR SOLUTION INTRAVENOUS at 11:56

## 2023-09-05 RX ADMIN — SODIUM CHLORIDE 25 ML/HR: 9 INJECTION, SOLUTION INTRAVENOUS at 12:20

## 2023-09-05 RX ADMIN — FLUOROURACIL 4000 MG: 50 INJECTION, SOLUTION INTRAVENOUS at 14:45

## 2023-09-05 RX ADMIN — LEUCOVORIN CALCIUM 200 MG: 200 INJECTION, POWDER, LYOPHILIZED, FOR SOLUTION INTRAMUSCULAR; INTRAVENOUS at 14:07

## 2023-09-05 RX ADMIN — DEXAMETHASONE SODIUM PHOSPHATE 10 MG: 10 INJECTION, SOLUTION INTRAMUSCULAR; INTRAVENOUS at 11:55

## 2023-09-05 RX ADMIN — PALONOSETRON 0.25 MG: 0.05 INJECTION, SOLUTION INTRAVENOUS at 11:54

## 2023-09-05 NOTE — PROGRESS NOTES
PROT 6.4 09/05/2023    LABALBU 3.4 (L) 09/05/2023    BILITOT 0.3 09/05/2023    ALKPHOS 102 09/05/2023    AST 34 09/05/2023    ALT 19 09/05/2023    LABGLOM >60 09/05/2023    GFRAA >59 01/12/2022    GLOB 3.0 09/05/2023       RADIOLOGY STUDIES REVIEWED BY ME:   None    ASSESSMENT:    Orders Placed This Encounter   Procedures    [address], [facility], [place]     Referral Priority:   Routine     Referral Type:   Eval and Treat     Referral Reason:   Specialty Services Required     Referred to Provider:   Jack Ferraro MS, RD, LD     Requested Specialty:   Dietitian Registered     Number of Visits Requested:   1        Zay Eye was seen today for pancreatic cancer. Diagnoses and all orders for this visit:    Psoriasis  -     triamcinolone (KENALOG) 0.1 % cream; Apply topically 2 times daily. 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 Plan:  -Recommended palliative chemotherapy with Onivyde/5-FU, cycle 9  -RTC cycle #3, MD    Right upper extremity DVT port related-started on Eliquis started on 1/14/2022. Eliquis for 3 months through mid April 2022. Denies any bleeding.    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   Stable    Bilateral leg edema-  -continue Lasix 40 mg p.o. daily and increase potassium supplement  - lower

## 2023-09-05 NOTE — PROGRESS NOTES
Lab Results   Component Value Date    WBC 7.77 09/05/2023    HGB 10.3 (L) 09/05/2023    HCT 31.0 (L) 09/05/2023    MCV 94.2 09/05/2023     (H) 09/05/2023     Lab Results   Component Value Date    NEUTROABS 2.35 09/05/2023     Lab Results   Component Value Date     09/05/2023    K 4.7 09/05/2023     09/05/2023    CO2 21 (L) 09/05/2023    BUN 14 09/05/2023    CREATININE 0.8 09/05/2023    GLUCOSE 147 (H) 09/05/2023    CALCIUM 8.9 09/05/2023    PROT 6.4 09/05/2023    LABALBU 3.4 (L) 09/05/2023    BILITOT 0.3 09/05/2023    ALKPHOS 102 09/05/2023    AST 34 09/05/2023    ALT 19 09/05/2023    LABGLOM >60 09/05/2023    GFRAA >59 01/12/2022    GLOB 3.0 09/05/2023

## 2023-09-06 VITALS — HEIGHT: 65 IN | BODY MASS INDEX: 24.53 KG/M2

## 2023-09-06 NOTE — PROGRESS NOTES
Comprehensive Nutrition Assessment    Type and Reason for Visit:  Consult    Nutrition Recommendations/Plan:   Increase calorie/protein intake through use of strategies discussed in initial MNT appt. Malnutrition Assessment:  Malnutrition Status: At risk for malnutrition (Comment) (09/06/23 1051)    Context:  Chronic Illness       Nutrition Assessment:    RN 68 y/o female presents 9/5/23 for initial RD evaluation. Referral from Dr. Bee Jones MD for decreased appetite in setting of cancer treatment. Pt with dx malignant neoplasm of tail of pancreas. Pt presents nutritionally compromised AEB decreased PO intake with early satiety, altered smell sensation, nausea, and vomiting. Pt has lost 4.7% (7 lbs) over the past month per wt records. Diet History:  Pt states of eating several small meals daily instead of large meals because she cannot eat much at any one time. She reports often eating a few bites of a food and then not wanting anymore of it. Pt is primarily eating at home as she is trying to avoid public places due to increased incidence of Covid, cold, flu recently. She does have family support to help with meal preparation but prepares many of her own meals and snacks. Pt notes able to tolerate all textures of foods but sensitive to temperatures at times. She usually tolerates cold foods but hot foods are hit or miss due to smell. Pt is drinking Ensure shakes at times and also drinking [place] instant breakfast when she does not eat well. Pt reports decreased fluid intake but trying to drink more water by adding flavor packets to water. Nutrition Related Findings:    Mild muscle, fat loss     Anthropometric Measures:  Height: 5' 5\" (165.1 cm)  Ideal Body Weight (IBW): 125 lbs (57 kg)       Current Body Weight: 147 lb 6.4 oz (66.9 kg), 117.9 % IBW.     Current BMI (kg/m2): 24.5  Wt Readings from Last 5 Encounters:   09/05/23 147 lb 6.4 oz (66.9 kg)   08/22/23 148 lb (67.1 kg)   08/08/23 154 lb 1.6

## 2023-09-07 ENCOUNTER — HOSPITAL ENCOUNTER (OUTPATIENT)
Dept: INFUSION THERAPY | Age: 78
Discharge: HOME | End: 2023-09-07
Payer: MEDICARE

## 2023-09-07 DIAGNOSIS — C25.2 MALIGNANT NEOPLASM OF TAIL OF PANCREAS (HCC): ICD-10-CM

## 2023-09-07 DIAGNOSIS — C25.9 METASTASIS FROM PANCREATIC CANCER (HCC): Primary | ICD-10-CM

## 2023-09-07 DIAGNOSIS — C79.9 METASTASIS FROM PANCREATIC CANCER (HCC): Primary | ICD-10-CM

## 2023-09-07 PROCEDURE — 2580000003 HC RX 258: Performed by: INTERNAL MEDICINE

## 2023-09-07 PROCEDURE — 96523 IRRIG DRUG DELIVERY DEVICE: CPT

## 2023-09-07 PROCEDURE — 6360000002 HC RX W HCPCS: Performed by: INTERNAL MEDICINE

## 2023-09-07 RX ORDER — SODIUM CHLORIDE 0.9 % (FLUSH) 0.9 %
5-40 SYRINGE (ML) INJECTION PRN
Status: DISCONTINUED | OUTPATIENT
Start: 2023-09-07 | End: 2023-09-08 | Stop reason: HOSPADM

## 2023-09-07 RX ORDER — HEPARIN 100 UNIT/ML
500 SYRINGE INTRAVENOUS PRN
Status: DISCONTINUED | OUTPATIENT
Start: 2023-09-07 | End: 2023-09-08 | Stop reason: HOSPADM

## 2023-09-07 RX ADMIN — SODIUM CHLORIDE, PRESERVATIVE FREE 10 ML: 5 INJECTION INTRAVENOUS at 14:15

## 2023-09-07 RX ADMIN — HEPARIN 500 UNITS: 100 SYRINGE at 14:15

## 2023-09-08 ENCOUNTER — TELEPHONE (OUTPATIENT)
Dept: PALLATIVE CARE | Age: 78
End: 2023-09-08

## 2023-09-08 NOTE — TELEPHONE ENCOUNTER
I was calling the patient today to schedule a follow-up appointment with MACHELLE Whiteside (Supportive Care). No answer at the time of the call. I left a voice message and will try to call back at another time. I can be reached at [phone].

## 2023-09-14 ENCOUNTER — OFFICE VISIT (OUTPATIENT)
Dept: INTERNAL MEDICINE | Facility: CLINIC | Age: 78
End: 2023-09-14
Payer: MEDICARE

## 2023-09-14 VITALS
HEIGHT: 65 IN | BODY MASS INDEX: 24.66 KG/M2 | SYSTOLIC BLOOD PRESSURE: 135 MMHG | WEIGHT: 148 LBS | DIASTOLIC BLOOD PRESSURE: 81 MMHG | TEMPERATURE: 98 F | OXYGEN SATURATION: 98 % | HEART RATE: 77 BPM

## 2023-09-14 DIAGNOSIS — R60.0 LOCALIZED EDEMA: ICD-10-CM

## 2023-09-14 DIAGNOSIS — I10 ESSENTIAL HYPERTENSION: ICD-10-CM

## 2023-09-14 DIAGNOSIS — R06.02 SOB (SHORTNESS OF BREATH): ICD-10-CM

## 2023-09-14 DIAGNOSIS — Z51.5 PALLIATIVE CARE PATIENT: Primary | ICD-10-CM

## 2023-09-14 DIAGNOSIS — C25.1 MALIGNANT NEOPLASM OF BODY OF PANCREAS: ICD-10-CM

## 2023-09-14 DIAGNOSIS — E03.9 ACQUIRED HYPOTHYROIDISM: ICD-10-CM

## 2023-09-14 RX ORDER — ALBUTEROL SULFATE 90 UG/1
2 AEROSOL, METERED RESPIRATORY (INHALATION) EVERY 4 HOURS PRN
Qty: 18 G | Refills: 3 | Status: SHIPPED | OUTPATIENT
Start: 2023-09-14

## 2023-09-14 RX ORDER — LIDOCAINE AND PRILOCAINE 25; 25 MG/G; MG/G
1 CREAM TOPICAL AS NEEDED
COMMUNITY
Start: 2023-08-08

## 2023-09-14 RX ORDER — TRIAMCINOLONE ACETONIDE 1 MG/G
1 CREAM TOPICAL 2 TIMES DAILY
COMMUNITY
Start: 2023-09-05

## 2023-09-14 RX ORDER — LEVOTHYROXINE SODIUM 0.15 MG/1
150 TABLET ORAL DAILY
Qty: 90 TABLET | Refills: 3 | Status: SHIPPED | OUTPATIENT
Start: 2023-09-14

## 2023-09-14 RX ORDER — METOPROLOL SUCCINATE 50 MG/1
50 TABLET, EXTENDED RELEASE ORAL 2 TIMES DAILY
Qty: 180 TABLET | Refills: 3 | Status: SHIPPED | OUTPATIENT
Start: 2023-09-14

## 2023-09-14 RX ORDER — FUROSEMIDE 20 MG/1
20 TABLET ORAL 2 TIMES DAILY
Qty: 180 TABLET | Refills: 3 | Status: SHIPPED | OUTPATIENT
Start: 2023-09-14

## 2023-09-14 RX ORDER — NIFEDIPINE 30 MG/1
30 TABLET, EXTENDED RELEASE ORAL DAILY
Qty: 90 TABLET | Refills: 3 | Status: SHIPPED | OUTPATIENT
Start: 2023-09-14

## 2023-09-14 RX ORDER — NYSTATIN 100000 [USP'U]/G
11111 POWDER TOPICAL 3 TIMES DAILY
COMMUNITY
Start: 2023-08-22

## 2023-09-14 RX ORDER — POTASSIUM CHLORIDE 750 MG/1
10 TABLET, FILM COATED, EXTENDED RELEASE ORAL DAILY
Qty: 90 TABLET | Refills: 3 | Status: SHIPPED | OUTPATIENT
Start: 2023-09-14

## 2023-09-14 RX ORDER — DRONABINOL 2.5 MG/1
2.5 CAPSULE ORAL
COMMUNITY
Start: 2023-08-22 | End: 2023-09-14

## 2023-09-14 RX ORDER — LOSARTAN POTASSIUM 25 MG/1
25 TABLET ORAL DAILY
Qty: 90 TABLET | Refills: 3 | Status: SHIPPED | OUTPATIENT
Start: 2023-09-14

## 2023-09-14 NOTE — PROGRESS NOTES
"Chief Complaint  Hypertension    Subjective        Ирина Randolph presents to [facility]  Hypertension      Ирина Randolph is a 77 y.o. female who presents for a routine visit at this time.  Patient has a history of creatinine cancer that she has been fighting for 3 years.  She is currently on her third round of chemotherapy and has had pancreatic surgery.  Patient still has a decreased appetite has tried Megace and Marinol and states that it has not been effective.  She states that her hypertension hypothyroidism and shortness of breath remained stable and well controlled advised the patient that should she need hospice care that we will be happy to do this for her going into the future.  Patient otherwise reports no new issues at this time    Objective   Vital Signs:  /81 (BP Location: Left arm, Patient Position: Sitting, Cuff Size: Adult)   Pulse 77   Temp 98 °F (36.7 °C) (Infrared)   Ht 165.1 cm (65\")   Wt 67.1 kg (148 lb)   SpO2 98%   BMI 24.63 kg/m²   Estimated body mass index is 24.63 kg/m² as calculated from the following:    Height as of this encounter: 165.1 cm (65\").    Weight as of this encounter: 67.1 kg (148 lb).       BMI is within normal parameters. No other follow-up for BMI required.      Physical Exam  Vitals and nursing note reviewed.   Constitutional:       General: She is not in acute distress.     Appearance: Normal appearance.   HENT:      Head: Normocephalic.   Eyes:      Extraocular Movements: Extraocular movements intact.      Pupils: Pupils are equal, round, and reactive to light.   Cardiovascular:      Rate and Rhythm: Normal rate and regular rhythm.      Heart sounds: Normal heart sounds. No murmur heard.  Pulmonary:      Effort: Pulmonary effort is normal. No respiratory distress.      Breath sounds: Normal breath sounds. No rhonchi or rales.   Abdominal:      General: Abdomen is flat. Bowel sounds are normal.      Palpations: Abdomen is soft. "   Neurological:      General: No focal deficit present.      Mental Status: She is alert.      Result Review :                   Assessment and Plan   Diagnoses and all orders for this visit:    1. Palliative care patient (Primary)    2. Malignant neoplasm of body of pancreas    3. Localized edema  -     furosemide (LASIX) 20 MG tablet; Take 1 tablet by mouth 2 (Two) Times a Day.  Dispense: 180 tablet; Refill: 3  -     potassium chloride 10 MEQ CR tablet; Take 1 tablet by mouth Daily.  Dispense: 90 tablet; Refill: 3    4. Acquired hypothyroidism  -     levothyroxine (SYNTHROID, LEVOTHROID) 150 MCG tablet; Take 1 tablet by mouth Daily.  Dispense: 90 tablet; Refill: 3    5. Essential hypertension  -     losartan (COZAAR) 25 MG tablet; Take 1 tablet by mouth Daily.  Dispense: 90 tablet; Refill: 3  -     metoprolol succinate XL (TOPROL-XL) 50 MG 24 hr tablet; Take 1 tablet by mouth 2 (Two) Times a Day.  Dispense: 180 tablet; Refill: 3  -     NIFEdipine XL (PROCARDIA XL) 30 MG 24 hr tablet; Take 1 tablet by mouth Daily.  Dispense: 90 tablet; Refill: 3    6. SOB (shortness of breath)  -     albuterol sulfate  (90 Base) MCG/ACT inhaler; Inhale 2 puffs Every 4 (Four) Hours As Needed for Wheezing.  Dispense: 18 g; Refill: 3      EMR Dictation/Transcription disclaimer:   Some of this note may be an electronic transcription/translation of spoken language to printed text. The electronic translation of spoken language may permit erroneous, or at times, nonsensical words or phrases to be inadvertently transcribed; Although I have reviewed the note for such errors, some may still exist.          Follow Up   No follow-ups on file.  Patient was given instructions and counseling regarding her condition or for health maintenance advice. Please see specific information pulled into the AVS if appropriate.

## 2023-09-19 ENCOUNTER — HOSPITAL ENCOUNTER (OUTPATIENT)
Dept: INFUSION THERAPY | Age: 78
Discharge: HOME | End: 2023-09-19
Payer: MEDICARE

## 2023-09-19 ENCOUNTER — OFFICE VISIT (OUTPATIENT)
Dept: HEMATOLOGY | Age: 78
End: 2023-09-19

## 2023-09-19 ENCOUNTER — OFFICE VISIT (OUTPATIENT)
Dept: PALLATIVE CARE | Age: 78
End: 2023-09-19
Payer: MEDICARE

## 2023-09-19 VITALS
HEART RATE: 73 BPM | BODY MASS INDEX: 24.41 KG/M2 | HEIGHT: 65 IN | SYSTOLIC BLOOD PRESSURE: 130 MMHG | TEMPERATURE: 98 F | DIASTOLIC BLOOD PRESSURE: 79 MMHG | RESPIRATION RATE: 18 BRPM | WEIGHT: 146.5 LBS | OXYGEN SATURATION: 99 %

## 2023-09-19 VITALS — HEIGHT: 65 IN | BODY MASS INDEX: 24.38 KG/M2

## 2023-09-19 DIAGNOSIS — C25.2 MALIGNANT NEOPLASM OF TAIL OF PANCREAS (HCC): Primary | ICD-10-CM

## 2023-09-19 DIAGNOSIS — Z51.5 ENCOUNTER FOR PALLIATIVE CARE: ICD-10-CM

## 2023-09-19 DIAGNOSIS — C25.9 METASTASIS FROM PANCREATIC CANCER (HCC): ICD-10-CM

## 2023-09-19 DIAGNOSIS — R63.0 POOR APPETITE: Primary | ICD-10-CM

## 2023-09-19 DIAGNOSIS — C25.2 MALIGNANT NEOPLASM OF TAIL OF PANCREAS (HCC): ICD-10-CM

## 2023-09-19 DIAGNOSIS — F41.9 ANXIETY: ICD-10-CM

## 2023-09-19 DIAGNOSIS — C79.9 METASTASIS FROM PANCREATIC CANCER (HCC): ICD-10-CM

## 2023-09-19 LAB
ALBUMIN SERPL-MCNC: 3.4 G/DL (ref 3.5–5.2)
ALP SERPL-CCNC: 92 U/L (ref 35–104)
ALT SERPL-CCNC: 17 U/L (ref 9–52)
ANION GAP SERPL CALCULATED.3IONS-SCNC: 8 MMOL/L (ref 7–19)
AST SERPL-CCNC: 37 U/L (ref 14–36)
BASOPHILS # BLD: 0.1 K/UL (ref 0.01–0.08)
BASOPHILS NFR BLD: 1.6 % (ref 0.1–1.2)
BILIRUB SERPL-MCNC: 0.3 MG/DL (ref 0.2–1.3)
BUN SERPL-MCNC: 10 MG/DL (ref 7–17)
CALCIUM SERPL-MCNC: 9.1 MG/DL (ref 8.4–10.2)
CHLORIDE SERPL-SCNC: 105 MMOL/L (ref 98–111)
CO2 SERPL-SCNC: 27 MMOL/L (ref 22–29)
CREAT SERPL-MCNC: 0.8 MG/DL (ref 0.5–1)
EOSINOPHIL # BLD: 0.55 K/UL (ref 0.04–0.54)
EOSINOPHIL NFR BLD: 8.6 % (ref 0.7–7)
ERYTHROCYTE [DISTWIDTH] IN BLOOD BY AUTOMATED COUNT: 16.7 % (ref 11.7–14.4)
GLOBULIN: 2.8 G/DL
GLUCOSE SERPL-MCNC: 127 MG/DL (ref 74–106)
HCT VFR BLD AUTO: 31.6 % (ref 34.1–44.9)
HGB BLD-MCNC: 10.5 G/DL (ref 11.2–15.7)
LYMPHOCYTES # BLD: 3.06 K/UL (ref 1.18–3.74)
LYMPHOCYTES NFR BLD: 47.9 % (ref 19.3–53.1)
MCH RBC QN AUTO: 31.6 PG (ref 25.6–32.2)
MCHC RBC AUTO-ENTMCNC: 33.2 G/DL (ref 32.3–35.5)
MCV RBC AUTO: 95.2 FL (ref 79.4–94.8)
MONOCYTES # BLD: 1.03 K/UL (ref 0.24–0.82)
MONOCYTES NFR BLD: 16.1 % (ref 4.7–12.5)
NEUTROPHILS # BLD: 1.63 K/UL (ref 1.56–6.13)
NEUTS SEG NFR BLD: 25.5 % (ref 34–71.1)
PLATELET # BLD AUTO: 403 K/UL (ref 182–369)
PMV BLD AUTO: 9.8 FL (ref 7.4–10.4)
POTASSIUM SERPL-SCNC: 4.2 MMOL/L (ref 3.5–5.1)
PROT SERPL-MCNC: 6.2 G/DL (ref 6.3–8.2)
RBC # BLD AUTO: 3.32 M/UL (ref 3.93–5.22)
SODIUM SERPL-SCNC: 140 MMOL/L (ref 137–145)
WBC # BLD AUTO: 6.39 K/UL (ref 3.98–10.04)

## 2023-09-19 PROCEDURE — G0498 CHEMO EXTEND IV INFUS W/PUMP: HCPCS

## 2023-09-19 PROCEDURE — 96375 TX/PRO/DX INJ NEW DRUG ADDON: CPT

## 2023-09-19 PROCEDURE — 99497 ADVNCD CARE PLAN 30 MIN: CPT | Performed by: NURSE PRACTITIONER

## 2023-09-19 PROCEDURE — 96415 CHEMO IV INFUSION ADDL HR: CPT

## 2023-09-19 PROCEDURE — 1090F PRES/ABSN URINE INCON ASSESS: CPT | Performed by: NURSE PRACTITIONER

## 2023-09-19 PROCEDURE — 6360000002 HC RX W HCPCS: Performed by: INTERNAL MEDICINE

## 2023-09-19 PROCEDURE — G8420 CALC BMI NORM PARAMETERS: HCPCS | Performed by: NURSE PRACTITIONER

## 2023-09-19 PROCEDURE — 1123F ACP DISCUSS/DSCN MKR DOCD: CPT | Performed by: NURSE PRACTITIONER

## 2023-09-19 PROCEDURE — 96413 CHEMO IV INFUSION 1 HR: CPT

## 2023-09-19 PROCEDURE — 85025 COMPLETE CBC W/AUTO DIFF WBC: CPT

## 2023-09-19 PROCEDURE — 96368 THER/DIAG CONCURRENT INF: CPT

## 2023-09-19 PROCEDURE — 2580000003 HC RX 258: Performed by: INTERNAL MEDICINE

## 2023-09-19 PROCEDURE — 96367 TX/PROPH/DG ADDL SEQ IV INF: CPT

## 2023-09-19 PROCEDURE — 80053 COMPREHEN METABOLIC PANEL: CPT

## 2023-09-19 PROCEDURE — 96416 CHEMO PROLONG INFUSE W/PUMP: CPT

## 2023-09-19 PROCEDURE — 1036F TOBACCO NON-USER: CPT | Performed by: NURSE PRACTITIONER

## 2023-09-19 PROCEDURE — 36415 COLL VENOUS BLD VENIPUNCTURE: CPT

## 2023-09-19 PROCEDURE — 99214 OFFICE O/P EST MOD 30 MIN: CPT | Performed by: NURSE PRACTITIONER

## 2023-09-19 RX ORDER — EPINEPHRINE 1 MG/ML
0.3 INJECTION, SOLUTION, CONCENTRATE INTRAVENOUS PRN
Status: CANCELLED | OUTPATIENT
Start: 2023-09-19

## 2023-09-19 RX ORDER — SODIUM CHLORIDE 9 MG/ML
INJECTION, SOLUTION INTRAVENOUS CONTINUOUS
Status: CANCELLED | OUTPATIENT
Start: 2023-09-19

## 2023-09-19 RX ORDER — FAMOTIDINE 10 MG/ML
20 INJECTION, SOLUTION INTRAVENOUS
Status: CANCELLED | OUTPATIENT
Start: 2023-09-19

## 2023-09-19 RX ORDER — ATROPINE SULFATE 0.4 MG/ML
0.4 INJECTION, SOLUTION ENDOTRACHEAL; INTRAMEDULLARY; INTRAMUSCULAR; INTRAVENOUS; SUBCUTANEOUS
Status: CANCELLED | OUTPATIENT
Start: 2023-09-19

## 2023-09-19 RX ORDER — ATROPINE SULFATE 0.4 MG/ML
0.4 INJECTION, SOLUTION ENDOTRACHEAL; INTRAMEDULLARY; INTRAMUSCULAR; INTRAVENOUS; SUBCUTANEOUS ONCE
Status: DISCONTINUED | OUTPATIENT
Start: 2023-09-19 | End: 2023-09-19

## 2023-09-19 RX ORDER — SODIUM CHLORIDE 9 MG/ML
5-250 INJECTION, SOLUTION INTRAVENOUS PRN
OUTPATIENT
Start: 2023-09-21

## 2023-09-19 RX ORDER — SODIUM CHLORIDE 9 MG/ML
5-250 INJECTION, SOLUTION INTRAVENOUS PRN
Status: CANCELLED | OUTPATIENT
Start: 2023-09-19

## 2023-09-19 RX ORDER — DIPHENHYDRAMINE HYDROCHLORIDE 50 MG/ML
50 INJECTION INTRAMUSCULAR; INTRAVENOUS
Status: CANCELLED | OUTPATIENT
Start: 2023-09-19

## 2023-09-19 RX ORDER — DEXAMETHASONE SODIUM PHOSPHATE 10 MG/ML
10 INJECTION, SOLUTION INTRAMUSCULAR; INTRAVENOUS ONCE
Status: COMPLETED | OUTPATIENT
Start: 2023-09-19 | End: 2023-09-19

## 2023-09-19 RX ORDER — ONDANSETRON 2 MG/ML
8 INJECTION INTRAMUSCULAR; INTRAVENOUS
Status: CANCELLED | OUTPATIENT
Start: 2023-09-19

## 2023-09-19 RX ORDER — PALONOSETRON 0.05 MG/ML
0.25 INJECTION, SOLUTION INTRAVENOUS ONCE
Status: CANCELLED | OUTPATIENT
Start: 2023-09-19 | End: 2023-09-19

## 2023-09-19 RX ORDER — PALONOSETRON 0.05 MG/ML
0.25 INJECTION, SOLUTION INTRAVENOUS ONCE
Status: COMPLETED | OUTPATIENT
Start: 2023-09-19 | End: 2023-09-19

## 2023-09-19 RX ORDER — ACETAMINOPHEN 325 MG/1
650 TABLET ORAL
Status: CANCELLED | OUTPATIENT
Start: 2023-09-19

## 2023-09-19 RX ORDER — DEXAMETHASONE SODIUM PHOSPHATE 10 MG/ML
10 INJECTION, SOLUTION INTRAMUSCULAR; INTRAVENOUS ONCE
Status: CANCELLED | OUTPATIENT
Start: 2023-09-19 | End: 2023-09-19

## 2023-09-19 RX ORDER — HEPARIN 100 UNIT/ML
500 SYRINGE INTRAVENOUS PRN
Status: CANCELLED | OUTPATIENT
Start: 2023-09-19

## 2023-09-19 RX ORDER — ATROPINE SULFATE 0.4 MG/ML
0.4 INJECTION, SOLUTION ENDOTRACHEAL; INTRAMEDULLARY; INTRAMUSCULAR; INTRAVENOUS; SUBCUTANEOUS ONCE
Status: CANCELLED | OUTPATIENT
Start: 2023-09-19 | End: 2023-09-19

## 2023-09-19 RX ORDER — SODIUM CHLORIDE 0.9 % (FLUSH) 0.9 %
5-40 SYRINGE (ML) INJECTION PRN
Status: CANCELLED | OUTPATIENT
Start: 2023-09-19

## 2023-09-19 RX ORDER — ATROPINE SULFATE 0.4 MG/ML
0.4 INJECTION, SOLUTION INTRAVENOUS ONCE
Status: COMPLETED | OUTPATIENT
Start: 2023-09-19 | End: 2023-09-19

## 2023-09-19 RX ORDER — MEPERIDINE HYDROCHLORIDE 25 MG/ML
12.5 INJECTION INTRAMUSCULAR; INTRAVENOUS; SUBCUTANEOUS PRN
Status: CANCELLED | OUTPATIENT
Start: 2023-09-19

## 2023-09-19 RX ORDER — HEPARIN 100 UNIT/ML
500 SYRINGE INTRAVENOUS PRN
Status: CANCELLED | OUTPATIENT
Start: 2023-09-21

## 2023-09-19 RX ORDER — ALBUTEROL SULFATE 90 UG/1
4 AEROSOL, METERED RESPIRATORY (INHALATION) PRN
Status: CANCELLED | OUTPATIENT
Start: 2023-09-19

## 2023-09-19 RX ORDER — SODIUM CHLORIDE 0.9 % (FLUSH) 0.9 %
5-40 SYRINGE (ML) INJECTION PRN
Status: CANCELLED | OUTPATIENT
Start: 2023-09-21

## 2023-09-19 RX ADMIN — LEUCOVORIN CALCIUM 200 MG: 200 INJECTION, POWDER, LYOPHILIZED, FOR SOLUTION INTRAMUSCULAR; INTRAVENOUS at 12:54

## 2023-09-19 RX ADMIN — FOSAPREPITANT DIMEGLUMINE 150 MG: 150 INJECTION, POWDER, LYOPHILIZED, FOR SOLUTION INTRAVENOUS at 10:44

## 2023-09-19 RX ADMIN — FLUOROURACIL 4000 MG: 50 INJECTION, SOLUTION INTRAVENOUS at 13:26

## 2023-09-19 RX ADMIN — DEXAMETHASONE SODIUM PHOSPHATE 10 MG: 10 INJECTION INTRAMUSCULAR; INTRAVENOUS at 10:41

## 2023-09-19 RX ADMIN — IRINOTECAN HYDROCHLORIDE 86 MG: 4.3 INJECTION, POWDER, FOR SOLUTION INTRAVENOUS at 11:11

## 2023-09-19 RX ADMIN — ATROPINE SULFATE 0.4 MG: 0.4 INJECTION, SOLUTION INTRAVENOUS at 11:09

## 2023-09-19 RX ADMIN — PALONOSETRON 0.25 MG: 0.05 INJECTION, SOLUTION INTRAVENOUS at 10:41

## 2023-09-19 NOTE — PROGRESS NOTES
Supportive Care/Community Based Palliative Care  Follow Up Note        Patient Name:  Alfredo Abdi  Medical Record Number:  [id number]  [address]  1945    Date of Visit: 9/19/2023  Location of Visit:  Other - [facility]) Oncology Office    Patient Care Team:  Dk Dorman as PCP - General (Internal Medicine)  Aury Augustin DO as Consulting Physician (Vascular Surgery)  MACHELLE Whiteside - CNP as Advanced Practice Nurse (Nurse Practitioner)  Dr. Ginger Geiger - Oncology    History obtained from:  patient, electronic medical record    PALLIATIVE DIAGNOSES AND ORDERS/RECOMMENDATIONS/PLAN:     Poor appetite  Frequent small high-calorie high-protein meals and snacks as tolerated encouraged  Currently taking Megace tablets at low-dose. Might benefit more from liquid Megace. She does not feel Marinol has provided benefit  Follow-up with Otto Palacios RD as scheduled    Anxiety  Currently controlled  Continue BuSpar and Xanax    Malignant neoplasm of tail of pancreas ([address])  Continues on Onivyde, leucovorin, CI 5-FU    Encounter for palliative care  Current goals of care include: Continue treatment palliative intent, maintain or improve function and quality of life, would be interested in hospice in the future. Code status confirmed: [facility]  Provided brief introduction to hospice  Encouraged completion of ACP documents. Supportive Care  is available to assist if patient desires  Will continue goals of care discussions based on clinical course  Follow up home visit in 3-4 weeks and as needed    CHIEF COMPLAINT:     Chief Complaint   Patient presents with    Cancer     Palliative care follow up      Other     Poor appetite     CLINICAL SUMMARY:          Alfredo Abdi is a 69 y.o. female with PMH of pancreatic cancer, anxiety, depression, HTN, thyroid disease, arthritis.      Brief oncology history:  -Invasive Dexacort adenocarcinoma the pancreas diagnosed June 2021  -Status post laparoscopic

## 2023-09-19 NOTE — PROGRESS NOTES
Lab Results   Component Value Date    WBC 6.39 09/19/2023    HGB 10.5 (L) 09/19/2023    HCT 31.6 (L) 09/19/2023    MCV 95.2 (H) 09/19/2023     (H) 09/19/2023     Lab Results   Component Value Date    NEUTROABS 1.63 09/19/2023     Lab Results   Component Value Date     09/19/2023    K 4.2 09/19/2023     09/19/2023    CO2 27 09/19/2023    BUN 10 09/19/2023    CREATININE 0.8 09/19/2023    GLUCOSE 127 (H) 09/19/2023    CALCIUM 9.1 09/19/2023    PROT 6.2 (L) 09/19/2023    LABALBU 3.4 (L) 09/19/2023    BILITOT 0.3 09/19/2023    ALKPHOS 92 09/19/2023    AST 37 (H) 09/19/2023    ALT 17 09/19/2023    LABGLOM >60 09/19/2023    GFRAA >59 01/12/2022    GLOB 2.8 09/19/2023

## 2023-09-19 NOTE — PROGRESS NOTES
Comprehensive Nutrition Assessment    Type and Reason for Visit:  Reassess    Malnutrition Assessment:  Malnutrition Status: At risk for malnutrition (Comment) (09/19/23 1319)    Context:  Chronic Illness       Nutrition Assessment:     RN 70 y/o female presents 9/19/23 for follow-up RD evaluation. Pt remains nutritionally compromised and at risk for further decline r/t catabolic illness and decreased intake. She states of minimal change in her nutrition-related symptoms over the past 2 weeks. She notes altered smell, early satiety, and nausea continue to be a challenge for her. She notes some improvement in smell sensitivity. She is continuing to take Marinol but feels it isn't making much difference in her appetite. She would be willing to try liquid Megace. Diet History:  Pt continues to eat several times daily but in small amounts. She is tolerating cold foods better than warm foods. She is drinking Ensure and [employer]. She has not yet made double strength milk but has reviewed recipes given to her at last appt and her  has made her a few milkshakes and is making her double strength milk today. Pt states she is pushing herself to eat frequently even when she does not want to. Anthropometric Measures:  Height: 5' 5\" (165.1 cm)  Ideal Body Weight (IBW): 125 lbs (57 kg)       Current Body Weight: 146 lb 8 oz (66.5 kg), 117.2 % IBW.     Current BMI (kg/m2): 24.4  Wt Readings from Last 5 Encounters:   09/19/23 146 lb 8 oz (66.5 kg)   09/05/23 147 lb 6.4 oz (66.9 kg)   08/22/23 148 lb (67.1 kg)   08/08/23 154 lb 1.6 oz (69.9 kg)   07/25/23 159 lb (72.1 kg)        Estimated Daily Nutrient Needs:  Energy Requirements Based On: Kcal/kg  Weight Used for Energy Requirements: Current  Energy (kcal/day): 9434-2536  Weight Used for Protein Requirements: Current  Protein (g/day):   Method Used for Fluid Requirements: 1 ml/kcal  Fluid (ml/day): 5670-3308    Nutrition Diagnosis:

## 2023-09-21 ENCOUNTER — HOSPITAL ENCOUNTER (OUTPATIENT)
Dept: INFUSION THERAPY | Age: 78
Discharge: HOME | End: 2023-09-21
Payer: MEDICARE

## 2023-09-21 DIAGNOSIS — C25.2 MALIGNANT NEOPLASM OF TAIL OF PANCREAS (HCC): ICD-10-CM

## 2023-09-21 DIAGNOSIS — C25.9 METASTASIS FROM PANCREATIC CANCER (HCC): Primary | ICD-10-CM

## 2023-09-21 DIAGNOSIS — C79.9 METASTASIS FROM PANCREATIC CANCER (HCC): Primary | ICD-10-CM

## 2023-09-21 PROCEDURE — 2580000003 HC RX 258: Performed by: INTERNAL MEDICINE

## 2023-09-21 PROCEDURE — 6360000002 HC RX W HCPCS: Performed by: INTERNAL MEDICINE

## 2023-09-21 PROCEDURE — 96523 IRRIG DRUG DELIVERY DEVICE: CPT

## 2023-09-21 RX ORDER — HEPARIN 100 UNIT/ML
500 SYRINGE INTRAVENOUS PRN
Status: DISCONTINUED | OUTPATIENT
Start: 2023-09-21 | End: 2023-09-22 | Stop reason: HOSPADM

## 2023-09-21 RX ORDER — SODIUM CHLORIDE 0.9 % (FLUSH) 0.9 %
5-40 SYRINGE (ML) INJECTION PRN
Status: DISCONTINUED | OUTPATIENT
Start: 2023-09-21 | End: 2023-09-22 | Stop reason: HOSPADM

## 2023-09-21 RX ADMIN — SODIUM CHLORIDE, PRESERVATIVE FREE 10 ML: 5 INJECTION INTRAVENOUS at 13:16

## 2023-09-21 RX ADMIN — HEPARIN 500 UNITS: 100 SYRINGE at 13:16

## 2023-10-02 NOTE — PROGRESS NOTES
5FU every 2 weeks  7/25/23 CA 19-9-507  8/22/23 CA 19-9-649, CEA 9.1  9/5/23 Tumor Markers: CEA 9.3, CA 19-9-596  9/5/23 Protein ,Total 6.1    Past Medical History:    Past Medical History:   Diagnosis Date    Anxiety     Arthritis     Depression     Hypertension     Hypoglycemia     if fasting for very long    Pancreatic cancer ([address] [facility]) 06/2021    s/p [place] by Dr. Annie Steele    Shoulder pain     incompleted rcr; possible injury    Thyroid disease      Past Surgical History:    Past Surgical History:   Procedure Laterality Date    CHOLECYSTECTOMY  1991    COLONOSCOPY  2020    Dr Kailash Whitlock, COLON, DIAGNOSTIC      HYSTERECTOMY ([address] [facility])  [address] [facility]  06/28/2021    PORT SURGERY Right 9/21/2021    Single lumen PORT INSERTION with ultrasound and fluoroscopy performed by Miguel Gaspar DO at [address] [facility] Right 1/13/2022    RIGHT SIDE MEDIPORT REMOVAL, LEFT SIDE MEDIPORT INSERTION performed by Lilly Banda DO at [address] ARTHROSCOPY SHOULDER W/ROTATOR CUFF RPR Right 10/19/2017    SHOULDER ARTHROSCOPY ROTATOR CUFF REPAIR/ DEBRIDEMENT BICEPS TENODESIS/ TENOTOMY SUBACROMIAL DECOMPRESSION/ DISTAL CLAVICLE EXCISION performed by Sarkis Leblanc MD at [address] [facility]  06/28/2021    UPPER GASTROINTESTINAL ENDOSCOPY  06/2021    Dr. Vincent Cole in [facility]      spur excision       Social History:    Marital status:   Smoking status: no  ETOH status: no  Resides: [facility]    Family History:   Family History   Adopted: Yes   Problem Relation Age of Onset    Breast Cancer Maternal Aunt     Breast Cancer Daughter     Cancer Daughter         Bladder    Cancer Maternal Uncle         Bladder       Current Hospital Medications:    Current Outpatient Medications   Medication Sig Dispense Refill    dronabinol (MARINOL) 5 MG capsule Take 1 capsule by mouth 2 times daily (before meals) for 30 days.  Max Daily Amount: 10 mg 60 capsule 0

## 2023-10-03 ENCOUNTER — HOSPITAL ENCOUNTER (OUTPATIENT)
Dept: INFUSION THERAPY | Age: 78
End: 2023-10-03

## 2023-10-04 ENCOUNTER — HOSPITAL ENCOUNTER (OUTPATIENT)
Dept: INFUSION THERAPY | Age: 78
Discharge: HOME | End: 2023-10-04
Payer: MEDICARE

## 2023-10-04 ENCOUNTER — OFFICE VISIT (OUTPATIENT)
Dept: HEMATOLOGY | Age: 78
End: 2023-10-04
Payer: MEDICARE

## 2023-10-04 VITALS
BODY MASS INDEX: 24.04 KG/M2 | TEMPERATURE: 97.9 F | SYSTOLIC BLOOD PRESSURE: 108 MMHG | HEIGHT: 65 IN | WEIGHT: 144.3 LBS | OXYGEN SATURATION: 99 % | HEART RATE: 72 BPM | DIASTOLIC BLOOD PRESSURE: 71 MMHG | RESPIRATION RATE: 18 BRPM

## 2023-10-04 DIAGNOSIS — T45.1X5A CHEMOTHERAPY INDUCED NEUTROPENIA (HCC): ICD-10-CM

## 2023-10-04 DIAGNOSIS — T45.1X5A CHEMOTHERAPY-INDUCED FATIGUE: ICD-10-CM

## 2023-10-04 DIAGNOSIS — D64.81 ANTINEOPLASTIC CHEMOTHERAPY INDUCED ANEMIA: ICD-10-CM

## 2023-10-04 DIAGNOSIS — R11.0 CHEMOTHERAPY-INDUCED NAUSEA: ICD-10-CM

## 2023-10-04 DIAGNOSIS — C79.9 METASTASIS FROM PANCREATIC CANCER (HCC): Primary | ICD-10-CM

## 2023-10-04 DIAGNOSIS — C79.9 METASTASIS FROM PANCREATIC CANCER (HCC): ICD-10-CM

## 2023-10-04 DIAGNOSIS — C25.2 MALIGNANT NEOPLASM OF TAIL OF PANCREAS (HCC): Primary | ICD-10-CM

## 2023-10-04 DIAGNOSIS — Z71.89 CARE PLAN DISCUSSED WITH PATIENT: ICD-10-CM

## 2023-10-04 DIAGNOSIS — L40.9 PSORIASIS: ICD-10-CM

## 2023-10-04 DIAGNOSIS — C25.9 METASTASIS FROM PANCREATIC CANCER (HCC): ICD-10-CM

## 2023-10-04 DIAGNOSIS — T45.1X5A CHEMOTHERAPY-INDUCED DIARRHEA: ICD-10-CM

## 2023-10-04 DIAGNOSIS — R63.4 WEIGHT LOSS: ICD-10-CM

## 2023-10-04 DIAGNOSIS — C25.9 METASTASIS FROM PANCREATIC CANCER (HCC): Primary | ICD-10-CM

## 2023-10-04 DIAGNOSIS — Z51.11 CHEMOTHERAPY MANAGEMENT, ENCOUNTER FOR: ICD-10-CM

## 2023-10-04 DIAGNOSIS — R53.83 CHEMOTHERAPY-INDUCED FATIGUE: ICD-10-CM

## 2023-10-04 DIAGNOSIS — K52.1 CHEMOTHERAPY-INDUCED DIARRHEA: ICD-10-CM

## 2023-10-04 DIAGNOSIS — D70.1 CHEMOTHERAPY INDUCED NEUTROPENIA (HCC): ICD-10-CM

## 2023-10-04 DIAGNOSIS — T45.1X5A ANTINEOPLASTIC CHEMOTHERAPY INDUCED ANEMIA: ICD-10-CM

## 2023-10-04 DIAGNOSIS — T45.1X5A CHEMOTHERAPY-INDUCED NAUSEA: ICD-10-CM

## 2023-10-04 DIAGNOSIS — R63.0 DECREASED APPETITE: ICD-10-CM

## 2023-10-04 DIAGNOSIS — C25.2 MALIGNANT NEOPLASM OF TAIL OF PANCREAS (HCC): ICD-10-CM

## 2023-10-04 LAB
ALBUMIN SERPL-MCNC: 3.5 G/DL (ref 3.5–5.2)
ALP SERPL-CCNC: 103 U/L (ref 35–104)
ALT SERPL-CCNC: 11 U/L (ref 5–33)
ANION GAP SERPL CALCULATED.3IONS-SCNC: 9 MMOL/L (ref 7–19)
AST SERPL-CCNC: 24 U/L (ref 5–32)
BASOPHILS # BLD: 0.08 K/UL (ref 0.01–0.08)
BASOPHILS NFR BLD: 1.3 % (ref 0.1–1.2)
BILIRUB SERPL-MCNC: <0.2 MG/DL (ref 0.2–1.2)
BUN SERPL-MCNC: 10 MG/DL (ref 8–23)
CALCIUM SERPL-MCNC: 9.2 MG/DL (ref 8.8–10.2)
CHLORIDE SERPL-SCNC: 101 MMOL/L (ref 98–111)
CO2 SERPL-SCNC: 26 MMOL/L (ref 22–29)
CREAT SERPL-MCNC: 0.6 MG/DL (ref 0.5–0.9)
EOSINOPHIL # BLD: 0.59 K/UL (ref 0.04–0.54)
EOSINOPHIL NFR BLD: 9.7 % (ref 0.7–7)
ERYTHROCYTE [DISTWIDTH] IN BLOOD BY AUTOMATED COUNT: 16.3 % (ref 11.7–14.4)
GLUCOSE SERPL-MCNC: 142 MG/DL (ref 74–109)
HCT VFR BLD AUTO: 32.3 % (ref 34.1–44.9)
HGB BLD-MCNC: 10.6 G/DL (ref 11.2–15.7)
LYMPHOCYTES # BLD: 3.27 K/UL (ref 1.18–3.74)
LYMPHOCYTES NFR BLD: 54 % (ref 19.3–53.1)
MCH RBC QN AUTO: 31.6 PG (ref 25.6–32.2)
MCHC RBC AUTO-ENTMCNC: 32.8 G/DL (ref 32.3–35.5)
MCV RBC AUTO: 96.4 FL (ref 79.4–94.8)
MONOCYTES # BLD: 0.86 K/UL (ref 0.24–0.82)
MONOCYTES NFR BLD: 14.2 % (ref 4.7–12.5)
NEUTROPHILS # BLD: 1.23 K/UL (ref 1.56–6.13)
NEUTS SEG NFR BLD: 20.3 % (ref 34–71.1)
PLATELET # BLD AUTO: 431 K/UL (ref 182–369)
PMV BLD AUTO: 9.8 FL (ref 7.4–10.4)
POTASSIUM SERPL-SCNC: 4.3 MMOL/L (ref 3.5–5)
PROT SERPL-MCNC: 6.3 G/DL (ref 6.6–8.7)
RBC # BLD AUTO: 3.35 M/UL (ref 3.93–5.22)
SODIUM SERPL-SCNC: 136 MMOL/L (ref 136–145)
WBC # BLD AUTO: 6.06 K/UL (ref 3.98–10.04)

## 2023-10-04 PROCEDURE — 99212 OFFICE O/P EST SF 10 MIN: CPT

## 2023-10-04 PROCEDURE — 99214 OFFICE O/P EST MOD 30 MIN: CPT | Performed by: INTERNAL MEDICINE

## 2023-10-04 PROCEDURE — G8420 CALC BMI NORM PARAMETERS: HCPCS | Performed by: INTERNAL MEDICINE

## 2023-10-04 PROCEDURE — 85025 COMPLETE CBC W/AUTO DIFF WBC: CPT

## 2023-10-04 PROCEDURE — 6360000002 HC RX W HCPCS: Performed by: INTERNAL MEDICINE

## 2023-10-04 PROCEDURE — 1036F TOBACCO NON-USER: CPT | Performed by: INTERNAL MEDICINE

## 2023-10-04 PROCEDURE — G8400 PT W/DXA NO RESULTS DOC: HCPCS | Performed by: INTERNAL MEDICINE

## 2023-10-04 PROCEDURE — 1090F PRES/ABSN URINE INCON ASSESS: CPT | Performed by: INTERNAL MEDICINE

## 2023-10-04 PROCEDURE — 96523 IRRIG DRUG DELIVERY DEVICE: CPT

## 2023-10-04 PROCEDURE — G8484 FLU IMMUNIZE NO ADMIN: HCPCS | Performed by: INTERNAL MEDICINE

## 2023-10-04 PROCEDURE — G8427 DOCREV CUR MEDS BY ELIG CLIN: HCPCS | Performed by: INTERNAL MEDICINE

## 2023-10-04 PROCEDURE — 1123F ACP DISCUSS/DSCN MKR DOCD: CPT | Performed by: INTERNAL MEDICINE

## 2023-10-04 PROCEDURE — 36415 COLL VENOUS BLD VENIPUNCTURE: CPT

## 2023-10-04 PROCEDURE — 2580000003 HC RX 258: Performed by: INTERNAL MEDICINE

## 2023-10-04 RX ORDER — DRONABINOL 5 MG/1
5 CAPSULE ORAL
Qty: 60 CAPSULE | Refills: 0 | Status: SHIPPED | OUTPATIENT
Start: 2023-10-04 | End: 2023-11-03

## 2023-10-04 RX ORDER — HEPARIN 100 UNIT/ML
500 SYRINGE INTRAVENOUS PRN
Status: DISCONTINUED | OUTPATIENT
Start: 2023-10-04 | End: 2023-10-05 | Stop reason: HOSPADM

## 2023-10-04 RX ORDER — SODIUM CHLORIDE 0.9 % (FLUSH) 0.9 %
5-40 SYRINGE (ML) INJECTION PRN
Status: DISCONTINUED | OUTPATIENT
Start: 2023-10-04 | End: 2023-10-05 | Stop reason: HOSPADM

## 2023-10-04 RX ADMIN — HEPARIN 500 UNITS: 100 SYRINGE at 11:12

## 2023-10-04 RX ADMIN — SODIUM CHLORIDE, PRESERVATIVE FREE 10 ML: 5 INJECTION INTRAVENOUS at 11:12

## 2023-10-05 ENCOUNTER — APPOINTMENT (OUTPATIENT)
Dept: INFUSION THERAPY | Age: 78
End: 2023-10-05
Payer: MEDICARE

## 2023-10-09 ENCOUNTER — HOSPITAL ENCOUNTER (OUTPATIENT)
Dept: GENERAL RADIOLOGY | Age: 78
Discharge: HOME | End: 2023-10-09
Attending: INTERNAL MEDICINE
Payer: MEDICARE

## 2023-10-09 DIAGNOSIS — C25.2 MALIGNANT NEOPLASM OF TAIL OF PANCREAS (HCC): ICD-10-CM

## 2023-10-09 PROCEDURE — 71260 CT THORAX DX C+: CPT

## 2023-10-09 PROCEDURE — 74177 CT ABD & PELVIS W/CONTRAST: CPT

## 2023-10-09 PROCEDURE — 6360000004 HC RX CONTRAST MEDICATION: Performed by: INTERNAL MEDICINE

## 2023-10-09 PROCEDURE — 2500000003 HC RX 250 WO HCPCS: Performed by: INTERNAL MEDICINE

## 2023-10-09 RX ADMIN — IOPAMIDOL 75 ML: 755 INJECTION, SOLUTION INTRAVENOUS at 12:09

## 2023-10-09 RX ADMIN — BARIUM SULFATE 450 ML: 21 SUSPENSION ORAL at 12:09

## 2023-10-09 NOTE — PROGRESS NOTES
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 10/9/23 CT Chest W Contrast: Multiple solid scattered pulmonary nodules are stable. Possible new lymphadenopathy the left-sided thoracic inlet. Probable metastatic right cardiophrenic angle lymph node. Trace pleural effusions. Suggestion of peritoneal carcinomatosis. Stable cyst at the dome of the right hepatic lobe. Trace ascites. 10/9/23 CT Abd/Pelvis W IV Contrast: The a 7.6 x 5.6 cm well circumscribed hypoattenuating area is noted in segments 07/08, grossly unchanged (axial image 11). Adjacent hypoattenuating lesions are also stable. No new suspicious mass or related to dilatation. GALLBLADDER:  Status post cholecystectomy. PANCREAS: Status post distal pancreatectomy. A 1.4 cm cyst is unchanged in the uncinate process (axial image 34). SPLEEN: No mass. No splenomegaly. ADRENALS: Normal. KIDNEYS/URETERS: No suspicious mass, stone, or hydronephrosis. GI TRACT: Colonic diverticulosis is identified, without evidence of diverticulitis. No bowel wall thickening or dilation. URINARY BLADDER: Normal. REPRODUCTIVE ORGANS: No abnormality identified. LYMPH NODES: No lymphadenopathy. VASCULATURE:   No significant aortic aneurysm. The visualized portal and mesenteric veins appear within normal limits. The IVC is normal. OTHER: - Status post omentectomy. Mild nodularity is noted in the left upper quadrant.   For example, a stable

## 2023-10-10 DIAGNOSIS — R63.4 WEIGHT LOSS: ICD-10-CM

## 2023-10-10 DIAGNOSIS — R63.0 DECREASED APPETITE: ICD-10-CM

## 2023-10-10 RX ORDER — DRONABINOL 5 MG/1
5 CAPSULE ORAL
Qty: 60 CAPSULE | Refills: 0 | Status: SHIPPED | OUTPATIENT
Start: 2023-10-10 | End: 2023-11-09

## 2023-10-11 ENCOUNTER — OFFICE VISIT (OUTPATIENT)
Dept: HEMATOLOGY | Age: 78
End: 2023-10-11
Payer: MEDICARE

## 2023-10-11 ENCOUNTER — HOSPITAL ENCOUNTER (OUTPATIENT)
Dept: INFUSION THERAPY | Age: 78
Discharge: HOME | End: 2023-10-11
Payer: MEDICARE

## 2023-10-11 VITALS
SYSTOLIC BLOOD PRESSURE: 129 MMHG | HEART RATE: 79 BPM | BODY MASS INDEX: 24.69 KG/M2 | HEIGHT: 65 IN | DIASTOLIC BLOOD PRESSURE: 81 MMHG | OXYGEN SATURATION: 96 % | RESPIRATION RATE: 18 BRPM | TEMPERATURE: 97.3 F | WEIGHT: 148.2 LBS

## 2023-10-11 DIAGNOSIS — C79.9 METASTASIS FROM PANCREATIC CANCER (HCC): Primary | ICD-10-CM

## 2023-10-11 DIAGNOSIS — C25.2 MALIGNANT NEOPLASM OF TAIL OF PANCREAS (HCC): ICD-10-CM

## 2023-10-11 DIAGNOSIS — Z71.89 CARE PLAN DISCUSSED WITH PATIENT: ICD-10-CM

## 2023-10-11 DIAGNOSIS — D64.81 ANTINEOPLASTIC CHEMOTHERAPY INDUCED ANEMIA: ICD-10-CM

## 2023-10-11 DIAGNOSIS — C25.9 METASTASIS FROM PANCREATIC CANCER (HCC): Primary | ICD-10-CM

## 2023-10-11 DIAGNOSIS — C25.9 METASTASIS FROM PANCREATIC CANCER (HCC): ICD-10-CM

## 2023-10-11 DIAGNOSIS — D75.839 THROMBOCYTOSIS: ICD-10-CM

## 2023-10-11 DIAGNOSIS — T45.1X5D ADVERSE EFFECT OF CHEMOTHERAPY, SUBSEQUENT ENCOUNTER: ICD-10-CM

## 2023-10-11 DIAGNOSIS — R21 RASH OF ENTIRE BODY: ICD-10-CM

## 2023-10-11 DIAGNOSIS — Z51.11 CHEMOTHERAPY MANAGEMENT, ENCOUNTER FOR: Primary | ICD-10-CM

## 2023-10-11 DIAGNOSIS — T45.1X5A ANTINEOPLASTIC CHEMOTHERAPY INDUCED ANEMIA: ICD-10-CM

## 2023-10-11 DIAGNOSIS — C79.9 METASTASIS FROM PANCREATIC CANCER (HCC): ICD-10-CM

## 2023-10-11 LAB
ALBUMIN SERPL-MCNC: 3.6 G/DL (ref 3.5–5.2)
ALP SERPL-CCNC: 101 U/L (ref 35–104)
ALT SERPL-CCNC: 16 U/L (ref 9–52)
ANION GAP SERPL CALCULATED.3IONS-SCNC: 6 MMOL/L (ref 7–19)
AST SERPL-CCNC: 38 U/L (ref 14–36)
BASOPHILS # BLD: 0.21 K/UL (ref 0.01–0.08)
BASOPHILS NFR BLD: 1.5 % (ref 0.1–1.2)
BILIRUB SERPL-MCNC: <0.2 MG/DL (ref 0.2–1.3)
BUN SERPL-MCNC: 9 MG/DL (ref 7–17)
CALCIUM SERPL-MCNC: 8.9 MG/DL (ref 8.4–10.2)
CANCER AG19-9 SERPL-ACNC: 408 U/ML (ref 0–35)
CEA SERPL-MCNC: 7.4 NG/ML (ref 0–4.7)
CHLORIDE SERPL-SCNC: 105 MMOL/L (ref 98–111)
CO2 SERPL-SCNC: 28 MMOL/L (ref 22–29)
CREAT SERPL-MCNC: 0.7 MG/DL (ref 0.5–1)
EOSINOPHIL # BLD: 0.81 K/UL (ref 0.04–0.54)
EOSINOPHIL NFR BLD: 5.6 % (ref 0.7–7)
ERYTHROCYTE [DISTWIDTH] IN BLOOD BY AUTOMATED COUNT: 16.2 % (ref 11.7–14.4)
GLOBULIN: 2.8 G/DL
GLUCOSE SERPL-MCNC: 118 MG/DL (ref 74–106)
HCT VFR BLD AUTO: 33.6 % (ref 34.1–44.9)
HGB BLD-MCNC: 11 G/DL (ref 11.2–15.7)
LYMPHOCYTES # BLD: 5.97 K/UL (ref 1.18–3.74)
LYMPHOCYTES NFR BLD: 41.4 % (ref 19.3–53.1)
MAGNESIUM SERPL-MCNC: 1.9 MG/DL (ref 1.6–2.3)
MCH RBC QN AUTO: 31.4 PG (ref 25.6–32.2)
MCHC RBC AUTO-ENTMCNC: 32.7 G/DL (ref 32.3–35.5)
MCV RBC AUTO: 96 FL (ref 79.4–94.8)
MONOCYTES # BLD: 2.54 K/UL (ref 0.24–0.82)
MONOCYTES NFR BLD: 17.6 % (ref 4.7–12.5)
NEUTROPHILS # BLD: 4.33 K/UL (ref 1.56–6.13)
NEUTS SEG NFR BLD: 29.9 % (ref 34–71.1)
PLATELET # BLD AUTO: 496 K/UL (ref 182–369)
PMV BLD AUTO: 9.9 FL (ref 7.4–10.4)
POTASSIUM SERPL-SCNC: 4.4 MMOL/L (ref 3.5–5.1)
PROT SERPL-MCNC: 6.3 G/DL (ref 6.3–8.2)
RBC # BLD AUTO: 3.5 M/UL (ref 3.93–5.22)
SODIUM SERPL-SCNC: 139 MMOL/L (ref 137–145)
WBC # BLD AUTO: 14.43 K/UL (ref 3.98–10.04)

## 2023-10-11 PROCEDURE — 96413 CHEMO IV INFUSION 1 HR: CPT

## 2023-10-11 PROCEDURE — 99214 OFFICE O/P EST MOD 30 MIN: CPT | Performed by: INTERNAL MEDICINE

## 2023-10-11 PROCEDURE — 85025 COMPLETE CBC W/AUTO DIFF WBC: CPT

## 2023-10-11 PROCEDURE — 96415 CHEMO IV INFUSION ADDL HR: CPT

## 2023-10-11 PROCEDURE — 96375 TX/PRO/DX INJ NEW DRUG ADDON: CPT

## 2023-10-11 PROCEDURE — 6360000002 HC RX W HCPCS: Performed by: INTERNAL MEDICINE

## 2023-10-11 PROCEDURE — G8420 CALC BMI NORM PARAMETERS: HCPCS | Performed by: INTERNAL MEDICINE

## 2023-10-11 PROCEDURE — G8427 DOCREV CUR MEDS BY ELIG CLIN: HCPCS | Performed by: INTERNAL MEDICINE

## 2023-10-11 PROCEDURE — G8399 PT W/DXA RESULTS DOCUMENT: HCPCS | Performed by: INTERNAL MEDICINE

## 2023-10-11 PROCEDURE — 1123F ACP DISCUSS/DSCN MKR DOCD: CPT | Performed by: INTERNAL MEDICINE

## 2023-10-11 PROCEDURE — 1036F TOBACCO NON-USER: CPT | Performed by: INTERNAL MEDICINE

## 2023-10-11 PROCEDURE — 96367 TX/PROPH/DG ADDL SEQ IV INF: CPT

## 2023-10-11 PROCEDURE — 1090F PRES/ABSN URINE INCON ASSESS: CPT | Performed by: INTERNAL MEDICINE

## 2023-10-11 PROCEDURE — 96376 TX/PRO/DX INJ SAME DRUG ADON: CPT

## 2023-10-11 PROCEDURE — 36415 COLL VENOUS BLD VENIPUNCTURE: CPT

## 2023-10-11 PROCEDURE — 83735 ASSAY OF MAGNESIUM: CPT

## 2023-10-11 PROCEDURE — G8484 FLU IMMUNIZE NO ADMIN: HCPCS | Performed by: INTERNAL MEDICINE

## 2023-10-11 PROCEDURE — 2580000003 HC RX 258: Performed by: INTERNAL MEDICINE

## 2023-10-11 PROCEDURE — 80053 COMPREHEN METABOLIC PANEL: CPT

## 2023-10-11 PROCEDURE — G0498 CHEMO EXTEND IV INFUS W/PUMP: HCPCS

## 2023-10-11 RX ORDER — ALBUTEROL SULFATE 90 UG/1
4 AEROSOL, METERED RESPIRATORY (INHALATION) PRN
Status: CANCELLED | OUTPATIENT
Start: 2023-10-11

## 2023-10-11 RX ORDER — SODIUM CHLORIDE 9 MG/ML
INJECTION, SOLUTION INTRAVENOUS CONTINUOUS
Status: CANCELLED | OUTPATIENT
Start: 2023-10-11

## 2023-10-11 RX ORDER — ATROPINE SULFATE 0.4 MG/ML
0.4 INJECTION, SOLUTION INTRAVENOUS ONCE
Status: COMPLETED | OUTPATIENT
Start: 2023-10-11 | End: 2023-10-11

## 2023-10-11 RX ORDER — ATROPINE SULFATE 0.4 MG/ML
0.4 INJECTION, SOLUTION INTRAMUSCULAR; INTRAVENOUS; SUBCUTANEOUS ONCE
Status: CANCELLED | OUTPATIENT
Start: 2023-10-11 | End: 2023-10-11

## 2023-10-11 RX ORDER — TRIAMCINOLONE ACETONIDE 1 MG/G
OINTMENT TOPICAL 2 TIMES DAILY
Qty: 30 G | Refills: 4 | Status: SHIPPED | OUTPATIENT
Start: 2023-10-11 | End: 2023-10-18

## 2023-10-11 RX ORDER — SODIUM CHLORIDE 0.9 % (FLUSH) 0.9 %
5-40 SYRINGE (ML) INJECTION PRN
Status: CANCELLED | OUTPATIENT
Start: 2023-10-11

## 2023-10-11 RX ORDER — PALONOSETRON 0.05 MG/ML
0.25 INJECTION, SOLUTION INTRAVENOUS ONCE
Status: COMPLETED | OUTPATIENT
Start: 2023-10-11 | End: 2023-10-11

## 2023-10-11 RX ORDER — ATROPINE SULFATE 0.4 MG/ML
0.4 INJECTION, SOLUTION INTRAVENOUS
Status: DISCONTINUED | OUTPATIENT
Start: 2023-10-11 | End: 2023-10-13 | Stop reason: HOSPADM

## 2023-10-11 RX ORDER — ONDANSETRON 2 MG/ML
8 INJECTION INTRAMUSCULAR; INTRAVENOUS
Status: CANCELLED | OUTPATIENT
Start: 2023-10-11

## 2023-10-11 RX ORDER — DIPHENHYDRAMINE HYDROCHLORIDE 50 MG/ML
50 INJECTION INTRAMUSCULAR; INTRAVENOUS
Status: CANCELLED | OUTPATIENT
Start: 2023-10-11

## 2023-10-11 RX ORDER — SODIUM CHLORIDE 9 MG/ML
5-250 INJECTION, SOLUTION INTRAVENOUS PRN
Status: DISCONTINUED | OUTPATIENT
Start: 2023-10-11 | End: 2023-10-12 | Stop reason: HOSPADM

## 2023-10-11 RX ORDER — ATROPINE SULFATE 0.4 MG/ML
0.4 INJECTION, SOLUTION INTRAMUSCULAR; INTRAVENOUS; SUBCUTANEOUS
Status: CANCELLED | OUTPATIENT
Start: 2023-10-11

## 2023-10-11 RX ORDER — EPINEPHRINE 1 MG/ML
0.3 INJECTION, SOLUTION, CONCENTRATE INTRAVENOUS PRN
Status: CANCELLED | OUTPATIENT
Start: 2023-10-11

## 2023-10-11 RX ORDER — MEPERIDINE HYDROCHLORIDE 50 MG/ML
12.5 INJECTION INTRAMUSCULAR; INTRAVENOUS; SUBCUTANEOUS PRN
Status: CANCELLED | OUTPATIENT
Start: 2023-10-11

## 2023-10-11 RX ORDER — FAMOTIDINE 10 MG/ML
20 INJECTION, SOLUTION INTRAVENOUS
Status: CANCELLED | OUTPATIENT
Start: 2023-10-11

## 2023-10-11 RX ORDER — SODIUM CHLORIDE 9 MG/ML
5-250 INJECTION, SOLUTION INTRAVENOUS PRN
Status: CANCELLED | OUTPATIENT
Start: 2023-10-11

## 2023-10-11 RX ORDER — PALONOSETRON 0.05 MG/ML
0.25 INJECTION, SOLUTION INTRAVENOUS ONCE
Status: CANCELLED | OUTPATIENT
Start: 2023-10-11 | End: 2023-10-11

## 2023-10-11 RX ORDER — HEPARIN SODIUM (PORCINE) LOCK FLUSH IV SOLN 100 UNIT/ML 100 UNIT/ML
500 SOLUTION INTRAVENOUS PRN
Status: CANCELLED | OUTPATIENT
Start: 2023-10-11

## 2023-10-11 RX ORDER — ACETAMINOPHEN 325 MG/1
650 TABLET ORAL
Status: CANCELLED | OUTPATIENT
Start: 2023-10-11

## 2023-10-11 RX ORDER — DEXAMETHASONE SODIUM PHOSPHATE 10 MG/ML
10 INJECTION, SOLUTION INTRAMUSCULAR; INTRAVENOUS ONCE
Status: COMPLETED | OUTPATIENT
Start: 2023-10-11 | End: 2023-10-11

## 2023-10-11 RX ADMIN — FOSAPREPITANT 150 MG: 150 INJECTION, POWDER, LYOPHILIZED, FOR SOLUTION INTRAVENOUS at 12:31

## 2023-10-11 RX ADMIN — PALONOSETRON 0.25 MG: 0.05 INJECTION, SOLUTION INTRAVENOUS at 12:31

## 2023-10-11 RX ADMIN — DEXAMETHASONE SODIUM PHOSPHATE 10 MG: 10 INJECTION INTRAMUSCULAR; INTRAVENOUS at 12:31

## 2023-10-11 RX ADMIN — LEUCOVORIN CALCIUM 200 MG: 200 INJECTION, POWDER, LYOPHILIZED, FOR SOLUTION INTRAMUSCULAR; INTRAVENOUS at 14:35

## 2023-10-11 RX ADMIN — SODIUM CHLORIDE 250 ML/HR: 9 INJECTION, SOLUTION INTRAVENOUS at 12:28

## 2023-10-11 RX ADMIN — ATROPINE SULFATE 0.4 MG: 0.4 INJECTION, SOLUTION INTRAVENOUS at 12:55

## 2023-10-11 RX ADMIN — FLUOROURACIL 4000 MG: 50 INJECTION, SOLUTION INTRAVENOUS at 15:07

## 2023-10-11 RX ADMIN — IRINOTECAN HYDROCHLORIDE 86 MG: 4.3 INJECTION, POWDER, FOR SOLUTION INTRAVENOUS at 12:56

## 2023-10-11 RX ADMIN — ATROPINE SULFATE 0.4 MG: 0.4 INJECTION, SOLUTION INTRAVENOUS at 14:32

## 2023-10-13 ENCOUNTER — HOSPITAL ENCOUNTER (OUTPATIENT)
Dept: INFUSION THERAPY | Age: 78
Discharge: HOME | End: 2023-10-13
Payer: MEDICARE

## 2023-10-13 DIAGNOSIS — C25.2 MALIGNANT NEOPLASM OF TAIL OF PANCREAS (HCC): Primary | ICD-10-CM

## 2023-10-13 PROCEDURE — 96523 IRRIG DRUG DELIVERY DEVICE: CPT

## 2023-10-13 PROCEDURE — 6360000002 HC RX W HCPCS

## 2023-10-13 PROCEDURE — 2580000003 HC RX 258: Performed by: INTERNAL MEDICINE

## 2023-10-13 RX ORDER — SODIUM CHLORIDE 0.9 % (FLUSH) 0.9 %
5-40 SYRINGE (ML) INJECTION PRN
Status: DISCONTINUED | OUTPATIENT
Start: 2023-10-13 | End: 2023-10-14 | Stop reason: HOSPADM

## 2023-10-13 RX ORDER — HEPARIN 100 UNIT/ML
SYRINGE INTRAVENOUS
Status: COMPLETED
Start: 2023-10-13 | End: 2023-10-13

## 2023-10-13 RX ORDER — HEPARIN 100 UNIT/ML
500 SYRINGE INTRAVENOUS PRN
Status: DISCONTINUED | OUTPATIENT
Start: 2023-10-13 | End: 2023-10-14 | Stop reason: HOSPADM

## 2023-10-13 RX ADMIN — SODIUM CHLORIDE, PRESERVATIVE FREE 10 ML: 5 INJECTION INTRAVENOUS at 13:25

## 2023-10-13 RX ADMIN — HEPARIN 500 UNITS: 100 SYRINGE at 13:25

## 2023-10-23 DIAGNOSIS — G89.3 CANCER-RELATED PAIN: ICD-10-CM

## 2023-10-23 RX ORDER — ALPRAZOLAM 0.5 MG/1
0.5 TABLET ORAL 2 TIMES DAILY
Qty: 60 TABLET | Refills: 0 | Status: SHIPPED | OUTPATIENT
Start: 2023-10-23 | End: 2023-11-22

## 2023-10-25 ENCOUNTER — HOSPITAL ENCOUNTER (OUTPATIENT)
Dept: INFUSION THERAPY | Age: 78
Discharge: HOME | End: 2023-10-25
Payer: MEDICARE

## 2023-10-25 VITALS
RESPIRATION RATE: 18 BRPM | BODY MASS INDEX: 23.99 KG/M2 | TEMPERATURE: 97.3 F | HEART RATE: 66 BPM | WEIGHT: 144 LBS | OXYGEN SATURATION: 99 % | HEIGHT: 65 IN | DIASTOLIC BLOOD PRESSURE: 72 MMHG | SYSTOLIC BLOOD PRESSURE: 127 MMHG

## 2023-10-25 DIAGNOSIS — C25.2 MALIGNANT NEOPLASM OF TAIL OF PANCREAS (HCC): Primary | ICD-10-CM

## 2023-10-25 DIAGNOSIS — C25.9 METASTASIS FROM PANCREATIC CANCER (HCC): ICD-10-CM

## 2023-10-25 DIAGNOSIS — C79.9 METASTASIS FROM PANCREATIC CANCER (HCC): ICD-10-CM

## 2023-10-25 LAB
ALBUMIN SERPL-MCNC: 3.6 G/DL (ref 3.5–5.2)
ALP SERPL-CCNC: 95 U/L (ref 35–104)
ALT SERPL-CCNC: 17 U/L (ref 9–52)
ANION GAP SERPL CALCULATED.3IONS-SCNC: 8 MMOL/L (ref 7–19)
AST SERPL-CCNC: 36 U/L (ref 14–36)
BASOPHILS # BLD: 0.06 K/UL (ref 0.01–0.08)
BASOPHILS NFR BLD: 0.7 % (ref 0.1–1.2)
BILIRUB SERPL-MCNC: 0.3 MG/DL (ref 0.2–1.3)
BUN SERPL-MCNC: 14 MG/DL (ref 7–17)
CALCIUM SERPL-MCNC: 9.3 MG/DL (ref 8.4–10.2)
CHLORIDE SERPL-SCNC: 105 MMOL/L (ref 98–111)
CO2 SERPL-SCNC: 27 MMOL/L (ref 22–29)
CREAT SERPL-MCNC: 0.7 MG/DL (ref 0.5–1)
EOSINOPHIL # BLD: 0.69 K/UL (ref 0.04–0.54)
EOSINOPHIL NFR BLD: 8.6 % (ref 0.7–7)
ERYTHROCYTE [DISTWIDTH] IN BLOOD BY AUTOMATED COUNT: 16.4 % (ref 11.7–14.4)
GLOBULIN: 2.8 G/DL
GLUCOSE SERPL-MCNC: 93 MG/DL (ref 74–106)
HCT VFR BLD AUTO: 32.5 % (ref 34.1–44.9)
HGB BLD-MCNC: 10.8 G/DL (ref 11.2–15.7)
LYMPHOCYTES # BLD: 3.16 K/UL (ref 1.18–3.74)
LYMPHOCYTES NFR BLD: 39.3 % (ref 19.3–53.1)
MCH RBC QN AUTO: 32 PG (ref 25.6–32.2)
MCHC RBC AUTO-ENTMCNC: 33.2 G/DL (ref 32.3–35.5)
MCV RBC AUTO: 96.2 FL (ref 79.4–94.8)
MONOCYTES # BLD: 0.81 K/UL (ref 0.24–0.82)
MONOCYTES NFR BLD: 10.1 % (ref 4.7–12.5)
NEUTROPHILS # BLD: 3.3 K/UL (ref 1.56–6.13)
NEUTS SEG NFR BLD: 41.1 % (ref 34–71.1)
PLATELET # BLD AUTO: 307 K/UL (ref 182–369)
PMV BLD AUTO: 10.4 FL (ref 7.4–10.4)
POTASSIUM SERPL-SCNC: 4.4 MMOL/L (ref 3.5–5.1)
PROT SERPL-MCNC: 6.4 G/DL (ref 6.3–8.2)
RBC # BLD AUTO: 3.38 M/UL (ref 3.93–5.22)
SODIUM SERPL-SCNC: 140 MMOL/L (ref 137–145)
WBC # BLD AUTO: 8.04 K/UL (ref 3.98–10.04)

## 2023-10-25 PROCEDURE — G0498 CHEMO EXTEND IV INFUS W/PUMP: HCPCS

## 2023-10-25 PROCEDURE — 96367 TX/PROPH/DG ADDL SEQ IV INF: CPT

## 2023-10-25 PROCEDURE — 96413 CHEMO IV INFUSION 1 HR: CPT

## 2023-10-25 PROCEDURE — 96415 CHEMO IV INFUSION ADDL HR: CPT

## 2023-10-25 PROCEDURE — 85025 COMPLETE CBC W/AUTO DIFF WBC: CPT

## 2023-10-25 PROCEDURE — 80053 COMPREHEN METABOLIC PANEL: CPT

## 2023-10-25 PROCEDURE — 96375 TX/PRO/DX INJ NEW DRUG ADDON: CPT

## 2023-10-25 PROCEDURE — 96417 CHEMO IV INFUS EACH ADDL SEQ: CPT

## 2023-10-25 PROCEDURE — 6360000002 HC RX W HCPCS: Performed by: INTERNAL MEDICINE

## 2023-10-25 PROCEDURE — 2580000003 HC RX 258: Performed by: INTERNAL MEDICINE

## 2023-10-25 RX ORDER — SODIUM CHLORIDE 9 MG/ML
5-250 INJECTION, SOLUTION INTRAVENOUS PRN
Status: CANCELLED | OUTPATIENT
Start: 2023-10-27

## 2023-10-25 RX ORDER — HEPARIN SODIUM (PORCINE) LOCK FLUSH IV SOLN 100 UNIT/ML 100 UNIT/ML
500 SOLUTION INTRAVENOUS PRN
Status: CANCELLED | OUTPATIENT
Start: 2023-10-27

## 2023-10-25 RX ORDER — ATROPINE SULFATE 0.4 MG/ML
0.4 INJECTION, SOLUTION INTRAMUSCULAR; INTRAVENOUS; SUBCUTANEOUS
Status: CANCELLED | OUTPATIENT
Start: 2023-10-25

## 2023-10-25 RX ORDER — ATROPINE SULFATE 0.4 MG/ML
0.4 INJECTION, SOLUTION INTRAMUSCULAR; INTRAVENOUS; SUBCUTANEOUS ONCE
Status: CANCELLED | OUTPATIENT
Start: 2023-10-25 | End: 2023-10-25

## 2023-10-25 RX ORDER — ALBUTEROL SULFATE 90 UG/1
4 AEROSOL, METERED RESPIRATORY (INHALATION) PRN
Status: CANCELLED | OUTPATIENT
Start: 2023-10-25

## 2023-10-25 RX ORDER — SODIUM CHLORIDE 9 MG/ML
5-250 INJECTION, SOLUTION INTRAVENOUS PRN
Status: CANCELLED | OUTPATIENT
Start: 2023-10-25

## 2023-10-25 RX ORDER — SODIUM CHLORIDE 9 MG/ML
INJECTION, SOLUTION INTRAVENOUS CONTINUOUS
Status: CANCELLED | OUTPATIENT
Start: 2023-10-25

## 2023-10-25 RX ORDER — DIPHENHYDRAMINE HYDROCHLORIDE 50 MG/ML
50 INJECTION INTRAMUSCULAR; INTRAVENOUS
Status: CANCELLED | OUTPATIENT
Start: 2023-10-25

## 2023-10-25 RX ORDER — SODIUM CHLORIDE 0.9 % (FLUSH) 0.9 %
5-40 SYRINGE (ML) INJECTION PRN
Status: CANCELLED | OUTPATIENT
Start: 2023-10-27

## 2023-10-25 RX ORDER — ONDANSETRON 2 MG/ML
8 INJECTION INTRAMUSCULAR; INTRAVENOUS
Status: CANCELLED | OUTPATIENT
Start: 2023-10-25

## 2023-10-25 RX ORDER — PALONOSETRON 0.05 MG/ML
0.25 INJECTION, SOLUTION INTRAVENOUS ONCE
Status: COMPLETED | OUTPATIENT
Start: 2023-10-25 | End: 2023-10-25

## 2023-10-25 RX ORDER — DEXAMETHASONE SODIUM PHOSPHATE 10 MG/ML
10 INJECTION, SOLUTION INTRAMUSCULAR; INTRAVENOUS ONCE
Status: COMPLETED | OUTPATIENT
Start: 2023-10-25 | End: 2023-10-25

## 2023-10-25 RX ORDER — HEPARIN SODIUM (PORCINE) LOCK FLUSH IV SOLN 100 UNIT/ML 100 UNIT/ML
500 SOLUTION INTRAVENOUS PRN
Status: CANCELLED | OUTPATIENT
Start: 2023-10-25

## 2023-10-25 RX ORDER — MEPERIDINE HYDROCHLORIDE 50 MG/ML
12.5 INJECTION INTRAMUSCULAR; INTRAVENOUS; SUBCUTANEOUS PRN
Status: CANCELLED | OUTPATIENT
Start: 2023-10-25

## 2023-10-25 RX ORDER — SODIUM CHLORIDE 0.9 % (FLUSH) 0.9 %
5-40 SYRINGE (ML) INJECTION PRN
Status: CANCELLED | OUTPATIENT
Start: 2023-10-25

## 2023-10-25 RX ORDER — FAMOTIDINE 10 MG/ML
20 INJECTION, SOLUTION INTRAVENOUS
Status: CANCELLED | OUTPATIENT
Start: 2023-10-25

## 2023-10-25 RX ORDER — PALONOSETRON 0.05 MG/ML
0.25 INJECTION, SOLUTION INTRAVENOUS ONCE
Status: CANCELLED | OUTPATIENT
Start: 2023-10-25 | End: 2023-10-25

## 2023-10-25 RX ORDER — EPINEPHRINE 1 MG/ML
0.3 INJECTION, SOLUTION, CONCENTRATE INTRAVENOUS PRN
Status: CANCELLED | OUTPATIENT
Start: 2023-10-25

## 2023-10-25 RX ORDER — ACETAMINOPHEN 325 MG/1
650 TABLET ORAL
Status: CANCELLED | OUTPATIENT
Start: 2023-10-25

## 2023-10-25 RX ORDER — ATROPINE SULFATE 0.4 MG/ML
0.4 INJECTION, SOLUTION INTRAVENOUS ONCE
Status: COMPLETED | OUTPATIENT
Start: 2023-10-25 | End: 2023-10-25

## 2023-10-25 RX ADMIN — DEXAMETHASONE SODIUM PHOSPHATE 10 MG: 10 INJECTION INTRAMUSCULAR; INTRAVENOUS at 09:41

## 2023-10-25 RX ADMIN — IRINOTECAN HYDROCHLORIDE 86 MG: 4.3 INJECTION, POWDER, FOR SOLUTION INTRAVENOUS at 10:12

## 2023-10-25 RX ADMIN — FLUOROURACIL 4000 MG: 50 INJECTION, SOLUTION INTRAVENOUS at 12:23

## 2023-10-25 RX ADMIN — LEUCOVORIN CALCIUM 200 MG: 200 INJECTION, POWDER, LYOPHILIZED, FOR SOLUTION INTRAMUSCULAR; INTRAVENOUS at 11:52

## 2023-10-25 RX ADMIN — FOSAPREPITANT 150 MG: 150 INJECTION, POWDER, LYOPHILIZED, FOR SOLUTION INTRAVENOUS at 09:44

## 2023-10-25 RX ADMIN — PALONOSETRON 0.25 MG: 0.05 INJECTION, SOLUTION INTRAVENOUS at 09:38

## 2023-10-25 RX ADMIN — ATROPINE SULFATE 0.4 MG: 0.4 INJECTION, SOLUTION INTRAVENOUS at 10:07

## 2023-10-27 ENCOUNTER — HOSPITAL ENCOUNTER (OUTPATIENT)
Dept: INFUSION THERAPY | Age: 78
Discharge: HOME | End: 2023-10-27
Payer: MEDICARE

## 2023-10-27 DIAGNOSIS — C25.2 MALIGNANT NEOPLASM OF TAIL OF PANCREAS (HCC): ICD-10-CM

## 2023-10-27 DIAGNOSIS — C25.9 METASTASIS FROM PANCREATIC CANCER (HCC): Primary | ICD-10-CM

## 2023-10-27 DIAGNOSIS — C79.9 METASTASIS FROM PANCREATIC CANCER (HCC): Primary | ICD-10-CM

## 2023-10-27 PROCEDURE — 6360000002 HC RX W HCPCS: Performed by: INTERNAL MEDICINE

## 2023-10-27 PROCEDURE — 96523 IRRIG DRUG DELIVERY DEVICE: CPT

## 2023-10-27 PROCEDURE — 2580000003 HC RX 258: Performed by: INTERNAL MEDICINE

## 2023-10-27 RX ORDER — SODIUM CHLORIDE 0.9 % (FLUSH) 0.9 %
5-40 SYRINGE (ML) INJECTION PRN
Status: DISCONTINUED | OUTPATIENT
Start: 2023-10-27 | End: 2023-10-28 | Stop reason: HOSPADM

## 2023-10-27 RX ORDER — HEPARIN 100 UNIT/ML
500 SYRINGE INTRAVENOUS PRN
Status: DISCONTINUED | OUTPATIENT
Start: 2023-10-27 | End: 2023-10-28 | Stop reason: HOSPADM

## 2023-10-27 RX ADMIN — SODIUM CHLORIDE, PRESERVATIVE FREE 10 ML: 5 INJECTION INTRAVENOUS at 13:14

## 2023-10-27 RX ADMIN — HEPARIN 500 UNITS: 100 SYRINGE at 13:14

## 2023-10-30 ENCOUNTER — TELEPHONE (OUTPATIENT)
Dept: HEMATOLOGY | Age: 78
End: 2023-10-30

## 2023-10-30 NOTE — TELEPHONE ENCOUNTER
JOELLE Duarte received a call from Patient Анна Garcia. Patient reports she received notifications in the mail stating she may be responsible to pay several thousand dollars for chemotherapy drugs. Patient expressed distress as she will not be able to afford this. This SW provided information about [employer] who this SW will notify on her behalf. SW asked patient to bring the documents she received so this SW can provide the information to the Navigators so they can assess and determine the best next steps. Patient states she will bring the documents to the [place] [facility] office on 10.31.23  This SW also routed this note to [address] [place] [address] [address].

## 2023-10-31 DIAGNOSIS — C25.9 ADENOCARCINOMA OF PANCREAS (HCC): ICD-10-CM

## 2023-10-31 RX ORDER — PANCRELIPASE 60000; 12000; 38000 [USP'U]/1; [USP'U]/1; [USP'U]/1
12000 CAPSULE, DELAYED RELEASE PELLETS ORAL
Qty: 90 CAPSULE | Refills: 5 | Status: SHIPPED | OUTPATIENT
Start: 2023-10-31

## 2023-11-03 ENCOUNTER — TELEPHONE (OUTPATIENT)
Dept: CARE COORDINATION | Age: 78
End: 2023-11-03

## 2023-11-03 NOTE — TELEPHONE ENCOUNTER
Patient's application for [facility] is mailed this date along with supporting/required documentation supplied by patient. Mailed to [facility]) [facility], [facility] S Jagdish Jones MSW Intern for [employer]

## 2023-11-06 ENCOUNTER — CLINICAL DOCUMENTATION (OUTPATIENT)
Facility: HOSPITAL | Age: 78
End: 2023-11-06

## 2023-11-06 NOTE — PROGRESS NOTES
Talked with pt on 11/3 regarding insurance questions she had in regards to receiving pw from Medicare stating that she may owe a large oop for her treatments. After looking at her accounts they are currently still in insurance, and I told her that as long as she keeps her premiums paid that her Medicare supplement should cover the 20% that Medicare would leave to her. Also, I mentioned she should call Medicare and her supplement company [employer] to verify the information she was given from Medicare, and if her supplement would have any issues in the future covering her medical costs. I said if she had anymore questions to feel free to call me back to speak.

## 2023-11-08 ENCOUNTER — HOSPITAL ENCOUNTER (OUTPATIENT)
Dept: INFUSION THERAPY | Age: 78
Discharge: HOME | End: 2023-11-08
Payer: MEDICARE

## 2023-11-08 ENCOUNTER — SOCIAL WORK (OUTPATIENT)
Dept: HEMATOLOGY | Age: 78
End: 2023-11-08

## 2023-11-08 ENCOUNTER — OFFICE VISIT (OUTPATIENT)
Dept: PALLATIVE CARE | Age: 78
End: 2023-11-08

## 2023-11-08 ENCOUNTER — TELEPHONE (OUTPATIENT)
Dept: PALLATIVE CARE | Age: 78
End: 2023-11-08

## 2023-11-08 VITALS
SYSTOLIC BLOOD PRESSURE: 111 MMHG | OXYGEN SATURATION: 97 % | HEIGHT: 65 IN | BODY MASS INDEX: 24.47 KG/M2 | HEART RATE: 70 BPM | RESPIRATION RATE: 18 BRPM | WEIGHT: 146.9 LBS | TEMPERATURE: 97.3 F | DIASTOLIC BLOOD PRESSURE: 59 MMHG

## 2023-11-08 DIAGNOSIS — C25.9 ADENOCARCINOMA OF PANCREAS (HCC): Primary | ICD-10-CM

## 2023-11-08 DIAGNOSIS — L40.9 PSORIASIS: ICD-10-CM

## 2023-11-08 DIAGNOSIS — Z51.5 ENCOUNTER FOR PALLIATIVE CARE: ICD-10-CM

## 2023-11-08 DIAGNOSIS — C79.9 METASTASIS FROM PANCREATIC CANCER (HCC): ICD-10-CM

## 2023-11-08 DIAGNOSIS — L29.9 PRURITUS: Primary | ICD-10-CM

## 2023-11-08 DIAGNOSIS — R63.0 POOR APPETITE: ICD-10-CM

## 2023-11-08 DIAGNOSIS — C25.9 METASTASIS FROM PANCREATIC CANCER (HCC): ICD-10-CM

## 2023-11-08 DIAGNOSIS — C25.2 MALIGNANT NEOPLASM OF TAIL OF PANCREAS (HCC): ICD-10-CM

## 2023-11-08 DIAGNOSIS — G62.9 NEUROPATHY: ICD-10-CM

## 2023-11-08 LAB
ALBUMIN SERPL-MCNC: 3.4 G/DL (ref 3.5–5.2)
ALP SERPL-CCNC: 100 U/L (ref 35–104)
ALT SERPL-CCNC: 17 U/L (ref 9–52)
ANION GAP SERPL CALCULATED.3IONS-SCNC: 7 MMOL/L (ref 7–19)
AST SERPL-CCNC: 32 U/L (ref 14–36)
BASOPHILS # BLD: 0.08 K/UL (ref 0.01–0.08)
BASOPHILS NFR BLD: 1.1 % (ref 0.1–1.2)
BILIRUB SERPL-MCNC: 0.3 MG/DL (ref 0.2–1.3)
BUN SERPL-MCNC: 10 MG/DL (ref 7–17)
CALCIUM SERPL-MCNC: 9.3 MG/DL (ref 8.4–10.2)
CHLORIDE SERPL-SCNC: 106 MMOL/L (ref 98–111)
CO2 SERPL-SCNC: 26 MMOL/L (ref 22–29)
CREAT SERPL-MCNC: 0.7 MG/DL (ref 0.5–1)
EOSINOPHIL # BLD: 0.33 K/UL (ref 0.04–0.54)
EOSINOPHIL NFR BLD: 4.7 % (ref 0.7–7)
ERYTHROCYTE [DISTWIDTH] IN BLOOD BY AUTOMATED COUNT: 16.6 % (ref 11.7–14.4)
GLOBULIN: 2.9 G/DL
GLUCOSE SERPL-MCNC: 102 MG/DL (ref 74–106)
HCT VFR BLD AUTO: 33 % (ref 34.1–44.9)
HGB BLD-MCNC: 10.8 G/DL (ref 11.2–15.7)
LYMPHOCYTES # BLD: 3.02 K/UL (ref 1.18–3.74)
LYMPHOCYTES NFR BLD: 43.1 % (ref 19.3–53.1)
MCH RBC QN AUTO: 31 PG (ref 25.6–32.2)
MCHC RBC AUTO-ENTMCNC: 32.7 G/DL (ref 32.3–35.5)
MCV RBC AUTO: 94.8 FL (ref 79.4–94.8)
MONOCYTES # BLD: 0.86 K/UL (ref 0.24–0.82)
MONOCYTES NFR BLD: 12.3 % (ref 4.7–12.5)
NEUTROPHILS # BLD: 2.7 K/UL (ref 1.56–6.13)
NEUTS SEG NFR BLD: 38.5 % (ref 34–71.1)
PLATELET # BLD AUTO: 390 K/UL (ref 182–369)
PMV BLD AUTO: 9.7 FL (ref 7.4–10.4)
POTASSIUM SERPL-SCNC: 4.3 MMOL/L (ref 3.5–5.1)
PROT SERPL-MCNC: 6.3 G/DL (ref 6.3–8.2)
RBC # BLD AUTO: 3.48 M/UL (ref 3.93–5.22)
SODIUM SERPL-SCNC: 139 MMOL/L (ref 137–145)
WBC # BLD AUTO: 7.01 K/UL (ref 3.98–10.04)

## 2023-11-08 PROCEDURE — 80053 COMPREHEN METABOLIC PANEL: CPT

## 2023-11-08 PROCEDURE — 96417 CHEMO IV INFUS EACH ADDL SEQ: CPT

## 2023-11-08 PROCEDURE — 96415 CHEMO IV INFUSION ADDL HR: CPT

## 2023-11-08 PROCEDURE — 96376 TX/PRO/DX INJ SAME DRUG ADON: CPT

## 2023-11-08 PROCEDURE — 96413 CHEMO IV INFUSION 1 HR: CPT

## 2023-11-08 PROCEDURE — 36415 COLL VENOUS BLD VENIPUNCTURE: CPT

## 2023-11-08 PROCEDURE — 96375 TX/PRO/DX INJ NEW DRUG ADDON: CPT

## 2023-11-08 PROCEDURE — 96367 TX/PROPH/DG ADDL SEQ IV INF: CPT

## 2023-11-08 PROCEDURE — 2580000003 HC RX 258: Performed by: INTERNAL MEDICINE

## 2023-11-08 PROCEDURE — 6360000002 HC RX W HCPCS: Performed by: INTERNAL MEDICINE

## 2023-11-08 PROCEDURE — G0498 CHEMO EXTEND IV INFUS W/PUMP: HCPCS

## 2023-11-08 PROCEDURE — 85025 COMPLETE CBC W/AUTO DIFF WBC: CPT

## 2023-11-08 RX ORDER — TRIAMCINOLONE ACETONIDE 1 MG/G
CREAM TOPICAL
Qty: 80 G | Refills: 0 | Status: SHIPPED | OUTPATIENT
Start: 2023-11-08

## 2023-11-08 RX ORDER — SODIUM CHLORIDE 9 MG/ML
5-250 INJECTION, SOLUTION INTRAVENOUS PRN
Status: CANCELLED | OUTPATIENT
Start: 2023-11-08

## 2023-11-08 RX ORDER — ACETAMINOPHEN 325 MG/1
650 TABLET ORAL
Status: CANCELLED | OUTPATIENT
Start: 2023-11-08

## 2023-11-08 RX ORDER — MEPERIDINE HYDROCHLORIDE 50 MG/ML
12.5 INJECTION INTRAMUSCULAR; INTRAVENOUS; SUBCUTANEOUS PRN
Status: CANCELLED | OUTPATIENT
Start: 2023-11-08

## 2023-11-08 RX ORDER — DEXAMETHASONE SODIUM PHOSPHATE 10 MG/ML
10 INJECTION, SOLUTION INTRAMUSCULAR; INTRAVENOUS ONCE
Status: COMPLETED | OUTPATIENT
Start: 2023-11-08 | End: 2023-11-08

## 2023-11-08 RX ORDER — DIPHENHYDRAMINE HYDROCHLORIDE 50 MG/ML
50 INJECTION INTRAMUSCULAR; INTRAVENOUS
Status: CANCELLED | OUTPATIENT
Start: 2023-11-08

## 2023-11-08 RX ORDER — FAMOTIDINE 10 MG/ML
20 INJECTION, SOLUTION INTRAVENOUS
Status: CANCELLED | OUTPATIENT
Start: 2023-11-08

## 2023-11-08 RX ORDER — ONDANSETRON 2 MG/ML
8 INJECTION INTRAMUSCULAR; INTRAVENOUS
Status: CANCELLED | OUTPATIENT
Start: 2023-11-08

## 2023-11-08 RX ORDER — ATROPINE SULFATE 0.4 MG/ML
0.4 INJECTION, SOLUTION INTRAVENOUS
Status: DISCONTINUED | OUTPATIENT
Start: 2023-11-08 | End: 2023-11-10 | Stop reason: HOSPADM

## 2023-11-08 RX ORDER — SODIUM CHLORIDE 9 MG/ML
5-250 INJECTION, SOLUTION INTRAVENOUS PRN
OUTPATIENT
Start: 2023-11-11

## 2023-11-08 RX ORDER — SODIUM CHLORIDE 9 MG/ML
INJECTION, SOLUTION INTRAVENOUS CONTINUOUS
Status: CANCELLED | OUTPATIENT
Start: 2023-11-08

## 2023-11-08 RX ORDER — PALONOSETRON 0.05 MG/ML
0.25 INJECTION, SOLUTION INTRAVENOUS ONCE
Status: CANCELLED | OUTPATIENT
Start: 2023-11-08 | End: 2023-11-08

## 2023-11-08 RX ORDER — EPINEPHRINE 1 MG/ML
0.3 INJECTION, SOLUTION, CONCENTRATE INTRAVENOUS PRN
Status: CANCELLED | OUTPATIENT
Start: 2023-11-08

## 2023-11-08 RX ORDER — SODIUM CHLORIDE 0.9 % (FLUSH) 0.9 %
5-40 SYRINGE (ML) INJECTION PRN
Status: CANCELLED | OUTPATIENT
Start: 2023-11-11

## 2023-11-08 RX ORDER — ALBUTEROL SULFATE 90 UG/1
4 AEROSOL, METERED RESPIRATORY (INHALATION) PRN
Status: CANCELLED | OUTPATIENT
Start: 2023-11-08

## 2023-11-08 RX ORDER — ATROPINE SULFATE 0.4 MG/ML
0.4 INJECTION, SOLUTION INTRAVENOUS ONCE
Status: COMPLETED | OUTPATIENT
Start: 2023-11-08 | End: 2023-11-08

## 2023-11-08 RX ORDER — HEPARIN SODIUM (PORCINE) LOCK FLUSH IV SOLN 100 UNIT/ML 100 UNIT/ML
500 SOLUTION INTRAVENOUS PRN
Status: CANCELLED | OUTPATIENT
Start: 2023-11-11

## 2023-11-08 RX ORDER — HEPARIN SODIUM (PORCINE) LOCK FLUSH IV SOLN 100 UNIT/ML 100 UNIT/ML
500 SOLUTION INTRAVENOUS PRN
Status: CANCELLED | OUTPATIENT
Start: 2023-11-08

## 2023-11-08 RX ORDER — PALONOSETRON 0.05 MG/ML
0.25 INJECTION, SOLUTION INTRAVENOUS ONCE
Status: COMPLETED | OUTPATIENT
Start: 2023-11-08 | End: 2023-11-08

## 2023-11-08 RX ORDER — HYDROXYZINE PAMOATE 25 MG/1
25 CAPSULE ORAL 3 TIMES DAILY PRN
Qty: 60 CAPSULE | Refills: 0 | Status: SHIPPED | OUTPATIENT
Start: 2023-11-08

## 2023-11-08 RX ORDER — SODIUM CHLORIDE 0.9 % (FLUSH) 0.9 %
5-40 SYRINGE (ML) INJECTION PRN
Status: CANCELLED | OUTPATIENT
Start: 2023-11-08

## 2023-11-08 RX ADMIN — FLUOROURACIL 4000 MG: 50 INJECTION, SOLUTION INTRAVENOUS at 11:41

## 2023-11-08 RX ADMIN — LEUCOVORIN CALCIUM 200 MG: 200 INJECTION, POWDER, LYOPHILIZED, FOR SOLUTION INTRAMUSCULAR; INTRAVENOUS at 11:15

## 2023-11-08 RX ADMIN — FOSAPREPITANT 150 MG: 150 INJECTION, POWDER, LYOPHILIZED, FOR SOLUTION INTRAVENOUS at 08:56

## 2023-11-08 RX ADMIN — PALONOSETRON 0.25 MG: 0.05 INJECTION, SOLUTION INTRAVENOUS at 08:58

## 2023-11-08 RX ADMIN — ATROPINE SULFATE 0.4 MG: 0.4 INJECTION, SOLUTION INTRAVENOUS at 11:01

## 2023-11-08 RX ADMIN — IRINOTECAN HYDROCHLORIDE 86 MG: 4.3 INJECTION, POWDER, FOR SOLUTION INTRAVENOUS at 09:22

## 2023-11-08 RX ADMIN — DEXAMETHASONE SODIUM PHOSPHATE 10 MG: 10 INJECTION INTRAMUSCULAR; INTRAVENOUS at 08:58

## 2023-11-08 RX ADMIN — ATROPINE SULFATE 0.4 MG: 0.4 INJECTION, SOLUTION INTRAVENOUS at 09:23

## 2023-11-08 NOTE — PROGRESS NOTES
JOELLE Reed met with patient this day during her chemotherapy for follow up. Patient states she is doing well at this time. She reports she has had two \"great\" days but is feeling fatigued this day. Patient describes a great day as a day she is able to get out and drive herself as well as feeling more strength. Patient states she went to see her aunt and met her daughters for lunch this week which brought the patient ba. Patient reports she has not called her insurnace yet as Nayla Palafox had advised. Patient also states she is wanting to complete advanced directives with this SW but does not feel up to it this day. Patient asks if she can meet with this SW on Friday following her pump off appointment. This SW agrees to this and will work with the patient to complete the advanced directives.

## 2023-11-08 NOTE — PROGRESS NOTES
Lab Results   Component Value Date    WBC 7.01 11/08/2023    HGB 10.8 (L) 11/08/2023    HCT 33.0 (L) 11/08/2023    MCV 94.8 11/08/2023     (H) 11/08/2023     Lab Results   Component Value Date    NEUTROABS 2.70 11/08/2023

## 2023-11-10 ENCOUNTER — HOSPITAL ENCOUNTER (OUTPATIENT)
Dept: INFUSION THERAPY | Age: 78
Discharge: HOME | End: 2023-11-10
Payer: MEDICARE

## 2023-11-10 DIAGNOSIS — C79.9 METASTASIS FROM PANCREATIC CANCER (HCC): Primary | ICD-10-CM

## 2023-11-10 DIAGNOSIS — C25.9 METASTASIS FROM PANCREATIC CANCER (HCC): Primary | ICD-10-CM

## 2023-11-10 DIAGNOSIS — C25.2 MALIGNANT NEOPLASM OF TAIL OF PANCREAS (HCC): ICD-10-CM

## 2023-11-10 PROCEDURE — 96523 IRRIG DRUG DELIVERY DEVICE: CPT

## 2023-11-10 PROCEDURE — 2580000003 HC RX 258: Performed by: INTERNAL MEDICINE

## 2023-11-10 PROCEDURE — 6360000002 HC RX W HCPCS: Performed by: INTERNAL MEDICINE

## 2023-11-10 RX ORDER — HEPARIN 100 UNIT/ML
500 SYRINGE INTRAVENOUS PRN
Status: DISCONTINUED | OUTPATIENT
Start: 2023-11-10 | End: 2023-11-11 | Stop reason: HOSPADM

## 2023-11-10 RX ORDER — SODIUM CHLORIDE 0.9 % (FLUSH) 0.9 %
5-40 SYRINGE (ML) INJECTION PRN
Status: DISCONTINUED | OUTPATIENT
Start: 2023-11-10 | End: 2023-11-11 | Stop reason: HOSPADM

## 2023-11-10 RX ADMIN — SODIUM CHLORIDE, PRESERVATIVE FREE 10 ML: 5 INJECTION INTRAVENOUS at 13:48

## 2023-11-10 RX ADMIN — HEPARIN 500 UNITS: 100 SYRINGE at 13:48

## 2023-11-12 DIAGNOSIS — E87.6 HYPOKALEMIA: ICD-10-CM

## 2023-11-13 RX ORDER — POTASSIUM CHLORIDE 750 MG/1
20 CAPSULE, EXTENDED RELEASE ORAL 2 TIMES DAILY
Qty: 360 CAPSULE | Refills: 1 | Status: SHIPPED | OUTPATIENT
Start: 2023-11-13

## 2023-11-13 NOTE — PROGRESS NOTES
positive completed by Dr. Amos Perez at [facility] 6/20/2021  -She had initial adjuvant treatment with Gemzar and Xeloda completed 2/25/2022  -Recurrence noted September 2022 and palliative Gemzar Abraxane initiated 10/14/2022  -Progression noted on imaging 7/2023  - new pulmonary nodules concerning for metastatic disease. Small right and trace left pleural effusions are new/worsening when compared to the prior with right lower lobe atelectasis. Redemonstration of a 7.6 cm subcapsular collection and/or subcapsular lesion in the hepatic dome, minimally decreased. Several additional subcapsular lesions and/or subcapsular collections that may be present subcapsular metastatic implants some of which are new. Additionally liver lesion may be parenchymal or subcapsular may be metastatic. Multifocal new peritoneal/omental/mesenteric nodules, most consistent with metastasis/carcinomatosis. New small volume ascites, some of which appears compartmentalize or partially, mineralized, may be malignant ascites  -Treatment changed to Onivyde, leucovorin, CI 5-FU every 2 weeks initiated 8/8/2023. -CT scans 2/9/2023 showed stable disease. HPI AND VISIT SUMMARY:   I saw Luz Marina Bryant in her treatment room at [facility] office. She is known to be awake, alert, sitting in chair in no acute distress. We discussed interval history since last visit. She reports having several good days recently. She is little stronger. She has been able to be more active. She has had Marinol. Appetite has improved some. She is she is able to eat more the time. Her weight is overall stable. Most bothersome to her is psoriasis. It has flared since initiation of the treatment and seems be worse lately. She has follow-up scheduled with dermatology. We will go ahead and refill her triamcinolone. She itching which is quite bothersome. Benadryl does help some.   Discussed trying Vistaril see if it helps

## 2023-11-14 ENCOUNTER — CLINICAL DOCUMENTATION (OUTPATIENT)
Dept: HEMATOLOGY | Age: 78
End: 2023-11-14

## 2023-11-21 NOTE — PROGRESS NOTES
in the right upper lobe posteriorly measuring 0.3 cm. A 0.4 cm nodule in the right lower lobe . A solid nodule in the measuring 0.3 cm is unchanged. There is a stable cystic lesion at the dome of the right hepatic lobe measuring 8.1 cm. Possible new lymphadenopathy the left-sided thoracic inlet. Probable metastatic right cardiophrenic angle lymph node. Trace pleural effusions. Suggestion of peritoneal carcinomatosis. Stable cyst at the dome of the right hepatic lobe. Trace ascites. 10/09/23 CT Abd/Pelvis W IV Contrast (oral) Stable CT of the abdomen and pelvis. ASSESSMENT:    Orders Placed This Encounter   Procedures    CBC with Auto Differential     Standing Status:   Future     Number of Occurrences:   1     Standing Expiration Date:   11/27/2024    Comprehensive Metabolic Panel     Standing Status:   Future     Number of Occurrences:   1     Standing Expiration Date:   11/27/2024    Cancer Antigen 19-9     Standing Status:   Future     Number of Occurrences:   1     Standing Expiration Date:   11/27/2024    CEA     Standing Status:   Future     Number of Occurrences:   1     Standing Expiration Date:   11/27/2024        Damon Pulido was seen today for cancer. Diagnoses and all orders for this visit:    Malignant neoplasm of tail of pancreas ([address])  -     CBC with Auto Differential; Future  -     Comprehensive Metabolic Panel; Future  -     Cancer Antigen 19-9; Future  -     CEA; Future  -     Cancel: atropine injection 0.4 mg  -     Cancel: irinotecan liposomal (ONIVYDE) 86 mg in sodium chloride 0.9 % 500 mL chemo IVPB  -     Cancel: leucovorin calcium (WELLCOVORIN) 200 mg in sodium chloride 0.9 % 100 mL IVPB  -     Cancel: fluorouracil (ADRUCIL) 4,000 mg in sodium chloride 0.9 % 250 mL chemo infusion    Chemotherapy management, encounter for  -     CBC with Auto Differential; Future  -     Comprehensive Metabolic Panel; Future  -     Cancer Antigen 19-9; Future  -     CEA;  Future    Adverse effect of chemotherapy,

## 2023-11-27 ENCOUNTER — HOSPITAL ENCOUNTER (OUTPATIENT)
Dept: INFUSION THERAPY | Age: 78
Discharge: HOME | End: 2023-11-27
Payer: MEDICARE

## 2023-11-27 ENCOUNTER — OFFICE VISIT (OUTPATIENT)
Dept: HEMATOLOGY | Age: 78
End: 2023-11-27
Payer: MEDICARE

## 2023-11-27 VITALS
OXYGEN SATURATION: 99 % | HEIGHT: 65 IN | DIASTOLIC BLOOD PRESSURE: 80 MMHG | TEMPERATURE: 97.4 F | HEART RATE: 64 BPM | SYSTOLIC BLOOD PRESSURE: 142 MMHG | RESPIRATION RATE: 18 BRPM | WEIGHT: 145.4 LBS | BODY MASS INDEX: 24.22 KG/M2

## 2023-11-27 DIAGNOSIS — C25.9 METASTASIS FROM PANCREATIC CANCER (HCC): ICD-10-CM

## 2023-11-27 DIAGNOSIS — C25.2 MALIGNANT NEOPLASM OF TAIL OF PANCREAS (HCC): ICD-10-CM

## 2023-11-27 DIAGNOSIS — R53.83 CHEMOTHERAPY-INDUCED FATIGUE: ICD-10-CM

## 2023-11-27 DIAGNOSIS — C79.9 METASTASIS FROM PANCREATIC CANCER (HCC): ICD-10-CM

## 2023-11-27 DIAGNOSIS — T45.1X5D ADVERSE EFFECT OF CHEMOTHERAPY, SUBSEQUENT ENCOUNTER: ICD-10-CM

## 2023-11-27 DIAGNOSIS — C25.2 MALIGNANT NEOPLASM OF TAIL OF PANCREAS (HCC): Primary | ICD-10-CM

## 2023-11-27 DIAGNOSIS — T45.1X5A CHEMOTHERAPY-INDUCED FATIGUE: ICD-10-CM

## 2023-11-27 DIAGNOSIS — Z51.11 CHEMOTHERAPY MANAGEMENT, ENCOUNTER FOR: ICD-10-CM

## 2023-11-27 DIAGNOSIS — Z71.89 CARE PLAN DISCUSSED WITH PATIENT: ICD-10-CM

## 2023-11-27 LAB
ALBUMIN SERPL-MCNC: 3.5 G/DL (ref 3.5–5.2)
ALP SERPL-CCNC: 112 U/L (ref 35–104)
ALT SERPL-CCNC: 17 U/L (ref 9–52)
ANION GAP SERPL CALCULATED.3IONS-SCNC: 5 MMOL/L (ref 7–19)
AST SERPL-CCNC: 39 U/L (ref 14–36)
BASOPHILS # BLD: 0.08 K/UL (ref 0.01–0.08)
BASOPHILS NFR BLD: 1 % (ref 0.1–1.2)
BILIRUB SERPL-MCNC: 0.3 MG/DL (ref 0.2–1.3)
BUN SERPL-MCNC: 13 MG/DL (ref 7–17)
CALCIUM SERPL-MCNC: 9.4 MG/DL (ref 8.4–10.2)
CANCER AG19-9 SERPL-ACNC: 258 U/ML (ref 0–35)
CEA SERPL-MCNC: 6.1 NG/ML (ref 0–4.7)
CHLORIDE SERPL-SCNC: 105 MMOL/L (ref 98–111)
CO2 SERPL-SCNC: 29 MMOL/L (ref 22–29)
CREAT SERPL-MCNC: 0.6 MG/DL (ref 0.5–1)
EOSINOPHIL # BLD: 0.54 K/UL (ref 0.04–0.54)
EOSINOPHIL NFR BLD: 6.8 % (ref 0.7–7)
ERYTHROCYTE [DISTWIDTH] IN BLOOD BY AUTOMATED COUNT: 16.6 % (ref 11.7–14.4)
GLOBULIN: 3 G/DL
GLUCOSE SERPL-MCNC: 78 MG/DL (ref 74–106)
HCT VFR BLD AUTO: 34.2 % (ref 34.1–44.9)
HGB BLD-MCNC: 11.3 G/DL (ref 11.2–15.7)
LYMPHOCYTES # BLD: 4.18 K/UL (ref 1.18–3.74)
LYMPHOCYTES NFR BLD: 52.6 % (ref 19.3–53.1)
MCH RBC QN AUTO: 31.4 PG (ref 25.6–32.2)
MCHC RBC AUTO-ENTMCNC: 33 G/DL (ref 32.3–35.5)
MCV RBC AUTO: 95 FL (ref 79.4–94.8)
MONOCYTES # BLD: 1.45 K/UL (ref 0.24–0.82)
MONOCYTES NFR BLD: 18.3 % (ref 4.7–12.5)
NEUTROPHILS # BLD: 1.64 K/UL (ref 1.56–6.13)
NEUTS SEG NFR BLD: 20.7 % (ref 34–71.1)
PLATELET # BLD AUTO: 389 K/UL (ref 182–369)
PMV BLD AUTO: 10.2 FL (ref 7.4–10.4)
POTASSIUM SERPL-SCNC: 3.9 MMOL/L (ref 3.5–5.1)
PROT SERPL-MCNC: 6.5 G/DL (ref 6.3–8.2)
RBC # BLD AUTO: 3.6 M/UL (ref 3.93–5.22)
SODIUM SERPL-SCNC: 139 MMOL/L (ref 137–145)
WBC # BLD AUTO: 7.94 K/UL (ref 3.98–10.04)

## 2023-11-27 PROCEDURE — 80053 COMPREHEN METABOLIC PANEL: CPT

## 2023-11-27 PROCEDURE — G8399 PT W/DXA RESULTS DOCUMENT: HCPCS | Performed by: INTERNAL MEDICINE

## 2023-11-27 PROCEDURE — 96415 CHEMO IV INFUSION ADDL HR: CPT

## 2023-11-27 PROCEDURE — G0498 CHEMO EXTEND IV INFUS W/PUMP: HCPCS

## 2023-11-27 PROCEDURE — 2580000003 HC RX 258: Performed by: INTERNAL MEDICINE

## 2023-11-27 PROCEDURE — 96367 TX/PROPH/DG ADDL SEQ IV INF: CPT

## 2023-11-27 PROCEDURE — 96413 CHEMO IV INFUSION 1 HR: CPT

## 2023-11-27 PROCEDURE — 6360000002 HC RX W HCPCS: Performed by: INTERNAL MEDICINE

## 2023-11-27 PROCEDURE — G8484 FLU IMMUNIZE NO ADMIN: HCPCS | Performed by: INTERNAL MEDICINE

## 2023-11-27 PROCEDURE — 1090F PRES/ABSN URINE INCON ASSESS: CPT | Performed by: INTERNAL MEDICINE

## 2023-11-27 PROCEDURE — G8428 CUR MEDS NOT DOCUMENT: HCPCS | Performed by: INTERNAL MEDICINE

## 2023-11-27 PROCEDURE — 96375 TX/PRO/DX INJ NEW DRUG ADDON: CPT

## 2023-11-27 PROCEDURE — 85025 COMPLETE CBC W/AUTO DIFF WBC: CPT

## 2023-11-27 PROCEDURE — 1123F ACP DISCUSS/DSCN MKR DOCD: CPT | Performed by: INTERNAL MEDICINE

## 2023-11-27 PROCEDURE — G8420 CALC BMI NORM PARAMETERS: HCPCS | Performed by: INTERNAL MEDICINE

## 2023-11-27 PROCEDURE — 1036F TOBACCO NON-USER: CPT | Performed by: INTERNAL MEDICINE

## 2023-11-27 PROCEDURE — 36415 COLL VENOUS BLD VENIPUNCTURE: CPT

## 2023-11-27 PROCEDURE — 96376 TX/PRO/DX INJ SAME DRUG ADON: CPT

## 2023-11-27 PROCEDURE — 99214 OFFICE O/P EST MOD 30 MIN: CPT | Performed by: INTERNAL MEDICINE

## 2023-11-27 RX ORDER — SODIUM CHLORIDE 9 MG/ML
5-250 INJECTION, SOLUTION INTRAVENOUS PRN
Status: CANCELLED | OUTPATIENT
Start: 2023-11-30

## 2023-11-27 RX ORDER — SODIUM CHLORIDE 9 MG/ML
5-250 INJECTION, SOLUTION INTRAVENOUS PRN
Status: DISCONTINUED | OUTPATIENT
Start: 2023-11-27 | End: 2023-11-28 | Stop reason: HOSPADM

## 2023-11-27 RX ORDER — MEPERIDINE HYDROCHLORIDE 50 MG/ML
12.5 INJECTION INTRAMUSCULAR; INTRAVENOUS; SUBCUTANEOUS PRN
Status: CANCELLED | OUTPATIENT
Start: 2023-11-27

## 2023-11-27 RX ORDER — FAMOTIDINE 10 MG/ML
20 INJECTION, SOLUTION INTRAVENOUS
Status: CANCELLED | OUTPATIENT
Start: 2023-11-27

## 2023-11-27 RX ORDER — SODIUM CHLORIDE 0.9 % (FLUSH) 0.9 %
5-40 SYRINGE (ML) INJECTION PRN
Status: CANCELLED | OUTPATIENT
Start: 2023-11-30

## 2023-11-27 RX ORDER — PALONOSETRON 0.05 MG/ML
0.25 INJECTION, SOLUTION INTRAVENOUS ONCE
Status: COMPLETED | OUTPATIENT
Start: 2023-11-27 | End: 2023-11-27

## 2023-11-27 RX ORDER — DIPHENHYDRAMINE HYDROCHLORIDE 50 MG/ML
50 INJECTION INTRAMUSCULAR; INTRAVENOUS
Status: CANCELLED | OUTPATIENT
Start: 2023-11-27

## 2023-11-27 RX ORDER — ACETAMINOPHEN 325 MG/1
650 TABLET ORAL
Status: CANCELLED | OUTPATIENT
Start: 2023-11-27

## 2023-11-27 RX ORDER — ALBUTEROL SULFATE 90 UG/1
4 AEROSOL, METERED RESPIRATORY (INHALATION) PRN
Status: CANCELLED | OUTPATIENT
Start: 2023-11-27

## 2023-11-27 RX ORDER — EPINEPHRINE 1 MG/ML
0.3 INJECTION, SOLUTION, CONCENTRATE INTRAVENOUS PRN
Status: CANCELLED | OUTPATIENT
Start: 2023-11-27

## 2023-11-27 RX ORDER — SODIUM CHLORIDE 9 MG/ML
INJECTION, SOLUTION INTRAVENOUS CONTINUOUS
Status: CANCELLED | OUTPATIENT
Start: 2023-11-27

## 2023-11-27 RX ORDER — ATROPINE SULFATE 0.4 MG/ML
0.4 INJECTION, SOLUTION INTRAVENOUS ONCE
Status: DISCONTINUED | OUTPATIENT
Start: 2023-11-27 | End: 2023-11-27 | Stop reason: SDUPTHER

## 2023-11-27 RX ORDER — SODIUM CHLORIDE 0.9 % (FLUSH) 0.9 %
5-40 SYRINGE (ML) INJECTION PRN
Status: CANCELLED | OUTPATIENT
Start: 2023-11-27

## 2023-11-27 RX ORDER — HEPARIN SODIUM (PORCINE) LOCK FLUSH IV SOLN 100 UNIT/ML 100 UNIT/ML
500 SOLUTION INTRAVENOUS PRN
Status: CANCELLED | OUTPATIENT
Start: 2023-11-30

## 2023-11-27 RX ORDER — HEPARIN SODIUM (PORCINE) LOCK FLUSH IV SOLN 100 UNIT/ML 100 UNIT/ML
500 SOLUTION INTRAVENOUS PRN
Status: CANCELLED | OUTPATIENT
Start: 2023-11-27

## 2023-11-27 RX ORDER — DEXAMETHASONE SODIUM PHOSPHATE 10 MG/ML
10 INJECTION, SOLUTION INTRAMUSCULAR; INTRAVENOUS ONCE
Status: COMPLETED | OUTPATIENT
Start: 2023-11-27 | End: 2023-11-27

## 2023-11-27 RX ORDER — SODIUM CHLORIDE 9 MG/ML
5-250 INJECTION, SOLUTION INTRAVENOUS PRN
Status: CANCELLED | OUTPATIENT
Start: 2023-11-27

## 2023-11-27 RX ORDER — ATROPINE SULFATE 0.4 MG/ML
0.4 INJECTION, SOLUTION INTRAVENOUS
Status: DISCONTINUED | OUTPATIENT
Start: 2023-11-27 | End: 2023-11-29 | Stop reason: HOSPADM

## 2023-11-27 RX ORDER — ONDANSETRON 2 MG/ML
8 INJECTION INTRAMUSCULAR; INTRAVENOUS
Status: CANCELLED | OUTPATIENT
Start: 2023-11-27

## 2023-11-27 RX ORDER — HYDROXYZINE PAMOATE 25 MG/1
25 CAPSULE ORAL 3 TIMES DAILY PRN
Qty: 60 CAPSULE | Refills: 1 | Status: SHIPPED | OUTPATIENT
Start: 2023-11-27

## 2023-11-27 RX ADMIN — FOSAPREPITANT 150 MG: 150 INJECTION, POWDER, LYOPHILIZED, FOR SOLUTION INTRAVENOUS at 10:34

## 2023-11-27 RX ADMIN — FLUOROURACIL 4000 MG: 50 INJECTION, SOLUTION INTRAVENOUS at 13:40

## 2023-11-27 RX ADMIN — DEXAMETHASONE SODIUM PHOSPHATE 10 MG: 10 INJECTION, SOLUTION INTRAMUSCULAR; INTRAVENOUS at 10:35

## 2023-11-27 RX ADMIN — LEUCOVORIN CALCIUM 200 MG: 200 INJECTION, POWDER, LYOPHILIZED, FOR SOLUTION INTRAMUSCULAR; INTRAVENOUS at 13:05

## 2023-11-27 RX ADMIN — IRINOTECAN HYDROCHLORIDE 86 MG: 4.3 INJECTION, POWDER, FOR SOLUTION INTRAVENOUS at 11:18

## 2023-11-27 RX ADMIN — ATROPINE SULFATE 0.4 MG: 0.4 INJECTION, SOLUTION INTRAVENOUS at 11:15

## 2023-11-27 RX ADMIN — SODIUM CHLORIDE 75 ML/HR: 9 INJECTION, SOLUTION INTRAVENOUS at 10:34

## 2023-11-27 RX ADMIN — PALONOSETRON 0.25 MG: 0.05 INJECTION, SOLUTION INTRAVENOUS at 10:34

## 2023-11-27 RX ADMIN — ATROPINE SULFATE 0.4 MG: 0.4 INJECTION, SOLUTION INTRAVENOUS at 13:05

## 2023-11-29 ENCOUNTER — OFFICE VISIT (OUTPATIENT)
Dept: HEMATOLOGY | Age: 78
End: 2023-11-29

## 2023-11-29 ENCOUNTER — HOSPITAL ENCOUNTER (OUTPATIENT)
Dept: INFUSION THERAPY | Age: 78
Discharge: HOME | End: 2023-11-29
Payer: MEDICARE

## 2023-11-29 DIAGNOSIS — Z78.9 NEEDS ASSISTANCE WITH COMMUNITY RESOURCES: Primary | ICD-10-CM

## 2023-11-29 DIAGNOSIS — C25.9 METASTASIS FROM PANCREATIC CANCER (HCC): Primary | ICD-10-CM

## 2023-11-29 DIAGNOSIS — C79.9 METASTASIS FROM PANCREATIC CANCER (HCC): Primary | ICD-10-CM

## 2023-11-29 DIAGNOSIS — C25.2 MALIGNANT NEOPLASM OF TAIL OF PANCREAS (HCC): ICD-10-CM

## 2023-11-29 PROCEDURE — 2580000003 HC RX 258: Performed by: INTERNAL MEDICINE

## 2023-11-29 PROCEDURE — 96523 IRRIG DRUG DELIVERY DEVICE: CPT

## 2023-11-29 PROCEDURE — 6360000002 HC RX W HCPCS: Performed by: INTERNAL MEDICINE

## 2023-11-29 PROCEDURE — NBSRV NON-BILLABLE SERVICE: Performed by: INTERNAL MEDICINE

## 2023-11-29 RX ORDER — HEPARIN 100 UNIT/ML
500 SYRINGE INTRAVENOUS PRN
Status: DISCONTINUED | OUTPATIENT
Start: 2023-11-29 | End: 2023-11-30 | Stop reason: HOSPADM

## 2023-11-29 RX ORDER — SODIUM CHLORIDE 0.9 % (FLUSH) 0.9 %
5-40 SYRINGE (ML) INJECTION PRN
Status: DISCONTINUED | OUTPATIENT
Start: 2023-11-29 | End: 2023-11-30 | Stop reason: HOSPADM

## 2023-11-29 RX ADMIN — SODIUM CHLORIDE, PRESERVATIVE FREE 10 ML: 5 INJECTION INTRAVENOUS at 12:20

## 2023-11-29 RX ADMIN — HEPARIN 500 UNITS: 100 SYRINGE at 12:20

## 2023-11-30 NOTE — PROGRESS NOTES
JOELLE Méndez completed visit with patient and her spouse. This SW provided the notarized original copies of the patients completed Living Will and EMS DNR. This SW also assisted the patients spouse with completing and notarizing a Living Will and EMS DNR.

## 2023-12-11 ENCOUNTER — HOSPITAL ENCOUNTER (OUTPATIENT)
Dept: INFUSION THERAPY | Age: 78
Discharge: HOME | End: 2023-12-11
Payer: MEDICARE

## 2023-12-11 VITALS
OXYGEN SATURATION: 96 % | TEMPERATURE: 97.5 F | RESPIRATION RATE: 18 BRPM | BODY MASS INDEX: 24.49 KG/M2 | SYSTOLIC BLOOD PRESSURE: 116 MMHG | HEIGHT: 65 IN | WEIGHT: 147 LBS | HEART RATE: 67 BPM | DIASTOLIC BLOOD PRESSURE: 67 MMHG

## 2023-12-11 DIAGNOSIS — R97.8 ABNORMAL TUMOR MARKERS: ICD-10-CM

## 2023-12-11 DIAGNOSIS — C25.2 MALIGNANT NEOPLASM OF TAIL OF PANCREAS (HCC): Primary | ICD-10-CM

## 2023-12-11 DIAGNOSIS — C25.2 MALIGNANT NEOPLASM OF TAIL OF PANCREAS (HCC): ICD-10-CM

## 2023-12-11 DIAGNOSIS — C25.9 METASTASIS FROM PANCREATIC CANCER (HCC): ICD-10-CM

## 2023-12-11 DIAGNOSIS — C79.9 METASTASIS FROM PANCREATIC CANCER (HCC): ICD-10-CM

## 2023-12-11 LAB
ALBUMIN SERPL-MCNC: 3.4 G/DL (ref 3.5–5.2)
ALP SERPL-CCNC: 108 U/L (ref 35–104)
ALT SERPL-CCNC: 17 U/L (ref 9–52)
ANION GAP SERPL CALCULATED.3IONS-SCNC: 2 MMOL/L (ref 7–19)
AST SERPL-CCNC: 31 U/L (ref 14–36)
BASOPHILS # BLD: 0.06 K/UL (ref 0.01–0.08)
BASOPHILS NFR BLD: 1 % (ref 0.1–1.2)
BILIRUB SERPL-MCNC: <0.2 MG/DL (ref 0.2–1.3)
BUN SERPL-MCNC: 13 MG/DL (ref 7–17)
CALCIUM SERPL-MCNC: 9.1 MG/DL (ref 8.4–10.2)
CHLORIDE SERPL-SCNC: 107 MMOL/L (ref 98–111)
CO2 SERPL-SCNC: 28 MMOL/L (ref 22–29)
CREAT SERPL-MCNC: 0.8 MG/DL (ref 0.5–1)
EOSINOPHIL # BLD: 0.48 K/UL (ref 0.04–0.54)
EOSINOPHIL NFR BLD: 8 % (ref 0.7–7)
ERYTHROCYTE [DISTWIDTH] IN BLOOD BY AUTOMATED COUNT: 16.3 % (ref 11.7–14.4)
GLOBULIN: 2.8 G/DL
GLUCOSE SERPL-MCNC: 116 MG/DL (ref 74–106)
HCT VFR BLD AUTO: 32.1 % (ref 34.1–44.9)
HGB BLD-MCNC: 10.6 G/DL (ref 11.2–15.7)
LYMPHOCYTES # BLD: 2.69 K/UL (ref 1.18–3.74)
LYMPHOCYTES NFR BLD: 44.8 % (ref 19.3–53.1)
MCH RBC QN AUTO: 31.6 PG (ref 25.6–32.2)
MCHC RBC AUTO-ENTMCNC: 33 G/DL (ref 32.3–35.5)
MCV RBC AUTO: 95.8 FL (ref 79.4–94.8)
MONOCYTES # BLD: 0.69 K/UL (ref 0.24–0.82)
MONOCYTES NFR BLD: 11.5 % (ref 4.7–12.5)
NEUTROPHILS # BLD: 2.07 K/UL (ref 1.56–6.13)
NEUTS SEG NFR BLD: 34.5 % (ref 34–71.1)
PLATELET # BLD AUTO: 276 K/UL (ref 182–369)
PMV BLD AUTO: 9.8 FL (ref 7.4–10.4)
POTASSIUM SERPL-SCNC: 4.1 MMOL/L (ref 3.5–5.1)
PROT SERPL-MCNC: 6.2 G/DL (ref 6.3–8.2)
RBC # BLD AUTO: 3.35 M/UL (ref 3.93–5.22)
SODIUM SERPL-SCNC: 137 MMOL/L (ref 137–145)
WBC # BLD AUTO: 6 K/UL (ref 3.98–10.04)

## 2023-12-11 PROCEDURE — 6360000002 HC RX W HCPCS: Performed by: INTERNAL MEDICINE

## 2023-12-11 PROCEDURE — 80053 COMPREHEN METABOLIC PANEL: CPT

## 2023-12-11 PROCEDURE — 96413 CHEMO IV INFUSION 1 HR: CPT

## 2023-12-11 PROCEDURE — 96376 TX/PRO/DX INJ SAME DRUG ADON: CPT

## 2023-12-11 PROCEDURE — 85025 COMPLETE CBC W/AUTO DIFF WBC: CPT

## 2023-12-11 PROCEDURE — 36415 COLL VENOUS BLD VENIPUNCTURE: CPT

## 2023-12-11 PROCEDURE — 96375 TX/PRO/DX INJ NEW DRUG ADDON: CPT

## 2023-12-11 PROCEDURE — 96367 TX/PROPH/DG ADDL SEQ IV INF: CPT

## 2023-12-11 PROCEDURE — G0498 CHEMO EXTEND IV INFUS W/PUMP: HCPCS

## 2023-12-11 PROCEDURE — 96415 CHEMO IV INFUSION ADDL HR: CPT

## 2023-12-11 PROCEDURE — 2580000003 HC RX 258: Performed by: INTERNAL MEDICINE

## 2023-12-11 RX ORDER — SODIUM CHLORIDE 9 MG/ML
5-250 INJECTION, SOLUTION INTRAVENOUS PRN
OUTPATIENT
Start: 2023-12-14

## 2023-12-11 RX ORDER — DIPHENHYDRAMINE HYDROCHLORIDE 50 MG/ML
50 INJECTION INTRAMUSCULAR; INTRAVENOUS
Status: CANCELLED | OUTPATIENT
Start: 2023-12-11

## 2023-12-11 RX ORDER — SODIUM CHLORIDE 9 MG/ML
5-250 INJECTION, SOLUTION INTRAVENOUS PRN
Status: CANCELLED | OUTPATIENT
Start: 2023-12-11

## 2023-12-11 RX ORDER — SODIUM CHLORIDE 0.9 % (FLUSH) 0.9 %
5-40 SYRINGE (ML) INJECTION PRN
Status: CANCELLED | OUTPATIENT
Start: 2023-12-11

## 2023-12-11 RX ORDER — ONDANSETRON 2 MG/ML
8 INJECTION INTRAMUSCULAR; INTRAVENOUS
Status: CANCELLED | OUTPATIENT
Start: 2023-12-11

## 2023-12-11 RX ORDER — SODIUM CHLORIDE 9 MG/ML
INJECTION, SOLUTION INTRAVENOUS CONTINUOUS
Status: CANCELLED | OUTPATIENT
Start: 2023-12-11

## 2023-12-11 RX ORDER — HEPARIN SODIUM (PORCINE) LOCK FLUSH IV SOLN 100 UNIT/ML 100 UNIT/ML
500 SOLUTION INTRAVENOUS PRN
Status: CANCELLED | OUTPATIENT
Start: 2023-12-14

## 2023-12-11 RX ORDER — EPINEPHRINE 1 MG/ML
0.3 INJECTION, SOLUTION, CONCENTRATE INTRAVENOUS PRN
Status: CANCELLED | OUTPATIENT
Start: 2023-12-11

## 2023-12-11 RX ORDER — ATROPINE SULFATE 0.4 MG/ML
0.4 INJECTION, SOLUTION INTRAVENOUS
Status: DISCONTINUED | OUTPATIENT
Start: 2023-12-11 | End: 2023-12-13 | Stop reason: HOSPADM

## 2023-12-11 RX ORDER — ATROPINE SULFATE 0.4 MG/ML
0.4 INJECTION, SOLUTION INTRAVENOUS ONCE
Status: COMPLETED | OUTPATIENT
Start: 2023-12-11 | End: 2023-12-11

## 2023-12-11 RX ORDER — MEPERIDINE HYDROCHLORIDE 50 MG/ML
12.5 INJECTION INTRAMUSCULAR; INTRAVENOUS; SUBCUTANEOUS PRN
Status: CANCELLED | OUTPATIENT
Start: 2023-12-11

## 2023-12-11 RX ORDER — SODIUM CHLORIDE 0.9 % (FLUSH) 0.9 %
5-40 SYRINGE (ML) INJECTION PRN
Status: CANCELLED | OUTPATIENT
Start: 2023-12-14

## 2023-12-11 RX ORDER — FAMOTIDINE 10 MG/ML
20 INJECTION, SOLUTION INTRAVENOUS
Status: CANCELLED | OUTPATIENT
Start: 2023-12-11

## 2023-12-11 RX ORDER — PALONOSETRON 0.05 MG/ML
0.25 INJECTION, SOLUTION INTRAVENOUS ONCE
Status: COMPLETED | OUTPATIENT
Start: 2023-12-11 | End: 2023-12-11

## 2023-12-11 RX ORDER — PALONOSETRON 0.05 MG/ML
0.25 INJECTION, SOLUTION INTRAVENOUS ONCE
Status: CANCELLED | OUTPATIENT
Start: 2023-12-11 | End: 2023-12-11

## 2023-12-11 RX ORDER — ACETAMINOPHEN 325 MG/1
650 TABLET ORAL
Status: CANCELLED | OUTPATIENT
Start: 2023-12-11

## 2023-12-11 RX ORDER — ALBUTEROL SULFATE 90 UG/1
4 AEROSOL, METERED RESPIRATORY (INHALATION) PRN
Status: CANCELLED | OUTPATIENT
Start: 2023-12-11

## 2023-12-11 RX ORDER — HEPARIN SODIUM (PORCINE) LOCK FLUSH IV SOLN 100 UNIT/ML 100 UNIT/ML
500 SOLUTION INTRAVENOUS PRN
Status: CANCELLED | OUTPATIENT
Start: 2023-12-11

## 2023-12-11 RX ADMIN — FLUOROURACIL 4000 MG: 50 INJECTION, SOLUTION INTRAVENOUS at 13:28

## 2023-12-11 RX ADMIN — ATROPINE SULFATE 0.4 MG: 0.4 INJECTION, SOLUTION INTRAVENOUS at 11:12

## 2023-12-11 RX ADMIN — LEUCOVORIN CALCIUM 200 MG: 200 INJECTION, POWDER, LYOPHILIZED, FOR SUSPENSION INTRAMUSCULAR; INTRAVENOUS at 12:59

## 2023-12-11 RX ADMIN — FOSAPREPITANT 150 MG: 150 INJECTION, POWDER, LYOPHILIZED, FOR SOLUTION INTRAVENOUS at 10:44

## 2023-12-11 RX ADMIN — IRINOTECAN HYDROCHLORIDE 86 MG: 4.3 INJECTION, POWDER, FOR SOLUTION INTRAVENOUS at 11:13

## 2023-12-11 RX ADMIN — ATROPINE SULFATE 0.4 MG: 0.4 INJECTION, SOLUTION INTRAVENOUS at 12:58

## 2023-12-11 RX ADMIN — DEXAMETHASONE SODIUM PHOSPHATE: 10 INJECTION, SOLUTION INTRAMUSCULAR; INTRAVENOUS at 10:32

## 2023-12-11 RX ADMIN — PALONOSETRON 0.25 MG: 0.05 INJECTION, SOLUTION INTRAVENOUS at 10:29

## 2023-12-11 NOTE — PROGRESS NOTES
Lab Results   Component Value Date    WBC 6.00 12/11/2023    HGB 10.6 (L) 12/11/2023    HCT 32.1 (L) 12/11/2023    MCV 95.8 (H) 12/11/2023     12/11/2023     Lab Results   Component Value Date    NEUTROABS 2.07 12/11/2023     Lab Results   Component Value Date     12/11/2023    K 4.1 12/11/2023     12/11/2023    CO2 28 12/11/2023    BUN 13 12/11/2023    CREATININE 0.8 12/11/2023    GLUCOSE 116 (H) 12/11/2023    CALCIUM 9.1 12/11/2023    PROT 6.2 (L) 12/11/2023    LABALBU 3.4 (L) 12/11/2023    BILITOT <0.2 12/11/2023    ALKPHOS 108 (H) 12/11/2023    AST 31 12/11/2023    ALT 17 12/11/2023    LABGLOM >60 12/11/2023    GFRAA >59 01/12/2022    GLOB 2.8 12/11/2023

## 2023-12-13 ENCOUNTER — HOSPITAL ENCOUNTER (OUTPATIENT)
Dept: INFUSION THERAPY | Age: 78
Discharge: HOME | End: 2023-12-13
Payer: MEDICARE

## 2023-12-13 DIAGNOSIS — C25.9 METASTASIS FROM PANCREATIC CANCER (HCC): ICD-10-CM

## 2023-12-13 DIAGNOSIS — C79.9 METASTASIS FROM PANCREATIC CANCER (HCC): ICD-10-CM

## 2023-12-13 DIAGNOSIS — C25.2 MALIGNANT NEOPLASM OF TAIL OF PANCREAS (HCC): Primary | ICD-10-CM

## 2023-12-13 PROCEDURE — 6360000002 HC RX W HCPCS: Performed by: INTERNAL MEDICINE

## 2023-12-13 PROCEDURE — 96523 IRRIG DRUG DELIVERY DEVICE: CPT

## 2023-12-13 PROCEDURE — 2580000003 HC RX 258: Performed by: INTERNAL MEDICINE

## 2023-12-13 RX ORDER — SODIUM CHLORIDE 0.9 % (FLUSH) 0.9 %
5-40 SYRINGE (ML) INJECTION PRN
Status: DISCONTINUED | OUTPATIENT
Start: 2023-12-13 | End: 2023-12-14 | Stop reason: HOSPADM

## 2023-12-13 RX ORDER — HEPARIN 100 UNIT/ML
500 SYRINGE INTRAVENOUS PRN
Status: DISCONTINUED | OUTPATIENT
Start: 2023-12-13 | End: 2023-12-14 | Stop reason: HOSPADM

## 2023-12-13 RX ADMIN — Medication 500 UNITS: at 13:30

## 2023-12-13 RX ADMIN — SODIUM CHLORIDE, PRESERVATIVE FREE 10 ML: 5 INJECTION INTRAVENOUS at 13:30

## 2023-12-26 ENCOUNTER — HOSPITAL ENCOUNTER (OUTPATIENT)
Dept: INFUSION THERAPY | Age: 78
Discharge: HOME | End: 2023-12-26
Payer: MEDICARE

## 2023-12-26 VITALS
DIASTOLIC BLOOD PRESSURE: 68 MMHG | HEART RATE: 72 BPM | BODY MASS INDEX: 24.53 KG/M2 | RESPIRATION RATE: 18 BRPM | OXYGEN SATURATION: 98 % | TEMPERATURE: 97.8 F | WEIGHT: 147.2 LBS | HEIGHT: 65 IN | SYSTOLIC BLOOD PRESSURE: 114 MMHG

## 2023-12-26 DIAGNOSIS — C25.9 METASTASIS FROM PANCREATIC CANCER (HCC): Primary | ICD-10-CM

## 2023-12-26 DIAGNOSIS — C25.2 MALIGNANT NEOPLASM OF TAIL OF PANCREAS (HCC): ICD-10-CM

## 2023-12-26 DIAGNOSIS — C25.9 METASTASIS FROM PANCREATIC CANCER (HCC): ICD-10-CM

## 2023-12-26 DIAGNOSIS — C79.9 METASTASIS FROM PANCREATIC CANCER (HCC): Primary | ICD-10-CM

## 2023-12-26 DIAGNOSIS — C79.9 METASTASIS FROM PANCREATIC CANCER (HCC): ICD-10-CM

## 2023-12-26 DIAGNOSIS — C25.2 MALIGNANT NEOPLASM OF TAIL OF PANCREAS (HCC): Primary | ICD-10-CM

## 2023-12-26 LAB
ALBUMIN SERPL-MCNC: 3.6 G/DL (ref 3.5–5.2)
ALP SERPL-CCNC: 116 U/L (ref 35–104)
ALT SERPL-CCNC: 16 U/L (ref 9–52)
ANION GAP SERPL CALCULATED.3IONS-SCNC: 5 MMOL/L (ref 7–19)
AST SERPL-CCNC: 31 U/L (ref 14–36)
BASOPHILS # BLD: 0.09 K/UL (ref 0.01–0.08)
BASOPHILS NFR BLD: 1.3 % (ref 0.1–1.2)
BILIRUB SERPL-MCNC: <0.2 MG/DL (ref 0.2–1.3)
BUN SERPL-MCNC: 11 MG/DL (ref 7–17)
CALCIUM SERPL-MCNC: 8.9 MG/DL (ref 8.4–10.2)
CANCER AG19-9 SERPL-ACNC: 194 U/ML (ref 0–35)
CEA SERPL-MCNC: 5.5 NG/ML (ref 0–4.7)
CHLORIDE SERPL-SCNC: 105 MMOL/L (ref 98–111)
CO2 SERPL-SCNC: 27 MMOL/L (ref 22–29)
CREAT SERPL-MCNC: 0.8 MG/DL (ref 0.5–1)
EOSINOPHIL # BLD: 0.32 K/UL (ref 0.04–0.54)
EOSINOPHIL NFR BLD: 4.7 % (ref 0.7–7)
ERYTHROCYTE [DISTWIDTH] IN BLOOD BY AUTOMATED COUNT: 16.3 % (ref 11.7–14.4)
GLOBULIN: 2.8 G/DL
GLUCOSE SERPL-MCNC: 164 MG/DL (ref 74–106)
HCT VFR BLD AUTO: 32.7 % (ref 34.1–44.9)
HGB BLD-MCNC: 10.8 G/DL (ref 11.2–15.7)
LYMPHOCYTES # BLD: 3.22 K/UL (ref 1.18–3.74)
LYMPHOCYTES NFR BLD: 47.1 % (ref 19.3–53.1)
MCH RBC QN AUTO: 31.5 PG (ref 25.6–32.2)
MCHC RBC AUTO-ENTMCNC: 33 G/DL (ref 32.3–35.5)
MCV RBC AUTO: 95.3 FL (ref 79.4–94.8)
MONOCYTES # BLD: 1.02 K/UL (ref 0.24–0.82)
MONOCYTES NFR BLD: 14.9 % (ref 4.7–12.5)
NEUTROPHILS # BLD: 2.15 K/UL (ref 1.56–6.13)
NEUTS SEG NFR BLD: 31.6 % (ref 34–71.1)
PLATELET # BLD AUTO: 388 K/UL (ref 182–369)
PMV BLD AUTO: 9.8 FL (ref 7.4–10.4)
POTASSIUM SERPL-SCNC: 4.2 MMOL/L (ref 3.5–5.1)
PROT SERPL-MCNC: 6.4 G/DL (ref 6.3–8.2)
RBC # BLD AUTO: 3.43 M/UL (ref 3.93–5.22)
SODIUM SERPL-SCNC: 137 MMOL/L (ref 137–145)
WBC # BLD AUTO: 6.83 K/UL (ref 3.98–10.04)

## 2023-12-26 PROCEDURE — 96367 TX/PROPH/DG ADDL SEQ IV INF: CPT

## 2023-12-26 PROCEDURE — 96376 TX/PRO/DX INJ SAME DRUG ADON: CPT

## 2023-12-26 PROCEDURE — 96413 CHEMO IV INFUSION 1 HR: CPT

## 2023-12-26 PROCEDURE — 85025 COMPLETE CBC W/AUTO DIFF WBC: CPT

## 2023-12-26 PROCEDURE — 96375 TX/PRO/DX INJ NEW DRUG ADDON: CPT

## 2023-12-26 PROCEDURE — 80053 COMPREHEN METABOLIC PANEL: CPT

## 2023-12-26 PROCEDURE — G0498 CHEMO EXTEND IV INFUS W/PUMP: HCPCS

## 2023-12-26 PROCEDURE — 36415 COLL VENOUS BLD VENIPUNCTURE: CPT

## 2023-12-26 PROCEDURE — 2580000003 HC RX 258: Performed by: INTERNAL MEDICINE

## 2023-12-26 PROCEDURE — 6360000002 HC RX W HCPCS: Performed by: INTERNAL MEDICINE

## 2023-12-26 PROCEDURE — 96415 CHEMO IV INFUSION ADDL HR: CPT

## 2023-12-26 RX ORDER — PALONOSETRON 0.05 MG/ML
0.25 INJECTION, SOLUTION INTRAVENOUS ONCE
Status: COMPLETED | OUTPATIENT
Start: 2023-12-26 | End: 2023-12-26

## 2023-12-26 RX ORDER — ATROPINE SULFATE 0.4 MG/ML
0.4 INJECTION, SOLUTION INTRAVENOUS
Status: DISCONTINUED | OUTPATIENT
Start: 2023-12-26 | End: 2023-12-28 | Stop reason: HOSPADM

## 2023-12-26 RX ORDER — SODIUM CHLORIDE 9 MG/ML
5-250 INJECTION, SOLUTION INTRAVENOUS PRN
Status: DISCONTINUED | OUTPATIENT
Start: 2023-12-26 | End: 2023-12-27 | Stop reason: HOSPADM

## 2023-12-26 RX ADMIN — LEUCOVORIN CALCIUM 200 MG: 200 INJECTION, POWDER, LYOPHILIZED, FOR SOLUTION INTRAMUSCULAR; INTRAVENOUS at 11:48

## 2023-12-26 RX ADMIN — DEXAMETHASONE SODIUM PHOSPHATE: 10 INJECTION, SOLUTION INTRAMUSCULAR; INTRAVENOUS at 09:20

## 2023-12-26 RX ADMIN — SODIUM CHLORIDE 50 ML/HR: 9 INJECTION, SOLUTION INTRAVENOUS at 09:20

## 2023-12-26 RX ADMIN — PALONOSETRON 0.25 MG: 0.05 INJECTION, SOLUTION INTRAVENOUS at 09:20

## 2023-12-26 RX ADMIN — FLUOROURACIL 4000 MG: 50 INJECTION, SOLUTION INTRAVENOUS at 12:20

## 2023-12-26 RX ADMIN — FOSAPREPITANT 150 MG: 150 INJECTION, POWDER, LYOPHILIZED, FOR SOLUTION INTRAVENOUS at 09:32

## 2023-12-26 RX ADMIN — IRINOTECAN HYDROCHLORIDE 86 MG: 4.3 INJECTION, POWDER, FOR SOLUTION INTRAVENOUS at 10:01

## 2023-12-26 RX ADMIN — ATROPINE SULFATE 0.4 MG: 0.4 INJECTION, SOLUTION INTRAVENOUS at 09:59

## 2023-12-26 RX ADMIN — ATROPINE SULFATE 0.4 MG: 0.4 INJECTION, SOLUTION INTRAVENOUS at 11:40

## 2023-12-28 ENCOUNTER — HOSPITAL ENCOUNTER (OUTPATIENT)
Dept: INFUSION THERAPY | Age: 78
Discharge: HOME | End: 2023-12-28
Payer: MEDICARE

## 2023-12-28 DIAGNOSIS — C25.2 MALIGNANT NEOPLASM OF TAIL OF PANCREAS (HCC): ICD-10-CM

## 2023-12-28 DIAGNOSIS — C79.9 METASTASIS FROM PANCREATIC CANCER (HCC): Primary | ICD-10-CM

## 2023-12-28 DIAGNOSIS — C25.9 METASTASIS FROM PANCREATIC CANCER (HCC): Primary | ICD-10-CM

## 2023-12-28 PROCEDURE — 6360000002 HC RX W HCPCS: Performed by: INTERNAL MEDICINE

## 2023-12-28 PROCEDURE — 96523 IRRIG DRUG DELIVERY DEVICE: CPT

## 2023-12-28 PROCEDURE — 2580000003 HC RX 258: Performed by: INTERNAL MEDICINE

## 2023-12-28 RX ORDER — HEPARIN 100 UNIT/ML
500 SYRINGE INTRAVENOUS PRN
Status: DISCONTINUED | OUTPATIENT
Start: 2023-12-28 | End: 2023-12-29 | Stop reason: HOSPADM

## 2023-12-28 RX ORDER — SODIUM CHLORIDE 0.9 % (FLUSH) 0.9 %
5-40 SYRINGE (ML) INJECTION PRN
Status: DISCONTINUED | OUTPATIENT
Start: 2023-12-28 | End: 2023-12-29 | Stop reason: HOSPADM

## 2023-12-28 RX ADMIN — SODIUM CHLORIDE, PRESERVATIVE FREE 10 ML: 5 INJECTION INTRAVENOUS at 13:59

## 2023-12-28 RX ADMIN — Medication 500 UNITS: at 13:59

## 2024-01-01 ENCOUNTER — TELEPHONE (OUTPATIENT)
Dept: INTERNAL MEDICINE | Facility: CLINIC | Age: 79
End: 2024-01-01
Payer: MEDICARE

## 2024-01-01 ENCOUNTER — READMISSION MANAGEMENT (OUTPATIENT)
Dept: CALL CENTER | Facility: HOSPITAL | Age: 79
End: 2024-01-01
Payer: MEDICARE

## 2024-01-02 ENCOUNTER — HOSPITAL ENCOUNTER (OUTPATIENT)
Dept: GENERAL RADIOLOGY | Age: 79
Discharge: HOME | End: 2024-01-02
Attending: INTERNAL MEDICINE
Payer: MEDICARE

## 2024-01-02 DIAGNOSIS — C25.2 MALIGNANT NEOPLASM OF TAIL OF PANCREAS (HCC): ICD-10-CM

## 2024-01-02 PROCEDURE — 71260 CT THORAX DX C+: CPT

## 2024-01-02 PROCEDURE — 6360000004 HC RX CONTRAST MEDICATION: Performed by: INTERNAL MEDICINE

## 2024-01-02 PROCEDURE — 74177 CT ABD & PELVIS W/CONTRAST: CPT

## 2024-01-02 PROCEDURE — 2500000003 HC RX 250 WO HCPCS: Performed by: INTERNAL MEDICINE

## 2024-01-02 RX ADMIN — BARIUM SULFATE 450 ML: 21 SUSPENSION ORAL at 11:11

## 2024-01-02 RX ADMIN — IOPAMIDOL 75 ML: 755 INJECTION, SOLUTION INTRAVENOUS at 11:11

## 2024-01-05 NOTE — PROGRESS NOTES
MEDICAL ONCOLOGY PROGRESS NOTE    Linn Pryor   1945  1/9/2024     Chief Complaint   Patient presents with    Cancer     Malignant neoplasm of tail of pancreas (HCC)     Pt Name: Linn Pryor  MRN: [id number]  YOB: 1945  Date of evaluation: 1/9/2024    HISTORY OF PRESENT ILLNESS:    Reason for MD visit-toxicity assessment-disease management. The patient is a very pleasant 78 years old female who has been diagnosed with invasive adenocarcinoma of the pancreas, node positive status post laparoscopic assisted distal pancreatectomy and splenectomy in June 2021 at [place].  She underwent completion of adjuvant chemotherapy with Gemzar/Xeloda completed February 2022.  Unfortunately, she developed recurrent disease with malignant ascites.  She is currently receiving second line therapy with Onivyde, 5-FU and leucovorin.  She is tolerating treatments complains of fatigue and a flare of her psoriasis.  She is using topical steroids for her psoriasis and this has somewhat gotten better.  Her weight has been overall stable.    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    Treatment Summary  6/28/21 Laparoscopic assisted distal pancreatectomy and splenectomy with 1 of 6 lymph nodes positive. Surgeon Dr. Rudy Coles/[facility]   8/13/2021-02/25/22-adjuvant Gemzar 1000 mg/m2 IV D 1, D8, D15 with Capecitabine 830 mg/m2 D1-21 every 28 days x6 cycles  1/14/22 Eliquis for DVT right upper extremity November 2022  10/14/22-Initiated palliative frontline Gemzar Abraxane every 2 weeks  1/13/23 Decrease Abraxane to 100mg/m2 with each treatment  7/17/23 Discontinue Abraxane Gemzar due to progression  7/25/23 Initiated Onivyde, Leucovorin, CI 5FU every 2 weeks    Cancer History  Linn Pryor

## 2024-01-09 ENCOUNTER — HOSPITAL ENCOUNTER (OUTPATIENT)
Dept: INFUSION THERAPY | Age: 79
Discharge: HOME | End: 2024-01-09
Payer: MEDICARE

## 2024-01-09 ENCOUNTER — OFFICE VISIT (OUTPATIENT)
Dept: HEMATOLOGY | Age: 79
End: 2024-01-09

## 2024-01-09 VITALS
WEIGHT: 146.3 LBS | RESPIRATION RATE: 18 BRPM | TEMPERATURE: 97.3 F | OXYGEN SATURATION: 100 % | BODY MASS INDEX: 24.37 KG/M2 | DIASTOLIC BLOOD PRESSURE: 66 MMHG | HEIGHT: 65 IN | SYSTOLIC BLOOD PRESSURE: 111 MMHG | HEART RATE: 77 BPM

## 2024-01-09 DIAGNOSIS — C25.9 METASTASIS FROM PANCREATIC CANCER (HCC): Primary | ICD-10-CM

## 2024-01-09 DIAGNOSIS — Z71.89 CARE PLAN DISCUSSED WITH PATIENT: ICD-10-CM

## 2024-01-09 DIAGNOSIS — C79.9 METASTASIS FROM PANCREATIC CANCER (HCC): ICD-10-CM

## 2024-01-09 DIAGNOSIS — C79.9 METASTASIS FROM PANCREATIC CANCER (HCC): Primary | ICD-10-CM

## 2024-01-09 DIAGNOSIS — C25.2 MALIGNANT NEOPLASM OF TAIL OF PANCREAS (HCC): ICD-10-CM

## 2024-01-09 DIAGNOSIS — Z86.718 HISTORY OF DVT (DEEP VEIN THROMBOSIS): ICD-10-CM

## 2024-01-09 DIAGNOSIS — C25.9 METASTASIS FROM PANCREATIC CANCER (HCC): ICD-10-CM

## 2024-01-09 DIAGNOSIS — T45.1X5D ADVERSE EFFECT OF CHEMOTHERAPY, SUBSEQUENT ENCOUNTER: ICD-10-CM

## 2024-01-09 DIAGNOSIS — T45.1X5A ANTINEOPLASTIC CHEMOTHERAPY INDUCED ANEMIA: ICD-10-CM

## 2024-01-09 DIAGNOSIS — Z51.11 CHEMOTHERAPY MANAGEMENT, ENCOUNTER FOR: ICD-10-CM

## 2024-01-09 DIAGNOSIS — D64.81 ANTINEOPLASTIC CHEMOTHERAPY INDUCED ANEMIA: ICD-10-CM

## 2024-01-09 DIAGNOSIS — Z79.01 ANTICOAGULATION MANAGEMENT ENCOUNTER: ICD-10-CM

## 2024-01-09 DIAGNOSIS — Z51.81 ANTICOAGULATION MANAGEMENT ENCOUNTER: ICD-10-CM

## 2024-01-09 LAB
ALBUMIN SERPL-MCNC: 3.6 G/DL (ref 3.5–5.2)
ALP SERPL-CCNC: 114 U/L (ref 35–104)
ALT SERPL-CCNC: 16 U/L (ref 9–52)
ANION GAP SERPL CALCULATED.3IONS-SCNC: 4 MMOL/L (ref 7–19)
AST SERPL-CCNC: 38 U/L (ref 14–36)
BASOPHILS # BLD: 0.07 K/UL (ref 0.01–0.08)
BASOPHILS NFR BLD: 0.9 % (ref 0.1–1.2)
BILIRUB SERPL-MCNC: 0.3 MG/DL (ref 0.2–1.3)
BUN SERPL-MCNC: 10 MG/DL (ref 7–17)
CA 19-9: 203 NG/ML
CALCIUM SERPL-MCNC: 9 MG/DL (ref 8.4–10.2)
CEA: 5.9 U/ML
CHLORIDE SERPL-SCNC: 107 MMOL/L (ref 98–111)
CO2 SERPL-SCNC: 26 MMOL/L (ref 22–29)
CREAT SERPL-MCNC: 0.8 MG/DL (ref 0.5–1)
EOSINOPHIL # BLD: 0.3 K/UL (ref 0.04–0.54)
EOSINOPHIL NFR BLD: 4 % (ref 0.7–7)
ERYTHROCYTE [DISTWIDTH] IN BLOOD BY AUTOMATED COUNT: 16 % (ref 11.7–14.4)
GLOBULIN: 2.8 G/DL
GLUCOSE SERPL-MCNC: 141 MG/DL (ref 74–106)
HCT VFR BLD AUTO: 33 % (ref 34.1–44.9)
HGB BLD-MCNC: 11.1 G/DL (ref 11.2–15.7)
LYMPHOCYTES # BLD: 2.74 K/UL (ref 1.18–3.74)
LYMPHOCYTES NFR BLD: 36.7 % (ref 19.3–53.1)
MCH RBC QN AUTO: 32.5 PG (ref 25.6–32.2)
MCHC RBC AUTO-ENTMCNC: 33.6 G/DL (ref 32.3–35.5)
MCV RBC AUTO: 96.5 FL (ref 79.4–94.8)
MONOCYTES # BLD: 0.64 K/UL (ref 0.24–0.82)
MONOCYTES NFR BLD: 8.6 % (ref 4.7–12.5)
NEUTROPHILS # BLD: 3.71 K/UL (ref 1.56–6.13)
NEUTS SEG NFR BLD: 49.7 % (ref 34–71.1)
PLATELET # BLD AUTO: 290 K/UL (ref 182–369)
PMV BLD AUTO: 10 FL (ref 7.4–10.4)
POTASSIUM SERPL-SCNC: 4.3 MMOL/L (ref 3.5–5.1)
PROT SERPL-MCNC: 6.4 G/DL (ref 6.3–8.2)
RBC # BLD AUTO: 3.42 M/UL (ref 3.93–5.22)
SODIUM SERPL-SCNC: 137 MMOL/L (ref 137–145)
WBC # BLD AUTO: 7.47 K/UL (ref 3.98–10.04)

## 2024-01-09 PROCEDURE — 96367 TX/PROPH/DG ADDL SEQ IV INF: CPT

## 2024-01-09 PROCEDURE — 96413 CHEMO IV INFUSION 1 HR: CPT

## 2024-01-09 PROCEDURE — 96368 THER/DIAG CONCURRENT INF: CPT

## 2024-01-09 PROCEDURE — G0498 CHEMO EXTEND IV INFUS W/PUMP: HCPCS

## 2024-01-09 PROCEDURE — 36415 COLL VENOUS BLD VENIPUNCTURE: CPT

## 2024-01-09 PROCEDURE — 80053 COMPREHEN METABOLIC PANEL: CPT

## 2024-01-09 PROCEDURE — 96376 TX/PRO/DX INJ SAME DRUG ADON: CPT

## 2024-01-09 PROCEDURE — 2580000003 HC RX 258: Performed by: INTERNAL MEDICINE

## 2024-01-09 PROCEDURE — 96417 CHEMO IV INFUS EACH ADDL SEQ: CPT

## 2024-01-09 PROCEDURE — 96375 TX/PRO/DX INJ NEW DRUG ADDON: CPT

## 2024-01-09 PROCEDURE — 85025 COMPLETE CBC W/AUTO DIFF WBC: CPT

## 2024-01-09 PROCEDURE — 6360000002 HC RX W HCPCS: Performed by: INTERNAL MEDICINE

## 2024-01-09 PROCEDURE — 96415 CHEMO IV INFUSION ADDL HR: CPT

## 2024-01-09 RX ORDER — ALBUTEROL SULFATE 90 UG/1
4 AEROSOL, METERED RESPIRATORY (INHALATION) PRN
Status: CANCELLED | OUTPATIENT
Start: 2024-01-09

## 2024-01-09 RX ORDER — SODIUM CHLORIDE 9 MG/ML
INJECTION, SOLUTION INTRAVENOUS CONTINUOUS
Status: CANCELLED | OUTPATIENT
Start: 2024-01-09

## 2024-01-09 RX ORDER — SODIUM CHLORIDE 9 MG/ML
5-250 INJECTION, SOLUTION INTRAVENOUS PRN
OUTPATIENT
Start: 2024-01-12

## 2024-01-09 RX ORDER — PALONOSETRON 0.05 MG/ML
0.25 INJECTION, SOLUTION INTRAVENOUS ONCE
Status: COMPLETED | OUTPATIENT
Start: 2024-01-09 | End: 2024-01-09

## 2024-01-09 RX ORDER — HEPARIN SODIUM (PORCINE) LOCK FLUSH IV SOLN 100 UNIT/ML 100 UNIT/ML
500 SOLUTION INTRAVENOUS PRN
Status: CANCELLED | OUTPATIENT
Start: 2024-01-09

## 2024-01-09 RX ORDER — SODIUM CHLORIDE 9 MG/ML
5-250 INJECTION, SOLUTION INTRAVENOUS PRN
Status: CANCELLED | OUTPATIENT
Start: 2024-01-09

## 2024-01-09 RX ORDER — HEPARIN SODIUM (PORCINE) LOCK FLUSH IV SOLN 100 UNIT/ML 100 UNIT/ML
500 SOLUTION INTRAVENOUS PRN
Status: CANCELLED | OUTPATIENT
Start: 2024-01-12

## 2024-01-09 RX ORDER — ATROPINE SULFATE 0.4 MG/ML
0.4 INJECTION, SOLUTION INTRAVENOUS
Status: DISCONTINUED | OUTPATIENT
Start: 2024-01-09 | End: 2024-01-11 | Stop reason: HOSPADM

## 2024-01-09 RX ORDER — ONDANSETRON 2 MG/ML
8 INJECTION INTRAMUSCULAR; INTRAVENOUS
Status: CANCELLED | OUTPATIENT
Start: 2024-01-09

## 2024-01-09 RX ORDER — PALONOSETRON 0.05 MG/ML
0.25 INJECTION, SOLUTION INTRAVENOUS ONCE
Status: CANCELLED | OUTPATIENT
Start: 2024-01-09 | End: 2024-01-09

## 2024-01-09 RX ORDER — EPINEPHRINE 1 MG/ML
0.3 INJECTION, SOLUTION, CONCENTRATE INTRAVENOUS PRN
Status: CANCELLED | OUTPATIENT
Start: 2024-01-09

## 2024-01-09 RX ORDER — DIPHENHYDRAMINE HYDROCHLORIDE 50 MG/ML
50 INJECTION INTRAMUSCULAR; INTRAVENOUS
Status: CANCELLED | OUTPATIENT
Start: 2024-01-09

## 2024-01-09 RX ORDER — ACETAMINOPHEN 325 MG/1
650 TABLET ORAL
Status: CANCELLED | OUTPATIENT
Start: 2024-01-09

## 2024-01-09 RX ORDER — ATROPINE SULFATE 0.4 MG/ML
0.4 INJECTION, SOLUTION INTRAVENOUS ONCE
Status: COMPLETED | OUTPATIENT
Start: 2024-01-09 | End: 2024-01-09

## 2024-01-09 RX ORDER — SODIUM CHLORIDE 0.9 % (FLUSH) 0.9 %
5-40 SYRINGE (ML) INJECTION PRN
Status: CANCELLED | OUTPATIENT
Start: 2024-01-12

## 2024-01-09 RX ORDER — SODIUM CHLORIDE 0.9 % (FLUSH) 0.9 %
5-40 SYRINGE (ML) INJECTION PRN
Status: CANCELLED | OUTPATIENT
Start: 2024-01-09

## 2024-01-09 RX ORDER — MEPERIDINE HYDROCHLORIDE 50 MG/ML
12.5 INJECTION INTRAMUSCULAR; INTRAVENOUS; SUBCUTANEOUS PRN
Status: CANCELLED | OUTPATIENT
Start: 2024-01-09

## 2024-01-09 RX ORDER — FAMOTIDINE 10 MG/ML
20 INJECTION, SOLUTION INTRAVENOUS
Status: CANCELLED | OUTPATIENT
Start: 2024-01-09

## 2024-01-09 RX ADMIN — ATROPINE SULFATE 0.4 MG: 0.4 INJECTION, SOLUTION INTRAVENOUS at 10:14

## 2024-01-09 RX ADMIN — FLUOROURACIL 4000 MG: 50 INJECTION, SOLUTION INTRAVENOUS at 12:31

## 2024-01-09 RX ADMIN — LEUCOVORIN CALCIUM 200 MG: 200 INJECTION, POWDER, LYOPHILIZED, FOR SUSPENSION INTRAMUSCULAR; INTRAVENOUS at 11:59

## 2024-01-09 RX ADMIN — IRINOTECAN HYDROCHLORIDE 86 MG: 4.3 INJECTION, POWDER, FOR SOLUTION INTRAVENOUS at 10:18

## 2024-01-09 RX ADMIN — DEXAMETHASONE SODIUM PHOSPHATE: 10 INJECTION, SOLUTION INTRAMUSCULAR; INTRAVENOUS at 09:35

## 2024-01-09 RX ADMIN — PALONOSETRON 0.25 MG: 0.05 INJECTION, SOLUTION INTRAVENOUS at 09:36

## 2024-01-09 RX ADMIN — FOSAPREPITANT DIMEGLUMINE 150 MG: 150 INJECTION, POWDER, LYOPHILIZED, FOR SOLUTION INTRAVENOUS at 09:47

## 2024-01-09 RX ADMIN — ATROPINE SULFATE 0.4 MG: 0.4 INJECTION, SOLUTION INTRAVENOUS at 12:00

## 2024-01-09 NOTE — PROGRESS NOTES
Lab Results   Component Value Date    WBC 7.47 01/09/2024    HGB 11.1 (L) 01/09/2024    HCT 33.0 (L) 01/09/2024    MCV 96.5 (H) 01/09/2024     01/09/2024     Lab Results   Component Value Date    NEUTROABS 3.71 01/09/2024     Lab Results   Component Value Date     12/26/2023    K 4.2 12/26/2023     12/26/2023    CO2 27 12/26/2023    BUN 11 12/26/2023    CREATININE 0.8 12/26/2023    GLUCOSE 164 (H) 12/26/2023    CALCIUM 8.9 12/26/2023    PROT 6.4 12/26/2023    LABALBU 3.6 12/26/2023    BILITOT <0.2 12/26/2023    ALKPHOS 116 (H) 12/26/2023    AST 31 12/26/2023    ALT 16 12/26/2023    LABGLOM >60 12/26/2023    GFRAA >59 01/12/2022    GLOB 2.8 12/26/2023

## 2024-01-11 ENCOUNTER — HOSPITAL ENCOUNTER (OUTPATIENT)
Dept: INFUSION THERAPY | Age: 79
Discharge: HOME | End: 2024-01-11
Payer: MEDICARE

## 2024-01-11 DIAGNOSIS — C25.2 MALIGNANT NEOPLASM OF TAIL OF PANCREAS (HCC): Primary | ICD-10-CM

## 2024-01-11 DIAGNOSIS — C79.9 METASTASIS FROM PANCREATIC CANCER (HCC): ICD-10-CM

## 2024-01-11 DIAGNOSIS — C25.9 METASTASIS FROM PANCREATIC CANCER (HCC): ICD-10-CM

## 2024-01-11 PROCEDURE — 6360000002 HC RX W HCPCS: Performed by: INTERNAL MEDICINE

## 2024-01-11 PROCEDURE — 96523 IRRIG DRUG DELIVERY DEVICE: CPT

## 2024-01-11 PROCEDURE — 2580000003 HC RX 258: Performed by: INTERNAL MEDICINE

## 2024-01-11 RX ORDER — HEPARIN 100 UNIT/ML
500 SYRINGE INTRAVENOUS PRN
Status: DISCONTINUED | OUTPATIENT
Start: 2024-01-11 | End: 2024-01-12 | Stop reason: HOSPADM

## 2024-01-11 RX ORDER — SODIUM CHLORIDE 0.9 % (FLUSH) 0.9 %
5-40 SYRINGE (ML) INJECTION PRN
Status: DISCONTINUED | OUTPATIENT
Start: 2024-01-11 | End: 2024-01-12 | Stop reason: HOSPADM

## 2024-01-11 RX ADMIN — HEPARIN 500 UNITS: 100 SYRINGE at 13:01

## 2024-01-11 RX ADMIN — SODIUM CHLORIDE, PRESERVATIVE FREE 10 ML: 5 INJECTION INTRAVENOUS at 13:01

## 2024-01-12 PROCEDURE — 87186 SC STD MICRODIL/AGAR DIL: CPT | Performed by: NURSE PRACTITIONER

## 2024-01-12 PROCEDURE — 87088 URINE BACTERIA CULTURE: CPT | Performed by: NURSE PRACTITIONER

## 2024-01-12 PROCEDURE — 87086 URINE CULTURE/COLONY COUNT: CPT | Performed by: NURSE PRACTITIONER

## 2024-01-14 DIAGNOSIS — I10 ESSENTIAL HYPERTENSION: ICD-10-CM

## 2024-01-15 RX ORDER — DOXAZOSIN 2 MG/1
2 TABLET ORAL EVERY EVENING
Qty: 90 TABLET | Refills: 3 | Status: SHIPPED | OUTPATIENT
Start: 2024-01-15

## 2024-01-15 NOTE — TELEPHONE ENCOUNTER
Rx Refill Note  Requested Prescriptions     Pending Prescriptions Disp Refills    doxazosin (CARDURA) 2 MG tablet [Pharmacy Med Name: DOXAZOSIN MESYLATE 2 MG TAB] 90 tablet 1     Sig: TAKE 1 TABLET BY MOUTH EVERY DAY IN THE EVENING      Last office visit with prescribing clinician: 9/14/2023   Last telemedicine visit with prescribing clinician: Visit date not found   Next office visit with prescribing clinician: 7/15/2024                         Would you like a call back once the refill request has been completed: [] Yes [] No    If the office needs to give you a call back, can they leave a voicemail: [] Yes [] No    Neida William RN  01/15/24, 07:54 CST

## 2024-01-22 ENCOUNTER — OFFICE VISIT (OUTPATIENT)
Dept: INTERNAL MEDICINE | Facility: CLINIC | Age: 79
End: 2024-01-22
Payer: MEDICARE

## 2024-01-22 VITALS
SYSTOLIC BLOOD PRESSURE: 120 MMHG | HEART RATE: 76 BPM | HEIGHT: 65 IN | DIASTOLIC BLOOD PRESSURE: 72 MMHG | OXYGEN SATURATION: 97 % | WEIGHT: 147 LBS | TEMPERATURE: 98.2 F | BODY MASS INDEX: 24.49 KG/M2

## 2024-01-22 DIAGNOSIS — E78.2 MIXED HYPERLIPIDEMIA: ICD-10-CM

## 2024-01-22 DIAGNOSIS — C25.1 MALIGNANT NEOPLASM OF BODY OF PANCREAS: ICD-10-CM

## 2024-01-22 DIAGNOSIS — N30.00 ACUTE CYSTITIS WITHOUT HEMATURIA: Primary | ICD-10-CM

## 2024-01-22 DIAGNOSIS — I10 ESSENTIAL HYPERTENSION: ICD-10-CM

## 2024-01-22 DIAGNOSIS — E03.9 ACQUIRED HYPOTHYROIDISM: ICD-10-CM

## 2024-01-22 DIAGNOSIS — H61.21 IMPACTED CERUMEN OF RIGHT EAR: ICD-10-CM

## 2024-01-22 DIAGNOSIS — R73.9 HYPERGLYCEMIA, UNSPECIFIED: ICD-10-CM

## 2024-01-22 LAB
BILIRUB BLD-MCNC: NEGATIVE MG/DL
CLARITY, POC: CLEAR
COLOR UR: YELLOW
GLUCOSE UR STRIP-MCNC: NEGATIVE MG/DL
KETONES UR QL: NEGATIVE
LEUKOCYTE EST, POC: NEGATIVE
NITRITE UR-MCNC: NEGATIVE MG/ML
PH UR: 6 [PH] (ref 5–8)
PROT UR STRIP-MCNC: NEGATIVE MG/DL
RBC # UR STRIP: ABNORMAL /UL
SP GR UR: 1.02 (ref 1–1.03)
UROBILINOGEN UR QL: ABNORMAL

## 2024-01-22 PROCEDURE — G2211 COMPLEX E/M VISIT ADD ON: HCPCS | Performed by: FAMILY MEDICINE

## 2024-01-22 PROCEDURE — 3078F DIAST BP <80 MM HG: CPT | Performed by: FAMILY MEDICINE

## 2024-01-22 PROCEDURE — 81003 URINALYSIS AUTO W/O SCOPE: CPT | Performed by: FAMILY MEDICINE

## 2024-01-22 PROCEDURE — 1159F MED LIST DOCD IN RCRD: CPT | Performed by: FAMILY MEDICINE

## 2024-01-22 PROCEDURE — 1160F RVW MEDS BY RX/DR IN RCRD: CPT | Performed by: FAMILY MEDICINE

## 2024-01-22 PROCEDURE — 99214 OFFICE O/P EST MOD 30 MIN: CPT | Performed by: FAMILY MEDICINE

## 2024-01-22 PROCEDURE — 3074F SYST BP LT 130 MM HG: CPT | Performed by: FAMILY MEDICINE

## 2024-01-22 NOTE — PROGRESS NOTES
"Chief Complaint  Urinary Tract Infection (FOLLOW UP FROM INFECTION/), Hypertension, and Hypothyroidism    Subjective        Ирина Randolph presents to [facility]  Hypertension    Urinary Tract Infection     Hypothyroidism        Ирина Randolph is a 78 y.o. female who presents for a routine visit at this time.  Patient continues to receive cancer treatment for her current pancreatic cancer.  She states that she continues to do well with this treatment at this time.  Patient was recently seen in the urgent care however for urinary tract infection urine culture shows antibiotic susceptibility should have been good at this time but she would like to recheck a urinalysis to evaluate this at this time.      Patient has a history of hypothyroidism and is due to recheck her thyroid today.  It also has been noted to have a history of some elevated blood sugars and would like to get a hemoglobin A1c we will also recheck her lipid profile at this time.    Noted to have cerumen impaction in her right ear.  Would like to have this removed at this time she states she has had some ear pain and drainage that she had noted and would like to get this evaluated.    Longitudinal care Statement:  Patient to continue with continuous, comprehensive, coordinated primary care that serves as the continuing focal point for all needed health care services related to her complex medical conditions.  I discussed preventative health care, vaccines, depression, and cancer screening with her.  Reviewed her complex diagnosis, comorbid conditions, and history at this time as well as associated labs and medication list for possible interactions.     Vital Signs:  /72 (BP Location: Left arm, Patient Position: Sitting, Cuff Size: Adult)   Pulse 76   Temp 98.2 °F (36.8 °C) (Infrared)   Ht 165.1 cm (65\")   Wt 66.7 kg (147 lb)   SpO2 97%   BMI 24.46 kg/m²   Estimated body mass index is 24.46 kg/m² as calculated from " "the following:    Height as of this encounter: 165.1 cm (65\").    Weight as of this encounter: 66.7 kg (147 lb).       Past Medical History:   Diagnosis Date    Acute bronchitis     Arthritis     Osteoarthritis    Cancer     pancreatic cancer    Disease of thyroid gland     Gallbladder abscess     Hypertension        Past Surgical History:   Procedure Laterality Date    ABDOMINAL HYSTERECTOMY      CHOLECYSTECTOMY  1994    COLONOSCOPY  04/23/2015    two polyps both removed  extensive diverticulosis    COLONOSCOPY N/A 06/17/2020    Procedure: COLONOSCOPY WITH ANESTHESIA;  Surgeon: Homer Zamarripa DO;  Location: [facility] ENDOSCOPY;  Service: Gastroenterology;  Laterality: N/A;  pre: Hx of polyps  post:  Anupama Dhaliwal    ENDOSCOPY  08/16/2016    normal    ENDOSCOPY N/A 06/17/2020    Procedure: ESOPHAGOGASTRODUODENOSCOPY WITH ANESTHESIA;  Surgeon: Homer Zamarripa DO;  Location: [facility] ENDOSCOPY;  Service: Gastroenterology;  Laterality: N/A;  pre: nausea  post:     ENDOSCOPY N/A 6/7/2022    Procedure: ESOPHAGOGASTRODUODENOSCOPY WITH ANESTHESIA;  Surgeon: Homer Zamarripa DO;  Location: [facility] ENDOSCOPY;  Service: Gastroenterology;  Laterality: N/A;  pre cough  post normal  Anupama Dhaliwal DO    PANCREAS SURGERY      Partial removal due to tumor    ROTATOR CUFF REPAIR  10/19/2017    SPLENECTOMY         Social History     Tobacco Use    Smoking status: Never    Smokeless tobacco: Never   Vaping Use    Vaping Use: Never used   Substance Use Topics    Alcohol use: No    Drug use: No       Family History   Problem Relation Age of Onset    Alcohol abuse Father     Breast cancer Maternal Aunt     Bone cancer Maternal Aunt     Leukemia Maternal Aunt     Colon cancer Neg Hx     Colon polyps Neg Hx     Esophageal cancer Neg Hx        Allergies as of 01/22/2024 - Reviewed 01/22/2024   Allergen Reaction Noted    Diclofenac Other (See Comments), Unknown - High Severity, and Unknown - Low Severity 10/17/2017    " Oxycodone-acetaminophen Hives and Rash 03/02/2018    Doxycycline Rash 09/14/2021         Current Outpatient Medications:     ACETAMINOPHEN PO, Take  by mouth., Disp: , Rfl:     albuterol sulfate  (90 Base) MCG/ACT inhaler, Inhale 2 puffs Every 4 (Four) Hours As Needed for Wheezing., Disp: 18 g, Rfl: 3    ALPRAZolam (Xanax) 0.5 MG tablet, Take 0.5 tablets by mouth 2 (Two) Times a Day As Needed for Anxiety., Disp: 30 tablet, Rfl: 1    Denta 5000 Plus 1.1 % cream, Apply 1 application topically to the appropriate area as directed Daily As Needed. Apply with toothbrush, Disp: , Rfl:     doxazosin (CARDURA) 2 MG tablet, TAKE 1 TABLET BY MOUTH EVERY DAY IN THE EVENING, Disp: 90 tablet, Rfl: 3    furosemide (LASIX) 20 MG tablet, Take 1 tablet by mouth 2 (Two) Times a Day., Disp: 180 tablet, Rfl: 3    HYDROcodone-acetaminophen (NORCO) 7.5-325 MG per tablet, Take 1 tablet by mouth Every 6 (Six) Hours As Needed for Moderate Pain., Disp: , Rfl:     levothyroxine (SYNTHROID, LEVOTHROID) 150 MCG tablet, Take 1 tablet by mouth Daily., Disp: 90 tablet, Rfl: 3    lidocaine-prilocaine (EMLA) 2.5-2.5 % cream, Apply 1 application  topically to the appropriate area as directed As Needed., Disp: , Rfl:     loratadine (CLARITIN) 10 MG tablet, Take 1 tablet by mouth Daily As Needed for Allergies., Disp: , Rfl:     losartan (COZAAR) 25 MG tablet, Take 1 tablet by mouth Daily., Disp: 90 tablet, Rfl: 3    metoprolol succinate XL (TOPROL-XL) 50 MG 24 hr tablet, Take 1 tablet by mouth 2 (Two) Times a Day., Disp: 180 tablet, Rfl: 3    NIFEdipine XL (PROCARDIA XL) 30 MG 24 hr tablet, Take 1 tablet by mouth Daily., Disp: 90 tablet, Rfl: 3    nystatin (MYCOSTATIN) 955865 UNIT/GM powder, Apply 11,111 application  topically to the appropriate area as directed 3 (Three) Times a Day., Disp: , Rfl:     pancrelipase, Lip-Prot-Amyl, (CREON) 41717-22433 units capsule delayed-release particles capsule, Take 1 capsule by mouth 3 (Three) Times a Day  With Meals., Disp: , Rfl:     potassium chloride 10 MEQ CR tablet, Take 1 tablet by mouth Daily., Disp: 90 tablet, Rfl: 3    promethazine (PHENERGAN) 12.5 MG tablet, Take 1 tablet by mouth Every 6 (Six) Hours As Needed for Nausea. for nausea, Disp: , Rfl:     tacrolimus (PROTOPIC) 0.1 % ointment, Apply 1 application  topically to the appropriate area as directed Daily. To psoriasis in groin, Disp: , Rfl:     triamcinolone (KENALOG) 0.1 % cream, Apply 1 application  topically to the appropriate area as directed 2 (Two) Times a Day., Disp: , Rfl:       BMI is within normal parameters. No other follow-up for BMI required.    Review of systems   negative unless otherwise specified above in HPI      Physical Exam  Vitals and nursing note reviewed.   Constitutional:       General: She is not in acute distress.     Appearance: Normal appearance.   HENT:      Head: Normocephalic.   Eyes:      Extraocular Movements: Extraocular movements intact.      Pupils: Pupils are equal, round, and reactive to light.   Cardiovascular:      Rate and Rhythm: Normal rate and regular rhythm.      Heart sounds: Normal heart sounds. No murmur heard.  Pulmonary:      Effort: Pulmonary effort is normal. No respiratory distress.      Breath sounds: Normal breath sounds. No rhonchi or rales.   Abdominal:      General: Abdomen is flat. Bowel sounds are normal.      Palpations: Abdomen is soft.   Neurological:      General: No focal deficit present.      Mental Status: She is alert.        Result Review :                   Assessment and Plan   Diagnoses and all orders for this visit:    1. Acute cystitis without hematuria (Primary)  -     Cancel: Urinalysis With Microscopic - Urine, Clean Catch; Future  -     POC Urinalysis Dipstick, Multipro    2. Acquired hypothyroidism  -     Cancel: TSH; Future  -     TSH    3. Malignant neoplasm of body of pancreas  -     Hemoglobin A1c    4. Essential hypertension  -     Hemoglobin A1c    5. Mixed  hyperlipidemia  -     Lipid Panel    6. Impacted cerumen of right ear    7. Hyperglycemia, unspecified  -     Hemoglobin A1c        EMR Dictation/Transcription disclaimer:   Some of this note may be an electronic transcription/translation of spoken language to printed text. The electronic translation of spoken language may permit erroneous, or at times, nonsensical words or phrases to be inadvertently transcribed; Although I have reviewed the note for such errors, some may still exist.          Follow Up   Return in about 4 months (around 5/22/2024).  Patient was given instructions and counseling regarding her condition or for health maintenance advice. Please see specific information pulled into the AVS if appropriate.     Signed by:    Franco Mendes MD Date: 01/22/24

## 2024-01-23 ENCOUNTER — HOSPITAL ENCOUNTER (OUTPATIENT)
Dept: INFUSION THERAPY | Age: 79
Discharge: HOME | End: 2024-01-23
Payer: MEDICARE

## 2024-01-23 VITALS
OXYGEN SATURATION: 97 % | BODY MASS INDEX: 24.47 KG/M2 | DIASTOLIC BLOOD PRESSURE: 73 MMHG | RESPIRATION RATE: 18 BRPM | SYSTOLIC BLOOD PRESSURE: 117 MMHG | TEMPERATURE: 97.4 F | HEART RATE: 78 BPM | WEIGHT: 146.9 LBS | HEIGHT: 65 IN

## 2024-01-23 DIAGNOSIS — C25.2 MALIGNANT NEOPLASM OF TAIL OF PANCREAS (HCC): Primary | ICD-10-CM

## 2024-01-23 DIAGNOSIS — C25.9 ADENOCARCINOMA OF PANCREAS (HCC): ICD-10-CM

## 2024-01-23 DIAGNOSIS — C79.9 METASTASIS FROM PANCREATIC CANCER (HCC): ICD-10-CM

## 2024-01-23 DIAGNOSIS — C25.9 METASTASIS FROM PANCREATIC CANCER (HCC): ICD-10-CM

## 2024-01-23 LAB
ALBUMIN SERPL-MCNC: 3.3 G/DL (ref 3.5–5.2)
ALP SERPL-CCNC: 97 U/L (ref 35–104)
ALT SERPL-CCNC: 14 U/L (ref 9–52)
ANION GAP SERPL CALCULATED.3IONS-SCNC: 6 MMOL/L (ref 7–19)
AST SERPL-CCNC: 30 U/L (ref 14–36)
BASOPHILS # BLD: 0.08 K/UL (ref 0.01–0.08)
BASOPHILS NFR BLD: 1.5 % (ref 0.1–1.2)
BILIRUB SERPL-MCNC: 0.3 MG/DL (ref 0.2–1.3)
BUN SERPL-MCNC: 8 MG/DL (ref 7–17)
CALCIUM SERPL-MCNC: 8.2 MG/DL (ref 8.4–10.2)
CHLORIDE SERPL-SCNC: 106 MMOL/L (ref 98–111)
CHOLEST SERPL-MCNC: 165 MG/DL (ref 100–199)
CO2 SERPL-SCNC: 26 MMOL/L (ref 22–29)
CREAT SERPL-MCNC: 0.6 MG/DL (ref 0.5–1)
EOSINOPHIL # BLD: 0.27 K/UL (ref 0.04–0.54)
EOSINOPHIL NFR BLD: 5.2 % (ref 0.7–7)
ERYTHROCYTE [DISTWIDTH] IN BLOOD BY AUTOMATED COUNT: 15.8 % (ref 11.7–14.4)
GLOBULIN: 2.8 G/DL
GLUCOSE SERPL-MCNC: 157 MG/DL (ref 74–106)
HBA1C MFR BLD: 5.9 % (ref 4.8–5.6)
HCT VFR BLD AUTO: 32.3 % (ref 34.1–44.9)
HDLC SERPL-MCNC: 51 MG/DL
HGB BLD-MCNC: 11 G/DL (ref 11.2–15.7)
LDLC SERPL CALC-MCNC: 84 MG/DL (ref 0–99)
LYMPHOCYTES # BLD: 2.46 K/UL (ref 1.18–3.74)
LYMPHOCYTES NFR BLD: 47.4 % (ref 19.3–53.1)
MCH RBC QN AUTO: 33.2 PG (ref 25.6–32.2)
MCHC RBC AUTO-ENTMCNC: 34.1 G/DL (ref 32.3–35.5)
MCV RBC AUTO: 97.6 FL (ref 79.4–94.8)
MONOCYTES # BLD: 0.64 K/UL (ref 0.24–0.82)
MONOCYTES NFR BLD: 12.3 % (ref 4.7–12.5)
NEUTROPHILS # BLD: 1.72 K/UL (ref 1.56–6.13)
NEUTS SEG NFR BLD: 33.2 % (ref 34–71.1)
PLATELET # BLD AUTO: 321 K/UL (ref 182–369)
PMV BLD AUTO: 9.5 FL (ref 7.4–10.4)
POTASSIUM SERPL-SCNC: 3.9 MMOL/L (ref 3.5–5.1)
PROT SERPL-MCNC: 6.1 G/DL (ref 6.3–8.2)
RBC # BLD AUTO: 3.31 M/UL (ref 3.93–5.22)
SODIUM SERPL-SCNC: 138 MMOL/L (ref 137–145)
TRIGL SERPL-MCNC: 174 MG/DL (ref 0–149)
TSH SERPL DL<=0.005 MIU/L-ACNC: NORMAL UIU/ML
VLDLC SERPL CALC-MCNC: 30 MG/DL (ref 5–40)
WBC # BLD AUTO: 5.19 K/UL (ref 3.98–10.04)
WRITTEN AUTHORIZATION: NORMAL

## 2024-01-23 PROCEDURE — 96367 TX/PROPH/DG ADDL SEQ IV INF: CPT

## 2024-01-23 PROCEDURE — 80053 COMPREHEN METABOLIC PANEL: CPT

## 2024-01-23 PROCEDURE — 6360000002 HC RX W HCPCS: Performed by: INTERNAL MEDICINE

## 2024-01-23 PROCEDURE — 96376 TX/PRO/DX INJ SAME DRUG ADON: CPT

## 2024-01-23 PROCEDURE — 2580000003 HC RX 258: Performed by: INTERNAL MEDICINE

## 2024-01-23 PROCEDURE — 96413 CHEMO IV INFUSION 1 HR: CPT

## 2024-01-23 PROCEDURE — 96375 TX/PRO/DX INJ NEW DRUG ADDON: CPT

## 2024-01-23 PROCEDURE — 36415 COLL VENOUS BLD VENIPUNCTURE: CPT

## 2024-01-23 PROCEDURE — 85025 COMPLETE CBC W/AUTO DIFF WBC: CPT

## 2024-01-23 PROCEDURE — G0498 CHEMO EXTEND IV INFUS W/PUMP: HCPCS

## 2024-01-23 PROCEDURE — 96415 CHEMO IV INFUSION ADDL HR: CPT

## 2024-01-23 RX ORDER — SODIUM CHLORIDE 0.9 % (FLUSH) 0.9 %
5-40 SYRINGE (ML) INJECTION PRN
Status: CANCELLED | OUTPATIENT
Start: 2024-01-23

## 2024-01-23 RX ORDER — SODIUM CHLORIDE 9 MG/ML
INJECTION, SOLUTION INTRAVENOUS CONTINUOUS
Status: CANCELLED | OUTPATIENT
Start: 2024-01-23

## 2024-01-23 RX ORDER — SODIUM CHLORIDE 9 MG/ML
5-250 INJECTION, SOLUTION INTRAVENOUS PRN
OUTPATIENT
Start: 2024-01-26

## 2024-01-23 RX ORDER — ACETAMINOPHEN 325 MG/1
650 TABLET ORAL
Status: CANCELLED | OUTPATIENT
Start: 2024-01-23

## 2024-01-23 RX ORDER — FAMOTIDINE 10 MG/ML
20 INJECTION, SOLUTION INTRAVENOUS
Status: CANCELLED | OUTPATIENT
Start: 2024-01-23

## 2024-01-23 RX ORDER — SODIUM CHLORIDE 9 MG/ML
5-250 INJECTION, SOLUTION INTRAVENOUS PRN
Status: CANCELLED | OUTPATIENT
Start: 2024-01-23

## 2024-01-23 RX ORDER — ATROPINE SULFATE 0.4 MG/ML
0.4 INJECTION, SOLUTION INTRAVENOUS ONCE
Status: COMPLETED | OUTPATIENT
Start: 2024-01-23 | End: 2024-01-23

## 2024-01-23 RX ORDER — PANCRELIPASE 60000; 12000; 38000 [USP'U]/1; [USP'U]/1; [USP'U]/1
12000 CAPSULE, DELAYED RELEASE PELLETS ORAL
Qty: 90 CAPSULE | Refills: 5 | Status: SHIPPED | OUTPATIENT
Start: 2024-01-23

## 2024-01-23 RX ORDER — HEPARIN SODIUM (PORCINE) LOCK FLUSH IV SOLN 100 UNIT/ML 100 UNIT/ML
500 SOLUTION INTRAVENOUS PRN
Status: CANCELLED | OUTPATIENT
Start: 2024-01-26

## 2024-01-23 RX ORDER — EPINEPHRINE 1 MG/ML
0.3 INJECTION, SOLUTION, CONCENTRATE INTRAVENOUS PRN
Status: CANCELLED | OUTPATIENT
Start: 2024-01-23

## 2024-01-23 RX ORDER — SODIUM CHLORIDE 9 MG/ML
5-250 INJECTION, SOLUTION INTRAVENOUS PRN
Status: DISCONTINUED | OUTPATIENT
Start: 2024-01-23 | End: 2024-01-24 | Stop reason: HOSPADM

## 2024-01-23 RX ORDER — PALONOSETRON 0.05 MG/ML
0.25 INJECTION, SOLUTION INTRAVENOUS ONCE
Status: COMPLETED | OUTPATIENT
Start: 2024-01-23 | End: 2024-01-23

## 2024-01-23 RX ORDER — PALONOSETRON 0.05 MG/ML
0.25 INJECTION, SOLUTION INTRAVENOUS ONCE
Status: CANCELLED | OUTPATIENT
Start: 2024-01-23 | End: 2024-01-23

## 2024-01-23 RX ORDER — MEPERIDINE HYDROCHLORIDE 50 MG/ML
12.5 INJECTION INTRAMUSCULAR; INTRAVENOUS; SUBCUTANEOUS PRN
Status: CANCELLED | OUTPATIENT
Start: 2024-01-23

## 2024-01-23 RX ORDER — ALBUTEROL SULFATE 90 UG/1
4 AEROSOL, METERED RESPIRATORY (INHALATION) PRN
Status: CANCELLED | OUTPATIENT
Start: 2024-01-23

## 2024-01-23 RX ORDER — HEPARIN SODIUM (PORCINE) LOCK FLUSH IV SOLN 100 UNIT/ML 100 UNIT/ML
500 SOLUTION INTRAVENOUS PRN
Status: CANCELLED | OUTPATIENT
Start: 2024-01-23

## 2024-01-23 RX ORDER — ATROPINE SULFATE 0.4 MG/ML
0.4 INJECTION, SOLUTION INTRAVENOUS
Status: DISCONTINUED | OUTPATIENT
Start: 2024-01-23 | End: 2024-01-25 | Stop reason: HOSPADM

## 2024-01-23 RX ORDER — SODIUM CHLORIDE 0.9 % (FLUSH) 0.9 %
5-40 SYRINGE (ML) INJECTION PRN
Status: CANCELLED | OUTPATIENT
Start: 2024-01-26

## 2024-01-23 RX ORDER — DIPHENHYDRAMINE HYDROCHLORIDE 50 MG/ML
50 INJECTION INTRAMUSCULAR; INTRAVENOUS
Status: CANCELLED | OUTPATIENT
Start: 2024-01-23

## 2024-01-23 RX ORDER — ONDANSETRON 2 MG/ML
8 INJECTION INTRAMUSCULAR; INTRAVENOUS
Status: CANCELLED | OUTPATIENT
Start: 2024-01-23

## 2024-01-23 RX ADMIN — SODIUM CHLORIDE 150 MG: 900 INJECTION, SOLUTION INTRAVENOUS at 12:11

## 2024-01-23 RX ADMIN — ATROPINE SULFATE 0.4 MG: 0.4 INJECTION, SOLUTION INTRAVENOUS at 14:23

## 2024-01-23 RX ADMIN — PALONOSETRON 0.25 MG: 0.05 INJECTION, SOLUTION INTRAVENOUS at 11:58

## 2024-01-23 RX ADMIN — IRINOTECAN HYDROCHLORIDE 86 MG: 4.3 INJECTION, POWDER, FOR SOLUTION INTRAVENOUS at 12:40

## 2024-01-23 RX ADMIN — DEXAMETHASONE SODIUM PHOSPHATE: 10 INJECTION, SOLUTION INTRAMUSCULAR; INTRAVENOUS at 11:58

## 2024-01-23 RX ADMIN — ATROPINE SULFATE 0.4 MG: 0.4 INJECTION, SOLUTION INTRAVENOUS at 12:40

## 2024-01-23 RX ADMIN — FLUOROURACIL 4000 MG: 50 INJECTION, SOLUTION INTRAVENOUS at 14:57

## 2024-01-23 RX ADMIN — LEUCOVORIN CALCIUM 200 MG: 200 INJECTION, POWDER, LYOPHILIZED, FOR SOLUTION INTRAMUSCULAR; INTRAVENOUS at 14:26

## 2024-01-23 NOTE — PROGRESS NOTES
Lab Results   Component Value Date    WBC 5.19 01/23/2024    HGB 11.0 (L) 01/23/2024    HCT 32.3 (L) 01/23/2024    MCV 97.6 (H) 01/23/2024     01/23/2024     Lab Results   Component Value Date    NEUTROABS 1.72 01/23/2024

## 2024-01-25 ENCOUNTER — HOSPITAL ENCOUNTER (OUTPATIENT)
Dept: INFUSION THERAPY | Age: 79
Discharge: HOME | End: 2024-01-25
Payer: MEDICARE

## 2024-01-25 DIAGNOSIS — C25.9 METASTASIS FROM PANCREATIC CANCER (HCC): ICD-10-CM

## 2024-01-25 DIAGNOSIS — C79.9 METASTASIS FROM PANCREATIC CANCER (HCC): ICD-10-CM

## 2024-01-25 DIAGNOSIS — C25.2 MALIGNANT NEOPLASM OF TAIL OF PANCREAS (HCC): Primary | ICD-10-CM

## 2024-01-25 PROCEDURE — 96523 IRRIG DRUG DELIVERY DEVICE: CPT

## 2024-01-25 PROCEDURE — 2580000003 HC RX 258: Performed by: INTERNAL MEDICINE

## 2024-01-25 PROCEDURE — 6360000002 HC RX W HCPCS: Performed by: INTERNAL MEDICINE

## 2024-01-25 RX ORDER — HEPARIN 100 UNIT/ML
500 SYRINGE INTRAVENOUS PRN
Status: DISCONTINUED | OUTPATIENT
Start: 2024-01-25 | End: 2024-01-26 | Stop reason: HOSPADM

## 2024-01-25 RX ORDER — SODIUM CHLORIDE 0.9 % (FLUSH) 0.9 %
5-40 SYRINGE (ML) INJECTION PRN
Status: DISCONTINUED | OUTPATIENT
Start: 2024-01-25 | End: 2024-01-26 | Stop reason: HOSPADM

## 2024-01-25 RX ADMIN — HEPARIN 500 UNITS: 100 SYRINGE at 10:27

## 2024-01-25 RX ADMIN — SODIUM CHLORIDE, PRESERVATIVE FREE 10 ML: 5 INJECTION INTRAVENOUS at 10:26

## 2024-02-02 NOTE — PROGRESS NOTES
is also unchanged. Ascites. Absent spleen.  There is fluid and some nodular like thickening along the left upper quadrant along the lateral margin of the abdominal wall. Indeterminate for tumor involvement versus sequela of prior splenectomy/abdominal surgery and pancreatectomy. Absent distal pancreas. Thickening of the gastric antral wall.  Correlate with any clinical evidence of gastritis. Diverticulosis. Bilateral pleural effusions.Unexpected findings as above. Pancreas: Fatty changes are present in the pancreatic head with multiple cyst-like changes.  The most inferior lesion measures 1.7 x 1.2 cm on the current study.  Specifically, prior exam 1.2 x 0.8.  On image 36 there is a lesion measuring 1.9 cm increased slightly from prior.  A second lesion measuring 1.4 cm also increased from previous. Soft tissue density is present in the mesentery measuring 57 HU coronal image 30.  This is along the superior margin of the transverse colon.  Dimensions are approximately 85 x 3 cm and could reflect the omental spread of tumor  1/9/2024-I reviewed results CT C/A/P that was performed to assess response.  With the patient's current proceed with cycle 9 palliative chemotherapy.  Overall, findings of mixed response.  Patient CA 19-9 is trending down.  The patient is clinically stable and denies any complaints of increased pain or weight loss.  I favor to continue current treatment for another 2 months and then repeat CT scans.  Patient does not have a lot of other chemotherapy options.  Clinically she is doing well.    Plan:  -Recommended to continue with palliative chemotherapy with Onivyde/5-FU, cycle 9  -RTC MD in 4 weeks  -Repeat CT C/A/P prior to next visit    Right upper extremity DVT port related-started on Eliquis started on 1/14/2022.  Eliquis for 3 months through mid April 2022.  Denies any bleeding.    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  Stable    Bilateral leg

## 2024-02-06 ENCOUNTER — HOSPITAL ENCOUNTER (OUTPATIENT)
Dept: INFUSION THERAPY | Age: 79
Discharge: HOME | End: 2024-02-06
Payer: MEDICARE

## 2024-02-06 ENCOUNTER — OFFICE VISIT (OUTPATIENT)
Dept: HEMATOLOGY | Age: 79
End: 2024-02-06
Payer: MEDICARE

## 2024-02-06 VITALS
BODY MASS INDEX: 24.78 KG/M2 | HEART RATE: 76 BPM | RESPIRATION RATE: 18 BRPM | HEIGHT: 65 IN | OXYGEN SATURATION: 97 % | SYSTOLIC BLOOD PRESSURE: 114 MMHG | WEIGHT: 148.7 LBS | DIASTOLIC BLOOD PRESSURE: 68 MMHG | TEMPERATURE: 97.2 F

## 2024-02-06 DIAGNOSIS — R53.83 CHEMOTHERAPY-INDUCED FATIGUE: ICD-10-CM

## 2024-02-06 DIAGNOSIS — D64.81 ANTINEOPLASTIC CHEMOTHERAPY INDUCED ANEMIA: ICD-10-CM

## 2024-02-06 DIAGNOSIS — T45.1X5A CHEMOTHERAPY-INDUCED FATIGUE: ICD-10-CM

## 2024-02-06 DIAGNOSIS — Z86.718 HISTORY OF DVT (DEEP VEIN THROMBOSIS): ICD-10-CM

## 2024-02-06 DIAGNOSIS — Z79.01 ANTICOAGULATION MANAGEMENT ENCOUNTER: ICD-10-CM

## 2024-02-06 DIAGNOSIS — C25.2 MALIGNANT NEOPLASM OF TAIL OF PANCREAS (HCC): Primary | ICD-10-CM

## 2024-02-06 DIAGNOSIS — C25.9 METASTASIS FROM PANCREATIC CANCER (HCC): ICD-10-CM

## 2024-02-06 DIAGNOSIS — C25.9 METASTASIS FROM PANCREATIC CANCER (HCC): Primary | ICD-10-CM

## 2024-02-06 DIAGNOSIS — L73.9 FOLLICULITIS: ICD-10-CM

## 2024-02-06 DIAGNOSIS — Z71.89 CARE PLAN DISCUSSED WITH PATIENT: ICD-10-CM

## 2024-02-06 DIAGNOSIS — Z51.12 ENCOUNTER FOR ANTINEOPLASTIC IMMUNOTHERAPY: ICD-10-CM

## 2024-02-06 DIAGNOSIS — T45.1X5A ANTINEOPLASTIC CHEMOTHERAPY INDUCED ANEMIA: ICD-10-CM

## 2024-02-06 DIAGNOSIS — C79.9 METASTASIS FROM PANCREATIC CANCER (HCC): Primary | ICD-10-CM

## 2024-02-06 DIAGNOSIS — Z51.11 CHEMOTHERAPY MANAGEMENT, ENCOUNTER FOR: ICD-10-CM

## 2024-02-06 DIAGNOSIS — C79.9 METASTASIS FROM PANCREATIC CANCER (HCC): ICD-10-CM

## 2024-02-06 DIAGNOSIS — D64.9 NORMOCYTIC ANEMIA: ICD-10-CM

## 2024-02-06 DIAGNOSIS — Z51.81 ANTICOAGULATION MANAGEMENT ENCOUNTER: ICD-10-CM

## 2024-02-06 DIAGNOSIS — T45.1X5D ADVERSE EFFECT OF CHEMOTHERAPY, SUBSEQUENT ENCOUNTER: ICD-10-CM

## 2024-02-06 LAB
ALBUMIN SERPL-MCNC: 3.5 G/DL (ref 3.5–5.2)
ALP SERPL-CCNC: 109 U/L (ref 35–104)
ALT SERPL-CCNC: 13 U/L (ref 9–52)
ANION GAP SERPL CALCULATED.3IONS-SCNC: 4 MMOL/L (ref 7–19)
AST SERPL-CCNC: 28 U/L (ref 14–36)
BASOPHILS # BLD: 0.09 K/UL (ref 0.01–0.08)
BASOPHILS NFR BLD: 1.3 % (ref 0.1–1.2)
BILIRUB SERPL-MCNC: <0.2 MG/DL (ref 0.2–1.3)
BUN SERPL-MCNC: 10 MG/DL (ref 7–17)
CALCIUM SERPL-MCNC: 8.4 MG/DL (ref 8.4–10.2)
CANCER AG19-9 SERPL-ACNC: 246 U/ML (ref 0–35)
CEA SERPL-MCNC: 6.1 NG/ML (ref 0–4.7)
CHLORIDE SERPL-SCNC: 108 MMOL/L (ref 98–111)
CO2 SERPL-SCNC: 27 MMOL/L (ref 22–29)
CREAT SERPL-MCNC: 0.6 MG/DL (ref 0.5–1)
EOSINOPHIL # BLD: 0.32 K/UL (ref 0.04–0.54)
EOSINOPHIL NFR BLD: 4.6 % (ref 0.7–7)
ERYTHROCYTE [DISTWIDTH] IN BLOOD BY AUTOMATED COUNT: 15.5 % (ref 11.7–14.4)
GLOBULIN: 2.8 G/DL
GLUCOSE SERPL-MCNC: 126 MG/DL (ref 74–106)
HCT VFR BLD AUTO: 32.1 % (ref 34.1–44.9)
HGB BLD-MCNC: 10.8 G/DL (ref 11.2–15.7)
LYMPHOCYTES # BLD: 2.8 K/UL (ref 1.18–3.74)
LYMPHOCYTES NFR BLD: 40.2 % (ref 19.3–53.1)
MCH RBC QN AUTO: 32.8 PG (ref 25.6–32.2)
MCHC RBC AUTO-ENTMCNC: 33.6 G/DL (ref 32.3–35.5)
MCV RBC AUTO: 97.6 FL (ref 79.4–94.8)
MONOCYTES # BLD: 0.85 K/UL (ref 0.24–0.82)
MONOCYTES NFR BLD: 12.2 % (ref 4.7–12.5)
NEUTROPHILS # BLD: 2.88 K/UL (ref 1.56–6.13)
NEUTS SEG NFR BLD: 41.4 % (ref 34–71.1)
PLATELET # BLD AUTO: 335 K/UL (ref 182–369)
PMV BLD AUTO: 9.8 FL (ref 7.4–10.4)
POTASSIUM SERPL-SCNC: 4.2 MMOL/L (ref 3.5–5.1)
PROT SERPL-MCNC: 6.3 G/DL (ref 6.3–8.2)
RBC # BLD AUTO: 3.29 M/UL (ref 3.93–5.22)
SODIUM SERPL-SCNC: 139 MMOL/L (ref 137–145)
WBC # BLD AUTO: 6.96 K/UL (ref 3.98–10.04)

## 2024-02-06 PROCEDURE — 96375 TX/PRO/DX INJ NEW DRUG ADDON: CPT

## 2024-02-06 PROCEDURE — G8420 CALC BMI NORM PARAMETERS: HCPCS | Performed by: INTERNAL MEDICINE

## 2024-02-06 PROCEDURE — 96367 TX/PROPH/DG ADDL SEQ IV INF: CPT

## 2024-02-06 PROCEDURE — 1090F PRES/ABSN URINE INCON ASSESS: CPT | Performed by: INTERNAL MEDICINE

## 2024-02-06 PROCEDURE — G8399 PT W/DXA RESULTS DOCUMENT: HCPCS | Performed by: INTERNAL MEDICINE

## 2024-02-06 PROCEDURE — G0498 CHEMO EXTEND IV INFUS W/PUMP: HCPCS

## 2024-02-06 PROCEDURE — 1123F ACP DISCUSS/DSCN MKR DOCD: CPT | Performed by: INTERNAL MEDICINE

## 2024-02-06 PROCEDURE — G2211 COMPLEX E/M VISIT ADD ON: HCPCS | Performed by: INTERNAL MEDICINE

## 2024-02-06 PROCEDURE — 1036F TOBACCO NON-USER: CPT | Performed by: INTERNAL MEDICINE

## 2024-02-06 PROCEDURE — 2580000003 HC RX 258: Performed by: INTERNAL MEDICINE

## 2024-02-06 PROCEDURE — 80053 COMPREHEN METABOLIC PANEL: CPT

## 2024-02-06 PROCEDURE — 96376 TX/PRO/DX INJ SAME DRUG ADON: CPT

## 2024-02-06 PROCEDURE — G8484 FLU IMMUNIZE NO ADMIN: HCPCS | Performed by: INTERNAL MEDICINE

## 2024-02-06 PROCEDURE — G8427 DOCREV CUR MEDS BY ELIG CLIN: HCPCS | Performed by: INTERNAL MEDICINE

## 2024-02-06 PROCEDURE — 96368 THER/DIAG CONCURRENT INF: CPT

## 2024-02-06 PROCEDURE — 96413 CHEMO IV INFUSION 1 HR: CPT

## 2024-02-06 PROCEDURE — 85025 COMPLETE CBC W/AUTO DIFF WBC: CPT

## 2024-02-06 PROCEDURE — 6360000002 HC RX W HCPCS: Performed by: INTERNAL MEDICINE

## 2024-02-06 PROCEDURE — 99214 OFFICE O/P EST MOD 30 MIN: CPT | Performed by: INTERNAL MEDICINE

## 2024-02-06 PROCEDURE — 96415 CHEMO IV INFUSION ADDL HR: CPT

## 2024-02-06 PROCEDURE — 36415 COLL VENOUS BLD VENIPUNCTURE: CPT

## 2024-02-06 PROCEDURE — 96417 CHEMO IV INFUS EACH ADDL SEQ: CPT

## 2024-02-06 RX ORDER — HEPARIN SODIUM (PORCINE) LOCK FLUSH IV SOLN 100 UNIT/ML 100 UNIT/ML
500 SOLUTION INTRAVENOUS PRN
Status: CANCELLED | OUTPATIENT
Start: 2024-02-06

## 2024-02-06 RX ORDER — SODIUM CHLORIDE 9 MG/ML
5-250 INJECTION, SOLUTION INTRAVENOUS PRN
Status: CANCELLED | OUTPATIENT
Start: 2024-02-06

## 2024-02-06 RX ORDER — ATROPINE SULFATE 0.4 MG/ML
0.4 INJECTION, SOLUTION INTRAVENOUS ONCE
Status: COMPLETED | OUTPATIENT
Start: 2024-02-06 | End: 2024-02-06

## 2024-02-06 RX ORDER — SODIUM CHLORIDE 0.9 % (FLUSH) 0.9 %
5-40 SYRINGE (ML) INJECTION PRN
Status: CANCELLED | OUTPATIENT
Start: 2024-02-09

## 2024-02-06 RX ORDER — ACETAMINOPHEN 325 MG/1
650 TABLET ORAL
Status: CANCELLED | OUTPATIENT
Start: 2024-02-06

## 2024-02-06 RX ORDER — SODIUM CHLORIDE 0.9 % (FLUSH) 0.9 %
5-40 SYRINGE (ML) INJECTION PRN
Status: CANCELLED | OUTPATIENT
Start: 2024-02-06

## 2024-02-06 RX ORDER — ATROPINE SULFATE 0.4 MG/ML
0.4 INJECTION, SOLUTION INTRAVENOUS
Status: DISCONTINUED | OUTPATIENT
Start: 2024-02-06 | End: 2024-02-08 | Stop reason: HOSPADM

## 2024-02-06 RX ORDER — EPINEPHRINE 1 MG/ML
0.3 INJECTION, SOLUTION, CONCENTRATE INTRAVENOUS PRN
Status: CANCELLED | OUTPATIENT
Start: 2024-02-06

## 2024-02-06 RX ORDER — ALBUTEROL SULFATE 90 UG/1
4 AEROSOL, METERED RESPIRATORY (INHALATION) PRN
Status: CANCELLED | OUTPATIENT
Start: 2024-02-06

## 2024-02-06 RX ORDER — PALONOSETRON 0.05 MG/ML
0.25 INJECTION, SOLUTION INTRAVENOUS ONCE
Status: COMPLETED | OUTPATIENT
Start: 2024-02-06 | End: 2024-02-06

## 2024-02-06 RX ORDER — HEPARIN SODIUM (PORCINE) LOCK FLUSH IV SOLN 100 UNIT/ML 100 UNIT/ML
500 SOLUTION INTRAVENOUS PRN
Status: CANCELLED | OUTPATIENT
Start: 2024-02-09

## 2024-02-06 RX ORDER — MEPERIDINE HYDROCHLORIDE 50 MG/ML
12.5 INJECTION INTRAMUSCULAR; INTRAVENOUS; SUBCUTANEOUS PRN
Status: CANCELLED | OUTPATIENT
Start: 2024-02-06

## 2024-02-06 RX ORDER — FAMOTIDINE 10 MG/ML
20 INJECTION, SOLUTION INTRAVENOUS
Status: CANCELLED | OUTPATIENT
Start: 2024-02-06

## 2024-02-06 RX ORDER — SODIUM CHLORIDE 9 MG/ML
5-250 INJECTION, SOLUTION INTRAVENOUS PRN
OUTPATIENT
Start: 2024-02-09

## 2024-02-06 RX ORDER — ONDANSETRON 2 MG/ML
8 INJECTION INTRAMUSCULAR; INTRAVENOUS
Status: CANCELLED | OUTPATIENT
Start: 2024-02-06

## 2024-02-06 RX ORDER — SODIUM CHLORIDE 9 MG/ML
INJECTION, SOLUTION INTRAVENOUS CONTINUOUS
Status: CANCELLED | OUTPATIENT
Start: 2024-02-06

## 2024-02-06 RX ORDER — DIPHENHYDRAMINE HYDROCHLORIDE 50 MG/ML
50 INJECTION INTRAMUSCULAR; INTRAVENOUS
Status: CANCELLED | OUTPATIENT
Start: 2024-02-06

## 2024-02-06 RX ADMIN — LEUCOVORIN CALCIUM 200 MG: 200 INJECTION, POWDER, LYOPHILIZED, FOR SOLUTION INTRAMUSCULAR; INTRAVENOUS at 12:28

## 2024-02-06 RX ADMIN — FLUOROURACIL 4000 MG: 50 INJECTION, SOLUTION INTRAVENOUS at 13:02

## 2024-02-06 RX ADMIN — IRINOTECAN HYDROCHLORIDE 86 MG: 4.3 INJECTION, POWDER, FOR SOLUTION INTRAVENOUS at 10:46

## 2024-02-06 RX ADMIN — ATROPINE SULFATE 0.4 MG: 0.4 INJECTION, SOLUTION INTRAVENOUS at 10:33

## 2024-02-06 RX ADMIN — PALONOSETRON 0.25 MG: 0.05 INJECTION, SOLUTION INTRAVENOUS at 09:57

## 2024-02-06 RX ADMIN — ATROPINE SULFATE 0.4 MG: 0.4 INJECTION, SOLUTION INTRAVENOUS at 12:28

## 2024-02-06 RX ADMIN — DEXAMETHASONE SODIUM PHOSPHATE: 10 INJECTION, SOLUTION INTRAMUSCULAR; INTRAVENOUS at 09:56

## 2024-02-06 RX ADMIN — SODIUM CHLORIDE 150 MG: 900 INJECTION, SOLUTION INTRAVENOUS at 10:12

## 2024-02-06 NOTE — PROGRESS NOTES
Lab Results   Component Value Date    WBC 6.96 02/06/2024    HGB 10.8 (L) 02/06/2024    HCT 32.1 (L) 02/06/2024    MCV 97.6 (H) 02/06/2024     02/06/2024     Lab Results   Component Value Date    NEUTROABS 2.88 02/06/2024     Lab Results   Component Value Date     02/06/2024    K 4.2 02/06/2024     02/06/2024    CO2 27 02/06/2024    BUN 10 02/06/2024    CREATININE 0.6 02/06/2024    GLUCOSE 126 (H) 02/06/2024    CALCIUM 8.4 02/06/2024    PROT 6.3 02/06/2024    LABALBU 3.5 02/06/2024    BILITOT <0.2 02/06/2024    ALKPHOS 109 (H) 02/06/2024    AST 28 02/06/2024    ALT 13 02/06/2024    LABGLOM >60 02/06/2024    GFRAA >59 01/12/2022    GLOB 2.8 02/06/2024     Lab Results   Component Value Date     02/06/2024    K 4.2 02/06/2024     02/06/2024    CO2 27 02/06/2024    BUN 10 02/06/2024    CREATININE 0.6 02/06/2024    GLUCOSE 126 (H) 02/06/2024    CALCIUM 8.4 02/06/2024    PROT 6.3 02/06/2024    LABALBU 3.5 02/06/2024    BILITOT <0.2 02/06/2024    ALKPHOS 109 (H) 02/06/2024    AST 28 02/06/2024    ALT 13 02/06/2024    LABGLOM >60 02/06/2024    GFRAA >59 01/12/2022    GLOB 2.8 02/06/2024

## 2024-02-08 ENCOUNTER — HOSPITAL ENCOUNTER (OUTPATIENT)
Dept: INFUSION THERAPY | Age: 79
Discharge: HOME | End: 2024-02-08
Payer: MEDICARE

## 2024-02-08 DIAGNOSIS — C79.9 METASTASIS FROM PANCREATIC CANCER (HCC): ICD-10-CM

## 2024-02-08 DIAGNOSIS — C25.2 MALIGNANT NEOPLASM OF TAIL OF PANCREAS (HCC): Primary | ICD-10-CM

## 2024-02-08 DIAGNOSIS — C25.9 METASTASIS FROM PANCREATIC CANCER (HCC): ICD-10-CM

## 2024-02-08 PROCEDURE — 96523 IRRIG DRUG DELIVERY DEVICE: CPT

## 2024-02-08 PROCEDURE — 2580000003 HC RX 258: Performed by: INTERNAL MEDICINE

## 2024-02-08 PROCEDURE — 6360000002 HC RX W HCPCS: Performed by: INTERNAL MEDICINE

## 2024-02-08 RX ORDER — HEPARIN 100 UNIT/ML
500 SYRINGE INTRAVENOUS PRN
Status: DISCONTINUED | OUTPATIENT
Start: 2024-02-08 | End: 2024-02-09 | Stop reason: HOSPADM

## 2024-02-08 RX ORDER — SODIUM CHLORIDE 0.9 % (FLUSH) 0.9 %
5-40 SYRINGE (ML) INJECTION PRN
Status: DISCONTINUED | OUTPATIENT
Start: 2024-02-08 | End: 2024-02-09 | Stop reason: HOSPADM

## 2024-02-08 RX ADMIN — HEPARIN 500 UNITS: 100 SYRINGE at 10:33

## 2024-02-08 RX ADMIN — SODIUM CHLORIDE, PRESERVATIVE FREE 10 ML: 5 INJECTION INTRAVENOUS at 10:33

## 2024-02-13 ENCOUNTER — TELEPHONE (OUTPATIENT)
Dept: INTERNAL MEDICINE | Facility: CLINIC | Age: 79
End: 2024-02-13
Payer: MEDICARE

## 2024-02-13 ENCOUNTER — LAB (OUTPATIENT)
Dept: INTERNAL MEDICINE | Facility: CLINIC | Age: 79
End: 2024-02-13
Payer: MEDICARE

## 2024-02-15 LAB — TSH SERPL DL<=0.005 MIU/L-ACNC: 4.01 UIU/ML (ref 0.45–4.5)

## 2024-02-20 ENCOUNTER — HOSPITAL ENCOUNTER (OUTPATIENT)
Dept: INFUSION THERAPY | Age: 79
Discharge: HOME | End: 2024-02-20
Payer: MEDICARE

## 2024-02-20 VITALS
OXYGEN SATURATION: 99 % | TEMPERATURE: 98.1 F | RESPIRATION RATE: 16 BRPM | BODY MASS INDEX: 24.28 KG/M2 | HEART RATE: 71 BPM | HEIGHT: 65 IN | WEIGHT: 145.7 LBS | SYSTOLIC BLOOD PRESSURE: 108 MMHG | DIASTOLIC BLOOD PRESSURE: 63 MMHG

## 2024-02-20 DIAGNOSIS — C25.9 METASTASIS FROM PANCREATIC CANCER (HCC): ICD-10-CM

## 2024-02-20 DIAGNOSIS — C79.9 METASTASIS FROM PANCREATIC CANCER (HCC): ICD-10-CM

## 2024-02-20 DIAGNOSIS — C25.2 MALIGNANT NEOPLASM OF TAIL OF PANCREAS (HCC): ICD-10-CM

## 2024-02-20 DIAGNOSIS — F41.9 ANXIETY IN CANCER PATIENT: Primary | ICD-10-CM

## 2024-02-20 DIAGNOSIS — R97.8 ABNORMAL TUMOR MARKERS: ICD-10-CM

## 2024-02-20 DIAGNOSIS — C25.9 METASTASIS FROM PANCREATIC CANCER (HCC): Primary | ICD-10-CM

## 2024-02-20 DIAGNOSIS — C25.2 MALIGNANT NEOPLASM OF TAIL OF PANCREAS (HCC): Primary | ICD-10-CM

## 2024-02-20 DIAGNOSIS — C79.9 METASTASIS FROM PANCREATIC CANCER (HCC): Primary | ICD-10-CM

## 2024-02-20 DIAGNOSIS — C25.9 ADENOCARCINOMA OF PANCREAS (HCC): ICD-10-CM

## 2024-02-20 LAB
ALBUMIN SERPL-MCNC: 3.4 G/DL (ref 3.5–5.2)
ALP SERPL-CCNC: 89 U/L (ref 35–104)
ALT SERPL-CCNC: 12 U/L (ref 9–52)
ANION GAP SERPL CALCULATED.3IONS-SCNC: 7 MMOL/L (ref 7–19)
AST SERPL-CCNC: 29 U/L (ref 14–36)
BASOPHILS # BLD: 0.09 K/UL (ref 0.01–0.08)
BASOPHILS NFR BLD: 1.3 % (ref 0.1–1.2)
BILIRUB SERPL-MCNC: 0.4 MG/DL (ref 0.2–1.3)
BUN SERPL-MCNC: 8 MG/DL (ref 7–17)
CALCIUM SERPL-MCNC: 9.1 MG/DL (ref 8.4–10.2)
CANCER AG19-9 SERPL-ACNC: 305 U/ML (ref 0–35)
CEA SERPL-MCNC: 6.6 NG/ML (ref 0–4.7)
CHLORIDE SERPL-SCNC: 108 MMOL/L (ref 98–111)
CO2 SERPL-SCNC: 26 MMOL/L (ref 22–29)
CREAT SERPL-MCNC: 0.7 MG/DL (ref 0.5–1)
EOSINOPHIL # BLD: 0.43 K/UL (ref 0.04–0.54)
EOSINOPHIL NFR BLD: 6.2 % (ref 0.7–7)
ERYTHROCYTE [DISTWIDTH] IN BLOOD BY AUTOMATED COUNT: 15.7 % (ref 11.7–14.4)
GLUCOSE SERPL-MCNC: 141 MG/DL (ref 74–106)
HCT VFR BLD AUTO: 32.4 % (ref 34.1–44.9)
HGB BLD-MCNC: 11.1 G/DL (ref 11.2–15.7)
LYMPHOCYTES # BLD: 2.88 K/UL (ref 1.18–3.74)
LYMPHOCYTES NFR BLD: 41.2 % (ref 19.3–53.1)
MAGNESIUM SERPL-MCNC: 1.7 MG/DL (ref 1.6–2.3)
MCH RBC QN AUTO: 33.6 PG (ref 25.6–32.2)
MCHC RBC AUTO-ENTMCNC: 34.3 G/DL (ref 32.3–35.5)
MCV RBC AUTO: 98.2 FL (ref 79.4–94.8)
MONOCYTES # BLD: 0.93 K/UL (ref 0.24–0.82)
MONOCYTES NFR BLD: 13.3 % (ref 4.7–12.5)
NEUTROPHILS # BLD: 2.63 K/UL (ref 1.56–6.13)
NEUTS SEG NFR BLD: 37.6 % (ref 34–71.1)
PLATELET # BLD AUTO: 351 K/UL (ref 182–369)
PMV BLD AUTO: 9.6 FL (ref 7.4–10.4)
POTASSIUM SERPL-SCNC: 3.9 MMOL/L (ref 3.5–5.1)
PROT SERPL-MCNC: 6.3 G/DL (ref 6.3–8.2)
RBC # BLD AUTO: 3.3 M/UL (ref 3.93–5.22)
SODIUM SERPL-SCNC: 141 MMOL/L (ref 137–145)
WBC # BLD AUTO: 6.99 K/UL (ref 3.98–10.04)

## 2024-02-20 PROCEDURE — 96413 CHEMO IV INFUSION 1 HR: CPT

## 2024-02-20 PROCEDURE — 83735 ASSAY OF MAGNESIUM: CPT

## 2024-02-20 PROCEDURE — 96415 CHEMO IV INFUSION ADDL HR: CPT

## 2024-02-20 PROCEDURE — 96376 TX/PRO/DX INJ SAME DRUG ADON: CPT

## 2024-02-20 PROCEDURE — 6360000002 HC RX W HCPCS: Performed by: INTERNAL MEDICINE

## 2024-02-20 PROCEDURE — 96375 TX/PRO/DX INJ NEW DRUG ADDON: CPT

## 2024-02-20 PROCEDURE — 85025 COMPLETE CBC W/AUTO DIFF WBC: CPT

## 2024-02-20 PROCEDURE — 96367 TX/PROPH/DG ADDL SEQ IV INF: CPT

## 2024-02-20 PROCEDURE — 36415 COLL VENOUS BLD VENIPUNCTURE: CPT

## 2024-02-20 PROCEDURE — G0498 CHEMO EXTEND IV INFUS W/PUMP: HCPCS

## 2024-02-20 PROCEDURE — 80053 COMPREHEN METABOLIC PANEL: CPT

## 2024-02-20 PROCEDURE — 2580000003 HC RX 258: Performed by: INTERNAL MEDICINE

## 2024-02-20 RX ORDER — ALBUTEROL SULFATE 90 UG/1
4 AEROSOL, METERED RESPIRATORY (INHALATION) PRN
Status: CANCELLED | OUTPATIENT
Start: 2024-02-20

## 2024-02-20 RX ORDER — PALONOSETRON 0.05 MG/ML
0.25 INJECTION, SOLUTION INTRAVENOUS ONCE
Status: CANCELLED | OUTPATIENT
Start: 2024-02-20 | End: 2024-02-20

## 2024-02-20 RX ORDER — DIPHENHYDRAMINE HYDROCHLORIDE 50 MG/ML
50 INJECTION INTRAMUSCULAR; INTRAVENOUS
Status: CANCELLED | OUTPATIENT
Start: 2024-02-20

## 2024-02-20 RX ORDER — SODIUM CHLORIDE 9 MG/ML
INJECTION, SOLUTION INTRAVENOUS CONTINUOUS
Status: CANCELLED | OUTPATIENT
Start: 2024-02-20

## 2024-02-20 RX ORDER — EPINEPHRINE 1 MG/ML
0.3 INJECTION, SOLUTION, CONCENTRATE INTRAVENOUS PRN
Status: CANCELLED | OUTPATIENT
Start: 2024-02-20

## 2024-02-20 RX ORDER — ONDANSETRON 2 MG/ML
8 INJECTION INTRAMUSCULAR; INTRAVENOUS
Status: CANCELLED | OUTPATIENT
Start: 2024-02-20

## 2024-02-20 RX ORDER — SODIUM CHLORIDE 0.9 % (FLUSH) 0.9 %
5-40 SYRINGE (ML) INJECTION PRN
Status: CANCELLED | OUTPATIENT
Start: 2024-02-20

## 2024-02-20 RX ORDER — HEPARIN SODIUM (PORCINE) LOCK FLUSH IV SOLN 100 UNIT/ML 100 UNIT/ML
500 SOLUTION INTRAVENOUS PRN
Status: CANCELLED | OUTPATIENT
Start: 2024-02-23

## 2024-02-20 RX ORDER — ATROPINE SULFATE 0.4 MG/ML
0.4 INJECTION, SOLUTION INTRAVENOUS
Status: DISCONTINUED | OUTPATIENT
Start: 2024-02-20 | End: 2024-02-22 | Stop reason: HOSPADM

## 2024-02-20 RX ORDER — SODIUM CHLORIDE 9 MG/ML
5-250 INJECTION, SOLUTION INTRAVENOUS PRN
Status: CANCELLED | OUTPATIENT
Start: 2024-02-20

## 2024-02-20 RX ORDER — SODIUM CHLORIDE 9 MG/ML
5-250 INJECTION, SOLUTION INTRAVENOUS PRN
Status: DISCONTINUED | OUTPATIENT
Start: 2024-02-20 | End: 2024-02-21 | Stop reason: HOSPADM

## 2024-02-20 RX ORDER — SODIUM CHLORIDE 9 MG/ML
5-250 INJECTION, SOLUTION INTRAVENOUS PRN
OUTPATIENT
Start: 2024-02-23

## 2024-02-20 RX ORDER — FAMOTIDINE 10 MG/ML
20 INJECTION, SOLUTION INTRAVENOUS
Status: CANCELLED | OUTPATIENT
Start: 2024-02-20

## 2024-02-20 RX ORDER — HEPARIN SODIUM (PORCINE) LOCK FLUSH IV SOLN 100 UNIT/ML 100 UNIT/ML
500 SOLUTION INTRAVENOUS PRN
Status: CANCELLED | OUTPATIENT
Start: 2024-02-20

## 2024-02-20 RX ORDER — ALPRAZOLAM 0.5 MG/1
0.25 TABLET ORAL 2 TIMES DAILY PRN
Qty: 30 TABLET | Refills: 0 | Status: SHIPPED | OUTPATIENT
Start: 2024-02-20 | End: 2024-03-21

## 2024-02-20 RX ORDER — MEPERIDINE HYDROCHLORIDE 50 MG/ML
12.5 INJECTION INTRAMUSCULAR; INTRAVENOUS; SUBCUTANEOUS PRN
Status: CANCELLED | OUTPATIENT
Start: 2024-02-20

## 2024-02-20 RX ORDER — ACETAMINOPHEN 325 MG/1
650 TABLET ORAL
Status: CANCELLED | OUTPATIENT
Start: 2024-02-20

## 2024-02-20 RX ORDER — SODIUM CHLORIDE 0.9 % (FLUSH) 0.9 %
5-40 SYRINGE (ML) INJECTION PRN
Status: CANCELLED | OUTPATIENT
Start: 2024-02-23

## 2024-02-20 RX ORDER — PALONOSETRON 0.05 MG/ML
0.25 INJECTION, SOLUTION INTRAVENOUS ONCE
Status: COMPLETED | OUTPATIENT
Start: 2024-02-20 | End: 2024-02-20

## 2024-02-20 RX ADMIN — LEUCOVORIN CALCIUM 200 MG: 200 INJECTION, POWDER, LYOPHILIZED, FOR SUSPENSION INTRAMUSCULAR; INTRAVENOUS at 11:12

## 2024-02-20 RX ADMIN — PALONOSETRON 0.25 MG: 0.05 INJECTION, SOLUTION INTRAVENOUS at 08:50

## 2024-02-20 RX ADMIN — ATROPINE SULFATE 0.4 MG: 0.4 INJECTION, SOLUTION INTRAVENOUS at 09:28

## 2024-02-20 RX ADMIN — DEXAMETHASONE SODIUM PHOSPHATE: 10 INJECTION, SOLUTION INTRAMUSCULAR; INTRAVENOUS at 08:48

## 2024-02-20 RX ADMIN — ATROPINE SULFATE 0.4 MG: 0.4 INJECTION, SOLUTION INTRAVENOUS at 11:10

## 2024-02-20 RX ADMIN — SODIUM CHLORIDE 150 MG: 900 INJECTION, SOLUTION INTRAVENOUS at 09:00

## 2024-02-20 RX ADMIN — FLUOROURACIL 4000 MG: 50 INJECTION, SOLUTION INTRAVENOUS at 11:43

## 2024-02-20 RX ADMIN — IRINOTECAN HYDROCHLORIDE 86 MG: 4.3 INJECTION, POWDER, FOR SOLUTION INTRAVENOUS at 09:30

## 2024-02-20 RX ADMIN — SODIUM CHLORIDE 50 ML/HR: 9 INJECTION, SOLUTION INTRAVENOUS at 08:48

## 2024-02-20 NOTE — PROGRESS NOTES
Lab Results   Component Value Date    WBC 6.99 02/20/2024    HGB 11.1 (L) 02/20/2024    HCT 32.4 (L) 02/20/2024    MCV 98.2 (H) 02/20/2024     02/20/2024     Lab Results   Component Value Date    NEUTROABS 2.63 02/20/2024     Lab Results   Component Value Date     02/06/2024    K 4.2 02/06/2024     02/06/2024    CO2 27 02/06/2024    BUN 10 02/06/2024    CREATININE 0.6 02/06/2024    GLUCOSE 126 (H) 02/06/2024    CALCIUM 8.4 02/06/2024    PROT 6.3 02/06/2024    LABALBU 3.5 02/06/2024    BILITOT <0.2 02/06/2024    ALKPHOS 109 (H) 02/06/2024    AST 28 02/06/2024    ALT 13 02/06/2024    LABGLOM >60 02/06/2024    GFRAA >59 01/12/2022    GLOB 2.8 02/06/2024

## 2024-02-22 ENCOUNTER — HOSPITAL ENCOUNTER (OUTPATIENT)
Dept: INFUSION THERAPY | Age: 79
Discharge: HOME | End: 2024-02-22
Payer: MEDICARE

## 2024-02-22 DIAGNOSIS — C25.9 METASTASIS FROM PANCREATIC CANCER (HCC): ICD-10-CM

## 2024-02-22 DIAGNOSIS — C25.2 MALIGNANT NEOPLASM OF TAIL OF PANCREAS (HCC): Primary | ICD-10-CM

## 2024-02-22 DIAGNOSIS — C79.9 METASTASIS FROM PANCREATIC CANCER (HCC): ICD-10-CM

## 2024-02-22 PROCEDURE — 96523 IRRIG DRUG DELIVERY DEVICE: CPT

## 2024-02-22 PROCEDURE — 2580000003 HC RX 258: Performed by: INTERNAL MEDICINE

## 2024-02-22 PROCEDURE — 6360000002 HC RX W HCPCS: Performed by: INTERNAL MEDICINE

## 2024-02-22 RX ORDER — HEPARIN 100 UNIT/ML
500 SYRINGE INTRAVENOUS PRN
Status: DISCONTINUED | OUTPATIENT
Start: 2024-02-22 | End: 2024-02-23 | Stop reason: HOSPADM

## 2024-02-22 RX ORDER — SODIUM CHLORIDE 0.9 % (FLUSH) 0.9 %
5-40 SYRINGE (ML) INJECTION PRN
Status: DISCONTINUED | OUTPATIENT
Start: 2024-02-22 | End: 2024-02-23 | Stop reason: HOSPADM

## 2024-02-22 RX ADMIN — HEPARIN 500 UNITS: 100 SYRINGE at 10:43

## 2024-02-22 RX ADMIN — SODIUM CHLORIDE, PRESERVATIVE FREE 10 ML: 5 INJECTION INTRAVENOUS at 10:43

## 2024-03-04 ENCOUNTER — HOSPITAL ENCOUNTER (OUTPATIENT)
Dept: GENERAL RADIOLOGY | Age: 79
Discharge: HOME | End: 2024-03-04
Attending: INTERNAL MEDICINE
Payer: MEDICARE

## 2024-03-04 DIAGNOSIS — C79.9 METASTASIS FROM PANCREATIC CANCER (HCC): ICD-10-CM

## 2024-03-04 DIAGNOSIS — C25.9 METASTASIS FROM PANCREATIC CANCER (HCC): ICD-10-CM

## 2024-03-04 PROCEDURE — 6360000004 HC RX CONTRAST MEDICATION: Performed by: INTERNAL MEDICINE

## 2024-03-04 PROCEDURE — 74177 CT ABD & PELVIS W/CONTRAST: CPT

## 2024-03-04 PROCEDURE — 71260 CT THORAX DX C+: CPT

## 2024-03-04 PROCEDURE — 2500000003 HC RX 250 WO HCPCS: Performed by: INTERNAL MEDICINE

## 2024-03-04 RX ADMIN — IOPAMIDOL 75 ML: 755 INJECTION, SOLUTION INTRAVENOUS at 14:43

## 2024-03-04 RX ADMIN — BARIUM SULFATE 450 ML: 21 SUSPENSION ORAL at 14:42

## 2024-03-04 NOTE — PROGRESS NOTES
Abd/Pelviw W IV Contrast  Soft tissue thickening of the anterior peritoneum is identified adjacent to the transverse colon, measuring 3.9 x 2.2 cm on today's exam, previously measuring 4.0 x 3.0 cm, grossly unchanged given differences in technique. Stable metastatic disease in the abdomen and pelvis, as described above.     Plan:  -Recommended to continue with palliative chemotherapy with Onivyde/5-FU, cycle 9  -RTC MD in 4 weeks  -Repeat CT C/A/P mid May    Right upper extremity DVT port related-started on Eliquis started on 1/14/2022.  Eliquis for 3 months through mid April 2022.  Denies any bleeding.    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  Stable    Bilateral leg edema-  -continue Lasix 40 mg p.o. daily and increase potassium supplement  -US lower extremity at [facility] 5/15/2023-proved no DVT  -2D echo 5/16/2023-normal EF 65%, diastolic dysfunction    Psoriasis skin lesions  She has had a skin flair on the Onivyde  Continue triamcinolone cream 0.1% twice a day    Protein calorie malnutrition-followed by dietitian  Wt Readings from Last 3 Encounters:   03/05/24 65.2 kg (143 lb 12.8 oz)   02/20/24 66.1 kg (145 lb 11.2 oz)   02/06/24 67.4 kg (148 lb 11.2 oz)   -Stable.      PLAN:  RTC with MD in treatment room 4 weeks   CBC, CMP, CEA, CA 19-9 today and every visit  C#16 Onivyde, Leucovorin, CI 5FU today and every 2 weeks  Continue follow-up with Palliative Care  Repeat CT chest abdomen pelvis in May 2024 to assess response  Recommend OTC Imodium as needed for diarrhea, up to 10/day  Recommend Lomotil 2.5mg every 6 hrs as needed, when Imodium isn't controlling diarrhea-script sent  Continue Marinol to 5mg twice a day  Continue follow-up with Mouna Restrepo Dietitian for nutrition control  Continue Triamcinolone cream 0.1% twice a day  Continue Lasix 40mg once a day  HOLD Micro K 20mEq twice a day   Continue Norco 7.5mg every 6 hrs as needed  Continue Phenergan as

## 2024-03-05 ENCOUNTER — OFFICE VISIT (OUTPATIENT)
Dept: HEMATOLOGY | Age: 79
End: 2024-03-05
Payer: MEDICARE

## 2024-03-05 ENCOUNTER — HOSPITAL ENCOUNTER (OUTPATIENT)
Dept: INFUSION THERAPY | Age: 79
Discharge: HOME | End: 2024-03-05
Payer: MEDICARE

## 2024-03-05 VITALS
RESPIRATION RATE: 18 BRPM | SYSTOLIC BLOOD PRESSURE: 126 MMHG | TEMPERATURE: 97.5 F | OXYGEN SATURATION: 100 % | DIASTOLIC BLOOD PRESSURE: 70 MMHG | HEIGHT: 65 IN | HEART RATE: 77 BPM | BODY MASS INDEX: 23.96 KG/M2 | WEIGHT: 143.8 LBS

## 2024-03-05 DIAGNOSIS — C25.2 MALIGNANT NEOPLASM OF TAIL OF PANCREAS (HCC): Chronic | ICD-10-CM

## 2024-03-05 DIAGNOSIS — C25.2 MALIGNANT NEOPLASM OF TAIL OF PANCREAS (HCC): Primary | Chronic | ICD-10-CM

## 2024-03-05 DIAGNOSIS — C79.9 METASTASIS FROM PANCREATIC CANCER (HCC): Primary | ICD-10-CM

## 2024-03-05 DIAGNOSIS — Z51.11 CHEMOTHERAPY MANAGEMENT, ENCOUNTER FOR: ICD-10-CM

## 2024-03-05 DIAGNOSIS — Z71.89 CARE PLAN DISCUSSED WITH PATIENT: ICD-10-CM

## 2024-03-05 DIAGNOSIS — C79.9 METASTASIS FROM PANCREATIC CANCER (HCC): ICD-10-CM

## 2024-03-05 DIAGNOSIS — T45.1X5A CHEMOTHERAPY-INDUCED FATIGUE: ICD-10-CM

## 2024-03-05 DIAGNOSIS — G62.0 CHEMOTHERAPY-INDUCED NEUROPATHY (HCC): ICD-10-CM

## 2024-03-05 DIAGNOSIS — Z51.81 ANTICOAGULATION MANAGEMENT ENCOUNTER: ICD-10-CM

## 2024-03-05 DIAGNOSIS — R53.83 CHEMOTHERAPY-INDUCED FATIGUE: ICD-10-CM

## 2024-03-05 DIAGNOSIS — T45.1X5A ANTINEOPLASTIC CHEMOTHERAPY INDUCED ANEMIA: ICD-10-CM

## 2024-03-05 DIAGNOSIS — T45.1X5D ADVERSE EFFECT OF CHEMOTHERAPY, SUBSEQUENT ENCOUNTER: ICD-10-CM

## 2024-03-05 DIAGNOSIS — C25.9 METASTASIS FROM PANCREATIC CANCER (HCC): ICD-10-CM

## 2024-03-05 DIAGNOSIS — D64.81 ANTINEOPLASTIC CHEMOTHERAPY INDUCED ANEMIA: ICD-10-CM

## 2024-03-05 DIAGNOSIS — C25.9 METASTASIS FROM PANCREATIC CANCER (HCC): Primary | ICD-10-CM

## 2024-03-05 DIAGNOSIS — Z79.01 ANTICOAGULATION MANAGEMENT ENCOUNTER: ICD-10-CM

## 2024-03-05 DIAGNOSIS — C25.2 MALIGNANT NEOPLASM OF TAIL OF PANCREAS (HCC): ICD-10-CM

## 2024-03-05 DIAGNOSIS — T45.1X5A CHEMOTHERAPY-INDUCED NEUROPATHY (HCC): ICD-10-CM

## 2024-03-05 DIAGNOSIS — R97.8 ABNORMAL TUMOR MARKERS: ICD-10-CM

## 2024-03-05 DIAGNOSIS — K52.1 CHEMOTHERAPY INDUCED DIARRHEA: ICD-10-CM

## 2024-03-05 DIAGNOSIS — T45.1X5A CHEMOTHERAPY INDUCED DIARRHEA: ICD-10-CM

## 2024-03-05 DIAGNOSIS — Z86.718 HISTORY OF DVT (DEEP VEIN THROMBOSIS): ICD-10-CM

## 2024-03-05 LAB
ALBUMIN SERPL-MCNC: 3.6 G/DL (ref 3.5–5.2)
ALP SERPL-CCNC: 86 U/L (ref 35–104)
ALT SERPL-CCNC: 14 U/L (ref 9–52)
ANION GAP SERPL CALCULATED.3IONS-SCNC: 11 MMOL/L (ref 7–19)
AST SERPL-CCNC: 29 U/L (ref 14–36)
BASOPHILS # BLD: 0.07 K/UL (ref 0.01–0.08)
BASOPHILS NFR BLD: 1 % (ref 0.1–1.2)
BILIRUB SERPL-MCNC: 0.3 MG/DL (ref 0.2–1.3)
BUN SERPL-MCNC: 7 MG/DL (ref 7–17)
CALCIUM SERPL-MCNC: 9.1 MG/DL (ref 8.4–10.2)
CANCER AG19-9 SERPL-ACNC: 301 U/ML (ref 0–35)
CEA SERPL-MCNC: 6.3 NG/ML (ref 0–4.7)
CHLORIDE SERPL-SCNC: 102 MMOL/L (ref 98–111)
CO2 SERPL-SCNC: 26 MMOL/L (ref 22–29)
CREAT SERPL-MCNC: 0.7 MG/DL (ref 0.5–1)
EOSINOPHIL # BLD: 0.38 K/UL (ref 0.04–0.54)
EOSINOPHIL NFR BLD: 5.6 % (ref 0.7–7)
ERYTHROCYTE [DISTWIDTH] IN BLOOD BY AUTOMATED COUNT: 15.7 % (ref 11.7–14.4)
GLOBULIN: 2.7 G/DL
GLUCOSE SERPL-MCNC: 116 MG/DL (ref 74–106)
HCT VFR BLD AUTO: 31.9 % (ref 34.1–44.9)
HGB BLD-MCNC: 10.9 G/DL (ref 11.2–15.7)
LYMPHOCYTES # BLD: 3.35 K/UL (ref 1.18–3.74)
LYMPHOCYTES NFR BLD: 49.7 % (ref 19.3–53.1)
MAGNESIUM SERPL-MCNC: 1.6 MG/DL (ref 1.6–2.3)
MCH RBC QN AUTO: 33.3 PG (ref 25.6–32.2)
MCHC RBC AUTO-ENTMCNC: 34.2 G/DL (ref 32.3–35.5)
MCV RBC AUTO: 97.6 FL (ref 79.4–94.8)
MONOCYTES # BLD: 0.82 K/UL (ref 0.24–0.82)
MONOCYTES NFR BLD: 12.2 % (ref 4.7–12.5)
NEUTROPHILS # BLD: 2.09 K/UL (ref 1.56–6.13)
NEUTS SEG NFR BLD: 31.1 % (ref 34–71.1)
PLATELET # BLD AUTO: 341 K/UL (ref 182–369)
PMV BLD AUTO: 9.5 FL (ref 7.4–10.4)
POTASSIUM SERPL-SCNC: 3.8 MMOL/L (ref 3.5–5.1)
PROT SERPL-MCNC: 6.3 G/DL (ref 6.3–8.2)
RBC # BLD AUTO: 3.27 M/UL (ref 3.93–5.22)
SODIUM SERPL-SCNC: 139 MMOL/L (ref 137–145)
WBC # BLD AUTO: 6.74 K/UL (ref 3.98–10.04)

## 2024-03-05 PROCEDURE — 96375 TX/PRO/DX INJ NEW DRUG ADDON: CPT

## 2024-03-05 PROCEDURE — 96413 CHEMO IV INFUSION 1 HR: CPT

## 2024-03-05 PROCEDURE — 96367 TX/PROPH/DG ADDL SEQ IV INF: CPT

## 2024-03-05 PROCEDURE — G8399 PT W/DXA RESULTS DOCUMENT: HCPCS | Performed by: INTERNAL MEDICINE

## 2024-03-05 PROCEDURE — 85025 COMPLETE CBC W/AUTO DIFF WBC: CPT

## 2024-03-05 PROCEDURE — G8420 CALC BMI NORM PARAMETERS: HCPCS | Performed by: INTERNAL MEDICINE

## 2024-03-05 PROCEDURE — G0498 CHEMO EXTEND IV INFUS W/PUMP: HCPCS

## 2024-03-05 PROCEDURE — 96415 CHEMO IV INFUSION ADDL HR: CPT

## 2024-03-05 PROCEDURE — G8484 FLU IMMUNIZE NO ADMIN: HCPCS | Performed by: INTERNAL MEDICINE

## 2024-03-05 PROCEDURE — 6360000002 HC RX W HCPCS: Performed by: INTERNAL MEDICINE

## 2024-03-05 PROCEDURE — G8427 DOCREV CUR MEDS BY ELIG CLIN: HCPCS | Performed by: INTERNAL MEDICINE

## 2024-03-05 PROCEDURE — 1123F ACP DISCUSS/DSCN MKR DOCD: CPT | Performed by: INTERNAL MEDICINE

## 2024-03-05 PROCEDURE — 99214 OFFICE O/P EST MOD 30 MIN: CPT | Performed by: INTERNAL MEDICINE

## 2024-03-05 PROCEDURE — 36591 DRAW BLOOD OFF VENOUS DEVICE: CPT

## 2024-03-05 PROCEDURE — 36415 COLL VENOUS BLD VENIPUNCTURE: CPT

## 2024-03-05 PROCEDURE — 2580000003 HC RX 258: Performed by: INTERNAL MEDICINE

## 2024-03-05 PROCEDURE — G2211 COMPLEX E/M VISIT ADD ON: HCPCS | Performed by: INTERNAL MEDICINE

## 2024-03-05 PROCEDURE — 1036F TOBACCO NON-USER: CPT | Performed by: INTERNAL MEDICINE

## 2024-03-05 PROCEDURE — 83735 ASSAY OF MAGNESIUM: CPT

## 2024-03-05 PROCEDURE — 80053 COMPREHEN METABOLIC PANEL: CPT

## 2024-03-05 PROCEDURE — 1090F PRES/ABSN URINE INCON ASSESS: CPT | Performed by: INTERNAL MEDICINE

## 2024-03-05 RX ORDER — DIPHENHYDRAMINE HYDROCHLORIDE 50 MG/ML
50 INJECTION INTRAMUSCULAR; INTRAVENOUS
Status: CANCELLED | OUTPATIENT
Start: 2024-03-05

## 2024-03-05 RX ORDER — ONDANSETRON 2 MG/ML
8 INJECTION INTRAMUSCULAR; INTRAVENOUS
Status: CANCELLED | OUTPATIENT
Start: 2024-03-05

## 2024-03-05 RX ORDER — ALBUTEROL SULFATE 90 UG/1
4 AEROSOL, METERED RESPIRATORY (INHALATION) PRN
Status: CANCELLED | OUTPATIENT
Start: 2024-03-05

## 2024-03-05 RX ORDER — HEPARIN SODIUM (PORCINE) LOCK FLUSH IV SOLN 100 UNIT/ML 100 UNIT/ML
500 SOLUTION INTRAVENOUS PRN
Status: CANCELLED | OUTPATIENT
Start: 2024-03-08

## 2024-03-05 RX ORDER — PALONOSETRON 0.05 MG/ML
0.25 INJECTION, SOLUTION INTRAVENOUS ONCE
Status: COMPLETED | OUTPATIENT
Start: 2024-03-05 | End: 2024-03-05

## 2024-03-05 RX ORDER — FAMOTIDINE 10 MG/ML
20 INJECTION, SOLUTION INTRAVENOUS
Status: CANCELLED | OUTPATIENT
Start: 2024-03-05

## 2024-03-05 RX ORDER — SODIUM CHLORIDE 0.9 % (FLUSH) 0.9 %
5-40 SYRINGE (ML) INJECTION PRN
Status: CANCELLED | OUTPATIENT
Start: 2024-03-08

## 2024-03-05 RX ORDER — ATROPINE SULFATE 0.4 MG/ML
0.4 INJECTION, SOLUTION INTRAVENOUS ONCE
Status: COMPLETED | OUTPATIENT
Start: 2024-03-05 | End: 2024-03-05

## 2024-03-05 RX ORDER — HEPARIN SODIUM (PORCINE) LOCK FLUSH IV SOLN 100 UNIT/ML 100 UNIT/ML
500 SOLUTION INTRAVENOUS PRN
Status: CANCELLED | OUTPATIENT
Start: 2024-03-05

## 2024-03-05 RX ORDER — DIPHENOXYLATE HYDROCHLORIDE AND ATROPINE SULFATE 2.5; .025 MG/1; MG/1
TABLET ORAL
Qty: 60 TABLET | Refills: 0 | Status: SHIPPED | OUTPATIENT
Start: 2024-03-05 | End: 2024-03-20

## 2024-03-05 RX ORDER — ACETAMINOPHEN 325 MG/1
650 TABLET ORAL
Status: CANCELLED | OUTPATIENT
Start: 2024-03-05

## 2024-03-05 RX ORDER — SODIUM CHLORIDE 0.9 % (FLUSH) 0.9 %
5-40 SYRINGE (ML) INJECTION PRN
Status: CANCELLED | OUTPATIENT
Start: 2024-03-05

## 2024-03-05 RX ORDER — SODIUM CHLORIDE 9 MG/ML
INJECTION, SOLUTION INTRAVENOUS CONTINUOUS
Status: CANCELLED | OUTPATIENT
Start: 2024-03-05

## 2024-03-05 RX ORDER — SODIUM CHLORIDE 9 MG/ML
5-250 INJECTION, SOLUTION INTRAVENOUS PRN
Status: DISCONTINUED | OUTPATIENT
Start: 2024-03-05 | End: 2024-03-06 | Stop reason: HOSPADM

## 2024-03-05 RX ORDER — EPINEPHRINE 1 MG/ML
0.3 INJECTION, SOLUTION, CONCENTRATE INTRAVENOUS PRN
Status: CANCELLED | OUTPATIENT
Start: 2024-03-05

## 2024-03-05 RX ORDER — PALONOSETRON 0.05 MG/ML
0.25 INJECTION, SOLUTION INTRAVENOUS ONCE
Status: CANCELLED | OUTPATIENT
Start: 2024-03-05 | End: 2024-03-05

## 2024-03-05 RX ORDER — MEPERIDINE HYDROCHLORIDE 50 MG/ML
12.5 INJECTION INTRAMUSCULAR; INTRAVENOUS; SUBCUTANEOUS PRN
Status: CANCELLED | OUTPATIENT
Start: 2024-03-05

## 2024-03-05 RX ORDER — SODIUM CHLORIDE 9 MG/ML
5-250 INJECTION, SOLUTION INTRAVENOUS PRN
Status: CANCELLED | OUTPATIENT
Start: 2024-03-05

## 2024-03-05 RX ORDER — SODIUM CHLORIDE 9 MG/ML
5-250 INJECTION, SOLUTION INTRAVENOUS PRN
OUTPATIENT
Start: 2024-03-08

## 2024-03-05 RX ADMIN — FLUOROURACIL 4000 MG: 50 INJECTION, SOLUTION INTRAVENOUS at 12:49

## 2024-03-05 RX ADMIN — PALONOSETRON 0.25 MG: 0.05 INJECTION, SOLUTION INTRAVENOUS at 10:00

## 2024-03-05 RX ADMIN — SODIUM CHLORIDE 25 ML/HR: 9 INJECTION, SOLUTION INTRAVENOUS at 09:59

## 2024-03-05 RX ADMIN — SODIUM CHLORIDE 150 MG: 900 INJECTION, SOLUTION INTRAVENOUS at 10:10

## 2024-03-05 RX ADMIN — ATROPINE SULFATE 0.4 MG: 0.4 INJECTION, SOLUTION INTRAVENOUS at 10:36

## 2024-03-05 RX ADMIN — LEUCOVORIN CALCIUM 200 MG: 200 INJECTION, POWDER, LYOPHILIZED, FOR SUSPENSION INTRAMUSCULAR; INTRAVENOUS at 12:16

## 2024-03-05 RX ADMIN — DEXAMETHASONE SODIUM PHOSPHATE: 100 INJECTION INTRAMUSCULAR; INTRAVENOUS at 10:00

## 2024-03-05 RX ADMIN — IRINOTECAN HYDROCHLORIDE 86 MG: 4.3 INJECTION, POWDER, FOR SOLUTION INTRAVENOUS at 10:37

## 2024-03-05 NOTE — PROGRESS NOTES
Lab Results   Component Value Date    WBC 6.74 03/05/2024    HGB 10.9 (L) 03/05/2024    HCT 31.9 (L) 03/05/2024    MCV 97.6 (H) 03/05/2024     03/05/2024     Lab Results   Component Value Date    NEUTROABS 2.09 03/05/2024     Lab Results   Component Value Date     03/05/2024    K 3.8 03/05/2024     03/05/2024    CO2 26 03/05/2024    BUN 7 03/05/2024    CREATININE 0.7 03/05/2024    GLUCOSE 116 (H) 03/05/2024    CALCIUM 9.1 03/05/2024    PROT 6.3 03/05/2024    LABALBU 3.6 03/05/2024    BILITOT 0.3 03/05/2024    ALKPHOS 86 03/05/2024    AST 29 03/05/2024    ALT 14 03/05/2024    LABGLOM >60 03/05/2024    GFRAA >59 01/12/2022    GLOB 2.7 03/05/2024

## 2024-03-07 ENCOUNTER — HOSPITAL ENCOUNTER (OUTPATIENT)
Dept: INFUSION THERAPY | Age: 79
Discharge: HOME | End: 2024-03-07
Payer: MEDICARE

## 2024-03-07 DIAGNOSIS — C25.2 MALIGNANT NEOPLASM OF TAIL OF PANCREAS (HCC): Primary | ICD-10-CM

## 2024-03-07 DIAGNOSIS — C25.9 METASTASIS FROM PANCREATIC CANCER (HCC): ICD-10-CM

## 2024-03-07 DIAGNOSIS — C79.9 METASTASIS FROM PANCREATIC CANCER (HCC): ICD-10-CM

## 2024-03-07 PROCEDURE — 96523 IRRIG DRUG DELIVERY DEVICE: CPT

## 2024-03-07 PROCEDURE — 2580000003 HC RX 258: Performed by: INTERNAL MEDICINE

## 2024-03-07 PROCEDURE — 6360000002 HC RX W HCPCS: Performed by: INTERNAL MEDICINE

## 2024-03-07 RX ORDER — SODIUM CHLORIDE 0.9 % (FLUSH) 0.9 %
5-40 SYRINGE (ML) INJECTION PRN
Status: DISCONTINUED | OUTPATIENT
Start: 2024-03-07 | End: 2024-03-08 | Stop reason: HOSPADM

## 2024-03-07 RX ORDER — HEPARIN 100 UNIT/ML
500 SYRINGE INTRAVENOUS PRN
Status: DISCONTINUED | OUTPATIENT
Start: 2024-03-07 | End: 2024-03-08 | Stop reason: HOSPADM

## 2024-03-07 RX ADMIN — HEPARIN 500 UNITS: 100 SYRINGE at 10:55

## 2024-03-07 RX ADMIN — SODIUM CHLORIDE, PRESERVATIVE FREE 10 ML: 5 INJECTION INTRAVENOUS at 10:55

## 2024-03-19 ENCOUNTER — HOSPITAL ENCOUNTER (OUTPATIENT)
Dept: INFUSION THERAPY | Age: 79
Discharge: HOME | End: 2024-03-19
Payer: MEDICARE

## 2024-03-19 VITALS
HEART RATE: 74 BPM | DIASTOLIC BLOOD PRESSURE: 76 MMHG | SYSTOLIC BLOOD PRESSURE: 113 MMHG | WEIGHT: 145.4 LBS | BODY MASS INDEX: 24.22 KG/M2 | RESPIRATION RATE: 18 BRPM | HEIGHT: 65 IN | TEMPERATURE: 97.1 F | OXYGEN SATURATION: 98 %

## 2024-03-19 DIAGNOSIS — R97.8 ABNORMAL TUMOR MARKERS: ICD-10-CM

## 2024-03-19 DIAGNOSIS — C25.2 MALIGNANT NEOPLASM OF TAIL OF PANCREAS (HCC): ICD-10-CM

## 2024-03-19 DIAGNOSIS — C25.9 METASTASIS FROM PANCREATIC CANCER (HCC): ICD-10-CM

## 2024-03-19 DIAGNOSIS — C79.9 METASTASIS FROM PANCREATIC CANCER (HCC): ICD-10-CM

## 2024-03-19 DIAGNOSIS — C25.9 METASTASIS FROM PANCREATIC CANCER (HCC): Primary | ICD-10-CM

## 2024-03-19 DIAGNOSIS — C79.9 METASTASIS FROM PANCREATIC CANCER (HCC): Primary | ICD-10-CM

## 2024-03-19 LAB
ALBUMIN SERPL-MCNC: 3.5 G/DL (ref 3.5–5.2)
ALP SERPL-CCNC: 86 U/L (ref 35–104)
ALT SERPL-CCNC: 14 U/L (ref 9–52)
ANION GAP SERPL CALCULATED.3IONS-SCNC: 7 MMOL/L (ref 7–19)
AST SERPL-CCNC: 29 U/L (ref 14–36)
BASOPHILS # BLD: 0.07 K/UL (ref 0.01–0.08)
BASOPHILS NFR BLD: 1.2 % (ref 0.1–1.2)
BILIRUB SERPL-MCNC: 0.3 MG/DL (ref 0.2–1.3)
BUN SERPL-MCNC: 9 MG/DL (ref 7–17)
CALCIUM SERPL-MCNC: 8.8 MG/DL (ref 8.4–10.2)
CANCER AG19-9 SERPL-ACNC: 320 U/ML (ref 0–35)
CEA SERPL-MCNC: 6.8 NG/ML (ref 0–4.7)
CHLORIDE SERPL-SCNC: 103 MMOL/L (ref 98–111)
CO2 SERPL-SCNC: 27 MMOL/L (ref 22–29)
CREAT SERPL-MCNC: 0.6 MG/DL (ref 0.5–1)
EOSINOPHIL # BLD: 0.38 K/UL (ref 0.04–0.54)
EOSINOPHIL NFR BLD: 6.5 % (ref 0.7–7)
ERYTHROCYTE [DISTWIDTH] IN BLOOD BY AUTOMATED COUNT: 15.9 % (ref 11.7–14.4)
GLOBULIN: 2.7 G/DL
GLUCOSE SERPL-MCNC: 98 MG/DL (ref 74–106)
HCT VFR BLD AUTO: 33.3 % (ref 34.1–44.9)
HGB BLD-MCNC: 11.2 G/DL (ref 11.2–15.7)
LYMPHOCYTES # BLD: 2.6 K/UL (ref 1.18–3.74)
LYMPHOCYTES NFR BLD: 44.3 % (ref 19.3–53.1)
MAGNESIUM SERPL-MCNC: 1.8 MG/DL (ref 1.6–2.3)
MCH RBC QN AUTO: 33.8 PG (ref 25.6–32.2)
MCHC RBC AUTO-ENTMCNC: 33.6 G/DL (ref 32.3–35.5)
MCV RBC AUTO: 100.6 FL (ref 79.4–94.8)
MONOCYTES # BLD: 0.69 K/UL (ref 0.24–0.82)
MONOCYTES NFR BLD: 11.8 % (ref 4.7–12.5)
NEUTROPHILS # BLD: 2.11 K/UL (ref 1.56–6.13)
NEUTS SEG NFR BLD: 35.9 % (ref 34–71.1)
PLATELET # BLD AUTO: 314 K/UL (ref 182–369)
PMV BLD AUTO: 9.7 FL (ref 7.4–10.4)
POTASSIUM SERPL-SCNC: 4 MMOL/L (ref 3.5–5.1)
PROT SERPL-MCNC: 6.2 G/DL (ref 6.3–8.2)
RBC # BLD AUTO: 3.31 M/UL (ref 3.93–5.22)
SODIUM SERPL-SCNC: 137 MMOL/L (ref 137–145)
WBC # BLD AUTO: 5.87 K/UL (ref 3.98–10.04)

## 2024-03-19 PROCEDURE — 2580000003 HC RX 258: Performed by: INTERNAL MEDICINE

## 2024-03-19 PROCEDURE — 6360000002 HC RX W HCPCS: Performed by: INTERNAL MEDICINE

## 2024-03-19 PROCEDURE — 96367 TX/PROPH/DG ADDL SEQ IV INF: CPT

## 2024-03-19 PROCEDURE — 96375 TX/PRO/DX INJ NEW DRUG ADDON: CPT

## 2024-03-19 PROCEDURE — G0498 CHEMO EXTEND IV INFUS W/PUMP: HCPCS

## 2024-03-19 PROCEDURE — 80053 COMPREHEN METABOLIC PANEL: CPT

## 2024-03-19 PROCEDURE — 85025 COMPLETE CBC W/AUTO DIFF WBC: CPT

## 2024-03-19 PROCEDURE — 83735 ASSAY OF MAGNESIUM: CPT

## 2024-03-19 PROCEDURE — 96417 CHEMO IV INFUS EACH ADDL SEQ: CPT

## 2024-03-19 PROCEDURE — 96413 CHEMO IV INFUSION 1 HR: CPT

## 2024-03-19 PROCEDURE — 36415 COLL VENOUS BLD VENIPUNCTURE: CPT

## 2024-03-19 PROCEDURE — 96376 TX/PRO/DX INJ SAME DRUG ADON: CPT

## 2024-03-19 PROCEDURE — 96415 CHEMO IV INFUSION ADDL HR: CPT

## 2024-03-19 RX ORDER — HEPARIN SODIUM (PORCINE) LOCK FLUSH IV SOLN 100 UNIT/ML 100 UNIT/ML
500 SOLUTION INTRAVENOUS PRN
Status: CANCELLED | OUTPATIENT
Start: 2024-03-19

## 2024-03-19 RX ORDER — ACETAMINOPHEN 325 MG/1
650 TABLET ORAL
Status: CANCELLED | OUTPATIENT
Start: 2024-03-19

## 2024-03-19 RX ORDER — SODIUM CHLORIDE 9 MG/ML
INJECTION, SOLUTION INTRAVENOUS CONTINUOUS
Status: CANCELLED | OUTPATIENT
Start: 2024-03-19

## 2024-03-19 RX ORDER — ATROPINE SULFATE 0.4 MG/ML
0.4 INJECTION, SOLUTION INTRAVENOUS
Status: DISCONTINUED | OUTPATIENT
Start: 2024-03-19 | End: 2024-03-21 | Stop reason: HOSPADM

## 2024-03-19 RX ORDER — EPINEPHRINE 1 MG/ML
0.3 INJECTION, SOLUTION, CONCENTRATE INTRAVENOUS PRN
Status: CANCELLED | OUTPATIENT
Start: 2024-03-19

## 2024-03-19 RX ORDER — ALBUTEROL SULFATE 90 UG/1
4 AEROSOL, METERED RESPIRATORY (INHALATION) PRN
Status: CANCELLED | OUTPATIENT
Start: 2024-03-19

## 2024-03-19 RX ORDER — FAMOTIDINE 10 MG/ML
20 INJECTION, SOLUTION INTRAVENOUS
Status: CANCELLED | OUTPATIENT
Start: 2024-03-19

## 2024-03-19 RX ORDER — SODIUM CHLORIDE 0.9 % (FLUSH) 0.9 %
5-40 SYRINGE (ML) INJECTION PRN
Status: CANCELLED | OUTPATIENT
Start: 2024-03-22

## 2024-03-19 RX ORDER — SODIUM CHLORIDE 9 MG/ML
5-250 INJECTION, SOLUTION INTRAVENOUS PRN
Status: CANCELLED | OUTPATIENT
Start: 2024-03-19

## 2024-03-19 RX ORDER — ATROPINE SULFATE 0.4 MG/ML
0.4 INJECTION, SOLUTION INTRAVENOUS ONCE
Status: COMPLETED | OUTPATIENT
Start: 2024-03-19 | End: 2024-03-19

## 2024-03-19 RX ORDER — SODIUM CHLORIDE 0.9 % (FLUSH) 0.9 %
5-40 SYRINGE (ML) INJECTION PRN
Status: CANCELLED | OUTPATIENT
Start: 2024-03-19

## 2024-03-19 RX ORDER — DIPHENHYDRAMINE HYDROCHLORIDE 50 MG/ML
50 INJECTION INTRAMUSCULAR; INTRAVENOUS
Status: CANCELLED | OUTPATIENT
Start: 2024-03-19

## 2024-03-19 RX ORDER — HEPARIN SODIUM (PORCINE) LOCK FLUSH IV SOLN 100 UNIT/ML 100 UNIT/ML
500 SOLUTION INTRAVENOUS PRN
Status: CANCELLED | OUTPATIENT
Start: 2024-03-22

## 2024-03-19 RX ORDER — SODIUM CHLORIDE 9 MG/ML
5-250 INJECTION, SOLUTION INTRAVENOUS PRN
OUTPATIENT
Start: 2024-03-22

## 2024-03-19 RX ORDER — MEPERIDINE HYDROCHLORIDE 50 MG/ML
12.5 INJECTION INTRAMUSCULAR; INTRAVENOUS; SUBCUTANEOUS PRN
Status: CANCELLED | OUTPATIENT
Start: 2024-03-19

## 2024-03-19 RX ORDER — PALONOSETRON 0.05 MG/ML
0.25 INJECTION, SOLUTION INTRAVENOUS ONCE
Status: COMPLETED | OUTPATIENT
Start: 2024-03-19 | End: 2024-03-19

## 2024-03-19 RX ORDER — SODIUM CHLORIDE 9 MG/ML
5-250 INJECTION, SOLUTION INTRAVENOUS PRN
Status: DISCONTINUED | OUTPATIENT
Start: 2024-03-19 | End: 2024-03-20 | Stop reason: HOSPADM

## 2024-03-19 RX ORDER — ONDANSETRON 2 MG/ML
8 INJECTION INTRAMUSCULAR; INTRAVENOUS
Status: CANCELLED | OUTPATIENT
Start: 2024-03-19

## 2024-03-19 RX ADMIN — LEUCOVORIN CALCIUM 200 MG: 200 INJECTION, POWDER, LYOPHILIZED, FOR SUSPENSION INTRAMUSCULAR; INTRAVENOUS at 11:58

## 2024-03-19 RX ADMIN — SODIUM CHLORIDE 25 ML/HR: 9 INJECTION, SOLUTION INTRAVENOUS at 09:36

## 2024-03-19 RX ADMIN — ATROPINE SULFATE 0.4 MG: 0.4 INJECTION, SOLUTION INTRAVENOUS at 10:16

## 2024-03-19 RX ADMIN — IRINOTECAN HYDROCHLORIDE 86 MG: 4.3 INJECTION, POWDER, FOR SOLUTION INTRAVENOUS at 10:14

## 2024-03-19 RX ADMIN — DEXAMETHASONE SODIUM PHOSPHATE: 100 INJECTION INTRAMUSCULAR; INTRAVENOUS at 09:37

## 2024-03-19 RX ADMIN — PALONOSETRON 0.25 MG: 0.05 INJECTION, SOLUTION INTRAVENOUS at 09:38

## 2024-03-19 RX ADMIN — ATROPINE SULFATE 0.4 MG: 0.4 INJECTION, SOLUTION INTRAVENOUS at 11:56

## 2024-03-19 RX ADMIN — FLUOROURACIL 4000 MG: 50 INJECTION, SOLUTION INTRAVENOUS at 12:30

## 2024-03-19 RX ADMIN — SODIUM CHLORIDE 150 MG: 900 INJECTION, SOLUTION INTRAVENOUS at 09:47

## 2024-03-19 NOTE — PROGRESS NOTES
Lab Results   Component Value Date    WBC 5.87 03/19/2024    HGB 11.2 03/19/2024    HCT 33.3 (L) 03/19/2024    .6 (H) 03/19/2024     03/19/2024     Lab Results   Component Value Date    NEUTROABS 2.11 03/19/2024     Lab Results   Component Value Date     03/19/2024    K 4.0 03/19/2024     03/19/2024    CO2 27 03/19/2024    BUN 9 03/19/2024    CREATININE 0.6 03/19/2024    GLUCOSE 98 03/19/2024    CALCIUM 8.8 03/19/2024    PROT 6.2 (L) 03/19/2024    LABALBU 3.5 03/19/2024    BILITOT 0.3 03/19/2024    ALKPHOS 86 03/19/2024    AST 29 03/19/2024    ALT 14 03/19/2024    LABGLOM >60 03/19/2024    GFRAA >59 01/12/2022    GLOB 2.7 03/19/2024

## 2024-03-21 ENCOUNTER — HOSPITAL ENCOUNTER (OUTPATIENT)
Dept: INFUSION THERAPY | Age: 79
Discharge: HOME | End: 2024-03-21
Payer: MEDICARE

## 2024-03-21 DIAGNOSIS — C79.9 METASTASIS FROM PANCREATIC CANCER (HCC): ICD-10-CM

## 2024-03-21 DIAGNOSIS — C25.2 MALIGNANT NEOPLASM OF TAIL OF PANCREAS (HCC): Primary | ICD-10-CM

## 2024-03-21 DIAGNOSIS — F41.9 ANXIETY IN CANCER PATIENT: ICD-10-CM

## 2024-03-21 DIAGNOSIS — C25.9 ADENOCARCINOMA OF PANCREAS (HCC): ICD-10-CM

## 2024-03-21 DIAGNOSIS — C25.9 METASTASIS FROM PANCREATIC CANCER (HCC): ICD-10-CM

## 2024-03-21 PROCEDURE — 96523 IRRIG DRUG DELIVERY DEVICE: CPT

## 2024-03-21 PROCEDURE — 2580000003 HC RX 258: Performed by: INTERNAL MEDICINE

## 2024-03-21 PROCEDURE — 6360000002 HC RX W HCPCS: Performed by: INTERNAL MEDICINE

## 2024-03-21 RX ORDER — ALPRAZOLAM 0.5 MG/1
0.25 TABLET ORAL 2 TIMES DAILY PRN
Qty: 30 TABLET | Refills: 0 | Status: SHIPPED | OUTPATIENT
Start: 2024-03-21 | End: 2024-04-20

## 2024-03-21 RX ORDER — SODIUM CHLORIDE 0.9 % (FLUSH) 0.9 %
5-40 SYRINGE (ML) INJECTION PRN
Status: DISCONTINUED | OUTPATIENT
Start: 2024-03-21 | End: 2024-03-22 | Stop reason: HOSPADM

## 2024-03-21 RX ORDER — HEPARIN 100 UNIT/ML
500 SYRINGE INTRAVENOUS PRN
Status: DISCONTINUED | OUTPATIENT
Start: 2024-03-21 | End: 2024-03-22 | Stop reason: HOSPADM

## 2024-03-21 RX ADMIN — HEPARIN 500 UNITS: 100 SYRINGE at 10:35

## 2024-03-21 RX ADMIN — SODIUM CHLORIDE, PRESERVATIVE FREE 10 ML: 5 INJECTION INTRAVENOUS at 10:35

## 2024-04-01 NOTE — PROGRESS NOTES
patient’s satisfaction. I have also reviewed the chief complaint (CC) and part of the history (History of Present Illness (HPI), Past Family Social History (PFSH), or Review of Systems (ROS) and made changes when appropriated.       (Please note that portions of this note were completed with a voice recognition program. Efforts were made to edit the dictations but occasionally words are mis-transcribed.)    Electronically signed by Radha Rosenthal MD on 4/2/2024 at 9:43 AM

## 2024-04-02 ENCOUNTER — HOSPITAL ENCOUNTER (OUTPATIENT)
Dept: INFUSION THERAPY | Age: 79
Discharge: HOME | End: 2024-04-02
Payer: MEDICARE

## 2024-04-02 ENCOUNTER — OFFICE VISIT (OUTPATIENT)
Dept: HEMATOLOGY | Age: 79
End: 2024-04-02
Payer: MEDICARE

## 2024-04-02 VITALS
WEIGHT: 145.2 LBS | BODY MASS INDEX: 24.19 KG/M2 | SYSTOLIC BLOOD PRESSURE: 119 MMHG | DIASTOLIC BLOOD PRESSURE: 72 MMHG | TEMPERATURE: 97.2 F | HEART RATE: 74 BPM | OXYGEN SATURATION: 97 % | HEIGHT: 65 IN | RESPIRATION RATE: 18 BRPM

## 2024-04-02 DIAGNOSIS — C79.9 METASTASIS FROM PANCREATIC CANCER (HCC): ICD-10-CM

## 2024-04-02 DIAGNOSIS — C25.9 METASTASIS FROM PANCREATIC CANCER (HCC): Primary | ICD-10-CM

## 2024-04-02 DIAGNOSIS — C79.9 METASTASIS FROM PANCREATIC CANCER (HCC): Primary | ICD-10-CM

## 2024-04-02 DIAGNOSIS — Z51.11 CHEMOTHERAPY MANAGEMENT, ENCOUNTER FOR: ICD-10-CM

## 2024-04-02 DIAGNOSIS — C25.2 MALIGNANT NEOPLASM OF TAIL OF PANCREAS (HCC): ICD-10-CM

## 2024-04-02 DIAGNOSIS — C25.2 MALIGNANT NEOPLASM OF TAIL OF PANCREAS (HCC): Primary | ICD-10-CM

## 2024-04-02 DIAGNOSIS — C25.9 METASTASIS FROM PANCREATIC CANCER (HCC): ICD-10-CM

## 2024-04-02 DIAGNOSIS — T45.1X5D ADVERSE EFFECT OF CHEMOTHERAPY, SUBSEQUENT ENCOUNTER: ICD-10-CM

## 2024-04-02 DIAGNOSIS — R97.8 ABNORMAL TUMOR MARKERS: ICD-10-CM

## 2024-04-02 LAB
ALBUMIN SERPL-MCNC: 3.5 G/DL (ref 3.5–5.2)
ALP SERPL-CCNC: 90 U/L (ref 35–104)
ALT SERPL-CCNC: 15 U/L (ref 9–52)
ANION GAP SERPL CALCULATED.3IONS-SCNC: 10 MMOL/L (ref 7–19)
AST SERPL-CCNC: 28 U/L (ref 14–36)
BASOPHILS # BLD: 0.06 K/UL (ref 0.01–0.08)
BASOPHILS NFR BLD: 1.1 % (ref 0.1–1.2)
BILIRUB SERPL-MCNC: 0.3 MG/DL (ref 0.2–1.3)
BUN SERPL-MCNC: 9 MG/DL (ref 7–17)
CALCIUM SERPL-MCNC: 8.7 MG/DL (ref 8.4–10.2)
CANCER AG19-9 SERPL-ACNC: 356 U/ML (ref 0–35)
CEA SERPL-MCNC: 7.1 NG/ML (ref 0–4.7)
CHLORIDE SERPL-SCNC: 101 MMOL/L (ref 98–111)
CO2 SERPL-SCNC: 26 MMOL/L (ref 22–29)
CREAT SERPL-MCNC: 0.7 MG/DL (ref 0.5–1)
EOSINOPHIL # BLD: 0.23 K/UL (ref 0.04–0.54)
EOSINOPHIL NFR BLD: 4 % (ref 0.7–7)
ERYTHROCYTE [DISTWIDTH] IN BLOOD BY AUTOMATED COUNT: 15.5 % (ref 11.7–14.4)
GLOBULIN: 2.5 G/DL
GLUCOSE SERPL-MCNC: 112 MG/DL (ref 74–106)
HCT VFR BLD AUTO: 32.7 % (ref 34.1–44.9)
HGB BLD-MCNC: 10.8 G/DL (ref 11.2–15.7)
LYMPHOCYTES # BLD: 3.01 K/UL (ref 1.18–3.74)
LYMPHOCYTES NFR BLD: 52.9 % (ref 19.3–53.1)
MAGNESIUM SERPL-MCNC: 1.8 MG/DL (ref 1.6–2.3)
MCH RBC QN AUTO: 32.8 PG (ref 25.6–32.2)
MCHC RBC AUTO-ENTMCNC: 33 G/DL (ref 32.3–35.5)
MCV RBC AUTO: 99.4 FL (ref 79.4–94.8)
MONOCYTES # BLD: 0.8 K/UL (ref 0.24–0.82)
MONOCYTES NFR BLD: 14.1 % (ref 4.7–12.5)
NEUTROPHILS # BLD: 1.57 K/UL (ref 1.56–6.13)
NEUTS SEG NFR BLD: 27.5 % (ref 34–71.1)
PLATELET # BLD AUTO: 350 K/UL (ref 182–369)
PMV BLD AUTO: 9.4 FL (ref 7.4–10.4)
POTASSIUM SERPL-SCNC: 3.9 MMOL/L (ref 3.5–5.1)
PROT SERPL-MCNC: 6.3 G/DL (ref 6.3–8.2)
RBC # BLD AUTO: 3.29 M/UL (ref 3.93–5.22)
SODIUM SERPL-SCNC: 137 MMOL/L (ref 137–145)
WBC # BLD AUTO: 5.69 K/UL (ref 3.98–10.04)

## 2024-04-02 PROCEDURE — 36415 COLL VENOUS BLD VENIPUNCTURE: CPT

## 2024-04-02 PROCEDURE — G2211 COMPLEX E/M VISIT ADD ON: HCPCS | Performed by: INTERNAL MEDICINE

## 2024-04-02 PROCEDURE — 96413 CHEMO IV INFUSION 1 HR: CPT

## 2024-04-02 PROCEDURE — 96375 TX/PRO/DX INJ NEW DRUG ADDON: CPT

## 2024-04-02 PROCEDURE — 85025 COMPLETE CBC W/AUTO DIFF WBC: CPT

## 2024-04-02 PROCEDURE — 1036F TOBACCO NON-USER: CPT | Performed by: INTERNAL MEDICINE

## 2024-04-02 PROCEDURE — G0498 CHEMO EXTEND IV INFUS W/PUMP: HCPCS

## 2024-04-02 PROCEDURE — 96415 CHEMO IV INFUSION ADDL HR: CPT

## 2024-04-02 PROCEDURE — G8399 PT W/DXA RESULTS DOCUMENT: HCPCS | Performed by: INTERNAL MEDICINE

## 2024-04-02 PROCEDURE — 1090F PRES/ABSN URINE INCON ASSESS: CPT | Performed by: INTERNAL MEDICINE

## 2024-04-02 PROCEDURE — G8420 CALC BMI NORM PARAMETERS: HCPCS | Performed by: INTERNAL MEDICINE

## 2024-04-02 PROCEDURE — 83735 ASSAY OF MAGNESIUM: CPT

## 2024-04-02 PROCEDURE — 1123F ACP DISCUSS/DSCN MKR DOCD: CPT | Performed by: INTERNAL MEDICINE

## 2024-04-02 PROCEDURE — 96367 TX/PROPH/DG ADDL SEQ IV INF: CPT

## 2024-04-02 PROCEDURE — 80053 COMPREHEN METABOLIC PANEL: CPT

## 2024-04-02 PROCEDURE — G8427 DOCREV CUR MEDS BY ELIG CLIN: HCPCS | Performed by: INTERNAL MEDICINE

## 2024-04-02 PROCEDURE — 96376 TX/PRO/DX INJ SAME DRUG ADON: CPT

## 2024-04-02 PROCEDURE — 99214 OFFICE O/P EST MOD 30 MIN: CPT | Performed by: INTERNAL MEDICINE

## 2024-04-02 PROCEDURE — 2580000003 HC RX 258: Performed by: INTERNAL MEDICINE

## 2024-04-02 PROCEDURE — 6360000002 HC RX W HCPCS: Performed by: INTERNAL MEDICINE

## 2024-04-02 RX ORDER — PALONOSETRON 0.05 MG/ML
0.25 INJECTION, SOLUTION INTRAVENOUS ONCE
Status: CANCELLED | OUTPATIENT
Start: 2024-04-02 | End: 2024-04-02

## 2024-04-02 RX ORDER — DIPHENHYDRAMINE HYDROCHLORIDE 50 MG/ML
50 INJECTION INTRAMUSCULAR; INTRAVENOUS
Status: CANCELLED | OUTPATIENT
Start: 2024-04-02

## 2024-04-02 RX ORDER — SODIUM CHLORIDE 9 MG/ML
5-250 INJECTION, SOLUTION INTRAVENOUS PRN
Status: CANCELLED | OUTPATIENT
Start: 2024-04-02

## 2024-04-02 RX ORDER — SODIUM CHLORIDE 0.9 % (FLUSH) 0.9 %
5-40 SYRINGE (ML) INJECTION PRN
Status: CANCELLED | OUTPATIENT
Start: 2024-04-05

## 2024-04-02 RX ORDER — ACETAMINOPHEN 325 MG/1
650 TABLET ORAL
Status: CANCELLED | OUTPATIENT
Start: 2024-04-02

## 2024-04-02 RX ORDER — HEPARIN 100 UNIT/ML
500 SYRINGE INTRAVENOUS PRN
Status: CANCELLED | OUTPATIENT
Start: 2024-04-05

## 2024-04-02 RX ORDER — ONDANSETRON 2 MG/ML
8 INJECTION INTRAMUSCULAR; INTRAVENOUS
Status: CANCELLED | OUTPATIENT
Start: 2024-04-02

## 2024-04-02 RX ORDER — SODIUM CHLORIDE 9 MG/ML
5-250 INJECTION, SOLUTION INTRAVENOUS PRN
Status: DISCONTINUED | OUTPATIENT
Start: 2024-04-02 | End: 2024-04-03 | Stop reason: HOSPADM

## 2024-04-02 RX ORDER — SODIUM CHLORIDE 9 MG/ML
5-250 INJECTION, SOLUTION INTRAVENOUS PRN
OUTPATIENT
Start: 2024-04-05

## 2024-04-02 RX ORDER — ATROPINE SULFATE 0.4 MG/ML
0.4 INJECTION, SOLUTION INTRAVENOUS
Status: DISCONTINUED | OUTPATIENT
Start: 2024-04-02 | End: 2024-04-04 | Stop reason: HOSPADM

## 2024-04-02 RX ORDER — ATROPINE SULFATE 0.4 MG/ML
0.4 INJECTION, SOLUTION INTRAVENOUS ONCE
Status: COMPLETED | OUTPATIENT
Start: 2024-04-02 | End: 2024-04-02

## 2024-04-02 RX ORDER — SODIUM CHLORIDE 0.9 % (FLUSH) 0.9 %
5-40 SYRINGE (ML) INJECTION PRN
Status: CANCELLED | OUTPATIENT
Start: 2024-04-02

## 2024-04-02 RX ORDER — SODIUM CHLORIDE 9 MG/ML
INJECTION, SOLUTION INTRAVENOUS CONTINUOUS
Status: CANCELLED | OUTPATIENT
Start: 2024-04-02

## 2024-04-02 RX ORDER — MEPERIDINE HYDROCHLORIDE 25 MG/ML
12.5 INJECTION INTRAMUSCULAR; INTRAVENOUS; SUBCUTANEOUS PRN
Status: CANCELLED | OUTPATIENT
Start: 2024-04-02

## 2024-04-02 RX ORDER — PALONOSETRON 0.05 MG/ML
0.25 INJECTION, SOLUTION INTRAVENOUS ONCE
Status: COMPLETED | OUTPATIENT
Start: 2024-04-02 | End: 2024-04-02

## 2024-04-02 RX ORDER — ATROPINE SULFATE 0.4 MG/ML
0.4 INJECTION, SOLUTION INTRAVENOUS ONCE
Status: CANCELLED | OUTPATIENT
Start: 2024-04-02 | End: 2024-04-02

## 2024-04-02 RX ORDER — ATROPINE SULFATE 0.4 MG/ML
0.4 INJECTION, SOLUTION INTRAVENOUS
Status: CANCELLED | OUTPATIENT
Start: 2024-04-02

## 2024-04-02 RX ORDER — ALBUTEROL SULFATE 90 UG/1
4 AEROSOL, METERED RESPIRATORY (INHALATION) PRN
Status: CANCELLED | OUTPATIENT
Start: 2024-04-02

## 2024-04-02 RX ORDER — HEPARIN 100 UNIT/ML
500 SYRINGE INTRAVENOUS PRN
Status: CANCELLED | OUTPATIENT
Start: 2024-04-02

## 2024-04-02 RX ORDER — EPINEPHRINE 1 MG/ML
0.3 INJECTION, SOLUTION, CONCENTRATE INTRAVENOUS PRN
Status: CANCELLED | OUTPATIENT
Start: 2024-04-02

## 2024-04-02 RX ADMIN — PALONOSETRON 0.25 MG: 0.05 INJECTION, SOLUTION INTRAVENOUS at 09:44

## 2024-04-02 RX ADMIN — ATROPINE SULFATE 0.4 MG: 0.4 INJECTION, SOLUTION INTRAVENOUS at 12:04

## 2024-04-02 RX ADMIN — FLUOROURACIL 4000 MG: 50 INJECTION, SOLUTION INTRAVENOUS at 12:41

## 2024-04-02 RX ADMIN — ATROPINE SULFATE 0.4 MG: 0.4 INJECTION, SOLUTION INTRAVENOUS at 10:20

## 2024-04-02 RX ADMIN — SODIUM CHLORIDE 25 ML/HR: 9 INJECTION, SOLUTION INTRAVENOUS at 09:42

## 2024-04-02 RX ADMIN — DEXAMETHASONE SODIUM PHOSPHATE: 100 INJECTION INTRAMUSCULAR; INTRAVENOUS at 09:45

## 2024-04-02 RX ADMIN — LEUCOVORIN CALCIUM 200 MG: 200 INJECTION, POWDER, LYOPHILIZED, FOR SUSPENSION INTRAMUSCULAR; INTRAVENOUS at 12:03

## 2024-04-02 RX ADMIN — IRINOTECAN HYDROCHLORIDE 86 MG: 4.3 INJECTION, POWDER, FOR SOLUTION INTRAVENOUS at 10:23

## 2024-04-02 RX ADMIN — FOSAPREPITANT DIMEGLUMINE 150 MG: 150 INJECTION, POWDER, LYOPHILIZED, FOR SOLUTION INTRAVENOUS at 09:55

## 2024-04-02 NOTE — PROGRESS NOTES
Lab Results   Component Value Date    WBC 5.69 04/02/2024    HGB 10.8 (L) 04/02/2024    HCT 32.7 (L) 04/02/2024    MCV 99.4 (H) 04/02/2024     04/02/2024     Lab Results   Component Value Date    NEUTROABS 1.57 04/02/2024     Lab Results   Component Value Date     03/19/2024    K 4.0 03/19/2024     03/19/2024    CO2 27 03/19/2024    BUN 9 03/19/2024    CREATININE 0.6 03/19/2024    GLUCOSE 98 03/19/2024    CALCIUM 8.8 03/19/2024    PROT 6.2 (L) 03/19/2024    LABALBU 3.5 03/19/2024    BILITOT 0.3 03/19/2024    ALKPHOS 86 03/19/2024    AST 29 03/19/2024    ALT 14 03/19/2024    LABGLOM >60 03/19/2024    GFRAA >59 01/12/2022    GLOB 2.7 03/19/2024

## 2024-04-04 ENCOUNTER — HOSPITAL ENCOUNTER (OUTPATIENT)
Dept: INFUSION THERAPY | Age: 79
Discharge: HOME | End: 2024-04-04
Payer: MEDICARE

## 2024-04-04 DIAGNOSIS — C25.9 METASTASIS FROM PANCREATIC CANCER (HCC): ICD-10-CM

## 2024-04-04 DIAGNOSIS — C79.9 METASTASIS FROM PANCREATIC CANCER (HCC): ICD-10-CM

## 2024-04-04 DIAGNOSIS — C25.2 MALIGNANT NEOPLASM OF TAIL OF PANCREAS (HCC): Primary | ICD-10-CM

## 2024-04-04 PROCEDURE — 96523 IRRIG DRUG DELIVERY DEVICE: CPT

## 2024-04-04 PROCEDURE — 6360000002 HC RX W HCPCS: Performed by: INTERNAL MEDICINE

## 2024-04-04 PROCEDURE — 2580000003 HC RX 258: Performed by: INTERNAL MEDICINE

## 2024-04-04 RX ORDER — HEPARIN 100 UNIT/ML
500 SYRINGE INTRAVENOUS PRN
Status: DISCONTINUED | OUTPATIENT
Start: 2024-04-04 | End: 2024-04-05 | Stop reason: HOSPADM

## 2024-04-04 RX ORDER — LIDOCAINE AND PRILOCAINE 25; 25 MG/G; MG/G
CREAM TOPICAL
Qty: 30 G | Refills: 5 | Status: SHIPPED | OUTPATIENT
Start: 2024-04-04

## 2024-04-04 RX ORDER — SODIUM CHLORIDE 0.9 % (FLUSH) 0.9 %
5-40 SYRINGE (ML) INJECTION PRN
Status: DISCONTINUED | OUTPATIENT
Start: 2024-04-04 | End: 2024-04-05 | Stop reason: HOSPADM

## 2024-04-04 RX ADMIN — HEPARIN 500 UNITS: 100 SYRINGE at 10:03

## 2024-04-04 RX ADMIN — SODIUM CHLORIDE, PRESERVATIVE FREE 10 ML: 5 INJECTION INTRAVENOUS at 10:02

## 2024-04-16 ENCOUNTER — HOSPITAL ENCOUNTER (OUTPATIENT)
Dept: INFUSION THERAPY | Age: 79
Discharge: HOME | End: 2024-04-16
Payer: MEDICARE

## 2024-04-16 VITALS
HEIGHT: 65 IN | RESPIRATION RATE: 18 BRPM | BODY MASS INDEX: 23.93 KG/M2 | TEMPERATURE: 97.4 F | SYSTOLIC BLOOD PRESSURE: 118 MMHG | HEART RATE: 81 BPM | DIASTOLIC BLOOD PRESSURE: 69 MMHG | OXYGEN SATURATION: 98 % | WEIGHT: 143.6 LBS

## 2024-04-16 DIAGNOSIS — C25.2 MALIGNANT NEOPLASM OF TAIL OF PANCREAS (HCC): Primary | ICD-10-CM

## 2024-04-16 DIAGNOSIS — C25.2 MALIGNANT NEOPLASM OF TAIL OF PANCREAS (HCC): ICD-10-CM

## 2024-04-16 DIAGNOSIS — C79.9 METASTASIS FROM PANCREATIC CANCER (HCC): Primary | ICD-10-CM

## 2024-04-16 DIAGNOSIS — C25.9 METASTASIS FROM PANCREATIC CANCER (HCC): Primary | ICD-10-CM

## 2024-04-16 LAB
ALBUMIN SERPL-MCNC: 3.5 G/DL (ref 3.5–5.2)
ALP SERPL-CCNC: 107 U/L (ref 35–104)
ALT SERPL-CCNC: 15 U/L (ref 9–52)
ANION GAP SERPL CALCULATED.3IONS-SCNC: 10 MMOL/L (ref 7–19)
AST SERPL-CCNC: 26 U/L (ref 14–36)
BASOPHILS # BLD: 0.08 K/UL (ref 0.01–0.08)
BASOPHILS NFR BLD: 1.3 % (ref 0.1–1.2)
BILIRUB SERPL-MCNC: <0.2 MG/DL (ref 0.2–1.3)
BUN SERPL-MCNC: 9 MG/DL (ref 7–17)
CALCIUM SERPL-MCNC: 8.7 MG/DL (ref 8.4–10.2)
CANCER AG19-9 SERPL-ACNC: 417 U/ML (ref 0–35)
CEA SERPL-MCNC: 6.7 NG/ML (ref 0–4.7)
CHLORIDE SERPL-SCNC: 102 MMOL/L (ref 98–111)
CO2 SERPL-SCNC: 26 MMOL/L (ref 22–29)
CREAT SERPL-MCNC: 0.7 MG/DL (ref 0.5–1)
EOSINOPHIL # BLD: 0.46 K/UL (ref 0.04–0.54)
EOSINOPHIL NFR BLD: 7.6 % (ref 0.7–7)
ERYTHROCYTE [DISTWIDTH] IN BLOOD BY AUTOMATED COUNT: 15.4 % (ref 11.7–14.4)
GLOBULIN: 2.5 G/DL
GLUCOSE SERPL-MCNC: 128 MG/DL (ref 74–106)
HCT VFR BLD AUTO: 32.5 % (ref 34.1–44.9)
HGB BLD-MCNC: 11 G/DL (ref 11.2–15.7)
LYMPHOCYTES # BLD: 2.68 K/UL (ref 1.18–3.74)
LYMPHOCYTES NFR BLD: 44.4 % (ref 19.3–53.1)
MAGNESIUM SERPL-MCNC: 1.7 MG/DL (ref 1.6–2.3)
MCH RBC QN AUTO: 32.7 PG (ref 25.6–32.2)
MCHC RBC AUTO-ENTMCNC: 33.8 G/DL (ref 32.3–35.5)
MCV RBC AUTO: 96.7 FL (ref 79.4–94.8)
MONOCYTES # BLD: 0.79 K/UL (ref 0.24–0.82)
MONOCYTES NFR BLD: 13.1 % (ref 4.7–12.5)
NEUTROPHILS # BLD: 1.99 K/UL (ref 1.56–6.13)
NEUTS SEG NFR BLD: 32.9 % (ref 34–71.1)
PLATELET # BLD AUTO: 378 K/UL (ref 182–369)
PMV BLD AUTO: 9.2 FL (ref 7.4–10.4)
POTASSIUM SERPL-SCNC: 4.2 MMOL/L (ref 3.5–5.1)
PROT SERPL-MCNC: 6 G/DL (ref 6.3–8.2)
RBC # BLD AUTO: 3.36 M/UL (ref 3.93–5.22)
SODIUM SERPL-SCNC: 138 MMOL/L (ref 137–145)
WBC # BLD AUTO: 6.04 K/UL (ref 3.98–10.04)

## 2024-04-16 PROCEDURE — G0498 CHEMO EXTEND IV INFUS W/PUMP: HCPCS

## 2024-04-16 PROCEDURE — 96375 TX/PRO/DX INJ NEW DRUG ADDON: CPT

## 2024-04-16 PROCEDURE — 96415 CHEMO IV INFUSION ADDL HR: CPT

## 2024-04-16 PROCEDURE — 96376 TX/PRO/DX INJ SAME DRUG ADON: CPT

## 2024-04-16 PROCEDURE — 36415 COLL VENOUS BLD VENIPUNCTURE: CPT

## 2024-04-16 PROCEDURE — 2580000003 HC RX 258: Performed by: INTERNAL MEDICINE

## 2024-04-16 PROCEDURE — 85025 COMPLETE CBC W/AUTO DIFF WBC: CPT

## 2024-04-16 PROCEDURE — 96413 CHEMO IV INFUSION 1 HR: CPT

## 2024-04-16 PROCEDURE — 96367 TX/PROPH/DG ADDL SEQ IV INF: CPT

## 2024-04-16 PROCEDURE — 83735 ASSAY OF MAGNESIUM: CPT

## 2024-04-16 PROCEDURE — 6360000002 HC RX W HCPCS: Performed by: INTERNAL MEDICINE

## 2024-04-16 PROCEDURE — 80053 COMPREHEN METABOLIC PANEL: CPT

## 2024-04-16 RX ORDER — ACETAMINOPHEN 325 MG/1
650 TABLET ORAL
Status: CANCELLED | OUTPATIENT
Start: 2024-04-16

## 2024-04-16 RX ORDER — FAMOTIDINE 10 MG/ML
20 INJECTION, SOLUTION INTRAVENOUS
Status: CANCELLED | OUTPATIENT
Start: 2024-04-16

## 2024-04-16 RX ORDER — ATROPINE SULFATE 0.4 MG/ML
0.4 INJECTION, SOLUTION INTRAVENOUS
Status: DISCONTINUED | OUTPATIENT
Start: 2024-04-16 | End: 2024-04-18 | Stop reason: HOSPADM

## 2024-04-16 RX ORDER — EPINEPHRINE 1 MG/ML
0.3 INJECTION, SOLUTION, CONCENTRATE INTRAVENOUS PRN
Status: CANCELLED | OUTPATIENT
Start: 2024-04-16

## 2024-04-16 RX ORDER — SODIUM CHLORIDE 0.9 % (FLUSH) 0.9 %
5-40 SYRINGE (ML) INJECTION PRN
Status: CANCELLED | OUTPATIENT
Start: 2024-04-16

## 2024-04-16 RX ORDER — SODIUM CHLORIDE 9 MG/ML
INJECTION, SOLUTION INTRAVENOUS CONTINUOUS
Status: CANCELLED | OUTPATIENT
Start: 2024-04-16

## 2024-04-16 RX ORDER — HEPARIN SODIUM (PORCINE) LOCK FLUSH IV SOLN 100 UNIT/ML 100 UNIT/ML
500 SOLUTION INTRAVENOUS PRN
Status: CANCELLED | OUTPATIENT
Start: 2024-04-16

## 2024-04-16 RX ORDER — DIPHENHYDRAMINE HYDROCHLORIDE 50 MG/ML
50 INJECTION INTRAMUSCULAR; INTRAVENOUS
Status: CANCELLED | OUTPATIENT
Start: 2024-04-16

## 2024-04-16 RX ORDER — ONDANSETRON 2 MG/ML
8 INJECTION INTRAMUSCULAR; INTRAVENOUS
Status: CANCELLED | OUTPATIENT
Start: 2024-04-16

## 2024-04-16 RX ORDER — SODIUM CHLORIDE 9 MG/ML
5-250 INJECTION, SOLUTION INTRAVENOUS PRN
Status: CANCELLED | OUTPATIENT
Start: 2024-04-16

## 2024-04-16 RX ORDER — PALONOSETRON 0.05 MG/ML
0.25 INJECTION, SOLUTION INTRAVENOUS ONCE
Status: COMPLETED | OUTPATIENT
Start: 2024-04-16 | End: 2024-04-16

## 2024-04-16 RX ORDER — ALBUTEROL SULFATE 90 UG/1
4 AEROSOL, METERED RESPIRATORY (INHALATION) PRN
Status: CANCELLED | OUTPATIENT
Start: 2024-04-16

## 2024-04-16 RX ORDER — ATROPINE SULFATE 0.4 MG/ML
0.4 INJECTION, SOLUTION INTRAVENOUS ONCE
Status: COMPLETED | OUTPATIENT
Start: 2024-04-16 | End: 2024-04-16

## 2024-04-16 RX ORDER — HEPARIN SODIUM (PORCINE) LOCK FLUSH IV SOLN 100 UNIT/ML 100 UNIT/ML
500 SOLUTION INTRAVENOUS PRN
Status: CANCELLED | OUTPATIENT
Start: 2024-04-19

## 2024-04-16 RX ORDER — SODIUM CHLORIDE 9 MG/ML
5-250 INJECTION, SOLUTION INTRAVENOUS PRN
Status: DISCONTINUED | OUTPATIENT
Start: 2024-04-16 | End: 2024-04-17 | Stop reason: HOSPADM

## 2024-04-16 RX ORDER — MEPERIDINE HYDROCHLORIDE 50 MG/ML
12.5 INJECTION INTRAMUSCULAR; INTRAVENOUS; SUBCUTANEOUS PRN
Status: CANCELLED | OUTPATIENT
Start: 2024-04-16

## 2024-04-16 RX ORDER — SODIUM CHLORIDE 0.9 % (FLUSH) 0.9 %
5-40 SYRINGE (ML) INJECTION PRN
Status: CANCELLED | OUTPATIENT
Start: 2024-04-19

## 2024-04-16 RX ORDER — SODIUM CHLORIDE 9 MG/ML
5-250 INJECTION, SOLUTION INTRAVENOUS PRN
OUTPATIENT
Start: 2024-04-19

## 2024-04-16 RX ORDER — PALONOSETRON 0.05 MG/ML
0.25 INJECTION, SOLUTION INTRAVENOUS ONCE
Status: CANCELLED | OUTPATIENT
Start: 2024-04-16 | End: 2024-04-16

## 2024-04-16 RX ADMIN — ATROPINE SULFATE 0.4 MG: 0.4 INJECTION, SOLUTION INTRAVENOUS at 10:20

## 2024-04-16 RX ADMIN — PALONOSETRON 0.25 MG: 0.05 INJECTION, SOLUTION INTRAVENOUS at 09:44

## 2024-04-16 RX ADMIN — FLUOROURACIL 4000 MG: 50 INJECTION, SOLUTION INTRAVENOUS at 12:34

## 2024-04-16 RX ADMIN — LEUCOVORIN CALCIUM 200 MG: 200 INJECTION, POWDER, LYOPHILIZED, FOR SUSPENSION INTRAMUSCULAR; INTRAVENOUS at 12:02

## 2024-04-16 RX ADMIN — SODIUM CHLORIDE 150 MG: 900 INJECTION, SOLUTION INTRAVENOUS at 09:54

## 2024-04-16 RX ADMIN — IRINOTECAN HYDROCHLORIDE 86 MG: 4.3 INJECTION, POWDER, FOR SOLUTION INTRAVENOUS at 10:20

## 2024-04-16 RX ADMIN — DEXAMETHASONE SODIUM PHOSPHATE: 100 INJECTION INTRAMUSCULAR; INTRAVENOUS at 09:44

## 2024-04-16 RX ADMIN — SODIUM CHLORIDE 250 ML/HR: 9 INJECTION, SOLUTION INTRAVENOUS at 09:43

## 2024-04-16 RX ADMIN — ATROPINE SULFATE 0.4 MG: 0.4 INJECTION, SOLUTION INTRAVENOUS at 12:02

## 2024-04-18 ENCOUNTER — HOSPITAL ENCOUNTER (OUTPATIENT)
Dept: INFUSION THERAPY | Age: 79
Discharge: HOME | End: 2024-04-18
Payer: MEDICARE

## 2024-04-18 DIAGNOSIS — C79.9 METASTASIS FROM PANCREATIC CANCER (HCC): ICD-10-CM

## 2024-04-18 DIAGNOSIS — C25.9 ADENOCARCINOMA OF PANCREAS (HCC): ICD-10-CM

## 2024-04-18 DIAGNOSIS — C25.9 METASTASIS FROM PANCREATIC CANCER (HCC): ICD-10-CM

## 2024-04-18 DIAGNOSIS — C25.2 MALIGNANT NEOPLASM OF TAIL OF PANCREAS (HCC): Primary | ICD-10-CM

## 2024-04-18 DIAGNOSIS — F41.9 ANXIETY IN CANCER PATIENT: ICD-10-CM

## 2024-04-18 PROCEDURE — 2580000003 HC RX 258: Performed by: INTERNAL MEDICINE

## 2024-04-18 PROCEDURE — 6360000002 HC RX W HCPCS: Performed by: INTERNAL MEDICINE

## 2024-04-18 PROCEDURE — 96523 IRRIG DRUG DELIVERY DEVICE: CPT

## 2024-04-18 RX ORDER — SODIUM CHLORIDE 0.9 % (FLUSH) 0.9 %
5-40 SYRINGE (ML) INJECTION PRN
Status: DISCONTINUED | OUTPATIENT
Start: 2024-04-18 | End: 2024-04-19 | Stop reason: HOSPADM

## 2024-04-18 RX ORDER — ALPRAZOLAM 0.5 MG/1
0.25 TABLET ORAL 2 TIMES DAILY PRN
Qty: 30 TABLET | Refills: 0 | Status: SHIPPED | OUTPATIENT
Start: 2024-04-18 | End: 2024-05-18

## 2024-04-18 RX ORDER — HEPARIN 100 UNIT/ML
500 SYRINGE INTRAVENOUS PRN
Status: DISCONTINUED | OUTPATIENT
Start: 2024-04-18 | End: 2024-04-19 | Stop reason: HOSPADM

## 2024-04-18 RX ADMIN — HEPARIN 500 UNITS: 100 SYRINGE at 11:45

## 2024-04-18 RX ADMIN — SODIUM CHLORIDE, PRESERVATIVE FREE 10 ML: 5 INJECTION INTRAVENOUS at 11:45

## 2024-04-30 ENCOUNTER — HOSPITAL ENCOUNTER (OUTPATIENT)
Dept: INFUSION THERAPY | Age: 79
Discharge: HOME | End: 2024-04-30
Payer: MEDICARE

## 2024-04-30 ENCOUNTER — OFFICE VISIT (OUTPATIENT)
Dept: PALLATIVE CARE | Age: 79
End: 2024-04-30
Payer: MEDICARE

## 2024-04-30 VITALS
TEMPERATURE: 98.2 F | WEIGHT: 142.1 LBS | HEART RATE: 73 BPM | RESPIRATION RATE: 18 BRPM | SYSTOLIC BLOOD PRESSURE: 109 MMHG | HEIGHT: 65 IN | BODY MASS INDEX: 23.68 KG/M2 | OXYGEN SATURATION: 100 % | DIASTOLIC BLOOD PRESSURE: 73 MMHG

## 2024-04-30 DIAGNOSIS — T45.1X5A CHEMOTHERAPY-INDUCED NAUSEA: ICD-10-CM

## 2024-04-30 DIAGNOSIS — C25.9 ADENOCARCINOMA OF PANCREAS (HCC): ICD-10-CM

## 2024-04-30 DIAGNOSIS — C79.9 METASTASIS FROM PANCREATIC CANCER (HCC): Primary | ICD-10-CM

## 2024-04-30 DIAGNOSIS — C25.9 ADENOCARCINOMA OF PANCREAS (HCC): Primary | ICD-10-CM

## 2024-04-30 DIAGNOSIS — C25.2 MALIGNANT NEOPLASM OF TAIL OF PANCREAS (HCC): ICD-10-CM

## 2024-04-30 DIAGNOSIS — C25.9 METASTASIS FROM PANCREATIC CANCER (HCC): Primary | ICD-10-CM

## 2024-04-30 DIAGNOSIS — Z51.5 ENCOUNTER FOR PALLIATIVE CARE: ICD-10-CM

## 2024-04-30 DIAGNOSIS — R11.0 CHEMOTHERAPY-INDUCED NAUSEA: ICD-10-CM

## 2024-04-30 DIAGNOSIS — R97.8 ABNORMAL TUMOR MARKERS: ICD-10-CM

## 2024-04-30 DIAGNOSIS — R11.0 NAUSEA: ICD-10-CM

## 2024-04-30 DIAGNOSIS — R19.7 DIARRHEA, UNSPECIFIED TYPE: Primary | ICD-10-CM

## 2024-04-30 LAB
ALBUMIN SERPL-MCNC: 3.5 G/DL (ref 3.5–5.2)
ALP SERPL-CCNC: 109 U/L (ref 35–104)
ALT SERPL-CCNC: 14 U/L (ref 9–52)
ANION GAP SERPL CALCULATED.3IONS-SCNC: 11 MMOL/L (ref 7–19)
AST SERPL-CCNC: 27 U/L (ref 14–36)
BASOPHILS # BLD: 0.05 K/UL (ref 0.01–0.08)
BASOPHILS NFR BLD: 0.9 % (ref 0.1–1.2)
BILIRUB SERPL-MCNC: 0.3 MG/DL (ref 0.2–1.3)
BUN SERPL-MCNC: 7 MG/DL (ref 7–17)
CALCIUM SERPL-MCNC: 8.7 MG/DL (ref 8.4–10.2)
CANCER AG19-9 SERPL-ACNC: 472 U/ML (ref 0–35)
CEA SERPL-MCNC: 7.4 NG/ML (ref 0–4.7)
CHLORIDE SERPL-SCNC: 101 MMOL/L (ref 98–111)
CO2 SERPL-SCNC: 25 MMOL/L (ref 22–29)
CREAT SERPL-MCNC: 0.6 MG/DL (ref 0.5–1)
EOSINOPHIL # BLD: 0.42 K/UL (ref 0.04–0.54)
EOSINOPHIL NFR BLD: 7.7 % (ref 0.7–7)
ERYTHROCYTE [DISTWIDTH] IN BLOOD BY AUTOMATED COUNT: 15.4 % (ref 11.7–14.4)
GLOBULIN: 2.5 G/DL
GLUCOSE SERPL-MCNC: 114 MG/DL (ref 74–106)
HCT VFR BLD AUTO: 32.3 % (ref 34.1–44.9)
HGB BLD-MCNC: 11.2 G/DL (ref 11.2–15.7)
LYMPHOCYTES # BLD: 3.2 K/UL (ref 1.18–3.74)
LYMPHOCYTES NFR BLD: 58.3 % (ref 19.3–53.1)
MAGNESIUM SERPL-MCNC: 1.9 MG/DL (ref 1.6–2.3)
MCH RBC QN AUTO: 33.7 PG (ref 25.6–32.2)
MCHC RBC AUTO-ENTMCNC: 34.7 G/DL (ref 32.3–35.5)
MCV RBC AUTO: 97.3 FL (ref 79.4–94.8)
MONOCYTES # BLD: 0.81 K/UL (ref 0.24–0.82)
MONOCYTES NFR BLD: 14.8 % (ref 4.7–12.5)
NEUTROPHILS # BLD: 0.98 K/UL (ref 1.56–6.13)
NEUTS SEG NFR BLD: 17.8 % (ref 34–71.1)
PLATELET # BLD AUTO: 417 K/UL (ref 182–369)
PMV BLD AUTO: 9.5 FL (ref 7.4–10.4)
POTASSIUM SERPL-SCNC: 4.2 MMOL/L (ref 3.5–5.1)
PROT SERPL-MCNC: 5.9 G/DL (ref 6.3–8.2)
RBC # BLD AUTO: 3.32 M/UL (ref 3.93–5.22)
SODIUM SERPL-SCNC: 137 MMOL/L (ref 137–145)
WBC # BLD AUTO: 5.49 K/UL (ref 3.98–10.04)

## 2024-04-30 PROCEDURE — 6360000002 HC RX W HCPCS: Performed by: INTERNAL MEDICINE

## 2024-04-30 PROCEDURE — 2580000003 HC RX 258: Performed by: INTERNAL MEDICINE

## 2024-04-30 PROCEDURE — 96523 IRRIG DRUG DELIVERY DEVICE: CPT

## 2024-04-30 PROCEDURE — 1123F ACP DISCUSS/DSCN MKR DOCD: CPT | Performed by: NURSE PRACTITIONER

## 2024-04-30 PROCEDURE — 83735 ASSAY OF MAGNESIUM: CPT

## 2024-04-30 PROCEDURE — 1090F PRES/ABSN URINE INCON ASSESS: CPT | Performed by: NURSE PRACTITIONER

## 2024-04-30 PROCEDURE — 36415 COLL VENOUS BLD VENIPUNCTURE: CPT

## 2024-04-30 PROCEDURE — 1036F TOBACCO NON-USER: CPT | Performed by: NURSE PRACTITIONER

## 2024-04-30 PROCEDURE — G8427 DOCREV CUR MEDS BY ELIG CLIN: HCPCS | Performed by: NURSE PRACTITIONER

## 2024-04-30 PROCEDURE — G8420 CALC BMI NORM PARAMETERS: HCPCS | Performed by: NURSE PRACTITIONER

## 2024-04-30 PROCEDURE — 99214 OFFICE O/P EST MOD 30 MIN: CPT | Performed by: NURSE PRACTITIONER

## 2024-04-30 PROCEDURE — 80053 COMPREHEN METABOLIC PANEL: CPT

## 2024-04-30 PROCEDURE — G8399 PT W/DXA RESULTS DOCUMENT: HCPCS | Performed by: NURSE PRACTITIONER

## 2024-04-30 PROCEDURE — 85025 COMPLETE CBC W/AUTO DIFF WBC: CPT

## 2024-04-30 RX ORDER — ALBUTEROL SULFATE 90 UG/1
4 AEROSOL, METERED RESPIRATORY (INHALATION) PRN
Status: CANCELLED | OUTPATIENT
Start: 2024-04-30

## 2024-04-30 RX ORDER — DIPHENHYDRAMINE HYDROCHLORIDE 50 MG/ML
50 INJECTION INTRAMUSCULAR; INTRAVENOUS
Status: CANCELLED | OUTPATIENT
Start: 2024-04-30

## 2024-04-30 RX ORDER — SODIUM CHLORIDE 9 MG/ML
5-250 INJECTION, SOLUTION INTRAVENOUS PRN
Status: CANCELLED | OUTPATIENT
Start: 2024-04-30

## 2024-04-30 RX ORDER — PROMETHAZINE HYDROCHLORIDE 12.5 MG/1
12.5 TABLET ORAL EVERY 6 HOURS PRN
Qty: 30 TABLET | Refills: 5 | Status: SHIPPED | OUTPATIENT
Start: 2024-04-30

## 2024-04-30 RX ORDER — EPINEPHRINE 1 MG/ML
0.3 INJECTION, SOLUTION, CONCENTRATE INTRAVENOUS PRN
Status: CANCELLED | OUTPATIENT
Start: 2024-04-30

## 2024-04-30 RX ORDER — MEPERIDINE HYDROCHLORIDE 25 MG/ML
12.5 INJECTION INTRAMUSCULAR; INTRAVENOUS; SUBCUTANEOUS PRN
Status: CANCELLED | OUTPATIENT
Start: 2024-04-30

## 2024-04-30 RX ORDER — ATROPINE SULFATE 0.4 MG/ML
0.4 INJECTION, SOLUTION INTRAVENOUS
Status: DISCONTINUED | OUTPATIENT
Start: 2024-04-30 | End: 2024-04-30

## 2024-04-30 RX ORDER — PALONOSETRON 0.05 MG/ML
0.25 INJECTION, SOLUTION INTRAVENOUS ONCE
Status: DISCONTINUED | OUTPATIENT
Start: 2024-04-30 | End: 2024-04-30

## 2024-04-30 RX ORDER — FAMOTIDINE 10 MG/ML
20 INJECTION, SOLUTION INTRAVENOUS
Status: CANCELLED | OUTPATIENT
Start: 2024-04-30

## 2024-04-30 RX ORDER — SODIUM CHLORIDE 9 MG/ML
5-250 INJECTION, SOLUTION INTRAVENOUS PRN
Status: DISCONTINUED | OUTPATIENT
Start: 2024-04-30 | End: 2024-05-01 | Stop reason: HOSPADM

## 2024-04-30 RX ORDER — SODIUM CHLORIDE 0.9 % (FLUSH) 0.9 %
5-40 SYRINGE (ML) INJECTION PRN
Status: DISCONTINUED | OUTPATIENT
Start: 2024-04-30 | End: 2024-05-01 | Stop reason: HOSPADM

## 2024-04-30 RX ORDER — ONDANSETRON 2 MG/ML
8 INJECTION INTRAMUSCULAR; INTRAVENOUS
Status: CANCELLED | OUTPATIENT
Start: 2024-04-30

## 2024-04-30 RX ORDER — ATROPINE SULFATE 0.4 MG/ML
0.4 INJECTION, SOLUTION INTRAVENOUS ONCE
Status: DISCONTINUED | OUTPATIENT
Start: 2024-04-30 | End: 2024-04-30

## 2024-04-30 RX ORDER — HEPARIN 100 UNIT/ML
500 SYRINGE INTRAVENOUS PRN
Status: DISCONTINUED | OUTPATIENT
Start: 2024-04-30 | End: 2024-05-01 | Stop reason: HOSPADM

## 2024-04-30 RX ORDER — ACETAMINOPHEN 325 MG/1
650 TABLET ORAL
Status: CANCELLED | OUTPATIENT
Start: 2024-04-30

## 2024-04-30 RX ORDER — SODIUM CHLORIDE 9 MG/ML
INJECTION, SOLUTION INTRAVENOUS CONTINUOUS
Status: CANCELLED | OUTPATIENT
Start: 2024-04-30

## 2024-04-30 RX ADMIN — HEPARIN 500 UNITS: 100 SYRINGE at 10:01

## 2024-04-30 RX ADMIN — SODIUM CHLORIDE, PRESERVATIVE FREE 10 ML: 5 INJECTION INTRAVENOUS at 10:01

## 2024-04-30 NOTE — PROGRESS NOTES
Lab Results   Component Value Date    WBC 5.49 04/30/2024    HGB 11.2 04/30/2024    HCT 32.3 (L) 04/30/2024    MCV 97.3 (H) 04/30/2024     (H) 04/30/2024     Lab Results   Component Value Date    NEUTROABS 0.98 (L) 04/30/2024     Lab Results   Component Value Date     04/30/2024    K 4.2 04/30/2024     04/30/2024    CO2 25 04/30/2024    BUN 7 04/30/2024    CREATININE 0.6 04/30/2024    GLUCOSE 114 (H) 04/30/2024    CALCIUM 8.7 04/30/2024    PROT 5.9 (L) 04/30/2024    BILITOT 0.3 04/30/2024    ALKPHOS 109 (H) 04/30/2024    AST 27 04/30/2024    ALT 14 04/30/2024    LABGLOM >90 04/30/2024    GFRAA >59 01/12/2022    GLOB 2.5 04/30/2024     No chemo today.

## 2024-04-30 NOTE — PROGRESS NOTES
Pt here for D1 C20 Onivyde, Leucovorin, 5FU. ANC 0.98. Dr. Rosenthal notified; orders to hold cancel this tx day, have pt return to clinic in 2 weeks for D1 C20; she will have CT scans next week on 5/7/24.

## 2024-04-30 NOTE — PROGRESS NOTES
Supportive Care/Community Based Palliative Care  Follow Up Note        Patient Name:  Linn Pryor  Medical Record Number:  [id number]  YOB: 1945    Date of Visit: 4/30/2024  Location of Visit:  Other - Oncology office    Patient Care Team:  Tasha Perry DO as PCP - General (Internal Medicine)  Yareli Henry DO as Consulting Physician (Vascular Surgery)  Shira Maharaj APRN - CNP as Advanced Practice Nurse (Nurse Practitioner)    History obtained from:  patient, electronic medical record    PALLIATIVE DIAGNOSES AND ORDERS/RECOMMENDATIONS/PLAN:   1. Diarrhea, unspecified type  Continue Imodium up to 4 times daily  Lomotil per oncology if needed    2. Nausea  Continue phenergan as needed, refilled today per oncology    3. Malignant neoplasm of tail of pancreas (HCC)  Continue with Palliative chemo    4. Encounter for palliative care  Current goals of care include: Continue treatment with palliative intent, Preserve independence/control/autonomy, Maintain or improve functional status, Maintain or improve quality of life, Focus on comfort and quality of life    Code status confirmed: DNRCC  Will continue goals of care discussions based on clinical course  Follow up home visit in 3-4 weeks and as needed    CHIEF COMPLAINT:     Chief Complaint   Patient presents with    Diarrhea     Requiring imodium multiple times daily       CLINICAL SUMMARY:          Linn Pryor is a 78 y.o. female with PMH of pancreatic cancer, anxiety, depression, HTN, thyroid disease, arthritis.     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 Rudy Coles at [facility] 6/20/2021  -She had initial adjuvant treatment with Gemzar and Xeloda completed 2/25/2022  -Recurrence noted September 2022 and palliative Gemzar Abraxane

## 2024-05-07 ENCOUNTER — HOSPITAL ENCOUNTER (OUTPATIENT)
Dept: CT IMAGING | Age: 79
Discharge: HOME | End: 2024-05-07
Attending: INTERNAL MEDICINE
Payer: MEDICARE

## 2024-05-07 ENCOUNTER — HOSPITAL ENCOUNTER (OUTPATIENT)
Dept: INFUSION THERAPY | Age: 79
Discharge: HOME | End: 2024-05-07
Payer: MEDICARE

## 2024-05-07 DIAGNOSIS — T45.1X5A CHEMOTHERAPY-INDUCED NAUSEA: ICD-10-CM

## 2024-05-07 DIAGNOSIS — C25.9 METASTASIS FROM PANCREATIC CANCER (HCC): ICD-10-CM

## 2024-05-07 DIAGNOSIS — C79.9 METASTASIS FROM PANCREATIC CANCER (HCC): ICD-10-CM

## 2024-05-07 DIAGNOSIS — C25.2 MALIGNANT NEOPLASM OF TAIL OF PANCREAS (HCC): Primary | ICD-10-CM

## 2024-05-07 DIAGNOSIS — C25.2 MALIGNANT NEOPLASM OF TAIL OF PANCREAS (HCC): ICD-10-CM

## 2024-05-07 DIAGNOSIS — R11.0 CHEMOTHERAPY-INDUCED NAUSEA: ICD-10-CM

## 2024-05-07 PROCEDURE — 96523 IRRIG DRUG DELIVERY DEVICE: CPT

## 2024-05-07 PROCEDURE — 2580000003 HC RX 258: Performed by: INTERNAL MEDICINE

## 2024-05-07 PROCEDURE — 6360000004 HC RX CONTRAST MEDICATION: Performed by: INTERNAL MEDICINE

## 2024-05-07 PROCEDURE — 74177 CT ABD & PELVIS W/CONTRAST: CPT

## 2024-05-07 PROCEDURE — 6360000002 HC RX W HCPCS: Performed by: INTERNAL MEDICINE

## 2024-05-07 PROCEDURE — 71260 CT THORAX DX C+: CPT

## 2024-05-07 RX ORDER — HEPARIN 100 UNIT/ML
500 SYRINGE INTRAVENOUS PRN
Status: DISCONTINUED | OUTPATIENT
Start: 2024-05-07 | End: 2024-05-08 | Stop reason: HOSPADM

## 2024-05-07 RX ORDER — PROMETHAZINE HYDROCHLORIDE 12.5 MG/1
12.5 TABLET ORAL EVERY 6 HOURS PRN
Qty: 30 TABLET | Refills: 5 | Status: SHIPPED | OUTPATIENT
Start: 2024-05-07 | End: 2024-05-07 | Stop reason: SDUPTHER

## 2024-05-07 RX ORDER — PROMETHAZINE HYDROCHLORIDE 12.5 MG/1
12.5 TABLET ORAL EVERY 6 HOURS PRN
Qty: 30 TABLET | Refills: 5 | Status: SHIPPED | OUTPATIENT
Start: 2024-05-07

## 2024-05-07 RX ORDER — SODIUM CHLORIDE 0.9 % (FLUSH) 0.9 %
5-40 SYRINGE (ML) INJECTION PRN
Status: DISCONTINUED | OUTPATIENT
Start: 2024-05-07 | End: 2024-05-08 | Stop reason: HOSPADM

## 2024-05-07 RX ADMIN — SODIUM CHLORIDE, PRESERVATIVE FREE 10 ML: 5 INJECTION INTRAVENOUS at 11:10

## 2024-05-07 RX ADMIN — IOPAMIDOL 75 ML: 755 INJECTION, SOLUTION INTRAVENOUS at 11:12

## 2024-05-07 RX ADMIN — HEPARIN 500 UNITS: 100 SYRINGE at 11:11

## 2024-05-10 NOTE — PROGRESS NOTES
carcinomatosis. Diagnostic paracentesis was performed following this examination with ultrasound guidance.Evidence previous resection of the pancreatic body and tail. There is a soft tissue implant or enlarged lymph node inferior to the celiac artery on the right which measures 1.6 x 1.6 cm, concerning for metastatic disease. There is a small cyst in subtle stable nodularity and patient replacement of the pancreatic head.   10/3/2022-essentially, findings of recurrent metastatic pancreatic adenocarcinoma.  Stage IV.  Recommend palliative Gemzar 1000 mg/M2 and Abraxane 125 mg/M2 every 2 weeks until disease progression or intolerable toxicity.  10/3/22 Chemotherapy consent signed  10/14/22-Initiated palliative frontline Gemzar Abraxane every 2 weeks  12/30/22 CA 19-9-815  1/6/23 CT Chest W Contrast No significant interval change.  1/6/23 CT Abd/Pelvis W IV Contrast (oral) Partial pancreatectomy with absence of pancreatic body and tail.  No significant change in size or number of a few cyst versus soft tissue nodules in region of pancreatic head. No retroperitoneal lymphadenopathy.Interval resolution of ascites.   1/13/2023-essentially, CT scan showed interval response to treatment. Resolution of prior ascites.  Continue palliative chemotherapy with Gemzar/Abraxane.  1/13/23 Decrease Abraxane to 100mg/m2 with each treatment due to development of neuropathy.  1/27/23 CA 19-9-360  2/10/23 CA 19-9-238  2/24/23 CA 19-9-171  3/10/23 MRI Brain W WO Contrast No acute intracranial abnormality notified.No evidence of metastatic disease in the brain. Scattered T2 FLAIR white matter hyperintensities are nonspecific, but commonly seen in the setting of chronic microvascular ischemic disease.  4/20/23 CT Chest, Abdomen,Pelvis  W Contrast New right hepatic dome 8.2 cm rim-enhancing complex subcapsular collection with adjacent subcentimeter hypodensity.New small right effusion with right basilar atelectasis. Postsurgical changes of

## 2024-05-14 ENCOUNTER — HOSPITAL ENCOUNTER (OUTPATIENT)
Dept: INFUSION THERAPY | Age: 79
Discharge: HOME | End: 2024-05-14
Payer: MEDICARE

## 2024-05-14 ENCOUNTER — OFFICE VISIT (OUTPATIENT)
Dept: HEMATOLOGY | Age: 79
End: 2024-05-14
Payer: MEDICARE

## 2024-05-14 VITALS
HEART RATE: 74 BPM | BODY MASS INDEX: 23.94 KG/M2 | RESPIRATION RATE: 18 BRPM | SYSTOLIC BLOOD PRESSURE: 128 MMHG | OXYGEN SATURATION: 100 % | DIASTOLIC BLOOD PRESSURE: 80 MMHG | HEIGHT: 65 IN | WEIGHT: 143.7 LBS | TEMPERATURE: 97.5 F

## 2024-05-14 DIAGNOSIS — T45.1X5A CHEMOTHERAPY-INDUCED FATIGUE: ICD-10-CM

## 2024-05-14 DIAGNOSIS — Z51.11 CHEMOTHERAPY MANAGEMENT, ENCOUNTER FOR: ICD-10-CM

## 2024-05-14 DIAGNOSIS — C79.9 METASTASIS FROM PANCREATIC CANCER (HCC): Primary | ICD-10-CM

## 2024-05-14 DIAGNOSIS — C25.9 METASTASIS FROM PANCREATIC CANCER (HCC): Primary | ICD-10-CM

## 2024-05-14 DIAGNOSIS — R53.83 CHEMOTHERAPY-INDUCED FATIGUE: ICD-10-CM

## 2024-05-14 DIAGNOSIS — R69 LIFE THREATENING MEDICAL ILLNESS: ICD-10-CM

## 2024-05-14 DIAGNOSIS — Z86.718 HISTORY OF DVT (DEEP VEIN THROMBOSIS): ICD-10-CM

## 2024-05-14 DIAGNOSIS — C25.9 METASTASIS FROM PANCREATIC CANCER (HCC): ICD-10-CM

## 2024-05-14 DIAGNOSIS — D72.9 NEUTROPHILIC LEUKOCYTOSIS: ICD-10-CM

## 2024-05-14 DIAGNOSIS — C25.2 MALIGNANT NEOPLASM OF TAIL OF PANCREAS (HCC): Primary | ICD-10-CM

## 2024-05-14 DIAGNOSIS — C25.2 MALIGNANT NEOPLASM OF TAIL OF PANCREAS (HCC): ICD-10-CM

## 2024-05-14 DIAGNOSIS — Z71.89 CARE PLAN DISCUSSED WITH PATIENT: ICD-10-CM

## 2024-05-14 DIAGNOSIS — C79.9 METASTASIS FROM PANCREATIC CANCER (HCC): ICD-10-CM

## 2024-05-14 DIAGNOSIS — T45.1X5D ADVERSE EFFECT OF CHEMOTHERAPY, SUBSEQUENT ENCOUNTER: ICD-10-CM

## 2024-05-14 DIAGNOSIS — T45.1X5A ANTINEOPLASTIC CHEMOTHERAPY INDUCED ANEMIA: ICD-10-CM

## 2024-05-14 DIAGNOSIS — D64.81 ANTINEOPLASTIC CHEMOTHERAPY INDUCED ANEMIA: ICD-10-CM

## 2024-05-14 DIAGNOSIS — R97.8 ABNORMAL TUMOR MARKERS: ICD-10-CM

## 2024-05-14 LAB
ALBUMIN SERPL-MCNC: 3.4 G/DL (ref 3.5–5.2)
ALP SERPL-CCNC: 132 U/L (ref 35–104)
ALT SERPL-CCNC: 16 U/L (ref 9–52)
ANION GAP SERPL CALCULATED.3IONS-SCNC: 7 MMOL/L (ref 7–19)
AST SERPL-CCNC: 42 U/L (ref 14–36)
BASOPHILS # BLD: 0.14 K/UL (ref 0.01–0.08)
BASOPHILS NFR BLD: 1 % (ref 0.1–1.2)
BILIRUB SERPL-MCNC: 0.5 MG/DL (ref 0.2–1.3)
BUN SERPL-MCNC: 7 MG/DL (ref 7–17)
CALCIUM SERPL-MCNC: 9 MG/DL (ref 8.4–10.2)
CANCER AG19-9 SERPL-ACNC: 684 U/ML (ref 0–35)
CEA SERPL-MCNC: 9.1 NG/ML (ref 0–4.7)
CHLORIDE SERPL-SCNC: 105 MMOL/L (ref 98–111)
CO2 SERPL-SCNC: 25 MMOL/L (ref 22–29)
CREAT SERPL-MCNC: 0.6 MG/DL (ref 0.5–1)
EOSINOPHIL # BLD: 0.34 K/UL (ref 0.04–0.54)
EOSINOPHIL NFR BLD: 2.5 % (ref 0.7–7)
ERYTHROCYTE [DISTWIDTH] IN BLOOD BY AUTOMATED COUNT: 15.6 % (ref 11.7–14.4)
GLOBULIN: 2.8 G/DL
GLUCOSE SERPL-MCNC: 89 MG/DL (ref 74–106)
HCT VFR BLD AUTO: 34.3 % (ref 34.1–44.9)
HGB BLD-MCNC: 11.6 G/DL (ref 11.2–15.7)
LYMPHOCYTES # BLD: 6.04 K/UL (ref 1.18–3.74)
LYMPHOCYTES NFR BLD: 44.7 % (ref 19.3–53.1)
MAGNESIUM SERPL-MCNC: 2 MG/DL (ref 1.6–2.3)
MCH RBC QN AUTO: 32.9 PG (ref 25.6–32.2)
MCHC RBC AUTO-ENTMCNC: 33.8 G/DL (ref 32.3–35.5)
MCV RBC AUTO: 97.2 FL (ref 79.4–94.8)
MONOCYTES # BLD: 2.03 K/UL (ref 0.24–0.82)
MONOCYTES NFR BLD: 15 % (ref 4.7–12.5)
NEUTROPHILS # BLD: 4.85 K/UL (ref 1.56–6.13)
NEUTS SEG NFR BLD: 36.1 % (ref 34–71.1)
PLATELET # BLD AUTO: 375 K/UL (ref 182–369)
PMV BLD AUTO: 10.3 FL (ref 7.4–10.4)
POTASSIUM SERPL-SCNC: 4.4 MMOL/L (ref 3.5–5.1)
PROT SERPL-MCNC: 6.2 G/DL (ref 6.3–8.2)
RBC # BLD AUTO: 3.53 M/UL (ref 3.93–5.22)
SODIUM SERPL-SCNC: 137 MMOL/L (ref 137–145)
WBC # BLD AUTO: 13.5 K/UL (ref 3.98–10.04)

## 2024-05-14 PROCEDURE — G8399 PT W/DXA RESULTS DOCUMENT: HCPCS | Performed by: INTERNAL MEDICINE

## 2024-05-14 PROCEDURE — 36415 COLL VENOUS BLD VENIPUNCTURE: CPT

## 2024-05-14 PROCEDURE — G8427 DOCREV CUR MEDS BY ELIG CLIN: HCPCS | Performed by: INTERNAL MEDICINE

## 2024-05-14 PROCEDURE — 80053 COMPREHEN METABOLIC PANEL: CPT

## 2024-05-14 PROCEDURE — 85025 COMPLETE CBC W/AUTO DIFF WBC: CPT

## 2024-05-14 PROCEDURE — 1090F PRES/ABSN URINE INCON ASSESS: CPT | Performed by: INTERNAL MEDICINE

## 2024-05-14 PROCEDURE — 96415 CHEMO IV INFUSION ADDL HR: CPT

## 2024-05-14 PROCEDURE — 99215 OFFICE O/P EST HI 40 MIN: CPT | Performed by: INTERNAL MEDICINE

## 2024-05-14 PROCEDURE — G2211 COMPLEX E/M VISIT ADD ON: HCPCS | Performed by: INTERNAL MEDICINE

## 2024-05-14 PROCEDURE — 1036F TOBACCO NON-USER: CPT | Performed by: INTERNAL MEDICINE

## 2024-05-14 PROCEDURE — 96413 CHEMO IV INFUSION 1 HR: CPT

## 2024-05-14 PROCEDURE — G8420 CALC BMI NORM PARAMETERS: HCPCS | Performed by: INTERNAL MEDICINE

## 2024-05-14 PROCEDURE — 83735 ASSAY OF MAGNESIUM: CPT

## 2024-05-14 PROCEDURE — 6360000002 HC RX W HCPCS: Performed by: INTERNAL MEDICINE

## 2024-05-14 PROCEDURE — 1123F ACP DISCUSS/DSCN MKR DOCD: CPT | Performed by: INTERNAL MEDICINE

## 2024-05-14 PROCEDURE — 96376 TX/PRO/DX INJ SAME DRUG ADON: CPT

## 2024-05-14 PROCEDURE — G0498 CHEMO EXTEND IV INFUS W/PUMP: HCPCS

## 2024-05-14 PROCEDURE — 36591 DRAW BLOOD OFF VENOUS DEVICE: CPT

## 2024-05-14 PROCEDURE — 2580000003 HC RX 258: Performed by: INTERNAL MEDICINE

## 2024-05-14 PROCEDURE — 96375 TX/PRO/DX INJ NEW DRUG ADDON: CPT

## 2024-05-14 PROCEDURE — 96367 TX/PROPH/DG ADDL SEQ IV INF: CPT

## 2024-05-14 RX ORDER — ONDANSETRON 2 MG/ML
8 INJECTION INTRAMUSCULAR; INTRAVENOUS
Status: CANCELLED | OUTPATIENT
Start: 2024-05-14

## 2024-05-14 RX ORDER — SODIUM CHLORIDE 9 MG/ML
5-250 INJECTION, SOLUTION INTRAVENOUS PRN
Status: DISCONTINUED | OUTPATIENT
Start: 2024-05-14 | End: 2024-05-15 | Stop reason: HOSPADM

## 2024-05-14 RX ORDER — ATROPINE SULFATE 0.4 MG/ML
0.4 INJECTION, SOLUTION INTRAVENOUS ONCE
Status: COMPLETED | OUTPATIENT
Start: 2024-05-14 | End: 2024-05-14

## 2024-05-14 RX ORDER — PALONOSETRON 0.05 MG/ML
0.25 INJECTION, SOLUTION INTRAVENOUS ONCE
Status: COMPLETED | OUTPATIENT
Start: 2024-05-14 | End: 2024-05-14

## 2024-05-14 RX ORDER — SODIUM CHLORIDE 9 MG/ML
5-250 INJECTION, SOLUTION INTRAVENOUS PRN
Status: CANCELLED | OUTPATIENT
Start: 2024-05-14

## 2024-05-14 RX ORDER — SODIUM CHLORIDE 9 MG/ML
5-250 INJECTION, SOLUTION INTRAVENOUS PRN
OUTPATIENT
Start: 2024-05-17

## 2024-05-14 RX ORDER — HEPARIN SODIUM (PORCINE) LOCK FLUSH IV SOLN 100 UNIT/ML 100 UNIT/ML
500 SOLUTION INTRAVENOUS PRN
Status: CANCELLED | OUTPATIENT
Start: 2024-05-17

## 2024-05-14 RX ORDER — FAMOTIDINE 10 MG/ML
20 INJECTION, SOLUTION INTRAVENOUS
Status: CANCELLED | OUTPATIENT
Start: 2024-05-14

## 2024-05-14 RX ORDER — ALBUTEROL SULFATE 90 UG/1
4 AEROSOL, METERED RESPIRATORY (INHALATION) PRN
Status: CANCELLED | OUTPATIENT
Start: 2024-05-14

## 2024-05-14 RX ORDER — ACETAMINOPHEN 325 MG/1
650 TABLET ORAL
Status: CANCELLED | OUTPATIENT
Start: 2024-05-14

## 2024-05-14 RX ORDER — DIPHENHYDRAMINE HYDROCHLORIDE 50 MG/ML
50 INJECTION INTRAMUSCULAR; INTRAVENOUS
Status: CANCELLED | OUTPATIENT
Start: 2024-05-14

## 2024-05-14 RX ORDER — HEPARIN SODIUM (PORCINE) LOCK FLUSH IV SOLN 100 UNIT/ML 100 UNIT/ML
500 SOLUTION INTRAVENOUS PRN
Status: CANCELLED | OUTPATIENT
Start: 2024-05-14

## 2024-05-14 RX ORDER — EPINEPHRINE 1 MG/ML
0.3 INJECTION, SOLUTION, CONCENTRATE INTRAVENOUS PRN
Status: CANCELLED | OUTPATIENT
Start: 2024-05-14

## 2024-05-14 RX ORDER — SODIUM CHLORIDE 0.9 % (FLUSH) 0.9 %
5-40 SYRINGE (ML) INJECTION PRN
Status: CANCELLED | OUTPATIENT
Start: 2024-05-17

## 2024-05-14 RX ORDER — MEPERIDINE HYDROCHLORIDE 50 MG/ML
12.5 INJECTION INTRAMUSCULAR; INTRAVENOUS; SUBCUTANEOUS PRN
Status: CANCELLED | OUTPATIENT
Start: 2024-05-14

## 2024-05-14 RX ORDER — SODIUM CHLORIDE 9 MG/ML
INJECTION, SOLUTION INTRAVENOUS CONTINUOUS
Status: CANCELLED | OUTPATIENT
Start: 2024-05-14

## 2024-05-14 RX ORDER — SODIUM CHLORIDE 0.9 % (FLUSH) 0.9 %
5-40 SYRINGE (ML) INJECTION PRN
Status: CANCELLED | OUTPATIENT
Start: 2024-05-14

## 2024-05-14 RX ORDER — ATROPINE SULFATE 0.4 MG/ML
0.4 INJECTION, SOLUTION INTRAVENOUS
Status: DISCONTINUED | OUTPATIENT
Start: 2024-05-14 | End: 2024-05-16 | Stop reason: HOSPADM

## 2024-05-14 RX ADMIN — FLUOROURACIL 4000 MG: 50 INJECTION, SOLUTION INTRAVENOUS at 13:29

## 2024-05-14 RX ADMIN — SODIUM CHLORIDE 150 MG: 9 INJECTION, SOLUTION INTRAVENOUS at 10:20

## 2024-05-14 RX ADMIN — PALONOSETRON 0.25 MG: 0.05 INJECTION, SOLUTION INTRAVENOUS at 10:10

## 2024-05-14 RX ADMIN — IRINOTECAN HYDROCHLORIDE 86 MG: 4.3 INJECTION, POWDER, FOR SOLUTION INTRAVENOUS at 11:37

## 2024-05-14 RX ADMIN — DEXAMETHASONE SODIUM PHOSPHATE: 100 INJECTION INTRAMUSCULAR; INTRAVENOUS at 10:10

## 2024-05-14 RX ADMIN — ATROPINE SULFATE 0.4 MG: 0.4 INJECTION, SOLUTION INTRAVENOUS at 13:27

## 2024-05-14 RX ADMIN — ATROPINE SULFATE 0.4 MG: 0.4 INJECTION, SOLUTION INTRAVENOUS at 11:34

## 2024-05-14 RX ADMIN — LEUCOVORIN CALCIUM 200 MG: 200 INJECTION, POWDER, LYOPHILIZED, FOR SUSPENSION INTRAMUSCULAR; INTRAVENOUS at 10:58

## 2024-05-16 ENCOUNTER — HOSPITAL ENCOUNTER (OUTPATIENT)
Dept: INFUSION THERAPY | Age: 79
Discharge: HOME | End: 2024-05-16
Payer: MEDICARE

## 2024-05-16 DIAGNOSIS — F41.9 ANXIETY IN CANCER PATIENT: ICD-10-CM

## 2024-05-16 DIAGNOSIS — C25.2 MALIGNANT NEOPLASM OF TAIL OF PANCREAS (HCC): Primary | ICD-10-CM

## 2024-05-16 DIAGNOSIS — C79.9 METASTASIS FROM PANCREATIC CANCER (HCC): ICD-10-CM

## 2024-05-16 DIAGNOSIS — C25.9 ADENOCARCINOMA OF PANCREAS (HCC): ICD-10-CM

## 2024-05-16 DIAGNOSIS — C25.9 METASTASIS FROM PANCREATIC CANCER (HCC): ICD-10-CM

## 2024-05-16 PROCEDURE — 2580000003 HC RX 258: Performed by: INTERNAL MEDICINE

## 2024-05-16 PROCEDURE — 6360000002 HC RX W HCPCS: Performed by: INTERNAL MEDICINE

## 2024-05-16 PROCEDURE — 96523 IRRIG DRUG DELIVERY DEVICE: CPT

## 2024-05-16 RX ORDER — SODIUM CHLORIDE 0.9 % (FLUSH) 0.9 %
5-40 SYRINGE (ML) INJECTION PRN
Status: DISCONTINUED | OUTPATIENT
Start: 2024-05-16 | End: 2024-05-17 | Stop reason: HOSPADM

## 2024-05-16 RX ORDER — ALPRAZOLAM 0.5 MG/1
0.25 TABLET ORAL 2 TIMES DAILY PRN
Qty: 30 TABLET | Refills: 0 | Status: SHIPPED | OUTPATIENT
Start: 2024-05-16 | End: 2024-06-15

## 2024-05-16 RX ORDER — HEPARIN 100 UNIT/ML
500 SYRINGE INTRAVENOUS PRN
Status: DISCONTINUED | OUTPATIENT
Start: 2024-05-16 | End: 2024-05-17 | Stop reason: HOSPADM

## 2024-05-16 RX ADMIN — HEPARIN 500 UNITS: 100 SYRINGE at 11:59

## 2024-05-16 RX ADMIN — SODIUM CHLORIDE, PRESERVATIVE FREE 10 ML: 5 INJECTION INTRAVENOUS at 11:59

## 2024-05-20 ENCOUNTER — OFFICE VISIT (OUTPATIENT)
Dept: INTERNAL MEDICINE | Facility: CLINIC | Age: 79
End: 2024-05-20
Payer: MEDICARE

## 2024-05-20 VITALS
WEIGHT: 144 LBS | HEART RATE: 76 BPM | SYSTOLIC BLOOD PRESSURE: 136 MMHG | TEMPERATURE: 98.4 F | DIASTOLIC BLOOD PRESSURE: 86 MMHG | HEIGHT: 65 IN | BODY MASS INDEX: 23.99 KG/M2 | OXYGEN SATURATION: 95 %

## 2024-05-20 DIAGNOSIS — E03.9 ACQUIRED HYPOTHYROIDISM: ICD-10-CM

## 2024-05-20 DIAGNOSIS — I10 ESSENTIAL HYPERTENSION: Primary | ICD-10-CM

## 2024-05-20 PROCEDURE — 3075F SYST BP GE 130 - 139MM HG: CPT | Performed by: FAMILY MEDICINE

## 2024-05-20 PROCEDURE — 99214 OFFICE O/P EST MOD 30 MIN: CPT | Performed by: FAMILY MEDICINE

## 2024-05-20 PROCEDURE — 1159F MED LIST DOCD IN RCRD: CPT | Performed by: FAMILY MEDICINE

## 2024-05-20 PROCEDURE — G2211 COMPLEX E/M VISIT ADD ON: HCPCS | Performed by: FAMILY MEDICINE

## 2024-05-20 PROCEDURE — 1160F RVW MEDS BY RX/DR IN RCRD: CPT | Performed by: FAMILY MEDICINE

## 2024-05-20 PROCEDURE — 3079F DIAST BP 80-89 MM HG: CPT | Performed by: FAMILY MEDICINE

## 2024-05-20 NOTE — PROGRESS NOTES
"Chief Complaint  Hypertension and Hypothyroidism    Subjective        Ирина Randolph presents to [facility] PRIMARY [facility]ARE  Hypertension    Urinary Tract Infection     Hypothyroidism        Ирина Randolph is a 78 y.o. female who presents for a routine visit at this time.  Patient continues to receive cancer treatment for her current pancreatic cancer.  She states that she continues to do well with this treatment at this time.  Patient is due at this time for recheck on her thyroid disease as well as labs for her hypertension.  She states that she continues to do well with her medications and is happy with her current dosages.  She otherwise states no other new problems complaints or concerns    Longitudinal care Statement:  Patient to continue with continuous, comprehensive, coordinated primary care that serves as the continuing focal point for all needed health care services related to her complex medical conditions.  I discussed preventative health care, vaccines, depression, and cancer screening with her.  Reviewed her complex diagnosis, comorbid conditions, and history at this time as well as associated labs and medication list for possible interactions.     Vital Signs:  /86 (BP Location: Left arm, Patient Position: Sitting, Cuff Size: Adult)   Pulse 76   Temp 98.4 °F (36.9 °C) (Infrared)   Ht 165.1 cm (65\")   Wt 65.3 kg (144 lb)   SpO2 95%   BMI 23.96 kg/m²   Estimated body mass index is 23.96 kg/m² as calculated from the following:    Height as of this encounter: 165.1 cm (65\").    Weight as of this encounter: 65.3 kg (144 lb).       Past Medical History:   Diagnosis Date    Acute bronchitis     Arthritis     Osteoarthritis    Cancer     pancreatic cancer    Disease of thyroid gland     Gallbladder abscess     Hypertension        Past Surgical History:   Procedure Laterality Date    ABDOMINAL HYSTERECTOMY      CHOLECYSTECTOMY  1994    COLONOSCOPY  04/23/2015    two polyps both " removed  extensive diverticulosis    COLONOSCOPY N/A 06/17/2020    Procedure: COLONOSCOPY WITH ANESTHESIA;  Surgeon: Homer Zamarripa DO;  Location: [facility] ENDOSCOPY;  Service: Gastroenterology;  Laterality: N/A;  pre: Hx of polyps  post:  Anupama Dhaliwal    ENDOSCOPY  08/16/2016    normal    ENDOSCOPY N/A 06/17/2020    Procedure: ESOPHAGOGASTRODUODENOSCOPY WITH ANESTHESIA;  Surgeon: Homer Zamarripa DO;  Location: [facility] ENDOSCOPY;  Service: Gastroenterology;  Laterality: N/A;  pre: nausea  post:     ENDOSCOPY N/A 6/7/2022    Procedure: ESOPHAGOGASTRODUODENOSCOPY WITH ANESTHESIA;  Surgeon: Homer Zamarripa DO;  Location: [facility] ENDOSCOPY;  Service: Gastroenterology;  Laterality: N/A;  pre cough  post Anupama Zimmerman DO    PANCREAS SURGERY      Partial removal due to tumor    ROTATOR CUFF REPAIR  10/19/2017    SPLENECTOMY         Social History     Tobacco Use    Smoking status: Never    Smokeless tobacco: Never   Vaping Use    Vaping status: Never Used   Substance Use Topics    Alcohol use: No    Drug use: No       Family History   Problem Relation Age of Onset    Alcohol abuse Father     Breast cancer Maternal Aunt     Bone cancer Maternal Aunt     Leukemia Maternal Aunt     Colon cancer Neg Hx     Colon polyps Neg Hx     Esophageal cancer Neg Hx        Allergies as of 05/20/2024 - Reviewed 05/20/2024   Allergen Reaction Noted    Diclofenac Other (See Comments), Unknown - High Severity, and Unknown - Low Severity 10/17/2017    Oxycodone-acetaminophen Hives and Rash 03/02/2018    Doxycycline Rash 09/14/2021         Current Outpatient Medications:     ACETAMINOPHEN PO, Take  by mouth., Disp: , Rfl:     albuterol sulfate  (90 Base) MCG/ACT inhaler, Inhale 2 puffs Every 4 (Four) Hours As Needed for Wheezing., Disp: 18 g, Rfl: 3    ALPRAZolam (Xanax) 0.5 MG tablet, Take 0.5 tablets by mouth 2 (Two) Times a Day As Needed for Anxiety., Disp: 30 tablet, Rfl: 1    Denta 5000 Plus 1.1 %  cream, Apply 1 application topically to the appropriate area as directed Daily As Needed. Apply with toothbrush, Disp: , Rfl:     doxazosin (CARDURA) 2 MG tablet, TAKE 1 TABLET BY MOUTH EVERY DAY IN THE EVENING, Disp: 90 tablet, Rfl: 3    furosemide (LASIX) 20 MG tablet, Take 1 tablet by mouth 2 (Two) Times a Day., Disp: 180 tablet, Rfl: 3    HYDROcodone-acetaminophen (NORCO) 7.5-325 MG per tablet, Take 1 tablet by mouth Every 6 (Six) Hours As Needed for Moderate Pain., Disp: , Rfl:     levothyroxine (SYNTHROID, LEVOTHROID) 150 MCG tablet, Take 1 tablet by mouth Daily., Disp: 90 tablet, Rfl: 3    lidocaine-prilocaine (EMLA) 2.5-2.5 % cream, Apply 1 Application topically to the appropriate area as directed As Needed., Disp: , Rfl:     loratadine (CLARITIN) 10 MG tablet, Take 1 tablet by mouth Daily As Needed for Allergies., Disp: , Rfl:     losartan (COZAAR) 25 MG tablet, Take 1 tablet by mouth Daily., Disp: 90 tablet, Rfl: 3    metoprolol succinate XL (TOPROL-XL) 50 MG 24 hr tablet, Take 1 tablet by mouth 2 (Two) Times a Day., Disp: 180 tablet, Rfl: 3    NIFEdipine XL (PROCARDIA XL) 30 MG 24 hr tablet, Take 1 tablet by mouth Daily., Disp: 90 tablet, Rfl: 3    nystatin (MYCOSTATIN) 597779 UNIT/GM powder, Apply 11,111 Applications topically to the appropriate area as directed 3 (Three) Times a Day., Disp: , Rfl:     pancrelipase, Lip-Prot-Amyl, (CREON) 95037-52587 units capsule delayed-release particles capsule, Take 1 capsule by mouth 3 (Three) Times a Day With Meals., Disp: , Rfl:     potassium chloride 10 MEQ CR tablet, Take 1 tablet by mouth Daily., Disp: 90 tablet, Rfl: 3    promethazine (PHENERGAN) 12.5 MG tablet, Take 1 tablet by mouth Every 6 (Six) Hours As Needed for Nausea. for nausea, Disp: , Rfl:     tacrolimus (PROTOPIC) 0.1 % ointment, Apply 1 Application topically to the appropriate area as directed Daily. To psoriasis in groin, Disp: , Rfl:     triamcinolone (KENALOG) 0.1 % cream, Apply 1 Application  topically to the appropriate area as directed 2 (Two) Times a Day., Disp: , Rfl:       BMI is within normal parameters. No other follow-up for BMI required.    Review of systems   negative unless otherwise specified above in HPI      Physical Exam  Vitals and nursing note reviewed.   Constitutional:       General: She is not in acute distress.     Appearance: Normal appearance.   HENT:      Head: Normocephalic.   Eyes:      Extraocular Movements: Extraocular movements intact.      Pupils: Pupils are equal, round, and reactive to light.   Cardiovascular:      Rate and Rhythm: Normal rate and regular rhythm.      Heart sounds: Normal heart sounds. No murmur heard.  Pulmonary:      Effort: Pulmonary effort is normal. No respiratory distress.      Breath sounds: Normal breath sounds. No rhonchi or rales.   Abdominal:      General: Abdomen is flat. Bowel sounds are normal.      Palpations: Abdomen is soft.   Neurological:      General: No focal deficit present.      Mental Status: She is alert.        Result Review :                   Assessment and Plan   Diagnoses and all orders for this visit:    1. Essential hypertension (Primary)  -     Comprehensive Metabolic Panel  -     CBC & Differential    2. Acquired hypothyroidism  -     TSH          EMR Dictation/Transcription disclaimer:   Some of this note may be an electronic transcription/translation of spoken language to printed text. The electronic translation of spoken language may permit erroneous, or at times, nonsensical words or phrases to be inadvertently transcribed; Although I have reviewed the note for such errors, some may still exist.          Follow Up   Return in about 6 months (around 11/20/2024).  Patient was given instructions and counseling regarding her condition or for health maintenance advice. Please see specific information pulled into the AVS if appropriate.     Signed by:    Franco Mendes MD Date: 05/20/24

## 2024-05-21 LAB
ALBUMIN SERPL-MCNC: 3.5 G/DL (ref 3.8–4.8)
ALBUMIN/GLOB SERPL: 1.3 {RATIO} (ref 1.2–2.2)
ALP SERPL-CCNC: 120 IU/L (ref 44–121)
ALT SERPL-CCNC: 18 IU/L (ref 0–32)
AST SERPL-CCNC: 36 IU/L (ref 0–40)
BASOPHILS # BLD AUTO: 0.1 X10E3/UL (ref 0–0.2)
BASOPHILS NFR BLD AUTO: 0 %
BILIRUB SERPL-MCNC: 0.4 MG/DL (ref 0–1.2)
BUN SERPL-MCNC: 10 MG/DL (ref 8–27)
BUN/CREAT SERPL: 16 (ref 12–28)
CALCIUM SERPL-MCNC: 9.5 MG/DL (ref 8.7–10.3)
CHLORIDE SERPL-SCNC: 101 MMOL/L (ref 96–106)
CO2 SERPL-SCNC: 24 MMOL/L (ref 20–29)
CREAT SERPL-MCNC: 0.61 MG/DL (ref 0.57–1)
EGFRCR SERPLBLD CKD-EPI 2021: 92 ML/MIN/1.73
EOSINOPHIL # BLD AUTO: 0.4 X10E3/UL (ref 0–0.4)
EOSINOPHIL NFR BLD AUTO: 4 %
ERYTHROCYTE [DISTWIDTH] IN BLOOD BY AUTOMATED COUNT: 14 % (ref 11.7–15.4)
GLOBULIN SER CALC-MCNC: 2.6 G/DL (ref 1.5–4.5)
GLUCOSE SERPL-MCNC: 87 MG/DL (ref 70–99)
HCT VFR BLD AUTO: 35.8 % (ref 34–46.6)
HGB BLD-MCNC: 11.7 G/DL (ref 11.1–15.9)
IMM GRANULOCYTES # BLD AUTO: 0 X10E3/UL (ref 0–0.1)
IMM GRANULOCYTES NFR BLD AUTO: 0 %
LYMPHOCYTES # BLD AUTO: 5.7 X10E3/UL (ref 0.7–3.1)
LYMPHOCYTES NFR BLD AUTO: 51 %
MCH RBC QN AUTO: 32.2 PG (ref 26.6–33)
MCHC RBC AUTO-ENTMCNC: 32.7 G/DL (ref 31.5–35.7)
MCV RBC AUTO: 99 FL (ref 79–97)
MONOCYTES # BLD AUTO: 0.3 X10E3/UL (ref 0.1–0.9)
MONOCYTES NFR BLD AUTO: 3 %
NEUTROPHILS # BLD AUTO: 4.7 X10E3/UL (ref 1.4–7)
NEUTROPHILS NFR BLD AUTO: 42 %
NRBC BLD AUTO-RTO: 1 % (ref 0–0)
PLATELET # BLD AUTO: 267 X10E3/UL (ref 150–450)
POTASSIUM SERPL-SCNC: 4.7 MMOL/L (ref 3.5–5.2)
PROT SERPL-MCNC: 6.1 G/DL (ref 6–8.5)
RBC # BLD AUTO: 3.63 X10E6/UL (ref 3.77–5.28)
SODIUM SERPL-SCNC: 136 MMOL/L (ref 134–144)
TSH SERPL DL<=0.005 MIU/L-ACNC: 1.08 UIU/ML (ref 0.45–4.5)
WBC # BLD AUTO: 11.2 X10E3/UL (ref 3.4–10.8)

## 2024-05-28 ENCOUNTER — HOSPITAL ENCOUNTER (OUTPATIENT)
Dept: INFUSION THERAPY | Age: 79
Discharge: HOME | End: 2024-05-28
Payer: MEDICARE

## 2024-05-28 ENCOUNTER — OFFICE VISIT (OUTPATIENT)
Dept: PALLATIVE CARE | Age: 79
End: 2024-05-28
Payer: MEDICARE

## 2024-05-28 VITALS
TEMPERATURE: 97.5 F | SYSTOLIC BLOOD PRESSURE: 125 MMHG | DIASTOLIC BLOOD PRESSURE: 72 MMHG | OXYGEN SATURATION: 98 % | WEIGHT: 141.7 LBS | RESPIRATION RATE: 18 BRPM | HEIGHT: 65 IN | HEART RATE: 69 BPM | BODY MASS INDEX: 23.61 KG/M2

## 2024-05-28 DIAGNOSIS — C25.9 METASTASIS FROM PANCREATIC CANCER (HCC): Primary | ICD-10-CM

## 2024-05-28 DIAGNOSIS — C25.2 MALIGNANT NEOPLASM OF TAIL OF PANCREAS (HCC): ICD-10-CM

## 2024-05-28 DIAGNOSIS — C79.9 METASTASIS FROM PANCREATIC CANCER (HCC): Primary | ICD-10-CM

## 2024-05-28 DIAGNOSIS — C25.2 MALIGNANT NEOPLASM OF TAIL OF PANCREAS (HCC): Primary | ICD-10-CM

## 2024-05-28 DIAGNOSIS — G89.3 CANCER-RELATED PAIN: ICD-10-CM

## 2024-05-28 DIAGNOSIS — Z51.5 ENCOUNTER FOR PALLIATIVE CARE: ICD-10-CM

## 2024-05-28 DIAGNOSIS — R97.8 ABNORMAL TUMOR MARKERS: ICD-10-CM

## 2024-05-28 DIAGNOSIS — R53.0 NEOPLASTIC MALIGNANT RELATED FATIGUE: Primary | ICD-10-CM

## 2024-05-28 DIAGNOSIS — R63.0 POOR APPETITE: ICD-10-CM

## 2024-05-28 LAB
ALBUMIN SERPL-MCNC: 3.6 G/DL (ref 3.5–5.2)
ALP SERPL-CCNC: 100 U/L (ref 35–104)
ALT SERPL-CCNC: 11 U/L (ref 9–52)
ANION GAP SERPL CALCULATED.3IONS-SCNC: 8 MMOL/L (ref 7–19)
AST SERPL-CCNC: 29 U/L (ref 14–36)
BASOPHILS # BLD: 0.04 K/UL (ref 0.01–0.08)
BASOPHILS NFR BLD: 0.5 % (ref 0.1–1.2)
BILIRUB SERPL-MCNC: 0.6 MG/DL (ref 0.2–1.3)
BUN SERPL-MCNC: 8 MG/DL (ref 7–17)
CALCIUM SERPL-MCNC: 9 MG/DL (ref 8.4–10.2)
CANCER AG19-9 SERPL-ACNC: 1062 U/ML (ref 0–35)
CEA SERPL-MCNC: 11.6 NG/ML (ref 0–4.7)
CHLORIDE SERPL-SCNC: 101 MMOL/L (ref 98–111)
CO2 SERPL-SCNC: 28 MMOL/L (ref 22–29)
CREAT SERPL-MCNC: 0.6 MG/DL (ref 0.5–1)
EOSINOPHIL # BLD: 0.41 K/UL (ref 0.04–0.54)
EOSINOPHIL NFR BLD: 5.4 % (ref 0.7–7)
ERYTHROCYTE [DISTWIDTH] IN BLOOD BY AUTOMATED COUNT: 15.2 % (ref 11.7–14.4)
GLOBULIN: 2.5 G/DL
GLUCOSE SERPL-MCNC: 108 MG/DL (ref 74–106)
HCT VFR BLD AUTO: 32.4 % (ref 34.1–44.9)
HGB BLD-MCNC: 10.9 G/DL (ref 11.2–15.7)
LYMPHOCYTES # BLD: 3.89 K/UL (ref 1.18–3.74)
LYMPHOCYTES NFR BLD: 50.8 % (ref 19.3–53.1)
MCH RBC QN AUTO: 32.7 PG (ref 25.6–32.2)
MCHC RBC AUTO-ENTMCNC: 33.6 G/DL (ref 32.3–35.5)
MCV RBC AUTO: 97.3 FL (ref 79.4–94.8)
MONOCYTES # BLD: 0.92 K/UL (ref 0.24–0.82)
MONOCYTES NFR BLD: 12 % (ref 4.7–12.5)
NEUTROPHILS # BLD: 2.38 K/UL (ref 1.56–6.13)
NEUTS SEG NFR BLD: 31 % (ref 34–71.1)
PLATELET # BLD AUTO: 354 K/UL (ref 182–369)
PMV BLD AUTO: 9.9 FL (ref 7.4–10.4)
POTASSIUM SERPL-SCNC: 4.1 MMOL/L (ref 3.5–5.1)
PROT SERPL-MCNC: 6.1 G/DL (ref 6.3–8.2)
RBC # BLD AUTO: 3.33 M/UL (ref 3.93–5.22)
SODIUM SERPL-SCNC: 137 MMOL/L (ref 137–145)
WBC # BLD AUTO: 7.66 K/UL (ref 3.98–10.04)

## 2024-05-28 PROCEDURE — 96415 CHEMO IV INFUSION ADDL HR: CPT

## 2024-05-28 PROCEDURE — 96375 TX/PRO/DX INJ NEW DRUG ADDON: CPT

## 2024-05-28 PROCEDURE — 1090F PRES/ABSN URINE INCON ASSESS: CPT | Performed by: NURSE PRACTITIONER

## 2024-05-28 PROCEDURE — G8420 CALC BMI NORM PARAMETERS: HCPCS | Performed by: NURSE PRACTITIONER

## 2024-05-28 PROCEDURE — G8399 PT W/DXA RESULTS DOCUMENT: HCPCS | Performed by: NURSE PRACTITIONER

## 2024-05-28 PROCEDURE — 6360000002 HC RX W HCPCS: Performed by: INTERNAL MEDICINE

## 2024-05-28 PROCEDURE — 80053 COMPREHEN METABOLIC PANEL: CPT

## 2024-05-28 PROCEDURE — 99214 OFFICE O/P EST MOD 30 MIN: CPT | Performed by: NURSE PRACTITIONER

## 2024-05-28 PROCEDURE — 1123F ACP DISCUSS/DSCN MKR DOCD: CPT | Performed by: NURSE PRACTITIONER

## 2024-05-28 PROCEDURE — 85025 COMPLETE CBC W/AUTO DIFF WBC: CPT

## 2024-05-28 PROCEDURE — 96413 CHEMO IV INFUSION 1 HR: CPT

## 2024-05-28 PROCEDURE — G8427 DOCREV CUR MEDS BY ELIG CLIN: HCPCS | Performed by: NURSE PRACTITIONER

## 2024-05-28 PROCEDURE — 2580000003 HC RX 258: Performed by: INTERNAL MEDICINE

## 2024-05-28 PROCEDURE — 36415 COLL VENOUS BLD VENIPUNCTURE: CPT

## 2024-05-28 PROCEDURE — 1036F TOBACCO NON-USER: CPT | Performed by: NURSE PRACTITIONER

## 2024-05-28 PROCEDURE — 96367 TX/PROPH/DG ADDL SEQ IV INF: CPT

## 2024-05-28 PROCEDURE — G0498 CHEMO EXTEND IV INFUS W/PUMP: HCPCS

## 2024-05-28 RX ORDER — SODIUM CHLORIDE 9 MG/ML
5-250 INJECTION, SOLUTION INTRAVENOUS PRN
Status: CANCELLED | OUTPATIENT
Start: 2024-05-28

## 2024-05-28 RX ORDER — SODIUM CHLORIDE 9 MG/ML
INJECTION, SOLUTION INTRAVENOUS CONTINUOUS
Status: CANCELLED | OUTPATIENT
Start: 2024-05-28

## 2024-05-28 RX ORDER — ATROPINE SULFATE 0.4 MG/ML
0.4 INJECTION, SOLUTION INTRAVENOUS ONCE
Status: CANCELLED | OUTPATIENT
Start: 2024-05-28 | End: 2024-05-28

## 2024-05-28 RX ORDER — ONDANSETRON 2 MG/ML
8 INJECTION INTRAMUSCULAR; INTRAVENOUS
Status: CANCELLED | OUTPATIENT
Start: 2024-05-28

## 2024-05-28 RX ORDER — HEPARIN 100 UNIT/ML
500 SYRINGE INTRAVENOUS PRN
Status: CANCELLED | OUTPATIENT
Start: 2024-05-28

## 2024-05-28 RX ORDER — ATROPINE SULFATE 0.4 MG/ML
0.4 INJECTION, SOLUTION INTRAVENOUS
Status: CANCELLED | OUTPATIENT
Start: 2024-05-28

## 2024-05-28 RX ORDER — SODIUM CHLORIDE 0.9 % (FLUSH) 0.9 %
5-40 SYRINGE (ML) INJECTION PRN
Status: CANCELLED | OUTPATIENT
Start: 2024-05-31

## 2024-05-28 RX ORDER — PALONOSETRON 0.05 MG/ML
0.25 INJECTION, SOLUTION INTRAVENOUS ONCE
Status: CANCELLED | OUTPATIENT
Start: 2024-05-28 | End: 2024-05-28

## 2024-05-28 RX ORDER — MEPERIDINE HYDROCHLORIDE 25 MG/ML
12.5 INJECTION INTRAMUSCULAR; INTRAVENOUS; SUBCUTANEOUS PRN
Status: CANCELLED | OUTPATIENT
Start: 2024-05-28

## 2024-05-28 RX ORDER — EPINEPHRINE 1 MG/ML
0.3 INJECTION, SOLUTION, CONCENTRATE INTRAVENOUS PRN
Status: CANCELLED | OUTPATIENT
Start: 2024-05-28

## 2024-05-28 RX ORDER — SODIUM CHLORIDE 9 MG/ML
5-250 INJECTION, SOLUTION INTRAVENOUS PRN
OUTPATIENT
Start: 2024-05-31

## 2024-05-28 RX ORDER — HEPARIN 100 UNIT/ML
500 SYRINGE INTRAVENOUS PRN
Status: CANCELLED | OUTPATIENT
Start: 2024-05-31

## 2024-05-28 RX ORDER — HYDROCODONE BITARTRATE AND ACETAMINOPHEN 7.5; 325 MG/1; MG/1
1 TABLET ORAL EVERY 6 HOURS PRN
Qty: 120 TABLET | Refills: 0 | Status: SHIPPED | OUTPATIENT
Start: 2024-05-28 | End: 2024-06-27

## 2024-05-28 RX ORDER — PALONOSETRON 0.05 MG/ML
0.25 INJECTION, SOLUTION INTRAVENOUS ONCE
Status: COMPLETED | OUTPATIENT
Start: 2024-05-28 | End: 2024-05-28

## 2024-05-28 RX ORDER — DIPHENHYDRAMINE HYDROCHLORIDE 50 MG/ML
50 INJECTION INTRAMUSCULAR; INTRAVENOUS
Status: CANCELLED | OUTPATIENT
Start: 2024-05-28

## 2024-05-28 RX ORDER — SODIUM CHLORIDE 0.9 % (FLUSH) 0.9 %
5-40 SYRINGE (ML) INJECTION PRN
Status: CANCELLED | OUTPATIENT
Start: 2024-05-28

## 2024-05-28 RX ORDER — ACETAMINOPHEN 325 MG/1
650 TABLET ORAL
Status: CANCELLED | OUTPATIENT
Start: 2024-05-28

## 2024-05-28 RX ORDER — ATROPINE SULFATE 0.4 MG/ML
0.4 INJECTION, SOLUTION INTRAVENOUS ONCE
Status: COMPLETED | OUTPATIENT
Start: 2024-05-28 | End: 2024-05-28

## 2024-05-28 RX ORDER — ALBUTEROL SULFATE 90 UG/1
4 AEROSOL, METERED RESPIRATORY (INHALATION) PRN
Status: CANCELLED | OUTPATIENT
Start: 2024-05-28

## 2024-05-28 RX ADMIN — LEUCOVORIN CALCIUM 200 MG: 200 INJECTION, POWDER, LYOPHILIZED, FOR SUSPENSION INTRAMUSCULAR; INTRAVENOUS at 13:00

## 2024-05-28 RX ADMIN — PALONOSETRON 0.25 MG: 0.05 INJECTION, SOLUTION INTRAVENOUS at 10:08

## 2024-05-28 RX ADMIN — IRINOTECAN HYDROCHLORIDE 86 MG: 4.3 INJECTION, POWDER, FOR SOLUTION INTRAVENOUS at 11:12

## 2024-05-28 RX ADMIN — FLUOROURACIL 4000 MG: 50 INJECTION, SOLUTION INTRAVENOUS at 13:31

## 2024-05-28 RX ADMIN — ATROPINE SULFATE 0.4 MG: 0.4 INJECTION, SOLUTION INTRAVENOUS at 10:50

## 2024-05-28 RX ADMIN — SODIUM CHLORIDE 150 MG: 9 INJECTION, SOLUTION INTRAVENOUS at 10:24

## 2024-05-28 RX ADMIN — DEXAMETHASONE SODIUM PHOSPHATE: 100 INJECTION INTRAMUSCULAR; INTRAVENOUS at 10:11

## 2024-05-28 ASSESSMENT — PAIN DESCRIPTION - LOCATION: LOCATION: ABDOMEN

## 2024-05-28 NOTE — PROGRESS NOTES
Lab Results   Component Value Date    WBC 7.66 05/28/2024    HGB 10.9 (L) 05/28/2024    HCT 32.4 (L) 05/28/2024    MCV 97.3 (H) 05/28/2024     05/28/2024     Lab Results   Component Value Date    NEUTROABS 2.38 05/28/2024     Lab Results   Component Value Date     05/28/2024    K 4.1 05/28/2024     05/28/2024    CO2 28 05/28/2024    BUN 8 05/28/2024    CREATININE 0.6 05/28/2024    GLUCOSE 108 (H) 05/28/2024    CALCIUM 9.0 05/28/2024    BILITOT 0.6 05/28/2024    ALKPHOS 100 05/28/2024    AST 29 05/28/2024    ALT 11 05/28/2024    LABGLOM >90 05/28/2024    GFRAA >59 01/12/2022    GLOB 2.5 05/28/2024

## 2024-05-28 NOTE — PROGRESS NOTES
times daily as needed (nausea), Disp: 60 tablet, Rfl: 3    metoprolol succinate (TOPROL XL) 50 MG extended release tablet, Take 1 tablet by mouth 2 times daily, Disp: 30 tablet, Rfl: 3    NIFEdipine (PROCARDIA XL) 30 MG extended release tablet, Take 1 tablet by mouth daily, Disp: 30 tablet, Rfl: 5    loratadine (CLARITIN) 10 MG tablet, Take 1 tablet by mouth daily, Disp: 30 tablet, Rfl: 0    levothyroxine (SYNTHROID) 137 MCG tablet, Take 1 tablet by mouth Daily Indications: Underactive Thyroid, Disp: , Rfl:     Cholecalciferol (VITAMIN D3) 10 MCG (400 UNIT) CAPS, Take 1 capsule by mouth daily, Disp: , Rfl:     Magic Mouthwash (MIRACLE MOUTHWASH), Swish and spit 5 mLs 4 times daily as needed for Irritation 1/3 viscous lidocaine, 1/3 benadryl, 1/3 Maalox., Disp: 480 mL, Rfl: 1    Caltrate 600+D Plus Minerals (CALTRATE) 600-800 MG-UNIT TABS tablet, Take 1 tablet by mouth daily, Disp: , Rfl:     busPIRone (BUSPAR) 5 MG tablet, Take 1 tablet by mouth 3 times daily, Disp: , Rfl:     ondansetron (ZOFRAN) 4 MG tablet, Take 1 tablet by mouth every 8 hours as needed for Nausea or Vomiting, Disp: 10 tablet, Rfl: 0     Allergies:  Diclofenac, Oxycodone-acetaminophen, and Doxycycline    OBJECTIVE:     General appearance:  alert and cooperative with exam, vital signs stable, well nourished, well developed  HEENT: normocephalic, atraumatic, sclera clear, conjunctiva pink, EOMI, external ears normal, external nose normal, lips normal, oral mucosa moist   Neck: supple  Lungs: equal bilateral expansion, clear to auscultation bilaterally, no cough, no dyspnea  Heart: regular rate and rhythm, S1 S2 normal, no murmur  Abdomen:  soft, non-tender, bowel sounds active, no masses noted  Genitourinary: deferred  Extremities:  no cyanosis, clubbing, or edema, pulses palpable  Neurologic: no focal neurologic deficits, normal sensation, no tremor, alert and oriented   Psychiatric: alert and oriented, no recent or remote memory deficits, good

## 2024-05-30 ENCOUNTER — HOSPITAL ENCOUNTER (OUTPATIENT)
Dept: INFUSION THERAPY | Age: 79
Discharge: HOME | End: 2024-05-30
Payer: MEDICARE

## 2024-05-30 DIAGNOSIS — C25.9 METASTASIS FROM PANCREATIC CANCER (HCC): ICD-10-CM

## 2024-05-30 DIAGNOSIS — C25.2 MALIGNANT NEOPLASM OF TAIL OF PANCREAS (HCC): Primary | ICD-10-CM

## 2024-05-30 DIAGNOSIS — C79.9 METASTASIS FROM PANCREATIC CANCER (HCC): ICD-10-CM

## 2024-05-30 PROCEDURE — 2580000003 HC RX 258: Performed by: INTERNAL MEDICINE

## 2024-05-30 PROCEDURE — 6360000002 HC RX W HCPCS: Performed by: INTERNAL MEDICINE

## 2024-05-30 PROCEDURE — 96523 IRRIG DRUG DELIVERY DEVICE: CPT

## 2024-05-30 RX ORDER — HEPARIN 100 UNIT/ML
500 SYRINGE INTRAVENOUS PRN
Status: DISCONTINUED | OUTPATIENT
Start: 2024-05-30 | End: 2024-05-31 | Stop reason: HOSPADM

## 2024-05-30 RX ORDER — SODIUM CHLORIDE 0.9 % (FLUSH) 0.9 %
5-40 SYRINGE (ML) INJECTION PRN
Status: DISCONTINUED | OUTPATIENT
Start: 2024-05-30 | End: 2024-05-31 | Stop reason: HOSPADM

## 2024-05-30 RX ADMIN — SODIUM CHLORIDE, PRESERVATIVE FREE 10 ML: 5 INJECTION INTRAVENOUS at 10:46

## 2024-05-30 RX ADMIN — HEPARIN 500 UNITS: 100 SYRINGE at 10:46

## 2024-06-10 NOTE — PROGRESS NOTES
tissue  Hold chemo today due to concerns of progression  Fentanyl 12 mcg q 72 h for pain control  Resume megace 400mg for anorexia  Repeat CT C/A/P to assess disease status ASAP  Continue OTC Imodium as needed for diarrhea, up to 10/day  Continue Lomotil 2.5mg every 6 hrs as needed, when Imodium isn't controlling diarrhea  Continue Marinol 5mg twice a day  Continue follow-up with Mouna Restrepo Dietitian for nutrition control  Continue follow-up with [facility]  Continue Triamcinolone cream 0.1% twice a day  Continue Norco 7.5mg every 6 hrs as needed  Continue Phenergan as needed  Continue Emla cream to port  Severe progression of disease    Follow Up:  Return for CBC, CMP, Appointment with Dr. Rosenthal-AFTER CT SCANS.  CT C/A/P-asap    Luisa ROSAS am pre charting  as Medical Assistant for Radha Rosenthal MD. Electronically signed by Luisa Acharya MA on 6/11/2024 at 12:55 PM CDT.    I have seen, examined and reviewed this patient medication list, appropriate labs and imaging studies. I reviewed relevant medical records and others physician’s notes. I discussed the plans of care with the patient. I answered all the questions to the patient’s satisfaction. I have also reviewed the chief complaint (CC) and part of the history (History of Present Illness (HPI), Past Family Social History (PFSH), or Review of Systems (ROS) and made changes when appropriated.       (Please note that portions of this note were completed with a voice recognition program. Efforts were made to edit the dictations but occasionally words are mis-transcribed.)    Electronically signed by Radha Rosenthal MD on 6/11/2024 at 12:00 PM

## 2024-06-11 ENCOUNTER — OFFICE VISIT (OUTPATIENT)
Dept: HEMATOLOGY | Age: 79
End: 2024-06-11
Payer: MEDICARE

## 2024-06-11 ENCOUNTER — HOSPITAL ENCOUNTER (OUTPATIENT)
Dept: INFUSION THERAPY | Age: 79
Discharge: HOME | End: 2024-06-11
Payer: MEDICARE

## 2024-06-11 VITALS
DIASTOLIC BLOOD PRESSURE: 75 MMHG | HEART RATE: 73 BPM | BODY MASS INDEX: 23.43 KG/M2 | WEIGHT: 140.6 LBS | SYSTOLIC BLOOD PRESSURE: 127 MMHG | HEIGHT: 65 IN | RESPIRATION RATE: 18 BRPM | TEMPERATURE: 97.1 F | OXYGEN SATURATION: 97 %

## 2024-06-11 DIAGNOSIS — C25.2 MALIGNANT NEOPLASM OF TAIL OF PANCREAS (HCC): Primary | ICD-10-CM

## 2024-06-11 DIAGNOSIS — Z71.89 CARE PLAN DISCUSSED WITH PATIENT: ICD-10-CM

## 2024-06-11 DIAGNOSIS — D64.81 ANEMIA ASSOCIATED WITH CHEMOTHERAPY: ICD-10-CM

## 2024-06-11 DIAGNOSIS — E87.1 HYPONATREMIA: ICD-10-CM

## 2024-06-11 DIAGNOSIS — R53.83 CHEMOTHERAPY-INDUCED FATIGUE: ICD-10-CM

## 2024-06-11 DIAGNOSIS — C25.2 MALIGNANT NEOPLASM OF TAIL OF PANCREAS (HCC): ICD-10-CM

## 2024-06-11 DIAGNOSIS — T45.1X5A ANEMIA ASSOCIATED WITH CHEMOTHERAPY: ICD-10-CM

## 2024-06-11 DIAGNOSIS — T45.1X5D ADVERSE EFFECT OF CHEMOTHERAPY, SUBSEQUENT ENCOUNTER: ICD-10-CM

## 2024-06-11 DIAGNOSIS — C79.9 METASTASIS FROM PANCREATIC CANCER (HCC): ICD-10-CM

## 2024-06-11 DIAGNOSIS — C79.9 METASTASIS FROM PANCREATIC CANCER (HCC): Primary | ICD-10-CM

## 2024-06-11 DIAGNOSIS — G89.3 CANCER ASSOCIATED PAIN: ICD-10-CM

## 2024-06-11 DIAGNOSIS — C25.9 METASTASIS FROM PANCREATIC CANCER (HCC): ICD-10-CM

## 2024-06-11 DIAGNOSIS — C25.9 METASTASIS FROM PANCREATIC CANCER (HCC): Primary | ICD-10-CM

## 2024-06-11 DIAGNOSIS — R97.8 ABNORMAL TUMOR MARKERS: ICD-10-CM

## 2024-06-11 DIAGNOSIS — Z51.11 CHEMOTHERAPY MANAGEMENT, ENCOUNTER FOR: ICD-10-CM

## 2024-06-11 DIAGNOSIS — T45.1X5A CHEMOTHERAPY-INDUCED FATIGUE: ICD-10-CM

## 2024-06-11 LAB
ALBUMIN SERPL-MCNC: 3.7 G/DL (ref 3.5–5.2)
ALP SERPL-CCNC: 101 U/L (ref 35–104)
ALT SERPL-CCNC: 9 U/L (ref 9–52)
ANION GAP SERPL CALCULATED.3IONS-SCNC: 7 MMOL/L (ref 7–19)
AST SERPL-CCNC: 25 U/L (ref 14–36)
BASOPHILS # BLD: 0.04 K/UL (ref 0.01–0.08)
BASOPHILS NFR BLD: 0.6 % (ref 0.1–1.2)
BILIRUB SERPL-MCNC: 0.6 MG/DL (ref 0.2–1.3)
BUN SERPL-MCNC: 10 MG/DL (ref 7–17)
CALCIUM SERPL-MCNC: 8.9 MG/DL (ref 8.4–10.2)
CANCER AG19-9 SERPL-ACNC: 1132 U/ML (ref 0–35)
CEA SERPL-MCNC: 11.7 NG/ML (ref 0–4.7)
CHLORIDE SERPL-SCNC: 98 MMOL/L (ref 98–111)
CO2 SERPL-SCNC: 28 MMOL/L (ref 22–29)
CREAT SERPL-MCNC: 0.6 MG/DL (ref 0.5–1)
EOSINOPHIL # BLD: 0.12 K/UL (ref 0.04–0.54)
EOSINOPHIL NFR BLD: 1.7 % (ref 0.7–7)
ERYTHROCYTE [DISTWIDTH] IN BLOOD BY AUTOMATED COUNT: 14.9 % (ref 11.7–14.4)
GLOBULIN: 2.4 G/DL
GLUCOSE SERPL-MCNC: 107 MG/DL (ref 74–106)
HCT VFR BLD AUTO: 32.3 % (ref 34.1–44.9)
HGB BLD-MCNC: 11 G/DL (ref 11.2–15.7)
LYMPHOCYTES # BLD: 4.3 K/UL (ref 1.18–3.74)
LYMPHOCYTES NFR BLD: 61.3 % (ref 19.3–53.1)
MCH RBC QN AUTO: 32.5 PG (ref 25.6–32.2)
MCHC RBC AUTO-ENTMCNC: 34.1 G/DL (ref 32.3–35.5)
MCV RBC AUTO: 95.6 FL (ref 79.4–94.8)
MONOCYTES # BLD: 0.76 K/UL (ref 0.24–0.82)
MONOCYTES NFR BLD: 10.8 % (ref 4.7–12.5)
NEUTROPHILS # BLD: 1.78 K/UL (ref 1.56–6.13)
NEUTS SEG NFR BLD: 25.5 % (ref 34–71.1)
PLATELET # BLD AUTO: 409 K/UL (ref 182–369)
PMV BLD AUTO: 9.5 FL (ref 7.4–10.4)
POTASSIUM SERPL-SCNC: 4.1 MMOL/L (ref 3.5–5.1)
PROT SERPL-MCNC: 6.1 G/DL (ref 6.3–8.2)
RBC # BLD AUTO: 3.38 M/UL (ref 3.93–5.22)
SODIUM SERPL-SCNC: 133 MMOL/L (ref 137–145)
WBC # BLD AUTO: 7.01 K/UL (ref 3.98–10.04)

## 2024-06-11 PROCEDURE — 80053 COMPREHEN METABOLIC PANEL: CPT

## 2024-06-11 PROCEDURE — 6360000002 HC RX W HCPCS: Performed by: INTERNAL MEDICINE

## 2024-06-11 PROCEDURE — 36415 COLL VENOUS BLD VENIPUNCTURE: CPT

## 2024-06-11 PROCEDURE — 85025 COMPLETE CBC W/AUTO DIFF WBC: CPT

## 2024-06-11 PROCEDURE — 1036F TOBACCO NON-USER: CPT | Performed by: INTERNAL MEDICINE

## 2024-06-11 PROCEDURE — 1123F ACP DISCUSS/DSCN MKR DOCD: CPT | Performed by: INTERNAL MEDICINE

## 2024-06-11 PROCEDURE — 1090F PRES/ABSN URINE INCON ASSESS: CPT | Performed by: INTERNAL MEDICINE

## 2024-06-11 PROCEDURE — G8420 CALC BMI NORM PARAMETERS: HCPCS | Performed by: INTERNAL MEDICINE

## 2024-06-11 PROCEDURE — G8399 PT W/DXA RESULTS DOCUMENT: HCPCS | Performed by: INTERNAL MEDICINE

## 2024-06-11 PROCEDURE — 99215 OFFICE O/P EST HI 40 MIN: CPT | Performed by: INTERNAL MEDICINE

## 2024-06-11 PROCEDURE — 99213 OFFICE O/P EST LOW 20 MIN: CPT

## 2024-06-11 PROCEDURE — G2211 COMPLEX E/M VISIT ADD ON: HCPCS | Performed by: INTERNAL MEDICINE

## 2024-06-11 PROCEDURE — 2580000003 HC RX 258: Performed by: INTERNAL MEDICINE

## 2024-06-11 PROCEDURE — 96523 IRRIG DRUG DELIVERY DEVICE: CPT

## 2024-06-11 PROCEDURE — G8427 DOCREV CUR MEDS BY ELIG CLIN: HCPCS | Performed by: INTERNAL MEDICINE

## 2024-06-11 RX ORDER — PALONOSETRON 0.05 MG/ML
0.25 INJECTION, SOLUTION INTRAVENOUS ONCE
Status: DISCONTINUED | OUTPATIENT
Start: 2024-06-11 | End: 2024-06-13 | Stop reason: HOSPADM

## 2024-06-11 RX ORDER — SODIUM CHLORIDE 0.9 % (FLUSH) 0.9 %
5-40 SYRINGE (ML) INJECTION PRN
Status: DISCONTINUED | OUTPATIENT
Start: 2024-06-11 | End: 2024-06-12 | Stop reason: HOSPADM

## 2024-06-11 RX ORDER — FENTANYL 12.5 UG/1
1 PATCH TRANSDERMAL
Qty: 10 PATCH | Refills: 0 | Status: SHIPPED | OUTPATIENT
Start: 2024-06-11 | End: 2024-07-11

## 2024-06-11 RX ORDER — HEPARIN 100 UNIT/ML
500 SYRINGE INTRAVENOUS PRN
Status: DISCONTINUED | OUTPATIENT
Start: 2024-06-11 | End: 2024-06-12 | Stop reason: HOSPADM

## 2024-06-11 RX ORDER — SODIUM CHLORIDE 9 MG/ML
5-250 INJECTION, SOLUTION INTRAVENOUS PRN
Status: DISCONTINUED | OUTPATIENT
Start: 2024-06-11 | End: 2024-06-12 | Stop reason: HOSPADM

## 2024-06-11 RX ORDER — ATROPINE SULFATE 0.4 MG/ML
0.4 INJECTION, SOLUTION INTRAVENOUS ONCE
Status: DISCONTINUED | OUTPATIENT
Start: 2024-06-11 | End: 2024-06-13 | Stop reason: HOSPADM

## 2024-06-11 RX ORDER — ATROPINE SULFATE 0.4 MG/ML
0.4 INJECTION, SOLUTION INTRAVENOUS
Status: DISCONTINUED | OUTPATIENT
Start: 2024-06-11 | End: 2024-06-13 | Stop reason: HOSPADM

## 2024-06-11 RX ADMIN — HEPARIN 500 UNITS: 100 SYRINGE at 11:32

## 2024-06-11 RX ADMIN — SODIUM CHLORIDE, PRESERVATIVE FREE 10 ML: 5 INJECTION INTRAVENOUS at 11:31

## 2024-06-13 ENCOUNTER — HOSPITAL ENCOUNTER (OUTPATIENT)
Dept: CT IMAGING | Age: 79
Discharge: HOME | End: 2024-06-13
Attending: INTERNAL MEDICINE
Payer: MEDICARE

## 2024-06-13 ENCOUNTER — APPOINTMENT (OUTPATIENT)
Dept: INFUSION THERAPY | Age: 79
End: 2024-06-13
Payer: MEDICARE

## 2024-06-13 ENCOUNTER — HOSPITAL ENCOUNTER (OUTPATIENT)
Dept: INFUSION THERAPY | Age: 79
Discharge: HOME | End: 2024-06-13
Payer: MEDICARE

## 2024-06-13 DIAGNOSIS — C25.2 MALIGNANT NEOPLASM OF TAIL OF PANCREAS (HCC): ICD-10-CM

## 2024-06-13 DIAGNOSIS — G89.3 CANCER ASSOCIATED PAIN: ICD-10-CM

## 2024-06-13 DIAGNOSIS — C25.2 MALIGNANT NEOPLASM OF TAIL OF PANCREAS (HCC): Primary | ICD-10-CM

## 2024-06-13 PROCEDURE — 2580000003 HC RX 258: Performed by: INTERNAL MEDICINE

## 2024-06-13 PROCEDURE — 6360000004 HC RX CONTRAST MEDICATION: Performed by: INTERNAL MEDICINE

## 2024-06-13 PROCEDURE — 96523 IRRIG DRUG DELIVERY DEVICE: CPT

## 2024-06-13 PROCEDURE — 71260 CT THORAX DX C+: CPT

## 2024-06-13 PROCEDURE — 6360000002 HC RX W HCPCS: Performed by: INTERNAL MEDICINE

## 2024-06-13 PROCEDURE — 74177 CT ABD & PELVIS W/CONTRAST: CPT

## 2024-06-13 RX ORDER — HEPARIN 100 UNIT/ML
500 SYRINGE INTRAVENOUS PRN
Status: DISCONTINUED | OUTPATIENT
Start: 2024-06-13 | End: 2024-06-14 | Stop reason: HOSPADM

## 2024-06-13 RX ORDER — SODIUM CHLORIDE 0.9 % (FLUSH) 0.9 %
5-40 SYRINGE (ML) INJECTION PRN
Status: DISCONTINUED | OUTPATIENT
Start: 2024-06-13 | End: 2024-06-14 | Stop reason: HOSPADM

## 2024-06-13 RX ADMIN — HEPARIN 500 UNITS: 100 SYRINGE at 11:02

## 2024-06-13 RX ADMIN — IOPAMIDOL 75 ML: 755 INJECTION, SOLUTION INTRAVENOUS at 11:21

## 2024-06-13 RX ADMIN — SODIUM CHLORIDE, PRESERVATIVE FREE 10 ML: 5 INJECTION INTRAVENOUS at 11:02

## 2024-06-14 NOTE — PROGRESS NOTES
results of [facility] xF and xT on 6/11/24  Recommend Palliative mFOLFOX -pending insur  Patient education performed  Treatment consent signed for mFOLFOX at this time   Continue Xanax 0.25 mg BId   Continue Fentanyl 12 mcg q 72 h for pain control  Continue Megace 400mg for anorexia  Continue OTC Imodium as needed for diarrhea, up to 10/day  Continue Lomotil 2.5mg every 6 hrs as needed, when Imodium isn't controlling diarrhea  Continue Marinol 5mg twice a day  Continue follow-up with Mouna Restrepo Dietitian for nutrition control  Continue [facility]up with [facility]  Continue Triamcinolone cream 0.1% twice a day  Continue Norco 7.5mg every 6 hrs as needed  Continue Phenergan as needed  Continue Emla cream to port  Written/verbal education regarding chemotherapy provided to the patient today      Follow Up:  Return for cycle #2.  Palliative mFOLFOX -pending insur    Luisa ROSAS am pre charting  as Medical Assistant for Radha Rosenthal MD. Electronically signed by Luisa Acharya MA on 6/17/2024 at 9:55 AM CDT.    I have seen, examined and reviewed this patient medication list, appropriate labs and imaging studies. I reviewed relevant medical records and others physician’s notes. I discussed the plans of care with the patient. I answered all the questions to the patient’s satisfaction. I have also reviewed the chief complaint (CC) and part of the history (History of Present Illness (HPI), Past Family Social History (PFSH), or Review of Systems (ROS) and made changes when appropriated.       (Please note that portions of this note were completed with a voice recognition program. Efforts were made to edit the dictations but occasionally words are mis-transcribed.)Electronically signed by Radha Rosenthal MD on 6/17/2024 at 10:06 AM

## 2024-06-17 ENCOUNTER — HOSPITAL ENCOUNTER (OUTPATIENT)
Dept: INFUSION THERAPY | Age: 79
Discharge: HOME | End: 2024-06-17
Payer: MEDICARE

## 2024-06-17 ENCOUNTER — OFFICE VISIT (OUTPATIENT)
Dept: HEMATOLOGY | Age: 79
End: 2024-06-17
Payer: MEDICARE

## 2024-06-17 VITALS
HEART RATE: 81 BPM | OXYGEN SATURATION: 98 % | DIASTOLIC BLOOD PRESSURE: 74 MMHG | SYSTOLIC BLOOD PRESSURE: 122 MMHG | HEIGHT: 65 IN | BODY MASS INDEX: 23.74 KG/M2 | WEIGHT: 142.5 LBS | TEMPERATURE: 97.8 F

## 2024-06-17 DIAGNOSIS — C25.2 MALIGNANT NEOPLASM OF TAIL OF PANCREAS (HCC): ICD-10-CM

## 2024-06-17 DIAGNOSIS — C25.9 METASTASIS FROM PANCREATIC CANCER (HCC): ICD-10-CM

## 2024-06-17 DIAGNOSIS — R97.8 ABNORMAL TUMOR MARKERS: ICD-10-CM

## 2024-06-17 DIAGNOSIS — Z71.89 CARE PLAN DISCUSSED WITH PATIENT: ICD-10-CM

## 2024-06-17 DIAGNOSIS — T45.1X5A CHEMOTHERAPY-INDUCED NEUROPATHY (HCC): ICD-10-CM

## 2024-06-17 DIAGNOSIS — F41.9 ANXIETY IN CANCER PATIENT: ICD-10-CM

## 2024-06-17 DIAGNOSIS — T45.1X5A CHEMOTHERAPY-INDUCED FATIGUE: ICD-10-CM

## 2024-06-17 DIAGNOSIS — R11.0 NAUSEA: ICD-10-CM

## 2024-06-17 DIAGNOSIS — C25.2 MALIGNANT NEOPLASM OF TAIL OF PANCREAS (HCC): Primary | ICD-10-CM

## 2024-06-17 DIAGNOSIS — C79.9 METASTASIS FROM PANCREATIC CANCER (HCC): ICD-10-CM

## 2024-06-17 DIAGNOSIS — R53.83 CHEMOTHERAPY-INDUCED FATIGUE: ICD-10-CM

## 2024-06-17 DIAGNOSIS — G62.0 CHEMOTHERAPY-INDUCED NEUROPATHY (HCC): ICD-10-CM

## 2024-06-17 DIAGNOSIS — C25.9 ADENOCARCINOMA OF PANCREAS (HCC): ICD-10-CM

## 2024-06-17 LAB
ALBUMIN SERPL-MCNC: 4.1 G/DL (ref 3.5–5.2)
ALP SERPL-CCNC: 121 U/L (ref 35–104)
ALT SERPL-CCNC: 10 U/L (ref 9–52)
ANION GAP SERPL CALCULATED.3IONS-SCNC: 8 MMOL/L (ref 7–19)
AST SERPL-CCNC: 29 U/L (ref 14–36)
BASOPHILS # BLD: 0.09 K/UL (ref 0.01–0.08)
BASOPHILS NFR BLD: 0.8 % (ref 0.1–1.2)
BILIRUB SERPL-MCNC: 0.7 MG/DL (ref 0.2–1.3)
BUN SERPL-MCNC: 9 MG/DL (ref 7–17)
CALCIUM SERPL-MCNC: 9.4 MG/DL (ref 8.4–10.2)
CHLORIDE SERPL-SCNC: 99 MMOL/L (ref 98–111)
CO2 SERPL-SCNC: 26 MMOL/L (ref 22–29)
CREAT SERPL-MCNC: 0.5 MG/DL (ref 0.5–1)
EOSINOPHIL # BLD: 0.19 K/UL (ref 0.04–0.54)
EOSINOPHIL NFR BLD: 1.8 % (ref 0.7–7)
ERYTHROCYTE [DISTWIDTH] IN BLOOD BY AUTOMATED COUNT: 15.3 % (ref 11.7–14.4)
GLOBULIN: 2.9 G/DL
GLUCOSE SERPL-MCNC: 96 MG/DL (ref 74–106)
HCT VFR BLD AUTO: 36.9 % (ref 34.1–44.9)
HGB BLD-MCNC: 12.4 G/DL (ref 11.2–15.7)
LYMPHOCYTES # BLD: 5.46 K/UL (ref 1.18–3.74)
LYMPHOCYTES NFR BLD: 50.8 % (ref 19.3–53.1)
MCH RBC QN AUTO: 32 PG (ref 25.6–32.2)
MCHC RBC AUTO-ENTMCNC: 33.6 G/DL (ref 32.3–35.5)
MCV RBC AUTO: 95.3 FL (ref 79.4–94.8)
MONOCYTES # BLD: 2.64 K/UL (ref 0.24–0.82)
MONOCYTES NFR BLD: 24.6 % (ref 4.7–12.5)
NEUTROPHILS # BLD: 2.23 K/UL (ref 1.56–6.13)
NEUTS SEG NFR BLD: 20.8 % (ref 34–71.1)
PLATELET # BLD AUTO: 541 K/UL (ref 182–369)
PMV BLD AUTO: 10 FL (ref 7.4–10.4)
POTASSIUM SERPL-SCNC: 4.1 MMOL/L (ref 3.5–5.1)
PROT SERPL-MCNC: 7 G/DL (ref 6.3–8.2)
RBC # BLD AUTO: 3.87 M/UL (ref 3.93–5.22)
SODIUM SERPL-SCNC: 133 MMOL/L (ref 137–145)
WBC # BLD AUTO: 10.74 K/UL (ref 3.98–10.04)

## 2024-06-17 PROCEDURE — G2211 COMPLEX E/M VISIT ADD ON: HCPCS | Performed by: INTERNAL MEDICINE

## 2024-06-17 PROCEDURE — 80053 COMPREHEN METABOLIC PANEL: CPT

## 2024-06-17 PROCEDURE — 1123F ACP DISCUSS/DSCN MKR DOCD: CPT | Performed by: INTERNAL MEDICINE

## 2024-06-17 PROCEDURE — G8420 CALC BMI NORM PARAMETERS: HCPCS | Performed by: INTERNAL MEDICINE

## 2024-06-17 PROCEDURE — 99215 OFFICE O/P EST HI 40 MIN: CPT | Performed by: INTERNAL MEDICINE

## 2024-06-17 PROCEDURE — 1090F PRES/ABSN URINE INCON ASSESS: CPT | Performed by: INTERNAL MEDICINE

## 2024-06-17 PROCEDURE — 99212 OFFICE O/P EST SF 10 MIN: CPT

## 2024-06-17 PROCEDURE — 85025 COMPLETE CBC W/AUTO DIFF WBC: CPT

## 2024-06-17 PROCEDURE — 36415 COLL VENOUS BLD VENIPUNCTURE: CPT

## 2024-06-17 PROCEDURE — 1036F TOBACCO NON-USER: CPT | Performed by: INTERNAL MEDICINE

## 2024-06-17 PROCEDURE — G8399 PT W/DXA RESULTS DOCUMENT: HCPCS | Performed by: INTERNAL MEDICINE

## 2024-06-17 PROCEDURE — G8427 DOCREV CUR MEDS BY ELIG CLIN: HCPCS | Performed by: INTERNAL MEDICINE

## 2024-06-17 RX ORDER — ALPRAZOLAM 0.5 MG/1
0.25 TABLET ORAL 2 TIMES DAILY PRN
Qty: 60 TABLET | Refills: 0 | Status: SHIPPED | OUTPATIENT
Start: 2024-06-17 | End: 2024-08-16

## 2024-06-17 NOTE — PROGRESS NOTES
Build modified mFOLFOX every 2 weeks with no 5-FU bolus x 12 biweekly cycles. Patient education performed. Treatment consent signed. Electronically signed by Mercedez Schilling RN on 6/17/2024 at 4:57 PM

## 2024-06-18 LAB
DNA RANGE(S) EXAMINED NAR: NORMAL
GENE DIS ANL INTERP-IMP: POSITIVE
GENE DIS ASSESSED: NORMAL
MSI CA SPEC-IMP: NOT DETECTED
REASON FOR STUDY: NORMAL
TEMPUS PORTAL: NORMAL
TEMPUS THERAPY1: NORMAL
TEMPUS THERAPYCOUNT: 1
TEMPUS TRIALCOUNT: 3
TEMPUS TRIALMATCHES1: NORMAL
TEMPUS TRIALMATCHES2: NORMAL
TEMPUS TRIALMATCHES3: NORMAL

## 2024-06-24 LAB
DPYD GENE MUT ANL BLD/T: NORMAL
DPYD PHENOTYPE: NORMAL
SPECIMEN SOURCE: NORMAL

## 2024-06-25 ENCOUNTER — HOSPITAL ENCOUNTER (OUTPATIENT)
Dept: INFUSION THERAPY | Age: 79
Discharge: HOME | End: 2024-06-25
Payer: MEDICARE

## 2024-06-25 ENCOUNTER — APPOINTMENT (OUTPATIENT)
Dept: INFUSION THERAPY | Age: 79
End: 2024-06-25
Payer: MEDICARE

## 2024-06-25 ENCOUNTER — OFFICE VISIT (OUTPATIENT)
Dept: PALLATIVE CARE | Age: 79
End: 2024-06-25
Payer: MEDICARE

## 2024-06-25 VITALS
TEMPERATURE: 98.1 F | HEART RATE: 75 BPM | SYSTOLIC BLOOD PRESSURE: 115 MMHG | OXYGEN SATURATION: 97 % | RESPIRATION RATE: 18 BRPM | HEIGHT: 65 IN | WEIGHT: 140.5 LBS | DIASTOLIC BLOOD PRESSURE: 77 MMHG | BODY MASS INDEX: 23.41 KG/M2

## 2024-06-25 DIAGNOSIS — C25.9 METASTASIS FROM PANCREATIC CANCER (HCC): ICD-10-CM

## 2024-06-25 DIAGNOSIS — C79.9 METASTASIS FROM PANCREATIC CANCER (HCC): Primary | ICD-10-CM

## 2024-06-25 DIAGNOSIS — C25.9 METASTASIS FROM PANCREATIC CANCER (HCC): Primary | ICD-10-CM

## 2024-06-25 DIAGNOSIS — C79.9 METASTASIS FROM PANCREATIC CANCER (HCC): ICD-10-CM

## 2024-06-25 DIAGNOSIS — R63.0 POOR APPETITE: ICD-10-CM

## 2024-06-25 DIAGNOSIS — C25.2 MALIGNANT NEOPLASM OF TAIL OF PANCREAS (HCC): Primary | ICD-10-CM

## 2024-06-25 DIAGNOSIS — Z51.5 ENCOUNTER FOR PALLIATIVE CARE: ICD-10-CM

## 2024-06-25 DIAGNOSIS — C25.2 MALIGNANT NEOPLASM OF TAIL OF PANCREAS (HCC): ICD-10-CM

## 2024-06-25 DIAGNOSIS — G89.3 CANCER RELATED PAIN: Primary | ICD-10-CM

## 2024-06-25 LAB
ALBUMIN SERPL-MCNC: 3.5 G/DL (ref 3.5–5.2)
ALP SERPL-CCNC: 101 U/L (ref 35–104)
ALT SERPL-CCNC: 10 U/L (ref 9–52)
ANION GAP SERPL CALCULATED.3IONS-SCNC: 5 MMOL/L (ref 7–19)
AST SERPL-CCNC: 32 U/L (ref 14–36)
BASOPHILS # BLD: 0.13 K/UL (ref 0.01–0.08)
BASOPHILS NFR BLD: 1 % (ref 0.1–1.2)
BILIRUB SERPL-MCNC: 0.6 MG/DL (ref 0.2–1.3)
BUN SERPL-MCNC: 10 MG/DL (ref 7–17)
CALCIUM SERPL-MCNC: 8.8 MG/DL (ref 8.4–10.2)
CEA SERPL-MCNC: 14.2 NG/ML (ref 0–4.7)
CHLORIDE SERPL-SCNC: 103 MMOL/L (ref 98–111)
CO2 SERPL-SCNC: 28 MMOL/L (ref 22–29)
CREAT SERPL-MCNC: 0.6 MG/DL (ref 0.5–1)
DNA RANGE(S) EXAMINED NAR: NORMAL
EOSINOPHIL # BLD: 0.29 K/UL (ref 0.04–0.54)
EOSINOPHIL NFR BLD: 2.3 % (ref 0.7–7)
ERYTHROCYTE [DISTWIDTH] IN BLOOD BY AUTOMATED COUNT: 15.4 % (ref 11.7–14.4)
GENE DIS ANL INTERP-IMP: POSITIVE
GENE DIS ASSESSED: NORMAL
GENE MUT TESTED BLD/T: 4.7 M/MB
GLOBULIN: 2.9 G/DL
GLUCOSE SERPL-MCNC: 98 MG/DL (ref 74–106)
HCT VFR BLD AUTO: 34.8 % (ref 34.1–44.9)
HGB BLD-MCNC: 11.5 G/DL (ref 11.2–15.7)
LYMPHOCYTES # BLD: 4.58 K/UL (ref 1.18–3.74)
LYMPHOCYTES NFR BLD: 36.5 % (ref 19.3–53.1)
MCH RBC QN AUTO: 31.9 PG (ref 25.6–32.2)
MCHC RBC AUTO-ENTMCNC: 33 G/DL (ref 32.3–35.5)
MCV RBC AUTO: 96.4 FL (ref 79.4–94.8)
MONOCYTES # BLD: 1.87 K/UL (ref 0.24–0.82)
MONOCYTES NFR BLD: 14.9 % (ref 4.7–12.5)
MSI CA SPEC-IMP: NORMAL
NEUTROPHILS # BLD: 5.62 K/UL (ref 1.56–6.13)
NEUTS SEG NFR BLD: 44.7 % (ref 34–71.1)
PLATELET # BLD AUTO: 472 K/UL (ref 182–369)
PMV BLD AUTO: 9.7 FL (ref 7.4–10.4)
POTASSIUM SERPL-SCNC: 4.5 MMOL/L (ref 3.5–5.1)
PROT SERPL-MCNC: 6.5 G/DL (ref 6.3–8.2)
RBC # BLD AUTO: 3.61 M/UL (ref 3.93–5.22)
REASON FOR STUDY: NORMAL
SODIUM SERPL-SCNC: 136 MMOL/L (ref 137–145)
TEMPUS FUSIONADDENDUM: NORMAL
TEMPUS LCA: NORMAL
TEMPUS PORTAL: NORMAL
TEMPUS TRIALCOUNT: 3
TEMPUS TRIALMATCHES1: NORMAL
TEMPUS TRIALMATCHES2: NORMAL
TEMPUS TRIALMATCHES3: NORMAL
WBC # BLD AUTO: 12.56 K/UL (ref 3.98–10.04)

## 2024-06-25 PROCEDURE — 96368 THER/DIAG CONCURRENT INF: CPT

## 2024-06-25 PROCEDURE — 96413 CHEMO IV INFUSION 1 HR: CPT

## 2024-06-25 PROCEDURE — 36415 COLL VENOUS BLD VENIPUNCTURE: CPT

## 2024-06-25 PROCEDURE — 2580000003 HC RX 258: Performed by: INTERNAL MEDICINE

## 2024-06-25 PROCEDURE — 99214 OFFICE O/P EST MOD 30 MIN: CPT | Performed by: NURSE PRACTITIONER

## 2024-06-25 PROCEDURE — 96415 CHEMO IV INFUSION ADDL HR: CPT

## 2024-06-25 PROCEDURE — 96366 THER/PROPH/DIAG IV INF ADDON: CPT

## 2024-06-25 PROCEDURE — 1123F ACP DISCUSS/DSCN MKR DOCD: CPT | Performed by: NURSE PRACTITIONER

## 2024-06-25 PROCEDURE — 85025 COMPLETE CBC W/AUTO DIFF WBC: CPT

## 2024-06-25 PROCEDURE — G0498 CHEMO EXTEND IV INFUS W/PUMP: HCPCS

## 2024-06-25 PROCEDURE — 96367 TX/PROPH/DG ADDL SEQ IV INF: CPT

## 2024-06-25 PROCEDURE — 1036F TOBACCO NON-USER: CPT | Performed by: NURSE PRACTITIONER

## 2024-06-25 PROCEDURE — 6360000002 HC RX W HCPCS: Performed by: INTERNAL MEDICINE

## 2024-06-25 PROCEDURE — G8420 CALC BMI NORM PARAMETERS: HCPCS | Performed by: NURSE PRACTITIONER

## 2024-06-25 PROCEDURE — 96375 TX/PRO/DX INJ NEW DRUG ADDON: CPT

## 2024-06-25 PROCEDURE — G8427 DOCREV CUR MEDS BY ELIG CLIN: HCPCS | Performed by: NURSE PRACTITIONER

## 2024-06-25 PROCEDURE — 80053 COMPREHEN METABOLIC PANEL: CPT

## 2024-06-25 PROCEDURE — G8399 PT W/DXA RESULTS DOCUMENT: HCPCS | Performed by: NURSE PRACTITIONER

## 2024-06-25 PROCEDURE — 82378 CARCINOEMBRYONIC ANTIGEN: CPT

## 2024-06-25 PROCEDURE — 1090F PRES/ABSN URINE INCON ASSESS: CPT | Performed by: NURSE PRACTITIONER

## 2024-06-25 RX ORDER — FAMOTIDINE 10 MG/ML
20 INJECTION, SOLUTION INTRAVENOUS
OUTPATIENT
Start: 2024-06-25

## 2024-06-25 RX ORDER — HEPARIN SODIUM (PORCINE) LOCK FLUSH IV SOLN 100 UNIT/ML 100 UNIT/ML
500 SOLUTION INTRAVENOUS PRN
Status: CANCELLED | OUTPATIENT
Start: 2024-06-27

## 2024-06-25 RX ORDER — DEXTROSE MONOHYDRATE 50 MG/ML
5-250 INJECTION, SOLUTION INTRAVENOUS PRN
Status: DISCONTINUED | OUTPATIENT
Start: 2024-06-25 | End: 2024-06-26 | Stop reason: HOSPADM

## 2024-06-25 RX ORDER — EPINEPHRINE 1 MG/ML
0.3 INJECTION, SOLUTION, CONCENTRATE INTRAVENOUS PRN
OUTPATIENT
Start: 2024-06-25

## 2024-06-25 RX ORDER — MEPERIDINE HYDROCHLORIDE 50 MG/ML
12.5 INJECTION INTRAMUSCULAR; INTRAVENOUS; SUBCUTANEOUS PRN
OUTPATIENT
Start: 2024-06-25

## 2024-06-25 RX ORDER — SODIUM CHLORIDE 0.9 % (FLUSH) 0.9 %
5-40 SYRINGE (ML) INJECTION PRN
Status: CANCELLED | OUTPATIENT
Start: 2024-06-27

## 2024-06-25 RX ORDER — PALONOSETRON 0.05 MG/ML
0.25 INJECTION, SOLUTION INTRAVENOUS ONCE
Status: COMPLETED | OUTPATIENT
Start: 2024-06-25 | End: 2024-06-25

## 2024-06-25 RX ORDER — SODIUM CHLORIDE 9 MG/ML
5-250 INJECTION, SOLUTION INTRAVENOUS PRN
Status: CANCELLED | OUTPATIENT
Start: 2024-06-27

## 2024-06-25 RX ORDER — DIPHENHYDRAMINE HYDROCHLORIDE 50 MG/ML
50 INJECTION INTRAMUSCULAR; INTRAVENOUS
OUTPATIENT
Start: 2024-06-25

## 2024-06-25 RX ORDER — SODIUM CHLORIDE 0.9 % (FLUSH) 0.9 %
5-40 SYRINGE (ML) INJECTION PRN
OUTPATIENT
Start: 2024-06-25

## 2024-06-25 RX ORDER — ONDANSETRON 2 MG/ML
8 INJECTION INTRAMUSCULAR; INTRAVENOUS
OUTPATIENT
Start: 2024-06-25

## 2024-06-25 RX ORDER — HEPARIN SODIUM (PORCINE) LOCK FLUSH IV SOLN 100 UNIT/ML 100 UNIT/ML
500 SOLUTION INTRAVENOUS PRN
OUTPATIENT
Start: 2024-06-25

## 2024-06-25 RX ORDER — ACETAMINOPHEN 325 MG/1
650 TABLET ORAL
OUTPATIENT
Start: 2024-06-25

## 2024-06-25 RX ORDER — SODIUM CHLORIDE 9 MG/ML
5-250 INJECTION, SOLUTION INTRAVENOUS PRN
OUTPATIENT
Start: 2024-06-25

## 2024-06-25 RX ORDER — SODIUM CHLORIDE 9 MG/ML
INJECTION, SOLUTION INTRAVENOUS CONTINUOUS
OUTPATIENT
Start: 2024-06-25

## 2024-06-25 RX ORDER — ALBUTEROL SULFATE 90 UG/1
4 AEROSOL, METERED RESPIRATORY (INHALATION) PRN
OUTPATIENT
Start: 2024-06-25

## 2024-06-25 RX ADMIN — PALONOSETRON 0.25 MG: 0.05 INJECTION, SOLUTION INTRAVENOUS at 10:24

## 2024-06-25 RX ADMIN — DEXAMETHASONE SODIUM PHOSPHATE: 100 INJECTION INTRAMUSCULAR; INTRAVENOUS at 10:24

## 2024-06-25 RX ADMIN — DEXTROSE MONOHYDRATE 25 ML/HR: 50 INJECTION, SOLUTION INTRAVENOUS at 10:22

## 2024-06-25 RX ADMIN — OXALIPLATIN 100 MG: 5 INJECTION, SOLUTION INTRAVENOUS at 11:01

## 2024-06-25 RX ADMIN — FLUOROURACIL 4000 MG: 50 INJECTION, SOLUTION INTRAVENOUS at 13:13

## 2024-06-25 RX ADMIN — SODIUM CHLORIDE 150 MG: 9 INJECTION, SOLUTION INTRAVENOUS at 10:34

## 2024-06-25 RX ADMIN — LEUCOVORIN CALCIUM 200 MG: 200 INJECTION, POWDER, LYOPHILIZED, FOR SUSPENSION INTRAMUSCULAR; INTRAVENOUS at 11:01

## 2024-06-25 NOTE — PROGRESS NOTES
Supportive Care/Community Based Palliative Care  Follow Up Note        Patient Name:  Linn Pryor  Medical Record Number:  [id number]  YOB: 1945    Date of Visit: 6/25/2024  Location of Visit:  Other - oncology office    Patient Care Team:  Jaiden Hayden MD as PCP - General (Family Medicine)  Yareli Henry DO as Consulting Physician (Vascular Surgery)  Radha Rosenthal MD as Consulting Physician (Oncology)  Shira Maharaj APRN - CNP (Nurse Practitioner)    History obtained from:  patient, family member - Daughter, electronic medical record    PALLIATIVE DIAGNOSES AND ORDERS/RECOMMENDATIONS/PLAN:   1. Cancer related pain  Continue Duragesic patch  Continue Norco for breakthrough pain    2. Poor appetite  Continue antiemetics as needed  Continue daily meal replacement shakes    3. Encounter for palliative care  Current goals of care include: Continue treatment with palliative intent, Preserve independence/control/autonomy, Maintain or improve functional status, Maintain or improve quality of life, Focus on comfort and quality of life, Remain at home    Code status confirmed: DNRCC  Will continue goals of care discussions based on clinical course  Follow up home visit in 2-3 weeks and as needed    CHIEF COMPLAINT:     Chief Complaint   Patient presents with    Follow-up     Starting new chemo regimen        CLINICAL SUMMARY:          Linn Pryor is a 78 y.o. female with PMH of pancreatic cancer, anxiety, depression, HTN, thyroid disease, arthritis.     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 Rudy Coles at [facility] 6/20/2021  -She had initial adjuvant treatment with Gemzar and Xeloda completed 2/25/2022  -Recurrence noted September 2022 and palliative Gemzar Abraxane initiated

## 2024-06-25 NOTE — PROGRESS NOTES
Lab Results   Component Value Date    WBC 12.56 (H) 06/25/2024    HGB 11.5 06/25/2024    HCT 34.8 06/25/2024    MCV 96.4 (H) 06/25/2024     (H) 06/25/2024     Lab Results   Component Value Date    NEUTROABS 5.62 06/25/2024     Lab Results   Component Value Date     (L) 06/25/2024    K 4.5 06/25/2024     06/25/2024    CO2 28 06/25/2024    BUN 10 06/25/2024    CREATININE 0.6 06/25/2024    GLUCOSE 98 06/25/2024    CALCIUM 8.8 06/25/2024    BILITOT 0.6 06/25/2024    ALKPHOS 101 06/25/2024    AST 32 06/25/2024    ALT 10 06/25/2024    LABGLOM >90 06/25/2024    GFRAA >59 01/12/2022    GLOB 2.9 06/25/2024

## 2024-06-27 ENCOUNTER — APPOINTMENT (OUTPATIENT)
Dept: INFUSION THERAPY | Age: 79
End: 2024-06-27
Payer: MEDICARE

## 2024-06-27 ENCOUNTER — HOSPITAL ENCOUNTER (OUTPATIENT)
Dept: INFUSION THERAPY | Age: 79
Discharge: HOME | End: 2024-06-27
Payer: MEDICARE

## 2024-06-27 DIAGNOSIS — C25.9 METASTASIS FROM PANCREATIC CANCER (HCC): ICD-10-CM

## 2024-06-27 DIAGNOSIS — C25.2 MALIGNANT NEOPLASM OF TAIL OF PANCREAS (HCC): Primary | ICD-10-CM

## 2024-06-27 DIAGNOSIS — C79.9 METASTASIS FROM PANCREATIC CANCER (HCC): ICD-10-CM

## 2024-06-27 PROCEDURE — 96523 IRRIG DRUG DELIVERY DEVICE: CPT

## 2024-06-27 PROCEDURE — 96372 THER/PROPH/DIAG INJ SC/IM: CPT

## 2024-06-27 PROCEDURE — 2580000003 HC RX 258: Performed by: INTERNAL MEDICINE

## 2024-06-27 PROCEDURE — 6360000002 HC RX W HCPCS: Performed by: INTERNAL MEDICINE

## 2024-06-27 RX ORDER — SODIUM CHLORIDE 0.9 % (FLUSH) 0.9 %
5-40 SYRINGE (ML) INJECTION PRN
Status: DISCONTINUED | OUTPATIENT
Start: 2024-06-27 | End: 2024-06-28 | Stop reason: HOSPADM

## 2024-06-27 RX ORDER — HEPARIN 100 UNIT/ML
500 SYRINGE INTRAVENOUS PRN
Status: DISCONTINUED | OUTPATIENT
Start: 2024-06-27 | End: 2024-06-28 | Stop reason: HOSPADM

## 2024-06-27 RX ORDER — SODIUM CHLORIDE 9 MG/ML
5-250 INJECTION, SOLUTION INTRAVENOUS PRN
Status: DISCONTINUED | OUTPATIENT
Start: 2024-06-27 | End: 2024-06-27

## 2024-06-27 RX ADMIN — HEPARIN 500 UNITS: 100 SYRINGE at 11:52

## 2024-06-27 RX ADMIN — SODIUM CHLORIDE, PRESERVATIVE FREE 10 ML: 5 INJECTION INTRAVENOUS at 11:52

## 2024-06-29 ENCOUNTER — HOSPITAL ENCOUNTER (EMERGENCY)
Facility: HOSPITAL | Age: 79
Discharge: HOME | End: 2024-06-29
Payer: MEDICARE

## 2024-06-29 ENCOUNTER — APPOINTMENT (OUTPATIENT)
Dept: GENERAL RADIOLOGY | Facility: HOSPITAL | Age: 79
End: 2024-06-29
Payer: MEDICARE

## 2024-06-29 VITALS
BODY MASS INDEX: 22.99 KG/M2 | OXYGEN SATURATION: 99 % | SYSTOLIC BLOOD PRESSURE: 132 MMHG | TEMPERATURE: 98.4 F | DIASTOLIC BLOOD PRESSURE: 82 MMHG | HEART RATE: 78 BPM | WEIGHT: 138 LBS | HEIGHT: 65 IN | RESPIRATION RATE: 16 BRPM

## 2024-06-29 DIAGNOSIS — Z92.21 AT HIGH RISK FOR INFECTION DUE TO CHEMOTHERAPY: ICD-10-CM

## 2024-06-29 DIAGNOSIS — B34.8 INFECTION DUE TO HUMAN METAPNEUMOVIRUS (HMPV): Primary | ICD-10-CM

## 2024-06-29 DIAGNOSIS — J90 PLEURAL EFFUSION: ICD-10-CM

## 2024-06-29 DIAGNOSIS — Z91.89 AT HIGH RISK FOR INFECTION DUE TO CHEMOTHERAPY: ICD-10-CM

## 2024-06-29 LAB
ACANTHOCYTES BLD QL SMEAR: ABNORMAL
ALBUMIN SERPL-MCNC: 3.5 G/DL (ref 3.5–5.2)
ALBUMIN/GLOB SERPL: 1.2 G/DL
ALP SERPL-CCNC: 104 U/L (ref 39–117)
ALT SERPL W P-5'-P-CCNC: 10 U/L (ref 1–33)
ANION GAP SERPL CALCULATED.3IONS-SCNC: 11 MMOL/L (ref 5–15)
ANISOCYTOSIS BLD QL: ABNORMAL
APTT PPP: 28.5 SECONDS (ref 24.5–36)
AST SERPL-CCNC: 39 U/L (ref 1–32)
B PARAPERT DNA SPEC QL NAA+PROBE: NOT DETECTED
B PERT DNA SPEC QL NAA+PROBE: NOT DETECTED
BASOPHILS # BLD MANUAL: 0 10*3/MM3 (ref 0–0.2)
BASOPHILS NFR BLD MANUAL: 0 % (ref 0–1.5)
BILIRUB SERPL-MCNC: 0.4 MG/DL (ref 0–1.2)
BUN SERPL-MCNC: 13 MG/DL (ref 8–23)
BUN/CREAT SERPL: 23.6 (ref 7–25)
BURR CELLS BLD QL SMEAR: ABNORMAL
C PNEUM DNA NPH QL NAA+NON-PROBE: NOT DETECTED
CALCIUM SPEC-SCNC: 9 MG/DL (ref 8.6–10.5)
CHLORIDE SERPL-SCNC: 101 MMOL/L (ref 98–107)
CO2 SERPL-SCNC: 23 MMOL/L (ref 22–29)
CREAT SERPL-MCNC: 0.55 MG/DL (ref 0.57–1)
D-LACTATE SERPL-SCNC: 1.1 MMOL/L (ref 0.5–2)
DEPRECATED RDW RBC AUTO: 53.1 FL (ref 37–54)
EGFRCR SERPLBLD CKD-EPI 2021: 94.5 ML/MIN/1.73
ELLIPTOCYTES BLD QL SMEAR: ABNORMAL
EOSINOPHIL # BLD MANUAL: 0.11 10*3/MM3 (ref 0–0.4)
EOSINOPHIL NFR BLD MANUAL: 1 % (ref 0.3–6.2)
ERYTHROCYTE [DISTWIDTH] IN BLOOD BY AUTOMATED COUNT: 15.1 % (ref 12.3–15.4)
FLUAV SUBTYP SPEC NAA+PROBE: NOT DETECTED
FLUBV RNA ISLT QL NAA+PROBE: NOT DETECTED
GIANT PLATELETS: ABNORMAL
GLOBULIN UR ELPH-MCNC: 2.9 GM/DL
GLUCOSE SERPL-MCNC: 120 MG/DL (ref 65–99)
HADV DNA SPEC NAA+PROBE: NOT DETECTED
HCOV 229E RNA SPEC QL NAA+PROBE: NOT DETECTED
HCOV HKU1 RNA SPEC QL NAA+PROBE: NOT DETECTED
HCOV NL63 RNA SPEC QL NAA+PROBE: NOT DETECTED
HCOV OC43 RNA SPEC QL NAA+PROBE: NOT DETECTED
HCT VFR BLD AUTO: 35.1 % (ref 34–46.6)
HGB BLD-MCNC: 11.5 G/DL (ref 12–15.9)
HMPV RNA NPH QL NAA+NON-PROBE: DETECTED
HPIV1 RNA ISLT QL NAA+PROBE: NOT DETECTED
HPIV2 RNA SPEC QL NAA+PROBE: NOT DETECTED
HPIV3 RNA NPH QL NAA+PROBE: NOT DETECTED
HPIV4 P GENE NPH QL NAA+PROBE: NOT DETECTED
INR PPP: 1.01 (ref 0.91–1.09)
LYMPHOCYTES # BLD MANUAL: 3.54 10*3/MM3 (ref 0.7–3.1)
LYMPHOCYTES NFR BLD MANUAL: 2 % (ref 5–12)
M PNEUMO IGG SER IA-ACNC: NOT DETECTED
MCH RBC QN AUTO: 31.6 PG (ref 26.6–33)
MCHC RBC AUTO-ENTMCNC: 32.8 G/DL (ref 31.5–35.7)
MCV RBC AUTO: 96.4 FL (ref 79–97)
MONOCYTES # BLD: 0.23 10*3/MM3 (ref 0.1–0.9)
NEUTROPHILS # BLD AUTO: 7.43 10*3/MM3 (ref 1.7–7)
NEUTROPHILS NFR BLD MANUAL: 64.6 % (ref 42.7–76)
NEUTS BAND NFR BLD MANUAL: 1 % (ref 0–5)
OVALOCYTES BLD QL SMEAR: ABNORMAL
PLATELET # BLD AUTO: 318 10*3/MM3 (ref 140–450)
PMV BLD AUTO: 10.7 FL (ref 6–12)
POIKILOCYTOSIS BLD QL SMEAR: ABNORMAL
POTASSIUM SERPL-SCNC: 4.6 MMOL/L (ref 3.5–5.2)
PROCALCITONIN SERPL-MCNC: 0.03 NG/ML (ref 0–0.25)
PROT SERPL-MCNC: 6.4 G/DL (ref 6–8.5)
PROTHROMBIN TIME: 13.7 SECONDS (ref 11.8–14.8)
RBC # BLD AUTO: 3.64 10*6/MM3 (ref 3.77–5.28)
RHINOVIRUS RNA SPEC NAA+PROBE: NOT DETECTED
RSV RNA NPH QL NAA+NON-PROBE: NOT DETECTED
SARS-COV-2 RNA NPH QL NAA+NON-PROBE: NOT DETECTED
SODIUM SERPL-SCNC: 135 MMOL/L (ref 136–145)
TARGETS BLD QL SMEAR: ABNORMAL
VARIANT LYMPHS NFR BLD MANUAL: 31.3 % (ref 19.6–45.3)
WBC MORPH BLD: NORMAL
WBC NRBC COR # BLD AUTO: 11.31 10*3/MM3 (ref 3.4–10.8)

## 2024-06-29 PROCEDURE — 83605 ASSAY OF LACTIC ACID: CPT | Performed by: PHYSICIAN ASSISTANT

## 2024-06-29 PROCEDURE — 71045 X-RAY EXAM CHEST 1 VIEW: CPT

## 2024-06-29 PROCEDURE — 99283 EMERGENCY DEPT VISIT LOW MDM: CPT

## 2024-06-29 PROCEDURE — 85730 THROMBOPLASTIN TIME PARTIAL: CPT | Performed by: PHYSICIAN ASSISTANT

## 2024-06-29 PROCEDURE — 85610 PROTHROMBIN TIME: CPT | Performed by: PHYSICIAN ASSISTANT

## 2024-06-29 PROCEDURE — 84145 PROCALCITONIN (PCT): CPT | Performed by: PHYSICIAN ASSISTANT

## 2024-06-29 PROCEDURE — 94640 AIRWAY INHALATION TREATMENT: CPT

## 2024-06-29 PROCEDURE — 80053 COMPREHEN METABOLIC PANEL: CPT | Performed by: PHYSICIAN ASSISTANT

## 2024-06-29 PROCEDURE — 0202U NFCT DS 22 TRGT SARS-COV-2: CPT | Performed by: PHYSICIAN ASSISTANT

## 2024-06-29 PROCEDURE — 94761 N-INVAS EAR/PLS OXIMETRY MLT: CPT

## 2024-06-29 PROCEDURE — 85025 COMPLETE CBC W/AUTO DIFF WBC: CPT | Performed by: PHYSICIAN ASSISTANT

## 2024-06-29 PROCEDURE — 85007 BL SMEAR W/DIFF WBC COUNT: CPT | Performed by: PHYSICIAN ASSISTANT

## 2024-06-29 RX ORDER — ALBUTEROL SULFATE 2.5 MG/3ML
2.5 SOLUTION RESPIRATORY (INHALATION) ONCE
Status: COMPLETED | OUTPATIENT
Start: 2024-06-29 | End: 2024-06-29

## 2024-06-29 RX ORDER — ALBUTEROL SULFATE 2.5 MG/3ML
SOLUTION RESPIRATORY (INHALATION)
Status: COMPLETED
Start: 2024-06-29 | End: 2024-06-29

## 2024-06-29 RX ORDER — SODIUM CHLORIDE 0.9 % (FLUSH) 0.9 %
10 SYRINGE (ML) INJECTION AS NEEDED
Status: DISCONTINUED | OUTPATIENT
Start: 2024-06-29 | End: 2024-06-29 | Stop reason: HOSPADM

## 2024-06-29 RX ORDER — AMOXICILLIN AND CLAVULANATE POTASSIUM 875; 125 MG/1; MG/1
1 TABLET, FILM COATED ORAL 2 TIMES DAILY
Qty: 20 TABLET | Refills: 0 | Status: SHIPPED | OUTPATIENT
Start: 2024-06-29 | End: 2024-07-03 | Stop reason: SINTOL

## 2024-06-29 RX ADMIN — ALBUTEROL SULFATE 2.5 MG: 2.5 SOLUTION RESPIRATORY (INHALATION) at 09:18

## 2024-06-29 RX ADMIN — ALBUTEROL SULFATE: 2.5 SOLUTION RESPIRATORY (INHALATION) at 09:22

## 2024-06-29 NOTE — ED PROVIDER NOTES
"Subjective   History of Present Illness    Patient is a 78-year-old female presenting to ED with wheezing and nasal congestion.  PMH significant for pancreatic cancer with current treatment of chemo, thyroid disease, hypertension, osteoarthritis.  Patient states she is currently undergoing treatment for pancreatic cancer with chemotherapy through her port.  Patient reports that 5 days ago, 1 day prior to her last infusion, she started developing mild congestion and a cough which has increased to a productive cough and wheezing.  Patient denies fevers, chills, diaphoresis, nausea, vomiting, any new chest pain/pressure/heaviness or tightness.  Patient states that she is concerned due to the fact that she is undergoing chemo and was concerned she was developing pneumonia for which she presents this time for further evaluation.  Patient denies any known sick contact but did state that she is in and out of healthcare settings due to her pancreatic cancer.  Patient states that she did use her home Norco earlier this morning for lower abdominal pain which is at her baseline with her cancer and states that the medication worked as it should.  Patient states that she even tried using her incentive spirometer earlier to help \"minimize pneumonia.\"  Patient denies development of any other new symptoms including no abdominal pain.    PATIENT IS A DNR.    Records reviewed show patient was last seen in the outpatient setting at the palliative care office on 6/25/2024 for cancer related pain, poor appetite, encounter for palliative care.  Patient plans to continue treatment and has been showing signs of improvement/stability however disease progression on her most recent scans.    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  At that time patient received infusion of palonosetron, dexamethasone, sodium chloride, oxaliplatin, " leucovorin calcium, fluorouracil.     Patient last seen the ED on 2/28/2023 for rhinorrhea, cancer.    Review of Systems   Constitutional: Negative.  Negative for diaphoresis, fatigue and fever.   HENT:  Positive for congestion. Negative for sinus pressure and sore throat.    Eyes: Negative.    Respiratory:  Positive for cough and wheezing. Negative for shortness of breath.    Cardiovascular:  Negative for chest pain, palpitations and leg swelling.   Gastrointestinal: Negative.  Negative for abdominal pain (No change in chronic), diarrhea, nausea and vomiting.   Genitourinary: Negative.    Musculoskeletal: Negative.  Negative for myalgias.   Skin: Negative.  Negative for rash.   Allergic/Immunologic: Positive for immunocompromised state (Undergoing chemotherapy for pancreatic cancer).   Neurological: Negative.  Negative for weakness (No change in chronic).   Psychiatric/Behavioral: Negative.     All other systems reviewed and are negative.      Past Medical History:   Diagnosis Date    Acute bronchitis     Arthritis     Osteoarthritis    Cancer     pancreatic cancer    Disease of thyroid gland     Gallbladder abscess     Hypertension        Allergies   Allergen Reactions    Diclofenac Other (See Comments), Unknown - High Severity and Unknown - Low Severity     Couldn't sleep and face turned blood red    Oxycodone-Acetaminophen Hives and Rash     itching  itching      Doxycycline Rash       Past Surgical History:   Procedure Laterality Date    ABDOMINAL HYSTERECTOMY      CHOLECYSTECTOMY  1994    COLONOSCOPY  04/23/2015    two polyps both removed  extensive diverticulosis    COLONOSCOPY N/A 06/17/2020    Procedure: COLONOSCOPY WITH ANESTHESIA;  Surgeon: Homer Zamarripa DO;  Location: [facility] ENDOSCOPY;  Service: Gastroenterology;  Laterality: N/A;  pre: Hx of polyps  post:  Anupama Dhaliwal    ENDOSCOPY  08/16/2016    normal    ENDOSCOPY N/A 06/17/2020    Procedure: ESOPHAGOGASTRODUODENOSCOPY WITH ANESTHESIA;   Surgeon: Homer Zamarripa DO;  Location: [facility] ENDOSCOPY;  Service: Gastroenterology;  Laterality: N/A;  pre: nausea  post:     ENDOSCOPY N/A 6/7/2022    Procedure: ESOPHAGOGASTRODUODENOSCOPY WITH ANESTHESIA;  Surgeon: Homer Zamarripa DO;  Location: [facility] ENDOSCOPY;  Service: Gastroenterology;  Laterality: N/A;  pre cough  post normal  Anupama Dhaliwal,     PANCREAS SURGERY      Partial removal due to tumor    ROTATOR CUFF REPAIR  10/19/2017    SPLENECTOMY         Family History   Problem Relation Age of Onset    Alcohol abuse Father     Breast cancer Maternal Aunt     Bone cancer Maternal Aunt     Leukemia Maternal Aunt     Colon cancer Neg Hx     Colon polyps Neg Hx     Esophageal cancer Neg Hx        Social History     Socioeconomic History    Marital status:    Tobacco Use    Smoking status: Never    Smokeless tobacco: Never   Vaping Use    Vaping status: Never Used   Substance and Sexual Activity    Alcohol use: No    Drug use: No    Sexual activity: Yes     Partners: Male           Objective   Physical Exam  Vitals and nursing note reviewed.   Constitutional:       General: She is not in acute distress.     Appearance: Normal appearance. She is normal weight. She is ill-appearing (Chronically ill-appearing). She is not toxic-appearing or diaphoretic.   HENT:      Head: Normocephalic.      Mouth/Throat:      Mouth: Mucous membranes are moist.      Pharynx: Oropharynx is clear.   Eyes:      Conjunctiva/sclera: Conjunctivae normal.      Pupils: Pupils are equal, round, and reactive to light.   Cardiovascular:      Rate and Rhythm: Normal rate.   Pulmonary:      Effort: Pulmonary effort is normal. No tachypnea, bradypnea, accessory muscle usage, prolonged expiration or respiratory distress.      Breath sounds: No stridor. Wheezing (Expiratory) and rhonchi present. No rales.   Chest:      Chest wall: No tenderness.   Abdominal:      General: Bowel sounds are normal.      Palpations: Abdomen  is soft.      Tenderness: There is no abdominal tenderness.   Musculoskeletal:         General: Normal range of motion.      Cervical back: Normal range of motion and neck supple. No rigidity.      Right lower leg: No edema.      Left lower leg: No edema.   Skin:     General: Skin is warm and dry.      Findings: No rash.   Neurological:      Mental Status: She is alert and oriented to person, place, and time.   Psychiatric:         Mood and Affect: Mood normal.         Behavior: Behavior normal.         Procedures           ED Course                                             Medical Decision Making  Problems Addressed:  Pleural effusion: complicated acute illness or injury    Amount and/or Complexity of Data Reviewed  Independent Historian: spouse  External Data Reviewed: labs, radiology and notes.  Labs: ordered. Decision-making details documented in ED Course.  Radiology: ordered. Decision-making details documented in ED Course.  ECG/medicine tests: ordered. Decision-making details documented in ED Course.  Discussion of management or test interpretation with external provider(s): Dr. Parvez Coronel (attending)    Risk  Prescription drug management.        Patient is a 78-year-old female presenting to ED with wheezing and nasal congestion.  PMH significant for pancreatic cancer with current treatment of chemo, thyroid disease, hypertension, osteoarthritis.  Upon initial evaluation patient resting in bed chronically ill-appearing however no acute distress, nontoxic-appearing, nondiaphoretic.  Patient with stable vital signs.  Examination finds rhonchi to bilateral lower lung fields with slight expiratory wheezing throughout all lung fields.  No reproducible tenderness to palpitation of the chest wall.  Examination is otherwise unremarkable including abdomen soft, nontender, nondistended, no peripheral edema, no dermatological changes.  Discussed with patient need for workup to include chest x-ray, labs, and  respiratory panel as well as a breathing treatment which she is amenable to no further questions, concerns, or needs at this time.    Differential diagnosis: Bronchitis, bronchiolitis, pneumonia, lung mass, systemic infection, COVID-19, human metapneumovirus, adenovirus, electro disturbances, other.    Lab work revealed leukocytosis of 11.31 with elevated bands at 1%.  Stable H&H and no further CBC abnormalities including normal platelets.  CMP with no electrolyte disturbances.  AST elevated at 39 with no further hepatic dysfunction.  Normal renal function.  Anion gap normal at 11.  Low concern for systemic or ischemic process with normal lactic acid as well as for the low concern for systemic bacterial process with normal procalcitonin.   PT/INR WNL.  Respiratory panel positive for human metapneumovirus and otherwise unremarkable.  Chest x-ray showed: Small pleural effusions, minimal atelectasis in the lung bases, table mild bronchial wall thickening.  Evaluation patient remained afebrile and oxygenating well at 96%.  Shared decision making with patient regarding risk, benefits, and alternatives to coverage for atypical pneumonia for which patient wishes to proceed with a course of Augmentin due to doxycycline allergy.  Discussed need for PCP follow-up within the next 48 hours for close reevaluation, strict return precautions, and need for immediate return to ED should she develop any new or worsening symptoms.  Patient is otherwise hemodynamically stable, ambulating at her baseline.  Patient is appreciated with no further questions, concerns, or needs at this time and is stable for discharge.      Final diagnoses:   Pleural effusion   Infection due to human metapneumovirus (hMPV)   At high risk for infection due to chemotherapy       ED Disposition  ED Disposition       ED Disposition   Discharge    Condition   Stable    Comment   --               No follow-up provider specified.       Medication List        New  Prescriptions      amoxicillin-clavulanate 875-125 MG per tablet  Commonly known as: AUGMENTIN  Take 1 tablet by mouth 2 (Two) Times a Day for 10 days.               Where to Get Your Medications        These medications were sent to [facility]/pharmacy #[id number] - [place], [place] - [address] - [phone]  - [phone]   [address] [place] [facility] [address]      Phone: [phone]   amoxicillin-clavulanate 875-125 MG per tablet            Lucien Parker PA-C  06/29/24 1044

## 2024-06-29 NOTE — DISCHARGE INSTRUCTIONS
As we discussed today your testing positive for human metapneumovirus.  Although you do not have pneumonia on your chest x-ray due to your risk factors we will have you complete a course of Augmentin.  Please follow-up with your primary care provider for close reevaluation within the next 48 hours however should you develop any new or worsening symptoms please return to the ER for further evaluation.  Please use your incentive spirometer 1-2 times a day as we discussed.

## 2024-07-03 ENCOUNTER — OFFICE VISIT (OUTPATIENT)
Dept: INTERNAL MEDICINE | Facility: CLINIC | Age: 79
End: 2024-07-03
Payer: MEDICARE

## 2024-07-03 VITALS
HEART RATE: 83 BPM | HEIGHT: 65 IN | OXYGEN SATURATION: 93 % | WEIGHT: 136 LBS | SYSTOLIC BLOOD PRESSURE: 130 MMHG | BODY MASS INDEX: 22.66 KG/M2 | TEMPERATURE: 98.6 F | DIASTOLIC BLOOD PRESSURE: 76 MMHG

## 2024-07-03 DIAGNOSIS — B34.8 INFECTION DUE TO HUMAN METAPNEUMOVIRUS (HMPV): ICD-10-CM

## 2024-07-03 DIAGNOSIS — J90 PLEURAL EFFUSION: Primary | ICD-10-CM

## 2024-07-03 DIAGNOSIS — Z92.21 AT HIGH RISK FOR INFECTION DUE TO CHEMOTHERAPY: ICD-10-CM

## 2024-07-03 DIAGNOSIS — Z91.89 AT HIGH RISK FOR INFECTION DUE TO CHEMOTHERAPY: ICD-10-CM

## 2024-07-03 DIAGNOSIS — D84.821 IMMUNODEFICIENCY DUE TO DRUGS (CODE): ICD-10-CM

## 2024-07-03 DIAGNOSIS — R11.2 NAUSEA AND VOMITING, UNSPECIFIED VOMITING TYPE: ICD-10-CM

## 2024-07-03 DIAGNOSIS — R19.7 DIARRHEA, UNSPECIFIED TYPE: ICD-10-CM

## 2024-07-03 PROCEDURE — 3078F DIAST BP <80 MM HG: CPT

## 2024-07-03 PROCEDURE — 1160F RVW MEDS BY RX/DR IN RCRD: CPT

## 2024-07-03 PROCEDURE — 99213 OFFICE O/P EST LOW 20 MIN: CPT

## 2024-07-03 PROCEDURE — 3075F SYST BP GE 130 - 139MM HG: CPT

## 2024-07-03 PROCEDURE — 1159F MED LIST DOCD IN RCRD: CPT

## 2024-07-03 RX ORDER — FENTANYL 12.5 UG/1
1 PATCH TRANSDERMAL
COMMUNITY
Start: 2024-06-11 | End: 2024-07-12

## 2024-07-03 RX ORDER — CEFPODOXIME PROXETIL 200 MG/1
200 TABLET, FILM COATED ORAL EVERY 12 HOURS
Qty: 10 TABLET | Refills: 0 | Status: SHIPPED | OUTPATIENT
Start: 2024-07-03 | End: 2024-07-08

## 2024-07-03 NOTE — PROGRESS NOTES
Chief Complaint  Wheezing (Went to  er,  hasd pneum. ) and Diarrhea (Antibiotic is causing the diarrhea, and dont want to eat/)    Subjective        Ирина Randolph presents to [facility]  History of Present Illness  Patient is a 78-year-old female presenting today with complaints of diarrhea. She had presented to [facility] ER on 6/29/2024 and was diagnosed with hMPV and pleural effusion. With this she was started on Augmentin as she is a high risk for infection currently receiving chemotherapy.  Reports diarrhea with nausea and vomiting beginning yesterday morning. Describes having taken 4 imodium due to diarrhea and having poor appetite. She did not take the antibiotic last night or this morning. Was not able to tolerate eating yesterday with not feeling well. Reports drinking water and trying to stay hydrated.     Past Medical History:   Diagnosis Date    Acute bronchitis     Arthritis     Osteoarthritis    Cancer     pancreatic cancer    Disease of thyroid gland     Gallbladder abscess     Hypertension      Past Surgical History:   Procedure Laterality Date    ABDOMINAL HYSTERECTOMY      CHOLECYSTECTOMY  1994    COLONOSCOPY  04/23/2015    two polyps both removed  extensive diverticulosis    COLONOSCOPY N/A 06/17/2020    Procedure: COLONOSCOPY WITH ANESTHESIA;  Surgeon: Homre Zamarripa DO;  Location: [facility] ENDOSCOPY;  Service: Gastroenterology;  Laterality: N/A;  pre: Hx of polyps  post:  Anupama Dhaliwal    ENDOSCOPY  08/16/2016    normal    ENDOSCOPY N/A 06/17/2020    Procedure: ESOPHAGOGASTRODUODENOSCOPY WITH ANESTHESIA;  Surgeon: Homer Zamarripa DO;  Location: [facility] ENDOSCOPY;  Service: Gastroenterology;  Laterality: N/A;  pre: nausea  post:     ENDOSCOPY N/A 6/7/2022    Procedure: ESOPHAGOGASTRODUODENOSCOPY WITH ANESTHESIA;  Surgeon: Homer Zamarripa DO;  Location: [facility] ENDOSCOPY;  Service: Gastroenterology;  Laterality: N/A;  pre cough  post  "normal  Anupama Dhaliwal, DO    PANCREAS SURGERY      Partial removal due to tumor    ROTATOR CUFF REPAIR  10/19/2017    SPLENECTOMY       Social History     Socioeconomic History    Marital status:    Tobacco Use    Smoking status: Never    Smokeless tobacco: Never   Vaping Use    Vaping status: Never Used   Substance and Sexual Activity    Alcohol use: No    Drug use: No    Sexual activity: Yes     Partners: Male     Family History   Problem Relation Age of Onset    Alcohol abuse Father     Breast cancer Maternal Aunt     Bone cancer Maternal Aunt     Leukemia Maternal Aunt     Colon cancer Neg Hx     Colon polyps Neg Hx     Esophageal cancer Neg Hx        Review of Systems  Review of systems is negative unless otherwise specified in HPI.    Objective   Vital Signs:  /76 (BP Location: Right arm, Patient Position: Sitting, Cuff Size: Adult)   Pulse 83   Temp 98.6 °F (37 °C) (Infrared)   Ht 165.1 cm (65\")   Wt 61.7 kg (136 lb)   SpO2 93%   BMI 22.63 kg/m²   Estimated body mass index is 22.63 kg/m² as calculated from the following:    Height as of this encounter: 165.1 cm (65\").    Weight as of this encounter: 61.7 kg (136 lb).       BMI is within normal parameters. No other follow-up for BMI required.    Physical Exam  Vitals and nursing note reviewed.   Constitutional:       General: She is not in acute distress.     Appearance: She is ill-appearing.   HENT:      Head: Normocephalic.      Nose: Nose normal.      Mouth/Throat:      Mouth: Mucous membranes are moist.      Pharynx: Oropharynx is clear.   Eyes:      Pupils: Pupils are equal, round, and reactive to light.   Cardiovascular:      Rate and Rhythm: Normal rate and regular rhythm.      Pulses: Normal pulses.      Heart sounds: Normal heart sounds.   Pulmonary:      Effort: Pulmonary effort is normal. No respiratory distress.      Breath sounds: Rhonchi present.   Abdominal:      General: Bowel sounds are normal.      Palpations: " Abdomen is soft.   Musculoskeletal:         General: Normal range of motion.      Cervical back: Normal range of motion.   Skin:     General: Skin is warm and dry.      Capillary Refill: Capillary refill takes less than 2 seconds.   Neurological:      General: No focal deficit present.      Mental Status: She is alert.          Result Review :  The following data was reviewed by: SKYLER Mccrary on 07/03/2024:  CMP          6/17/2024    10:48 6/25/2024    09:26 6/29/2024    09:38   CMP   Glucose 96     98     120    BUN 9     10     13    Creatinine 0.5     0.6     0.55    EGFR   94.5    Sodium 133     136     135    Potassium 4.1     4.5     4.6    Chloride 99     103     101    Calcium 9.4     8.8     9.0    Total Protein 7.0     6.5     6.4    Albumin 4.1     3.5     3.5    Globulin 2.9     2.9     2.9    Total Bilirubin 0.7     0.6     0.4    Alkaline Phosphatase 121     101     104    AST (SGOT) 29     32     39    ALT (SGPT) 10     10     10    Albumin/Globulin Ratio   1.2    BUN/Creatinine Ratio   23.6    Anion Gap 8     5     11.0       Details          This result is from an external source.             CBC          6/17/2024    10:27 6/25/2024    09:26 6/29/2024    09:38   CBC   WBC 10.74     12.56     11.31    RBC 3.87     3.61     3.64    Hemoglobin 12.4     11.5     11.5    Hematocrit 36.9     34.8     35.1    MCV 95.3     96.4     96.4    MCH 32.0     31.9     31.6    MCHC 33.6     33.0     32.8    RDW 15.3     15.4     15.1    Platelets 541     472     318       Details          This result is from an external source.             Data reviewed : Recent hospitalization notes from [facility] ER 6/29/2024.    Respiratory Panel PCR w/COVID-19(SARS-CoV-2) [facility]/SONIA/POLY/[facility]/IVETTE In-House, NP Swab in UTM/VTM, 2 HR TAT - Swab, Nasopharynx (06/29/2024 09:41)              Assessment and Plan   Diagnoses and all orders for this visit:    1. Pleural effusion (Primary)  -     cefpodoxime  (VANTIN) 200 MG tablet; Take 1 tablet by mouth Every 12 (Twelve) Hours for 5 days.  Dispense: 10 tablet; Refill: 0    2. At high risk for infection due to chemotherapy  -     cefpodoxime (VANTIN) 200 MG tablet; Take 1 tablet by mouth Every 12 (Twelve) Hours for 5 days.  Dispense: 10 tablet; Refill: 0  -     Gastrointestinal Panel, PCR - Stool, Per Rectum; Future    3. Infection due to human metapneumovirus (hMPV)    4. Diarrhea, unspecified type  -     Cancel: Stool Culture (Reference Lab) - Stool, Per Rectum; Future  -     Gastrointestinal Panel, PCR - Stool, Per Rectum; Future    5. Nausea and vomiting, unspecified vomiting type    6. Immunodeficiency due to drugs (CODE)  -     Gastrointestinal Panel, PCR - Stool, Per Rectum; Future      - Will change antibiotic from Augmentin to Cefpodoxime due to diarrhea. Will obtain GI panel due to diarrhea and antibiotic use with decreased immune system due to chemotherapy.   - Encouraged increased hydration and use of promethazine as needed for nausea.  - Discussed deep breathing and coughing. Patient reports use of incentive spirometer at home.            Follow Up   Return if symptoms worsen or fail to improve.  Patient was given instructions and counseling regarding her condition or for health maintenance advice. Please see specific information pulled into the AVS if appropriate.     Signed by:    SKYLER Mccrary Date: 07/03/24

## 2024-07-08 NOTE — PROGRESS NOTES
Additional new liver lesion may be parenchymal or subcapsular and may be metastatic. Multifocal new peritoneal/omental/mesenteric nodules, most consistent with etastasis/carcinomatosis. New small volume ascites, some of which appears compartmentalized or partially compartmentalized, may be malignant ascites.   7/17/2023-recommend second line treatment with Onivyde and 5-FU.  Treatment is palliative.  7/17/23 Chemotherapy consent signed for Onivyde, Leucovorin, CI 5FU   7/25/23 Initiated Onivyde 50 mg/M2, Leucovorin, CI 5FU every 2 weeks  7/25/23 CA [id number]  8/22/23 CA 19-9-649, CEA 9.1  10/09/23 CT Chest W Contrast  Multiple solid scattered pulmonary nodules are stable.There is a stable solid nodule in the right upper lobe posteriorly measuring 0.3 cm.A 0.4 cm nodule in the right lower lobe . A solid nodule in the measuring 0.3 cm is unchanged. There is a stable cystic lesion at the dome of the right hepatic lobe measuring 8.1 cm. Possible new lymphadenopathy the left-sided thoracic inlet.Probable metastatic right cardiophrenic angle lymph node.Trace pleural effusions.Suggestion of peritoneal carcinomatosis.Stable cyst at the dome of the right hepatic lobe. Trace ascites.  10/09/23 CT Abd/Pelvis W IV Contrast (oral) Stable CT of the abdomen and pelvis.   10/11/23 Tumor Markers: CEA 7.4, CA [id number]  12/26/23 Tumor Markers: CEA 5.5, CA 19-9-194  1/2/24 CT Chest W Contrast  No evidence of metastatic disease in the chest.Trace layering right pleural effusion.   1/2/24 CT Abd/Pelvis W IV Contrast (oral) Findings suspicious for mesenteric implant/mesenteric mass as noted above in the left upper quadrant.Cystic lesions in the pancreatic head and uncinate process which have increased in size compared to prior. No change in the low density ovoid lesion within the right lobe of the liver.  The smaller subcapsular nodule air fluid is also unchanged. Ascites. Absent spleen.  There is fluid and some nodular like thickening

## 2024-07-09 ENCOUNTER — OFFICE VISIT (OUTPATIENT)
Dept: HEMATOLOGY | Age: 79
End: 2024-07-09
Payer: MEDICARE

## 2024-07-09 ENCOUNTER — LAB (OUTPATIENT)
Dept: INTERNAL MEDICINE | Facility: CLINIC | Age: 79
End: 2024-07-09
Payer: MEDICARE

## 2024-07-09 ENCOUNTER — APPOINTMENT (OUTPATIENT)
Dept: INFUSION THERAPY | Age: 79
End: 2024-07-09
Payer: MEDICARE

## 2024-07-09 ENCOUNTER — HOSPITAL ENCOUNTER (OUTPATIENT)
Dept: INFUSION THERAPY | Age: 79
Discharge: HOME | End: 2024-07-09
Payer: MEDICARE

## 2024-07-09 ENCOUNTER — OFFICE VISIT (OUTPATIENT)
Dept: HEMATOLOGY | Age: 79
End: 2024-07-09

## 2024-07-09 ENCOUNTER — OFFICE VISIT (OUTPATIENT)
Dept: PALLATIVE CARE | Age: 79
End: 2024-07-09

## 2024-07-09 VITALS
RESPIRATION RATE: 18 BRPM | HEIGHT: 65 IN | TEMPERATURE: 98.3 F | BODY MASS INDEX: 22.57 KG/M2 | WEIGHT: 135.5 LBS | DIASTOLIC BLOOD PRESSURE: 72 MMHG | HEART RATE: 77 BPM | OXYGEN SATURATION: 100 % | SYSTOLIC BLOOD PRESSURE: 114 MMHG

## 2024-07-09 VITALS — HEIGHT: 65 IN | BODY MASS INDEX: 22.55 KG/M2

## 2024-07-09 DIAGNOSIS — C25.2 MALIGNANT NEOPLASM OF TAIL OF PANCREAS (HCC): Primary | ICD-10-CM

## 2024-07-09 DIAGNOSIS — Z92.21 AT HIGH RISK FOR INFECTION DUE TO CHEMOTHERAPY: ICD-10-CM

## 2024-07-09 DIAGNOSIS — C79.9 METASTASIS FROM PANCREATIC CANCER (HCC): Primary | ICD-10-CM

## 2024-07-09 DIAGNOSIS — G62.0 CHEMOTHERAPY-INDUCED NEUROPATHY (HCC): ICD-10-CM

## 2024-07-09 DIAGNOSIS — Z71.89 CARE PLAN DISCUSSED WITH PATIENT: ICD-10-CM

## 2024-07-09 DIAGNOSIS — D84.821 IMMUNODEFICIENCY DUE TO DRUGS (CODE): ICD-10-CM

## 2024-07-09 DIAGNOSIS — Z51.5 ENCOUNTER FOR PALLIATIVE CARE: ICD-10-CM

## 2024-07-09 DIAGNOSIS — R53.83 CHEMOTHERAPY-INDUCED FATIGUE: ICD-10-CM

## 2024-07-09 DIAGNOSIS — C25.2 MALIGNANT NEOPLASM OF TAIL OF PANCREAS (HCC): ICD-10-CM

## 2024-07-09 DIAGNOSIS — F41.9 ANXIETY IN CANCER PATIENT: ICD-10-CM

## 2024-07-09 DIAGNOSIS — G89.3 CANCER ASSOCIATED PAIN: ICD-10-CM

## 2024-07-09 DIAGNOSIS — R63.0 DECREASED APPETITE: ICD-10-CM

## 2024-07-09 DIAGNOSIS — J06.9 URI WITH COUGH AND CONGESTION: ICD-10-CM

## 2024-07-09 DIAGNOSIS — R53.0 NEOPLASTIC MALIGNANT RELATED FATIGUE: Primary | ICD-10-CM

## 2024-07-09 DIAGNOSIS — J12.3 PNEUMONIA DUE TO HUMAN METAPNEUMOVIRUS: ICD-10-CM

## 2024-07-09 DIAGNOSIS — T45.1X5A CHEMOTHERAPY-INDUCED FATIGUE: ICD-10-CM

## 2024-07-09 DIAGNOSIS — C25.9 METASTASIS FROM PANCREATIC CANCER (HCC): Primary | ICD-10-CM

## 2024-07-09 DIAGNOSIS — R19.7 DIARRHEA, UNSPECIFIED TYPE: ICD-10-CM

## 2024-07-09 DIAGNOSIS — Z51.11 CHEMOTHERAPY MANAGEMENT, ENCOUNTER FOR: ICD-10-CM

## 2024-07-09 DIAGNOSIS — T45.1X5D ADVERSE EFFECT OF CHEMOTHERAPY, SUBSEQUENT ENCOUNTER: ICD-10-CM

## 2024-07-09 DIAGNOSIS — T45.1X5A CHEMOTHERAPY-INDUCED NEUROPATHY (HCC): ICD-10-CM

## 2024-07-09 DIAGNOSIS — Z91.89 AT HIGH RISK FOR INFECTION DUE TO CHEMOTHERAPY: ICD-10-CM

## 2024-07-09 LAB
ALBUMIN SERPL-MCNC: 3.8 G/DL (ref 3.5–5.2)
ALP SERPL-CCNC: 134 U/L (ref 35–104)
ALT SERPL-CCNC: 13 U/L (ref 9–52)
ANION GAP SERPL CALCULATED.3IONS-SCNC: 7 MMOL/L (ref 7–19)
AST SERPL-CCNC: 39 U/L (ref 14–36)
BASOPHILS # BLD: 0.08 K/UL (ref 0.01–0.08)
BASOPHILS NFR BLD: 0.8 % (ref 0.1–1.2)
BILIRUB SERPL-MCNC: 0.7 MG/DL (ref 0.2–1.3)
BUN SERPL-MCNC: 6 MG/DL (ref 7–17)
CALCIUM SERPL-MCNC: 9.2 MG/DL (ref 8.4–10.2)
CANCER AG19-9 SERPL-ACNC: 2081 U/ML (ref 0–35)
CEA SERPL-MCNC: 19.8 NG/ML (ref 0–4.7)
CHLORIDE SERPL-SCNC: 102 MMOL/L (ref 98–111)
CO2 SERPL-SCNC: 30 MMOL/L (ref 22–29)
CREAT SERPL-MCNC: 0.6 MG/DL (ref 0.5–1)
EOSINOPHIL # BLD: 0.3 K/UL (ref 0.04–0.54)
EOSINOPHIL NFR BLD: 2.9 % (ref 0.7–7)
ERYTHROCYTE [DISTWIDTH] IN BLOOD BY AUTOMATED COUNT: 15.6 % (ref 11.7–14.4)
GLOBULIN: 3.2 G/DL
GLUCOSE SERPL-MCNC: 89 MG/DL (ref 74–106)
HCT VFR BLD AUTO: 36.3 % (ref 34.1–44.9)
HGB BLD-MCNC: 12.3 G/DL (ref 11.2–15.7)
LYMPHOCYTES # BLD: 4.63 K/UL (ref 1.18–3.74)
LYMPHOCYTES NFR BLD: 45.1 % (ref 19.3–53.1)
MCH RBC QN AUTO: 31.5 PG (ref 25.6–32.2)
MCHC RBC AUTO-ENTMCNC: 33.9 G/DL (ref 32.3–35.5)
MCV RBC AUTO: 93.1 FL (ref 79.4–94.8)
MONOCYTES # BLD: 1.49 K/UL (ref 0.24–0.82)
MONOCYTES NFR BLD: 14.5 % (ref 4.7–12.5)
NEUTROPHILS # BLD: 3.73 K/UL (ref 1.56–6.13)
NEUTS SEG NFR BLD: 36.4 % (ref 34–71.1)
PLATELET # BLD AUTO: 410 K/UL (ref 182–369)
PMV BLD AUTO: 9.8 FL (ref 7.4–10.4)
POTASSIUM SERPL-SCNC: 4.2 MMOL/L (ref 3.5–5.1)
PROT SERPL-MCNC: 7 G/DL (ref 6.3–8.2)
RBC # BLD AUTO: 3.9 M/UL (ref 3.93–5.22)
SODIUM SERPL-SCNC: 139 MMOL/L (ref 137–145)
WBC # BLD AUTO: 10.26 K/UL (ref 3.98–10.04)

## 2024-07-09 PROCEDURE — 2580000003 HC RX 258: Performed by: INTERNAL MEDICINE

## 2024-07-09 PROCEDURE — 1090F PRES/ABSN URINE INCON ASSESS: CPT | Performed by: INTERNAL MEDICINE

## 2024-07-09 PROCEDURE — G8427 DOCREV CUR MEDS BY ELIG CLIN: HCPCS | Performed by: INTERNAL MEDICINE

## 2024-07-09 PROCEDURE — 96366 THER/PROPH/DIAG IV INF ADDON: CPT

## 2024-07-09 PROCEDURE — 80053 COMPREHEN METABOLIC PANEL: CPT

## 2024-07-09 PROCEDURE — 96415 CHEMO IV INFUSION ADDL HR: CPT

## 2024-07-09 PROCEDURE — 1123F ACP DISCUSS/DSCN MKR DOCD: CPT | Performed by: INTERNAL MEDICINE

## 2024-07-09 PROCEDURE — 96367 TX/PROPH/DG ADDL SEQ IV INF: CPT

## 2024-07-09 PROCEDURE — G8420 CALC BMI NORM PARAMETERS: HCPCS | Performed by: INTERNAL MEDICINE

## 2024-07-09 PROCEDURE — G0498 CHEMO EXTEND IV INFUS W/PUMP: HCPCS

## 2024-07-09 PROCEDURE — 96375 TX/PRO/DX INJ NEW DRUG ADDON: CPT

## 2024-07-09 PROCEDURE — 99214 OFFICE O/P EST MOD 30 MIN: CPT | Performed by: INTERNAL MEDICINE

## 2024-07-09 PROCEDURE — 85025 COMPLETE CBC W/AUTO DIFF WBC: CPT

## 2024-07-09 PROCEDURE — G2211 COMPLEX E/M VISIT ADD ON: HCPCS | Performed by: INTERNAL MEDICINE

## 2024-07-09 PROCEDURE — 96413 CHEMO IV INFUSION 1 HR: CPT

## 2024-07-09 PROCEDURE — 6360000002 HC RX W HCPCS: Performed by: INTERNAL MEDICINE

## 2024-07-09 PROCEDURE — 1036F TOBACCO NON-USER: CPT | Performed by: INTERNAL MEDICINE

## 2024-07-09 PROCEDURE — G8399 PT W/DXA RESULTS DOCUMENT: HCPCS | Performed by: INTERNAL MEDICINE

## 2024-07-09 PROCEDURE — 96368 THER/DIAG CONCURRENT INF: CPT

## 2024-07-09 PROCEDURE — 36415 COLL VENOUS BLD VENIPUNCTURE: CPT

## 2024-07-09 RX ORDER — DEXTROSE MONOHYDRATE 50 MG/ML
5-250 INJECTION, SOLUTION INTRAVENOUS PRN
Status: CANCELLED | OUTPATIENT
Start: 2024-07-09

## 2024-07-09 RX ORDER — SODIUM CHLORIDE 0.9 % (FLUSH) 0.9 %
5-40 SYRINGE (ML) INJECTION PRN
Status: CANCELLED | OUTPATIENT
Start: 2024-07-09

## 2024-07-09 RX ORDER — FAMOTIDINE 10 MG/ML
20 INJECTION, SOLUTION INTRAVENOUS
Status: CANCELLED | OUTPATIENT
Start: 2024-07-09

## 2024-07-09 RX ORDER — PALONOSETRON 0.05 MG/ML
0.25 INJECTION, SOLUTION INTRAVENOUS ONCE
Status: COMPLETED | OUTPATIENT
Start: 2024-07-09 | End: 2024-07-09

## 2024-07-09 RX ORDER — MEPERIDINE HYDROCHLORIDE 50 MG/ML
12.5 INJECTION INTRAMUSCULAR; INTRAVENOUS; SUBCUTANEOUS PRN
Status: CANCELLED | OUTPATIENT
Start: 2024-07-09

## 2024-07-09 RX ORDER — SODIUM CHLORIDE 0.9 % (FLUSH) 0.9 %
5-40 SYRINGE (ML) INJECTION PRN
Status: CANCELLED | OUTPATIENT
Start: 2024-07-11

## 2024-07-09 RX ORDER — FENTANYL 12.5 UG/1
1 PATCH TRANSDERMAL
Qty: 10 PATCH | Refills: 0 | Status: SHIPPED | OUTPATIENT
Start: 2024-07-09 | End: 2024-08-08

## 2024-07-09 RX ORDER — EPINEPHRINE 1 MG/ML
0.3 INJECTION, SOLUTION, CONCENTRATE INTRAVENOUS PRN
Status: CANCELLED | OUTPATIENT
Start: 2024-07-09

## 2024-07-09 RX ORDER — ALBUTEROL SULFATE 90 UG/1
4 AEROSOL, METERED RESPIRATORY (INHALATION) PRN
Status: CANCELLED | OUTPATIENT
Start: 2024-07-09

## 2024-07-09 RX ORDER — SODIUM CHLORIDE 9 MG/ML
5-250 INJECTION, SOLUTION INTRAVENOUS PRN
OUTPATIENT
Start: 2024-07-11

## 2024-07-09 RX ORDER — SODIUM CHLORIDE 9 MG/ML
5-250 INJECTION, SOLUTION INTRAVENOUS PRN
Status: CANCELLED | OUTPATIENT
Start: 2024-07-09

## 2024-07-09 RX ORDER — HEPARIN SODIUM (PORCINE) LOCK FLUSH IV SOLN 100 UNIT/ML 100 UNIT/ML
500 SOLUTION INTRAVENOUS PRN
Status: CANCELLED | OUTPATIENT
Start: 2024-07-11

## 2024-07-09 RX ORDER — ONDANSETRON 2 MG/ML
8 INJECTION INTRAMUSCULAR; INTRAVENOUS
Status: CANCELLED | OUTPATIENT
Start: 2024-07-09

## 2024-07-09 RX ORDER — PALONOSETRON 0.05 MG/ML
0.25 INJECTION, SOLUTION INTRAVENOUS ONCE
Status: CANCELLED | OUTPATIENT
Start: 2024-07-09 | End: 2024-07-09

## 2024-07-09 RX ORDER — SODIUM CHLORIDE 9 MG/ML
INJECTION, SOLUTION INTRAVENOUS CONTINUOUS
Status: CANCELLED | OUTPATIENT
Start: 2024-07-09

## 2024-07-09 RX ORDER — DIPHENHYDRAMINE HYDROCHLORIDE 50 MG/ML
50 INJECTION INTRAMUSCULAR; INTRAVENOUS
Status: CANCELLED | OUTPATIENT
Start: 2024-07-09

## 2024-07-09 RX ORDER — HEPARIN SODIUM (PORCINE) LOCK FLUSH IV SOLN 100 UNIT/ML 100 UNIT/ML
500 SOLUTION INTRAVENOUS PRN
Status: CANCELLED | OUTPATIENT
Start: 2024-07-09

## 2024-07-09 RX ORDER — ACETAMINOPHEN 325 MG/1
650 TABLET ORAL
Status: CANCELLED | OUTPATIENT
Start: 2024-07-09

## 2024-07-09 RX ADMIN — PALONOSETRON 0.25 MG: 0.05 INJECTION, SOLUTION INTRAVENOUS at 12:32

## 2024-07-09 RX ADMIN — FLUOROURACIL 4000 MG: 50 INJECTION, SOLUTION INTRAVENOUS at 15:21

## 2024-07-09 RX ADMIN — DEXAMETHASONE SODIUM PHOSPHATE: 10 INJECTION, SOLUTION INTRAMUSCULAR; INTRAVENOUS at 12:32

## 2024-07-09 RX ADMIN — OXALIPLATIN 100 MG: 5 INJECTION, SOLUTION INTRAVENOUS at 13:13

## 2024-07-09 RX ADMIN — LEUCOVORIN CALCIUM 200 MG: 200 INJECTION, POWDER, LYOPHILIZED, FOR SUSPENSION INTRAMUSCULAR; INTRAVENOUS at 13:12

## 2024-07-09 RX ADMIN — SODIUM CHLORIDE 150 MG: 9 INJECTION, SOLUTION INTRAVENOUS at 12:42

## 2024-07-09 NOTE — PROGRESS NOTES
Disp: 60 tablet, Rfl: 3    doxazosin (CARDURA) 2 MG tablet, Take 1 tablet by mouth every evening, Disp: 30 tablet, Rfl: 3    losartan (COZAAR) 25 MG tablet, Take 1 tablet by mouth 2 times daily, Disp: 30 tablet, Rfl: 3    metoclopramide (REGLAN) 10 MG tablet, Take 1 tablet by mouth 3 times daily as needed (nausea) (Patient not taking: Reported on 6/17/2024), Disp: 60 tablet, Rfl: 3    metoprolol succinate (TOPROL XL) 50 MG extended release tablet, Take 1 tablet by mouth 2 times daily, Disp: 30 tablet, Rfl: 3    NIFEdipine (PROCARDIA XL) 30 MG extended release tablet, Take 1 tablet by mouth daily, Disp: 30 tablet, Rfl: 5    loratadine (CLARITIN) 10 MG tablet, Take 1 tablet by mouth daily (Patient taking differently: Take 1 tablet by mouth as needed), Disp: 30 tablet, Rfl: 0    levothyroxine (SYNTHROID) 137 MCG tablet, Take 1 tablet by mouth Daily Indications: Underactive Thyroid, Disp: , Rfl:     Cholecalciferol (VITAMIN D3) 10 MCG (400 UNIT) CAPS, Take 1 capsule by mouth daily (Patient not taking: Reported on 6/17/2024), Disp: , Rfl:     Magic Mouthwash (MIRACLE MOUTHWASH), Swish and spit 5 mLs 4 times daily as needed for Irritation 1/3 viscous lidocaine, 1/3 benadryl, 1/3 Maalox. (Patient not taking: Reported on 6/17/2024), Disp: 480 mL, Rfl: 1    Caltrate 600+D Plus Minerals (CALTRATE) 600-800 MG-UNIT TABS tablet, Take 1 tablet by mouth daily (Patient not taking: Reported on 6/17/2024), Disp: , Rfl:     busPIRone (BUSPAR) 5 MG tablet, Take 1 tablet by mouth 3 times daily (Patient not taking: Reported on 6/17/2024), Disp: , Rfl:     ondansetron (ZOFRAN) 4 MG tablet, Take 1 tablet by mouth every 8 hours as needed for Nausea or Vomiting (Patient not taking: Reported on 6/17/2024), Disp: 10 tablet, Rfl: 0     Allergies:  Diclofenac, Oxycodone-acetaminophen, and Doxycycline    OBJECTIVE:     General appearance:  alert and cooperative with exam, vital signs stable, well nourished, well developed, chronically ill

## 2024-07-09 NOTE — PROGRESS NOTES
Comprehensive Nutrition Assessment    Type and Reason for Visit:  Reassess    Malnutrition Assessment:  Malnutrition Status:  Moderate malnutrition (07/09/24 1251)    Context:  Chronic Illness     Findings of the 6 clinical characteristics of malnutrition:  Energy Intake:  Mild decrease in energy intake (Comment)  Weight Loss:  5% over 1 month     Body Fat Loss:  Mild body fat loss Orbital, Buccal region   Muscle Mass Loss:  Mild muscle mass loss Temples (temporalis), Clavicles (pectoralis & deltoids)  Fluid Accumulation:  No significant fluid accumulation     Strength:  Not Performed    Nutrition Assessment:    RN 79 y/o female presents 7/9/24 for follow-up RD evaluation. Pt presents moderately malnourished AEB mild muscle wasting, mild fat loss, 5% wt loss in 1 month, and decreased energy intake. Pt has lost 7lbs (5%) of body wt in the past 3 weeks. She attributes most of the wt loss to medication-induced diarrhea. She was prescribed Augmentin for human meta pneumovirus and states she had severe diarrhea for several days. She had very poor appetite and N/V during this time. Diarrhea has subsided and pt notes her appetite is improving slightly but remains poor overall. She denies any other nutrition-impact symptoms.       Diet History:  Pt reports she ate very little for the last 2 weeks due to acute illness. She notes her intake has just started to  over the past 2 days. She reports usual intake has been small snacks throughout the day as opposed to large meals. Her  prepares food for her. She notes frequently eating scrambled eggs, fruit, jello, pudding, cereal. She drinks milk, water, tea, and Sprite. Pt also drinks 1-2 Boost, Ensure, or [place] Instant Breakfast shakes daily.   Pt is treated with Oxaliplatin and demonstrates understanding of avoiding cold food/drink for 3-4 days following treatment.     Nutrition Related Findings:    BUN:Cr 10, Na+ 139, K+ 4.2, glu 89, GFR

## 2024-07-11 ENCOUNTER — HOSPITAL ENCOUNTER (OUTPATIENT)
Dept: INFUSION THERAPY | Age: 79
Discharge: HOME | End: 2024-07-11
Payer: MEDICARE

## 2024-07-11 DIAGNOSIS — C25.9 METASTASIS FROM PANCREATIC CANCER (HCC): ICD-10-CM

## 2024-07-11 DIAGNOSIS — C25.2 MALIGNANT NEOPLASM OF TAIL OF PANCREAS (HCC): Primary | ICD-10-CM

## 2024-07-11 DIAGNOSIS — C79.9 METASTASIS FROM PANCREATIC CANCER (HCC): ICD-10-CM

## 2024-07-11 LAB
ADV 40+41 DNA STL QL NAA+NON-PROBE: NOT DETECTED
ASTRO TYP 1-8 RNA STL QL NAA+NON-PROBE: NOT DETECTED
C CAYETANENSIS DNA STL QL NAA+NON-PROBE: NOT DETECTED
C COLI+JEJ+UPSA DNA STL QL NAA+NON-PROBE: NOT DETECTED
C DIF TOX TCDA+TCDB STL QL NAA+NON-PROBE: NOT DETECTED
CRYPTOSP DNA STL QL NAA+NON-PROBE: NOT DETECTED
E COLI O157 DNA STL QL NAA+NON-PROBE: NORMAL
E HISTOLYT DNA STL QL NAA+NON-PROBE: NOT DETECTED
EAEC PAA PLAS AGGR+AATA ST NAA+NON-PRB: NOT DETECTED
EC STX1+STX2 GENES STL QL NAA+NON-PROBE: NOT DETECTED
EPEC EAE GENE STL QL NAA+NON-PROBE: NOT DETECTED
ETEC LTA+ST1A+ST1B TOX ST NAA+NON-PROBE: NOT DETECTED
G LAMBLIA DNA STL QL NAA+NON-PROBE: NOT DETECTED
NOROVIRUS GI+II RNA STL QL NAA+NON-PROBE: NOT DETECTED
P SHIGELLOIDES DNA STL QL NAA+NON-PROBE: NOT DETECTED
RVA RNA STL QL NAA+NON-PROBE: NOT DETECTED
S ENT+BONG DNA STL QL NAA+NON-PROBE: NOT DETECTED
SAPO I+II+IV+V RNA STL QL NAA+NON-PROBE: NOT DETECTED
SHIGELLA SP+EIEC IPAH ST NAA+NON-PROBE: NOT DETECTED
V CHOL+PARA+VUL DNA STL QL NAA+NON-PROBE: NOT DETECTED
V CHOLERAE DNA STL QL NAA+NON-PROBE: NOT DETECTED
Y ENTEROCOL DNA STL QL NAA+NON-PROBE: NOT DETECTED

## 2024-07-11 PROCEDURE — 2580000003 HC RX 258: Performed by: INTERNAL MEDICINE

## 2024-07-11 PROCEDURE — 6360000002 HC RX W HCPCS: Performed by: INTERNAL MEDICINE

## 2024-07-11 RX ORDER — HEPARIN 100 UNIT/ML
500 SYRINGE INTRAVENOUS PRN
Status: DISCONTINUED | OUTPATIENT
Start: 2024-07-11 | End: 2024-07-12 | Stop reason: HOSPADM

## 2024-07-11 RX ORDER — SODIUM CHLORIDE 0.9 % (FLUSH) 0.9 %
5-40 SYRINGE (ML) INJECTION PRN
Status: DISCONTINUED | OUTPATIENT
Start: 2024-07-11 | End: 2024-07-12 | Stop reason: HOSPADM

## 2024-07-11 RX ADMIN — HEPARIN 500 UNITS: 100 SYRINGE at 11:52

## 2024-07-11 RX ADMIN — SODIUM CHLORIDE, PRESERVATIVE FREE 10 ML: 5 INJECTION INTRAVENOUS at 11:52

## 2024-07-12 ENCOUNTER — TELEPHONE (OUTPATIENT)
Dept: INTERNAL MEDICINE | Facility: CLINIC | Age: 79
End: 2024-07-12
Payer: MEDICARE

## 2024-07-12 NOTE — PROGRESS NOTES
Pt returned my call.  Results given.  Pt states that she still has a cough but the diarrhea has gotten much better.  Pt advised to contact the office with any issues or concerns.  Pt voiced understanding

## 2024-07-12 NOTE — PROGRESS NOTES
Tried to call pt to discuss GI panel results.  LM to return my call.  Gave hub the okay to give normal results.

## 2024-07-12 NOTE — TELEPHONE ENCOUNTER
"Tried to call pt to give GI Panel results.  LM to return my call.    Relay     \"GI panel is negative for infectious cause of diarrhea. \"                  "

## 2024-07-12 NOTE — TELEPHONE ENCOUNTER
----- Message from Sourav William sent at 7/12/2024  8:11 AM CDT -----  GI panel is negative for infectious cause of diarrhea.

## 2024-07-15 ENCOUNTER — OFFICE VISIT (OUTPATIENT)
Dept: INTERNAL MEDICINE | Facility: CLINIC | Age: 79
End: 2024-07-15
Payer: MEDICARE

## 2024-07-15 VITALS
DIASTOLIC BLOOD PRESSURE: 78 MMHG | HEART RATE: 75 BPM | HEIGHT: 65 IN | OXYGEN SATURATION: 96 % | SYSTOLIC BLOOD PRESSURE: 138 MMHG | TEMPERATURE: 98.2 F | WEIGHT: 134 LBS | BODY MASS INDEX: 22.33 KG/M2

## 2024-07-15 DIAGNOSIS — R60.0 LOCALIZED EDEMA: ICD-10-CM

## 2024-07-15 DIAGNOSIS — Z23 NEED FOR PNEUMOCOCCAL VACCINATION: Primary | ICD-10-CM

## 2024-07-15 DIAGNOSIS — R06.02 SOB (SHORTNESS OF BREATH): ICD-10-CM

## 2024-07-15 DIAGNOSIS — E03.9 ACQUIRED HYPOTHYROIDISM: ICD-10-CM

## 2024-07-15 DIAGNOSIS — I10 ESSENTIAL HYPERTENSION: ICD-10-CM

## 2024-07-15 PROCEDURE — 1159F MED LIST DOCD IN RCRD: CPT | Performed by: FAMILY MEDICINE

## 2024-07-15 PROCEDURE — 1170F FXNL STATUS ASSESSED: CPT | Performed by: FAMILY MEDICINE

## 2024-07-15 PROCEDURE — 90677 PCV20 VACCINE IM: CPT | Performed by: FAMILY MEDICINE

## 2024-07-15 PROCEDURE — 3078F DIAST BP <80 MM HG: CPT | Performed by: FAMILY MEDICINE

## 2024-07-15 PROCEDURE — 99213 OFFICE O/P EST LOW 20 MIN: CPT | Performed by: FAMILY MEDICINE

## 2024-07-15 PROCEDURE — 1160F RVW MEDS BY RX/DR IN RCRD: CPT | Performed by: FAMILY MEDICINE

## 2024-07-15 PROCEDURE — G0009 ADMIN PNEUMOCOCCAL VACCINE: HCPCS | Performed by: FAMILY MEDICINE

## 2024-07-15 PROCEDURE — 3075F SYST BP GE 130 - 139MM HG: CPT | Performed by: FAMILY MEDICINE

## 2024-07-15 PROCEDURE — G0439 PPPS, SUBSEQ VISIT: HCPCS | Performed by: FAMILY MEDICINE

## 2024-07-15 PROCEDURE — 99497 ADVNCD CARE PLAN 30 MIN: CPT | Performed by: FAMILY MEDICINE

## 2024-07-15 RX ORDER — LOSARTAN POTASSIUM 25 MG/1
25 TABLET ORAL DAILY
Qty: 90 TABLET | Refills: 3 | Status: SHIPPED | OUTPATIENT
Start: 2024-07-15

## 2024-07-15 RX ORDER — LEVOTHYROXINE SODIUM 0.15 MG/1
150 TABLET ORAL DAILY
Qty: 90 TABLET | Refills: 3 | Status: SHIPPED | OUTPATIENT
Start: 2024-07-15

## 2024-07-15 RX ORDER — FUROSEMIDE 20 MG/1
20 TABLET ORAL 2 TIMES DAILY
Qty: 180 TABLET | Refills: 3 | Status: SHIPPED | OUTPATIENT
Start: 2024-07-15

## 2024-07-15 RX ORDER — DOXAZOSIN 2 MG/1
2 TABLET ORAL EVERY EVENING
Qty: 90 TABLET | Refills: 3 | Status: SHIPPED | OUTPATIENT
Start: 2024-07-15

## 2024-07-15 RX ORDER — POTASSIUM CHLORIDE 750 MG/1
10 TABLET, FILM COATED, EXTENDED RELEASE ORAL DAILY
Qty: 90 TABLET | Refills: 3 | Status: SHIPPED | OUTPATIENT
Start: 2024-07-15

## 2024-07-15 RX ORDER — ALBUTEROL SULFATE 90 UG/1
2 AEROSOL, METERED RESPIRATORY (INHALATION) EVERY 4 HOURS PRN
Qty: 18 G | Refills: 3 | Status: SHIPPED | OUTPATIENT
Start: 2024-07-15

## 2024-07-15 RX ORDER — NIFEDIPINE 30 MG/1
30 TABLET, EXTENDED RELEASE ORAL DAILY
Qty: 90 TABLET | Refills: 3 | Status: SHIPPED | OUTPATIENT
Start: 2024-07-15

## 2024-07-15 RX ORDER — METOPROLOL SUCCINATE 50 MG/1
50 TABLET, EXTENDED RELEASE ORAL 2 TIMES DAILY
Qty: 180 TABLET | Refills: 3 | Status: SHIPPED | OUTPATIENT
Start: 2024-07-15

## 2024-07-15 NOTE — LETTER
[facility]  Vaccine Consent Form    Patient Name:  Ирина Randolph  Patient :  1945     Vaccine(s) Ordered    Pneumococcal Conjugate Vaccine 20-Valent All        Screening Checklist  The following questions should be completed prior to vaccination. If you answer “yes” to any question, it does not necessarily mean you should not be vaccinated. It just means we may need to clarify or ask more questions. If a question is unclear, please ask your healthcare provider to explain it.    Yes No   Any fever or moderate to severe illness today (mild illness and/or antibiotic treatment are not contraindications)?     Do you have a history of a serious reaction to any previous vaccinations, such as anaphylaxis, encephalopathy within 7 days, Guillain-Lewiston syndrome within 6 weeks, seizure?     Have you received any live vaccine(s) (e.g MMR, MARCELA) or any other vaccines in the last month (to ensure duplicate doses aren't given)?     Do you have an anaphylactic allergy to latex (DTaP, DTaP-IPV, Hep A, Hep B, MenB, RV, Td, Tdap), baker’s yeast (Hep B, HPV), polysorbates (RSV, nirsevimab, PCV 20, Rotavirrus, Tdap, Shingrix), or gelatin (MARCELA, MMR)?     Do you have an anaphylactic allergy to neomycin (Rabies, MARCELA, MMR, IPV, Hep A), polymyxin B (IPV), or streptomycin (IPV)?      Any cancer, leukemia, AIDS, or other immune system disorder? (MARCELA, MMR, RV)     Do you have a parent, brother, or sister with an immune system problem (if immune competence of vaccine recipient clinically verified, can proceed)? (MMR, MARCELA)     Any recent steroid treatments for >2 weeks, chemotherapy, or radiation treatment? (MARCELA, MMR)     Have you received antibody-containing blood transfusions or IVIG in the past 11 months (recommended interval is dependent on product)? (MMR, MARCELA)     Have you taken antiviral drugs (acyclovir, famciclovir, valacyclovir for MARCELA) in the last 24 or 48 hours, respectively?      Are you pregnant or planning to become  "pregnant within 1 month? (MARCELA, MMR, HPV, IPV, MenB, Abrexvy; For Hep B- refer to Engerix-B; For RSV - Abrysvo is indicated for 32-36 weeks of pregnancy from September to January)     For infants, have you ever been told your child has had intussusception or a medical emergency involving obstruction of the intestine (Rotavirus)? If not for an infant, can skip this question.         *Ordering Physicians/APC should be consulted if \"yes\" is checked by the patient or guardian above.  I have received, read, and understand the Vaccine Information Statement (VIS) for each vaccine ordered.  I have considered my or my child's health status as well as the health status of my close contacts.  I have taken the opportunity to discuss my vaccine questions with my or my child's health care provider.   I have requested that the ordered vaccine(s) be given to me or my child.  I understand the benefits and risks of the vaccines.  I understand that I should remain in the clinic for 15 minutes after receiving the vaccine(s).  _________________________________________________________  Signature of Patient or Parent/Legal Guardian ____________________  Date     "

## 2024-07-15 NOTE — PROGRESS NOTES
The ABCs of the Annual Wellness Visit  Subsequent Medicare Wellness Visit    Subjective    Ирина Randolph is a 78 y.o. female who presents for a Subsequent Medicare Wellness Visit.    The following portions of the patient's history were reviewed and   updated as appropriate: allergies, current medications, past family history, past medical history, past social history, past surgical history, and problem list.    Compared to one year ago, the patient feels her physical   health is worse.    Compared to one year ago, the patient feels her mental   health is the same.    Recent Hospitalizations:  She was not admitted to the hospital during the last year.       Current Medical Providers:  Patient Care Team:  Franco Mendes MD as PCP - General (Family Medicine)  Homer Zamarripa DO as Consulting Physician (Gastroenterology)    Outpatient Medications Prior to Visit   Medication Sig Dispense Refill    ACETAMINOPHEN PO Take  by mouth.      ALPRAZolam (Xanax) 0.5 MG tablet Take 0.5 tablets by mouth 2 (Two) Times a Day As Needed for Anxiety. 30 tablet 1    Denta 5000 Plus 1.1 % cream Apply 1 application topically to the appropriate area as directed Daily As Needed. Apply with toothbrush      HYDROcodone-acetaminophen (NORCO) 7.5-325 MG per tablet Take 1 tablet by mouth Every 6 (Six) Hours As Needed for Moderate Pain.      lidocaine-prilocaine (EMLA) 2.5-2.5 % cream Apply 1 Application topically to the appropriate area as directed As Needed.      loratadine (CLARITIN) 10 MG tablet Take 1 tablet by mouth Daily As Needed for Allergies.      nystatin (MYCOSTATIN) 477367 UNIT/GM powder Apply 11,111 Applications topically to the appropriate area as directed 3 (Three) Times a Day.      pancrelipase, Lip-Prot-Amyl, (CREON) 36103-06313 units capsule delayed-release particles capsule Take 1 capsule by mouth 3 (Three) Times a Day With Meals.      promethazine (PHENERGAN) 12.5 MG tablet Take 1 tablet by mouth Every 6 (Six)  Hours As Needed for Nausea. for nausea      tacrolimus (PROTOPIC) 0.1 % ointment Apply 1 Application topically to the appropriate area as directed Daily. To psoriasis in groin      triamcinolone (KENALOG) 0.1 % cream Apply 1 Application topically to the appropriate area as directed 2 (Two) Times a Day.      albuterol sulfate  (90 Base) MCG/ACT inhaler Inhale 2 puffs Every 4 (Four) Hours As Needed for Wheezing. 18 g 3    doxazosin (CARDURA) 2 MG tablet TAKE 1 TABLET BY MOUTH EVERY DAY IN THE EVENING 90 tablet 3    furosemide (LASIX) 20 MG tablet Take 1 tablet by mouth 2 (Two) Times a Day. 180 tablet 3    levothyroxine (SYNTHROID, LEVOTHROID) 150 MCG tablet Take 1 tablet by mouth Daily. 90 tablet 3    losartan (COZAAR) 25 MG tablet Take 1 tablet by mouth Daily. 90 tablet 3    metoprolol succinate XL (TOPROL-XL) 50 MG 24 hr tablet Take 1 tablet by mouth 2 (Two) Times a Day. 180 tablet 3    NIFEdipine XL (PROCARDIA XL) 30 MG 24 hr tablet Take 1 tablet by mouth Daily. 90 tablet 3    potassium chloride 10 MEQ CR tablet Take 1 tablet by mouth Daily. 90 tablet 3     No facility-administered medications prior to visit.       Opioid medication/s are on active medication list.  and I have evaluated her active treatment plan and pain score trends (see table).  There were no vitals filed for this visit.  I have reviewed the chart for potential of high risk medication and harmful drug interactions in the elderly.          Aspirin is not on active medication list.  Aspirin use is not indicated based on review of current medical condition/s. Risk of harm outweighs potential benefits.  .    Patient Active Problem List   Diagnosis    Essential hypertension    Acquired hypothyroidism    Osteopenia of multiple sites    Osteoarthritis of multiple joints    Mixed hyperlipidemia    Heel spur, right    Post-nasal drip    History of colon polyps    Cough    Cancer    Malignant neoplasm of body of pancreas    Malignant neoplasm of  "tail of pancreas    Metastasis from pancreatic cancer    Pancreatic mass    Poor venous access    Palliative care patient    Pleural effusion    Hospital-acquired pneumonia    Volume overload    SHAVON (acute kidney injury)     Advance Care Planning   Advance Care Planning     Advance Directive is on file.  ACP discussion was held with the patient during this visit. Patient has an advance directive in EMR which is still valid.        Advanced Directives  I discussed with this patient the importance of advanced directives and planning ahead in the event that they are unable to make decisions on their own due to illness or incapacity.  We filled out a MOST form and discussed the different options including DNR, DNI, feeding tubes, Long term iv fluids and the use of antibiotics.  I discussed other options such as a health care surrogate, and or placing time limits on life saving measures should the need arise.  I discussed the importance of having kyra discussions with their family/healthcare surrogate about their wishes so they are well known.   I spent 16 minutes discussing/counseling these issues with the patient and MOST form will be scanned into the chart, also advised the patient to keep a copy and also make sure local hospital services also have a copy on file.  Patient also advised if they have a copy to their healthcare surrogate if they have chosen one.  Lastly we also advised the patient that if they want to change their wishes at any time that a new MOST form can be drafted to accommodate any changes.     Objective    Vitals:    07/15/24 1028   BP: 138/78   BP Location: Left arm   Patient Position: Sitting   Cuff Size: Adult   Pulse: 75   Temp: 98.2 °F (36.8 °C)   TempSrc: Infrared   SpO2: 96%   Weight: 60.8 kg (134 lb)   Height: 165.1 cm (65\")     Estimated body mass index is 22.3 kg/m² as calculated from the following:    Height as of this encounter: 165.1 cm (65\").    Weight as of this encounter: 60.8 kg " (134 lb).    BMI is within normal parameters. No other follow-up for BMI required.      Does the patient have evidence of cognitive impairment? No          HEALTH RISK ASSESSMENT    Smoking Status:  Social History     Tobacco Use   Smoking Status Never   Smokeless Tobacco Never     Alcohol Consumption:  Social History     Substance and Sexual Activity   Alcohol Use No     Fall Risk Screen:    TIEN Fall Risk Assessment was completed, and patient is at LOW risk for falls.Assessment completed on:7/15/2024    Depression Screenin/15/2024    10:36 AM   PHQ-2/PHQ-9 Depression Screening   Little Interest or Pleasure in Doing Things 0-->not at all   Feeling Down, Depressed or Hopeless 0-->not at all   PHQ-9: Brief Depression Severity Measure Score 0       Health Habits and Functional and Cognitive Screenin/15/2024    10:33 AM   Functional & Cognitive Status   Do you have difficulty preparing food and eating? No   Do you have difficulty bathing yourself, getting dressed or grooming yourself? No   Do you have difficulty using the toilet? No   Do you have difficulty moving around from place to place? No   Do you have trouble with steps or getting out of a bed or a chair? No   Current Diet Other   Dental Exam Not up to date   Eye Exam Up to date   Exercise (times per week) 4 times per week   Current Exercises Include Walking;House Cleaning   Do you need help using the phone?  No   Are you deaf or do you have serious difficulty hearing?  No   Do you need help to go to places out of walking distance? No   Do you need help shopping? No   Do you need help preparing meals?  No   Do you need help with housework?  No   Do you need help with laundry? No   Do you need help taking your medications? No   Do you need help managing money? No   Do you ever drive or ride in a car without wearing a seat belt? No   Have you felt unusual stress, anger or loneliness in the last month? Yes   Who do you live with? Spouse   If you  need help, do you have trouble finding someone available to you? No   Do you have difficulty concentrating, remembering or making decisions? Yes       Age-appropriate Screening Schedule:  Refer to the list below for future screening recommendations based on patient's age, sex and/or medical conditions. Orders for these recommended tests are listed in the plan section. The patient has been provided with a written plan.    Health Maintenance   Topic Date Due    TDAP/TD VACCINES (1 - Tdap) Never done    ZOSTER VACCINE (1 of 2) Never done    Hepatitis B (1 of 3 - Risk 3-dose series) Never done    RSV Vaccine - Adults (1 - 1-dose 60+ series) Never done    Pneumococcal Vaccine 65+ (3 of 3 - PCV) 04/18/2019    DIABETIC EYE EXAM  12/07/2023    DXA SCAN  05/11/2024    URINE MICROALBUMIN  07/12/2024    HEMOGLOBIN A1C  07/22/2024    COVID-19 Vaccine (1) 08/09/2024 (Originally 10/5/1950)    INFLUENZA VACCINE  08/01/2024    LIPID PANEL  01/22/2025    COLORECTAL CANCER SCREENING  06/17/2025    ANNUAL WELLNESS VISIT  07/15/2025    HEPATITIS C SCREENING  Completed                  CMS Preventative Services Quick Reference  Risk Factors Identified During Encounter  Chronic Pain: Natural history and expected course discussed. Questions answered.  The above risks/problems have been discussed with the patient.  Pertinent information has been shared with the patient in the After Visit Summary.  An After Visit Summary and PPPS were made available to the patient.    Follow Up:   Next Medicare Wellness visit to be scheduled in 1 year.       Additional E&M Note during same encounter follows:  Patient has multiple medical problems which are significant and separately identifiable that require additional work above and beyond the Medicare Wellness Visit.      Chief Complaint  Medicare Wellness-subsequent    Subjective        HPI  Ирина Randolph is also being seen today for refills on her medications.  She states that her hypertension and edema  "hypothyroidism remained stable and well-controlled at this time.  Patient reports no side effects with her chronic medications and states they have continued to work very well for her.  She would also like to get her pneumococcal vaccine today.  She reports no other new problems complaints or concerns         Objective   Vital Signs:  /78 (BP Location: Left arm, Patient Position: Sitting, Cuff Size: Adult)   Pulse 75   Temp 98.2 °F (36.8 °C) (Infrared)   Ht 165.1 cm (65\")   Wt 60.8 kg (134 lb)   SpO2 96%   BMI 22.30 kg/m²     Review of systems   negative unless otherwise specified above in HPI      Physical Exam  Vitals and nursing note reviewed.   Constitutional:       General: She is not in acute distress.     Appearance: Normal appearance.   HENT:      Head: Normocephalic.   Eyes:      Extraocular Movements: Extraocular movements intact.      Pupils: Pupils are equal, round, and reactive to light.   Cardiovascular:      Rate and Rhythm: Normal rate and regular rhythm.      Heart sounds: Normal heart sounds. No murmur heard.  Pulmonary:      Effort: Pulmonary effort is normal. No respiratory distress.      Breath sounds: Normal breath sounds. No rhonchi or rales.   Abdominal:      General: Abdomen is flat. Bowel sounds are normal.      Palpations: Abdomen is soft.   Neurological:      General: No focal deficit present.      Mental Status: She is alert.                         Assessment and Plan   Diagnoses and all orders for this visit:    1. Need for pneumococcal vaccination (Primary)  -     Pneumococcal Conjugate Vaccine 20-Valent All    2. Essential hypertension  -     losartan (COZAAR) 25 MG tablet; Take 1 tablet by mouth Daily.  Dispense: 90 tablet; Refill: 3  -     metoprolol succinate XL (TOPROL-XL) 50 MG 24 hr tablet; Take 1 tablet by mouth 2 (Two) Times a Day.  Dispense: 180 tablet; Refill: 3  -     NIFEdipine XL (PROCARDIA XL) 30 MG 24 hr tablet; Take 1 tablet by mouth Daily.  Dispense: 90 " tablet; Refill: 3  -     doxazosin (CARDURA) 2 MG tablet; Take 1 tablet by mouth Every Evening.  Dispense: 90 tablet; Refill: 3    3. Localized edema  -     potassium chloride 10 MEQ CR tablet; Take 1 tablet by mouth Daily.  Dispense: 90 tablet; Refill: 3  -     furosemide (LASIX) 20 MG tablet; Take 1 tablet by mouth 2 (Two) Times a Day.  Dispense: 180 tablet; Refill: 3    4. Acquired hypothyroidism  -     levothyroxine (SYNTHROID, LEVOTHROID) 150 MCG tablet; Take 1 tablet by mouth Daily.  Dispense: 90 tablet; Refill: 3    5. SOB (shortness of breath)  -     albuterol sulfate  (90 Base) MCG/ACT inhaler; Inhale 2 puffs Every 4 (Four) Hours As Needed for Wheezing.  Dispense: 18 g; Refill: 3             Follow Up   Return in about 6 months (around 1/15/2025) for Recheck.  Patient was given instructions and counseling regarding her condition or for health maintenance advice. Please see specific information pulled into the AVS if appropriate.     Signed by:    Franco Mendes MD Date: 07/15/24

## 2024-07-15 NOTE — PATIENT INSTRUCTIONS
Medicare Wellness  Personal Prevention Plan of Service     Date of Office Visit:    Encounter Provider:  Franco Mendes MD  Place of Service:  [facility]  Patient Name: Ирина Randolph  :  1945    As part of the Medicare Wellness portion of your visit today, we are providing you with this personalized preventive plan of services (PPPS). This plan is based upon recommendations of the [place] Preventive Services Task Force (USPSTF) and the Advisory Committee on Immunization Practices (ACIP).    This lists the preventive care services that should be considered, and provides dates of when you are due. Items listed as completed are up-to-date and do not require any further intervention.    Health Maintenance   Topic Date Due   • TDAP/TD VACCINES (1 - Tdap) Never done   • ZOSTER VACCINE (1 of 2) Never done   • Hepatitis B (1 of 3 - Risk 3-dose series) Never done   • RSV Vaccine - Adults (1 - 1-dose 60+ series) Never done   • Pneumococcal Vaccine 65+ (3 of 3 - PCV) 2019   • DIABETIC EYE EXAM  2023   • DXA SCAN  2024   • URINE MICROALBUMIN  2024   • HEMOGLOBIN A1C  2024   • COVID-19 Vaccine (1) 2024 (Originally 10/5/1950)   • INFLUENZA VACCINE  2024   • LIPID PANEL  2025   • COLORECTAL CANCER SCREENING  2025   • ANNUAL WELLNESS VISIT  07/15/2025   • HEPATITIS C SCREENING  Completed       No orders of the defined types were placed in this encounter.      Return in about 6 months (around 1/15/2025) for Recheck.

## 2024-07-19 DIAGNOSIS — G89.3 CANCER ASSOCIATED PAIN: Primary | ICD-10-CM

## 2024-07-19 RX ORDER — FAMOTIDINE 10 MG/ML
20 INJECTION, SOLUTION INTRAVENOUS
OUTPATIENT
Start: 2024-07-23

## 2024-07-19 RX ORDER — HEPARIN SODIUM (PORCINE) LOCK FLUSH IV SOLN 100 UNIT/ML 100 UNIT/ML
500 SOLUTION INTRAVENOUS PRN
OUTPATIENT
Start: 2024-07-25

## 2024-07-19 RX ORDER — EPINEPHRINE 1 MG/ML
0.3 INJECTION, SOLUTION, CONCENTRATE INTRAVENOUS PRN
OUTPATIENT
Start: 2024-07-23

## 2024-07-19 RX ORDER — ONDANSETRON 2 MG/ML
8 INJECTION INTRAMUSCULAR; INTRAVENOUS
OUTPATIENT
Start: 2024-07-23

## 2024-07-19 RX ORDER — PALONOSETRON 0.05 MG/ML
0.25 INJECTION, SOLUTION INTRAVENOUS ONCE
OUTPATIENT
Start: 2024-07-23 | End: 2024-07-23

## 2024-07-19 RX ORDER — MEPERIDINE HYDROCHLORIDE 50 MG/ML
12.5 INJECTION INTRAMUSCULAR; INTRAVENOUS; SUBCUTANEOUS PRN
OUTPATIENT
Start: 2024-07-23

## 2024-07-19 RX ORDER — SODIUM CHLORIDE 9 MG/ML
5-250 INJECTION, SOLUTION INTRAVENOUS PRN
OUTPATIENT
Start: 2024-07-23

## 2024-07-19 RX ORDER — SODIUM CHLORIDE 0.9 % (FLUSH) 0.9 %
5-40 SYRINGE (ML) INJECTION PRN
OUTPATIENT
Start: 2024-07-23

## 2024-07-19 RX ORDER — SODIUM CHLORIDE 9 MG/ML
INJECTION, SOLUTION INTRAVENOUS CONTINUOUS
OUTPATIENT
Start: 2024-07-23

## 2024-07-19 RX ORDER — DEXTROSE MONOHYDRATE 50 MG/ML
5-250 INJECTION, SOLUTION INTRAVENOUS PRN
OUTPATIENT
Start: 2024-07-23

## 2024-07-19 RX ORDER — HYDROCODONE BITARTRATE AND ACETAMINOPHEN 7.5; 325 MG/1; MG/1
1 TABLET ORAL EVERY 6 HOURS PRN
Qty: 120 TABLET | Refills: 0 | Status: SHIPPED | OUTPATIENT
Start: 2024-07-19 | End: 2024-08-18

## 2024-07-19 RX ORDER — ACETAMINOPHEN 325 MG/1
650 TABLET ORAL
OUTPATIENT
Start: 2024-07-23

## 2024-07-19 RX ORDER — SODIUM CHLORIDE 9 MG/ML
5-250 INJECTION, SOLUTION INTRAVENOUS PRN
OUTPATIENT
Start: 2024-07-25

## 2024-07-19 RX ORDER — SODIUM CHLORIDE 0.9 % (FLUSH) 0.9 %
5-40 SYRINGE (ML) INJECTION PRN
OUTPATIENT
Start: 2024-07-25

## 2024-07-19 RX ORDER — DIPHENHYDRAMINE HYDROCHLORIDE 50 MG/ML
50 INJECTION INTRAMUSCULAR; INTRAVENOUS
OUTPATIENT
Start: 2024-07-23

## 2024-07-19 RX ORDER — HEPARIN SODIUM (PORCINE) LOCK FLUSH IV SOLN 100 UNIT/ML 100 UNIT/ML
500 SOLUTION INTRAVENOUS PRN
OUTPATIENT
Start: 2024-07-23

## 2024-07-19 RX ORDER — ALBUTEROL SULFATE 90 UG/1
4 AEROSOL, METERED RESPIRATORY (INHALATION) PRN
OUTPATIENT
Start: 2024-07-23

## 2024-07-23 ENCOUNTER — APPOINTMENT (OUTPATIENT)
Dept: INFUSION THERAPY | Age: 79
End: 2024-07-23
Payer: MEDICARE

## 2024-07-23 ENCOUNTER — OFFICE VISIT (OUTPATIENT)
Dept: HEMATOLOGY | Age: 79
End: 2024-07-23

## 2024-07-23 ENCOUNTER — HOSPITAL ENCOUNTER (OUTPATIENT)
Dept: INFUSION THERAPY | Age: 79
Discharge: HOME | End: 2024-07-23
Payer: MEDICARE

## 2024-07-23 VITALS
BODY MASS INDEX: 22.23 KG/M2 | TEMPERATURE: 97.6 F | SYSTOLIC BLOOD PRESSURE: 118 MMHG | OXYGEN SATURATION: 97 % | WEIGHT: 133.4 LBS | HEIGHT: 65 IN | RESPIRATION RATE: 16 BRPM | HEART RATE: 71 BPM | DIASTOLIC BLOOD PRESSURE: 92 MMHG

## 2024-07-23 VITALS — HEIGHT: 65 IN | BODY MASS INDEX: 22.2 KG/M2

## 2024-07-23 DIAGNOSIS — C25.9 METASTASIS FROM PANCREATIC CANCER (HCC): Primary | ICD-10-CM

## 2024-07-23 DIAGNOSIS — T45.1X5A CHEMOTHERAPY-INDUCED FATIGUE: ICD-10-CM

## 2024-07-23 DIAGNOSIS — C25.2 MALIGNANT NEOPLASM OF TAIL OF PANCREAS (HCC): Primary | ICD-10-CM

## 2024-07-23 DIAGNOSIS — G89.3 CANCER ASSOCIATED PAIN: ICD-10-CM

## 2024-07-23 DIAGNOSIS — C25.2 MALIGNANT NEOPLASM OF TAIL OF PANCREAS (HCC): ICD-10-CM

## 2024-07-23 DIAGNOSIS — R11.2 CHEMOTHERAPY INDUCED NAUSEA AND VOMITING: Primary | ICD-10-CM

## 2024-07-23 DIAGNOSIS — C79.9 METASTASIS FROM PANCREATIC CANCER (HCC): ICD-10-CM

## 2024-07-23 DIAGNOSIS — T45.1X5A CHEMOTHERAPY INDUCED NAUSEA AND VOMITING: Primary | ICD-10-CM

## 2024-07-23 DIAGNOSIS — C25.9 METASTASIS FROM PANCREATIC CANCER (HCC): ICD-10-CM

## 2024-07-23 DIAGNOSIS — C79.9 METASTASIS FROM PANCREATIC CANCER (HCC): Primary | ICD-10-CM

## 2024-07-23 DIAGNOSIS — R53.83 CHEMOTHERAPY-INDUCED FATIGUE: ICD-10-CM

## 2024-07-23 DIAGNOSIS — R63.4 WEIGHT LOSS: ICD-10-CM

## 2024-07-23 DIAGNOSIS — R63.0 DECREASED APPETITE: ICD-10-CM

## 2024-07-23 LAB
ALBUMIN SERPL-MCNC: 3.7 G/DL (ref 3.5–5.2)
ALP SERPL-CCNC: 148 U/L (ref 35–104)
ALT SERPL-CCNC: 13 U/L (ref 9–52)
ANION GAP SERPL CALCULATED.3IONS-SCNC: 5 MMOL/L (ref 7–19)
AST SERPL-CCNC: 65 U/L (ref 14–36)
BASOPHILS # BLD: 0.07 K/UL (ref 0.01–0.08)
BASOPHILS NFR BLD: 0.7 % (ref 0.1–1.2)
BILIRUB SERPL-MCNC: 0.6 MG/DL (ref 0.2–1.3)
BUN SERPL-MCNC: 10 MG/DL (ref 7–17)
CALCIUM SERPL-MCNC: 9.2 MG/DL (ref 8.4–10.2)
CANCER AG19-9 SERPL-ACNC: 2310 U/ML (ref 0–35)
CEA SERPL-MCNC: 22.8 NG/ML (ref 0–4.7)
CHLORIDE SERPL-SCNC: 102 MMOL/L (ref 98–111)
CO2 SERPL-SCNC: 29 MMOL/L (ref 22–29)
CREAT SERPL-MCNC: 0.6 MG/DL (ref 0.5–1)
EOSINOPHIL # BLD: 0.14 K/UL (ref 0.04–0.54)
EOSINOPHIL NFR BLD: 1.5 % (ref 0.7–7)
ERYTHROCYTE [DISTWIDTH] IN BLOOD BY AUTOMATED COUNT: 16.1 % (ref 11.7–14.4)
GLOBULIN: 3.1 G/DL
GLUCOSE SERPL-MCNC: 87 MG/DL (ref 74–106)
HCT VFR BLD AUTO: 35.9 % (ref 34.1–44.9)
HGB BLD-MCNC: 11.9 G/DL (ref 11.2–15.7)
LYMPHOCYTES # BLD: 5.97 K/UL (ref 1.18–3.74)
LYMPHOCYTES NFR BLD: 62.4 % (ref 19.3–53.1)
MCH RBC QN AUTO: 30.9 PG (ref 25.6–32.2)
MCHC RBC AUTO-ENTMCNC: 33.1 G/DL (ref 32.3–35.5)
MCV RBC AUTO: 93.2 FL (ref 79.4–94.8)
MONOCYTES # BLD: 1.51 K/UL (ref 0.24–0.82)
MONOCYTES NFR BLD: 15.8 % (ref 4.7–12.5)
NEUTROPHILS # BLD: 1.87 K/UL (ref 1.56–6.13)
NEUTS SEG NFR BLD: 19.5 % (ref 34–71.1)
PLATELET # BLD AUTO: 296 K/UL (ref 182–369)
PMV BLD AUTO: 10 FL (ref 7.4–10.4)
POTASSIUM SERPL-SCNC: 4.3 MMOL/L (ref 3.5–5.1)
PROT SERPL-MCNC: 6.7 G/DL (ref 6.3–8.2)
RBC # BLD AUTO: 3.85 M/UL (ref 3.93–5.22)
SODIUM SERPL-SCNC: 136 MMOL/L (ref 137–145)
WBC # BLD AUTO: 9.57 K/UL (ref 3.98–10.04)

## 2024-07-23 PROCEDURE — G0498 CHEMO EXTEND IV INFUS W/PUMP: HCPCS

## 2024-07-23 PROCEDURE — 96368 THER/DIAG CONCURRENT INF: CPT

## 2024-07-23 PROCEDURE — 80053 COMPREHEN METABOLIC PANEL: CPT

## 2024-07-23 PROCEDURE — 96366 THER/PROPH/DIAG IV INF ADDON: CPT

## 2024-07-23 PROCEDURE — 6360000002 HC RX W HCPCS: Performed by: INTERNAL MEDICINE

## 2024-07-23 PROCEDURE — 96415 CHEMO IV INFUSION ADDL HR: CPT

## 2024-07-23 PROCEDURE — 96413 CHEMO IV INFUSION 1 HR: CPT

## 2024-07-23 PROCEDURE — 2580000003 HC RX 258: Performed by: INTERNAL MEDICINE

## 2024-07-23 PROCEDURE — 96367 TX/PROPH/DG ADDL SEQ IV INF: CPT

## 2024-07-23 PROCEDURE — 85025 COMPLETE CBC W/AUTO DIFF WBC: CPT

## 2024-07-23 PROCEDURE — 36415 COLL VENOUS BLD VENIPUNCTURE: CPT

## 2024-07-23 PROCEDURE — 96375 TX/PRO/DX INJ NEW DRUG ADDON: CPT

## 2024-07-23 RX ORDER — PALONOSETRON 0.05 MG/ML
0.25 INJECTION, SOLUTION INTRAVENOUS ONCE
Status: COMPLETED | OUTPATIENT
Start: 2024-07-23 | End: 2024-07-23

## 2024-07-23 RX ORDER — DRONABINOL 5 MG/1
5 CAPSULE ORAL
Qty: 60 CAPSULE | Refills: 0 | Status: SHIPPED | OUTPATIENT
Start: 2024-07-23 | End: 2024-08-22

## 2024-07-23 RX ORDER — DEXTROSE MONOHYDRATE 50 MG/ML
5-250 INJECTION, SOLUTION INTRAVENOUS PRN
Status: DISCONTINUED | OUTPATIENT
Start: 2024-07-23 | End: 2024-07-24 | Stop reason: HOSPADM

## 2024-07-23 RX ORDER — FENTANYL 25 UG/1
1 PATCH TRANSDERMAL
Qty: 10 PATCH | Refills: 0 | Status: SHIPPED | OUTPATIENT
Start: 2024-07-23 | End: 2024-08-22

## 2024-07-23 RX ORDER — HYDROCODONE BITARTRATE AND ACETAMINOPHEN 10; 325 MG/1; MG/1
1 TABLET ORAL EVERY 6 HOURS PRN
Qty: 120 TABLET | Refills: 0 | Status: SHIPPED | OUTPATIENT
Start: 2024-07-23 | End: 2024-08-22

## 2024-07-23 RX ADMIN — DEXAMETHASONE SODIUM PHOSPHATE: 10 INJECTION, SOLUTION INTRAMUSCULAR; INTRAVENOUS at 11:41

## 2024-07-23 RX ADMIN — OXALIPLATIN 100 MG: 5 INJECTION, SOLUTION INTRAVENOUS at 12:14

## 2024-07-23 RX ADMIN — SODIUM CHLORIDE 150 MG: 9 INJECTION, SOLUTION INTRAVENOUS at 11:51

## 2024-07-23 RX ADMIN — PALONOSETRON 0.25 MG: 0.05 INJECTION, SOLUTION INTRAVENOUS at 11:40

## 2024-07-23 RX ADMIN — FLUOROURACIL 4000 MG: 50 INJECTION, SOLUTION INTRAVENOUS at 14:23

## 2024-07-23 RX ADMIN — LEUCOVORIN CALCIUM 200 MG: 200 INJECTION, POWDER, LYOPHILIZED, FOR SUSPENSION INTRAMUSCULAR; INTRAVENOUS at 12:14

## 2024-07-23 NOTE — PROGRESS NOTES
Comprehensive Nutrition Assessment    Type and Reason for Visit:  Reassess    Malnutrition Assessment:  Malnutrition Status:  Moderate malnutrition (07/23/24 1204)    Context:  Chronic Illness     Findings of the 6 clinical characteristics of malnutrition:  Energy Intake:  Mild decrease in energy intake (Comment)  Weight Loss:  5% over 1 month     Body Fat Loss:  Mild body fat loss Orbital, Buccal region   Muscle Mass Loss:  Mild muscle mass loss Temples (temporalis), Clavicles (pectoralis & deltoids), Hand (interosseous)    Nutrition Assessment:    RN 77 y/o female presents 7/23/24 for follow-up RD evaluation. Pt remains moderately malnourished. She remains at high risk for further nutritional decline due to catabolic illness and inadequate energy intake. Her intake has improved slightly over the last 2 weeks. She notes that she is not eating more volume of food, but able to make better choices. She reports N/V and pain as primary contributors to inadequate intake. Her pain medication is being adjusted today per MD. Pt takes Phenergan for nausea with improvement. She has not been taking Megace, but has a bottle of Marinol that has been in her fridge at home. She requested a refill on this today.     Diet History:  Pt continues to eat small, frequent snacks. Pt reports her  continues to prepare food for her. She is able to tolerate bland foods such as scrambled eggs, jello, cereal, and pudding at times. She drinks ONS warmed as hot chocolate at times in the days following Oxaliplatin treatment. Pt notes she cannot eat anything when nauseous but can usually tolerate PO intake after taking Phenergan.     Nutrition Related Findings:    BUN:Cr 17, Na+ 136, K+ 4.3    Anthropometric Measures:  Height: 165.1 cm (5' 5\")  Ideal Body Weight (IBW): 125 lbs (57 kg)       Current Body Weight: 60.5 kg (133 lb 6.4 oz), 106.7 % IBW.    Current BMI (kg/m2): 22.2  Wt Readings from Last 5 Encounters:   07/23/24 60.5 kg (133 lb

## 2024-07-23 NOTE — TELEPHONE ENCOUNTER
Patient called and complained of pain across her upper abdomen. Spoke with MD and new order received to increase Fentanyl 25 mcg every 3 days, and Norco to 10 mg every 6 hours. I attempted to call patient back at this time. Left patient VM to call me back. New prescriptions sent to Dr. Wick for approval. Electronically signed by Mercedez Schilling RN on 7/23/2024 at 11:34 AM

## 2024-07-25 ENCOUNTER — HOSPITAL ENCOUNTER (OUTPATIENT)
Dept: INFUSION THERAPY | Age: 79
Setting detail: INFUSION SERIES
Discharge: HOME | End: 2024-07-25
Payer: MEDICARE

## 2024-07-25 VITALS
RESPIRATION RATE: 18 BRPM | OXYGEN SATURATION: 96 % | HEART RATE: 79 BPM | SYSTOLIC BLOOD PRESSURE: 138 MMHG | TEMPERATURE: 96.9 F | DIASTOLIC BLOOD PRESSURE: 91 MMHG

## 2024-07-25 DIAGNOSIS — C25.2 MALIGNANT NEOPLASM OF TAIL OF PANCREAS (HCC): Primary | ICD-10-CM

## 2024-07-25 PROCEDURE — 2580000003 HC RX 258: Performed by: INTERNAL MEDICINE

## 2024-07-25 PROCEDURE — 6360000002 HC RX W HCPCS: Performed by: INTERNAL MEDICINE

## 2024-07-25 PROCEDURE — 96523 IRRIG DRUG DELIVERY DEVICE: CPT

## 2024-07-25 RX ORDER — SODIUM CHLORIDE 0.9 % (FLUSH) 0.9 %
5-40 SYRINGE (ML) INJECTION PRN
Status: DISCONTINUED | OUTPATIENT
Start: 2024-07-25 | End: 2024-07-26 | Stop reason: HOSPADM

## 2024-07-25 RX ORDER — HEPARIN 100 UNIT/ML
500 SYRINGE INTRAVENOUS PRN
Status: DISCONTINUED | OUTPATIENT
Start: 2024-07-25 | End: 2024-07-26 | Stop reason: HOSPADM

## 2024-07-25 RX ADMIN — SODIUM CHLORIDE, PRESERVATIVE FREE 20 ML: 5 INJECTION INTRAVENOUS at 11:31

## 2024-07-25 RX ADMIN — HEPARIN 500 UNITS: 100 SYRINGE at 11:32

## 2024-07-29 ENCOUNTER — APPOINTMENT (OUTPATIENT)
Dept: INFUSION THERAPY | Age: 79
End: 2024-07-29
Payer: MEDICARE

## 2024-08-05 NOTE — PROGRESS NOTES
MEDICAL ONCOLOGY PROGRESS NOTE    Linn Pryor   1945  8/6/2024     Chief Complaint   Patient presents with    Pancreatic Cancer     Pt Name: Linn Pryor  MRN: [id number]  YOB: 1945  Date of evaluation: 8/6/2024    HISTORY OF PRESENT ILLNESS:    Reason for MD visit-toxicity assessment-disease management.   The patient is a very pleasant 78 years old female who has been diagnosed with invasive adenocarcinoma of the pancreas, node positive status post laparoscopic assisted distal pancreatectomy and splenectomy in June 2021 at [place].   The patient has developed metastatic disease with malignant ascites.  She has received 2 prior lines of palliative treatment with Gemzar Abraxane and Onivyde/5-FU.  Unfortunately, she had disease progression.  She is currently receiving palliative treatment with modified FOLFOX.  Therefore, repeat CT chest abdomen pelvis was performed that showed disease recurrence.  She was recommended modified FOLFOX.  She presents here today for cycle #4 of modified FOLFOX treatment.    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  June 2024-Progressive Disease   MSI High-Not Detected   6/18/24 Tempus xT/xF: JESUSITA, KRAS, TP53 positive; TMB low; MSI-stable    Treatment Summary  6/28/21 Laparoscopic assisted distal pancreatectomy and splenectomy with 1 of 6 lymph nodes positive. Surgeon Dr. Rudy Coles/[facility]   8/13/2021-02/25/22-adjuvant Gemzar 1000 mg/m2 IV D 1, D8, D15 with Capecitabine 830 mg/m2 D1-21 every 28 days x6 cycles  1/14/22 Eliquis for DVT right upper extremity November 2022  10/14/22-Initiated palliative frontline Gemzar Abraxane every 2 weeks  1/13/23 Decrease Abraxane to 100mg/m2 with each treatment  7/17/23 Discontinue Abraxane Gemzar

## 2024-08-06 ENCOUNTER — HOSPITAL ENCOUNTER (OUTPATIENT)
Dept: INFUSION THERAPY | Age: 79
Discharge: HOME | End: 2024-08-06
Payer: MEDICARE

## 2024-08-06 ENCOUNTER — OFFICE VISIT (OUTPATIENT)
Dept: HEMATOLOGY | Age: 79
End: 2024-08-06
Payer: MEDICARE

## 2024-08-06 ENCOUNTER — APPOINTMENT (OUTPATIENT)
Dept: INFUSION THERAPY | Age: 79
End: 2024-08-06
Payer: MEDICARE

## 2024-08-06 VITALS
WEIGHT: 129 LBS | OXYGEN SATURATION: 97 % | SYSTOLIC BLOOD PRESSURE: 125 MMHG | TEMPERATURE: 97.5 F | BODY MASS INDEX: 21.49 KG/M2 | HEART RATE: 65 BPM | HEIGHT: 65 IN | DIASTOLIC BLOOD PRESSURE: 72 MMHG | RESPIRATION RATE: 18 BRPM

## 2024-08-06 DIAGNOSIS — R97.8 ABNORMAL TUMOR MARKERS: ICD-10-CM

## 2024-08-06 DIAGNOSIS — C25.2 MALIGNANT NEOPLASM OF TAIL OF PANCREAS (HCC): ICD-10-CM

## 2024-08-06 DIAGNOSIS — C25.2 MALIGNANT NEOPLASM OF TAIL OF PANCREAS (HCC): Primary | ICD-10-CM

## 2024-08-06 DIAGNOSIS — G62.0 CHEMOTHERAPY-INDUCED NEUROPATHY (HCC): ICD-10-CM

## 2024-08-06 DIAGNOSIS — T45.1X5A CHEMOTHERAPY INDUCED NEUTROPENIA (HCC): ICD-10-CM

## 2024-08-06 DIAGNOSIS — G89.3 CANCER ASSOCIATED PAIN: ICD-10-CM

## 2024-08-06 DIAGNOSIS — T45.1X5A CHEMOTHERAPY-INDUCED FATIGUE: ICD-10-CM

## 2024-08-06 DIAGNOSIS — C25.9 METASTASIS FROM PANCREATIC CANCER (HCC): Primary | ICD-10-CM

## 2024-08-06 DIAGNOSIS — C79.9 METASTASIS FROM PANCREATIC CANCER (HCC): Primary | ICD-10-CM

## 2024-08-06 DIAGNOSIS — C79.9 METASTASIS FROM PANCREATIC CANCER (HCC): ICD-10-CM

## 2024-08-06 DIAGNOSIS — Z51.11 CHEMOTHERAPY MANAGEMENT, ENCOUNTER FOR: ICD-10-CM

## 2024-08-06 DIAGNOSIS — R53.83 CHEMOTHERAPY-INDUCED FATIGUE: ICD-10-CM

## 2024-08-06 DIAGNOSIS — D70.1 CHEMOTHERAPY INDUCED NEUTROPENIA (HCC): ICD-10-CM

## 2024-08-06 DIAGNOSIS — T45.1X5D ADVERSE EFFECT OF CHEMOTHERAPY, SUBSEQUENT ENCOUNTER: ICD-10-CM

## 2024-08-06 DIAGNOSIS — C25.9 METASTASIS FROM PANCREATIC CANCER (HCC): ICD-10-CM

## 2024-08-06 DIAGNOSIS — T45.1X5A CHEMOTHERAPY-INDUCED NEUROPATHY (HCC): ICD-10-CM

## 2024-08-06 DIAGNOSIS — Z71.89 CARE PLAN DISCUSSED WITH PATIENT: ICD-10-CM

## 2024-08-06 LAB
ALBUMIN SERPL-MCNC: 3.6 G/DL (ref 3.5–5.2)
ALP SERPL-CCNC: 117 U/L (ref 35–104)
ALT SERPL-CCNC: <5 U/L (ref 5–33)
ANION GAP SERPL CALCULATED.3IONS-SCNC: 9 MMOL/L (ref 7–19)
AST SERPL-CCNC: 35 U/L (ref 5–32)
BASOPHILS # BLD: 0.07 K/UL (ref 0.01–0.08)
BASOPHILS NFR BLD: 1 % (ref 0.1–1.2)
BILIRUB SERPL-MCNC: 0.4 MG/DL (ref 0–1.2)
BUN SERPL-MCNC: 8 MG/DL (ref 8–23)
CALCIUM SERPL-MCNC: 9 MG/DL (ref 8.8–10.2)
CANCER AG19-9 SERPL-ACNC: 2567 U/ML (ref 0–35)
CEA SERPL-MCNC: 23.1 NG/ML (ref 0–4.7)
CHLORIDE SERPL-SCNC: 100 MMOL/L (ref 98–107)
CO2 SERPL-SCNC: 26 MMOL/L (ref 22–29)
CREAT SERPL-MCNC: 0.6 MG/DL (ref 0.5–0.9)
EOSINOPHIL # BLD: 0.19 K/UL (ref 0.04–0.54)
EOSINOPHIL NFR BLD: 2.8 % (ref 0.7–7)
ERYTHROCYTE [DISTWIDTH] IN BLOOD BY AUTOMATED COUNT: 16.1 % (ref 11.7–14.4)
GLUCOSE SERPL-MCNC: 95 MG/DL (ref 70–99)
HCT VFR BLD AUTO: 35.7 % (ref 34.1–44.9)
HGB BLD-MCNC: 11.7 G/DL (ref 11.2–15.7)
LYMPHOCYTES # BLD: 4.27 K/UL (ref 1.18–3.74)
LYMPHOCYTES NFR BLD: 62.2 % (ref 19.3–53.1)
MCH RBC QN AUTO: 30.9 PG (ref 25.6–32.2)
MCHC RBC AUTO-ENTMCNC: 32.8 G/DL (ref 32.3–35.5)
MCV RBC AUTO: 94.2 FL (ref 79.4–94.8)
MONOCYTES # BLD: 1.16 K/UL (ref 0.24–0.82)
MONOCYTES NFR BLD: 16.9 % (ref 4.7–12.5)
NEUTROPHILS # BLD: 1.16 K/UL (ref 1.56–6.13)
NEUTS SEG NFR BLD: 17 % (ref 34–71.1)
PLATELET # BLD AUTO: 269 K/UL (ref 182–369)
PMV BLD AUTO: 9.7 FL (ref 7.4–10.4)
POTASSIUM SERPL-SCNC: 4.5 MMOL/L (ref 3.5–5.1)
PROT SERPL-MCNC: 6.9 G/DL (ref 6.4–8.3)
RBC # BLD AUTO: 3.79 M/UL (ref 3.93–5.22)
SODIUM SERPL-SCNC: 135 MMOL/L (ref 136–145)
WBC # BLD AUTO: 6.86 K/UL (ref 3.98–10.04)

## 2024-08-06 PROCEDURE — 96375 TX/PRO/DX INJ NEW DRUG ADDON: CPT

## 2024-08-06 PROCEDURE — 99214 OFFICE O/P EST MOD 30 MIN: CPT | Performed by: INTERNAL MEDICINE

## 2024-08-06 PROCEDURE — 80053 COMPREHEN METABOLIC PANEL: CPT

## 2024-08-06 PROCEDURE — 96367 TX/PROPH/DG ADDL SEQ IV INF: CPT

## 2024-08-06 PROCEDURE — 2580000003 HC RX 258: Performed by: INTERNAL MEDICINE

## 2024-08-06 PROCEDURE — 1036F TOBACCO NON-USER: CPT | Performed by: INTERNAL MEDICINE

## 2024-08-06 PROCEDURE — 36415 COLL VENOUS BLD VENIPUNCTURE: CPT

## 2024-08-06 PROCEDURE — 1090F PRES/ABSN URINE INCON ASSESS: CPT | Performed by: INTERNAL MEDICINE

## 2024-08-06 PROCEDURE — 85025 COMPLETE CBC W/AUTO DIFF WBC: CPT

## 2024-08-06 PROCEDURE — 6360000002 HC RX W HCPCS: Performed by: INTERNAL MEDICINE

## 2024-08-06 PROCEDURE — 96368 THER/DIAG CONCURRENT INF: CPT

## 2024-08-06 PROCEDURE — 96413 CHEMO IV INFUSION 1 HR: CPT

## 2024-08-06 PROCEDURE — G0498 CHEMO EXTEND IV INFUS W/PUMP: HCPCS

## 2024-08-06 PROCEDURE — G8427 DOCREV CUR MEDS BY ELIG CLIN: HCPCS | Performed by: INTERNAL MEDICINE

## 2024-08-06 PROCEDURE — G8399 PT W/DXA RESULTS DOCUMENT: HCPCS | Performed by: INTERNAL MEDICINE

## 2024-08-06 PROCEDURE — G2211 COMPLEX E/M VISIT ADD ON: HCPCS | Performed by: INTERNAL MEDICINE

## 2024-08-06 PROCEDURE — G8420 CALC BMI NORM PARAMETERS: HCPCS | Performed by: INTERNAL MEDICINE

## 2024-08-06 PROCEDURE — 96366 THER/PROPH/DIAG IV INF ADDON: CPT

## 2024-08-06 PROCEDURE — 96415 CHEMO IV INFUSION ADDL HR: CPT

## 2024-08-06 PROCEDURE — 1123F ACP DISCUSS/DSCN MKR DOCD: CPT | Performed by: INTERNAL MEDICINE

## 2024-08-06 RX ORDER — PALONOSETRON 0.05 MG/ML
0.25 INJECTION, SOLUTION INTRAVENOUS ONCE
Status: COMPLETED | OUTPATIENT
Start: 2024-08-06 | End: 2024-08-06

## 2024-08-06 RX ORDER — EPINEPHRINE 1 MG/ML
0.3 INJECTION, SOLUTION, CONCENTRATE INTRAVENOUS PRN
OUTPATIENT
Start: 2024-08-06

## 2024-08-06 RX ORDER — ALBUTEROL SULFATE 90 UG/1
4 AEROSOL, METERED RESPIRATORY (INHALATION) PRN
OUTPATIENT
Start: 2024-08-06

## 2024-08-06 RX ORDER — MEPERIDINE HYDROCHLORIDE 50 MG/ML
12.5 INJECTION INTRAMUSCULAR; INTRAVENOUS; SUBCUTANEOUS PRN
OUTPATIENT
Start: 2024-08-06

## 2024-08-06 RX ORDER — HEPARIN SODIUM (PORCINE) LOCK FLUSH IV SOLN 100 UNIT/ML 100 UNIT/ML
500 SOLUTION INTRAVENOUS PRN
Status: CANCELLED | OUTPATIENT
Start: 2024-08-08

## 2024-08-06 RX ORDER — FAMOTIDINE 10 MG/ML
20 INJECTION, SOLUTION INTRAVENOUS
OUTPATIENT
Start: 2024-08-06

## 2024-08-06 RX ORDER — SODIUM CHLORIDE 0.9 % (FLUSH) 0.9 %
5-40 SYRINGE (ML) INJECTION PRN
OUTPATIENT
Start: 2024-08-06

## 2024-08-06 RX ORDER — SODIUM CHLORIDE 0.9 % (FLUSH) 0.9 %
5-40 SYRINGE (ML) INJECTION PRN
Status: CANCELLED | OUTPATIENT
Start: 2024-08-08

## 2024-08-06 RX ORDER — PALONOSETRON 0.05 MG/ML
0.25 INJECTION, SOLUTION INTRAVENOUS ONCE
Status: CANCELLED | OUTPATIENT
Start: 2024-08-06 | End: 2024-08-06

## 2024-08-06 RX ORDER — DEXAMETHASONE SODIUM PHOSPHATE 10 MG/ML
10 INJECTION, SOLUTION INTRAMUSCULAR; INTRAVENOUS ONCE
Status: CANCELLED
Start: 2024-08-06 | End: 2024-08-06

## 2024-08-06 RX ORDER — SODIUM CHLORIDE 9 MG/ML
5-250 INJECTION, SOLUTION INTRAVENOUS PRN
OUTPATIENT
Start: 2024-08-06

## 2024-08-06 RX ORDER — SODIUM CHLORIDE 9 MG/ML
INJECTION, SOLUTION INTRAVENOUS CONTINUOUS
OUTPATIENT
Start: 2024-08-06

## 2024-08-06 RX ORDER — HEPARIN SODIUM (PORCINE) LOCK FLUSH IV SOLN 100 UNIT/ML 100 UNIT/ML
500 SOLUTION INTRAVENOUS PRN
OUTPATIENT
Start: 2024-08-06

## 2024-08-06 RX ORDER — SODIUM CHLORIDE 9 MG/ML
5-250 INJECTION, SOLUTION INTRAVENOUS PRN
OUTPATIENT
Start: 2024-08-08

## 2024-08-06 RX ORDER — ONDANSETRON 2 MG/ML
8 INJECTION INTRAMUSCULAR; INTRAVENOUS
OUTPATIENT
Start: 2024-08-06

## 2024-08-06 RX ORDER — DIPHENHYDRAMINE HYDROCHLORIDE 50 MG/ML
50 INJECTION INTRAMUSCULAR; INTRAVENOUS
OUTPATIENT
Start: 2024-08-06

## 2024-08-06 RX ORDER — ACETAMINOPHEN 325 MG/1
650 TABLET ORAL
OUTPATIENT
Start: 2024-08-06

## 2024-08-06 RX ORDER — DEXTROSE MONOHYDRATE 50 MG/ML
5-250 INJECTION, SOLUTION INTRAVENOUS PRN
OUTPATIENT
Start: 2024-08-06

## 2024-08-06 RX ORDER — DEXAMETHASONE SODIUM PHOSPHATE 10 MG/ML
10 INJECTION, SOLUTION INTRAMUSCULAR; INTRAVENOUS ONCE
Status: COMPLETED | OUTPATIENT
Start: 2024-08-06 | End: 2024-08-06

## 2024-08-06 RX ADMIN — FLUOROURACIL 2000 MG: 50 INJECTION, SOLUTION INTRAVENOUS at 14:15

## 2024-08-06 RX ADMIN — OXALIPLATIN 100 MG: 5 INJECTION, SOLUTION INTRAVENOUS at 12:03

## 2024-08-06 RX ADMIN — PALONOSETRON 0.25 MG: 0.05 INJECTION, SOLUTION INTRAVENOUS at 11:03

## 2024-08-06 RX ADMIN — DEXAMETHASONE SODIUM PHOSPHATE 10 MG: 10 INJECTION, SOLUTION INTRAMUSCULAR; INTRAVENOUS at 11:03

## 2024-08-06 RX ADMIN — LEUCOVORIN CALCIUM 200 MG: 200 INJECTION, POWDER, LYOPHILIZED, FOR SUSPENSION INTRAMUSCULAR; INTRAVENOUS at 12:04

## 2024-08-06 RX ADMIN — SODIUM CHLORIDE 150 MG: 9 INJECTION, SOLUTION INTRAVENOUS at 11:08

## 2024-08-08 ENCOUNTER — HOSPITAL ENCOUNTER (OUTPATIENT)
Dept: INFUSION THERAPY | Age: 79
Discharge: HOME | End: 2024-08-08
Payer: MEDICARE

## 2024-08-08 DIAGNOSIS — C79.9 METASTASIS FROM PANCREATIC CANCER (HCC): ICD-10-CM

## 2024-08-08 DIAGNOSIS — C25.2 MALIGNANT NEOPLASM OF TAIL OF PANCREAS (HCC): Primary | ICD-10-CM

## 2024-08-08 DIAGNOSIS — C25.9 METASTASIS FROM PANCREATIC CANCER (HCC): ICD-10-CM

## 2024-08-08 PROCEDURE — 2580000003 HC RX 258: Performed by: INTERNAL MEDICINE

## 2024-08-08 PROCEDURE — 6360000002 HC RX W HCPCS: Performed by: INTERNAL MEDICINE

## 2024-08-08 PROCEDURE — 96523 IRRIG DRUG DELIVERY DEVICE: CPT

## 2024-08-08 RX ORDER — HEPARIN 100 UNIT/ML
500 SYRINGE INTRAVENOUS PRN
Status: DISCONTINUED | OUTPATIENT
Start: 2024-08-08 | End: 2024-08-09 | Stop reason: HOSPADM

## 2024-08-08 RX ORDER — SODIUM CHLORIDE 0.9 % (FLUSH) 0.9 %
5-40 SYRINGE (ML) INJECTION PRN
Status: DISCONTINUED | OUTPATIENT
Start: 2024-08-08 | End: 2024-08-09 | Stop reason: HOSPADM

## 2024-08-08 RX ADMIN — HEPARIN 500 UNITS: 100 SYRINGE at 11:49

## 2024-08-08 RX ADMIN — SODIUM CHLORIDE, PRESERVATIVE FREE 10 ML: 5 INJECTION INTRAVENOUS at 11:49

## 2024-08-13 ENCOUNTER — HOSPITAL ENCOUNTER (OUTPATIENT)
Dept: INFUSION THERAPY | Age: 79
Discharge: HOME | End: 2024-08-13
Payer: MEDICARE

## 2024-08-13 DIAGNOSIS — C25.9 METASTASIS FROM PANCREATIC CANCER (HCC): ICD-10-CM

## 2024-08-13 DIAGNOSIS — C79.9 METASTASIS FROM PANCREATIC CANCER (HCC): ICD-10-CM

## 2024-08-13 DIAGNOSIS — C25.2 MALIGNANT NEOPLASM OF TAIL OF PANCREAS (HCC): ICD-10-CM

## 2024-08-13 LAB
ALBUMIN SERPL-MCNC: 3.6 G/DL (ref 3.5–5.2)
ALP SERPL-CCNC: 115 U/L (ref 35–104)
ALT SERPL-CCNC: 6 U/L (ref 5–33)
ANION GAP SERPL CALCULATED.3IONS-SCNC: 9 MMOL/L (ref 7–19)
AST SERPL-CCNC: 36 U/L (ref 5–32)
BASOPHILS # BLD: 0.07 K/UL (ref 0.01–0.08)
BASOPHILS NFR BLD: 1.1 % (ref 0.1–1.2)
BILIRUB SERPL-MCNC: 0.3 MG/DL (ref 0–1.2)
BUN SERPL-MCNC: 8 MG/DL (ref 8–23)
CALCIUM SERPL-MCNC: 8.4 MG/DL (ref 8.8–10.2)
CANCER AG19-9 SERPL-ACNC: 2655 U/ML (ref 0–35)
CEA SERPL-MCNC: 23.1 NG/ML (ref 0–4.7)
CHLORIDE SERPL-SCNC: 100 MMOL/L (ref 98–107)
CO2 SERPL-SCNC: 27 MMOL/L (ref 22–29)
CREAT SERPL-MCNC: 0.6 MG/DL (ref 0.5–0.9)
EOSINOPHIL # BLD: 0.12 K/UL (ref 0.04–0.54)
EOSINOPHIL NFR BLD: 1.8 % (ref 0.7–7)
ERYTHROCYTE [DISTWIDTH] IN BLOOD BY AUTOMATED COUNT: 15.9 % (ref 11.7–14.4)
GLUCOSE SERPL-MCNC: 107 MG/DL (ref 70–99)
HCT VFR BLD AUTO: 35.3 % (ref 34.1–44.9)
HGB BLD-MCNC: 11.8 G/DL (ref 11.2–15.7)
LYMPHOCYTES # BLD: 4.05 K/UL (ref 1.18–3.74)
LYMPHOCYTES NFR BLD: 61.9 % (ref 19.3–53.1)
MCH RBC QN AUTO: 31.2 PG (ref 25.6–32.2)
MCHC RBC AUTO-ENTMCNC: 33.4 G/DL (ref 32.3–35.5)
MCV RBC AUTO: 93.4 FL (ref 79.4–94.8)
MONOCYTES # BLD: 1.07 K/UL (ref 0.24–0.82)
MONOCYTES NFR BLD: 16.4 % (ref 4.7–12.5)
NEUTROPHILS # BLD: 1.16 K/UL (ref 1.56–6.13)
NEUTS SEG NFR BLD: 17.7 % (ref 34–71.1)
PLATELET # BLD AUTO: 294 K/UL (ref 182–369)
PMV BLD AUTO: 11.1 FL (ref 7.4–10.4)
POTASSIUM SERPL-SCNC: 4.4 MMOL/L (ref 3.5–5.1)
PROT SERPL-MCNC: 6.8 G/DL (ref 6.4–8.3)
RBC # BLD AUTO: 3.78 M/UL (ref 3.93–5.22)
SODIUM SERPL-SCNC: 136 MMOL/L (ref 136–145)
WBC # BLD AUTO: 6.54 K/UL (ref 3.98–10.04)

## 2024-08-13 PROCEDURE — 80053 COMPREHEN METABOLIC PANEL: CPT

## 2024-08-13 PROCEDURE — 36415 COLL VENOUS BLD VENIPUNCTURE: CPT

## 2024-08-13 PROCEDURE — 85025 COMPLETE CBC W/AUTO DIFF WBC: CPT

## 2024-08-20 ENCOUNTER — OFFICE VISIT (OUTPATIENT)
Dept: PALLATIVE CARE | Age: 79
End: 2024-08-20
Payer: MEDICARE

## 2024-08-20 ENCOUNTER — OFFICE VISIT (OUTPATIENT)
Dept: HEMATOLOGY | Age: 79
End: 2024-08-20

## 2024-08-20 ENCOUNTER — HOSPITAL ENCOUNTER (OUTPATIENT)
Dept: INFUSION THERAPY | Age: 79
Discharge: HOME | End: 2024-08-20
Payer: MEDICARE

## 2024-08-20 VITALS
TEMPERATURE: 97.2 F | HEIGHT: 65 IN | HEART RATE: 78 BPM | OXYGEN SATURATION: 98 % | RESPIRATION RATE: 18 BRPM | WEIGHT: 128.7 LBS | BODY MASS INDEX: 21.44 KG/M2 | SYSTOLIC BLOOD PRESSURE: 128 MMHG | DIASTOLIC BLOOD PRESSURE: 71 MMHG

## 2024-08-20 VITALS — HEIGHT: 65 IN | BODY MASS INDEX: 21.42 KG/M2

## 2024-08-20 DIAGNOSIS — C79.9 METASTASIS FROM PANCREATIC CANCER (HCC): Primary | ICD-10-CM

## 2024-08-20 DIAGNOSIS — R63.0 POOR APPETITE: ICD-10-CM

## 2024-08-20 DIAGNOSIS — F41.9 ANXIETY IN CANCER PATIENT: Primary | ICD-10-CM

## 2024-08-20 DIAGNOSIS — T40.2X5A CONSTIPATION DUE TO OPIOID THERAPY: Primary | ICD-10-CM

## 2024-08-20 DIAGNOSIS — R53.0 NEOPLASTIC MALIGNANT RELATED FATIGUE: ICD-10-CM

## 2024-08-20 DIAGNOSIS — C25.9 METASTASIS FROM PANCREATIC CANCER (HCC): Primary | ICD-10-CM

## 2024-08-20 DIAGNOSIS — C25.2 MALIGNANT NEOPLASM OF TAIL OF PANCREAS (HCC): ICD-10-CM

## 2024-08-20 DIAGNOSIS — Z51.5 ENCOUNTER FOR PALLIATIVE CARE: ICD-10-CM

## 2024-08-20 DIAGNOSIS — C25.9 METASTASIS FROM PANCREATIC CANCER (HCC): ICD-10-CM

## 2024-08-20 DIAGNOSIS — C25.9 ADENOCARCINOMA OF PANCREAS (HCC): ICD-10-CM

## 2024-08-20 DIAGNOSIS — K59.03 CONSTIPATION DUE TO OPIOID THERAPY: Primary | ICD-10-CM

## 2024-08-20 DIAGNOSIS — C79.9 METASTASIS FROM PANCREATIC CANCER (HCC): ICD-10-CM

## 2024-08-20 LAB
ALBUMIN SERPL-MCNC: 3.6 G/DL (ref 3.5–5.2)
ALP SERPL-CCNC: 117 U/L (ref 35–104)
ALT SERPL-CCNC: 6 U/L (ref 5–33)
ANION GAP SERPL CALCULATED.3IONS-SCNC: 10 MMOL/L (ref 7–19)
AST SERPL-CCNC: 36 U/L (ref 5–32)
BASOPHILS # BLD: 0.08 K/UL (ref 0.01–0.08)
BASOPHILS NFR BLD: 0.9 % (ref 0.1–1.2)
BILIRUB SERPL-MCNC: 0.4 MG/DL (ref 0–1.2)
BUN SERPL-MCNC: 5 MG/DL (ref 8–23)
CALCIUM SERPL-MCNC: 8.9 MG/DL (ref 8.8–10.2)
CHLORIDE SERPL-SCNC: 103 MMOL/L (ref 98–107)
CO2 SERPL-SCNC: 26 MMOL/L (ref 22–29)
CREAT SERPL-MCNC: 0.6 MG/DL (ref 0.5–0.9)
EOSINOPHIL # BLD: 0.16 K/UL (ref 0.04–0.54)
EOSINOPHIL NFR BLD: 1.8 % (ref 0.7–7)
ERYTHROCYTE [DISTWIDTH] IN BLOOD BY AUTOMATED COUNT: 16.5 % (ref 11.7–14.4)
GLUCOSE SERPL-MCNC: 93 MG/DL (ref 70–99)
HCT VFR BLD AUTO: 36.2 % (ref 34.1–44.9)
HGB BLD-MCNC: 12.1 G/DL (ref 11.2–15.7)
LYMPHOCYTES # BLD: 4.42 K/UL (ref 1.18–3.74)
LYMPHOCYTES NFR BLD: 50.7 % (ref 19.3–53.1)
MCH RBC QN AUTO: 31.4 PG (ref 25.6–32.2)
MCHC RBC AUTO-ENTMCNC: 33.4 G/DL (ref 32.3–35.5)
MCV RBC AUTO: 94 FL (ref 79.4–94.8)
MONOCYTES # BLD: 1.53 K/UL (ref 0.24–0.82)
MONOCYTES NFR BLD: 17.5 % (ref 4.7–12.5)
NEUTROPHILS # BLD: 2.51 K/UL (ref 1.56–6.13)
NEUTS SEG NFR BLD: 28.9 % (ref 34–71.1)
PLATELET # BLD AUTO: 254 K/UL (ref 182–369)
PMV BLD AUTO: 10 FL (ref 7.4–10.4)
POTASSIUM SERPL-SCNC: 4.4 MMOL/L (ref 3.5–5.1)
PROT SERPL-MCNC: 6.9 G/DL (ref 6.4–8.3)
RBC # BLD AUTO: 3.85 M/UL (ref 3.93–5.22)
SODIUM SERPL-SCNC: 139 MMOL/L (ref 136–145)
WBC # BLD AUTO: 8.72 K/UL (ref 3.98–10.04)

## 2024-08-20 PROCEDURE — 96415 CHEMO IV INFUSION ADDL HR: CPT

## 2024-08-20 PROCEDURE — 6360000002 HC RX W HCPCS: Performed by: INTERNAL MEDICINE

## 2024-08-20 PROCEDURE — 80053 COMPREHEN METABOLIC PANEL: CPT

## 2024-08-20 PROCEDURE — 96368 THER/DIAG CONCURRENT INF: CPT

## 2024-08-20 PROCEDURE — 1123F ACP DISCUSS/DSCN MKR DOCD: CPT | Performed by: NURSE PRACTITIONER

## 2024-08-20 PROCEDURE — G8399 PT W/DXA RESULTS DOCUMENT: HCPCS | Performed by: NURSE PRACTITIONER

## 2024-08-20 PROCEDURE — 96366 THER/PROPH/DIAG IV INF ADDON: CPT

## 2024-08-20 PROCEDURE — G8420 CALC BMI NORM PARAMETERS: HCPCS | Performed by: NURSE PRACTITIONER

## 2024-08-20 PROCEDURE — 96413 CHEMO IV INFUSION 1 HR: CPT

## 2024-08-20 PROCEDURE — 1090F PRES/ABSN URINE INCON ASSESS: CPT | Performed by: NURSE PRACTITIONER

## 2024-08-20 PROCEDURE — 96375 TX/PRO/DX INJ NEW DRUG ADDON: CPT

## 2024-08-20 PROCEDURE — G0498 CHEMO EXTEND IV INFUS W/PUMP: HCPCS

## 2024-08-20 PROCEDURE — 2580000003 HC RX 258: Performed by: INTERNAL MEDICINE

## 2024-08-20 PROCEDURE — 96367 TX/PROPH/DG ADDL SEQ IV INF: CPT

## 2024-08-20 PROCEDURE — 85025 COMPLETE CBC W/AUTO DIFF WBC: CPT

## 2024-08-20 PROCEDURE — G8428 CUR MEDS NOT DOCUMENT: HCPCS | Performed by: NURSE PRACTITIONER

## 2024-08-20 PROCEDURE — 1036F TOBACCO NON-USER: CPT | Performed by: NURSE PRACTITIONER

## 2024-08-20 PROCEDURE — 36415 COLL VENOUS BLD VENIPUNCTURE: CPT

## 2024-08-20 PROCEDURE — 99214 OFFICE O/P EST MOD 30 MIN: CPT | Performed by: NURSE PRACTITIONER

## 2024-08-20 RX ORDER — HEPARIN SODIUM (PORCINE) LOCK FLUSH IV SOLN 100 UNIT/ML 100 UNIT/ML
500 SOLUTION INTRAVENOUS PRN
Status: CANCELLED | OUTPATIENT
Start: 2024-08-22

## 2024-08-20 RX ORDER — DEXAMETHASONE SODIUM PHOSPHATE 10 MG/ML
10 INJECTION, SOLUTION INTRAMUSCULAR; INTRAVENOUS ONCE
Status: COMPLETED | OUTPATIENT
Start: 2024-08-20 | End: 2024-08-20

## 2024-08-20 RX ORDER — ACETAMINOPHEN 325 MG/1
650 TABLET ORAL
Status: CANCELLED | OUTPATIENT
Start: 2024-08-20

## 2024-08-20 RX ORDER — ALPRAZOLAM 0.5 MG/1
0.25 TABLET ORAL 2 TIMES DAILY PRN
Qty: 60 TABLET | Refills: 0 | Status: SHIPPED | OUTPATIENT
Start: 2024-08-20 | End: 2024-10-19

## 2024-08-20 RX ORDER — ONDANSETRON 2 MG/ML
8 INJECTION INTRAMUSCULAR; INTRAVENOUS
Status: CANCELLED | OUTPATIENT
Start: 2024-08-20

## 2024-08-20 RX ORDER — EPINEPHRINE 1 MG/ML
0.3 INJECTION, SOLUTION, CONCENTRATE INTRAVENOUS PRN
Status: CANCELLED | OUTPATIENT
Start: 2024-08-20

## 2024-08-20 RX ORDER — DEXTROSE MONOHYDRATE 50 MG/ML
5-250 INJECTION, SOLUTION INTRAVENOUS PRN
Status: DISCONTINUED | OUTPATIENT
Start: 2024-08-20 | End: 2024-08-21 | Stop reason: HOSPADM

## 2024-08-20 RX ORDER — HEPARIN 100 UNIT/ML
500 SYRINGE INTRAVENOUS PRN
Status: CANCELLED | OUTPATIENT
Start: 2024-08-20

## 2024-08-20 RX ORDER — DIPHENHYDRAMINE HYDROCHLORIDE 50 MG/ML
50 INJECTION INTRAMUSCULAR; INTRAVENOUS
Status: CANCELLED | OUTPATIENT
Start: 2024-08-20

## 2024-08-20 RX ORDER — SODIUM CHLORIDE 0.9 % (FLUSH) 0.9 %
5-40 SYRINGE (ML) INJECTION PRN
Status: CANCELLED | OUTPATIENT
Start: 2024-08-20

## 2024-08-20 RX ORDER — MEPERIDINE HYDROCHLORIDE 25 MG/ML
12.5 INJECTION INTRAMUSCULAR; INTRAVENOUS; SUBCUTANEOUS PRN
Status: CANCELLED | OUTPATIENT
Start: 2024-08-20

## 2024-08-20 RX ORDER — ALBUTEROL SULFATE 90 UG/1
4 AEROSOL, METERED RESPIRATORY (INHALATION) PRN
Status: CANCELLED | OUTPATIENT
Start: 2024-08-20

## 2024-08-20 RX ORDER — SODIUM CHLORIDE 9 MG/ML
5-250 INJECTION, SOLUTION INTRAVENOUS PRN
OUTPATIENT
Start: 2024-08-22

## 2024-08-20 RX ORDER — FAMOTIDINE 10 MG/ML
20 INJECTION, SOLUTION INTRAVENOUS
Status: CANCELLED | OUTPATIENT
Start: 2024-08-20

## 2024-08-20 RX ORDER — PALONOSETRON 0.05 MG/ML
0.25 INJECTION, SOLUTION INTRAVENOUS ONCE
Status: COMPLETED | OUTPATIENT
Start: 2024-08-20 | End: 2024-08-20

## 2024-08-20 RX ORDER — SODIUM CHLORIDE 0.9 % (FLUSH) 0.9 %
5-40 SYRINGE (ML) INJECTION PRN
Status: CANCELLED | OUTPATIENT
Start: 2024-08-22

## 2024-08-20 RX ORDER — SODIUM CHLORIDE 9 MG/ML
INJECTION, SOLUTION INTRAVENOUS CONTINUOUS
Status: CANCELLED | OUTPATIENT
Start: 2024-08-20

## 2024-08-20 RX ORDER — SODIUM CHLORIDE 9 MG/ML
5-250 INJECTION, SOLUTION INTRAVENOUS PRN
Status: CANCELLED | OUTPATIENT
Start: 2024-08-20

## 2024-08-20 RX ADMIN — PALONOSETRON 0.25 MG: 0.05 INJECTION, SOLUTION INTRAVENOUS at 11:46

## 2024-08-20 RX ADMIN — OXALIPLATIN 100 MG: 5 INJECTION, SOLUTION INTRAVENOUS at 12:20

## 2024-08-20 RX ADMIN — DEXAMETHASONE SODIUM PHOSPHATE 10 MG: 10 INJECTION, SOLUTION INTRAMUSCULAR; INTRAVENOUS at 11:46

## 2024-08-20 RX ADMIN — LEUCOVORIN CALCIUM 200 MG: 200 INJECTION, POWDER, LYOPHILIZED, FOR SUSPENSION INTRAMUSCULAR; INTRAVENOUS at 12:20

## 2024-08-20 RX ADMIN — FLUOROURACIL 3925 MG: 50 INJECTION, SOLUTION INTRAVENOUS at 14:32

## 2024-08-20 RX ADMIN — SODIUM CHLORIDE 150 MG: 9 INJECTION, SOLUTION INTRAVENOUS at 11:54

## 2024-08-20 NOTE — PROGRESS NOTES
Lab Results   Component Value Date    WBC 8.72 08/20/2024    HGB 12.1 08/20/2024    HCT 36.2 08/20/2024    MCV 94.0 08/20/2024     08/20/2024     Lab Results   Component Value Date    NEUTROABS 2.51 08/20/2024     Lab Results   Component Value Date     08/20/2024    K 4.4 08/20/2024     08/20/2024    CO2 26 08/20/2024    BUN 5 (L) 08/20/2024    CREATININE 0.6 08/20/2024    GLUCOSE 93 08/20/2024    CALCIUM 8.9 08/20/2024    BILITOT 0.4 08/20/2024    ALKPHOS 117 (H) 08/20/2024    AST 36 (H) 08/20/2024    ALT 6 08/20/2024    LABGLOM >90 08/20/2024    GFRAA >59 01/12/2022    GLOB 3.1 07/23/2024

## 2024-08-20 NOTE — PROGRESS NOTES
[facility] Oncology [facility] Hematology  [address] [place] [address] [place] [address]  Phone: [phone]  Fax: [phone]    Mouna Mena, MS, RD, LD   Linn MELISSA Konstantin 78 y.o. White (non-) [employer]     Diagnosis, staging, date of diagnosis: Invasive ductal adenocarcinoma, pancreas, June 2021  Current Treatment: Palliative mFOLFOX every 2 weeks    Comprehensive Nutrition Assessment    Type and Reason for Visit:  Reassess    Malnutrition Assessment:  Malnutrition Status:  Moderate malnutrition (08/20/24 1323)    Context:  Chronic Illness     Findings of the 6 clinical characteristics of malnutrition:  Energy Intake:  Mild decrease in energy intake (Comment)  Weight Loss:  Greater than 7.5% over 3 months     Body Fat Loss:  Mild body fat loss Orbital, Buccal region   Muscle Mass Loss:  Mild muscle mass loss Temples (temporalis), Clavicles (pectoralis & deltoids)  Nutrition Assessment:    RN 77 y/o female presents 8/20/24 for follow-up RD evaluation. Pt remains moderately malnourished AEB inadequate intake, wt loss, and muscle/fat loss. Negative wt trend continues, but it has slowed compared to 3 months ago. Pt has had slight improvement in energy intake. However, it does remain inadequate to meet estimated needs. She reports diarrhea has improved and she now how more difficulty with constipation. Pt is not taking Marinol. She tried a dose and states she felt \"loopy\" and did not want to try again. Pt is not taking Creon regularly.     Diet History:  Pt has been able to eat more of a variety of foods since starting modified FOLFOX in June. She notes that she is ordering a vegetable plate from a local restaurant at times and eats this well. She is eating scrambled eggs most mornings. Pt's  continues to prepare foods for her. She is drinking at least one Ensure or [place] Instant Breakfast daily.    Anthropometric Measures:  Height: 165.1 cm (5' 5\")  Ideal Body Weight (IBW): 125

## 2024-08-21 LAB — CEA SERPL-MCNC: 23.4 NG/ML (ref 0–4.7)

## 2024-08-21 RX ORDER — LACTULOSE 10 G/15ML
10 SOLUTION ORAL DAILY PRN
Qty: 450 ML | Refills: 1 | Status: SHIPPED | OUTPATIENT
Start: 2024-08-21

## 2024-08-21 NOTE — PROGRESS NOTES
Supportive Care/Community Based Palliative Care  Follow Up Note        Patient Name:  Linn Pryor  Medical Record Number:  [id number]  YOB: 1945    Date of Visit: 8/20/2024  Location of Visit:  Other - Oncology office    Patient Care Team:  Jaiden Hayden MD as PCP - General (Family Medicine)  Yareli Henry DO as Consulting Physician (Vascular Surgery)  Radha Rosenthal MD as Consulting Physician (Oncology)  Shira Maharaj APRN - CNP (Nurse Practitioner)    History obtained from:  patient, family member - daughter, electronic medical record    PALLIATIVE DIAGNOSES AND ORDERS/RECOMMENDATIONS/PLAN:   1. Constipation due to opioid therapy  Daily stool softener  Lactulose as needed    2. Neoplastic malignant related fatigue  Continue duragesic patch  Continue Norco as needed    3. Poor appetite  Continue protein shakes  Continue Megace    4. Encounter for palliative care  Current goals of care include: Continue treatment with palliative intent, Preserve independence/control/autonomy, Maintain or improve functional status, Maintain or improve quality of life, Focus on comfort and quality of life, Remain at home, Would not consider facility placement    Code status confirmed: DNRCC  Will continue goals of care discussions based on clinical course  Follow up home visit in 2-3 weeks and as needed    CHIEF COMPLAINT:     Chief Complaint   Patient presents with    Constipation     Alternating constipation and diarrhea       CLINICAL SUMMARY:          Linn Pryor is a 78 y.o. female with PMH of pancreatic cancer, anxiety, depression, HTN, thyroid disease, arthritis.     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 Rudy Coles at [facility], [place], operated by [facility] 6/20/2021  -She had initial adjuvant treatment with Gemzar and

## 2024-08-22 ENCOUNTER — HOSPITAL ENCOUNTER (OUTPATIENT)
Dept: INFUSION THERAPY | Age: 79
Discharge: HOME | End: 2024-08-22
Payer: MEDICARE

## 2024-08-22 DIAGNOSIS — C25.2 MALIGNANT NEOPLASM OF TAIL OF PANCREAS (HCC): ICD-10-CM

## 2024-08-22 DIAGNOSIS — C25.9 METASTASIS FROM PANCREATIC CANCER (HCC): ICD-10-CM

## 2024-08-22 DIAGNOSIS — C79.9 METASTASIS FROM PANCREATIC CANCER (HCC): ICD-10-CM

## 2024-08-22 DIAGNOSIS — G89.3 CANCER ASSOCIATED PAIN: ICD-10-CM

## 2024-08-22 DIAGNOSIS — C25.2 MALIGNANT NEOPLASM OF TAIL OF PANCREAS (HCC): Primary | ICD-10-CM

## 2024-08-22 PROCEDURE — 96523 IRRIG DRUG DELIVERY DEVICE: CPT

## 2024-08-22 PROCEDURE — 2580000003 HC RX 258: Performed by: INTERNAL MEDICINE

## 2024-08-22 PROCEDURE — 6360000002 HC RX W HCPCS: Performed by: INTERNAL MEDICINE

## 2024-08-22 RX ORDER — SODIUM CHLORIDE 0.9 % (FLUSH) 0.9 %
5-40 SYRINGE (ML) INJECTION PRN
Status: DISCONTINUED | OUTPATIENT
Start: 2024-08-22 | End: 2024-08-23 | Stop reason: HOSPADM

## 2024-08-22 RX ORDER — HEPARIN 100 UNIT/ML
500 SYRINGE INTRAVENOUS PRN
Status: DISCONTINUED | OUTPATIENT
Start: 2024-08-22 | End: 2024-08-23 | Stop reason: HOSPADM

## 2024-08-22 RX ORDER — FENTANYL 25 UG/1
1 PATCH TRANSDERMAL
Qty: 10 PATCH | Refills: 0 | Status: SHIPPED | OUTPATIENT
Start: 2024-08-22 | End: 2024-09-21

## 2024-08-22 RX ADMIN — HEPARIN 500 UNITS: 100 SYRINGE at 11:24

## 2024-08-22 RX ADMIN — SODIUM CHLORIDE, PRESERVATIVE FREE 10 ML: 5 INJECTION INTRAVENOUS at 11:24

## 2024-08-23 LAB — CANCER AG19-9 SERPL-ACNC: 2787 U/ML (ref 0–35)

## 2024-08-30 NOTE — PROGRESS NOTES
MEDICAL ONCOLOGY PROGRESS NOTE                                                            Linn Pryor   1945  9/3/2024     Chief Complaint   Patient presents with    Pancreatic Cancer     Pt Name: Linn Pryor  MRN: [id number]  YOB: 1945  Date of evaluation: 9/3/2024    HISTORY OF PRESENT ILLNESS:    Reason for MD visit-toxicity assessment-disease management.   The patient is a very pleasant 78 years old female who has been diagnosed with invasive adenocarcinoma of the pancreas, node positive status post laparoscopic assisted distal pancreatectomy and splenectomy in June 2021 at [place].   The patient has developed metastatic disease with malignant ascites.  She has received 2 prior lines of palliative treatment with Gemzar Abraxane and Onivyde/5-FU.  Unfortunately, she had disease progression.  She is currently receiving palliative treatment with modified FOLFOX.  Therefore, repeat CT chest abdomen pelvis was performed that showed disease recurrence.  She was recommended modified FOLFOX.  She presents here today for cycle #4 of modified FOLFOX treatment.  She complains of intermittent abdominal pain and mild nausea.  She has Phenergan at home.  Zofran does not work very well.  Her weight has maintained.    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  June 2024-Progressive Disease   MSI High-Not Detected   6/18/24 Tempus xT/xF: JESUSITA, KRAS, TP53 positive; TMB low; MSI-stable    Treatment Summary  6/28/21 Laparoscopic assisted distal pancreatectomy and splenectomy with 1 of 6 lymph nodes positive. Surgeon Dr. Rudy Coles/[facility]   8/13/2021-02/25/22-adjuvant Gemzar 1000 mg/m2 IV D 1, D8, D15 with Capecitabine 830 mg/m2 D1-21 every 28 days x6 cycles  1/14/22

## 2024-09-03 ENCOUNTER — OFFICE VISIT (OUTPATIENT)
Dept: HEMATOLOGY | Age: 79
End: 2024-09-03
Payer: MEDICARE

## 2024-09-03 ENCOUNTER — HOSPITAL ENCOUNTER (OUTPATIENT)
Dept: INFUSION THERAPY | Age: 79
Discharge: HOME | End: 2024-09-03
Payer: MEDICARE

## 2024-09-03 VITALS
WEIGHT: 129.8 LBS | RESPIRATION RATE: 18 BRPM | SYSTOLIC BLOOD PRESSURE: 121 MMHG | HEART RATE: 73 BPM | HEIGHT: 65 IN | BODY MASS INDEX: 21.63 KG/M2 | TEMPERATURE: 97.5 F | OXYGEN SATURATION: 97 % | DIASTOLIC BLOOD PRESSURE: 86 MMHG

## 2024-09-03 DIAGNOSIS — C25.2 MALIGNANT NEOPLASM OF TAIL OF PANCREAS (HCC): ICD-10-CM

## 2024-09-03 DIAGNOSIS — C79.9 METASTASIS FROM PANCREATIC CANCER (HCC): ICD-10-CM

## 2024-09-03 DIAGNOSIS — T45.1X5A CHEMOTHERAPY INDUCED NAUSEA AND VOMITING: Primary | ICD-10-CM

## 2024-09-03 DIAGNOSIS — Z71.89 CARE PLAN DISCUSSED WITH PATIENT: ICD-10-CM

## 2024-09-03 DIAGNOSIS — T45.1X5A CHEMOTHERAPY-INDUCED FATIGUE: ICD-10-CM

## 2024-09-03 DIAGNOSIS — C25.2 MALIGNANT NEOPLASM OF TAIL OF PANCREAS (HCC): Primary | ICD-10-CM

## 2024-09-03 DIAGNOSIS — R11.2 CHEMOTHERAPY INDUCED NAUSEA AND VOMITING: Primary | ICD-10-CM

## 2024-09-03 DIAGNOSIS — T45.1X5A CHEMOTHERAPY-INDUCED NAUSEA: ICD-10-CM

## 2024-09-03 DIAGNOSIS — R11.0 CHEMOTHERAPY-INDUCED NAUSEA: ICD-10-CM

## 2024-09-03 DIAGNOSIS — C79.9 METASTASIS FROM PANCREATIC CANCER (HCC): Primary | ICD-10-CM

## 2024-09-03 DIAGNOSIS — R53.83 CHEMOTHERAPY-INDUCED FATIGUE: ICD-10-CM

## 2024-09-03 DIAGNOSIS — R97.8 ABNORMAL TUMOR MARKERS: ICD-10-CM

## 2024-09-03 DIAGNOSIS — C25.9 METASTASIS FROM PANCREATIC CANCER (HCC): ICD-10-CM

## 2024-09-03 DIAGNOSIS — Z51.11 CHEMOTHERAPY MANAGEMENT, ENCOUNTER FOR: ICD-10-CM

## 2024-09-03 DIAGNOSIS — K59.03 THERAPEUTIC OPIOID INDUCED CONSTIPATION: ICD-10-CM

## 2024-09-03 DIAGNOSIS — G62.0 CHEMOTHERAPY-INDUCED NEUROPATHY (HCC): ICD-10-CM

## 2024-09-03 DIAGNOSIS — T45.1X5A CHEMOTHERAPY-INDUCED NEUROPATHY (HCC): ICD-10-CM

## 2024-09-03 DIAGNOSIS — G89.3 CANCER ASSOCIATED PAIN: ICD-10-CM

## 2024-09-03 DIAGNOSIS — T40.2X5A THERAPEUTIC OPIOID INDUCED CONSTIPATION: ICD-10-CM

## 2024-09-03 DIAGNOSIS — C25.9 METASTASIS FROM PANCREATIC CANCER (HCC): Primary | ICD-10-CM

## 2024-09-03 LAB
ALBUMIN SERPL-MCNC: 3.6 G/DL (ref 3.5–5.2)
ALP SERPL-CCNC: 114 U/L (ref 35–104)
ALT SERPL-CCNC: 6 U/L (ref 5–33)
ANION GAP SERPL CALCULATED.3IONS-SCNC: 8 MMOL/L (ref 7–19)
AST SERPL-CCNC: 35 U/L (ref 5–32)
BASOPHILS # BLD: 0.05 K/UL (ref 0.01–0.08)
BASOPHILS NFR BLD: 0.7 % (ref 0.1–1.2)
BILIRUB SERPL-MCNC: 0.5 MG/DL (ref 0–1.2)
BUN SERPL-MCNC: 8 MG/DL (ref 8–23)
CALCIUM SERPL-MCNC: 9.4 MG/DL (ref 8.8–10.2)
CANCER AG19-9 SERPL-ACNC: 2737 U/ML (ref 0–35)
CEA SERPL-MCNC: 23 NG/ML (ref 0–4.7)
CHLORIDE SERPL-SCNC: 101 MMOL/L (ref 98–107)
CO2 SERPL-SCNC: 27 MMOL/L (ref 22–29)
CREAT SERPL-MCNC: 0.6 MG/DL (ref 0.5–0.9)
EOSINOPHIL # BLD: 0.22 K/UL (ref 0.04–0.54)
EOSINOPHIL NFR BLD: 3 % (ref 0.7–7)
ERYTHROCYTE [DISTWIDTH] IN BLOOD BY AUTOMATED COUNT: 16.6 % (ref 11.7–14.4)
GLUCOSE SERPL-MCNC: 105 MG/DL (ref 70–99)
HCT VFR BLD AUTO: 34.7 % (ref 34.1–44.9)
HGB BLD-MCNC: 11.5 G/DL (ref 11.2–15.7)
LYMPHOCYTES # BLD: 3.82 K/UL (ref 1.18–3.74)
LYMPHOCYTES NFR BLD: 51.3 % (ref 19.3–53.1)
MCH RBC QN AUTO: 31.3 PG (ref 25.6–32.2)
MCHC RBC AUTO-ENTMCNC: 33.1 G/DL (ref 32.3–35.5)
MCV RBC AUTO: 94.6 FL (ref 79.4–94.8)
MONOCYTES # BLD: 1.38 K/UL (ref 0.24–0.82)
MONOCYTES NFR BLD: 18.5 % (ref 4.7–12.5)
NEUTROPHILS # BLD: 1.96 K/UL (ref 1.56–6.13)
NEUTS SEG NFR BLD: 26.4 % (ref 34–71.1)
PLATELET # BLD AUTO: 224 K/UL (ref 182–369)
PMV BLD AUTO: 10.6 FL (ref 7.4–10.4)
POTASSIUM SERPL-SCNC: 4.2 MMOL/L (ref 3.5–5.1)
PROT SERPL-MCNC: 6.7 G/DL (ref 6.4–8.3)
RBC # BLD AUTO: 3.67 M/UL (ref 3.93–5.22)
SODIUM SERPL-SCNC: 136 MMOL/L (ref 136–145)
WBC # BLD AUTO: 7.44 K/UL (ref 3.98–10.04)

## 2024-09-03 PROCEDURE — 96415 CHEMO IV INFUSION ADDL HR: CPT

## 2024-09-03 PROCEDURE — 2580000003 HC RX 258: Performed by: INTERNAL MEDICINE

## 2024-09-03 PROCEDURE — 96366 THER/PROPH/DIAG IV INF ADDON: CPT

## 2024-09-03 PROCEDURE — 80053 COMPREHEN METABOLIC PANEL: CPT

## 2024-09-03 PROCEDURE — 1090F PRES/ABSN URINE INCON ASSESS: CPT | Performed by: INTERNAL MEDICINE

## 2024-09-03 PROCEDURE — 85025 COMPLETE CBC W/AUTO DIFF WBC: CPT

## 2024-09-03 PROCEDURE — G8427 DOCREV CUR MEDS BY ELIG CLIN: HCPCS | Performed by: INTERNAL MEDICINE

## 2024-09-03 PROCEDURE — 1036F TOBACCO NON-USER: CPT | Performed by: INTERNAL MEDICINE

## 2024-09-03 PROCEDURE — 36415 COLL VENOUS BLD VENIPUNCTURE: CPT

## 2024-09-03 PROCEDURE — G2211 COMPLEX E/M VISIT ADD ON: HCPCS | Performed by: INTERNAL MEDICINE

## 2024-09-03 PROCEDURE — G0498 CHEMO EXTEND IV INFUS W/PUMP: HCPCS

## 2024-09-03 PROCEDURE — 96368 THER/DIAG CONCURRENT INF: CPT

## 2024-09-03 PROCEDURE — 96375 TX/PRO/DX INJ NEW DRUG ADDON: CPT

## 2024-09-03 PROCEDURE — 99214 OFFICE O/P EST MOD 30 MIN: CPT | Performed by: INTERNAL MEDICINE

## 2024-09-03 PROCEDURE — G8420 CALC BMI NORM PARAMETERS: HCPCS | Performed by: INTERNAL MEDICINE

## 2024-09-03 PROCEDURE — G8399 PT W/DXA RESULTS DOCUMENT: HCPCS | Performed by: INTERNAL MEDICINE

## 2024-09-03 PROCEDURE — 96367 TX/PROPH/DG ADDL SEQ IV INF: CPT

## 2024-09-03 PROCEDURE — 96413 CHEMO IV INFUSION 1 HR: CPT

## 2024-09-03 PROCEDURE — 1123F ACP DISCUSS/DSCN MKR DOCD: CPT | Performed by: INTERNAL MEDICINE

## 2024-09-03 PROCEDURE — 6360000002 HC RX W HCPCS: Performed by: INTERNAL MEDICINE

## 2024-09-03 RX ORDER — HEPARIN SODIUM (PORCINE) LOCK FLUSH IV SOLN 100 UNIT/ML 100 UNIT/ML
500 SOLUTION INTRAVENOUS PRN
Status: CANCELLED | OUTPATIENT
Start: 2024-09-03

## 2024-09-03 RX ORDER — SODIUM CHLORIDE 9 MG/ML
INJECTION, SOLUTION INTRAVENOUS CONTINUOUS
Status: CANCELLED | OUTPATIENT
Start: 2024-09-03

## 2024-09-03 RX ORDER — ALBUTEROL SULFATE 90 UG/1
4 AEROSOL, METERED RESPIRATORY (INHALATION) PRN
Status: CANCELLED | OUTPATIENT
Start: 2024-09-03

## 2024-09-03 RX ORDER — FAMOTIDINE 10 MG/ML
20 INJECTION, SOLUTION INTRAVENOUS
Status: CANCELLED | OUTPATIENT
Start: 2024-09-03

## 2024-09-03 RX ORDER — SODIUM CHLORIDE 0.9 % (FLUSH) 0.9 %
5-40 SYRINGE (ML) INJECTION PRN
Status: CANCELLED | OUTPATIENT
Start: 2024-09-03

## 2024-09-03 RX ORDER — HEPARIN SODIUM (PORCINE) LOCK FLUSH IV SOLN 100 UNIT/ML 100 UNIT/ML
500 SOLUTION INTRAVENOUS PRN
Status: CANCELLED | OUTPATIENT
Start: 2024-09-05

## 2024-09-03 RX ORDER — SODIUM CHLORIDE 9 MG/ML
5-250 INJECTION, SOLUTION INTRAVENOUS PRN
OUTPATIENT
Start: 2024-09-05

## 2024-09-03 RX ORDER — SODIUM CHLORIDE 0.9 % (FLUSH) 0.9 %
5-40 SYRINGE (ML) INJECTION PRN
Status: CANCELLED | OUTPATIENT
Start: 2024-09-05

## 2024-09-03 RX ORDER — PROMETHAZINE HYDROCHLORIDE 25 MG/1
25 TABLET ORAL EVERY 6 HOURS PRN
Qty: 30 TABLET | Refills: 3 | Status: SHIPPED | OUTPATIENT
Start: 2024-09-03 | End: 2024-10-03

## 2024-09-03 RX ORDER — EPINEPHRINE 1 MG/ML
0.3 INJECTION, SOLUTION, CONCENTRATE INTRAVENOUS PRN
Status: CANCELLED | OUTPATIENT
Start: 2024-09-03

## 2024-09-03 RX ORDER — DEXAMETHASONE SODIUM PHOSPHATE 10 MG/ML
10 INJECTION, SOLUTION INTRAMUSCULAR; INTRAVENOUS ONCE
Status: COMPLETED | OUTPATIENT
Start: 2024-09-03 | End: 2024-09-03

## 2024-09-03 RX ORDER — METOCLOPRAMIDE 10 MG/1
10 TABLET ORAL EVERY 6 HOURS PRN
Qty: 120 TABLET | Refills: 3 | Status: SHIPPED | OUTPATIENT
Start: 2024-09-03

## 2024-09-03 RX ORDER — DIPHENHYDRAMINE HYDROCHLORIDE 50 MG/ML
50 INJECTION INTRAMUSCULAR; INTRAVENOUS
Status: CANCELLED | OUTPATIENT
Start: 2024-09-03

## 2024-09-03 RX ORDER — ACETAMINOPHEN 325 MG/1
650 TABLET ORAL
Status: CANCELLED | OUTPATIENT
Start: 2024-09-03

## 2024-09-03 RX ORDER — SODIUM CHLORIDE 9 MG/ML
5-250 INJECTION, SOLUTION INTRAVENOUS PRN
Status: CANCELLED | OUTPATIENT
Start: 2024-09-03

## 2024-09-03 RX ORDER — ONDANSETRON 2 MG/ML
8 INJECTION INTRAMUSCULAR; INTRAVENOUS
Status: CANCELLED | OUTPATIENT
Start: 2024-09-03

## 2024-09-03 RX ORDER — MEPERIDINE HYDROCHLORIDE 50 MG/ML
12.5 INJECTION INTRAMUSCULAR; INTRAVENOUS; SUBCUTANEOUS PRN
Status: CANCELLED | OUTPATIENT
Start: 2024-09-03

## 2024-09-03 RX ORDER — DEXTROSE MONOHYDRATE 50 MG/ML
5-250 INJECTION, SOLUTION INTRAVENOUS PRN
Status: CANCELLED | OUTPATIENT
Start: 2024-09-03

## 2024-09-03 RX ORDER — PALONOSETRON 0.05 MG/ML
0.25 INJECTION, SOLUTION INTRAVENOUS ONCE
Status: COMPLETED | OUTPATIENT
Start: 2024-09-03 | End: 2024-09-03

## 2024-09-03 RX ADMIN — LEUCOVORIN CALCIUM 200 MG: 200 INJECTION, POWDER, LYOPHILIZED, FOR SUSPENSION INTRAMUSCULAR; INTRAVENOUS at 12:22

## 2024-09-03 RX ADMIN — FLUOROURACIL 3925 MG: 50 INJECTION, SOLUTION INTRAVENOUS at 14:33

## 2024-09-03 RX ADMIN — OXALIPLATIN 100 MG: 5 INJECTION, SOLUTION INTRAVENOUS at 12:21

## 2024-09-03 RX ADMIN — DEXAMETHASONE SODIUM PHOSPHATE 10 MG: 10 INJECTION, SOLUTION INTRAMUSCULAR; INTRAVENOUS at 10:54

## 2024-09-03 RX ADMIN — PALONOSETRON 0.25 MG: 0.05 INJECTION, SOLUTION INTRAVENOUS at 10:54

## 2024-09-03 RX ADMIN — SODIUM CHLORIDE 150 MG: 9 INJECTION, SOLUTION INTRAVENOUS at 10:55

## 2024-09-04 ENCOUNTER — TELEPHONE (OUTPATIENT)
Dept: HEMATOLOGY | Age: 79
End: 2024-09-04

## 2024-09-04 NOTE — TELEPHONE ENCOUNTER
Called patient to give her dates of her scheduled scans on 9/10 @ 1:20 gave her instructions that she will have to arrive 1 hour early to drink the barium.       [facility] CT ABDOMEN PELVIS W IV CONTRAST  * Do not wear jewelry, hair pins, hearing aids, or any other metallic object. Wear clothing that does not contain metal.  * If test is being performed with oral contrast, patient must arrive 1 hour prior to exam. Otherwise, arrive 30 minutes prior.  * Nothing to eat or drink 4 hours prior to exam.    Dept directions for [facility] CT SCAN:  - [facility], Located in [address], [address] [facility], [place], [place]    (commonly referred to as [facility]) enter on the ground floor and on left of the atrium.   Also Linn is going to come before her scans and get her port accessed (per Nilda) I told her about this appt time also.

## 2024-09-05 ENCOUNTER — HOSPITAL ENCOUNTER (OUTPATIENT)
Dept: INFUSION THERAPY | Age: 79
Discharge: HOME | End: 2024-09-05
Payer: MEDICARE

## 2024-09-05 DIAGNOSIS — C79.9 METASTASIS FROM PANCREATIC CANCER (HCC): ICD-10-CM

## 2024-09-05 DIAGNOSIS — C25.2 MALIGNANT NEOPLASM OF TAIL OF PANCREAS (HCC): Primary | ICD-10-CM

## 2024-09-05 DIAGNOSIS — C25.9 METASTASIS FROM PANCREATIC CANCER (HCC): ICD-10-CM

## 2024-09-05 PROCEDURE — 6360000002 HC RX W HCPCS: Performed by: INTERNAL MEDICINE

## 2024-09-05 PROCEDURE — 2580000003 HC RX 258: Performed by: INTERNAL MEDICINE

## 2024-09-05 PROCEDURE — 96523 IRRIG DRUG DELIVERY DEVICE: CPT

## 2024-09-05 RX ORDER — HEPARIN 100 UNIT/ML
500 SYRINGE INTRAVENOUS PRN
Status: DISCONTINUED | OUTPATIENT
Start: 2024-09-05 | End: 2024-09-06 | Stop reason: HOSPADM

## 2024-09-05 RX ORDER — SODIUM CHLORIDE 0.9 % (FLUSH) 0.9 %
5-40 SYRINGE (ML) INJECTION PRN
Status: DISCONTINUED | OUTPATIENT
Start: 2024-09-05 | End: 2024-09-06 | Stop reason: HOSPADM

## 2024-09-05 RX ADMIN — SODIUM CHLORIDE, PRESERVATIVE FREE 10 ML: 5 INJECTION INTRAVENOUS at 10:57

## 2024-09-05 RX ADMIN — HEPARIN 500 UNITS: 100 SYRINGE at 10:57

## 2024-09-10 ENCOUNTER — HOSPITAL ENCOUNTER (OUTPATIENT)
Dept: CT IMAGING | Age: 79
Discharge: HOME | End: 2024-09-10
Payer: MEDICARE

## 2024-09-10 ENCOUNTER — HOSPITAL ENCOUNTER (OUTPATIENT)
Dept: INFUSION THERAPY | Age: 79
Discharge: HOME | DRG: 392 | End: 2024-09-10
Payer: MEDICARE

## 2024-09-10 DIAGNOSIS — C79.9 METASTASIS FROM PANCREATIC CANCER (HCC): ICD-10-CM

## 2024-09-10 DIAGNOSIS — C25.2 MALIGNANT NEOPLASM OF TAIL OF PANCREAS (HCC): ICD-10-CM

## 2024-09-10 DIAGNOSIS — C25.2 MALIGNANT NEOPLASM OF TAIL OF PANCREAS (HCC): Primary | ICD-10-CM

## 2024-09-10 DIAGNOSIS — C25.9 METASTASIS FROM PANCREATIC CANCER (HCC): ICD-10-CM

## 2024-09-10 PROCEDURE — 74177 CT ABD & PELVIS W/CONTRAST: CPT

## 2024-09-10 PROCEDURE — 6360000002 HC RX W HCPCS: Performed by: INTERNAL MEDICINE

## 2024-09-10 PROCEDURE — 96523 IRRIG DRUG DELIVERY DEVICE: CPT

## 2024-09-10 PROCEDURE — 6360000004 HC RX CONTRAST MEDICATION: Performed by: INTERNAL MEDICINE

## 2024-09-10 PROCEDURE — 71260 CT THORAX DX C+: CPT

## 2024-09-10 PROCEDURE — 2580000003 HC RX 258: Performed by: INTERNAL MEDICINE

## 2024-09-10 RX ORDER — IOPAMIDOL 755 MG/ML
75 INJECTION, SOLUTION INTRAVASCULAR
Status: COMPLETED | OUTPATIENT
Start: 2024-09-10 | End: 2024-09-10

## 2024-09-10 RX ORDER — HEPARIN 100 UNIT/ML
500 SYRINGE INTRAVENOUS PRN
Status: DISCONTINUED | OUTPATIENT
Start: 2024-09-10 | End: 2024-09-11 | Stop reason: HOSPADM

## 2024-09-10 RX ORDER — SODIUM CHLORIDE 0.9 % (FLUSH) 0.9 %
5-40 SYRINGE (ML) INJECTION PRN
Status: DISCONTINUED | OUTPATIENT
Start: 2024-09-10 | End: 2024-09-11 | Stop reason: HOSPADM

## 2024-09-10 RX ADMIN — IOPAMIDOL 75 ML: 755 INJECTION, SOLUTION INTRAVENOUS at 14:10

## 2024-09-10 RX ADMIN — HEPARIN 500 UNITS: 100 SYRINGE at 13:23

## 2024-09-10 RX ADMIN — SODIUM CHLORIDE, PRESERVATIVE FREE 10 ML: 5 INJECTION INTRAVENOUS at 13:23

## 2024-09-13 ENCOUNTER — HOSPITAL ENCOUNTER (INPATIENT)
Age: 79
LOS: 3 days | Discharge: HOSPICE | DRG: 392 | End: 2024-09-16
Attending: PEDIATRICS
Payer: MEDICARE

## 2024-09-13 ENCOUNTER — APPOINTMENT (OUTPATIENT)
Dept: CT IMAGING | Age: 79
DRG: 392 | End: 2024-09-13
Payer: MEDICARE

## 2024-09-13 DIAGNOSIS — R10.12 LEFT UPPER QUADRANT ABDOMINAL PAIN: ICD-10-CM

## 2024-09-13 DIAGNOSIS — C25.9 MALIGNANT NEOPLASM OF PANCREAS, UNSPECIFIED LOCATION OF MALIGNANCY (HCC): ICD-10-CM

## 2024-09-13 DIAGNOSIS — K92.0 GASTROINTESTINAL HEMORRHAGE WITH HEMATEMESIS: Primary | ICD-10-CM

## 2024-09-13 LAB
ACANTHOCYTES BLD QL SMEAR: ABNORMAL
ALBUMIN SERPL-MCNC: 3.6 G/DL (ref 3.5–5.2)
ALP SERPL-CCNC: 117 U/L (ref 35–104)
ALT SERPL-CCNC: 6 U/L (ref 5–33)
ANION GAP SERPL CALCULATED.3IONS-SCNC: 11 MMOL/L (ref 7–19)
ANISOCYTOSIS BLD QL SMEAR: ABNORMAL
AST SERPL-CCNC: 35 U/L (ref 5–32)
BACTERIA #/AREA URNS HPF: NORMAL /HPF
BASO STIPL BLD QL SMEAR: ABNORMAL
BASOPHILS # BLD: 0 K/UL (ref 0–0.2)
BASOPHILS NFR BLD: 0 % (ref 0–1)
BILIRUB SERPL-MCNC: 0.4 MG/DL (ref 0.2–1.2)
BILIRUB UR QL STRIP: NEGATIVE
BUN SERPL-MCNC: 12 MG/DL (ref 8–23)
CALCIUM SERPL-MCNC: 9 MG/DL (ref 8.8–10.2)
CHLORIDE SERPL-SCNC: 99 MMOL/L (ref 98–111)
CLARITY UR: ABNORMAL
CO2 SERPL-SCNC: 25 MMOL/L (ref 22–29)
COLOR UR: ABNORMAL
CREAT SERPL-MCNC: 0.6 MG/DL (ref 0.5–0.9)
CRYSTALS URNS MICRO: NORMAL /HPF
EOSINOPHIL # BLD: 0 K/UL (ref 0–0.6)
EOSINOPHIL NFR BLD: 0 % (ref 0–5)
ERYTHROCYTE [DISTWIDTH] IN BLOOD BY AUTOMATED COUNT: 16.3 % (ref 11.5–14.5)
GLUCOSE SERPL-MCNC: 131 MG/DL (ref 70–99)
GLUCOSE UR STRIP.AUTO-MCNC: NEGATIVE MG/DL
HCT VFR BLD AUTO: 34.7 % (ref 37–47)
HGB BLD-MCNC: 11.6 G/DL (ref 12–16)
HGB UR STRIP.AUTO-MCNC: ABNORMAL MG/L
HYALINE CASTS #/AREA URNS LPF: NORMAL /LPF (ref 0–5)
IMM GRANULOCYTES # BLD: 0 K/UL
KETONES UR STRIP.AUTO-MCNC: ABNORMAL MG/DL
LACTATE BLDV-SCNC: 0.8 MG/DL (ref 0.5–1.9)
LACTATE BLDV-SCNC: 1.7 MG/DL (ref 0.5–1.9)
LEUKOCYTE ESTERASE UR QL STRIP.AUTO: ABNORMAL
LIPASE SERPL-CCNC: 7 U/L (ref 13–60)
LYMPHOCYTES # BLD: 4.3 K/UL (ref 1.1–4.5)
LYMPHOCYTES NFR BLD: 51 % (ref 20–40)
MCH RBC QN AUTO: 32.1 PG (ref 27–31)
MCHC RBC AUTO-ENTMCNC: 33.4 G/DL (ref 33–37)
MCV RBC AUTO: 96.1 FL (ref 81–99)
MONOCYTES # BLD: 0.9 K/UL (ref 0–0.9)
MONOCYTES NFR BLD: 11 % (ref 0–10)
NEUTROPHILS # BLD: 3.2 K/UL (ref 1.5–7.5)
NEUTS SEG NFR BLD: 38 % (ref 50–65)
NITRITE UR QL STRIP.AUTO: NEGATIVE
OVALOCYTES BLD QL SMEAR: ABNORMAL
PH UR STRIP.AUTO: 5.5 [PH] (ref 5–8)
PLATELET # BLD AUTO: 240 K/UL (ref 130–400)
PLATELET SLIDE REVIEW: ADEQUATE
PMV BLD AUTO: 11 FL (ref 9.4–12.3)
POLYCHROMASIA BLD QL SMEAR: ABNORMAL
POTASSIUM SERPL-SCNC: 3.7 MMOL/L (ref 3.5–5)
PROT SERPL-MCNC: 6.9 G/DL (ref 6.4–8.3)
PROT UR STRIP.AUTO-MCNC: ABNORMAL MG/DL
RBC # BLD AUTO: 3.61 M/UL (ref 4.2–5.4)
RBC #/AREA URNS HPF: NORMAL /HPF (ref 0–2)
SODIUM SERPL-SCNC: 135 MMOL/L (ref 136–145)
SP GR UR STRIP.AUTO: 1.02 (ref 1–1.03)
SQUAMOUS #/AREA URNS HPF: NORMAL /HPF
UROBILINOGEN UR STRIP.AUTO-MCNC: 1 E.U./DL
WBC # BLD AUTO: 8.4 K/UL (ref 4.8–10.8)
WBC #/AREA URNS HPF: NORMAL /HPF (ref 0–5)

## 2024-09-13 PROCEDURE — 80053 COMPREHEN METABOLIC PANEL: CPT

## 2024-09-13 PROCEDURE — 96365 THER/PROPH/DIAG IV INF INIT: CPT

## 2024-09-13 PROCEDURE — 99285 EMERGENCY DEPT VISIT HI MDM: CPT

## 2024-09-13 PROCEDURE — 85025 COMPLETE CBC W/AUTO DIFF WBC: CPT

## 2024-09-13 PROCEDURE — 36415 COLL VENOUS BLD VENIPUNCTURE: CPT

## 2024-09-13 PROCEDURE — 99223 1ST HOSP IP/OBS HIGH 75: CPT | Performed by: INTERNAL MEDICINE

## 2024-09-13 PROCEDURE — 96367 TX/PROPH/DG ADDL SEQ IV INF: CPT

## 2024-09-13 PROCEDURE — 87040 BLOOD CULTURE FOR BACTERIA: CPT

## 2024-09-13 PROCEDURE — 93005 ELECTROCARDIOGRAM TRACING: CPT | Performed by: PEDIATRICS

## 2024-09-13 PROCEDURE — 81001 URINALYSIS AUTO W/SCOPE: CPT

## 2024-09-13 PROCEDURE — 6360000002 HC RX W HCPCS: Performed by: PEDIATRICS

## 2024-09-13 PROCEDURE — 74177 CT ABD & PELVIS W/CONTRAST: CPT

## 2024-09-13 PROCEDURE — 83605 ASSAY OF LACTIC ACID: CPT

## 2024-09-13 PROCEDURE — 96376 TX/PRO/DX INJ SAME DRUG ADON: CPT

## 2024-09-13 PROCEDURE — 1200000000 HC SEMI PRIVATE

## 2024-09-13 PROCEDURE — 96375 TX/PRO/DX INJ NEW DRUG ADDON: CPT

## 2024-09-13 PROCEDURE — 6360000004 HC RX CONTRAST MEDICATION: Performed by: PEDIATRICS

## 2024-09-13 PROCEDURE — 83690 ASSAY OF LIPASE: CPT

## 2024-09-13 PROCEDURE — 2580000003 HC RX 258: Performed by: PEDIATRICS

## 2024-09-13 RX ORDER — SODIUM CHLORIDE 9 MG/ML
INJECTION, SOLUTION INTRAVENOUS PRN
Status: DISCONTINUED | OUTPATIENT
Start: 2024-09-13 | End: 2024-09-16 | Stop reason: HOSPADM

## 2024-09-13 RX ORDER — IOPAMIDOL 755 MG/ML
75 INJECTION, SOLUTION INTRAVASCULAR
Status: COMPLETED | OUTPATIENT
Start: 2024-09-13 | End: 2024-09-13

## 2024-09-13 RX ORDER — METRONIDAZOLE 500 MG/100ML
500 INJECTION, SOLUTION INTRAVENOUS EVERY 8 HOURS
Status: DISCONTINUED | OUTPATIENT
Start: 2024-09-14 | End: 2024-09-16 | Stop reason: HOSPADM

## 2024-09-13 RX ORDER — SODIUM CHLORIDE 9 MG/ML
INJECTION, SOLUTION INTRAVENOUS PRN
Status: DISCONTINUED | OUTPATIENT
Start: 2024-09-13 | End: 2024-09-13 | Stop reason: SDUPTHER

## 2024-09-13 RX ORDER — HYDROMORPHONE HYDROCHLORIDE 1 MG/ML
0.5 INJECTION, SOLUTION INTRAMUSCULAR; INTRAVENOUS; SUBCUTANEOUS ONCE
Status: DISCONTINUED | OUTPATIENT
Start: 2024-09-13 | End: 2024-09-13

## 2024-09-13 RX ORDER — PANTOPRAZOLE SODIUM 40 MG/10ML
80 INJECTION, POWDER, LYOPHILIZED, FOR SOLUTION INTRAVENOUS ONCE
Status: COMPLETED | OUTPATIENT
Start: 2024-09-13 | End: 2024-09-13

## 2024-09-13 RX ORDER — SODIUM CHLORIDE 0.9 % (FLUSH) 0.9 %
5-40 SYRINGE (ML) INJECTION PRN
Status: DISCONTINUED | OUTPATIENT
Start: 2024-09-13 | End: 2024-09-16 | Stop reason: HOSPADM

## 2024-09-13 RX ORDER — ONDANSETRON 2 MG/ML
4 INJECTION INTRAMUSCULAR; INTRAVENOUS ONCE
Status: DISCONTINUED | OUTPATIENT
Start: 2024-09-13 | End: 2024-09-13

## 2024-09-13 RX ORDER — ONDANSETRON 2 MG/ML
4 INJECTION INTRAMUSCULAR; INTRAVENOUS EVERY 6 HOURS PRN
Status: DISCONTINUED | OUTPATIENT
Start: 2024-09-13 | End: 2024-09-16 | Stop reason: HOSPADM

## 2024-09-13 RX ORDER — SODIUM CHLORIDE 0.9 % (FLUSH) 0.9 %
5-40 SYRINGE (ML) INJECTION EVERY 12 HOURS SCHEDULED
Status: DISCONTINUED | OUTPATIENT
Start: 2024-09-13 | End: 2024-09-13 | Stop reason: SDUPTHER

## 2024-09-13 RX ORDER — HYDROMORPHONE HYDROCHLORIDE 1 MG/ML
0.5 INJECTION, SOLUTION INTRAMUSCULAR; INTRAVENOUS; SUBCUTANEOUS EVERY 30 MIN PRN
Status: COMPLETED | OUTPATIENT
Start: 2024-09-13 | End: 2024-09-16

## 2024-09-13 RX ORDER — HYDROCODONE BITARTRATE AND ACETAMINOPHEN 10; 325 MG/1; MG/1
1 TABLET ORAL EVERY 6 HOURS PRN
COMMUNITY

## 2024-09-13 RX ORDER — SODIUM CHLORIDE 0.9 % (FLUSH) 0.9 %
5-40 SYRINGE (ML) INJECTION PRN
Status: DISCONTINUED | OUTPATIENT
Start: 2024-09-13 | End: 2024-09-13 | Stop reason: SDUPTHER

## 2024-09-13 RX ORDER — ONDANSETRON 4 MG/1
4 TABLET, ORALLY DISINTEGRATING ORAL EVERY 8 HOURS PRN
Status: DISCONTINUED | OUTPATIENT
Start: 2024-09-13 | End: 2024-09-16 | Stop reason: HOSPADM

## 2024-09-13 RX ORDER — PROMETHAZINE HYDROCHLORIDE 25 MG/ML
25 INJECTION, SOLUTION INTRAMUSCULAR; INTRAVENOUS ONCE
Status: DISCONTINUED | OUTPATIENT
Start: 2024-09-13 | End: 2024-09-13 | Stop reason: SDUPTHER

## 2024-09-13 RX ORDER — SODIUM CHLORIDE 0.9 % (FLUSH) 0.9 %
5-40 SYRINGE (ML) INJECTION EVERY 12 HOURS SCHEDULED
Status: DISCONTINUED | OUTPATIENT
Start: 2024-09-13 | End: 2024-09-16 | Stop reason: HOSPADM

## 2024-09-13 RX ORDER — CIPROFLOXACIN 2 MG/ML
400 INJECTION, SOLUTION INTRAVENOUS EVERY 12 HOURS
Status: DISCONTINUED | OUTPATIENT
Start: 2024-09-14 | End: 2024-09-16 | Stop reason: HOSPADM

## 2024-09-13 RX ORDER — 0.9 % SODIUM CHLORIDE 0.9 %
30 INTRAVENOUS SOLUTION INTRAVENOUS ONCE
Status: COMPLETED | OUTPATIENT
Start: 2024-09-13 | End: 2024-09-13

## 2024-09-13 RX ADMIN — IOPAMIDOL 75 ML: 755 INJECTION, SOLUTION INTRAVENOUS at 15:17

## 2024-09-13 RX ADMIN — PANTOPRAZOLE SODIUM 80 MG: 40 INJECTION, POWDER, FOR SOLUTION INTRAVENOUS at 19:00

## 2024-09-13 RX ADMIN — HYDROMORPHONE HYDROCHLORIDE 0.5 MG: 1 INJECTION, SOLUTION INTRAMUSCULAR; INTRAVENOUS; SUBCUTANEOUS at 18:50

## 2024-09-13 RX ADMIN — SODIUM CHLORIDE 1755 ML: 9 INJECTION, SOLUTION INTRAVENOUS at 15:19

## 2024-09-13 RX ADMIN — PANTOPRAZOLE SODIUM 8 MG/HR: 40 INJECTION, POWDER, FOR SOLUTION INTRAVENOUS at 20:31

## 2024-09-13 RX ADMIN — PROMETHAZINE HYDROCHLORIDE 25 MG: 25 INJECTION INTRAMUSCULAR; INTRAVENOUS at 15:44

## 2024-09-13 ASSESSMENT — LIFESTYLE VARIABLES
HOW OFTEN DO YOU HAVE A DRINK CONTAINING ALCOHOL: NEVER
HOW MANY STANDARD DRINKS CONTAINING ALCOHOL DO YOU HAVE ON A TYPICAL DAY: PATIENT DOES NOT DRINK

## 2024-09-13 ASSESSMENT — PAIN SCALES - GENERAL: PAINLEVEL_OUTOF10: 8

## 2024-09-13 NOTE — CONSULTS
MEDICAL ONCOLOGY CONSULTATION    Pt Name: Linn Pryor  MRN: [id number]  YOB: 1945  Date of evaluation: 9/13/2024    REASON FOR CONSULTATION:  Pancreatic cancer , continuity of care   REQUESTING PHYSICIAN: ER provider    History Obtained From:    patient, electronic medical record    HISTORY OF PRESENT ILLNESS:   The patient is well-known to our clinic.  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  She is currently undergoing chemotherapy with modified FOLFOX.  She had prior progression on Gemzar and Abraxane, Gemzar capecitabine and Onivyde and leucovorin.  She has been on treatment since August 2021.  She presented to the ER department today with complaints of coffee-ground emesis and hematochezia.  CBC showed WBC 8.4, hemoglobin 11.6, platelet count 240,000.  Chemistry remarkable for sodium 135, normal creatinine, normal LFTs.    9/10/2024-CT chest, [facility]: Small bilateral pleural effusions, perhaps mildly increased.  Stable sub-centimeter pulmonary nodules.  No new pulmonary nodule or pulmonary mass.  Please see report from CT abdomen pelvis obtained at the same time for description of findings in the upper abdomen.    9/10/2024-CT abdomen pelvis with IV contrast: FINDINGS:  Stable subcapsular low density mass of the liver right lobe.  Measures 8.0 x 5.5 cm cross section by 5.8 cm cranial caudally.  0.9 cm subcapsular low density of the liver right lobe slightly more medially at the same level, also stable.  Stable 0.6 cm cyst of the left lobe along the falciform ligament.  No new liver lesions.  No biliary tree dilatation.  Cholecystectomy, again noted.  Severe atrophy and fatty replacement of the pancreas, stable.  Stable 1.4 cm cystic lesion of uncinate process of the pancreas and 0.9 cm cystic lesion of the pancreatic head.  Absence of the spleen, with suggestion of several splenules in the left upper quadrant, again noted.  The adrenal glands have a normal  prior similar study of 5/5/20. The patient is again noted to be status post cholecystectomy. The examination is otherwise within normal limits for age with no abnormal postcontrast enhancement noted.  6/3/21 Upper EUS (Dr Micah Rene/[place]): CT Visualized portion of pancreas abnormal on ultrasound. There is a 26.7 mm solid mass in pancreatic tail. It abuts the splenic vessel without occlusion or varices. Pancreatic duct is dilated to 6 mm in the tail proximal to the mass. FNA x4 yields atypical cells. Celiac axis, liver appear normal, no local nodes. Body and head of pancreas normal.  6/3/21 Pancreas tail mass, FNA aspiration smear (x2), ThinPrep and cell block: Adenocarcinoma.  6/7/21 CA 19-9 407.1  6/28/21-she underwent Whipple procedure pancreatectomy by Dr. Rudy Coles: Invasive ductal carcinoma 4.3 cm in greatest dimension, invasive in the peripancreatic adipose tissue, diffuse perineural invasion. Surgical margins were negative for carcinoma. The pancreas margins involved by a low-grade dysplasia. 1/6 nodes positive for metastatic cancer with direct extension. Distal fibrosis and pancreatitis was noted. Spleen was unremarkable.  Pathological stage pT2 N1 M0  8/6/2021-she was first seen by me.  Recommend adjuvant chemotherapy with gemcitabine/Xeloda.  She acknowledged understanding and agree with treatment.  8/09/2021 CT Chest W/Contrast No evidence of intrathoracic metastatic disease is identified.  8/09/2021 CT Abd/Pelvis W/Oral Contrast Prior distal pancreatectomy and splenectomy. There is a 1.5 cm circumscribed, nonenhancing cystic lesion along the inferior pancreatic head, perhaps a branch IPMN. Otherwise, no obvious pancreatic solid mass is seen. No suspicious adenopathy or evidence of distant metastatic disease in the abdomen or pelvis.Prior cholecystectomy.Liver steatosis. Colonic diverticulosis.    8/13/2021-initiation of adjuvant capecitabine/Gemzar   9/10/2021-I reviewed CT chest abdomen  with the patient. I answered all the questions to the patient’s satisfaction. I have also reviewed the chief complaint (CC) and part of the history (History of Present Illness (HPI), Past Family Social History (PFSH), or Review of Systems (ROS) and made changes when appropriated.       (Please note that portions of this note were completed with a voice recognition program. Efforts were made to edit the dictations but occasionally words are mis-transcribed.)        Radha Rosenthal MD    09/13/24  7:10 PM

## 2024-09-13 NOTE — ED PROVIDER NOTES
[facility] EMERGENCY DEPT  eMERGENCY dEPARTMENT eNCOUnter      Pt Name: Linn Pryor  MRN: [id number]  Birthdate 1945  Date of evaluation: 9/13/2024  Provider: Sarah Garcia MD    CHIEF COMPLAINT       Chief Complaint   Patient presents with    Nausea & Vomiting     Felt sick for a few days but worse since yesterday, pt reports dark emesis this morning    Abdominal Pain     Upper abdominal pain, pancreatic CA pt, states pain getting worse         HISTORY OF PRESENT ILLNESS   (Location/Symptom, Timing/Onset,Context/Setting, Quality, Duration, Modifying Factors, Severity)  Note limiting factors.   Linn Pryor is a 78 y.o. female who presents to the emergency department abdominal pain and vomiting.  Patient has a history of pancreatic cancer.  Patient states that she has been experiencing pain since her diagnosis but pain has been worse since yesterday.  Patient states that she has been having nausea and vomiting.  Patient awoke and had \"black vomit\" last night.  Patient states that abdominal pain is severe and currently a 10 out of 10.  Patient points to left mid abdomen as source of pain.  Patient has been having diarrhea but patient denies black or bloody stools.  Patient sees Dr. Rosenthal for her pancreatic cancer.  Patient denies fever, burning with urination, difficulty urinating, cough or congestion    HPI    NursingNotes were reviewed.    REVIEW OF SYSTEMS    (2-9 systems for level 4, 10 or more for level 5)     Review of Systems   Constitutional:  Positive for fatigue. Negative for fever.   HENT:  Negative for congestion and rhinorrhea.    Respiratory:  Negative for cough and shortness of breath.    Cardiovascular:  Negative for chest pain and palpitations.   Gastrointestinal:  Positive for abdominal pain, diarrhea, nausea and vomiting. Negative for blood in stool.        Hematemesis   Genitourinary:  Negative for difficulty urinating and dysuria.   Musculoskeletal:  Negative for back pain and neck pain.  Abnormal; Notable for the following components:       Result Value    RBC 3.61 (*)     Hemoglobin 11.6 (*)     Hematocrit 34.7 (*)     MCH 32.1 (*)     RDW 16.3 (*)     Neutrophils % 38.0 (*)     Lymphocytes % 51.0 (*)     Monocytes % 11.0 (*)     Anisocytosis 1+ (*)     Polychromasia 1+ (*)     Acanthocytes 1+ (*)     Ovalocytes Occasional (*)     Basophilic Stippling Occasional (*)     All other components within normal limits   COMPREHENSIVE METABOLIC PANEL - Abnormal; Notable for the following components:    Sodium 135 (*)     Glucose 131 (*)     Alkaline Phosphatase 117 (*)     AST 35 (*)     All other components within normal limits   LIPASE - Abnormal; Notable for the following components:    Lipase 7 (*)     All other components within normal limits   URINALYSIS WITH REFLEX TO CULTURE - Abnormal; Notable for the following components:    Color, UA DARK YELLOW (*)     Clarity, UA CLOUDY (*)     Ketones, Urine TRACE (*)     Blood, Urine SMALL (*)     Protein, UA TRACE (*)     Leukocyte Esterase, Urine SMALL (*)     All other components within normal limits   CULTURE, BLOOD 1   CULTURE, BLOOD 2   MICROSCOPIC URINALYSIS   LACTATE, SEPSIS   LACTATE, SEPSIS       All other labs were within normal range or not returned as of this dictation.    EMERGENCY DEPARTMENT COURSE and DIFFERENTIAL DIAGNOSIS/MDM:   Vitals:    Vitals:    09/13/24 1659 09/13/24 1729 09/13/24 1758 09/13/24 1843   BP: 125/76 130/77 127/73    Pulse: 76 73 74 88   Resp: 15 16 17 22   Temp:       TempSrc:       SpO2: 94% 95% 94% 92%   Weight:           MDM  78-year-old female with history of pancreatic cancer presents with abdominal pain and hematemesis.  On physical examination, patient is in severe pain.  Patient is given Dilaudid 0.5 mg IV and Phenergan IV drip.  Rectal exam is heme positive.  Patient started on Protonix 80 mg IV and 8 mg/h drip.  Discussed with Dr. Rosenthal, oncology, and Dr. Bonilla, GI specialist.  Patient will be admitted to

## 2024-09-14 PROBLEM — Z78.9 POOR PROGNOSIS: Status: ACTIVE | Noted: 2024-09-14

## 2024-09-14 PROBLEM — R69 LIFE THREATENING MEDICAL ILLNESS: Status: ACTIVE | Noted: 2024-09-14

## 2024-09-14 PROBLEM — E44.0 MODERATE MALNUTRITION (HCC): Chronic | Status: ACTIVE | Noted: 2024-09-14

## 2024-09-14 PROBLEM — T45.1X5A CHEMOTHERAPY ADVERSE REACTION: Status: ACTIVE | Noted: 2024-09-14

## 2024-09-14 PROBLEM — C25.9 PANCREATIC ADENOCARCINOMA (HCC): Status: ACTIVE | Noted: 2024-09-14

## 2024-09-14 LAB
APTT PPP: 28.5 SEC (ref 26–36.2)
HCT VFR BLD AUTO: 32 % (ref 37–47)
HCT VFR BLD AUTO: 33.5 % (ref 37–47)
HGB BLD-MCNC: 10.4 G/DL (ref 12–16)
HGB BLD-MCNC: 11 G/DL (ref 12–16)
INR PPP: 1.02 (ref 0.88–1.18)
IRON SATN MFR SERPL: 18 % (ref 14–50)
IRON SERPL-MCNC: 37 UG/DL (ref 37–145)
PROTHROMBIN TIME: 13.1 SEC (ref 12–14.6)
TIBC SERPL-MCNC: 206 UG/DL (ref 250–400)

## 2024-09-14 PROCEDURE — 6360000002 HC RX W HCPCS: Performed by: NURSE PRACTITIONER

## 2024-09-14 PROCEDURE — 83540 ASSAY OF IRON: CPT

## 2024-09-14 PROCEDURE — 85730 THROMBOPLASTIN TIME PARTIAL: CPT

## 2024-09-14 PROCEDURE — 94760 N-INVAS EAR/PLS OXIMETRY 1: CPT

## 2024-09-14 PROCEDURE — 99232 SBSQ HOSP IP/OBS MODERATE 35: CPT | Performed by: INTERNAL MEDICINE

## 2024-09-14 PROCEDURE — 6370000000 HC RX 637 (ALT 250 FOR IP): Performed by: INTERNAL MEDICINE

## 2024-09-14 PROCEDURE — 6360000002 HC RX W HCPCS: Performed by: PEDIATRICS

## 2024-09-14 PROCEDURE — 6370000000 HC RX 637 (ALT 250 FOR IP): Performed by: NURSE PRACTITIONER

## 2024-09-14 PROCEDURE — 6360000002 HC RX W HCPCS: Performed by: INTERNAL MEDICINE

## 2024-09-14 PROCEDURE — 83550 IRON BINDING TEST: CPT

## 2024-09-14 PROCEDURE — 85014 HEMATOCRIT: CPT

## 2024-09-14 PROCEDURE — 85610 PROTHROMBIN TIME: CPT

## 2024-09-14 PROCEDURE — 99222 1ST HOSP IP/OBS MODERATE 55: CPT | Performed by: INTERNAL MEDICINE

## 2024-09-14 PROCEDURE — 2580000003 HC RX 258: Performed by: NURSE PRACTITIONER

## 2024-09-14 PROCEDURE — 36591 DRAW BLOOD OFF VENOUS DEVICE: CPT

## 2024-09-14 PROCEDURE — 1200000000 HC SEMI PRIVATE

## 2024-09-14 PROCEDURE — 85018 HEMOGLOBIN: CPT

## 2024-09-14 PROCEDURE — 36415 COLL VENOUS BLD VENIPUNCTURE: CPT

## 2024-09-14 PROCEDURE — 2580000003 HC RX 258: Performed by: INTERNAL MEDICINE

## 2024-09-14 RX ORDER — LOSARTAN POTASSIUM 50 MG/1
25 TABLET ORAL 2 TIMES DAILY
Status: DISCONTINUED | OUTPATIENT
Start: 2024-09-14 | End: 2024-09-16 | Stop reason: HOSPADM

## 2024-09-14 RX ORDER — DOCUSATE SODIUM 100 MG/1
100 CAPSULE, LIQUID FILLED ORAL 2 TIMES DAILY
Status: DISCONTINUED | OUTPATIENT
Start: 2024-09-14 | End: 2024-09-16 | Stop reason: HOSPADM

## 2024-09-14 RX ORDER — MEGESTROL ACETATE 40 MG/1
20 TABLET ORAL PRN
Status: DISCONTINUED | OUTPATIENT
Start: 2024-09-14 | End: 2024-09-16 | Stop reason: HOSPADM

## 2024-09-14 RX ORDER — ALPRAZOLAM 0.5 MG
0.25 TABLET ORAL 2 TIMES DAILY
Status: DISCONTINUED | OUTPATIENT
Start: 2024-09-14 | End: 2024-09-16 | Stop reason: HOSPADM

## 2024-09-14 RX ORDER — LACTULOSE 10 G/15ML
10 SOLUTION ORAL DAILY PRN
Status: DISCONTINUED | OUTPATIENT
Start: 2024-09-14 | End: 2024-09-16 | Stop reason: HOSPADM

## 2024-09-14 RX ORDER — HYDROCODONE BITARTRATE AND ACETAMINOPHEN 10; 325 MG/1; MG/1
1 TABLET ORAL EVERY 6 HOURS PRN
Status: DISCONTINUED | OUTPATIENT
Start: 2024-09-14 | End: 2024-09-16 | Stop reason: HOSPADM

## 2024-09-14 RX ORDER — DOXAZOSIN 4 MG/1
2 TABLET ORAL EVERY EVENING
Status: DISCONTINUED | OUTPATIENT
Start: 2024-09-14 | End: 2024-09-16 | Stop reason: HOSPADM

## 2024-09-14 RX ORDER — METOCLOPRAMIDE 5 MG/1
10 TABLET ORAL EVERY 6 HOURS PRN
Status: DISCONTINUED | OUTPATIENT
Start: 2024-09-14 | End: 2024-09-16 | Stop reason: HOSPADM

## 2024-09-14 RX ORDER — NIFEDIPINE 30 MG/1
30 TABLET, EXTENDED RELEASE ORAL DAILY
Status: DISCONTINUED | OUTPATIENT
Start: 2024-09-14 | End: 2024-09-16 | Stop reason: HOSPADM

## 2024-09-14 RX ORDER — FENTANYL 25 UG/1
1 PATCH TRANSDERMAL
Status: DISCONTINUED | OUTPATIENT
Start: 2024-09-14 | End: 2024-09-16 | Stop reason: HOSPADM

## 2024-09-14 RX ORDER — FENTANYL 12.5 UG/1
1 PATCH TRANSDERMAL
Status: DISCONTINUED | OUTPATIENT
Start: 2024-09-14 | End: 2024-09-16 | Stop reason: HOSPADM

## 2024-09-14 RX ORDER — METOPROLOL SUCCINATE 50 MG/1
50 TABLET, EXTENDED RELEASE ORAL 2 TIMES DAILY
Status: DISCONTINUED | OUTPATIENT
Start: 2024-09-14 | End: 2024-09-16 | Stop reason: HOSPADM

## 2024-09-14 RX ADMIN — ONDANSETRON 4 MG: 2 INJECTION INTRAMUSCULAR; INTRAVENOUS at 12:56

## 2024-09-14 RX ADMIN — ALPRAZOLAM 0.25 MG: 0.5 TABLET ORAL at 10:06

## 2024-09-14 RX ADMIN — LOSARTAN POTASSIUM 25 MG: 50 TABLET, FILM COATED ORAL at 21:59

## 2024-09-14 RX ADMIN — METRONIDAZOLE 500 MG: 500 INJECTION, SOLUTION INTRAVENOUS at 04:10

## 2024-09-14 RX ADMIN — HYDROCODONE BITARTRATE AND ACETAMINOPHEN 1 TABLET: 10; 325 TABLET ORAL at 05:15

## 2024-09-14 RX ADMIN — PANCRELIPASE LIPASE, PANCRELIPASE PROTEASE, PANCRELIPASE AMYLASE 10000 UNITS: 5000; 17000; 24000 CAPSULE, DELAYED RELEASE ORAL at 11:23

## 2024-09-14 RX ADMIN — NIFEDIPINE 30 MG: 30 TABLET, EXTENDED RELEASE ORAL at 12:15

## 2024-09-14 RX ADMIN — METRONIDAZOLE 500 MG: 500 INJECTION, SOLUTION INTRAVENOUS at 18:39

## 2024-09-14 RX ADMIN — LOSARTAN POTASSIUM 25 MG: 50 TABLET, FILM COATED ORAL at 12:15

## 2024-09-14 RX ADMIN — HYDROCODONE BITARTRATE AND ACETAMINOPHEN 1 TABLET: 10; 325 TABLET ORAL at 18:36

## 2024-09-14 RX ADMIN — SODIUM CHLORIDE, PRESERVATIVE FREE 10 ML: 5 INJECTION INTRAVENOUS at 22:05

## 2024-09-14 RX ADMIN — PROMETHAZINE HYDROCHLORIDE 25 MG: 25 INJECTION INTRAMUSCULAR; INTRAVENOUS at 14:09

## 2024-09-14 RX ADMIN — HYDROMORPHONE HYDROCHLORIDE 0.5 MG: 1 INJECTION, SOLUTION INTRAMUSCULAR; INTRAVENOUS; SUBCUTANEOUS at 01:10

## 2024-09-14 RX ADMIN — DOCUSATE SODIUM 100 MG: 100 CAPSULE, LIQUID FILLED ORAL at 21:59

## 2024-09-14 RX ADMIN — DOCUSATE SODIUM 100 MG: 100 CAPSULE, LIQUID FILLED ORAL at 12:15

## 2024-09-14 RX ADMIN — PANCRELIPASE LIPASE, PANCRELIPASE PROTEASE, PANCRELIPASE AMYLASE 10000 UNITS: 5000; 17000; 24000 CAPSULE, DELAYED RELEASE ORAL at 18:00

## 2024-09-14 RX ADMIN — SODIUM CHLORIDE, PRESERVATIVE FREE 40 MG: 5 INJECTION INTRAVENOUS at 11:23

## 2024-09-14 RX ADMIN — METRONIDAZOLE 500 MG: 500 INJECTION, SOLUTION INTRAVENOUS at 11:28

## 2024-09-14 RX ADMIN — METOPROLOL SUCCINATE 50 MG: 50 TABLET, EXTENDED RELEASE ORAL at 21:59

## 2024-09-14 RX ADMIN — LEVOTHYROXINE SODIUM 137 MCG: 25 TABLET ORAL at 05:15

## 2024-09-14 RX ADMIN — CIPROFLOXACIN 400 MG: 400 INJECTION, SOLUTION INTRAVENOUS at 02:43

## 2024-09-14 RX ADMIN — CIPROFLOXACIN 400 MG: 400 INJECTION, SOLUTION INTRAVENOUS at 13:53

## 2024-09-14 RX ADMIN — SODIUM CHLORIDE, PRESERVATIVE FREE 10 ML: 5 INJECTION INTRAVENOUS at 11:22

## 2024-09-14 RX ADMIN — HYDROCODONE BITARTRATE AND ACETAMINOPHEN 1 TABLET: 10; 325 TABLET ORAL at 12:15

## 2024-09-14 RX ADMIN — METOPROLOL SUCCINATE 50 MG: 50 TABLET, EXTENDED RELEASE ORAL at 12:15

## 2024-09-14 RX ADMIN — ALPRAZOLAM 0.25 MG: 0.5 TABLET ORAL at 22:05

## 2024-09-14 ASSESSMENT — PAIN DESCRIPTION - LOCATION
LOCATION: ABDOMEN

## 2024-09-14 ASSESSMENT — PAIN DESCRIPTION - PAIN TYPE
TYPE: ACUTE PAIN
TYPE: ACUTE PAIN

## 2024-09-14 ASSESSMENT — PAIN SCALES - GENERAL
PAINLEVEL_OUTOF10: 6
PAINLEVEL_OUTOF10: 4
PAINLEVEL_OUTOF10: 10
PAINLEVEL_OUTOF10: 10
PAINLEVEL_OUTOF10: 7
PAINLEVEL_OUTOF10: 5
PAINLEVEL_OUTOF10: 10
PAINLEVEL_OUTOF10: 0
PAINLEVEL_OUTOF10: 0
PAINLEVEL_OUTOF10: 9
PAINLEVEL_OUTOF10: 8
PAINLEVEL_OUTOF10: 3
PAINLEVEL_OUTOF10: 8
PAINLEVEL_OUTOF10: 6

## 2024-09-14 ASSESSMENT — PAIN DESCRIPTION - DIRECTION
RADIATING_TOWARDS: NO
RADIATING_TOWARDS: NO

## 2024-09-14 ASSESSMENT — PAIN DESCRIPTION - DESCRIPTORS
DESCRIPTORS: SHARP;THROBBING
DESCRIPTORS: SHARP;THROBBING

## 2024-09-14 ASSESSMENT — PAIN DESCRIPTION - FREQUENCY
FREQUENCY: INTERMITTENT
FREQUENCY: INTERMITTENT

## 2024-09-14 ASSESSMENT — PAIN DESCRIPTION - ORIENTATION
ORIENTATION: RIGHT;LEFT
ORIENTATION: RIGHT;LEFT

## 2024-09-14 ASSESSMENT — PAIN - FUNCTIONAL ASSESSMENT
PAIN_FUNCTIONAL_ASSESSMENT: ACTIVITIES ARE NOT PREVENTED
PAIN_FUNCTIONAL_ASSESSMENT: ACTIVITIES ARE NOT PREVENTED

## 2024-09-14 ASSESSMENT — PAIN DESCRIPTION - ONSET
ONSET: ON-GOING
ONSET: ON-GOING

## 2024-09-14 NOTE — CONSULTS
Consult Note            Date:9/14/2024        Patient Name:Linn Pryor     YOB: 1945     Age:78 y.o.    Reason for consult: Coffee-ground hematemesis    History of Present Illness   This is a very pleasant 78-year-old female with a history of metastatic pancreatic cancer on palliative chemotherapy weekly who presented yesterday with coffee-ground emesis and heme positive stools.  Hemoglobin on presentation is 10.4 from levels of 12.  We have been asked to further evaluate.    Her past surgical history includes pancreatic resection with splenectomy in 2021 for metastatic pancreatic cancer.  On presentation CT scan with IV contrast shows a stable liver lesion and stable peritoneal carcinomatosis.  She also has mention of sigmoid colon thickening which cannot completely exclude a mass.  And questionable pneumatosis in the cecum.    She reports the onset of worsening nausea 2 days ago, more than her baseline nausea.  She states that she has been passing gas and bowel movements.  She noticed this morning that her bowel movement was soft and dark in color.  She is having some crampy abdominal pain throughout her abdomen.  She is not certain if this is related or improved with bowel movements or passing gas.  She does have a past history of alternating loose stool and constipation.  She is on chronic narcotic medications for pain related to her pancreatic cancer.    Past Medical History     Past Medical History:   Diagnosis Date    Anxiety     Arthritis     Depression     Hypertension     Hypoglycemia     if fasting for very long    Pancreatic cancer (HCC) 06/2021    s/p Whipple by Dr. Rudy Coles    Shoulder pain     incompleted rcr; possible injury    Thyroid disease         Past Surgical History     Past Surgical History:   Procedure Laterality Date    CHOLECYSTECTOMY  1991    COLONOSCOPY  2020    Dr Arsenio Hunter    ENDOSCOPY, COLON, DIAGNOSTIC      HYSTERECTOMY (CERVIX STATUS UNKNOWN)  1994     Joshua Alcantara,    loratadine (CLARITIN) 10 MG tablet Take 1 tablet by mouth daily  Patient taking differently: Take 1 tablet by mouth as needed (allergies) 2/28/23   Deloris Stout APRN   levothyroxine (SYNTHROID) 137 MCG tablet Take 1 tablet by mouth Daily Indications: Underactive Thyroid    Coby Harden MD   Cholecalciferol (VITAMIN D3) 10 MCG (400 UNIT) CAPS Take 1 capsule by mouth daily  Patient not taking: Reported on 6/17/2024    Coby Harden MD   Magic Mouthwash (MIRACLE MOUTHWASH) Swish and spit 5 mLs 4 times daily as needed for Irritation 1/3 viscous lidocaine, 1/3 benadryl, 1/3 Maalox.  Patient not taking: Reported on 6/17/2024 10/11/21   Radha Rosenthal MD   Caltrate 600+D Plus Minerals (CALTRATE) 600-800 MG-UNIT TABS tablet Take 1 tablet by mouth daily  Patient not taking: Reported on 6/17/2024    Coby Harden MD   busPIRone (BUSPAR) 5 MG tablet Take 1 tablet by mouth 3 times daily  Patient not taking: Reported on 6/17/2024 7/15/21   Coby Harden MD   ondansetron (ZOFRAN) 4 MG tablet Take 1 tablet by mouth every 8 hours as needed for Nausea or Vomiting  Patient not taking: Reported on 6/17/2024 10/19/17   Johnson Shields MD        ALPRAZolam (XANAX) tablet 0.25 mg, BID  fentaNYL (DURAGESIC) 25 MCG/HR 1 patch, Q3 Days  HYDROcodone-acetaminophen (NORCO)  MG per tablet 1 tablet, Q6H PRN  lactulose (CHRONULAC) 10 GM/15ML solution 10 g, Daily PRN  levothyroxine (SYNTHROID) tablet 137 mcg, Daily  lipase-protease-amylase (ZENPEP) delayed release capsule 10,000 Units, TID WC  megestrol (MEGACE) tablet 20 mg, PRN  fentaNYL (DURAGESIC) 12 MCG/HR 1 patch, Q72H  pantoprazole (PROTONIX) 40 mg in sodium chloride (PF) 0.9 % 10 mL injection, Q12H  docusate sodium (COLACE) capsule 100 mg, BID  HYDROmorphone HCl PF (DILAUDID) injection 0.5 mg, Q30 Min PRN  sodium chloride flush 0.9 % injection 5-40 mL, 2 times per day  sodium chloride flush 0.9 % injection 5-40  mL, PRN  0.9 % sodium chloride infusion, PRN  ondansetron (ZOFRAN-ODT) disintegrating tablet 4 mg, Q8H PRN   Or  ondansetron (ZOFRAN) injection 4 mg, Q6H PRN  ciprofloxacin (CIPRO) IVPB 400 mg, Q12H  metroNIDAZOLE (FLAGYL) 500 mg in 0.9% NaCl 100 mL IVPB premix, Q8H          Allergies   Diclofenac, Oxycodone-acetaminophen, and Doxycycline    Social History     Pertinent history mentioned above.     Family History     Family History   Adopted: Yes   Problem Relation Age of Onset    Breast Cancer Maternal Aunt     Breast Cancer Daughter     Cancer Daughter         Bladder    Cancer Maternal Uncle         Bladder       Review of Systems   - CONSTITUTIONAL: Denies weight loss, fever and chills.    - HEENT: Denies changes in vision and hearing.    - RESPIRATORY: Denies SOB and cough.    - CV: Denies palpitations and CP.    - GI: Positive for nausea, coffee-ground emesis, diffuse abdominal pain, constipation.    - : Denies dysuria and urinary frequency.    - MSK: Denies myalgia and joint pain.    - SKIN: Denies rash and pruritus.    - NEUROLOGICAL: Denies headache and syncope.    - PSYCHIATRIC: Denies recent changes in mood. Denies anxiety and depression.    - SKIN: No jaundice or skin lesions.    -RECTAL: No pain or tenderness.     Physical Exam   /82   Pulse 74   Temp 97.2 °F (36.2 °C) (Temporal)   Resp 18   Ht 1.651 m (5' 5\")   Wt 57.9 kg (127 lb 9 oz)   SpO2 97%   Breastfeeding No   BMI 21.23 kg/m²      - GENERAL: Alert and oriented x 3. No acute distress. Well-nourished.    - EYES: EOMI. Anicteric.    - HENT: Moist mucous membranes. No cervical lymphadenopathy.    - LUNGS: Clear to auscultation bilaterally. No accessory muscle use.    - CARDIOVASCULAR: Regular rate and rhythm. No murmur. No JVD.    - ABDOMEN: Soft, tenderness to palpation diffusely, tympany noted throughout on palpation and non-distended. No palpable masses.    - EXTREMITIES: No edema. Non-tender.?SKIN: No rashes or lesions.

## 2024-09-14 NOTE — H&P
[facility]      Hospitalist - History & Physical      PCP: Jaiden Hayden MD    Date of Admission: 9/13/2024    Date of Service: 9/13/2024    Chief Complaint: Hematemesis     History Of Present Illness:   The patient is a 78 y.o. female who presented to [facility] ED for evaluation of hematemesis. Pt has history of pancreatic cancer followed by Dr. Rosenthal treated with chemotherapy every other week.     Pt reports problems with nausea and vomiting since yesterday with noted black discoloration to emesis worsening last night and persisting today. She reports mid and bilateral upper abdominal pain. She has had problems with weight loss noting that her clothes are more loose over past month.     In ED, CT abd/pelvis-Possible wall thickening in the colon concerning for colitis.  There is focal wall thickening in the sigmoid colon concerning for mass.  Surgical consultation is recommended.  There is also question of  pneumatosis at the cecum.  No portal venous gas or mesenteric venous gas. Hgb 11.6, platelet slide review listed as adequate, creatinine 0.6/bun 12, lipase 7. Pt is admitted inpatient to hospitalist.    Past Medical History:        Diagnosis Date    Anxiety     Arthritis     Depression     Hypertension     Hypoglycemia     if fasting for very long    Pancreatic cancer (HCC) 06/2021    s/p [facility] by Dr. Rudy Coles    Shoulder pain     incompleted rcr; possible injury    Thyroid disease        Past Surgical History:        Procedure Laterality Date    CHOLECYSTECTOMY  1991    COLONOSCOPY  2020    Dr Arsenio Hunter    ENDOSCOPY, COLON, DIAGNOSTIC      HYSTERECTOMY (CERVIX STATUS UNKNOWN)  1994    PANCREATECTOMY  06/28/2021    PORT SURGERY Right 9/21/2021    Single lumen PORT INSERTION with ultrasound and fluoroscopy performed by Vivian Grant DO at [facility] OR    PORT SURGERY Right 1/13/2022    RIGHT SIDE MEDIPORT REMOVAL, LEFT SIDE MEDIPORT INSERTION performed by Yareli Henry DO at [facility] OR  External ear normal.      Left Ear: External ear normal.   Cardiovascular:      Rate and Rhythm: Normal rate and regular rhythm.   Abdominal:      Tenderness: There is no abdominal tenderness.   Musculoskeletal:      Right lower leg: No edema.      Left lower leg: No edema.   Skin:     General: Skin is warm and dry.   Neurological:      Mental Status: She is alert and oriented to person, place, and time.          Diagnostic Data:  CBC:  Recent Labs     09/13/24  1356   WBC 8.4   HGB 11.6*   HCT 34.7*        BMP:  Recent Labs     09/13/24  1356   *   K 3.7   CL 99   CO2 25   BUN 12   CREATININE 0.6   CALCIUM 9.0     Recent Labs     09/13/24  1356   AST 35*   ALT 6   BILITOT 0.4   ALKPHOS 117*     Urinalysis:  Lab Results   Component Value Date/Time    NITRU Negative 09/13/2024 02:04 PM    WBCUA 2-4 09/13/2024 02:04 PM    BACTERIA 1+ 09/13/2024 02:04 PM    RBCUA 0-1 09/13/2024 02:04 PM    BLOODU SMALL 09/13/2024 02:04 PM    GLUCOSEU Negative 09/13/2024 02:04 PM     CT ABDOMEN PELVIS W IV CONTRAST Additional Contrast? None    Result Date: 9/13/2024    EXAM: CT ABDOMEN PELVIS WITH CONTRAST  HISTORY:  Abdominal pain  COMPARISON:  CT abdomen pelvis from 09/10/2024 and 06/13/2024.  TECHNIQUE:  Serial axial images of the abdomen pelvis were performed after  Omnipaque IV contrast was administered.  FINDINGS:  Stable lesion in the right hepatic lobe and additional adjacent sub-centimeter hypoattenuated lesion.  Stable soft-tissue attenuated nodule anterior to the liver (axial image 16).  Diffuse fatty atrophy of the pancreas.  1 x 1 cm cystic structure at the pancreatic head.  Soft tissue prominence at the anterior abdominal wall (axial images 36 - 39, and in the left upper quadrant, grossly stable and consistent with peritoneal carcinomatosis.  Question of colonic wall thickening, most notably in the sigmoid colon.  Possible pneumatosis at the cecum.  No dilated loops of bowel.  Small volume ascites (not enough

## 2024-09-14 NOTE — PROGRESS NOTES
Comprehensive Nutrition Assessment    Type and Reason for Visit:  Initial, Positive Nutrition Screen    Nutrition Recommendations/Plan:   Monitor for diet advancement/tolerance  Add Moderate Malnutrition to the Problem List     Malnutrition Assessment:  Malnutrition Status:  Moderate malnutrition (09/14/24 0726)    Context:  Chronic Illness     Findings of the 6 clinical characteristics of malnutrition:  Weight Loss:  Greater than 10% over 6 months     Body Fat Loss:  Mild body fat loss Orbital, Buccal region   Muscle Mass Loss:  Mild muscle mass loss Temples (temporalis), Clavicles (pectoralis & deltoids)    Nutrition Assessment:    Pt meets the criteria for Moderate Malnutrition AEB significant wt loss, mild muscle loss, and mild fat loss. Pt is currently NPO. Last BM noted 9/13. Pt has presented with s/s N/V/D. Will monitor for diet advancement and implement nutrition intervention as needed.    Current Nutrition Intake & Therapies:    Average Meal Intake: NPO  Average Supplements Intake: NPO  Diet NPO Exceptions are: Sips of Clear Liquids    Anthropometric Measures:  Height: 165.1 cm (5' 5\")  Ideal Body Weight (IBW): 125 lbs (57 kg)    Current Body Weight: 57.9 kg (127 lb 10.3 oz),   IBW.    Current BMI (kg/m2): 21.2  BMI Categories: Underweight (BMI less than 22) age over 65    Estimated Daily Nutrient Needs:  Energy Requirements Based On: Kcal/kg  Weight Used for Energy Requirements: Current  Energy (kcal/day): 0231-3247 kcals/day  Weight Used for Protein Requirements: Current  Protein (g/day):  g/protein/day  Method Used for Fluid Requirements: 1 ml/kcal  Fluid (ml/day): 2120-4486 mL/day    Nutrition Diagnosis:   Moderate malnutrition related to catabolic illness as evidenced by Criteria as identified in malnutrition assessment    Nutrition Interventions:   Food and/or Nutrient Delivery: Continue NPO  Coordination of Nutrition Care: Continue to monitor while inpatient    Goals:  Goals: Initiate PO

## 2024-09-14 NOTE — PLAN OF CARE
Problem: Safety - Adult  Goal: Free from fall injury  9/14/2024 0312 by Wendy Callaway LPN  Outcome: Progressing  Flowsheets (Taken 9/13/2024 2310)  Free From Fall Injury: Instruct family/caregiver on patient safety  9/13/2024 2310 by Wendy Callaway LPN  Outcome: Progressing  Flowsheets  Taken 9/13/2024 2310  Free From Fall Injury: Instruct family/caregiver on patient safety  Taken 9/13/2024 2306  Free From Fall Injury: Instruct family/caregiver on patient safety     Problem: ABCDS Injury Assessment  Goal: Absence of physical injury  9/14/2024 0312 by Wendy Callaway LPN  Outcome: Progressing  Flowsheets (Taken 9/13/2024 2310)  Absence of Physical Injury: Implement safety measures based on patient assessment  9/13/2024 2310 by Wendy Callaway LPN  Outcome: Progressing  Flowsheets  Taken 9/13/2024 2310  Absence of Physical Injury: Implement safety measures based on patient assessment  Taken 9/13/2024 2306  Absence of Physical Injury: Implement safety measures based on patient assessment     Problem: Chronic Conditions and Co-morbidities  Goal: Patient's chronic conditions and co-morbidity symptoms are monitored and maintained or improved  9/14/2024 0312 by Wendy Callaway LPN  Outcome: Progressing  Flowsheets (Taken 9/13/2024 2310)  Care Plan - Patient's Chronic Conditions and Co-Morbidity Symptoms are Monitored and Maintained or Improved:   Collaborate with multidisciplinary team to address chronic and comorbid conditions and prevent exacerbation or deterioration   Monitor and assess patient's chronic conditions and comorbid symptoms for stability, deterioration, or improvement   Update acute care plan with appropriate goals if chronic or comorbid symptoms are exacerbated and prevent overall improvement and discharge  9/13/2024 2310 by Wendy Callaway LPN  Outcome: Progressing  Flowsheets (Taken 9/13/2024 2310)  Care Plan - Patient's Chronic Conditions and Co-Morbidity Symptoms are  Monitored and Maintained or Improved:   Collaborate with multidisciplinary team to address chronic and comorbid conditions and prevent exacerbation or deterioration   Monitor and assess patient's chronic conditions and comorbid symptoms for stability, deterioration, or improvement   Update acute care plan with appropriate goals if chronic or comorbid symptoms are exacerbated and prevent overall improvement and discharge     Problem: Pain  Goal: Verbalizes/displays adequate comfort level or baseline comfort level  Outcome: Progressing  Flowsheets (Taken 9/14/2024 0154)  Verbalizes/displays adequate comfort level or baseline comfort level:   Encourage patient to monitor pain and request assistance   Assess pain using appropriate pain scale   Administer analgesics based on type and severity of pain and evaluate response   Implement non-pharmacological measures as appropriate and evaluate response   Consider cultural and social influences on pain and pain management   Notify Licensed Independent Practitioner if interventions unsuccessful or patient reports new pain

## 2024-09-14 NOTE — PROGRESS NOTES
4 Eyes Skin Assessment     NAME:  Linn Pryor  YOB: 1945  MEDICAL RECORD NUMBER:  [id number]    The patient is being assessed for  Admission    I agree that at least one RN has performed a thorough Head to Toe Skin Assessment on the patient. ALL assessment sites listed below have been assessed.      Areas assessed by both nurses:    Other none.        Does the Patient have a Wound? No noted wound(s)       Antoine Prevention initiated by RN: No  Wound Care Orders initiated by RN: No    Pressure Injury (Stage 3,4, Unstageable, DTI, NWPT, and Complex wounds) if present, place Wound referral order by RN under : No    New Ostomies, if present place, Ostomy referral order under : No     Nurse 1 eSignature: Electronically signed by Wendy Callaway LPN on 9/13/24 at 11:28 PM CDT    **SHARE this note so that the co-signing nurse can place an eSignature**    Nurse 2 eSignature: Electronically signed by Charissa Prince RN on 9/14/24 at 2:03 AM CDT

## 2024-09-14 NOTE — PROGRESS NOTES
[facility]      [facility] Note    Patient:  Linn Pryor  YOB: 1945  Date of Service: 9/14/2024  MRN: [id number]   Acct: [id number]   Primary Care Physician: Jaiden Hayden MD  Advance Directive: Full Code  Admit Date: 9/13/2024       Hospital Day: 1    Portions of this note have been copied forward, however, updated to reflect the most current clinical status of this patient.     CHIEF COMPLAINT vomiting coffee-ground emesis    SUBJECTIVE: Complains of significant pain today      CUMULATIVE HOSPITAL COURSE:   The patient is a 78 y.o. female who presented to [facility] ED for evaluation of hematemesis. Pt has history of pancreatic cancer followed by Dr. Rosenthal treated with chemotherapy every other week.      Pt reports problems with nausea and vomiting since yesterday with noted black discoloration to emesis worsening last night and persisting today. She reports mid and bilateral upper abdominal pain. She has had problems with weight loss noting that her clothes are more loose over past month.      In ED, CT abd/pelvis-Possible wall thickening in the colon concerning for colitis.  There is focal wall thickening in the sigmoid colon concerning for mass.  Surgical consultation is recommended.  There is also question of  pneumatosis at the cecum.  No portal venous gas or mesenteric venous gas. Hgb 11.6, platelet slide review listed as adequate, creatinine 0.6/bun 12, lipase 7. Pt is admitted inpatient to hospitalist.    She is having significant abdominal pain today.  GI and oncology following.  No immediate plan by GI for intervention.  She will have an enema to see if findings on the CT or stool burden.  EGD and flexible sigmoidoscopy planned for Monday.        Objective:   VITALS:  BP (!) 140/79   Pulse 72   Temp 97.2 °F (36.2 °C) (Temporal)   Resp 20   Ht 1.651 m (5' 5\")   Wt 57.9 kg (127 lb 9 oz)   SpO2 97%   Breastfeeding No   BMI 21.23 kg/m²   24HR INTAKE/OUTPUT:    Intake/Output  Summary (Last 24 hours) at 9/14/2024 1602  Last data filed at 9/14/2024 1039  Gross per 24 hour   Intake 60.38 ml   Output --   Net 60.38 ml           Physical Exam  Vitals and nursing note reviewed.   Constitutional:       Appearance: She is ill-appearing.   HENT:      Head: Normocephalic and atraumatic.      Nose: Nose normal.      Mouth/Throat:      Mouth: Mucous membranes are moist.   Eyes:      Extraocular Movements: Extraocular movements intact.   Cardiovascular:      Rate and Rhythm: Normal rate and regular rhythm.   Pulmonary:      Effort: Pulmonary effort is normal.      Breath sounds: Normal breath sounds.   Abdominal:      General: Bowel sounds are normal.      Palpations: Abdomen is soft.   Musculoskeletal:         General: Normal range of motion.      Cervical back: Normal range of motion and neck supple.   Skin:     General: Skin is warm and dry.      Coloration: Skin is pale.   Neurological:      General: No focal deficit present.      Mental Status: She is alert.   Psychiatric:         Mood and Affect: Mood normal.            Medications:      sodium chloride        ALPRAZolam  0.25 mg Oral BID    fentaNYL  1 patch TransDERmal Q3 Days    levothyroxine  137 mcg Oral Daily    lipase-protease-amylase  10,000 Units Oral TID WC    fentaNYL  1 patch TransDERmal Q72H    doxazosin  2 mg Oral QPM    losartan  25 mg Oral BID    metoprolol succinate  50 mg Oral BID    NIFEdipine  30 mg Oral Daily    pantoprazole (PROTONIX) 40 mg in sodium chloride (PF) 0.9 % 10 mL injection  40 mg IntraVENous Q12H    docusate sodium  100 mg Oral BID    sodium chloride flush  5-40 mL IntraVENous 2 times per day    ciprofloxacin  400 mg IntraVENous Q12H    metroNIDAZOLE  500 mg IntraVENous Q8H     HYDROcodone-acetaminophen, lactulose, megestrol, metoclopramide, HYDROmorphone, sodium chloride flush, sodium chloride, ondansetron **OR** ondansetron  ADULT DIET; Clear Liquid     Lab and other Data:     Recent Labs     09/13/24  1356

## 2024-09-14 NOTE — PLAN OF CARE
Problem: Safety - Adult  Goal: Free from fall injury  Outcome: Progressing  Flowsheets (Taken 9/13/2024 2310)  Free From Fall Injury: Instruct family/caregiver on patient safety     Problem: ABCDS Injury Assessment  Goal: Absence of physical injury  Outcome: Progressing  Flowsheets (Taken 9/13/2024 2310)  Absence of Physical Injury: Implement safety measures based on patient assessment     Problem: Chronic Conditions and Co-morbidities  Goal: Patient's chronic conditions and co-morbidity symptoms are monitored and maintained or improved  Outcome: Progressing  Flowsheets (Taken 9/13/2024 2310)  Care Plan - Patient's Chronic Conditions and Co-Morbidity Symptoms are Monitored and Maintained or Improved:   Collaborate with multidisciplinary team to address chronic and comorbid conditions and prevent exacerbation or deterioration   Monitor and assess patient's chronic conditions and comorbid symptoms for stability, deterioration, or improvement   Update acute care plan with appropriate goals if chronic or comorbid symptoms are exacerbated and prevent overall improvement and discharge

## 2024-09-14 NOTE — ED NOTES
ED TO INPATIENT SBAR HANDOFF    Patient Name: Linn Pryor   : 1945  78 y.o.   Family/Caregiver Present: Yes  Code Status Order: Prior    C-SSRS: Risk of Suicide: No Risk  Sitter No  Restraints:         Situation  Chief Complaint:   Chief Complaint   Patient presents with    Nausea & Vomiting     Felt sick for a few days but worse since yesterday, pt reports dark emesis this morning    Abdominal Pain     Upper abdominal pain, pancreatic CA pt, states pain getting worse     Patient Diagnosis: Hematemesis [K92.0]     Brief Description of Patient's Condition: Patient has a history of pancreatic cancer. Patient states that she has been experiencing pain since her diagnosis but pain has been worse since yesterday. Patient states that she has been having nausea and vomiting. Patient awoke and had \"black vomit\" last night. Patient states that abdominal pain is severe and currently a 10 out of 10. Patient points to left mid abdomen as source of pain. Patient has been having diarrhea but patient denies black or bloody stools. Patient sees Dr. Rosenthal for her pancreatic cancer. Plan for GI consult and possible scope. Given 80mg bolus protonix and started on gtt. L chest port accessed and in use, flushes and draws.   Mental Status: oriented and alert  Arrived from: home    Imaging:   CT ABDOMEN PELVIS W IV CONTRAST Additional Contrast? None   Final Result       Possible wall thickening in the colon concerning for colitis.  There is focal wall thickening in the sigmoid colon concerning for mass.  Surgical consultation is recommended.  There is also question of  pneumatosis at the cecum.  No portal venous gas or    mesenteric venous gas.       Stable appearance to the liver lesions and peritoneal carcinomatosis.       Small volume ascites, stable.       Please see above description and additional findings.        All CT scans are performed using dose optimization techniques as appropriate to the performed exam and     0% Pulse 71       NPO? No  O2 Flow Rate: O2 Device: None (Room air)    Cardiac Rhythm: sinus  NIH Score: NIH     Active LDA's:    Pertinent or High Risk Medications/Drips: no   If Yes, please provide details:    Blood Product Administration: no  If Yes, please provide details:    Sepsis Risk Score      Admitted with Sepsis? No    Recommendation  Incomplete orders: Hospitalist  Patient Belongings: w pt  Additional Comments: will need IV pole  If any further questions, please call Sending RN at [phone]    Electronically signed by: Electronically signed by Ramiro Reyes RN on 9/13/2024 at 9:47 PM

## 2024-09-14 NOTE — PROGRESS NOTES
Linn Pryor arrived to room # 434.   Presented with: Hematemesis.  Mental Status: Patient is oriented, alert, coherent, logical, thought processes intact, and able to concentrate and follow conversation.   Vitals:    09/13/24 2225   BP: 135/79   Pulse: 73   Resp: 20   Temp: 98 °F (36.7 °C)   SpO2: 96%     Patient safety contract and falls prevention contract reviewed with patient Yes.  Oriented Patient to room.  Call light within reach. Yes.  Needs, issues or concerns expressed at this time: no.      Electronically signed by Wendy Callaway LPN on 9/13/2024 at 11:27 PM

## 2024-09-14 NOTE — PROGRESS NOTES
MEDICAL ONCOLOGY PROGRESS NOTE     Pt Name: Linn Pryor  MRN: [id number]  YOB: 1945  Date of evaluation: 9/14/2024    Subjective-patient complaining of significant epigastric pain as well as pain in her pelvic area.  Discussed with GI, Dr. Lauren Bonilla.  She will be planning upper endoscopy and flexible endoscopy.      HISTORY OF PRESENT ILLNESS:   The patient is well-known to our clinic.  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  She is currently undergoing chemotherapy with modified FOLFOX.  She had prior progression on Gemzar and Abraxane, Gemzar capecitabine and Onivyde and leucovorin.  She has been on treatment since August 2021.  She presented to the ER department today with complaints of coffee-ground emesis and hematochezia.  CBC showed WBC 8.4, hemoglobin 11.6, platelet count 240,000.  Chemistry remarkable for sodium 135, normal creatinine, normal LFTs.    9/10/2024-CT chest, [facility]: Small bilateral pleural effusions, perhaps mildly increased.  Stable sub-centimeter pulmonary nodules.  No new pulmonary nodule or pulmonary mass.  Please see report from CT abdomen pelvis obtained at the same time for description of findings in the upper abdomen.    9/10/2024-CT abdomen pelvis with IV contrast: FINDINGS:  Stable subcapsular low density mass of the liver right lobe.  Measures 8.0 x 5.5 cm cross section by 5.8 cm cranial caudally.  0.9 cm subcapsular low density of the liver right lobe slightly more medially at the same level, also stable.  Stable 0.6 cm cyst of the left lobe along the falciform ligament.  No new liver lesions.  No biliary tree dilatation.  Cholecystectomy, again noted.  Severe atrophy and fatty replacement of the pancreas, stable.  Stable 1.4 cm cystic lesion of uncinate process of the pancreas and 0.9 cm cystic lesion of the pancreatic head.  Absence of the spleen, with suggestion of several splenules in the left upper quadrant, again  noted.  The adrenal glands have a normal appearance.  The kidneys enhance symmetrically.  1.2 cm cyst  of the left kidney, stable.  Extrarenal pelves bilaterally with mild fullness of the right renal collecting system but no eh hydronephrosis, stable.  Hysterectomy, again noted.  No visible adnexal mass.  Colonic diverticulosis.  Apparent mild wall thickening of the sigmoid colon, stable to mildly progressed.  Trace ascites, stable.  Suggestion of mild nodular peritoneal thickening associated with some of the ascites.  Nodular thickening of the greater omentum on the left, grossly stable.  Measures  1.6 cm.  Prominent right epicardial lymph node, stable.  Measures 0.7 cm in short axis dimension.  Bone window images show no significant lytic or sclerotic bone lesions.  Mild serpentine scoliosis of the thoracolumbar spine, stable.  Impression: 1.  Stable hepatic metastatic disease, and peritoneal carcinomatosis. 2.  Stable severe atrophy and fatty replacement of the pancreas, and 1.4 cm and smaller cystic lesions of the uncinate process and pancreatic head. 3.  Colonic diverticulosis, again noted, with equivocal mild diverticulitis of the sigmoid colon. 4.  Absent spleen, as before. 5.  For findings in the chest, please refer to separate report.    9/13/2024-CT abdomen/pelvis with IV contrast, [facility]:FINDINGS:  Stable lesion in the right hepatic lobe and additional adjacent sub-centimeter hypoattenuated lesion.  Stable soft-tissue attenuated nodule anterior to the liver (axial image 16).  Diffuse fatty atrophy of the pancreas.  1 x 1 cm cystic structure at the pancreatic head.  Soft tissue prominence at the anterior abdominal wall (axial images 36 - 39, and in the left upper quadrant, grossly stable and consistent with peritoneal carcinomatosis.  Question of colonic wall thickening, most notably in the sigmoid colon.  Possible pneumatosis at the cecum.  No dilated loops of bowel.  Small volume ascites (not enough for

## 2024-09-15 PROBLEM — D64.9 ANEMIA: Status: ACTIVE | Noted: 2024-09-13

## 2024-09-15 LAB
ADV 40+41 DNA STL QL NAA+NON-PROBE: NOT DETECTED
ALBUMIN SERPL-MCNC: 3.2 G/DL (ref 3.5–5.2)
ALP SERPL-CCNC: 101 U/L (ref 35–104)
ALT SERPL-CCNC: 7 U/L (ref 5–33)
ANION GAP SERPL CALCULATED.3IONS-SCNC: 13 MMOL/L (ref 7–19)
AST SERPL-CCNC: 27 U/L (ref 5–32)
BASOPHILS # BLD: 0 K/UL (ref 0–0.2)
BASOPHILS NFR BLD: 0.5 % (ref 0–1)
BILIRUB SERPL-MCNC: 0.4 MG/DL (ref 0.2–1.2)
BUN SERPL-MCNC: 8 MG/DL (ref 8–23)
C CAYETANENSIS DNA STL QL NAA+NON-PROBE: NOT DETECTED
C COLI+JEJ+UPSA DNA STL QL NAA+NON-PROBE: NOT DETECTED
C DIFF TOX A+B STL QL IA: NORMAL
CALCIUM SERPL-MCNC: 8.6 MG/DL (ref 8.8–10.2)
CHLORIDE SERPL-SCNC: 101 MMOL/L (ref 98–111)
CO2 SERPL-SCNC: 21 MMOL/L (ref 22–29)
CREAT SERPL-MCNC: 0.6 MG/DL (ref 0.5–0.9)
CRYPTOSP DNA STL QL NAA+NON-PROBE: NOT DETECTED
E HISTOLYT DNA STL QL NAA+NON-PROBE: NOT DETECTED
EAEC PAA PLAS AGGR+AATA ST NAA+NON-PRB: NOT DETECTED
EC STX1+STX2 GENES STL QL NAA+NON-PROBE: NOT DETECTED
EKG P AXIS: 71 DEGREES
EKG P-R INTERVAL: 214 MS
EKG Q-T INTERVAL: 350 MS
EKG QRS DURATION: 72 MS
EKG QTC CALCULATION (BAZETT): 392 MS
EKG T AXIS: 33 DEGREES
EOSINOPHIL # BLD: 0.2 K/UL (ref 0–0.6)
EOSINOPHIL NFR BLD: 2.1 % (ref 0–5)
EPEC EAE GENE STL QL NAA+NON-PROBE: NOT DETECTED
ERYTHROCYTE [DISTWIDTH] IN BLOOD BY AUTOMATED COUNT: 16.5 % (ref 11.5–14.5)
ETEC LTA+ST1A+ST1B TOX ST NAA+NON-PROBE: NOT DETECTED
G LAMBLIA DNA STL QL NAA+NON-PROBE: NOT DETECTED
GI PATH DNA+RNA PNL STL NAA+NON-PROBE: NOT DETECTED
GLUCOSE SERPL-MCNC: 150 MG/DL (ref 70–99)
HCT VFR BLD AUTO: 31 % (ref 37–47)
HCT VFR BLD AUTO: 32.6 % (ref 37–47)
HCT VFR BLD AUTO: 32.7 % (ref 37–47)
HCT VFR BLD AUTO: 33.9 % (ref 37–47)
HEMOCCULT SP1 STL QL: ABNORMAL
HGB BLD-MCNC: 10.1 G/DL (ref 12–16)
HGB BLD-MCNC: 10.6 G/DL (ref 12–16)
HGB BLD-MCNC: 10.7 G/DL (ref 12–16)
HGB BLD-MCNC: 11.3 G/DL (ref 12–16)
IMM GRANULOCYTES # BLD: 0 K/UL
LYMPHOCYTES # BLD: 2.4 K/UL (ref 1.1–4.5)
LYMPHOCYTES NFR BLD: 27.9 % (ref 20–40)
MAGNESIUM SERPL-MCNC: 1.8 MG/DL (ref 1.6–2.4)
MCH RBC QN AUTO: 31.8 PG (ref 27–31)
MCHC RBC AUTO-ENTMCNC: 33.3 G/DL (ref 33–37)
MCV RBC AUTO: 95.5 FL (ref 81–99)
MONOCYTES # BLD: 1.3 K/UL (ref 0–0.9)
MONOCYTES NFR BLD: 15.4 % (ref 0–10)
NEUTROPHILS # BLD: 4.6 K/UL (ref 1.5–7.5)
NEUTS SEG NFR BLD: 53.6 % (ref 50–65)
NOROVIRUS GI+II RNA STL QL NAA+NON-PROBE: NOT DETECTED
P SHIGELLOIDES DNA STL QL NAA+NON-PROBE: NOT DETECTED
PLATELET # BLD AUTO: 217 K/UL (ref 130–400)
PMV BLD AUTO: 11.5 FL (ref 9.4–12.3)
POTASSIUM SERPL-SCNC: 3.3 MMOL/L (ref 3.5–5)
PROT SERPL-MCNC: 5.9 G/DL (ref 6.4–8.3)
RBC # BLD AUTO: 3.55 M/UL (ref 4.2–5.4)
RVA RNA STL QL NAA+NON-PROBE: NOT DETECTED
S ENT+BONG DNA STL QL NAA+NON-PROBE: NOT DETECTED
SAPO I+II+IV+V RNA STL QL NAA+NON-PROBE: NOT DETECTED
SHIGELLA SP+EIEC IPAH ST NAA+NON-PROBE: NOT DETECTED
SODIUM SERPL-SCNC: 135 MMOL/L (ref 136–145)
V CHOL+PARA+VUL DNA STL QL NAA+NON-PROBE: NOT DETECTED
V CHOLERAE DNA STL QL NAA+NON-PROBE: NOT DETECTED
WBC # BLD AUTO: 8.5 K/UL (ref 4.8–10.8)
Y ENTEROCOL DNA STL QL NAA+NON-PROBE: NOT DETECTED

## 2024-09-15 PROCEDURE — 85025 COMPLETE CBC W/AUTO DIFF WBC: CPT

## 2024-09-15 PROCEDURE — 6370000000 HC RX 637 (ALT 250 FOR IP): Performed by: NURSE PRACTITIONER

## 2024-09-15 PROCEDURE — 85014 HEMATOCRIT: CPT

## 2024-09-15 PROCEDURE — 6360000002 HC RX W HCPCS: Performed by: NURSE PRACTITIONER

## 2024-09-15 PROCEDURE — 87324 CLOSTRIDIUM AG IA: CPT

## 2024-09-15 PROCEDURE — 36415 COLL VENOUS BLD VENIPUNCTURE: CPT

## 2024-09-15 PROCEDURE — 2580000003 HC RX 258: Performed by: INTERNAL MEDICINE

## 2024-09-15 PROCEDURE — 99232 SBSQ HOSP IP/OBS MODERATE 35: CPT | Performed by: INTERNAL MEDICINE

## 2024-09-15 PROCEDURE — 6370000000 HC RX 637 (ALT 250 FOR IP): Performed by: INTERNAL MEDICINE

## 2024-09-15 PROCEDURE — 87507 IADNA-DNA/RNA PROBE TQ 12-25: CPT

## 2024-09-15 PROCEDURE — 93010 ELECTROCARDIOGRAM REPORT: CPT | Performed by: INTERNAL MEDICINE

## 2024-09-15 PROCEDURE — 1200000000 HC SEMI PRIVATE

## 2024-09-15 PROCEDURE — 2580000003 HC RX 258: Performed by: NURSE PRACTITIONER

## 2024-09-15 PROCEDURE — 36591 DRAW BLOOD OFF VENOUS DEVICE: CPT

## 2024-09-15 PROCEDURE — 82270 OCCULT BLOOD FECES: CPT

## 2024-09-15 PROCEDURE — 80053 COMPREHEN METABOLIC PANEL: CPT

## 2024-09-15 PROCEDURE — 83735 ASSAY OF MAGNESIUM: CPT

## 2024-09-15 PROCEDURE — 87449 NOS EACH ORGANISM AG IA: CPT

## 2024-09-15 PROCEDURE — 94760 N-INVAS EAR/PLS OXIMETRY 1: CPT

## 2024-09-15 PROCEDURE — 6360000002 HC RX W HCPCS: Performed by: INTERNAL MEDICINE

## 2024-09-15 PROCEDURE — 85018 HEMOGLOBIN: CPT

## 2024-09-15 RX ORDER — POTASSIUM CHLORIDE 7.45 MG/ML
10 INJECTION INTRAVENOUS PRN
Status: DISCONTINUED | OUTPATIENT
Start: 2024-09-15 | End: 2024-09-16 | Stop reason: HOSPADM

## 2024-09-15 RX ORDER — POTASSIUM CHLORIDE 1500 MG/1
40 TABLET, EXTENDED RELEASE ORAL PRN
Status: DISCONTINUED | OUTPATIENT
Start: 2024-09-15 | End: 2024-09-16 | Stop reason: HOSPADM

## 2024-09-15 RX ADMIN — METOPROLOL SUCCINATE 50 MG: 50 TABLET, EXTENDED RELEASE ORAL at 08:32

## 2024-09-15 RX ADMIN — PANCRELIPASE LIPASE, PANCRELIPASE PROTEASE, PANCRELIPASE AMYLASE 10000 UNITS: 5000; 17000; 24000 CAPSULE, DELAYED RELEASE ORAL at 17:02

## 2024-09-15 RX ADMIN — METOPROLOL SUCCINATE 50 MG: 50 TABLET, EXTENDED RELEASE ORAL at 20:33

## 2024-09-15 RX ADMIN — HYDROCODONE BITARTRATE AND ACETAMINOPHEN 1 TABLET: 10; 325 TABLET ORAL at 19:28

## 2024-09-15 RX ADMIN — PROMETHAZINE HYDROCHLORIDE 25 MG: 25 INJECTION INTRAMUSCULAR; INTRAVENOUS at 10:02

## 2024-09-15 RX ADMIN — NIFEDIPINE 30 MG: 30 TABLET, EXTENDED RELEASE ORAL at 08:32

## 2024-09-15 RX ADMIN — METRONIDAZOLE 500 MG: 500 INJECTION, SOLUTION INTRAVENOUS at 03:15

## 2024-09-15 RX ADMIN — DOCUSATE SODIUM 100 MG: 100 CAPSULE, LIQUID FILLED ORAL at 20:33

## 2024-09-15 RX ADMIN — ALPRAZOLAM 0.25 MG: 0.5 TABLET ORAL at 20:33

## 2024-09-15 RX ADMIN — LEVOTHYROXINE SODIUM 137 MCG: 25 TABLET ORAL at 05:52

## 2024-09-15 RX ADMIN — DOXAZOSIN MESYLATE 2 MG: 4 TABLET ORAL at 17:02

## 2024-09-15 RX ADMIN — LOSARTAN POTASSIUM 25 MG: 50 TABLET, FILM COATED ORAL at 08:33

## 2024-09-15 RX ADMIN — ONDANSETRON 4 MG: 2 INJECTION INTRAMUSCULAR; INTRAVENOUS at 08:48

## 2024-09-15 RX ADMIN — LOSARTAN POTASSIUM 25 MG: 50 TABLET, FILM COATED ORAL at 20:33

## 2024-09-15 RX ADMIN — PANCRELIPASE LIPASE, PANCRELIPASE PROTEASE, PANCRELIPASE AMYLASE 10000 UNITS: 5000; 17000; 24000 CAPSULE, DELAYED RELEASE ORAL at 11:19

## 2024-09-15 RX ADMIN — SODIUM CHLORIDE, PRESERVATIVE FREE 10 ML: 5 INJECTION INTRAVENOUS at 08:34

## 2024-09-15 RX ADMIN — METRONIDAZOLE 500 MG: 500 INJECTION, SOLUTION INTRAVENOUS at 11:18

## 2024-09-15 RX ADMIN — CIPROFLOXACIN 400 MG: 400 INJECTION, SOLUTION INTRAVENOUS at 14:12

## 2024-09-15 RX ADMIN — SODIUM CHLORIDE, PRESERVATIVE FREE 40 MG: 5 INJECTION INTRAVENOUS at 11:18

## 2024-09-15 RX ADMIN — PANCRELIPASE LIPASE, PANCRELIPASE PROTEASE, PANCRELIPASE AMYLASE 10000 UNITS: 5000; 17000; 24000 CAPSULE, DELAYED RELEASE ORAL at 08:32

## 2024-09-15 RX ADMIN — SODIUM CHLORIDE, PRESERVATIVE FREE 40 MG: 5 INJECTION INTRAVENOUS at 00:10

## 2024-09-15 RX ADMIN — POTASSIUM BICARBONATE 40 MEQ: 782 TABLET, EFFERVESCENT ORAL at 20:42

## 2024-09-15 RX ADMIN — ALPRAZOLAM 0.25 MG: 0.5 TABLET ORAL at 08:32

## 2024-09-15 RX ADMIN — SODIUM CHLORIDE, PRESERVATIVE FREE 10 ML: 5 INJECTION INTRAVENOUS at 20:32

## 2024-09-15 RX ADMIN — HYDROCODONE BITARTRATE AND ACETAMINOPHEN 1 TABLET: 10; 325 TABLET ORAL at 08:36

## 2024-09-15 RX ADMIN — METRONIDAZOLE 500 MG: 500 INJECTION, SOLUTION INTRAVENOUS at 18:38

## 2024-09-15 RX ADMIN — DOCUSATE SODIUM 100 MG: 100 CAPSULE, LIQUID FILLED ORAL at 08:32

## 2024-09-15 RX ADMIN — SODIUM CHLORIDE, PRESERVATIVE FREE 40 MG: 5 INJECTION INTRAVENOUS at 23:59

## 2024-09-15 RX ADMIN — CIPROFLOXACIN 400 MG: 400 INJECTION, SOLUTION INTRAVENOUS at 01:59

## 2024-09-15 ASSESSMENT — PAIN SCALES - GENERAL
PAINLEVEL_OUTOF10: 7
PAINLEVEL_OUTOF10: 5
PAINLEVEL_OUTOF10: 0
PAINLEVEL_OUTOF10: 0
PAINLEVEL_OUTOF10: 7

## 2024-09-15 ASSESSMENT — PAIN DESCRIPTION - LOCATION
LOCATION: ABDOMEN
LOCATION: HEAD;ABDOMEN

## 2024-09-15 ASSESSMENT — PAIN DESCRIPTION - DESCRIPTORS
DESCRIPTORS: ACHING
DESCRIPTORS: ACHING;CRAMPING

## 2024-09-15 ASSESSMENT — PAIN - FUNCTIONAL ASSESSMENT: PAIN_FUNCTIONAL_ASSESSMENT: ACTIVITIES ARE NOT PREVENTED

## 2024-09-15 ASSESSMENT — PAIN DESCRIPTION - DIRECTION: RADIATING_TOWARDS: NO

## 2024-09-15 ASSESSMENT — PAIN DESCRIPTION - PAIN TYPE: TYPE: ACUTE PAIN

## 2024-09-15 ASSESSMENT — PAIN DESCRIPTION - ONSET: ONSET: ON-GOING

## 2024-09-15 ASSESSMENT — PAIN DESCRIPTION - FREQUENCY: FREQUENCY: INTERMITTENT

## 2024-09-15 ASSESSMENT — PAIN DESCRIPTION - ORIENTATION
ORIENTATION: MID
ORIENTATION: MID

## 2024-09-15 ASSESSMENT — PAIN SCALES - WONG BAKER: WONGBAKER_NUMERICALRESPONSE: NO HURT

## 2024-09-15 NOTE — PLAN OF CARE
No significant improvement in nausea symptoms overnight.  She is asking for cysts p.o. intake to be with more substance other than the clear liquids.  I placed her on a full liquid diet.  The plan is to proceed with an EGD and flexible sigmoidoscopy tomorrow.  Her likely procedure time will be around 10 AM.  Keep n.p.o. after midnight.  Enema ordered for the morning.

## 2024-09-15 NOTE — PLAN OF CARE
Problem: Safety - Adult  Goal: Free from fall injury  Outcome: Progressing  Flowsheets (Taken 9/14/2024 2325)  Free From Fall Injury: Instruct family/caregiver on patient safety     Problem: ABCDS Injury Assessment  Goal: Absence of physical injury  Outcome: Progressing  Flowsheets (Taken 9/14/2024 2325)  Absence of Physical Injury: Implement safety measures based on patient assessment     Problem: Chronic Conditions and Co-morbidities  Goal: Patient's chronic conditions and co-morbidity symptoms are monitored and maintained or improved  Outcome: Progressing  Flowsheets (Taken 9/14/2024 2200)  Care Plan - Patient's Chronic Conditions and Co-Morbidity Symptoms are Monitored and Maintained or Improved:   Monitor and assess patient's chronic conditions and comorbid symptoms for stability, deterioration, or improvement   Collaborate with multidisciplinary team to address chronic and comorbid conditions and prevent exacerbation or deterioration   Update acute care plan with appropriate goals if chronic or comorbid symptoms are exacerbated and prevent overall improvement and discharge     Problem: Pain  Goal: Verbalizes/displays adequate comfort level or baseline comfort level  Outcome: Progressing  Flowsheets (Taken 9/14/2024 2310)  Verbalizes/displays adequate comfort level or baseline comfort level:   Encourage patient to monitor pain and request assistance   Assess pain using appropriate pain scale   Administer analgesics based on type and severity of pain and evaluate response   Implement non-pharmacological measures as appropriate and evaluate response   Consider cultural and social influences on pain and pain management   Notify Licensed Independent Practitioner if interventions unsuccessful or patient reports new pain     Problem: Nutrition Deficit:  Goal: Optimize nutritional status  Outcome: Progressing  Flowsheets (Taken 9/15/2024 0342)  Nutrient intake appropriate for improving, restoring, or maintaining  nutritional needs:   Assess nutritional status and recommend course of action   Monitor oral intake, labs, and treatment plans

## 2024-09-15 NOTE — PLAN OF CARE
Problem: Safety - Adult  Goal: Free from fall injury  9/15/2024 1207 by Marisol Upton LPN  Outcome: Progressing  9/15/2024 0342 by Wendy Callaway LPN  Outcome: Progressing  Flowsheets (Taken 9/14/2024 2325)  Free From Fall Injury: Instruct family/caregiver on patient safety     Problem: ABCDS Injury Assessment  Goal: Absence of physical injury  9/15/2024 1207 by Marisol Upton LPN  Outcome: Progressing  9/15/2024 0342 by Wendy Callaway LPN  Outcome: Progressing  Flowsheets (Taken 9/14/2024 2325)  Absence of Physical Injury: Implement safety measures based on patient assessment     Problem: Chronic Conditions and Co-morbidities  Goal: Patient's chronic conditions and co-morbidity symptoms are monitored and maintained or improved  9/15/2024 1207 by Marisol Upton LPN  Outcome: Progressing  9/15/2024 0342 by Wendy Callaway LPN  Outcome: Progressing  Flowsheets (Taken 9/14/2024 2200)  Care Plan - Patient's Chronic Conditions and Co-Morbidity Symptoms are Monitored and Maintained or Improved:   Monitor and assess patient's chronic conditions and comorbid symptoms for stability, deterioration, or improvement   Collaborate with multidisciplinary team to address chronic and comorbid conditions and prevent exacerbation or deterioration   Update acute care plan with appropriate goals if chronic or comorbid symptoms are exacerbated and prevent overall improvement and discharge     Problem: Pain  Goal: Verbalizes/displays adequate comfort level or baseline comfort level  9/15/2024 1207 by Marisol Upton LPN  Outcome: Progressing  9/15/2024 0342 by Wendy Callaway LPN  Outcome: Progressing  Flowsheets (Taken 9/14/2024 2310)  Verbalizes/displays adequate comfort level or baseline comfort level:   Encourage patient to monitor pain and request assistance   Assess pain using appropriate pain scale   Administer analgesics based on type and severity of pain and evaluate response   Implement  non-pharmacological measures as appropriate and evaluate response   Consider cultural and social influences on pain and pain management   Notify Licensed Independent Practitioner if interventions unsuccessful or patient reports new pain     Problem: Nutrition Deficit:  Goal: Optimize nutritional status  9/15/2024 1207 by Marisol Upton LPN  Outcome: Progressing  9/15/2024 0342 by Wendy Callaway LPN  Outcome: Progressing  Flowsheets (Taken 9/15/2024 0342)  Nutrient intake appropriate for improving, restoring, or maintaining nutritional needs:   Assess nutritional status and recommend course of action   Monitor oral intake, labs, and treatment plans

## 2024-09-15 NOTE — PROGRESS NOTES
MEDICAL ONCOLOGY PROGRESS NOTE     Pt Name: Linn Pryor  MRN: [id number]  YOB: 1945  Date of evaluation: 9/15/2024    Subjective-reviewed interval notes.  Discussed with GI.  She is still feeling nauseated when she eats.   at bedside this morning.  She is afebrile.  Diet was changed today to full liquid.  Reviewed GI notes.    HISTORY OF PRESENT ILLNESS:   The patient is well-known to our clinic.  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  She is currently undergoing chemotherapy with modified FOLFOX.  She had prior progression on Gemzar and Abraxane, Gemzar capecitabine and Onivyde and leucovorin.  She has been on treatment since August 2021.  She presented to the ER department today with complaints of coffee-ground emesis and hematochezia.  CBC showed WBC 8.4, hemoglobin 11.6, platelet count 240,000.  Chemistry remarkable for sodium 135, normal creatinine, normal LFTs.    9/10/2024-CT chest, [facility]: Small bilateral pleural effusions, perhaps mildly increased.  Stable sub-centimeter pulmonary nodules.  No new pulmonary nodule or pulmonary mass.  Please see report from CT abdomen pelvis obtained at the same time for description of findings in the upper abdomen.    9/10/2024-CT abdomen pelvis with IV contrast: FINDINGS:  Stable subcapsular low density mass of the liver right lobe.  Measures 8.0 x 5.5 cm cross section by 5.8 cm cranial caudally.  0.9 cm subcapsular low density of the liver right lobe slightly more medially at the same level, also stable.  Stable 0.6 cm cyst of the left lobe along the falciform ligament.  No new liver lesions.  No biliary tree dilatation.  Cholecystectomy, again noted.  Severe atrophy and fatty replacement of the pancreas, stable.  Stable 1.4 cm cystic lesion of uncinate process of the pancreas and 0.9 cm cystic lesion of the pancreatic head.  Absence of the spleen, with suggestion of several splenules in the left upper  ([facility]):Small amount of diffuse ascites within the abdomen, likely malignant ascites given the presence of nodular peritoneal and omental soft tissue thickening compatible with carcinomatosis. Diagnostic paracentesis was performed following this examination with ultrasound guidance.Evidence previous resection of the pancreatic body and tail. There is a soft tissue implant or enlarged lymph node inferior to the celiac artery on the right which measures 1.6 x 1.6 cm, concerning for metastatic disease. There is a small cyst in subtle stable nodularity and patient replacement of the pancreatic head.   10/3/2022-essentially, findings of recurrent metastatic pancreatic adenocarcinoma.  Stage IV.  Recommend palliative Gemzar 1000 mg/M2 and Abraxane 125 mg/M2 every 2 weeks until disease progression or intolerable toxicity.  10/3/22 Chemotherapy consent signed  10/14/22-Initiated palliative frontline Gemzar Abraxane every 2 weeks  12/30/22 CA 19-9-815  1/6/23 CT Chest W Contrast No significant interval change.  1/6/23 CT Abd/Pelvis W IV Contrast (oral) Partial pancreatectomy with absence of pancreatic body and tail.  No significant change in size or number of a few cyst versus soft tissue nodules in region of pancreatic head. No retroperitoneal lymphadenopathy.Interval resolution of ascites.   1/13/2023-essentially, CT scan showed interval response to treatment. Resolution of prior ascites.  Continue palliative chemotherapy with Gemzar/Abraxane.  1/13/23 Decrease Abraxane to 100mg/m2 with each treatment due to development of neuropathy.  1/27/23 CA 19-9-360  2/10/23 CA 19-9-238  2/24/23 CA 19-9-171  3/10/23 MRI Brain W WO Contrast No acute intracranial abnormality notified.No evidence of metastatic disease in the brain. Scattered T2 FLAIR white matter hyperintensities are nonspecific, but commonly seen in the setting of chronic microvascular ischemic disease.  4/20/23 CT Chest, Abdomen,Pelvis  W Contrast New right hepatic dome  are nonspecific but metastatic disease cannot be excluded.  Continued follow-up recommended.No acute infiltrates. Mild cardiomegaly.  3/4/24 CT Abd/Pelviw W IV Contrast  Soft tissue thickening of the anterior peritoneum is identified adjacent to the transverse colon, measuring 3.9 x 2.2 cm on today's exam, previously measuring 4.0 x 3.0 cm, grossly unchanged given differences in technique. Stable metastatic disease in the abdomen and pelvis, as described above.   3/5/2024-I reviewed CT C/A/P to assess response.  Overall, stable results.  No evidence of disease progression.  Continue current treatment.  Patient was instructed to increase antidiarrhea.  Prescribe Lomotil today.  Continue morning  3/5/24 Tumor Markers: CEA 6.3, CA 19-9-301  3/19/24 Tumor Markers: CEA 6.8, CA 19-9-320  4/30/24 Tumor Markers: CEA 7.4, CA 19-9-472  4/30/24 Magnesium:1.9  5/7/24 CT Chest W Contrast No significant change in the bilateral pulmonary nodules. Trace bilateral pleural effusions and right lower lobe dependent atelectasis have mildly increased.  5/7/24 CT Abd/Pelvis W IV Contrast (oral) There is multi focal soft tissue nodularity nodularity in the left anterior mesentery/omentum with redemonstration of 3 x 1.8 cm soft tissue focus adjacent to the splenic flexure.  Nodularity and thickening within the omentum at midline and to the right of midline noted as well concerning for grossly stable omental/peritoneal implants.Stable exam. Stable appearing carcinomatosis of the abdomen and stable liver and low density pancreatic lesions.  5/28/24 Tumor Markers: CEA 11.6, CA 19-9-1062  6/11/24 Tumor Markers: CEA 11.7, CA 19-9-1132  6/13/24 CT Chest W Contrast  There is a 0.3 cm pulmonary nodule in the posterior right lung apex unchanged from prior examination on image 26.  There is a persistent 0.4 cm pulmonary nodule in the medial left lung apex on image 26.  There is a 0.5 cm pulmonary nodule in the right upper lobe on image 39 which is

## 2024-09-15 NOTE — PROGRESS NOTES
[facility]      Progress Note    Patient:  Linn Pryor  YOB: 1945  Date of Service: 9/15/2024  MRN: [id number]   Acct: [id number]   Primary Care Physician: Jaiden Hayden MD  Advance Directive: Full Code  Admit Date: 9/13/2024       Hospital Day: 2    Portions of this note have been copied forward, however, updated to reflect the most current clinical status of this patient.     CHIEF COMPLAINT vomiting coffee-ground emesis    SUBJECTIVE: Complains of significant pain today      CUMULATIVE HOSPITAL COURSE:   The patient is a 78 y.o. female who presented to [facility] ED for evaluation of hematemesis. Pt has history of pancreatic cancer followed by Dr. Rosenthal treated with chemotherapy every other week.      Pt reports problems with nausea and vomiting since yesterday with noted black discoloration to emesis worsening last night and persisting today. She reports mid and bilateral upper abdominal pain. She has had problems with weight loss noting that her clothes are more loose over past month.      In ED, CT abd/pelvis-Possible wall thickening in the colon concerning for colitis.  There is focal wall thickening in the sigmoid colon concerning for mass.  Surgical consultation is recommended.  There is also question of  pneumatosis at the cecum.  No portal venous gas or mesenteric venous gas. Hgb 11.6, platelet slide review listed as adequate, creatinine 0.6/bun 12, lipase 7. Pt is admitted inpatient to hospitalist.    She is having significant abdominal pain today.  GI and oncology following.  No immediate plan by GI for intervention.  She will have an enema to see if findings on the CT or stool burden.  EGD and flexible sigmoidoscopy planned for Monday.        Objective:   VITALS:  /73   Pulse 64   Temp 97.2 °F (36.2 °C) (Temporal)   Resp 15   Ht 1.651 m (5' 5\")   Wt 57.9 kg (127 lb 9 oz)   SpO2 96%   Breastfeeding No   BMI 21.23 kg/m²   24HR INTAKE/OUTPUT:    Intake/Output

## 2024-09-16 ENCOUNTER — ANESTHESIA (OUTPATIENT)
Dept: ENDOSCOPY | Age: 79
DRG: 392 | End: 2024-09-16
Payer: MEDICARE

## 2024-09-16 ENCOUNTER — APPOINTMENT (OUTPATIENT)
Dept: CT IMAGING | Age: 79
DRG: 392 | End: 2024-09-16
Payer: MEDICARE

## 2024-09-16 ENCOUNTER — ANESTHESIA EVENT (OUTPATIENT)
Dept: ENDOSCOPY | Age: 79
DRG: 392 | End: 2024-09-16
Payer: MEDICARE

## 2024-09-16 ENCOUNTER — HOSPITAL ENCOUNTER (INPATIENT)
Age: 79
LOS: 1 days | DRG: 951 | End: 2024-09-16
Attending: INTERNAL MEDICINE | Admitting: INTERNAL MEDICINE
Payer: MEDICARE

## 2024-09-16 ENCOUNTER — APPOINTMENT (OUTPATIENT)
Dept: GENERAL RADIOLOGY | Age: 79
DRG: 392 | End: 2024-09-16
Attending: INTERNAL MEDICINE
Payer: MEDICARE

## 2024-09-16 VITALS — RESPIRATION RATE: 33 BRPM | TEMPERATURE: 97 F

## 2024-09-16 VITALS
RESPIRATION RATE: 18 BRPM | WEIGHT: 127.56 LBS | TEMPERATURE: 96.9 F | DIASTOLIC BLOOD PRESSURE: 44 MMHG | BODY MASS INDEX: 21.25 KG/M2 | HEIGHT: 65 IN | SYSTOLIC BLOOD PRESSURE: 66 MMHG | HEART RATE: 90 BPM | OXYGEN SATURATION: 95 %

## 2024-09-16 PROBLEM — C25.9 PANCREATIC CANCER (HCC): Status: ACTIVE | Noted: 2024-09-16

## 2024-09-16 PROBLEM — G89.3 NEOPLASM RELATED PAIN: Status: ACTIVE | Noted: 2024-09-16

## 2024-09-16 PROBLEM — K52.9 COLITIS: Status: ACTIVE | Noted: 2024-09-16

## 2024-09-16 PROBLEM — C25.9 MALIGNANT NEOPLASM OF PANCREAS (HCC): Status: ACTIVE | Noted: 2024-09-16

## 2024-09-16 PROBLEM — R93.5 ABNORMAL FINDINGS ON DIAGNOSTIC IMAGING OF ABDOMEN: Status: ACTIVE | Noted: 2024-09-16

## 2024-09-16 LAB
HCT VFR BLD AUTO: 30.1 % (ref 37–47)
HGB BLD-MCNC: 9.9 G/DL (ref 12–16)
SARS-COV-2 RDRP RESP QL NAA+PROBE: NOT DETECTED

## 2024-09-16 PROCEDURE — 74018 RADEX ABDOMEN 1 VIEW: CPT

## 2024-09-16 PROCEDURE — 6560000002 HC HOSPICE GENERAL INPATIENT

## 2024-09-16 PROCEDURE — 6360000002 HC RX W HCPCS: Performed by: NURSE PRACTITIONER

## 2024-09-16 PROCEDURE — 99233 SBSQ HOSP IP/OBS HIGH 50: CPT | Performed by: INTERNAL MEDICINE

## 2024-09-16 PROCEDURE — 2580000003 HC RX 258: Performed by: NURSE PRACTITIONER

## 2024-09-16 PROCEDURE — 85014 HEMATOCRIT: CPT

## 2024-09-16 PROCEDURE — 6360000002 HC RX W HCPCS

## 2024-09-16 PROCEDURE — 71045 X-RAY EXAM CHEST 1 VIEW: CPT

## 2024-09-16 PROCEDURE — 2700000000 HC OXYGEN THERAPY PER DAY

## 2024-09-16 PROCEDURE — 85018 HEMOGLOBIN: CPT

## 2024-09-16 PROCEDURE — 94760 N-INVAS EAR/PLS OXIMETRY 1: CPT

## 2024-09-16 PROCEDURE — 6370000000 HC RX 637 (ALT 250 FOR IP): Performed by: INTERNAL MEDICINE

## 2024-09-16 PROCEDURE — 6370000000 HC RX 637 (ALT 250 FOR IP): Performed by: NURSE PRACTITIONER

## 2024-09-16 PROCEDURE — 2580000003 HC RX 258: Performed by: INTERNAL MEDICINE

## 2024-09-16 PROCEDURE — 99223 1ST HOSP IP/OBS HIGH 75: CPT

## 2024-09-16 PROCEDURE — 87635 SARS-COV-2 COVID-19 AMP PRB: CPT

## 2024-09-16 PROCEDURE — 99497 ADVNCD CARE PLAN 30 MIN: CPT

## 2024-09-16 PROCEDURE — 2580000003 HC RX 258: Performed by: ANESTHESIOLOGY

## 2024-09-16 PROCEDURE — 6360000002 HC RX W HCPCS: Performed by: PEDIATRICS

## 2024-09-16 PROCEDURE — 36591 DRAW BLOOD OFF VENOUS DEVICE: CPT

## 2024-09-16 PROCEDURE — 6360000002 HC RX W HCPCS: Performed by: INTERNAL MEDICINE

## 2024-09-16 RX ORDER — HYDROMORPHONE HYDROCHLORIDE 1 MG/ML
0.5 INJECTION, SOLUTION INTRAMUSCULAR; INTRAVENOUS; SUBCUTANEOUS EVERY 5 MIN PRN
Status: CANCELLED | OUTPATIENT
Start: 2024-09-16

## 2024-09-16 RX ORDER — HYDROMORPHONE HYDROCHLORIDE 1 MG/ML
0.25 INJECTION, SOLUTION INTRAMUSCULAR; INTRAVENOUS; SUBCUTANEOUS EVERY 5 MIN PRN
Status: CANCELLED | OUTPATIENT
Start: 2024-09-16

## 2024-09-16 RX ORDER — ONDANSETRON 2 MG/ML
4 INJECTION INTRAMUSCULAR; INTRAVENOUS EVERY 6 HOURS PRN
Status: DISCONTINUED | OUTPATIENT
Start: 2024-09-16 | End: 2024-09-17 | Stop reason: HOSPADM

## 2024-09-16 RX ORDER — MORPHINE SULFATE 4 MG/ML
4 INJECTION, SOLUTION INTRAMUSCULAR; INTRAVENOUS
Status: DISCONTINUED | OUTPATIENT
Start: 2024-09-16 | End: 2024-09-16

## 2024-09-16 RX ORDER — LORAZEPAM 2 MG/ML
1 INJECTION INTRAMUSCULAR EVERY 6 HOURS PRN
Status: CANCELLED | OUTPATIENT
Start: 2024-09-16

## 2024-09-16 RX ORDER — SODIUM CHLORIDE, SODIUM LACTATE, POTASSIUM CHLORIDE, CALCIUM CHLORIDE 600; 310; 30; 20 MG/100ML; MG/100ML; MG/100ML; MG/100ML
INJECTION, SOLUTION INTRAVENOUS CONTINUOUS
Status: DISCONTINUED | OUTPATIENT
Start: 2024-09-16 | End: 2024-09-16 | Stop reason: HOSPADM

## 2024-09-16 RX ORDER — ALPRAZOLAM 0.5 MG
0.25 TABLET ORAL 2 TIMES DAILY
Status: CANCELLED | OUTPATIENT
Start: 2024-09-16

## 2024-09-16 RX ORDER — HYDROMORPHONE HYDROCHLORIDE 1 MG/ML
1 INJECTION, SOLUTION INTRAMUSCULAR; INTRAVENOUS; SUBCUTANEOUS
Status: DISCONTINUED | OUTPATIENT
Start: 2024-09-16 | End: 2024-09-16 | Stop reason: HOSPADM

## 2024-09-16 RX ORDER — SCOLOPAMINE TRANSDERMAL SYSTEM 1 MG/1
1 PATCH, EXTENDED RELEASE TRANSDERMAL
Status: DISCONTINUED | OUTPATIENT
Start: 2024-09-16 | End: 2024-09-17 | Stop reason: HOSPADM

## 2024-09-16 RX ORDER — SODIUM CHLORIDE 0.9 % (FLUSH) 0.9 %
5-40 SYRINGE (ML) INJECTION PRN
Status: CANCELLED | OUTPATIENT
Start: 2024-09-16

## 2024-09-16 RX ORDER — BISACODYL 10 MG
10 SUPPOSITORY, RECTAL RECTAL DAILY PRN
Status: DISCONTINUED | OUTPATIENT
Start: 2024-09-16 | End: 2024-09-17 | Stop reason: HOSPADM

## 2024-09-16 RX ORDER — SODIUM CHLORIDE 0.9 % (FLUSH) 0.9 %
5-40 SYRINGE (ML) INJECTION PRN
Status: DISCONTINUED | OUTPATIENT
Start: 2024-09-16 | End: 2024-09-16 | Stop reason: HOSPADM

## 2024-09-16 RX ORDER — 0.9 % SODIUM CHLORIDE 0.9 %
500 INTRAVENOUS SOLUTION INTRAVENOUS ONCE
Status: COMPLETED | OUTPATIENT
Start: 2024-09-16 | End: 2024-09-16

## 2024-09-16 RX ORDER — SODIUM CHLORIDE, SODIUM LACTATE, POTASSIUM CHLORIDE, CALCIUM CHLORIDE 600; 310; 30; 20 MG/100ML; MG/100ML; MG/100ML; MG/100ML
INJECTION, SOLUTION INTRAVENOUS CONTINUOUS
Status: CANCELLED | OUTPATIENT
Start: 2024-09-16

## 2024-09-16 RX ORDER — ONDANSETRON 2 MG/ML
4 INJECTION INTRAMUSCULAR; INTRAVENOUS
Status: CANCELLED | OUTPATIENT
Start: 2024-09-16 | End: 2024-09-17

## 2024-09-16 RX ORDER — SODIUM CHLORIDE 9 MG/ML
INJECTION, SOLUTION INTRAVENOUS CONTINUOUS
Status: CANCELLED | OUTPATIENT
Start: 2024-09-16

## 2024-09-16 RX ORDER — FENTANYL 12.5 UG/1
1 PATCH TRANSDERMAL
Status: CANCELLED | OUTPATIENT
Start: 2024-09-16 | End: 2024-09-19

## 2024-09-16 RX ORDER — HYDROMORPHONE HYDROCHLORIDE 1 MG/ML
0.5 INJECTION, SOLUTION INTRAMUSCULAR; INTRAVENOUS; SUBCUTANEOUS
Status: DISCONTINUED | OUTPATIENT
Start: 2024-09-16 | End: 2024-09-17 | Stop reason: HOSPADM

## 2024-09-16 RX ORDER — LORAZEPAM 2 MG/ML
1 INJECTION INTRAMUSCULAR EVERY 6 HOURS PRN
Status: DISCONTINUED | OUTPATIENT
Start: 2024-09-16 | End: 2024-09-16 | Stop reason: HOSPADM

## 2024-09-16 RX ORDER — FENTANYL 25 UG/1
1 PATCH TRANSDERMAL
Status: CANCELLED | OUTPATIENT
Start: 2024-09-17

## 2024-09-16 RX ORDER — SODIUM CHLORIDE 0.9 % (FLUSH) 0.9 %
5-40 SYRINGE (ML) INJECTION EVERY 12 HOURS SCHEDULED
Status: CANCELLED | OUTPATIENT
Start: 2024-09-16

## 2024-09-16 RX ORDER — NALOXONE HYDROCHLORIDE 0.4 MG/ML
INJECTION, SOLUTION INTRAMUSCULAR; INTRAVENOUS; SUBCUTANEOUS PRN
Status: CANCELLED | OUTPATIENT
Start: 2024-09-16

## 2024-09-16 RX ORDER — GLYCOPYRROLATE 0.2 MG/ML
0.2 INJECTION INTRAMUSCULAR; INTRAVENOUS EVERY 4 HOURS PRN
Status: DISCONTINUED | OUTPATIENT
Start: 2024-09-16 | End: 2024-09-17 | Stop reason: HOSPADM

## 2024-09-16 RX ORDER — SODIUM CHLORIDE 0.9 % (FLUSH) 0.9 %
5-40 SYRINGE (ML) INJECTION PRN
Status: DISCONTINUED | OUTPATIENT
Start: 2024-09-16 | End: 2024-09-17 | Stop reason: HOSPADM

## 2024-09-16 RX ORDER — SODIUM CHLORIDE 9 MG/ML
INJECTION, SOLUTION INTRAVENOUS CONTINUOUS
Status: DISCONTINUED | OUTPATIENT
Start: 2024-09-16 | End: 2024-09-17 | Stop reason: HOSPADM

## 2024-09-16 RX ORDER — SODIUM CHLORIDE 0.9 % (FLUSH) 0.9 %
5-40 SYRINGE (ML) INJECTION EVERY 12 HOURS SCHEDULED
Status: DISCONTINUED | OUTPATIENT
Start: 2024-09-16 | End: 2024-09-16 | Stop reason: HOSPADM

## 2024-09-16 RX ORDER — SODIUM CHLORIDE 9 MG/ML
INJECTION, SOLUTION INTRAVENOUS PRN
Status: CANCELLED | OUTPATIENT
Start: 2024-09-16

## 2024-09-16 RX ORDER — LORAZEPAM 2 MG/ML
1 INJECTION INTRAMUSCULAR
Status: DISCONTINUED | OUTPATIENT
Start: 2024-09-16 | End: 2024-09-17 | Stop reason: HOSPADM

## 2024-09-16 RX ORDER — SODIUM CHLORIDE 9 MG/ML
INJECTION, SOLUTION INTRAVENOUS PRN
Status: DISCONTINUED | OUTPATIENT
Start: 2024-09-16 | End: 2024-09-16 | Stop reason: HOSPADM

## 2024-09-16 RX ORDER — SODIUM CHLORIDE 9 MG/ML
INJECTION, SOLUTION INTRAVENOUS CONTINUOUS
Status: DISCONTINUED | OUTPATIENT
Start: 2024-09-16 | End: 2024-09-16 | Stop reason: HOSPADM

## 2024-09-16 RX ADMIN — ONDANSETRON 4 MG: 2 INJECTION INTRAMUSCULAR; INTRAVENOUS at 06:47

## 2024-09-16 RX ADMIN — MORPHINE SULFATE 4 MG: 4 INJECTION, SOLUTION INTRAMUSCULAR; INTRAVENOUS at 06:44

## 2024-09-16 RX ADMIN — SODIUM PHOSPHATE 1 ENEMA: 7; 19 ENEMA RECTAL at 08:33

## 2024-09-16 RX ADMIN — SODIUM CHLORIDE 500 ML: 9 INJECTION, SOLUTION INTRAVENOUS at 11:12

## 2024-09-16 RX ADMIN — HYDROMORPHONE HYDROCHLORIDE 0.5 MG: 1 INJECTION, SOLUTION INTRAMUSCULAR; INTRAVENOUS; SUBCUTANEOUS at 02:56

## 2024-09-16 RX ADMIN — SODIUM CHLORIDE, PRESERVATIVE FREE 10 ML: 5 INJECTION INTRAVENOUS at 08:40

## 2024-09-16 RX ADMIN — CIPROFLOXACIN 400 MG: 400 INJECTION, SOLUTION INTRAVENOUS at 02:34

## 2024-09-16 RX ADMIN — HYDROCODONE BITARTRATE AND ACETAMINOPHEN 1 TABLET: 10; 325 TABLET ORAL at 02:30

## 2024-09-16 RX ADMIN — SODIUM CHLORIDE: 9 INJECTION, SOLUTION INTRAVENOUS at 16:25

## 2024-09-16 RX ADMIN — SODIUM CHLORIDE, PRESERVATIVE FREE 10 ML: 5 INJECTION INTRAVENOUS at 16:25

## 2024-09-16 RX ADMIN — PROMETHAZINE HYDROCHLORIDE 25 MG: 25 INJECTION INTRAMUSCULAR; INTRAVENOUS at 03:08

## 2024-09-16 RX ADMIN — HYDROMORPHONE HYDROCHLORIDE 1 MG: 1 INJECTION, SOLUTION INTRAMUSCULAR; INTRAVENOUS; SUBCUTANEOUS at 12:19

## 2024-09-16 RX ADMIN — METRONIDAZOLE 500 MG: 500 INJECTION, SOLUTION INTRAVENOUS at 02:51

## 2024-09-16 RX ADMIN — SODIUM CHLORIDE 500 ML: 9 INJECTION, SOLUTION INTRAVENOUS at 08:47

## 2024-09-16 RX ADMIN — Medication 10 ML: at 14:25

## 2024-09-16 RX ADMIN — Medication 0.1 MG/HR: at 13:01

## 2024-09-16 ASSESSMENT — PAIN SCALES - GENERAL
PAINLEVEL_OUTOF10: 0
PAINLEVEL_OUTOF10: 10
PAINLEVEL_OUTOF10: 6
PAINLEVEL_OUTOF10: 0
PAINLEVEL_OUTOF10: 10
PAINLEVEL_OUTOF10: 10
PAINLEVEL_OUTOF10: 0
PAINLEVEL_OUTOF10: 3
PAINLEVEL_OUTOF10: 10
PAINLEVEL_OUTOF10: 10
PAINLEVEL_OUTOF10: 6

## 2024-09-16 ASSESSMENT — PAIN DESCRIPTION - DIRECTION
RADIATING_TOWARDS: NO
RADIATING_TOWARDS: NO

## 2024-09-16 ASSESSMENT — PAIN DESCRIPTION - LOCATION
LOCATION: ABDOMEN
LOCATION: GENERALIZED
LOCATION: ABDOMEN
LOCATION: ABDOMEN
LOCATION: ABDOMEN;GENERALIZED
LOCATION: ABDOMEN
LOCATION: ABDOMEN;GENERALIZED
LOCATION: GENERALIZED

## 2024-09-16 ASSESSMENT — PAIN DESCRIPTION - DESCRIPTORS
DESCRIPTORS: PATIENT UNABLE TO DESCRIBE
DESCRIPTORS: PATIENT UNABLE TO DESCRIBE
DESCRIPTORS: ACHING
DESCRIPTORS: ACHING
DESCRIPTORS: OTHER (COMMENT)

## 2024-09-16 ASSESSMENT — PAIN DESCRIPTION - ORIENTATION
ORIENTATION: MID
ORIENTATION: MID

## 2024-09-16 ASSESSMENT — PAIN DESCRIPTION - PAIN TYPE
TYPE: ACUTE PAIN
TYPE: ACUTE PAIN

## 2024-09-16 ASSESSMENT — PAIN DESCRIPTION - ONSET
ONSET: ON-GOING
ONSET: ON-GOING

## 2024-09-16 ASSESSMENT — PAIN DESCRIPTION - FREQUENCY
FREQUENCY: INTERMITTENT
FREQUENCY: INTERMITTENT

## 2024-09-16 ASSESSMENT — LIFESTYLE VARIABLES: SMOKING_STATUS: 0

## 2024-09-16 NOTE — ANESTHESIA PRE PROCEDURE
Department of Anesthesiology  Preprocedure Note       Name:  Linn Pryor   Age:  78 y.o.  :  1945                                          MRN:  [id number]         Date:  2024      Surgeon: Surgeon(s):  Lauren Bonilla MD    Procedure: Procedure(s):  ESOPHAGOGASTRODUODENOSCOPY  SIGMOIDOSCOPY DIAGNOSTIC FLEXIBLE    Medications prior to admission:   Prior to Admission medications    Medication Sig Start Date End Date Taking? Authorizing Provider   HYDROcodone-acetaminophen (NORCO)  MG per tablet Take 1 tablet by mouth every 6 hours as needed for Pain. Max Daily Amount: 4 tablets   Yes Provider, MD Coby   metoclopramide (REGLAN) 10 MG tablet Take 1 tablet by mouth every 6 hours as needed (take every 6 hours as needed for nausea) 9/3/24   Radha Rosenthal MD   promethazine (PHENERGAN) 25 MG tablet Take 1 tablet by mouth every 6 hours as needed for Nausea 9/3/24 10/3/24  Radha Rosenthal MD   fentaNYL (DURAGESIC) 25 MCG/HR Place 1 patch onto the skin every 3 days for 30 days. Intended supply: 30 days Max Daily Amount: 1 patch 24  Radha Rosenthal MD   lactulose (CHRONULAC) 10 GM/15ML solution Take 15 mLs by mouth daily as needed (constipation) 24   Shira Maharaj, MACHELLE - CNP   ALPRAZolam (XANAX) 0.5 MG tablet Take 0.5 tablets by mouth 2 times daily as needed for Anxiety for up to 60 days. Max Daily Amount: 0.5 mg  Patient taking differently: Take 0.5 tablets by mouth in the morning and at bedtime. 8/20/24 10/19/24  Radha Rosenthal MD   lidocaine-prilocaine (EMLA) 2.5-2.5 % cream Apply topically as needed. 24   Radha Rosenthal MD   lipase-protease-amylase (CREON) 71539-99787 units delayed release capsule Take 1 capsule by mouth 3 times daily (with meals) 24   Radha Rosenthal MD   hydrOXYzine pamoate (VISTARIL) 25 MG capsule TAKE 1 CAPSULE BY MOUTH 3 TIMES DAILY AS NEEDED FOR ITCHING.  Patient not taking: Reported on 2024  INSERTION with ultrasound and fluoroscopy performed by Vivian Grant DO at [facility] OR   • PORT SURGERY Right 1/13/2022    RIGHT SIDE MEDIPORT REMOVAL, LEFT SIDE MEDIPORT INSERTION performed by Yareli Henry DO at [facility] OR   • RI SURGICAL ARTHROSCOPY SHOULDER W/ROTATOR CUFF RPR Right 10/19/2017    SHOULDER ARTHROSCOPY ROTATOR CUFF REPAIR/ DEBRIDEMENT BICEPS TENODESIS/ TENOTOMY SUBACROMIAL DECOMPRESSION/ DISTAL CLAVICLE EXCISION performed by Johnson Shields MD at [facility] OR   • SPLENECTOMY  06/28/2021   • UPPER GASTROINTESTINAL ENDOSCOPY  06/2021    Dr. Rene in [place]   • WRIST SURGERY      spur excision       Social History:    Social History     Tobacco Use   • Smoking status: Never   • Smokeless tobacco: Never   Substance Use Topics   • Alcohol use: Not Currently     Comment: rare                                Counseling given: Not Answered      Vital Signs (Current):   Vitals:    09/16/24 0644 09/16/24 0733 09/16/24 0815 09/16/24 1010   BP:  (!) 80/50 (!) 82/54 (!) 71/62   Pulse:  91  96   Resp: 22 20 14   Temp:  96.9 °F (36.1 °C)     TempSrc:  Temporal     SpO2:  91%  (!) 89%   Weight:       Height:                                                  BP Readings from Last 3 Encounters:   09/16/24 (!) 71/62   09/03/24 121/86   08/20/24 128/71       NPO Status: Time of last liquid consumption: 1900                        Time of last solid consumption: 1900                        Date of last liquid consumption: 09/15/24                        Date of last solid food consumption: 09/15/24    BMI:   Wt Readings from Last 3 Encounters:   09/13/24 57.9 kg (127 lb 9 oz)   09/03/24 58.9 kg (129 lb 12.8 oz)   08/20/24 58.4 kg (128 lb 11.2 oz)     Body mass index is 21.23 kg/m².    CBC:   Lab Results   Component Value Date/Time    WBC 8.5 09/15/2024 09:30 AM    RBC 3.55 09/15/2024 09:30 AM    HGB 9.9 09/16/2024 01:41 AM    HCT 30.1 09/16/2024 01:41 AM    MCV 95.5 09/15/2024 09:30 AM    RDW 16.5 09/15/2024 09:30 AM    Neuro/Psych:   (+) psychiatric history (Depression):   (-) seizures, TIA and CVA           GI/Hepatic/Renal:   (+) GERD: well controlled     (-) liver disease and no renal disease       Endo/Other:    (+) hypothyroidism::., malignancy/cancer (Pancreatic).    (-) diabetes mellitus, hyperthyroidism               Abdominal:             Vascular:     - DVT.      Other Findings:         Anesthesia Plan      general and TIVA     ASA 4     (Patient has been mildly hypotensive, will have pressors ready)  Induction: intravenous.    MIPS: Postoperative opioids intended and Prophylactic antiemetics administered.  Anesthetic plan and risks discussed with patient.    Use of blood products discussed with patient whom consented to blood products.                    Ulices Blakely MD   9/16/2024

## 2024-09-16 NOTE — ACP (ADVANCE CARE PLANNING)
Advance Care Planning      Palliative Medicine Provider (MD/NP)  Advance Care Planning (ACP) Conversation      Date of Conversation: 09/16/24  The patient and/or authorized decision maker consented to a voluntary Advance Care Planning conversation.   Individuals present for the conversation:   Patient, Spouse  , and Daughter all present at bedside    Legal Healthcare Agent(s):    Primary Decision Maker: Flip Pryor - Spouse - [phone]    Secondary Decision Maker: Delia Hull - Child - [phone]    ACP documents available in EMR prior to discussion:  -Living Will and EMS DNR  - Have requested copies of POA documents    Primary Palliative Diagnosis(es):  Stage IV pancreatic adenocarcinoma  Intractable abdominal pain  Persistent nausea and vomiting    Conversation Summary: Met with patient and family at bedside to discuss status and plan of care.  Patient is awake and communicative but increased confusion with escalation of pain regimen this morning.  Reviewed living will and EMS DNR documents currently on file.  Patient's spouse remains primary HCS decision maker with patient's daughter as secondary.  Daughter also reports that she has POA documents and I have asked for copies of these to scanned to epic chart.  Living will is currently scanned in under media tab which list her spouse as primary HCS decision maker and daughter as secondary. Confirmed with family that they do wish to continue current level of medical care and interventions in hopes for improvement in symptom stabilization but otherwise patient is to be a DNR in the event of cardiac respiratory arrest.     ADDENDUM: Pt was not hemodynamically stable for scheduled colonoscopy/EGD.  Has since had further abdominal imaging concerning for free air.  Family asked for this provider to return to bedside to further discuss options about comfort focused care.  Patient continues to have uncontrolled pain but due to hypotension has been unable to

## 2024-09-16 NOTE — CARE COORDINATION
Patient in the process of being evaluated for IP Hospice.  Patient not appropriate for initial Care Management Assessment at this time.  Electronically signed by Rosy Waite RN on 9/16/2024 at 1:22 PM

## 2024-09-16 NOTE — DISCHARGE SUMMARY
Discharge Summary    NAME: Linn Pryor  :  1945  MRN:  [id number]    Admit date:  2024  Discharge date:  2024    Admitting Physician:  No admitting provider for patient encounter.    Advance Directive: DNR    Consults: GI and hematology/oncology    Primary Care Physician:  Jaiden Hayden MD    Discharge Diagnoses:  Principal Problem:    Coffee ground emesis  Active Problems:    Palliative care patient    Moderate malnutrition (HCC)    Pancreatic adenocarcinoma (HCC)    Chemotherapy adverse reaction    Poor prognosis    Life threatening medical illness  Resolved Problems:    * No resolved hospital problems. *      Significant Diagnostic Studies:   CT ABDOMEN PELVIS W IV CONTRAST Additional Contrast? None    Result Date: 2024    EXAM: CT ABDOMEN PELVIS WITH CONTRAST  HISTORY:  Abdominal pain  COMPARISON:  CT abdomen pelvis from 09/10/2024 and 2024.  TECHNIQUE:  Serial axial images of the abdomen pelvis were performed after  Omnipaque IV contrast was administered.  FINDINGS:  Stable lesion in the right hepatic lobe and additional adjacent sub-centimeter hypoattenuated lesion.  Stable soft-tissue attenuated nodule anterior to the liver (axial image 16).  Diffuse fatty atrophy of the pancreas.  1 x 1 cm cystic structure at the pancreatic head.  Soft tissue prominence at the anterior abdominal wall (axial images 36 - 39, and in the left upper quadrant, grossly stable and consistent with peritoneal carcinomatosis.  Question of colonic wall thickening, most notably in the sigmoid colon.  Possible pneumatosis at the cecum.  No dilated loops of bowel.  Small volume ascites (not enough for paracentesis).  The spleen is absent.  The gallbladder is removed.  No acute process involving the kidneys or adrenal glands.  No acute process in the pelvis.       Possible wall thickening in the colon concerning for colitis.  There is focal wall thickening in the sigmoid colon concerning for mass.     ALPRAZolam 0.5 MG tablet  Commonly known as: XANAX  Take 0.5 tablets by mouth 2 times daily as needed for Anxiety for up to 60 days. Max Daily Amount: 0.5 mg     busPIRone 5 MG tablet  Commonly known as: BUSPAR     Caltrate 600+D Plus Minerals 600-800 MG-UNIT Tabs tablet     Creon 67517-92296 units delayed release capsule  Generic drug: lipase-protease-amylase  Take 1 capsule by mouth 3 times daily (with meals)     diphenhydrAMINE 25 MG capsule  Commonly known as: BENADRYL     doxazosin 2 MG tablet  Commonly known as: CARDURA  Take 1 tablet by mouth every evening     fentaNYL 25 MCG/HR  Commonly known as: DURAGESIC  Place 1 patch onto the skin every 3 days for 30 days. Intended supply: 30 days Max Daily Amount: 1 patch     furosemide 20 MG tablet  Commonly known as: LASIX  Take 1 tablet by mouth daily as needed (fluid retention)     HYDROcodone-acetaminophen  MG per tablet  Commonly known as: NORCO     hydrOXYzine pamoate 25 MG capsule  Commonly known as: VISTARIL  TAKE 1 CAPSULE BY MOUTH 3 TIMES DAILY AS NEEDED FOR ITCHING.     lactulose 10 GM/15ML solution  Commonly known as: CHRONULAC  Take 15 mLs by mouth daily as needed (constipation)     levothyroxine 137 MCG tablet  Commonly known as: SYNTHROID     lidocaine 4 % Gel jelly  Apply 1 Application topically 2 times daily as needed for Pain     lidocaine-prilocaine 2.5-2.5 % cream  Commonly known as: EMLA  Apply topically as needed.     loratadine 10 MG tablet  Commonly known as: Claritin  Take 1 tablet by mouth daily     losartan 25 MG tablet  Commonly known as: COZAAR  Take 1 tablet by mouth 2 times daily     Magic Mouthwash  Commonly known as: Miracle Mouthwash  Swish and spit 5 mLs 4 times daily as needed for Irritation 1/3 viscous lidocaine, 1/3 benadryl, 1/3 Maalox.     megestrol 20 MG tablet  Commonly known as: MEGACE     metoclopramide 10 MG tablet  Commonly known as: REGLAN  Take 1 tablet by mouth every 6 hours as needed (take every 6 hours as

## 2024-09-16 NOTE — PLAN OF CARE
Problem: Pain  Goal: Verbalizes/displays adequate comfort level or baseline comfort level  Outcome: Not Progressing  Flowsheets (Taken 9/15/2024 2307)  Verbalizes/displays adequate comfort level or baseline comfort level:   Encourage patient to monitor pain and request assistance   Assess pain using appropriate pain scale   Administer analgesics based on type and severity of pain and evaluate response   Implement non-pharmacological measures as appropriate and evaluate response   Notify Licensed Independent Practitioner if interventions unsuccessful or patient reports new pain   Consider cultural and social influences on pain and pain management     Problem: Safety - Adult  Goal: Free from fall injury  Outcome: Progressing  Flowsheets (Taken 9/16/2024 0010)  Free From Fall Injury: Instruct family/caregiver on patient safety     Problem: ABCDS Injury Assessment  Goal: Absence of physical injury  Outcome: Progressing  Flowsheets (Taken 9/16/2024 0010)  Absence of Physical Injury: Implement safety measures based on patient assessment     Problem: Chronic Conditions and Co-morbidities  Goal: Patient's chronic conditions and co-morbidity symptoms are monitored and maintained or improved  Outcome: Progressing  Flowsheets (Taken 9/15/2024 2032)  Care Plan - Patient's Chronic Conditions and Co-Morbidity Symptoms are Monitored and Maintained or Improved:   Monitor and assess patient's chronic conditions and comorbid symptoms for stability, deterioration, or improvement   Collaborate with multidisciplinary team to address chronic and comorbid conditions and prevent exacerbation or deterioration   Update acute care plan with appropriate goals if chronic or comorbid symptoms are exacerbated and prevent overall improvement and discharge     Problem: Nutrition Deficit:  Goal: Optimize nutritional status  Outcome: Progressing  Flowsheets (Taken 9/15/2024 0342)  Nutrient intake appropriate for improving, restoring, or maintaining  nutritional needs:   Assess nutritional status and recommend course of action   Monitor oral intake, labs, and treatment plans     Problem: Pain  Goal: Verbalizes/displays adequate comfort level or baseline comfort level  Outcome: Not Progressing  Flowsheets (Taken 9/15/2024 9608)  Verbalizes/displays adequate comfort level or baseline comfort level:   Encourage patient to monitor pain and request assistance   Assess pain using appropriate pain scale   Administer analgesics based on type and severity of pain and evaluate response   Implement non-pharmacological measures as appropriate and evaluate response   Notify Licensed Independent Practitioner if interventions unsuccessful or patient reports new pain   Consider cultural and social influences on pain and pain management

## 2024-09-16 NOTE — PLAN OF CARE
Mrs. Pryor was found to be hypotensive with low O2 sats on room air in holding, asymptomatic.  Given these findings, we opted to defer EGD and flexible sigmoidoscopy today.  I reached out to her hospital medicine team.  We will determine timing of her procedures once she is medically optimized.  Plan discussed with her daughter and son also.  Order for KUB and CXR placed now.    Addendum:  Made aware of KUB findings and concerns for free air and perforation.  Recommending a stat CT scan abdomen and pelvis.  However the family seems to want to pursue hospice care.  Discussing with her hospital medicine team and Dr. Rosenthal at this time.

## 2024-09-16 NOTE — CARE COORDINATION
09/16/24 1423   Encounter Summary   Encounter Overview/Reason Initial Encounter;Spiritual/Emotional Needs;Grief, Loss, and Adjustments  (The pt has been accepted for admission to the [facility].)   Service Provided For Patient;Family   Referral/Consult From Physician;Palliative Care   Support System Children;Family members   Complexity of Encounter Moderate   Begin Time 1345   End Time  1430   Total Time Calculated 45 min   Spiritual/Emotional needs   Type Spiritual Support;Other (comment)  (End of life care.)   Grief, Loss, and Adjustments   Type Hospice   Advance Care Planning   Type   (DNR order in place.)   Assessment/Intervention/Outcome   Assessment Anxious;Concerns with suffering;Tearful   Intervention Active listening;End of Life Care;Nurtured Hope;Prayer (assurance of)/Dixie;Sustaining Presence/Ministry of presence;Discussed belief system/Faith practices/nicole   Outcome Expressed feelings, needs, and concerns;Less anxious, Less agitated;Venting emotion   Plan and Referrals   Plan/Referrals Continue to visit, (comment)  (This  to follow for sc in the [facility].)   Does the patient have a [facility] PCP? Yes    to follow up after discharge? No     Spiritual Health Assessment/Progress Note  [facility]    (P) Initial Encounter, Spiritual/Emotional Needs, Grief, Loss, and Adjustments (The pt has been accepted for admission to the [facility].),  , (P) Hospice,      Name: Linn Pryor MRN: [id number]    Age: 78 y.o.     Sex: female   Language: English   Mormon: Adventism   Coffee ground emesis     Date: 9/16/2024            Total Time Calculated: (P) 45 min              Spiritual Assessment began in [facility] 4 ONCOLOGY UNIT        Referral/Consult From: (P) Physician, Palliative Care   Encounter Overview/Reason: (P) Initial Encounter, Spiritual/Emotional Needs, Grief, Loss, and Adjustments (The pt has been accepted for admission to the [facility].)  Service Provided For: (P) Patient, Family    Nicole, Belief,  Meaning:   Patient identifies as spiritual  Family/Friends identify as spiritual and are connected with a nicole tradition or spiritual practice      Importance and Influence:  Patient has spiritual/personal beliefs that influence decisions regarding their health  Family/Friends have spiritual/personal beliefs that influence decisions regarding the patient's health    Community:  [facility].  Family/Friends are connected with a spiritual community:    Assessment and Plan of Care:     Patient Interventions include: Facilitated expression of thoughts and feelings and Explored spiritual coping/struggle/distress and theological reflection  Family/Friends Interventions include: Family/Friends Interventions and Explored spiritual coping/struggle/distress and theological reflection    Patient Plan of Care: Contact Nicole  for support or sacramental needs  Family/Friends Plan of Care: Contact Nicole  for support or sacramental needs    Electronically signed by Chaplain Lev on 9/16/2024 at 2:28 PM

## 2024-09-16 NOTE — CONSULTS
Palliative Care Consult Note    9/16/2024 8:40 AM  Subjective:  Admit Date: 9/13/2024  PCP: Jaiden Hayden MD    Date of Service: 9/16/2024    Reason for Consultation:  Goals of Care, Code Status, Family Support     History Obtained From: EMR/Patient and their Family    History Of Present Illness:   The patient is a 78 y.o. female with PMH HTN, depression, anxiety, hypothyroidism, arthritis, pancreatic cancer s/p whipple procedure 2021, pulmonary nodules, and other comorbidities who presented to [facility] ED 9/13/2024 complaining of worsening abdominal pain with nausea and coffee ground emesis. 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  Patient is s/p laparoscopic assisted distal pancreatectomy and splenectomy in June 2021 at [facility]. She is followed locally by oncology with Dr. Rosenthal and has been receiving chemotherapy with modified FOLFOX since June 2024.  Last received treatment 9/3/2024. She had prior progression of disease while on Gemzar and Abraxane, Gemzar capecitabine and Onivyde and leucovorin.  Most recent CT scans have remained stable without evidence of gross disease progression.  Patient is also established with outpatient palliative care with last visit note reviewed from 8/20/2024. Patient endorsed worsening pain from her chronic neoplasm related pain in abdomen and pelvis for one day prior to presentation along with \"black vomit\".  Workup in ED did reveal positive heme rectal exam, CBC showed WBC 8.4, hemoglobin 11.6, platelet count 240,000.  Chemistry remarkable for sodium 135, normal creatinine, normal LFTs.  CT chest with small bilateral pleural effusions, stable subcentimeter pulmonary nodules, and without other acute findings. CT abdomen/pelvis with IV contrast shows a stable liver lesion and stable peritoneal carcinomatosis. She also has mention of sigmoid colon thickening

## 2024-09-16 NOTE — PROGRESS NOTES
Venclexta starter pack approved until 12/31/2020-[employer]    I have escribed the rx to [facility].   PRN Lauren Bonilla MD        promethazine (PHENERGAN) 25 mg in sodium chloride 0.9 % 50 mL IVPB  25 mg IntraVENous Q8H PRN Melanie Campos APRN - CNP   Stopped at 09/16/24 0323    potassium chloride (KLOR-CON M) extended release tablet 40 mEq  40 mEq Oral PRN Melanie Campos APRN - CNP        Or    potassium bicarb-citric acid (EFFER-K) effervescent tablet 40 mEq  40 mEq Oral PRN Melanie Campos APRN - CNP   40 mEq at 09/15/24 2042    Or    potassium chloride 10 mEq/100 mL IVPB (Peripheral Line)  10 mEq IntraVENous PRN Melanie Campos APRN - CNP        ALPRAZolam (XANAX) tablet 0.25 mg  0.25 mg Oral BID Dayday Terry APRN - CNP   0.25 mg at 09/15/24 2033    fentaNYL (DURAGESIC) 25 MCG/HR 1 patch  1 patch TransDERmal Q3 Days Dayday Terry APRN - CNP   1 patch at 09/14/24 1006    HYDROcodone-acetaminophen (NORCO)  MG per tablet 1 tablet  1 tablet Oral Q6H PRN Dayday Terry APRN - CNP   1 tablet at 09/16/24 0230    lactulose (CHRONULAC) 10 GM/15ML solution 10 g  10 g Oral Daily PRN Dayday Terry APRN - CNP        levothyroxine (SYNTHROID) tablet 137 mcg  137 mcg Oral Daily Dayday Terry APRN - CNP   137 mcg at 09/15/24 0552    lipase-protease-amylase (ZENPEP) delayed release capsule 10,000 Units  10,000 Units Oral TID WC Dayday Terry APRN - CNP   10,000 Units at 09/15/24 1702    megestrol (MEGACE) tablet 20 mg  20 mg Oral PRN Dayday Terry APRN - CNP        fentaNYL (DURAGESIC) 12 MCG/HR 1 patch  1 patch TransDERmal Q72H Radha Rosenthal MD   1 patch at 09/14/24 1127    doxazosin (CARDURA) tablet 2 mg  2 mg Oral QPM Melanie Campos APRN - CNP   2 mg at 09/15/24 1702    losartan (COZAAR) tablet 25 mg  25 mg Oral BID Melanie Campos APRN - CNP   25 mg at 09/15/24 2033    metoclopramide (REGLAN) tablet 10 mg  10 mg Oral Q6H PRN Melanie Campos APRN - CNP        metoprolol succinate (TOPROL XL) extended release tablet 50 mg  50 mg Oral BID Melanie Campos APRN - CNP   50 mg at 09/15/24 2033  stable.  Stable 0.6 cm cyst of the left lobe along the falciform ligament.  No new liver lesions.  No biliary tree dilatation.  Cholecystectomy, again noted.  Severe atrophy and fatty replacement of the pancreas, stable.  Stable 1.4 cm cystic lesion of uncinate process of the pancreas and 0.9 cm cystic lesion of the pancreatic head.  Absence of the spleen, with suggestion of several splenules in the left upper quadrant, again noted.  The adrenal glands have a normal appearance.  The kidneys enhance symmetrically.  1.2 cm cyst  of the left kidney, stable.  Extrarenal pelves bilaterally with mild fullness of the right renal collecting system but no eh hydronephrosis, stable.  Hysterectomy, again noted.  No visible adnexal mass.  Colonic diverticulosis.  Apparent mild wall thickening of the sigmoid colon, stable to mildly progressed.  Trace ascites, stable.  Suggestion of mild nodular peritoneal thickening associated with some of the ascites.  Nodular thickening of the greater omentum on the left, grossly stable.  Measures  1.6 cm.  Prominent right epicardial lymph node, stable.  Measures 0.7 cm in short axis dimension.  Bone window images show no significant lytic or sclerotic bone lesions.  Mild serpentine scoliosis of the thoracolumbar spine, stable.  Impression: 1.  Stable hepatic metastatic disease, and peritoneal carcinomatosis. 2.  Stable severe atrophy and fatty replacement of the pancreas, and 1.4 cm and smaller cystic lesions of the uncinate process and pancreatic head. 3.  Colonic diverticulosis, again noted, with equivocal mild diverticulitis of the sigmoid colon. 4.  Absent spleen, as before. 5.  For findings in the chest, please refer to separate report.            My interpretation: Colonic dilatation.      ASSESSMENT:  # Coffee-ground emesis/persistent nausea vomiting  Continue pantoprazole drip  Plan for upper endoscopy tomorrow    # Guaiac positive stool    # Colitis-CT abdomen/pel showed dilated  colon  Ciprofloxacin and metronidazole  Plans for flex sig endoscopy or colonoscopy tomorrow    # Pancreatic adenocarcinoma, stage IV-currently on treatment with modified FOLFOX as outpatient.  Overall, the most recent CT scan C/A/P does not show any evidence of gross disease progression.  Hold chemotherapy treatment      # Normocytic anemia-no significant drop in hemoglobin.  Mild improvement.  Recent Labs     09/16/24  0141 09/15/24  1935 09/15/24  0930 09/14/24  0620 09/13/24  1356 09/03/24  1017   WBC  --   --  8.5  --  8.4 7.44   HGB 9.9* 10.7* 11.3*   < > 11.6* 11.5   HCT 30.1* 32.6* 33.9*   < > 34.7* 34.7   MCV  --   --  95.5  --  96.1 94.6   PLT  --   --  217  --  240 224    < > = values in this interval not displayed.   Continue to monitor.    # Cancer-related pain-  Continue fentanyl 37 mcg every 72 hours, next dose 9/17/2004 9 AM    # Hypothyroidism  Continue levothyroxine 137 mcg  I do not see any recent TSH we will check tomorrow    # Hypotension-patient became hypotensive from she had 1 dose of morphine.  Normal saline bolus 500 cc x 1.  Normal saline 100 cc an hour.    PLAN:  Continue to monitor GI bleed  Continue to monitor hemoglobin  Continue pain control with opioid medications  Continue to monitor hemoglobin  Agree with pantoprazole drip  Continue to hold chemotherapy  Continue ciprofloxacin and metronidazole  Care plan discussed with GI-planning upper EGD/flex sigmoidoscopy today  CBC without differential and BMP with reflexive magnesium in a.m.  CA 19-9 in a.m.  TSH in a.m.  Increased pain medication to fentanyl patch 37 mcg, change 9/17/2024 9 AM  4 mg IV morphine given this morning for severe pain.  Coordination with bedside RN and palliative care  Palliative care consultation today to assist with pain control  Normal saline 500 cc bolus now  Normal saline at 100 cc an hour  Care plan discussed with palliative care and bedside RN      (Please note that portions of this note were completed

## 2024-09-18 LAB
BACTERIA BLD CULT ORG #2: NORMAL
BACTERIA BLD CULT: NORMAL

## 2024-09-19 ENCOUNTER — HOSPITAL ENCOUNTER (OUTPATIENT)
Dept: INFUSION THERAPY | Age: 79
End: 2024-09-19

## 2025-01-23 NOTE — H&P (VIEW-ONLY)
I did send the prep into the pharmacy.  The patient is diabetic and appears to be on GLP-1 agonist please ensure that she has the appropriate diabetic instructions and the medication hold for semaglutide.    It also appears that this patient was dealing with constipation when I saw her in 10/2022.  I think she should be taking MiraLAX daily and increasing to twice daily leading up to the colonoscopy to try to get her bowels moving.   Patient Care Team:  lAejandra Garcia DO as PCP - General (Internal Medicine)  Lisa Ta DO as Consulting Physician (Vascular Surgery)      Addendum: 1/11/22  Yoseph Hercules 77 y.o. female with a history of pancreatic cancer, status post Whipple procedure. She is having swelling in her right upper extremity and neck. We have been asked by Hem/Onc to perform a Port study. A port study was performed on 1/11/2022 by Dr. Ava Brothers. This showed that the port was dislodged into the subcutaneous tissue. She  will need a new port placed.     Patient Active Problem List   Diagnosis    Traumatic incomplete tear of left rotator cuff    Malignant neoplasm of tail of pancreas ([facility] [address]      See attached meds  Allergies:  Diclofenac, Oxycodone-acetaminophen, and Doxycycline  Past Medical History:   Diagnosis Date    Anxiety     Arthritis     Depression     Hypertension     Hypoglycemia     if fasting for very long    Pancreatic cancer ([facility] [address] 06/2021    s/p Whipple by Dr. Arben Abdi    Shoulder pain     incompleted rcr; possible injury    Thyroid disease       Past Surgical History:   Procedure Laterality Date   [address] [facility]  2020    Dr Pablo Morrison  06/28/2021    PORT SURGERY Right 9/21/2021    Single lumen PORT INSERTION with ultrasound and fluoroscopy performed by Eugenia Stewart DO at [address] Right 10/19/2017    SHOULDER ARTHROSCOPY ROTATOR CUFF REPAIR/ DEBRIDEMENT BICEPS TENODESIS/ TENOTOMY SUBACROMIAL DECOMPRESSION/ DISTAL CLAVICLE EXCISION performed by Jordan Duran MD at [facility]  06/28/2021    UPPER GASTROINTESTINAL ENDOSCOPY  06/2021    Dr. Crys Iverson in [place]      spur excision      Family History   Adopted: Yes   Problem Relation Age of Onset    Breast Cancer Maternal Aunt     Breast Cancer Daughter     Cancer Daughter         Bladder    Cancer Maternal Uncle         Bladder      Social History     Tobacco Use    Smoking status: Never Smoker    Smokeless tobacco: Never Used   Substance Use Topics    Alcohol use: Not Currently     Comment: rare       HEENT __normocepahlic; sclera clear  Neck   Supple  Lungs -cta  Heart  rr  GI - Abdomen soft, non-tender  Extremities without cyanosis  Skin without significant lesions     Diagnosis: Pancreatic CA    Permit for removal of old port; placement of new port       Risks have been reviewed with the patient including but not limited to heart attack, death, CVA, bleeding, nerve injury, infection, steal, pain, and need for further surgery.       _______________________________________________________________________  Signature     Date    Time

## (undated) DEVICE — Device: Brand: DEFENDO AIR/WATER/SUCTION AND BIOPSY VALVE

## (undated) DEVICE — MASK VENTILATOR MED AD SUPERNOVA ET

## (undated) DEVICE — COVER US PRB W15XL120CM W/ GEL RUBBERBAND TAPE STRP FLD GEN

## (undated) DEVICE — TBG SMPL FLTR LINE NASL 02/C02 A/ BX/100

## (undated) DEVICE — THE SINGLE USE ETRAP – POLYP TRAP IS USED FOR SUCTION RETRIEVAL OF ENDOSCOPICALLY REMOVED POLYPS.: Brand: ETRAP

## (undated) DEVICE — 3M™ TEGADERM™ TRANSPARENT FILM DRESSING FRAME STYLE, 1626W, 4 IN X 4-3/4 IN (10 CM X 12 CM), 50/CT 4CT/CASE: Brand: 3M™ TEGADERM™

## (undated) DEVICE — STERILE POLYISOPRENE POWDER-FREE SURGICAL GLOVES: Brand: PROTEXIS

## (undated) DEVICE — NEEDLE HYPO 22GA L1.5IN BLK POLYPR HUB S STL REG BVL STR

## (undated) DEVICE — C-ARM: Brand: UNBRANDED

## (undated) DEVICE — SUTURE PROL SZ 2-0 L36IN NONABSORBABLE BLU V-7 L26MM 1/2 8977H

## (undated) DEVICE — GLOVE SURG SZ 7 CRM LTX FREE POLYISOPRENE POLYMER BEAD ANTI

## (undated) DEVICE — NEEDLE ANGIO 18GAX2.75IN PERC NO BASE

## (undated) DEVICE — ADHESIVE SKIN CLSR 0.7ML TOP DERMBND ADV

## (undated) DEVICE — MAJOR BSIN SETUP PK

## (undated) DEVICE — MINOR CDS: Brand: MEDLINE INDUSTRIES, INC.

## (undated) DEVICE — 3M™ IOBAN™ 2 ANTIMICROBIAL INCISE DRAPE 6650EZ: Brand: IOBAN™ 2

## (undated) DEVICE — SNAR POLYP CAPTIVATOR MICROHEX 13 240CM

## (undated) DEVICE — PAD,EYE,1-5/8X2 5/8,STERILE,LF,1/PK: Brand: MEDLINE

## (undated) DEVICE — TOWEL,OR,DSP,ST,BLUE,DLX,4/PK,20PK/CS: Brand: MEDLINE

## (undated) DEVICE — SUTURE TIGERTAPE TIGERWIRE SZ 2-0 L30IN NONABSORBABLE AR72377T

## (undated) DEVICE — THE CHANNEL CLEANING BRUSH IS A NYLON FLEXI BRUSH ATTACHED TO A FLEXIBLE PLASTIC SHEATH DESIGNED TO SAFELY REMOVE DEBRIS FROM FLEXIBLE ENDOSCOPES.

## (undated) DEVICE — [AGGRESSIVE 6-FLUTE BARREL BUR, ARTHROSCOPIC SHAVER BLADE,  DO NOT RESTERILIZE,  DO NOT USE IF PACKAGE IS DAMAGED,  KEEP DRY,  KEEP AWAY FROM SUNLIGHT]: Brand: FORMULA

## (undated) DEVICE — SHOULDER CDS

## (undated) DEVICE — YANKAUER,BULB TIP WITH VENT: Brand: ARGYLE

## (undated) DEVICE — 3M™ STERI-STRIP™ REINFORCED ADHESIVE SKIN CLOSURES, R1546, 1/4 IN X 4 IN (6 MM X 100 MM), 10 STRIPS/ENVELOPE: Brand: 3M™ STERI-STRIP™

## (undated) DEVICE — CUFF,BP,DISP,1 TUBE,ADULT,HP: Brand: MEDLINE

## (undated) DEVICE — BANDAGE,GAUZE,CONFORMING,4"X75",STRL,LF: Brand: MEDLINE

## (undated) DEVICE — ARM BOARD PAD: Brand: DEVON

## (undated) DEVICE — SOLUTION IV IRRIG POUR BRL 0.9% SODIUM CHL 2F7124

## (undated) DEVICE — FRCP BX RADJAW4 NDL 2.8 240 STD OG

## (undated) DEVICE — DRAPE,SHOULDER,BEACH CHAIR,STERILE: Brand: MEDLINE

## (undated) DEVICE — PROVE COVER: Brand: UNBRANDED

## (undated) DEVICE — ABDOMINAL PAD: Brand: DERMACEA

## (undated) DEVICE — SUTURE MCRYL SZ 3 0 L18IN ABSRB UD PS 2 3 8 CIR REV CUT NDL MCP497G

## (undated) DEVICE — 90-S MAX, SUCTION PROBE, NON-BENDABLE, MAX CUT LEVEL 11: Brand: SERFAS ENERGY

## (undated) DEVICE — MASK,OXYGEN,MED CONC,ADLT,7' TUB, UC: Brand: PENDING

## (undated) DEVICE — SUTURE FIBERTAPE FIBERWIRE SZ 2-0 30IN NONABSORB BLU AR72377

## (undated) DEVICE — PACK,UNIVERSAL,NO GOWNS: Brand: MEDLINE

## (undated) DEVICE — GLOVE SURG SZ 8 L12IN FNGR THK94MIL TRNSLUC YEL LTX HYDRGEL

## (undated) DEVICE — SENSR O2 OXIMAX FNGR A/ 18IN NONSTR

## (undated) DEVICE — CHLORAPREP 26ML ORANGE

## (undated) DEVICE — SUTURE VCRL SZ 3-0 L27IN ABSRB UD L26MM SH 1/2 CIR J416H

## (undated) DEVICE — SUTURE FIBERWIRE SZ 2 L38IN NONABSORBABLE BLU WHT BLK AR7201

## (undated) DEVICE — INTENDED FOR TISSUE SEPARATION, AND OTHER PROCEDURES THAT REQUIRE A SHARP SURGICAL BLADE TO PUNCTURE OR CUT.: Brand: BARD-PARKER ® CARBON RIB-BACK BLADES

## (undated) DEVICE — SUTURE VCRL SZ 3-0 L18IN ABSRB UD L26MM SH 1/2 CIR J864D

## (undated) DEVICE — 9165 UNIVERSAL PATIENT PLATE: Brand: 3M™

## (undated) DEVICE — 60 ML SYRINGE LUER-LOCK TIP: Brand: MONOJECT

## (undated) DEVICE — Z INACTIVE USE 2660664 SOLUTION IRRIG 3000ML 0.9% SOD CHL USP UROMATIC PLAS CONT

## (undated) DEVICE — BAG BND 20X15IN CLR POLY RUBBERBAND NONSTERILE CFI664NS

## (undated) DEVICE — CANNULA ARTHSCP L7CM DIA7MM TRNSLUC THRD FLX W/ NO SQUIRT

## (undated) DEVICE — ENDO KIT,LOURDES HOSPITAL: Brand: MEDLINE INDUSTRIES, INC.

## (undated) DEVICE — [AGGRESSIVE PLUS CUTTER, ARTHROSCOPIC SHAVER BLADE,  DO NOT RESTERILIZE,  DO NOT USE IF PACKAGE IS DAMAGED,  KEEP DRY,  KEEP AWAY FROM SUNLIGHT]: Brand: FORMULA

## (undated) DEVICE — SUTURE FIBERLOOP SZ 2-0 L20IN NONABSORBABLE BLU STR NDL AR7234

## (undated) DEVICE — NEEDLE SUT PASS FOR ROT CUF LABRAL REP SUREFIRE SCORPION

## (undated) DEVICE — ROYAL SILK SURGICAL GOWN, XXL: Brand: CONVERTORS

## (undated) DEVICE — SUTURE MCRYL SZ 4-0 L18IN ABSRB UD L19MM PS-2 3/8 CIR PRIM Y496G

## (undated) DEVICE — THREE QUARTER SHEET: Brand: CONVERTORS

## (undated) DEVICE — CONMED SCOPE SAVER BITE BLOCK, 20X27 MM: Brand: SCOPE SAVER

## (undated) DEVICE — T-MAX DISPOSABLE FACE MASK 8 PER BOX

## (undated) DEVICE — CONMED GOLDLINE ELECTROSURGICAL HANDPIECE, HAND CONTROLLED WITH BLADE ELECTRODE, BUTTON SWITCH, SAFETY HOLSTER AND 10 FT (3 M) CABLE: Brand: CONMED GOLDLINE

## (undated) DEVICE — 3M™ STERI-STRIP™ REINFORCED ADHESIVE SKIN CLOSURES, R1547, 1/2 IN X 4 IN (12 MM X 100 MM), 6 STRIPS/ENVELOPE: Brand: 3M™ STERI-STRIP™

## (undated) DEVICE — SHOULDER STABILIZATION KIT,                                    DISPOSABLE 12 PER BOX

## (undated) DEVICE — SUTURE PROL SZ 0 L30IN NONABSORBABLE BLU L26MM CT-2 1/2 CIR 8412H

## (undated) DEVICE — SKIN MARKER,REGULAR TIP WITH RULER: Brand: DEVON

## (undated) DEVICE — INTEGRATED CASSETTE TUBING, DO NOT USE IF PACKAGE IS DAMAGED: Brand: CROSSFLOW

## (undated) DEVICE — SUTURE VCRL + SZ 4-0 L27IN ABSRB UD L26MM SH 1/2 CIR VCP415H

## (undated) DEVICE — CANNULA NSL AD L7FT DIV O2 CO2 W/ M LUERLOCK TRMPT CONN

## (undated) DEVICE — GLOVE SURG SZ 8 CRM LTX FREE POLYISOPRENE POLYMER BEAD ANTI